# Patient Record
Sex: FEMALE | Race: WHITE | NOT HISPANIC OR LATINO | Employment: OTHER | URBAN - NONMETROPOLITAN AREA
[De-identification: names, ages, dates, MRNs, and addresses within clinical notes are randomized per-mention and may not be internally consistent; named-entity substitution may affect disease eponyms.]

---

## 2017-08-10 ENCOUNTER — HOSPITAL ENCOUNTER (EMERGENCY)
Facility: HOSPITAL | Age: 62
Discharge: HOME | End: 2017-08-10
Attending: EMERGENCY MEDICINE | Admitting: EMERGENCY MEDICINE

## 2017-08-10 VITALS
RESPIRATION RATE: 18 BRPM | WEIGHT: 186 LBS | OXYGEN SATURATION: 98 % | TEMPERATURE: 98.6 F | HEIGHT: 61 IN | SYSTOLIC BLOOD PRESSURE: 203 MMHG | HEART RATE: 74 BPM | DIASTOLIC BLOOD PRESSURE: 90 MMHG | BODY MASS INDEX: 35.12 KG/M2

## 2017-08-10 DIAGNOSIS — I10 POORLY-CONTROLLED HYPERTENSION: Primary | ICD-10-CM

## 2017-08-10 PROCEDURE — 99283 EMERGENCY DEPT VISIT LOW MDM: CPT

## 2017-08-10 RX ORDER — TRAMADOL HYDROCHLORIDE 50 MG/1
50 TABLET ORAL EVERY 8 HOURS PRN
COMMUNITY
End: 2017-08-25

## 2017-08-10 RX ORDER — LISINOPRIL 40 MG/1
40 TABLET ORAL DAILY
Qty: 30 TABLET | Refills: 0 | Status: SHIPPED | OUTPATIENT
Start: 2017-08-10 | End: 2017-08-24

## 2017-08-10 RX ORDER — FLUOXETINE HYDROCHLORIDE 20 MG/1
60 CAPSULE ORAL DAILY
COMMUNITY
End: 2017-11-30 | Stop reason: SDUPTHER

## 2017-08-10 RX ORDER — CLONIDINE HYDROCHLORIDE 0.1 MG/1
0.1 TABLET ORAL ONCE
Status: COMPLETED | OUTPATIENT
Start: 2017-08-10 | End: 2017-08-10

## 2017-08-10 RX ORDER — LISINOPRIL 40 MG/1
40 TABLET ORAL DAILY
COMMUNITY
End: 2017-08-25 | Stop reason: SDUPTHER

## 2017-08-10 RX ADMIN — CLONIDINE HYDROCHLORIDE 0.1 MG: 0.1 TABLET ORAL at 22:10

## 2017-08-11 NOTE — ED PROVIDER NOTES
Subjective   HPI Comments: 62-year-old female with poorly controlled hypertension.  She states that she just recently changed insurances and she has not established a doctor yet, she is on lisinopril and another hypertension medication that she cannot remember.  She is noncompliant with her controlled.  She states that even when she is on blood pressure medicine and her systolic runs 190s.  She states that she does have a headache but denies any chest pain blurred vision or dizziness.      History provided by:  Patient   used: No        Review of Systems   Cardiovascular:        Htn   Neurological: Positive for headaches.   All other systems reviewed and are negative.      Past Medical History:   Diagnosis Date   • Arthritis    • Depression    • Diabetes mellitus    • Hyperlipidemia    • Hypertension    • Myocardial infarction        Allergies   Allergen Reactions   • Sulfa Antibiotics Rash       Past Surgical History:   Procedure Laterality Date   • BONE MARROW ASPIRATION     • HYSTERECTOMY     • NOSE SURGERY         History reviewed. No pertinent family history.    Social History     Social History   • Marital status: Single     Spouse name: N/A   • Number of children: N/A   • Years of education: N/A     Social History Main Topics   • Smoking status: Former Smoker   • Smokeless tobacco: None   • Alcohol use No   • Drug use: No   • Sexual activity: Not Asked     Other Topics Concern   • None     Social History Narrative   • None           Objective   Physical Exam   Constitutional: She is oriented to person, place, and time. She appears well-developed and well-nourished.   HENT:   Head: Normocephalic.   Eyes: EOM are normal. Pupils are equal, round, and reactive to light.   Neck: Normal range of motion. Neck supple.   Cardiovascular: Normal rate and regular rhythm.    Pulmonary/Chest: Effort normal and breath sounds normal.   Abdominal: Soft. Bowel sounds are normal.   Musculoskeletal: Normal  range of motion.   Neurological: She is alert and oriented to person, place, and time. She has normal reflexes.   Skin: Skin is warm and dry.   Psychiatric: She has a normal mood and affect.   Nursing note and vitals reviewed.      Procedures         ED Course  ED Course                  MDM    Final diagnoses:   Poorly-controlled hypertension            Jamari Mata Jr., ABRAHAM  08/10/17 [id number]

## 2017-08-11 NOTE — ED NOTES
ANAYELI Kauffman states okay to medicate pt and then d/c. Pt reports comfortable to go home. No complaints/concerns @ d/c.      Meme Lewis RN  08/10/17 [id number]

## 2017-08-24 ENCOUNTER — OFFICE VISIT (OUTPATIENT)
Dept: INTERNAL MEDICINE | Facility: CLINIC | Age: 62
End: 2017-08-24

## 2017-08-24 VITALS
HEART RATE: 78 BPM | SYSTOLIC BLOOD PRESSURE: 174 MMHG | DIASTOLIC BLOOD PRESSURE: 90 MMHG | HEIGHT: 61 IN | RESPIRATION RATE: 20 BRPM | BODY MASS INDEX: 34.93 KG/M2 | OXYGEN SATURATION: 99 % | WEIGHT: 185 LBS

## 2017-08-24 DIAGNOSIS — E11.9 TYPE 2 DIABETES MELLITUS WITHOUT COMPLICATION, WITHOUT LONG-TERM CURRENT USE OF INSULIN (HCC): ICD-10-CM

## 2017-08-24 DIAGNOSIS — J45.30 MILD PERSISTENT ASTHMA WITHOUT COMPLICATION: ICD-10-CM

## 2017-08-24 DIAGNOSIS — I10 ESSENTIAL HYPERTENSION: Primary | ICD-10-CM

## 2017-08-24 LAB
ALBUMIN SERPL-MCNC: 4.6 G/DL (ref 3.2–4.8)
ALBUMIN/GLOB SERPL: 1.9 G/DL (ref 1.5–2.5)
ALP SERPL-CCNC: 103 U/L (ref 25–100)
ALT SERPL W P-5'-P-CCNC: 38 U/L (ref 7–40)
ANION GAP SERPL CALCULATED.3IONS-SCNC: 11 MMOL/L (ref 3–11)
ARTICHOKE IGE QN: 147 MG/DL (ref 0–130)
AST SERPL-CCNC: 32 U/L (ref 0–33)
BILIRUB SERPL-MCNC: 0.6 MG/DL (ref 0.3–1.2)
BUN BLD-MCNC: 17 MG/DL (ref 9–23)
BUN/CREAT SERPL: 18.9 (ref 7–25)
CALCIUM SPEC-SCNC: 9.6 MG/DL (ref 8.7–10.4)
CHLORIDE SERPL-SCNC: 103 MMOL/L (ref 99–109)
CHOLEST SERPL-MCNC: 224 MG/DL (ref 0–200)
CO2 SERPL-SCNC: 26 MMOL/L (ref 20–31)
CREAT BLD-MCNC: 0.9 MG/DL (ref 0.6–1.3)
GFR SERPL CREATININE-BSD FRML MDRD: 64 ML/MIN/1.73
GLOBULIN UR ELPH-MCNC: 2.4 GM/DL
GLUCOSE BLD-MCNC: 89 MG/DL (ref 70–100)
HBA1C MFR BLD: 6.1 %
HDLC SERPL-MCNC: 44 MG/DL (ref 40–60)
POTASSIUM BLD-SCNC: 4.5 MMOL/L (ref 3.5–5.5)
PROT SERPL-MCNC: 7 G/DL (ref 5.7–8.2)
SODIUM BLD-SCNC: 140 MMOL/L (ref 132–146)
TRIGL SERPL-MCNC: 223 MG/DL (ref 0–150)

## 2017-08-24 PROCEDURE — 99204 OFFICE O/P NEW MOD 45 MIN: CPT | Performed by: FAMILY MEDICINE

## 2017-08-24 PROCEDURE — 83036 HEMOGLOBIN GLYCOSYLATED A1C: CPT | Performed by: FAMILY MEDICINE

## 2017-08-24 PROCEDURE — 80053 COMPREHEN METABOLIC PANEL: CPT | Performed by: FAMILY MEDICINE

## 2017-08-24 PROCEDURE — 80061 LIPID PANEL: CPT | Performed by: FAMILY MEDICINE

## 2017-08-24 RX ORDER — HYDROCHLOROTHIAZIDE 25 MG/1
25 TABLET ORAL DAILY
COMMUNITY
End: 2017-08-25 | Stop reason: SDUPTHER

## 2017-08-24 RX ORDER — ALBUTEROL SULFATE 90 UG/1
2 AEROSOL, METERED RESPIRATORY (INHALATION) EVERY 4 HOURS PRN
COMMUNITY
End: 2017-10-04 | Stop reason: SDUPTHER

## 2017-08-24 NOTE — PATIENT INSTRUCTIONS
It was nice meeting you today!  I look forward to getting to know you and helping you with your primary care needs.  Please go to the lab before you leave to have your labs drawn.  You will be called or receive a letter with your results.  I will refill your medications and prescribe Breo.  Your medications have been electronically prescribed and will be at your pharmacy.  Please pick them up and take as directed.  Use your Breo once a day.  Please sign a consent to request medical records from your previous primary care provider.  Recommend that you sign up for My Chart (our patient portal).  You will be able to access lab results, request appointments and send our office messages.  If you are interested, please ask for My Chart access information at the check out desk.  Please schedule an appointment for your annual physical exam (if you have not already had one for this year) at your earliest convenience.  Please follow-up as indicated.  Return to the clinic sooner if your symptoms worsen or if you have any other concerns.

## 2017-08-24 NOTE — PROGRESS NOTES
Subjective   Martha Murray is a 62 y.o. female who presents to the clinic to Establish Care      History of Present Illness  She reports that her previous PCP was at .  Transferred care due to patient being dismissed from her last PCP office.  Specialists include: Dr. Ontiveros (sports medicine)  OTC medications include: Aleve PRN    Patient has a history of chronic hypertension and diabetes, diagnosed between 3-5 years ago.  Currently taking HCTZ and was previously taking Lisinopril for her blood pressure.  Was also previously taking Metformin, but is not currently taking it.  Has been trying to control her blood sugar with diet.  Admits that she does not check her blood sugar or pressure at home, but states that she will check her blood pressure at [facility] on occasion.  States that she is very active at work.    Also reports a history of chronic asthma.  Currently using Symbicort and Albuterol PRN.  Admits that she will sometimes use her Symbicort more often if she is wheezing more.  Symptoms tend to worsen with exertion.  Previous smoker, quit about 20 years ago.    Review of Systems   Constitutional: Negative for appetite change, chills, fever and unexpected weight change.   HENT: Negative for congestion, ear pain, rhinorrhea, sinus pressure and sore throat.    Eyes: Negative for pain and visual disturbance.   Respiratory: Positive for cough (intermittent), shortness of breath (intermittent) and wheezing (intermittent).    Cardiovascular: Negative for chest pain and palpitations.   Gastrointestinal: Negative for abdominal pain, constipation, diarrhea, nausea and vomiting.   Genitourinary: Negative for decreased urine volume, dysuria and hematuria.   Musculoskeletal: Positive for arthralgias (chronic) and joint swelling (chronic). Negative for back pain and gait problem.   Skin: Negative for pallor and rash.   Neurological: Negative for dizziness, syncope and headaches.   Psychiatric/Behavioral: Negative for  self-injury and suicidal ideas. The patient is not nervous/anxious.         Positive for depressed mood.     All other systems reviewed and are negative.    Past Medical History:   Diagnosis Date   • Arthritis    • Asthma    • Depression    • Diabetes mellitus    • History of heart attack    • Hyperlipidemia    • Hypertension        Family History   Problem Relation Age of Onset   • Arthritis Maternal Grandmother    • Hypertension Mother    • Hyperlipidemia Mother    • Thyroid disease Mother    • Diabetes Sister    • Crohn's disease Sister    • Hypertension Sister    • Heart attack Maternal Grandfather        Past Surgical History:   Procedure Laterality Date   • BILATERAL BREAST REDUCTION     • BONE MARROW ASPIRATION     • HYSTERECTOMY  1990    x 2   • SINUS SURGERY  2003   • TONSILLECTOMY         Social History     Social History   • Marital status: Single     Spouse name: N/A   • Number of children: N/A   • Years of education: N/A     Occupational History   • Not on file.     Social History Main Topics   • Smoking status: Former Smoker     Packs/day: 2.00     Years: 30.00     Types: Cigarettes     Quit date: 1997   • Smokeless tobacco: Never Used   • Alcohol use No   • Drug use: No   • Sexual activity: Not on file     Other Topics Concern   • Not on file     Social History Narrative   • No narrative on file         Current Outpatient Prescriptions:   •  albuterol (PROVENTIL HFA;VENTOLIN HFA) 108 (90 Base) MCG/ACT inhaler, Inhale 2 puffs Every 4 (Four) Hours As Needed for Wheezing., Disp: , Rfl:   •  FLUoxetine (PROzac) 20 MG capsule, Take 60 mg by mouth Daily., Disp: , Rfl:   •  hydrochlorothiazide (HYDRODIURIL) 25 MG tablet, Take 1 tablet by mouth Daily., Disp: 30 tablet, Rfl: 2  •  lisinopril (PRINIVIL,ZESTRIL) 40 MG tablet, Take 1 tablet by mouth Daily., Disp: 30 tablet, Rfl: 2  •  Fluticasone Furoate-Vilanterol (BREO ELLIPTA) 100-25 MCG/INH aerosol powder , Inhale 1 puff Daily., Disp: 14 each, Rfl:  "0    Objective   /90  Pulse 78  Resp 20  Ht 61\" (154.9 cm)  Wt 185 lb (83.9 kg)  SpO2 99%  BMI 34.96 kg/m2     Physical Exam   Constitutional: She is oriented to person, place, and time. She appears well-developed and well-nourished.   No acute distress.   HENT:   Head: Normocephalic and atraumatic.   Right Ear: External ear normal.   Left Ear: External ear normal.   Nose: Nose normal.   Mouth/Throat: Oropharynx is clear and moist.   Eyes: Conjunctivae and EOM are normal. Pupils are equal, round, and reactive to light.   Neck: Normal range of motion. Neck supple.   Cardiovascular: Normal rate, regular rhythm, normal heart sounds and intact distal pulses.    Pulmonary/Chest: Effort normal and breath sounds normal. No respiratory distress. She has no wheezes.   Abdominal: Soft. Bowel sounds are normal. There is no tenderness.   Musculoskeletal: Normal range of motion.   Normal gait.   Neurological: She is alert and oriented to person, place, and time.   Skin: Skin is warm and dry.   Psychiatric: She has a normal mood and affect. Her behavior is normal.   Vitals reviewed.      Assessment/Plan   Martha was seen today for establish care.    Diagnoses and all orders for this visit:    Essential hypertension  -     lisinopril (PRINIVIL,ZESTRIL) 40 MG tablet; Take 1 tablet by mouth Daily.  -     hydrochlorothiazide (HYDRODIURIL) 25 MG tablet; Take 1 tablet by mouth Daily.    Uncontrolled.  Blood pressure today in office was 174/90.  Patient states that she is only taking HCTZ at this time.  Will refill both of her medications today.  Advised patient to return to the clinic in 4 weeks for recheck or sooner if their blood pressure worsens.    Mild persistent asthma without complication  -     Fluticasone Furoate-Vilanterol (BREO ELLIPTA) 100-25 MCG/INH aerosol powder ; Inhale 1 puff Daily.    Uncontrolled symptoms per patient report.  A sample of Breo was provided for the patient today.  Will consider continuing " this if insurance will cover.  Advised patient to return to the clinic in 4 weeks for recheck or sooner if their symptoms worsen.    Type 2 diabetes mellitus without complication, without long-term current use of insulin  -     Comprehensive Metabolic Panel  -     Lipid Panel  -     POC Glycosylated Hemoglobin (Hb A1C)    Per patient report.  Patient is not currently taking any medications.  The above labs were ordered today.  Will restart Metformin if labs indicate.

## 2017-08-25 ENCOUNTER — TELEPHONE (OUTPATIENT)
Dept: INTERNAL MEDICINE | Facility: CLINIC | Age: 62
End: 2017-08-25

## 2017-08-25 PROBLEM — J45.30 MILD PERSISTENT ASTHMA WITHOUT COMPLICATION: Status: ACTIVE | Noted: 2017-08-25

## 2017-08-25 PROBLEM — E11.9 TYPE 2 DIABETES MELLITUS WITHOUT COMPLICATION, WITHOUT LONG-TERM CURRENT USE OF INSULIN (HCC): Status: ACTIVE | Noted: 2017-08-25

## 2017-08-25 PROBLEM — I10 ESSENTIAL HYPERTENSION: Status: ACTIVE | Noted: 2017-08-25

## 2017-08-25 RX ORDER — HYDROCHLOROTHIAZIDE 25 MG/1
25 TABLET ORAL DAILY
Qty: 30 TABLET | Refills: 2 | Status: SHIPPED | OUTPATIENT
Start: 2017-08-25 | End: 2017-11-30 | Stop reason: SDUPTHER

## 2017-08-25 RX ORDER — LISINOPRIL 40 MG/1
40 TABLET ORAL DAILY
Qty: 30 TABLET | Refills: 2 | Status: SHIPPED | OUTPATIENT
Start: 2017-08-25 | End: 2017-11-30 | Stop reason: SDUPTHER

## 2017-08-25 NOTE — TELEPHONE ENCOUNTER
----- Message from Billie Carvajal MD sent at 8/25/2017  8:24 AM EDT -----  Please call the patient regarding her recent lab results.  Her cholesterol was elevated.  Considering this, her hypertension, diabetes and age, she is at increased risk for developing heart disease.  I recommend that she start taking a baby aspirin and a cholesterol medication called Atorvastatin.  If she is OK with this, I will prescribe them for her.  Her Hgb A1C was 6.1, meaning that her diabetes is currently well controlled with her diet.  Recommend that she follow-up in 4 weeks for asthma recheck and we can also discuss her lab results further.  Thanks.

## 2017-08-28 DIAGNOSIS — E78.2 MIXED HYPERLIPIDEMIA: Primary | ICD-10-CM

## 2017-08-28 PROBLEM — E78.5 HYPERLIPIDEMIA: Status: ACTIVE | Noted: 2017-08-28

## 2017-08-28 RX ORDER — ASPIRIN 81 MG/1
81 TABLET ORAL DAILY
Qty: 30 TABLET | Refills: 5 | Status: SHIPPED | OUTPATIENT
Start: 2017-08-28 | End: 2019-02-01 | Stop reason: SDUPTHER

## 2017-08-28 RX ORDER — ATORVASTATIN CALCIUM 40 MG/1
40 TABLET, FILM COATED ORAL DAILY
Qty: 30 TABLET | Refills: 2 | Status: SHIPPED | OUTPATIENT
Start: 2017-08-28 | End: 2017-11-30 | Stop reason: SDUPTHER

## 2017-09-26 ENCOUNTER — TELEPHONE (OUTPATIENT)
Dept: INTERNAL MEDICINE | Facility: CLINIC | Age: 62
End: 2017-09-26

## 2017-09-26 DIAGNOSIS — J45.30 MILD PERSISTENT ASTHMA WITHOUT COMPLICATION: ICD-10-CM

## 2017-10-04 ENCOUNTER — OFFICE VISIT (OUTPATIENT)
Dept: INTERNAL MEDICINE | Facility: CLINIC | Age: 62
End: 2017-10-04

## 2017-10-04 ENCOUNTER — HOSPITAL ENCOUNTER (OUTPATIENT)
Dept: GENERAL RADIOLOGY | Facility: HOSPITAL | Age: 62
Discharge: HOME | End: 2017-10-04
Admitting: FAMILY MEDICINE

## 2017-10-04 VITALS
HEIGHT: 61 IN | HEART RATE: 78 BPM | SYSTOLIC BLOOD PRESSURE: 150 MMHG | BODY MASS INDEX: 34.93 KG/M2 | DIASTOLIC BLOOD PRESSURE: 100 MMHG | OXYGEN SATURATION: 100 % | RESPIRATION RATE: 20 BRPM | WEIGHT: 185 LBS

## 2017-10-04 DIAGNOSIS — J98.9 FEBRILE RESPIRATORY ILLNESS: Primary | ICD-10-CM

## 2017-10-04 DIAGNOSIS — J45.31 MILD PERSISTENT ASTHMA WITH ACUTE EXACERBATION: ICD-10-CM

## 2017-10-04 DIAGNOSIS — R50.9 FEBRILE RESPIRATORY ILLNESS: Primary | ICD-10-CM

## 2017-10-04 PROCEDURE — 71020 HC CHEST PA AND LATERAL: CPT

## 2017-10-04 PROCEDURE — 99214 OFFICE O/P EST MOD 30 MIN: CPT | Performed by: FAMILY MEDICINE

## 2017-10-04 RX ORDER — PREDNISONE 20 MG/1
40 TABLET ORAL DAILY
Qty: 10 TABLET | Refills: 0 | Status: SHIPPED | OUTPATIENT
Start: 2017-10-04 | End: 2017-11-30

## 2017-10-04 RX ORDER — ALBUTEROL SULFATE 90 UG/1
2 AEROSOL, METERED RESPIRATORY (INHALATION) EVERY 6 HOURS PRN
Qty: 1 INHALER | Refills: 2 | Status: SHIPPED | OUTPATIENT
Start: 2017-10-04 | End: 2019-02-01 | Stop reason: SDUPTHER

## 2017-10-04 RX ORDER — BUDESONIDE AND FORMOTEROL FUMARATE DIHYDRATE 80; 4.5 UG/1; UG/1
2 AEROSOL RESPIRATORY (INHALATION)
Qty: 1 INHALER | Refills: 2 | Status: SHIPPED | OUTPATIENT
Start: 2017-10-04 | End: 2019-02-01 | Stop reason: SDUPTHER

## 2017-10-04 RX ORDER — AZITHROMYCIN 250 MG/1
TABLET, FILM COATED ORAL
Qty: 6 TABLET | Refills: 0 | Status: SHIPPED | OUTPATIENT
Start: 2017-10-04 | End: 2017-11-30

## 2017-10-04 NOTE — PROGRESS NOTES
"Subjective   Martha Murray is a 62 y.o. female who presents with complaint of Cough and Wheezing      History of Present Illness   Patient presents with complaint of a productive cough (brown and clear sputum) associated with fever (up to 101 F), chest pain with coughing, wheezing, dyspnea, congestion, runny nose and sore throat.  Problem started one week ago and has been worsening.  Has a history of chronic asthma and seasonal allergies.  States that she ran out of her Albuterol inhaler, but was using the Breo inhaler that was given during her last office visit.  Reports multiple recent sick contacts at work, but denies any known influenza contacts.  Has tried taking DayQuil, which has not significantly helped.  Denies active smoking, but admits to intermittent passive smoke exposure.    Review of Systems   Constitutional: Positive for fever. Negative for chills.   HENT: Positive for congestion, rhinorrhea and sore throat. Negative for ear discharge and ear pain.    Respiratory: Positive for cough (productive), shortness of breath and wheezing.    Cardiovascular: Positive for chest pain (with coughing). Negative for palpitations.   Gastrointestinal: Negative for abdominal pain, constipation, diarrhea, nausea and vomiting.   Neurological: Negative for dizziness, syncope and headaches.     The following portions of the patient's history were reviewed and updated as appropriate: current medications, past medical history, past social history and problem list.    Objective   /100  Pulse 78  Resp 20  Ht 61\" (154.9 cm)  Wt 185 lb (83.9 kg)  SpO2 100%  BMI 34.96 kg/m2     Physical Exam   Constitutional: She is oriented to person, place, and time. She appears well-developed and well-nourished.   No acute distress.   HENT:   Head: Normocephalic and atraumatic.   Right Ear: External ear normal.   Left Ear: External ear normal.   Nose: Mucosal edema present.   Mouth/Throat: Oropharynx is clear and moist.   Eyes: " Conjunctivae and EOM are normal. Pupils are equal, round, and reactive to light.   Neck: Normal range of motion. Neck supple.   Cardiovascular: Normal rate, regular rhythm, normal heart sounds and intact distal pulses.    Pulmonary/Chest: Effort normal. No respiratory distress. She has wheezes (mild, bilaterally).   Abdominal: Soft. Bowel sounds are normal. There is no tenderness.   Musculoskeletal: Normal range of motion.   Normal gait.   Lymphadenopathy:        Head (right side): Submandibular adenopathy present.        Head (left side): Submandibular adenopathy present.   Neurological: She is alert and oriented to person, place, and time.   Skin: Skin is warm and dry.   Psychiatric: She has a normal mood and affect. Her behavior is normal.   Vitals reviewed.      Assessment/Plan   Martha was seen today for cough and wheezing.    Diagnoses and all orders for this visit:    Febrile respiratory illness  -     XR Chest PA & Lateral  -     azithromycin (ZITHROMAX Z-BIANCA) 250 MG tablet; Take 2 tablets the first day, then 1 tablet daily for 4 days.    Symptoms are concerning for possible pneumonia.  Will order a chest xray today to further evaluate her symptoms and empirically treat with the above antibiotic today.  Further management as per below.    Mild persistent asthma with acute exacerbation  -     albuterol (PROVENTIL HFA;VENTOLIN HFA) 108 (90 Base) MCG/ACT inhaler; Inhale 2 puffs Every 6 (Six) Hours As Needed for Wheezing.  -     predniSONE (DELTASONE) 20 MG tablet; Take 2 tablets by mouth Daily.  -     budesonide-formoterol (SYMBICORT) 80-4.5 MCG/ACT inhaler; Inhale 2 puffs 2 (Two) Times a Day.    Likely due to the above issue.  Will refill her Albuterol as well as prescribe steroids and Symbicort (due to insurance preference).  Will stop Breo today.  Recommended that patient use her Albuterol inhaler three times a day for the next 5 days then go back to as needed.  Advised patient to return to the clinic if  their symptoms worsen despite the above treatments.

## 2017-10-04 NOTE — PATIENT INSTRUCTIONS
You are likely having an acute exacerbation of your asthma.  I have prescribed steroids and an antibiotic (Azithromycin) for you.  I have ordered a chest xray for you to rule out possible pneumonia.  Please go to the [facility] at [address] to get this done.  You do not need an appointment.  I have also refilled your Albuterol.  Please use this three times a day for the next 5 days, then go back to as needed.  I have also given you a sample of Symbicort and refilled this for you.  Please use two puffs, twice a day, every day.  Your medications have been electronically prescribed and will be at your pharmacy.  Please pick them up and take as directed.  Please follow-up as indicated.  Return to the clinic sooner if your symptoms worsen or if you have any other concerns.

## 2017-11-06 ENCOUNTER — HOSPITAL ENCOUNTER (OUTPATIENT)
Dept: GENERAL RADIOLOGY | Facility: HOSPITAL | Age: 62
Discharge: HOME | End: 2017-11-06
Admitting: FAMILY MEDICINE

## 2017-11-06 ENCOUNTER — OFFICE VISIT (OUTPATIENT)
Dept: INTERNAL MEDICINE | Facility: CLINIC | Age: 62
End: 2017-11-06

## 2017-11-06 VITALS
DIASTOLIC BLOOD PRESSURE: 90 MMHG | BODY MASS INDEX: 34.93 KG/M2 | WEIGHT: 185 LBS | HEIGHT: 61 IN | SYSTOLIC BLOOD PRESSURE: 128 MMHG | TEMPERATURE: 97.1 F

## 2017-11-06 DIAGNOSIS — M25.561 CHRONIC PAIN OF BOTH KNEES: Primary | ICD-10-CM

## 2017-11-06 DIAGNOSIS — M25.562 CHRONIC PAIN OF BOTH KNEES: Primary | ICD-10-CM

## 2017-11-06 DIAGNOSIS — G89.29 CHRONIC PAIN OF BOTH KNEES: Primary | ICD-10-CM

## 2017-11-06 DIAGNOSIS — J45.20 MILD INTERMITTENT ASTHMA WITHOUT COMPLICATION: ICD-10-CM

## 2017-11-06 PROCEDURE — 99214 OFFICE O/P EST MOD 30 MIN: CPT | Performed by: FAMILY MEDICINE

## 2017-11-06 PROCEDURE — 73560 X-RAY EXAM OF KNEE 1 OR 2: CPT

## 2017-11-06 NOTE — PROGRESS NOTES
Subjective   Martha Murray is a 62 y.o. female who presents to follow-up for Asthma      History of Present Illness   She was last seen in the office on 10/04/17 for acute asthma exacerbation.  Previous intervention/treatment includes: prescribed 5 day course of steroids, Symbicort and refilled Albuterol.  Reports better symptoms.    States that she has been using her Symbicort as directed and denies any intolerable side effects.  Also reports taking her steroids as directed.  States that she has been using her Albuterol inhaler less than twice a week.  Reports that her symptoms have improved significantly.    Also reports complaint of chronic bilateral knee pain.  Pain is on the medial sides of both knees and is associated with intermittent knee swelling.  Denies associated erythema, leg numbness or weakness.  Symptoms have been worsening over the past 7-8 months.  States that her pain worsens with excessive walking and states that she has to sit down and rest multiple times a day.  Pain also bothers her a night.  Denies falling, but states that her knees will sometimes give out from under her, but she will catch herself before falling.  Takes Aleve twice a day as well as uses OTC topical medications, which mildly helps.  Has done a steroid injection in her left knee one time, which helped for about one month.  Has also tried using gel insoles and has done physical therapy in the past, which did not significantly help.  Last imaging studies were reportedly done earlier this year.  Was previously seeing an orthopedic surgeon, but states that she switched insurances and needs a new referral.    Review of Systems   Constitutional: Negative for chills and fever.   Respiratory: Positive for cough (improved), shortness of breath (improved) and wheezing (improved).    Cardiovascular: Negative for chest pain and palpitations.   Gastrointestinal: Negative for abdominal pain, constipation, diarrhea, nausea and vomiting.  "  Musculoskeletal: Positive for arthralgias (chronic bilateral knees) and joint swelling (chronic bilateral knees).   Skin: Negative for color change.   Neurological: Negative for dizziness, syncope and headaches.     The following portions of the patient's history were reviewed and updated as appropriate: current medications, past medical history and problem list.    Objective   /90  Temp 97.1 °F (36.2 °C)  Ht 61\" (154.9 cm)  Wt 185 lb (83.9 kg)  BMI 34.96 kg/m2     Physical Exam   Constitutional: She is oriented to person, place, and time. She appears well-developed and well-nourished.   No acute distress.   HENT:   Head: Normocephalic and atraumatic.   Eyes: Conjunctivae and EOM are normal.   Neck: Normal range of motion.   Cardiovascular: Normal rate, regular rhythm, normal heart sounds and intact distal pulses.    Pulmonary/Chest: Effort normal and breath sounds normal. She has no wheezes.   Abdominal: Soft. Bowel sounds are normal. There is no tenderness.   Musculoskeletal: Normal range of motion.        Right knee: She exhibits normal range of motion, no swelling, no deformity and normal patellar mobility. No tenderness found.        Left knee: She exhibits normal range of motion, no swelling, no deformity and normal patellar mobility. No tenderness found.   Moves all extremities. Popping sensation with right knee flexion and extension.   Neurological: She is alert and oriented to person, place, and time. She has normal strength. No sensory deficit.   Skin: Skin is warm and dry.   Psychiatric: She has a normal mood and affect. Her behavior is normal.   Vitals reviewed.      Assessment/Plan   Martha was seen today for asthma.    Diagnoses and all orders for this visit:    Chronic pain of both knees  -     Ambulatory Referral to Orthopedic Surgery  -     XR Knee 1 or 2 View Left  -     XR Knee 1 or 2 View Right  -     diclofenac (VOLTAREN) 1 % gel gel; Apply 4 g topically 4 (Four) Times a Day As Needed " (joint pain).    Will order bilateral knee xrays today to further evaluate her symptoms.  Will also prescribe the above medication and referral for orthopedic surgery to aid with further evaluation and management today.    Mild intermittent asthma without complication    Improved exacerbation symptoms per patient report.  Will continue Symbicort and Albuterol PRN.  Advised patient to return to the clinic if their symptoms recur despite the above treatments.

## 2017-11-06 NOTE — PATIENT INSTRUCTIONS
I have ordered a orthopedic surgery referral for you.  You will be called to set up an appointment with this specialist.  In the mean time, I have prescribed a medication for your knee pain called Voltaren gel.  Your new medication has been electronically prescribed and will be at your pharmacy.  Please pick this up and take as directed.  I have ordered knee xrays for you.  Please go to the [facility] at [facility] to get this done.  You do not need an appointment.  Please continue taking your current medications as directed.  Please follow-up as indicated.  Return to the clinic sooner if your symptoms worsen or if you have any other concerns.

## 2017-11-14 ENCOUNTER — OFFICE VISIT (OUTPATIENT)
Dept: ORTHOPEDIC SURGERY | Facility: CLINIC | Age: 62
End: 2017-11-14

## 2017-11-14 VITALS
DIASTOLIC BLOOD PRESSURE: 103 MMHG | HEIGHT: 61 IN | HEART RATE: 70 BPM | SYSTOLIC BLOOD PRESSURE: 180 MMHG | BODY MASS INDEX: 34.93 KG/M2 | WEIGHT: 185 LBS

## 2017-11-14 DIAGNOSIS — M17.0 PRIMARY OSTEOARTHRITIS OF BOTH KNEES: ICD-10-CM

## 2017-11-14 DIAGNOSIS — M17.10 PATELLOFEMORAL ARTHRITIS: Primary | ICD-10-CM

## 2017-11-14 PROCEDURE — 20610 DRAIN/INJ JOINT/BURSA W/O US: CPT | Performed by: ORTHOPAEDIC SURGERY

## 2017-11-14 PROCEDURE — 99244 OFF/OP CNSLTJ NEW/EST MOD 40: CPT | Performed by: ORTHOPAEDIC SURGERY

## 2017-11-14 RX ORDER — LIDOCAINE HYDROCHLORIDE 10 MG/ML
3 INJECTION, SOLUTION INFILTRATION; PERINEURAL
Status: COMPLETED | OUTPATIENT
Start: 2017-11-14 | End: 2017-11-14

## 2017-11-14 RX ORDER — TRIAMCINOLONE ACETONIDE 40 MG/ML
80 INJECTION, SUSPENSION INTRA-ARTICULAR; INTRAMUSCULAR
Status: COMPLETED | OUTPATIENT
Start: 2017-11-14 | End: 2017-11-14

## 2017-11-14 RX ORDER — MELOXICAM 7.5 MG/1
TABLET ORAL
Qty: 60 TABLET | Refills: 0 | Status: SHIPPED | OUTPATIENT
Start: 2017-11-14 | End: 2019-04-17

## 2017-11-14 RX ADMIN — LIDOCAINE HYDROCHLORIDE 3 ML: 10 INJECTION, SOLUTION INFILTRATION; PERINEURAL at 08:30

## 2017-11-14 RX ADMIN — LIDOCAINE HYDROCHLORIDE 3 ML: 10 INJECTION, SOLUTION INFILTRATION; PERINEURAL at 08:32

## 2017-11-14 RX ADMIN — TRIAMCINOLONE ACETONIDE 80 MG: 40 INJECTION, SUSPENSION INTRA-ARTICULAR; INTRAMUSCULAR at 08:32

## 2017-11-14 RX ADMIN — TRIAMCINOLONE ACETONIDE 80 MG: 40 INJECTION, SUSPENSION INTRA-ARTICULAR; INTRAMUSCULAR at 08:30

## 2017-11-14 NOTE — PROGRESS NOTES
Procedure   Large Joint Arthrocentesis  Date/Time: 11/14/2017 8:30 AM  Consent given by: patient  Site marked: site marked  Timeout: Immediately prior to procedure a time out was called to verify the correct patient, procedure, equipment, support staff and site/side marked as required   Supporting Documentation  Indications: pain   Procedure Details  Location: knee - R knee  Preparation: Patient was prepped and draped in the usual sterile fashion  Needle size: 22 G  Approach: anterolateral  Medications administered: 3 mL lidocaine 1 %; 80 mg triamcinolone acetonide 40 MG/ML (3cc bupivicaine .25%, NDC 63270-334-11, lot [id number], exp [id number])  Patient tolerance: patient tolerated the procedure well with no immediate complications    Large Joint Arthrocentesis  Date/Time: 11/14/2017 8:32 AM  Consent given by: patient  Site marked: site marked  Timeout: Immediately prior to procedure a time out was called to verify the correct patient, procedure, equipment, support staff and site/side marked as required   Supporting Documentation  Indications: pain   Procedure Details  Location: knee - L knee  Preparation: Patient was prepped and draped in the usual sterile fashion  Needle size: 22 G  Approach: anterolateral  Medications administered: 3 mL lidocaine 1 %; 80 mg triamcinolone acetonide 40 MG/ML (3cc bupivicaine .25%, NDC 85504-725-25, lot [id number], exp [id number])  Patient tolerance: patient tolerated the procedure well with no immediate complications

## 2017-11-14 NOTE — PROGRESS NOTES
Orthopaedic Clinic Note: Knee New Patient    Chief Complaint   Patient presents with   • Left Knee - Pain   • Right Knee - Pain        HPI    Consult from Billie Carvajal M.D.    Martha Murray is a 62 y.o. female who presents with bilateral knee pain for several years.  The right knee has been symptomatic for 10 years, left knee for 2 years.  She localizes the pain primarily to the anterior aspect of the knee and rates it a 5/10 on the pain scale in the morning which gradually increases to a 10/10 on the pain scale by evening.  Her pain is worse with climbing stairs, rising from a seated position, sitting with knees bent for prolonged period of time, working.  Rest, heat and elevation improve the pain.  Previous treatments have included Advil, therapy, and a left knee cortisone injection in the beginning of 2017 which she states provided approximately 2 months of pain relief.  Despite these treatments, her pain has persisted.  She was referred to me for discussion of treatment options for her ongoing knee pain.     Past Medical History:   Diagnosis Date   • Arthritis    • Asthma    • Depression    • Diabetes mellitus    • History of heart attack    • Hyperlipidemia    • Hypertension       Past Surgical History:   Procedure Laterality Date   • BILATERAL BREAST REDUCTION     • BONE MARROW ASPIRATION     • HYSTERECTOMY  1990    x 2   • SINUS SURGERY  2003   • TONSILLECTOMY        Family History   Problem Relation Age of Onset   • Arthritis Maternal Grandmother    • Hypertension Mother    • Hyperlipidemia Mother    • Thyroid disease Mother    • Diabetes Sister    • Crohn's disease Sister    • Hypertension Sister    • Heart attack Maternal Grandfather      Social History     Social History   • Marital status: Single     Spouse name: N/A   • Number of children: N/A   • Years of education: N/A     Occupational History   • Not on file.     Social History Main Topics   • Smoking status: Former Smoker     Packs/day: 2.00      Years: 30.00     Types: Cigarettes     Quit date: 1997   • Smokeless tobacco: Never Used   • Alcohol use No   • Drug use: No   • Sexual activity: Defer     Other Topics Concern   • Not on file     Social History Narrative      Current Outpatient Prescriptions on File Prior to Visit   Medication Sig Dispense Refill   • albuterol (PROVENTIL HFA;VENTOLIN HFA) 108 (90 Base) MCG/ACT inhaler Inhale 2 puffs Every 6 (Six) Hours As Needed for Wheezing. 1 inhaler 2   • aspirin 81 MG EC tablet Take 1 tablet by mouth Daily. 30 tablet 5   • atorvastatin (LIPITOR) 40 MG tablet Take 1 tablet by mouth Daily. 30 tablet 2   • azithromycin (ZITHROMAX Z-BIANCA) 250 MG tablet Take 2 tablets the first day, then 1 tablet daily for 4 days. 6 tablet 0   • budesonide-formoterol (SYMBICORT) 80-4.5 MCG/ACT inhaler Inhale 2 puffs 2 (Two) Times a Day. 1 inhaler 2   • diclofenac (VOLTAREN) 1 % gel gel Apply 4 g topically 4 (Four) Times a Day As Needed (joint pain). 100 g 0   • FLUoxetine (PROzac) 20 MG capsule Take 60 mg by mouth Daily.     • hydrochlorothiazide (HYDRODIURIL) 25 MG tablet Take 1 tablet by mouth Daily. 30 tablet 2   • lisinopril (PRINIVIL,ZESTRIL) 40 MG tablet Take 1 tablet by mouth Daily. 30 tablet 2   • predniSONE (DELTASONE) 20 MG tablet Take 2 tablets by mouth Daily. 10 tablet 0     No current facility-administered medications on file prior to visit.       Allergies   Allergen Reactions   • Sulfa Antibiotics Rash        Review of Systems   Constitutional: Negative.    HENT: Negative.    Eyes: Positive for pain and itching.   Respiratory: Positive for wheezing.    Cardiovascular: Negative.    Gastrointestinal: Negative.    Endocrine: Negative.    Genitourinary: Negative.    Musculoskeletal: Negative.         Joint Pain    Skin: Negative.    Allergic/Immunologic: Negative.    Neurological: Negative.    Hematological: Negative.    Psychiatric/Behavioral: Negative.         The following portions of the patient's history were reviewed  "and updated as appropriate: allergies, current medications, past family history, past medical history, past social history, past surgical history and problem list.    Physical Exam  Blood pressure (!) 180/103, pulse 70, height 61\" (154.9 cm), weight 185 lb (83.9 kg).    Body mass index is 34.96 kg/(m^2).    GENERAL APPEARANCE: awake, alert & oriented x 3, in no acute distress and well developed, well nourished  PSYCH: normal affect  LUNGS:  breathing nonlabored  EYES: sclera anicteric  CARDIOVASCULAR: palpable dorsalis pedis, palpable posterior tibial bilaterally. Capillary refill less than 2 seconds  EXTREMITIES: no clubbing, cyanosis  GAIT:  Antalgic            Right Lower Extremity Exam:   ----------  Hip Exam  ----------  FLEXION CONTRACTURE: None  FLEXION: 110 degrees  INTERNAL ROTATION: 20 degrees at 90 degrees of flexion   EXTERNAL ROTATION: 40 degrees at 90 degrees of flexion    PAIN WITH HIP MOTION: no  ----------  Knee Exam  ----------  ALIGNMENT: Slight varus, correctable to neutral     RANGE OF MOTION:  Normal (0-130 degrees) with no extensor lag or flexion contracture  LIGAMENTOUS STABILITY:   stable to varus and valgus stress at terminal extension and 30 degrees; slight retensioning of the MCL is appreciated with valgus stress at 30 degrees consistent with medial compartment degeneration     STRENGTH:  4/5 knee flexion, extension. 5/5 ankle dorsiflexion and plantarflexion.     PAIN WITH PALPATION: anterior knee  KNEE EFFUSION: yes, mild effusion  PAIN WITH KNEE ROM: yes, anteriorly with deep knee flexion   PATELLAR CREPITUS: yes, painful and symptomatic  SPECIAL EXAM FINDINGS:  none    REFLEXES:  PATELLAR 2+/4  ACHILLES 2+/4    CLONUS: no  STRAIGHT LEG TEST:   negative    SENSATION TO LIGHT TOUCH:  DEEP PERONEAL/SUPERFICIAL PERONEAL/SURAL/SAPHENOUS/TIBIAL:   intact    EDEMA:  no  ERYTHEMA:  no  WOUNDS/INCISIONS: no        Left Lower Extremity Exam:   ----------  Hip Exam  ----------  FLEXION " CONTRACTURE: None  FLEXION: 110 degrees  INTERNAL ROTATION: 20 degrees at 90 degrees of flexion   EXTERNAL ROTATION: 40 degrees at 90 degrees of flexion    PAIN WITH HIP MOTION: no  ----------  Knee Exam  ----------  ALIGNMENT: Slight varus, correctable to neutral     RANGE OF MOTION:  Normal (0-130 degrees) with no extensor lag or flexion contracture  LIGAMENTOUS STABILITY:   stable to varus and valgus stress at terminal extension and 30 degrees; slight retensioning of the MCL is appreciated with valgus stress at 30 degrees consistent with medial compartment degeneration     STRENGTH:  4/5 knee flexion, extension. 5/5 ankle dorsiflexion and plantarflexion.     PAIN WITH PALPATION: anterior knee  KNEE EFFUSION: yes, mild effusion  PAIN WITH KNEE ROM: yes, anteriorly with deep knee flexion   PATELLAR CREPITUS: yes, painful and symptomatic  SPECIAL EXAM FINDINGS:  none    REFLEXES:  PATELLAR 2+/4  ACHILLES 2+/4    CLONUS: no  STRAIGHT LEG TEST:   negative    SENSATION TO LIGHT TOUCH:  DEEP PERONEAL/SUPERFICIAL PERONEAL/SURAL/SAPHENOUS/TIBIAL:   intact    EDEMA:  no  ERYTHEMA:  no  WOUNDS/INCISIONS: no    _____________________________________________________________________  ______________________________________________________________________    RADIOGRAPHIC FINDINGS:   Radiographs from 11/6/17 of the bilateral knees were personally reviewed.  Radiographs demonstrate mild to moderate varus osteoarthritis of bilateral knees with slight medial compartment joint space narrowing and periarticular osteophytes.  There is moderate to severe patellofemoral arthritis bilaterally with severe narrowing of the joint space.    Assessment/Plan:   Diagnosis Plan   1. Patellofemoral arthritis  meloxicam (MOBIC) 7.5 MG tablet    Ambulatory Referral to Physical Therapy Evaluate and treat   2. Primary osteoarthritis of both knees  meloxicam (MOBIC) 7.5 MG tablet    Ambulatory Referral to Physical Therapy Evaluate and treat     I  discussed with the patient that her pain appears to be primarily to the isolated the patellofemoral joint.  Arthritis in this part of the knee joint has a unreliable outcome with total knee arthroplasty or patellofemoral arthroplasty.  Therefore I recommended conservative intervention.  I'll prescribe her a course of oral anti-inflammatories, physical therapy for treatment of patellofemoral arthritis, and corticosteroid injections the bilateral knees today.  She was agreeable to this plan.  I will see her back in 3 months for repeat assessment.    Procedure Note:  I discussed with the patient the potential benefits of performing a therapeutic injections of the bilateral knees as well as potential risks including but not limited to infection, swelling, pain, bleeding, bruising, nerve/vessel damage, skin color changes, transient elevation in blood glucose levels, and fat atrophy. After informed consent and after the areas were prepped with alcohol, ethyl chloride was used to numb the skin. Via the superolateral approach, 3cc of 1% lidocaine, 3cc of 0.25% marcaine and 2 cc of 40mg/ml of Kenalog were each injected into the bilateral knees. The patient tolerated the procedures well. There were no complications. A sterile dressing was placed over each injection site.    Keaton Aldrich MD  11/14/17  8:22 AM

## 2017-11-28 ENCOUNTER — TREATMENT (OUTPATIENT)
Dept: PHYSICAL THERAPY | Facility: CLINIC | Age: 62
End: 2017-11-28

## 2017-11-28 DIAGNOSIS — M25.561 CHRONIC PAIN OF BOTH KNEES: Primary | ICD-10-CM

## 2017-11-28 DIAGNOSIS — M25.562 CHRONIC PAIN OF BOTH KNEES: Primary | ICD-10-CM

## 2017-11-28 DIAGNOSIS — M17.10 PATELLOFEMORAL ARTHRITIS: ICD-10-CM

## 2017-11-28 DIAGNOSIS — G89.29 CHRONIC PAIN OF BOTH KNEES: Primary | ICD-10-CM

## 2017-11-28 DIAGNOSIS — M17.0 PRIMARY OSTEOARTHRITIS OF BOTH KNEES: ICD-10-CM

## 2017-11-28 PROCEDURE — 97110 THERAPEUTIC EXERCISES: CPT | Performed by: PHYSICAL THERAPIST

## 2017-11-28 PROCEDURE — 97162 PT EVAL MOD COMPLEX 30 MIN: CPT | Performed by: PHYSICAL THERAPIST

## 2017-11-28 PROCEDURE — 97035 APP MDLTY 1+ULTRASOUND EA 15: CPT | Performed by: PHYSICAL THERAPIST

## 2017-11-28 NOTE — PROGRESS NOTES
Physical Therapy Initial Evaluation and Plan of Care        Subjective       Objective       Assessment/Plan    Manual Therapy:    mins  44740;  Therapeutic Exercise:         mins  75023;     Neuromuscular Eric:        mins  70876;    Therapeutic Activity:          mins  85710;     Gait Training:           mins  30119;     Ultrasound:          mins  72996;    Electrical Stimulation:         mins  46867 ( );  Dry Needling          mins self-pay    Timed Treatment:      mins   Total Treatment:        mins    PT SIGNATURE: Tian Ca, PAPI   DATE TREATMENT INITIATED: 11/28/2017    Initial Certification  Certification Period: 2/26/2018  I certify that the therapy services are furnished while this patient is under my care.  The services outlined above are required by this patient, and will be reviewed every 90 days.     PHYSICIAN: Keaton Aldrich MD      DATE:     Please sign and return via fax to  .. Thank you, [facility].

## 2017-11-28 NOTE — PROGRESS NOTES
"Physical Therapy Daily Progress Note        Subjective Evaluation    History of Present Illness  Mechanism of injury: Right knee has been popping for a while but hadn't had pain. Hurt left knee approx 3 years ago. Tripped and landed on knee. Xrays done and was told she \"chipped something in her knee\". Had PT but wasn't helping and stopped.   Was going down steps and pain returned approx 1 1/2 years ago.   2 injections left knee and one right knee. The last were done 2 weeks ago. Helped the right knee more than the left.   Left knee will buckle.       Patient Occupation:  at [employer]   Precautions and Work Restrictions: nonePain  Pain scale at highest: right knee- 5/10, left 6/10.  Location: left knee-anterior knee, posterior, right- medial and lateral  Exacerbated by: left knee- standing, walking, twisting, steps. right knee- stairs, walking,     Patient Goals  Patient goals for therapy: decreased pain           Objective       Tenderness     Additional Tenderness Details  Left medial knee/MCL    Neurological Testing   Sensation     Knee   Left Knee   Intact: light touch    Right Knee   Intact: light touch     Active Range of Motion   Left Knee   Flexion: 112 degrees   Extension: Left knee active extension: -2.     Right Knee   Flexion: 127 degrees   Extension: Right knee active extension: -5.     Patellar Mobility     Additional Patellar Mobility Details  Hypermobile bilateral, crepitus    Strength/Myotome Testing     Left Knee   Flexion: 4  Extension: 4+    Right Knee   Flexion: 4+  Extension: 4+    Tests     Left Knee   Negative anterior drawer, lateral Jamil, medial Jamil, patellar compression, patella-femoral grind, valgus stress test at 0 degrees and varus stress test at 0 degrees.     Right Knee   Negative anterior drawer, lateral Jamil, medial Jamil, patellar compression, patella-femoral grind, valgus stress test at 0 degrees and varus stress test at 0 degrees.       See Exercise, " Manual, and Modality Logs for complete treatment.       Assessment & Plan     Assessment  Impairments: abnormal gait, abnormal or restricted ROM, activity intolerance, impaired physical strength, lacks appropriate home exercise program and pain with function  Assessment details: Pt presents to therapy with bilateral chronic knee pain. Deficits with ROM. Mild weakness at knee and hip. Decreased flexibility. Crepitus. Patella hypomobility. Chief complaint is pain with standing and walking. Ligamentous tests were negative. Jamil negative.   Prognosis: fair  Prognosis details: STG 2wks  1. Pt to be independent with HEP.   2. Pt to rate pain at worst less than or equal to 3/10 for improved function with sleep.    LTG 4 wks  1. Pt to have improved bilateral knee ROM to WNL for improved function.  2. Pt to have improved left knee strength to 5/5 for improved function with stairs.  3. Pt to have improved right hip strength to 5/5 for return to previous activity level.   Functional Limitations: sleeping, walking, uncomfortable because of pain and standing  Plan  Therapy options: will be seen for skilled physical therapy services  Planned modality interventions: cryotherapy, electrical stimulation/Russian stimulation, iontophoresis, microcurrent electrical stimulation, TENS, thermotherapy (hydrocollator packs) and ultrasound  Planned therapy interventions: flexibility, functional ROM exercises, home exercise program, gait training, joint mobilization, manual therapy, soft tissue mobilization, strengthening, stretching and therapeutic activities  Frequency: 2x week  Duration in weeks: 4  Treatment plan discussed with: patient        Progress per Plan of Care           Manual Therapy:         mins  14404;  Therapeutic Exercise:    9     mins  98292;     Neuromuscular Eric:         mins  98831;    Therapeutic Activity:          mins  11976;     Gait Training:            mins  16776;     Ultrasound:     8     mins  73194;     Electrical Stimulation:         mins  38314 ( );  Dry Needling          mins self-pay    Timed Treatment:   17   mins   Total Treatment:     55   mins    Tian Ca PT, DPT  Physical Therapist

## 2017-11-30 ENCOUNTER — OFFICE VISIT (OUTPATIENT)
Dept: INTERNAL MEDICINE | Facility: CLINIC | Age: 62
End: 2017-11-30

## 2017-11-30 VITALS
HEIGHT: 61 IN | OXYGEN SATURATION: 99 % | BODY MASS INDEX: 34.55 KG/M2 | DIASTOLIC BLOOD PRESSURE: 82 MMHG | HEART RATE: 81 BPM | SYSTOLIC BLOOD PRESSURE: 134 MMHG | RESPIRATION RATE: 20 BRPM | WEIGHT: 183 LBS

## 2017-11-30 DIAGNOSIS — E11.9 DIET-CONTROLLED DIABETES MELLITUS (HCC): ICD-10-CM

## 2017-11-30 DIAGNOSIS — F41.9 ANXIETY: ICD-10-CM

## 2017-11-30 DIAGNOSIS — E78.2 MIXED HYPERLIPIDEMIA: ICD-10-CM

## 2017-11-30 DIAGNOSIS — J45.20 MILD INTERMITTENT ASTHMA WITHOUT COMPLICATION: ICD-10-CM

## 2017-11-30 DIAGNOSIS — I10 ESSENTIAL HYPERTENSION: Primary | ICD-10-CM

## 2017-11-30 LAB
ALBUMIN SERPL-MCNC: 4.4 G/DL (ref 3.2–4.8)
ALBUMIN/GLOB SERPL: 2.1 G/DL (ref 1.5–2.5)
ALP SERPL-CCNC: 116 U/L (ref 25–100)
ALT SERPL W P-5'-P-CCNC: 56 U/L (ref 7–40)
ANION GAP SERPL CALCULATED.3IONS-SCNC: 8 MMOL/L (ref 3–11)
ARTICHOKE IGE QN: 127 MG/DL (ref 0–130)
AST SERPL-CCNC: 24 U/L (ref 0–33)
BILIRUB SERPL-MCNC: 1.6 MG/DL (ref 0.3–1.2)
BUN BLD-MCNC: 28 MG/DL (ref 9–23)
BUN/CREAT SERPL: 25.5 (ref 7–25)
CALCIUM SPEC-SCNC: 9.6 MG/DL (ref 8.7–10.4)
CHLORIDE SERPL-SCNC: 108 MMOL/L (ref 99–109)
CHOLEST SERPL-MCNC: 190 MG/DL (ref 0–200)
CO2 SERPL-SCNC: 25 MMOL/L (ref 20–31)
CREAT BLD-MCNC: 1.1 MG/DL (ref 0.6–1.3)
GFR SERPL CREATININE-BSD FRML MDRD: 50 ML/MIN/1.73
GLOBULIN UR ELPH-MCNC: 2.1 GM/DL
GLUCOSE BLD-MCNC: 134 MG/DL (ref 70–100)
HDLC SERPL-MCNC: 43 MG/DL (ref 40–60)
POTASSIUM BLD-SCNC: 5.1 MMOL/L (ref 3.5–5.5)
PROT SERPL-MCNC: 6.5 G/DL (ref 5.7–8.2)
SODIUM BLD-SCNC: 141 MMOL/L (ref 132–146)
TRIGL SERPL-MCNC: 156 MG/DL (ref 0–150)

## 2017-11-30 PROCEDURE — 80061 LIPID PANEL: CPT | Performed by: FAMILY MEDICINE

## 2017-11-30 PROCEDURE — 99214 OFFICE O/P EST MOD 30 MIN: CPT | Performed by: FAMILY MEDICINE

## 2017-11-30 PROCEDURE — 80053 COMPREHEN METABOLIC PANEL: CPT | Performed by: FAMILY MEDICINE

## 2017-11-30 RX ORDER — HYDROCHLOROTHIAZIDE 25 MG/1
25 TABLET ORAL DAILY
Qty: 90 TABLET | Refills: 1 | Status: SHIPPED | OUTPATIENT
Start: 2017-11-30 | End: 2018-02-07 | Stop reason: SDUPTHER

## 2017-11-30 RX ORDER — FLUOXETINE HYDROCHLORIDE 20 MG/1
60 CAPSULE ORAL DAILY
Qty: 270 CAPSULE | Refills: 1 | Status: SHIPPED | OUTPATIENT
Start: 2017-11-30 | End: 2018-02-07 | Stop reason: SDUPTHER

## 2017-11-30 RX ORDER — LISINOPRIL 40 MG/1
40 TABLET ORAL DAILY
Qty: 90 TABLET | Refills: 1 | Status: SHIPPED | OUTPATIENT
Start: 2017-11-30 | End: 2018-02-07 | Stop reason: SDUPTHER

## 2017-11-30 RX ORDER — ATORVASTATIN CALCIUM 40 MG/1
40 TABLET, FILM COATED ORAL DAILY
Qty: 90 TABLET | Refills: 1 | Status: SHIPPED | OUTPATIENT
Start: 2017-11-30 | End: 2018-02-07 | Stop reason: SDUPTHER

## 2017-12-05 ENCOUNTER — TREATMENT (OUTPATIENT)
Dept: PHYSICAL THERAPY | Facility: CLINIC | Age: 62
End: 2017-12-05

## 2017-12-05 DIAGNOSIS — M17.10 PATELLOFEMORAL ARTHRITIS: ICD-10-CM

## 2017-12-05 DIAGNOSIS — M17.0 PRIMARY OSTEOARTHRITIS OF BOTH KNEES: Primary | ICD-10-CM

## 2017-12-05 PROCEDURE — 97035 APP MDLTY 1+ULTRASOUND EA 15: CPT | Performed by: PHYSICAL THERAPIST

## 2017-12-05 PROCEDURE — 97530 THERAPEUTIC ACTIVITIES: CPT | Performed by: PHYSICAL THERAPIST

## 2017-12-05 PROCEDURE — 97110 THERAPEUTIC EXERCISES: CPT | Performed by: PHYSICAL THERAPIST

## 2017-12-05 NOTE — PROGRESS NOTES
Physical Therapy Daily Progress Note    Visit # : 2        Subjective Pt reports her knees feel about the same. Has some pain left posterior knee and thigh today. Has been doing a lot of walking today at work. Is short staffed and has been busy. Says she feels like she needs to keep left knee straight when walking down incline as it hurts to bend it.     Objective    See Exercise, Manual, and Modality Logs for complete treatment.     Pt performed the following exercises:  SLR flexion- 2 x 10  Add squeeze w/ball- hold 5 secs, 2 x 10   Hip abd w/TB- red, 2 x 10  Standing hip abd and ext with red TB- 2 x 10   Hamstring stretch- hold 20 secs, 3 times    Ultrasound left knee- 3 MHz, 100%, 1.0 w/cm^2      Assessment/Plan Pt did well with exercises today. Some left knee pain with standing hip extension on the right. May need to do in prone next visit instead.     Progress per Plan of Care           Manual Therapy:    5     mins  11931;  Therapeutic Exercise:    17     mins  48342;     Neuromuscular Eric:         mins  88985;    Therapeutic Activity:     13     mins  80140;     Gait Training:            mins  35418;     Ultrasound:     8     mins  51952;    Electrical Stimulation:         mins  01665 ( );  Dry Needling          mins self-pay    Timed Treatment:   43   mins   Total Treatment:     47   mins    Tian Ca, PT, DPT  Physical Therapist

## 2017-12-07 ENCOUNTER — TREATMENT (OUTPATIENT)
Dept: PHYSICAL THERAPY | Facility: CLINIC | Age: 62
End: 2017-12-07

## 2017-12-07 DIAGNOSIS — M17.10 PATELLOFEMORAL ARTHRITIS: ICD-10-CM

## 2017-12-07 DIAGNOSIS — M17.0 PRIMARY OSTEOARTHRITIS OF BOTH KNEES: Primary | ICD-10-CM

## 2017-12-07 PROCEDURE — 97110 THERAPEUTIC EXERCISES: CPT | Performed by: PHYSICAL THERAPIST

## 2017-12-07 NOTE — PROGRESS NOTES
Physical Therapy Daily Progress Note    Visit # : 3      Subjective Pt reports her knee felt okay Tuesday. Had pain yesterday at work. So bad that she took off work today. Has felt better with rest and heat.     Objective    See Exercise, Manual, and Modality Logs for complete treatment.       Assessment/Plan Pt's exercises and treatment did not appear in flowsheet from last PT session. Added to last PT note in objective section. Pt did well with exercises today. Did better with abduction and extension exercises lying down vs. Standing.     Progress per Plan of Care           Manual Therapy:         mins  48894;  Therapeutic Exercise:    43     mins  07387;     Neuromuscular Eric:         mins  10704;    Therapeutic Activity:          mins  10474;     Gait Training:            mins  78108;     Ultrasound:     8     mins  21666;    Electrical Stimulation:         mins  46566 ( );  Dry Needling          mins self-pay    Timed Treatment:   51   mins   Total Treatment:     54   mins    Tian Ca, PT, DPT  Physical Therapist

## 2017-12-19 ENCOUNTER — TREATMENT (OUTPATIENT)
Dept: PHYSICAL THERAPY | Facility: CLINIC | Age: 62
End: 2017-12-19

## 2017-12-19 DIAGNOSIS — M17.10 PATELLOFEMORAL ARTHRITIS: ICD-10-CM

## 2017-12-19 DIAGNOSIS — M17.0 PRIMARY OSTEOARTHRITIS OF BOTH KNEES: Primary | ICD-10-CM

## 2017-12-19 PROCEDURE — 97014 ELECTRIC STIMULATION THERAPY: CPT | Performed by: PHYSICAL THERAPIST

## 2017-12-19 PROCEDURE — 97140 MANUAL THERAPY 1/> REGIONS: CPT | Performed by: PHYSICAL THERAPIST

## 2017-12-19 PROCEDURE — 97110 THERAPEUTIC EXERCISES: CPT | Performed by: PHYSICAL THERAPIST

## 2017-12-19 NOTE — PROGRESS NOTES
Physical Therapy Daily Progress Note    Visit # : 4        Subjective Pt reports she was sitting on the floor, turned and twisted left knee last Thursday and had increased pain. Today right knee is hurting more. Pain superior patella.     Objective    See Exercise, Manual, and Modality Logs for complete treatment.       Assessment/Plan Last day of work before Christmas break is on 12/22. Will see how pt responds from being off work and less standing/walking. Pt reports no decreased pain from US. To try estim/MH today. To see how she responds.     Progress per Plan of Care           Manual Therapy:    8     mins  70019;  Therapeutic Exercise:    32     mins  08012;     Neuromuscular Eric:         mins  25923;    Therapeutic Activity:          mins  77687;     Gait Training:            mins  94858;     Ultrasound:          mins  88564;    Electrical Stimulation:    15     mins  03886 ( );  Dry Needling          mins self-pay    Timed Treatment:   40   mins   Total Treatment:     59   mins    Tian Ca, PT, DPT  Physical Therapist

## 2017-12-26 ENCOUNTER — TREATMENT (OUTPATIENT)
Dept: PHYSICAL THERAPY | Facility: CLINIC | Age: 62
End: 2017-12-26

## 2017-12-26 DIAGNOSIS — M17.0 PRIMARY OSTEOARTHRITIS OF BOTH KNEES: Primary | ICD-10-CM

## 2017-12-26 DIAGNOSIS — M17.10 PATELLOFEMORAL ARTHRITIS: ICD-10-CM

## 2017-12-26 PROCEDURE — 97110 THERAPEUTIC EXERCISES: CPT | Performed by: PHYSICAL THERAPIST

## 2017-12-26 PROCEDURE — 97140 MANUAL THERAPY 1/> REGIONS: CPT | Performed by: PHYSICAL THERAPIST

## 2017-12-26 NOTE — PROGRESS NOTES
Physical Therapy Daily Progress Note    Visit # : 5        Subjective Pt reports her knees have been feeling much better since being off work. Had a little pain yesterday from being on feet more with [place]. Right knee is more sore today. Liked estim done last visit. Pt says she plans on retiring from work next year.     Objective    See Exercise, Manual, and Modality Logs for complete treatment.       Assessment/Plan Pt reports she likes TG squats. Wanted to do another minute. Progress with strengthening as able to help support knees.    Progress per Plan of Care           Manual Therapy:    8     mins  02543;  Therapeutic Exercise:    32     mins  14309;     Neuromuscular Eric:         mins  08483;    Therapeutic Activity:          mins  21595;     Gait Training:            mins  84893;     Ultrasound:          mins  03763;    Electrical Stimulation:    12     mins  13128 ( );  Dry Needling          mins self-pay    Timed Treatment:   40   mins   Total Treatment:     57   mins    Tian Ca, PT, DPT  Physical Therapist

## 2018-01-02 ENCOUNTER — TREATMENT (OUTPATIENT)
Dept: PHYSICAL THERAPY | Facility: CLINIC | Age: 63
End: 2018-01-02

## 2018-01-02 DIAGNOSIS — M17.10 PATELLOFEMORAL ARTHRITIS: ICD-10-CM

## 2018-01-02 DIAGNOSIS — M17.0 PRIMARY OSTEOARTHRITIS OF BOTH KNEES: Primary | ICD-10-CM

## 2018-01-02 PROCEDURE — 97110 THERAPEUTIC EXERCISES: CPT | Performed by: PHYSICAL THERAPIST

## 2018-01-02 NOTE — PROGRESS NOTES
Physical Therapy Daily Progress Note    Visit # : 6        Subjective Pt reports her knees are feeling much better. Has been off work with [place] break. Scheduled to RTW on Monday. She says it has been so nice to not be in pain. Current pain will only reach a 2/10.     Objective    See Exercise, Manual, and Modality Logs for complete treatment.       Assessment/Plan Decreased knee pain with being off work. Is fearful pain will return to being on feet more at her job. Talked with about ways to decrease pressure on knees.     Progress per Plan of Care           Manual Therapy:         mins  24979;  Therapeutic Exercise:    41     mins  79460;     Neuromuscular Eric:         mins  90802;    Therapeutic Activity:          mins  58889;     Gait Training:            mins  79698;     Ultrasound:          mins  35250;    Electrical Stimulation:    12     mins  06229 ( );  Dry Needling          mins self-pay    Timed Treatment:   41   mins   Total Treatment:     57   mins    Tian Ca, PT, DPT  Physical Therapist

## 2018-01-11 ENCOUNTER — TELEPHONE (OUTPATIENT)
Dept: ORTHOPEDIC SURGERY | Facility: CLINIC | Age: 63
End: 2018-01-11

## 2018-01-11 ENCOUNTER — TREATMENT (OUTPATIENT)
Dept: PHYSICAL THERAPY | Facility: CLINIC | Age: 63
End: 2018-01-11

## 2018-01-11 DIAGNOSIS — M17.10 PATELLOFEMORAL ARTHRITIS: ICD-10-CM

## 2018-01-11 DIAGNOSIS — M17.0 PRIMARY OSTEOARTHRITIS OF BOTH KNEES: Primary | ICD-10-CM

## 2018-01-11 PROCEDURE — 97140 MANUAL THERAPY 1/> REGIONS: CPT | Performed by: PHYSICAL THERAPIST

## 2018-01-11 PROCEDURE — 97110 THERAPEUTIC EXERCISES: CPT | Performed by: PHYSICAL THERAPIST

## 2018-01-11 NOTE — TELEPHONE ENCOUNTER
Patient is having a great deal of pain in both knees and wants to know if it is  too soon for another injection. She has a three month follow up scheduled for 02/15/2018 but would like to come sooner if possible. She can be reached at [phone].

## 2018-01-11 NOTE — PROGRESS NOTES
Physical Therapy Daily Progress Note    Visit # : 7        Subjective Pt reports her knees have been hurting more since she has been back to work. Has been aching down into her lower leg and foot.      Objective    See Exercise, Manual, and Modality Logs for complete treatment.       Assessment/Plan Held on some exercises today secondary to increased pain. Will hope to resume next visit. Pt with increased symptoms secondary to prolonged standing and walking at work. Pt says she is going to ask MD if she can have another injection. Pt reports decreased pain post PT    Progress per Plan of Care           Manual Therapy:    12     mins  65159;  Therapeutic Exercise:     28    mins  16712;     Neuromuscular Eric:         mins  58942;    Therapeutic Activity:          mins  35376;     Gait Training:            mins  34766;     Ultrasound:          mins  48262;    Electrical Stimulation:    12     mins  09387 ( );  Dry Needling          mins self-pay    Timed Treatment: 40   mins   Total Treatment:     56   mins    Tian Ca, PT, DPT  Physical Therapist

## 2018-01-11 NOTE — TELEPHONE ENCOUNTER
The patient is in lots of pain and wants to get injection prior to the 3 month marty. Are you willing to do that?    Meme

## 2018-01-23 ENCOUNTER — TREATMENT (OUTPATIENT)
Dept: PHYSICAL THERAPY | Facility: CLINIC | Age: 63
End: 2018-01-23

## 2018-01-23 DIAGNOSIS — M17.0 PRIMARY OSTEOARTHRITIS OF BOTH KNEES: Primary | ICD-10-CM

## 2018-01-23 DIAGNOSIS — M17.10 PATELLOFEMORAL ARTHRITIS: ICD-10-CM

## 2018-01-23 PROCEDURE — 97140 MANUAL THERAPY 1/> REGIONS: CPT | Performed by: PHYSICAL THERAPIST

## 2018-01-23 PROCEDURE — 97110 THERAPEUTIC EXERCISES: CPT | Performed by: PHYSICAL THERAPIST

## 2018-01-23 NOTE — PROGRESS NOTES
Physical Therapy Daily Progress Note    Visit # : 8        Subjective Pt reports her left knee has really been hurting. Had increased pain yesterday at work. Says she didn't think she was going to be able to get out of her car and walk into the house yesterday. Had pain radiating into lower leg and posterior knee. Says her boss has allowed her to sit more at work so she is hoping this will help.     Objective    See Exercise, Manual, and Modality Logs for complete treatment.       Assessment/Plan STM done left knee and post knee today. Some increased pain with exercises today. Exercises stopped.     Progress per Plan of Care         Manual Therapy:    10     mins  83718;  Therapeutic Exercise:    29     mins  37594;     Neuromuscular Eric:         mins  26765;    Therapeutic Activity:          mins  72755;     Gait Training:            mins  30329;     Ultrasound:          mins  71770;    Electrical Stimulation:    12     mins  14078 ( );  Dry Needling          mins self-pay    Timed Treatment:   39   mins   Total Treatment:     57   mins    Tian Ca, PT, DPT  Physical Therapist

## 2018-01-30 ENCOUNTER — TREATMENT (OUTPATIENT)
Dept: PHYSICAL THERAPY | Facility: CLINIC | Age: 63
End: 2018-01-30

## 2018-01-30 DIAGNOSIS — M17.10 PATELLOFEMORAL ARTHRITIS: ICD-10-CM

## 2018-01-30 DIAGNOSIS — M17.0 PRIMARY OSTEOARTHRITIS OF BOTH KNEES: Primary | ICD-10-CM

## 2018-01-30 PROCEDURE — 97014 ELECTRIC STIMULATION THERAPY: CPT | Performed by: PHYSICAL THERAPIST

## 2018-01-30 PROCEDURE — 97110 THERAPEUTIC EXERCISES: CPT | Performed by: PHYSICAL THERAPIST

## 2018-01-30 PROCEDURE — 97140 MANUAL THERAPY 1/> REGIONS: CPT | Performed by: PHYSICAL THERAPIST

## 2018-01-30 NOTE — PROGRESS NOTES
Physical Therapy Daily Progress Note    Visit # : 9        Subjective Pt reports increased pain on Friday. Says her knee was hurting at the end of the work day so bad she wanted to cry. Says this weekend she had zero pain in her knees.   Is trying to sit more at work to help with the pain.     Objective    See Exercise, Manual, and Modality Logs for complete treatment.       Assessment/Plan Decreased pain with PT. Have held on some exercises secondary to pain. Will resume these as able.     Progress per Plan of Care           Manual Therapy:    13     mins  83744;  Therapeutic Exercise:    25     mins  99491;     Neuromuscular Eric:         mins  89134;    Therapeutic Activity:          mins  10207;     Gait Training:            mins  74228;     Ultrasound:          mins  96635;    Electrical Stimulation:    12     mins  07799 ( );  Dry Needling          mins self-pay    Timed Treatment:   38   mins   Total Treatment:     56  mins    Tian Ca PT, DPT  Physical Therapist

## 2018-02-07 ENCOUNTER — OFFICE VISIT (OUTPATIENT)
Dept: INTERNAL MEDICINE | Facility: CLINIC | Age: 63
End: 2018-02-07

## 2018-02-07 VITALS
OXYGEN SATURATION: 99 % | WEIGHT: 187 LBS | DIASTOLIC BLOOD PRESSURE: 102 MMHG | RESPIRATION RATE: 20 BRPM | SYSTOLIC BLOOD PRESSURE: 158 MMHG | HEIGHT: 61 IN | BODY MASS INDEX: 35.3 KG/M2 | HEART RATE: 82 BPM

## 2018-02-07 DIAGNOSIS — F41.9 ANXIETY: ICD-10-CM

## 2018-02-07 DIAGNOSIS — E11.9 DIET-CONTROLLED DIABETES MELLITUS (HCC): ICD-10-CM

## 2018-02-07 DIAGNOSIS — F32.A DEPRESSION, UNSPECIFIED DEPRESSION TYPE: ICD-10-CM

## 2018-02-07 DIAGNOSIS — I10 ESSENTIAL HYPERTENSION: Primary | ICD-10-CM

## 2018-02-07 DIAGNOSIS — E78.2 MIXED HYPERLIPIDEMIA: ICD-10-CM

## 2018-02-07 PROCEDURE — 36415 COLL VENOUS BLD VENIPUNCTURE: CPT | Performed by: FAMILY MEDICINE

## 2018-02-07 PROCEDURE — 99214 OFFICE O/P EST MOD 30 MIN: CPT | Performed by: FAMILY MEDICINE

## 2018-02-07 RX ORDER — FLUOXETINE HYDROCHLORIDE 20 MG/1
60 CAPSULE ORAL DAILY
Qty: 270 CAPSULE | Refills: 1 | Status: SHIPPED | OUTPATIENT
Start: 2018-02-07 | End: 2019-02-01 | Stop reason: SDUPTHER

## 2018-02-07 RX ORDER — ATORVASTATIN CALCIUM 40 MG/1
40 TABLET, FILM COATED ORAL DAILY
Qty: 90 TABLET | Refills: 1 | Status: SHIPPED | OUTPATIENT
Start: 2018-02-07 | End: 2019-02-01 | Stop reason: SDUPTHER

## 2018-02-07 RX ORDER — HYDROCHLOROTHIAZIDE 25 MG/1
25 TABLET ORAL DAILY
Qty: 90 TABLET | Refills: 1 | Status: SHIPPED | OUTPATIENT
Start: 2018-02-07 | End: 2019-02-01 | Stop reason: SDUPTHER

## 2018-02-07 RX ORDER — LISINOPRIL 40 MG/1
40 TABLET ORAL DAILY
Qty: 90 TABLET | Refills: 1 | Status: SHIPPED | OUTPATIENT
Start: 2018-02-07 | End: 2019-02-01 | Stop reason: SDUPTHER

## 2018-02-07 NOTE — PATIENT INSTRUCTIONS
Please go to the lab before you leave to have your labs drawn.  You will be called with your results.  I have refilled your medications.  Your medications have been electronically prescribed and will be at your pharmacy.  Please pick them up and take as directed.  Please follow-up as indicated.  Return to the clinic sooner if your symptoms worsen or if you have any other concerns.

## 2018-02-07 NOTE — PROGRESS NOTES
Subjective   Martha Murray is a 62 y.o. female who presents to follow-up for Hypertension; Diabetes; and Depression      History of Present Illness   She was last seen in the office on 11/30/17 for hypertension, diabetes, hyperlipidemia and asthma management.  Previous intervention/treatment includes: continued on current regimen.    Patient is also here for follow-up of type 2 diabetes mellitus.  Current symptoms/problems include none.  Patient is diet controlled.  Last Hgb A1C was 6.1 on 08/24/17.      Current monitoring regimen: none  Home blood sugar records: N/A  Any episodes of hypoglycemia? no     Known diabetic complications: none  Cardiovascular risk factors: diabetes mellitus, dyslipidemia, hypertension and obesity (BMI >= 30 kg/m2)  Current diabetic medications include none, diet-controlled     Eye exam current (within one year): yes  Weight trend: stable  Current diet: not limiting carbohydrates, drinking tea instead of soda, low salt  Current exercise: walks a lot at work     She is on an ACE inhibitor or angiotensin II receptor blocker: yes  She is on a statin: yes  She is on Aspirin: yes    Patient is also here to follow-up for chronic hypertension.  She is exercising (walks a lot at work) and is adherent to a low-salt diet.  She denies chest pressure/discomfort, dyspnea, orthopnea, palpitations and syncope.  Cardiovascular risk factors: diabetes mellitus, dyslipidemia, hypertension and obesity (BMI >= 30 kg/m2).  She is not compliant with her medications, admits that she takes her medications every other day to make her medications last.  Denies intolerable side effects to her medication.  She does not smoke.     Patient is also here to follow up for hyperlipidemia management with a lipid panel recheck.   She indicates her exercise level as walks a lot at work.  Diet: not limiting carbohydrates, drinking tea instead of soda, low salt  Patient isnot  compliant with medications, admits that she takes  "her medications every other day to make her medications last..  Side effects to medications: No      Patient is due for labs.    Also presents to follow-up for chronic depression management.  Admits that she cut down on her Prozac dose (down from 60 to 40 mg) to make her medication last longer.  Reports sleeping more and that her mood has been worsening slightly.  Appetite and weight have been stable.  Denies SI or HI.    Review of Systems   Constitutional: Negative for chills and fever.   Respiratory: Negative for cough, shortness of breath and wheezing.    Cardiovascular: Negative for chest pain and palpitations.   Gastrointestinal: Negative for abdominal pain, constipation, diarrhea, nausea and vomiting.   Neurological: Negative for dizziness, syncope and headaches.   Psychiatric/Behavioral: Positive for sleep disturbance (hypersomnia). Negative for self-injury and suicidal ideas. The patient is not nervous/anxious.         Positive for depressed mood.     The following portions of the patient's history were reviewed and updated as appropriate: current medications, past medical history, past social history and problem list.    Objective   BP (!) 158/102  Pulse 82  Resp 20  Ht 154.9 cm (61\")  Wt 84.8 kg (187 lb)  SpO2 99%  BMI 35.33 kg/m2     Physical Exam   Constitutional: She is oriented to person, place, and time. She appears well-developed and well-nourished.   No acute distress.   HENT:   Head: Normocephalic and atraumatic.   Eyes: Conjunctivae and EOM are normal.   Neck: Normal range of motion.   Cardiovascular: Normal rate, regular rhythm, normal heart sounds and intact distal pulses.    Pulmonary/Chest: Effort normal and breath sounds normal. She has no wheezes.   Abdominal: Soft. Bowel sounds are normal. There is no tenderness.   Musculoskeletal: Normal range of motion.   Moves all extremities.   Neurological: She is alert and oriented to person, place, and time.   Skin: Skin is warm and dry. "   Psychiatric: Her behavior is normal. Judgment and thought content normal. Her mood appears anxious.   Tearful during visit, but able to console.   Vitals reviewed.      Assessment/Plan   Martha was seen today for hypertension.    Diagnoses and all orders for this visit:    Essential hypertension  -     lisinopril (PRINIVIL,ZESTRIL) 40 MG tablet; Take 1 tablet by mouth Daily.  -     hydrochlorothiazide (HYDRODIURIL) 25 MG tablet; Take 1 tablet by mouth Daily.    Blood pressure today in office was 158/102.  Patient admits that she has not been taking her medication every day.  Encouraged medication compliance.  Will continue current regimen today.  Refills for her medications were requested today.  Advised patient to return to the clinic in 3 months for next routine follow-up or sooner if needed.    Diet-controlled diabetes mellitus  -     Hemoglobin A1c    The above labs were ordered today.  Will plan to restart Metformin if lab results indicate.  Lab results to determine follow-up.    Mixed hyperlipidemia  -     Lipid Panel  -     Comprehensive Metabolic Panel  -     atorvastatin (LIPITOR) 40 MG tablet; Take 1 tablet by mouth Daily.    The above labs were ordered today.  Will plan to adjust her current regimen if lab results indicate.    Depression, unspecified depression type    Patient reports worsening mood since she decreased her medication dose.  Denies SI or HI.  Will continue current regimen (60 mg) today.  A refill for her medication was requested today.  Advised patient to return to the clinic in 3 months for next routine follow-up or sooner if needed.    Anxiety  -     FLUoxetine (PROzac) 20 MG capsule; Take 3 capsules by mouth Daily.    Patient reports worsening mood since she decreased her medication dose.  Denies SI or HI.  Will continue current regimen (60 mg) today.  A refill for her medication was requested today.  Advised patient to return to the clinic in 3 months for next routine follow-up or  sooner if needed.

## 2018-02-15 ENCOUNTER — OFFICE VISIT (OUTPATIENT)
Dept: ORTHOPEDIC SURGERY | Facility: CLINIC | Age: 63
End: 2018-02-15

## 2018-02-15 VITALS
SYSTOLIC BLOOD PRESSURE: 176 MMHG | DIASTOLIC BLOOD PRESSURE: 96 MMHG | HEART RATE: 92 BPM | BODY MASS INDEX: 33.3 KG/M2 | WEIGHT: 176.37 LBS | HEIGHT: 61 IN

## 2018-02-15 DIAGNOSIS — M17.0 PRIMARY OSTEOARTHRITIS OF BOTH KNEES: Primary | ICD-10-CM

## 2018-02-15 DIAGNOSIS — M17.10 PATELLOFEMORAL ARTHRITIS: ICD-10-CM

## 2018-02-15 DIAGNOSIS — M17.12 PRIMARY OSTEOARTHRITIS OF LEFT KNEE: ICD-10-CM

## 2018-02-15 PROCEDURE — 20610 DRAIN/INJ JOINT/BURSA W/O US: CPT | Performed by: ORTHOPAEDIC SURGERY

## 2018-02-15 PROCEDURE — 99213 OFFICE O/P EST LOW 20 MIN: CPT | Performed by: ORTHOPAEDIC SURGERY

## 2018-02-15 RX ORDER — TRIAMCINOLONE ACETONIDE 40 MG/ML
80 INJECTION, SUSPENSION INTRA-ARTICULAR; INTRAMUSCULAR
Status: COMPLETED | OUTPATIENT
Start: 2018-02-15 | End: 2018-02-15

## 2018-02-15 RX ADMIN — TRIAMCINOLONE ACETONIDE 80 MG: 40 INJECTION, SUSPENSION INTRA-ARTICULAR; INTRAMUSCULAR at 13:44

## 2018-02-15 NOTE — PROGRESS NOTES
Procedure   Large Joint Arthrocentesis  Date/Time: 2/15/2018 1:44 PM  Consent given by: patient  Site marked: site marked  Timeout: Immediately prior to procedure a time out was called to verify the correct patient, procedure, equipment, support staff and site/side marked as required   Supporting Documentation  Indications: pain   Procedure Details  Location: knee - L knee  Preparation: Patient was prepped and draped in the usual sterile fashion  Needle size: 22 G  Approach: anterolateral  Medications administered: 80 mg triamcinolone acetonide 40 MG/ML (Bupivacaine 0.25%, NDC: 551501-167-10, LOT: [id number], EXP: 10/2019, 3cc - Lidocaine 1%, NDC: 06093-279-80, LOT: [id number], EXP: 08/2019, 3cc)  Patient tolerance: patient tolerated the procedure well with no immediate complications

## 2018-02-15 NOTE — PROGRESS NOTES
Orthopaedic Clinic Note: Knee Established Patient    Chief Complaint   Patient presents with   • Left Knee - Follow-up     3 month   • Right Knee - Follow-up        HPI    It has been 3  month(s) since Ms. Murray's last visit. She returns to clinic today for Follow-up of bilateral knee pain.  She received cortisone injections as well as referral physical therapy and anti-inflammatories at her last visit.  She states the anti-inflammatories did help somewhat.  Therapy exercises are also helping and the injection helped for approximately 2 months.  Her pain is gradually returned in the left knee.  The right knee is still doing fairly well.  She rates her left knee pain a 9/10 on the pain scale.  She is describing the pain as being localized to the front asthma knee as well as pain radiating down the posterior aspect of her leg to the foot.  She is also complaining of numbness and tingling in the bilateral feet.  She states that as a result of her increased pain with ambulation, her job is now allow her to sit most of the day.  She is contemplating residential as she is unable continue with the daily rigors of work as a result of her ongoing knee pain.  Overall she feels she is doing worse.    Past Medical History:   Diagnosis Date   • Allergic    • Anemia    • Depression    • Diabetes mellitus    • History of heart attack    • Hyperlipidemia    • Hypertension       Past Surgical History:   Procedure Laterality Date   • BILATERAL BREAST REDUCTION     • BONE MARROW ASPIRATION     • HYSTERECTOMY  1990    x 2   • SINUS SURGERY  2003   • TONSILLECTOMY        Family History   Problem Relation Age of Onset   • Arthritis Maternal Grandmother    • Hypertension Mother    • Hyperlipidemia Mother    • Thyroid disease Mother    • Diabetes Sister    • Crohn's disease Sister    • Hypertension Sister    • Heart attack Maternal Grandfather      Social History     Social History   • Marital status: Single     Spouse name: N/A   • Number  of children: N/A   • Years of education: N/A     Occupational History   • Not on file.     Social History Main Topics   • Smoking status: Former Smoker     Packs/day: 2.00     Years: 30.00     Types: Cigarettes     Quit date: 1997   • Smokeless tobacco: Never Used   • Alcohol use No   • Drug use: No   • Sexual activity: Defer     Other Topics Concern   • Not on file     Social History Narrative      Current Outpatient Prescriptions on File Prior to Visit   Medication Sig Dispense Refill   • albuterol (PROVENTIL HFA;VENTOLIN HFA) 108 (90 Base) MCG/ACT inhaler Inhale 2 puffs Every 6 (Six) Hours As Needed for Wheezing. 1 inhaler 2   • aspirin 81 MG EC tablet Take 1 tablet by mouth Daily. 30 tablet 5   • atorvastatin (LIPITOR) 40 MG tablet Take 1 tablet by mouth Daily. 90 tablet 1   • budesonide-formoterol (SYMBICORT) 80-4.5 MCG/ACT inhaler Inhale 2 puffs 2 (Two) Times a Day. 1 inhaler 2   • diclofenac (VOLTAREN) 1 % gel gel Apply 4 g topically 4 (Four) Times a Day As Needed (joint pain). 100 g 0   • FLUoxetine (PROzac) 20 MG capsule Take 3 capsules by mouth Daily. 270 capsule 1   • hydrochlorothiazide (HYDRODIURIL) 25 MG tablet Take 1 tablet by mouth Daily. 90 tablet 1   • lisinopril (PRINIVIL,ZESTRIL) 40 MG tablet Take 1 tablet by mouth Daily. 90 tablet 1   • meloxicam (MOBIC) 7.5 MG tablet 1 Oral Daily with food. 60 tablet 0     No current facility-administered medications on file prior to visit.       Allergies   Allergen Reactions   • Sulfa Antibiotics Rash        Review of Systems   Constitutional: Negative.    HENT: Negative.    Eyes: Negative.    Respiratory: Negative.    Cardiovascular: Negative.    Gastrointestinal: Negative.    Endocrine: Negative.    Genitourinary: Negative.    Musculoskeletal: Positive for arthralgias.   Skin: Negative.    Allergic/Immunologic: Negative.    Neurological: Negative.    Hematological: Negative.    Psychiatric/Behavioral: Negative.         Physical Exam  Blood pressure 176/96,  "pulse 92, height 154.9 cm (60.98\"), weight 80 kg (176 lb 5.9 oz).    Body mass index is 33.34 kg/(m^2).    GENERAL APPEARANCE: awake, alert, oriented, in no acute distress  LUNGS:  breathing nonlabored  EXTREMITIES: no clubbing, cyanosis  PERIPHERAL PULSES: palpable dorsalis pedis and posterior tibial pulses bilaterally.    GAIT:  Antalgic        ----------  Left Knee Exam:  ----------  ALIGNMENT: Slight varus, correctable to neutral   ----------  RANGE OF MOTION:  Normal (0-130 degrees) with no extensor lag or flexion contracture  LIGAMENTOUS STABILITY:   stable to varus and valgus stress at terminal extension and 30 degrees; slight retensioning of the MCL is appreciated with valgus stress at 30 degrees consistent with medial compartment degeneration  ----------  STRENGTH:  KNEE FLEXION 5/5  KNEE EXTENSION  5/5  ANKLE DORSIFLEXION  5/5  ANKLE PLANTARFLEXION  5/5  ----------  PAIN WITH PALPATION:medial joint line, anterior knee and popliteal region  KNEE EFFUSION: yes, trace effusion  PAIN WITH KNEE ROM: yes  PATELLAR CREPITUS:  yes, painful and symptomatic  ----------  SENSATION TO LIGHT TOUCH:  DEEP PERONEAL/SUPERFICIAL PERONEAL/SURAL/SAPHENOUS/TIBIAL:    intact  ----------  EDEMA:  no  ERYTHEMA:    no  WOUNDS/INCISIONS:  no  _____________________________________________________________________  _____________________________________________________________________    RADIOGRAPHIC FINDINGS:   No new imaging today    Assessment/Plan:   Diagnosis Plan   1. Primary osteoarthritis of both knees     2. Patellofemoral arthritis     3. Primary osteoarthritis of left knee  Large Joint Arthrocentesis     Patient does have patellofemoral arthritis as well as ongoing mild to moderate knee arthritis.  She received significant relief with the prior cortisone injection but her pain is gradually returned.  I do not believe her arthritis is advanced enough to warrant total knee arthroplasty at this time.  Furthermore, anterior " knee pain does not respond predictably to total knee arthroplasty.  I discussed with her alternative treatment options including bracing, weight loss, and modified exercises activities.  I also discussed surgical intervention per her request and regarding what it total knee arthroplasty would entail and the recovery.  She is not interested in surgery at this time.  She is agreeable to activity modification.  She is not interested in a brace.  She will work on weight loss.  We'll proceed with cortisone injection left knee only today.  She'll follow up on an as-needed basis.    Procedure Note:  I discussed with the patient the potential benefits of performing a therapeutic injection of the left knee as well as potential risks including but not limited to infection, swelling, pain, bleeding, bruising, nerve/vessel damage, skin color changes, transient elevation in blood glucose levels, and fat atrophy. After informed consent and after the area was prepped with alcohol, ethyl chloride was used to numb the skin. Via the superolateral approach, 3cc of 1% lidocaine, 3cc of 0.25% marcaine and 2 cc of 40mg/ml of Kenalog were injected into the left knee. The patient tolerated the procedure well. There were no complications. A sterile dressing was placed over the injection site.      Keaton Aldrich MD  02/15/18  2:10 PM

## 2018-02-22 ENCOUNTER — HOSPITAL ENCOUNTER (EMERGENCY)
Facility: HOSPITAL | Age: 63
Discharge: HOME | End: 2018-02-22
Attending: EMERGENCY MEDICINE | Admitting: EMERGENCY MEDICINE

## 2018-02-22 ENCOUNTER — APPOINTMENT (OUTPATIENT)
Dept: CT IMAGING | Facility: HOSPITAL | Age: 63
End: 2018-02-22

## 2018-02-22 ENCOUNTER — OFFICE VISIT (OUTPATIENT)
Dept: INTERNAL MEDICINE | Facility: CLINIC | Age: 63
End: 2018-02-22

## 2018-02-22 VITALS
BODY MASS INDEX: 34.93 KG/M2 | WEIGHT: 185 LBS | DIASTOLIC BLOOD PRESSURE: 96 MMHG | TEMPERATURE: 98.8 F | SYSTOLIC BLOOD PRESSURE: 167 MMHG | HEART RATE: 68 BPM | RESPIRATION RATE: 18 BRPM | HEIGHT: 61 IN | OXYGEN SATURATION: 100 %

## 2018-02-22 VITALS
HEIGHT: 61 IN | SYSTOLIC BLOOD PRESSURE: 144 MMHG | DIASTOLIC BLOOD PRESSURE: 100 MMHG | BODY MASS INDEX: 34.74 KG/M2 | RESPIRATION RATE: 20 BRPM | WEIGHT: 184 LBS

## 2018-02-22 DIAGNOSIS — L02.01 ACUTE ABSCESS OF FACE: Primary | ICD-10-CM

## 2018-02-22 DIAGNOSIS — L03.211 CELLULITIS OF CHIN: Primary | ICD-10-CM

## 2018-02-22 DIAGNOSIS — L03.221 CELLULITIS, NECK: ICD-10-CM

## 2018-02-22 LAB
ALBUMIN SERPL-MCNC: 4.3 G/DL (ref 3.5–5)
ALBUMIN/GLOB SERPL: 1.4 G/DL (ref 1–2)
ALP SERPL-CCNC: 170 U/L (ref 38–126)
ALT SERPL W P-5'-P-CCNC: 114 U/L (ref 13–69)
ANION GAP SERPL CALCULATED.3IONS-SCNC: 16.7 MMOL/L
AST SERPL-CCNC: 57 U/L (ref 15–46)
BASOPHILS # BLD AUTO: 0.16 10*3/MM3 (ref 0–0.2)
BASOPHILS NFR BLD AUTO: 0.8 % (ref 0–2.5)
BILIRUB SERPL-MCNC: 1.4 MG/DL (ref 0.2–1.3)
BUN BLD-MCNC: 26 MG/DL (ref 7–20)
BUN/CREAT SERPL: 28.9 (ref 7.1–23.5)
CALCIUM SPEC-SCNC: 9.1 MG/DL (ref 8.4–10.2)
CHLORIDE SERPL-SCNC: 104 MMOL/L (ref 98–107)
CO2 SERPL-SCNC: 24 MMOL/L (ref 26–30)
CREAT BLD-MCNC: 0.9 MG/DL (ref 0.6–1.3)
CRP SERPL-MCNC: 5.4 MG/DL (ref 0–1)
DEPRECATED RDW RBC AUTO: 46.5 FL (ref 37–54)
EOSINOPHIL # BLD AUTO: 0.71 10*3/MM3 (ref 0–0.7)
EOSINOPHIL NFR BLD AUTO: 3.7 % (ref 0–7)
ERYTHROCYTE [DISTWIDTH] IN BLOOD BY AUTOMATED COUNT: 13.4 % (ref 11.5–14.5)
ERYTHROCYTE [SEDIMENTATION RATE] IN BLOOD: 5 MM/HR (ref 0–20)
GFR SERPL CREATININE-BSD FRML MDRD: 64 ML/MIN/1.73
GLOBULIN UR ELPH-MCNC: 3.1 GM/DL
GLUCOSE BLD-MCNC: 136 MG/DL (ref 74–98)
HCT VFR BLD AUTO: 54 % (ref 37–47)
HGB BLD-MCNC: 17.8 G/DL (ref 12–16)
IMM GRANULOCYTES # BLD: 0.35 10*3/MM3 (ref 0–0.06)
IMM GRANULOCYTES NFR BLD: 1.8 % (ref 0–0.6)
LYMPHOCYTES # BLD AUTO: 1.68 10*3/MM3 (ref 0.6–3.4)
LYMPHOCYTES NFR BLD AUTO: 8.7 % (ref 10–50)
MCH RBC QN AUTO: 31 PG (ref 27–31)
MCHC RBC AUTO-ENTMCNC: 33 G/DL (ref 30–37)
MCV RBC AUTO: 94.1 FL (ref 81–99)
MONOCYTES # BLD AUTO: 1.2 10*3/MM3 (ref 0–0.9)
MONOCYTES NFR BLD AUTO: 6.2 % (ref 0–12)
NEUTROPHILS # BLD AUTO: 15.25 10*3/MM3 (ref 2–6.9)
NEUTROPHILS NFR BLD AUTO: 78.8 % (ref 37–80)
NRBC BLD MANUAL-RTO: 0 /100 WBC (ref 0–0)
PLATELET # BLD AUTO: 889 10*3/MM3 (ref 130–400)
PMV BLD AUTO: 9.8 FL (ref 6–12)
POTASSIUM BLD-SCNC: 4.7 MMOL/L (ref 3.5–5.1)
PROT SERPL-MCNC: 7.4 G/DL (ref 6.3–8.2)
RBC # BLD AUTO: 5.74 10*6/MM3 (ref 4.2–5.4)
SODIUM BLD-SCNC: 140 MMOL/L (ref 137–145)
WBC NRBC COR # BLD: 19.35 10*3/MM3 (ref 4.8–10.8)

## 2018-02-22 PROCEDURE — 96365 THER/PROPH/DIAG IV INF INIT: CPT

## 2018-02-22 PROCEDURE — 0 IOPAMIDOL 61 % SOLUTION: Performed by: EMERGENCY MEDICINE

## 2018-02-22 PROCEDURE — 70491 CT SOFT TISSUE NECK W/DYE: CPT

## 2018-02-22 PROCEDURE — 85651 RBC SED RATE NONAUTOMATED: CPT | Performed by: NURSE PRACTITIONER

## 2018-02-22 PROCEDURE — 80053 COMPREHEN METABOLIC PANEL: CPT | Performed by: NURSE PRACTITIONER

## 2018-02-22 PROCEDURE — 86140 C-REACTIVE PROTEIN: CPT | Performed by: NURSE PRACTITIONER

## 2018-02-22 PROCEDURE — 99283 EMERGENCY DEPT VISIT LOW MDM: CPT

## 2018-02-22 PROCEDURE — 85025 COMPLETE CBC W/AUTO DIFF WBC: CPT | Performed by: NURSE PRACTITIONER

## 2018-02-22 PROCEDURE — 99214 OFFICE O/P EST MOD 30 MIN: CPT | Performed by: FAMILY MEDICINE

## 2018-02-22 RX ORDER — HYDROCODONE BITARTRATE AND ACETAMINOPHEN 10; 325 MG/1; MG/1
1 TABLET ORAL ONCE
Status: COMPLETED | OUTPATIENT
Start: 2018-02-22 | End: 2018-02-22

## 2018-02-22 RX ORDER — CLINDAMYCIN PHOSPHATE 900 MG/50ML
900 INJECTION, SOLUTION INTRAVENOUS ONCE
Status: COMPLETED | OUTPATIENT
Start: 2018-02-22 | End: 2018-02-22

## 2018-02-22 RX ORDER — ONDANSETRON 4 MG/1
4 TABLET, ORALLY DISINTEGRATING ORAL EVERY 8 HOURS PRN
Qty: 21 TABLET | Refills: 0 | Status: SHIPPED | OUTPATIENT
Start: 2018-02-22 | End: 2018-03-01

## 2018-02-22 RX ORDER — HYDROCODONE BITARTRATE AND ACETAMINOPHEN 5; 325 MG/1; MG/1
1 TABLET ORAL EVERY 6 HOURS PRN
Qty: 12 TABLET | Refills: 0 | Status: SHIPPED | OUTPATIENT
Start: 2018-02-22 | End: 2018-03-01

## 2018-02-22 RX ORDER — CLINDAMYCIN HYDROCHLORIDE 300 MG/1
300 CAPSULE ORAL 3 TIMES DAILY
Qty: 21 CAPSULE | Refills: 0 | Status: SHIPPED | OUTPATIENT
Start: 2018-02-22 | End: 2018-03-01

## 2018-02-22 RX ADMIN — CLINDAMYCIN PHOSPHATE 900 MG: 900 INJECTION, SOLUTION INTRAVENOUS at 14:05

## 2018-02-22 RX ADMIN — SODIUM CHLORIDE 1000 ML: 9 INJECTION, SOLUTION INTRAVENOUS at 13:16

## 2018-02-22 RX ADMIN — IOPAMIDOL 100 ML: 612 INJECTION, SOLUTION INTRAVENOUS at 14:15

## 2018-02-22 RX ADMIN — HYDROCODONE BITARTRATE AND ACETAMINOPHEN 1 TABLET: 10; 325 TABLET ORAL at 14:06

## 2018-02-22 NOTE — PATIENT INSTRUCTIONS
Please go to the ED to have your abscess further evaluated.  You may need to be started on an IV antibiotic and have your abscess opened up again.  Please follow-up as indicated.  Return to the clinic sooner if you have any other concerns.

## 2018-02-22 NOTE — ED PROVIDER NOTES
Subjective   History of Present Illness  62-year-old female with a history of diabetes mellitus, hyperlipidemia, hypertension presents with a facial abscess.  She tells me that on right H she felt like she was having some discomfort around her chin and she felt like she was having flushing on her face.  She didn't see anything and then when she woke up Saturday morning she had an swelling and swelling.  She went to urgent care and they opened the abscess and there was purulent drainage.  She was started on a Keflex 3 times a day.  She states that the area continued to get more swollen and red and painful.  She went back and they ordered some Tylenol 3 for the pain and she was to continue the Keflex.  She went to her primary care provider today and they recommended that she come to the emergency Department because there is significant redness swelling that is extending down into the anterior neck and warmth.  He denies any difficulty swallowing.  She does not have any pain or tenderness around her lower inner T.  She believes she has had fever and chills.     Review of Systems   All other systems reviewed and are negative.      Past Medical History:   Diagnosis Date   • Allergic    • Anemia    • Depression    • Diabetes mellitus    • History of heart attack    • Hyperlipidemia    • Hypertension        Allergies   Allergen Reactions   • Sulfa Antibiotics Rash       Past Surgical History:   Procedure Laterality Date   • BILATERAL BREAST REDUCTION     • BONE MARROW ASPIRATION     • HYSTERECTOMY  1990    x 2   • SINUS SURGERY  2003   • TONSILLECTOMY         Family History   Problem Relation Age of Onset   • Arthritis Maternal Grandmother    • Hypertension Mother    • Hyperlipidemia Mother    • Thyroid disease Mother    • Diabetes Sister    • Crohn's disease Sister    • Hypertension Sister    • Heart attack Maternal Grandfather        Social History     Social History   • Marital status: Single     Spouse name: N/A   •  Number of children: N/A   • Years of education: N/A     Social History Main Topics   • Smoking status: Former Smoker     Packs/day: 2.00     Years: 30.00     Types: Cigarettes     Quit date: 1997   • Smokeless tobacco: Never Used   • Alcohol use No   • Drug use: No   • Sexual activity: Defer     Other Topics Concern   • None     Social History Narrative           Objective   Physical Exam   Constitutional: She is oriented to person, place, and time. She appears well-developed and well-nourished.   HENT:   Head: Normocephalic and atraumatic.   Mouth/Throat: Oropharynx is clear and moist.   Eyes: Conjunctivae and EOM are normal. Pupils are equal, round, and reactive to light.   Neck: Normal range of motion. Neck supple.   There is redness, swelling of the lower chin lower jaw bilaterally, and the anterior neck.  There is evidence of an abscessed area on the lower chin that is draining.   Cardiovascular: Normal rate, regular rhythm, normal heart sounds and intact distal pulses.  Exam reveals no gallop and no friction rub.    No murmur heard.  Pulmonary/Chest: Effort normal and breath sounds normal.   Musculoskeletal: Normal range of motion.   Neurological: She is alert and oriented to person, place, and time.   Nursing note and vitals reviewed.      Procedures         ED Course  ED Course   Comment By Time   Patient was started on Keflex on 19 February and was given Rocephin IM.  She then was seen again on the 20th and at that time was started on Augmentin.  Going to give her a dose of IV clindamycin for MRSA coverage.  White count is 19. Amberly Olga Lidia Chica, APRN 02/22 1338      CT demonstrates cellulitis but there is no discrete abscess.  The patient is having no difficulty swallowing.  I've given her IV clindamycin and going to discharge her with clindamycin 300 mg every 8 hours with food.  I'll give her some Lortab for pain and zofran in case she has nausea.  She will do warm moist packs.  If the area is not  improving greatly within 48 hours, she should return to the emergency department.  If it is improving she has an appointment in 1 week to see her primary care provider for recheck.  GERD has a note clearing her from work from her primary care provider through Monday.  He understands that if her symptoms worsen or she develops any trouble swallowing that she should return to the emergency department before 48 hours.  She will discontinue the Augmentin and the Keflex.  She states an understanding.  Discharged in stable condition.            [facility]    Final diagnoses:   Cellulitis of chin   Cellulitis, neck            Amberly Coto, APRN  02/22/18 0414

## 2018-02-22 NOTE — PROGRESS NOTES
"Subjective   Martha Murray is a 62 y.o. female who presents with complaint of Abscess      History of Present Illness   Patient presents with complaint of an acute facial abscess.  Symptoms started on Saturday and have been worsening.  Describes developing acute redness and swelling located at her chin and extending down her neck.  States that she went to an [facility] on Monday and had an incision and drainage done.  Reports that a small amount of purulent discharge was expressed and was prescribed Keflex and Tylenol with Codeine.  Patient has been taking her medications as directed and has also been using warm and cold compresses.  Reports that her abscess had mild drainage yesterday, but then stopped.  Has noticed worsening redness, warmth and swelling since her [facility] visit associated with nausea, chills and decreased appetite.    Review of Systems   Constitutional: Positive for appetite change (decreased) and chills. Negative for fever.   Respiratory: Negative for cough, shortness of breath and wheezing.    Cardiovascular: Negative for chest pain and palpitations.   Gastrointestinal: Positive for nausea. Negative for abdominal pain, constipation, diarrhea and vomiting.   Skin: Positive for color change (erythema).        Positive for facial abscess.   Neurological: Negative for dizziness, syncope and headaches.     The following portions of the patient's history were reviewed and updated as appropriate: current medications, past medical history and problem list.    Objective   /100  Resp 20  Ht 154.9 cm (61\")  Wt 83.5 kg (184 lb)  BMI 34.77 kg/m2     Physical Exam   Constitutional: She is oriented to person, place, and time. She appears well-developed and well-nourished.   No acute distress.   HENT:   Head: Normocephalic and atraumatic.       Eyes: Conjunctivae and EOM are normal.   Neck: Normal range of motion.   Cardiovascular: Normal rate, regular rhythm, normal heart sounds and intact distal pulses.  "   Pulmonary/Chest: Effort normal and breath sounds normal. She has no wheezes.   Abdominal: Soft. Bowel sounds are normal. There is no tenderness.   Musculoskeletal: Normal range of motion.   Moves all extremities.   Neurological: She is alert and oriented to person, place, and time.   Skin: Skin is warm.   Psychiatric: She has a normal mood and affect. Her behavior is normal.   Vitals reviewed.      Assessment/Plan   Martha was seen today for abscess.    Diagnoses and all orders for this visit:    Acute abscess of face    Patient has failed outpatient antibiotic therapy.  Considering her physical exam findings and worsening symptoms, recommended that she go to the ED to be promptly treated.

## 2018-03-01 ENCOUNTER — OFFICE VISIT (OUTPATIENT)
Dept: INTERNAL MEDICINE | Facility: CLINIC | Age: 63
End: 2018-03-01

## 2018-03-01 VITALS
HEIGHT: 61 IN | DIASTOLIC BLOOD PRESSURE: 90 MMHG | WEIGHT: 184 LBS | HEART RATE: 74 BPM | SYSTOLIC BLOOD PRESSURE: 142 MMHG | RESPIRATION RATE: 20 BRPM | OXYGEN SATURATION: 100 % | BODY MASS INDEX: 34.74 KG/M2

## 2018-03-01 DIAGNOSIS — L02.01 ACUTE ABSCESS OF FACE: Primary | ICD-10-CM

## 2018-03-01 PROCEDURE — 99213 OFFICE O/P EST LOW 20 MIN: CPT | Performed by: FAMILY MEDICINE

## 2018-03-01 NOTE — PATIENT INSTRUCTIONS
Your abscess looks much better.  Continue using warm compresses (to promote drainage) and keeping abscess area clean and dry.  Please continue taking your current medications as directed.  Please follow-up as indicated.  Return to the clinic sooner if your symptoms worsen or if you have any other concerns.

## 2018-03-01 NOTE — PROGRESS NOTES
"Subjective   Martha Murray is a 62 y.o. female who presents to follow-up for Abscess      History of Present Illness   She was last seen in the office on 02/22/18 for abscess management.  Previous intervention/treatment includes: recommended that patient go to the ED for further evaluation and treatment.  Reports improving symptoms.    Patient presents to follow-up after recent ED visit for abscess management on 02/22/18.  Had a facial CT scan done, which showed inflammatory stranding in the right submandibular and submental region consistent with cellulitis with no evidence of a discrete fluid  collection to suggest an abscess.  Patient was given a one time IV dose of Clindamycin and was discharged with a one week course of Clindamycin as well as Lortab and Zofran.    Since her ED visit, patient states that she took her medications as directed and denies any intolerable side effects.  Has also been using warm compresses multiple times a day.  Reports improving facial swelling, induration and redness.  States that she has had some purulent drainage at home, last time was a few days ago.  Denies any fevers, sweats, chills, trouble breathing.    Review of Systems   Constitutional: Negative for chills and fever.   Respiratory: Negative for cough, shortness of breath and wheezing.    Cardiovascular: Negative for chest pain and palpitations.   Gastrointestinal: Negative for abdominal pain, constipation, diarrhea, nausea and vomiting.   Skin:        Positive for facial swelling, induration and redness, improving.   Neurological: Negative for dizziness, syncope and headaches.     The following portions of the patient's history were reviewed and updated as appropriate: current medications, past medical history and problem list.    Objective   /90  Pulse 74  Resp 20  Ht 154.9 cm (61\")  Wt 83.5 kg (184 lb)  SpO2 100%  BMI 34.77 kg/m2     Physical Exam   Constitutional: She is oriented to person, place, and time. " She appears well-developed and well-nourished.   No acute distress.   HENT:   Head: Normocephalic and atraumatic.       Eyes: Conjunctivae and EOM are normal.   Neck: Normal range of motion.   Cardiovascular: Normal rate, regular rhythm, normal heart sounds and intact distal pulses.    Pulmonary/Chest: Effort normal and breath sounds normal. She has no wheezes.   Abdominal: Soft. Bowel sounds are normal. There is no tenderness.   Musculoskeletal: Normal range of motion.   Moves all extremities.   Neurological: She is alert and oriented to person, place, and time.   Skin: Skin is warm and dry.   Psychiatric: She has a normal mood and affect. Her behavior is normal.   Vitals reviewed.      Assessment/Plan   Martha was seen today for abscess.    Diagnoses and all orders for this visit:    Acute abscess of face    Significantly improved symptoms.  Patient reports that she finished taking her antibiotic.  Recommended that she continue using warm compresses and keeping affected skin clean and dry.  Advised patient to return to the clinic if their symptoms worsen or persist despite the above recommendations.

## 2018-05-15 ENCOUNTER — OFFICE VISIT (OUTPATIENT)
Dept: ORTHOPEDIC SURGERY | Facility: CLINIC | Age: 63
End: 2018-05-15

## 2018-05-15 VITALS
SYSTOLIC BLOOD PRESSURE: 189 MMHG | WEIGHT: 180.34 LBS | HEIGHT: 61 IN | BODY MASS INDEX: 34.05 KG/M2 | HEART RATE: 68 BPM | DIASTOLIC BLOOD PRESSURE: 89 MMHG

## 2018-05-15 DIAGNOSIS — M17.0 PRIMARY OSTEOARTHRITIS OF BOTH KNEES: Primary | ICD-10-CM

## 2018-05-15 DIAGNOSIS — M17.10 PATELLOFEMORAL ARTHRITIS: ICD-10-CM

## 2018-05-15 PROCEDURE — 99213 OFFICE O/P EST LOW 20 MIN: CPT | Performed by: ORTHOPAEDIC SURGERY

## 2018-05-15 PROCEDURE — 20610 DRAIN/INJ JOINT/BURSA W/O US: CPT | Performed by: ORTHOPAEDIC SURGERY

## 2018-05-15 RX ORDER — LIDOCAINE HYDROCHLORIDE 10 MG/ML
3 INJECTION, SOLUTION INFILTRATION; PERINEURAL
Status: COMPLETED | OUTPATIENT
Start: 2018-05-15 | End: 2018-05-15

## 2018-05-15 RX ORDER — TRIAMCINOLONE ACETONIDE 40 MG/ML
80 INJECTION, SUSPENSION INTRA-ARTICULAR; INTRAMUSCULAR
Status: COMPLETED | OUTPATIENT
Start: 2018-05-15 | End: 2018-05-15

## 2018-05-15 RX ORDER — BUPIVACAINE HYDROCHLORIDE 2.5 MG/ML
3 INJECTION, SOLUTION INFILTRATION; PERINEURAL
Status: COMPLETED | OUTPATIENT
Start: 2018-05-15 | End: 2018-05-15

## 2018-05-15 RX ADMIN — BUPIVACAINE HYDROCHLORIDE 3 ML: 2.5 INJECTION, SOLUTION INFILTRATION; PERINEURAL at 07:50

## 2018-05-15 RX ADMIN — TRIAMCINOLONE ACETONIDE 80 MG: 40 INJECTION, SUSPENSION INTRA-ARTICULAR; INTRAMUSCULAR at 07:46

## 2018-05-15 RX ADMIN — TRIAMCINOLONE ACETONIDE 80 MG: 40 INJECTION, SUSPENSION INTRA-ARTICULAR; INTRAMUSCULAR at 07:50

## 2018-05-15 RX ADMIN — LIDOCAINE HYDROCHLORIDE 3 ML: 10 INJECTION, SOLUTION INFILTRATION; PERINEURAL at 07:46

## 2018-05-15 RX ADMIN — BUPIVACAINE HYDROCHLORIDE 3 ML: 2.5 INJECTION, SOLUTION INFILTRATION; PERINEURAL at 07:46

## 2018-05-15 RX ADMIN — LIDOCAINE HYDROCHLORIDE 3 ML: 10 INJECTION, SOLUTION INFILTRATION; PERINEURAL at 07:50

## 2018-05-15 NOTE — PROGRESS NOTES
Procedure   Large Joint Injection  Date/Time: 5/15/2018 7:46 AM  Consent given by: patient  Site marked: site marked  Timeout: Immediately prior to procedure a time out was called to verify the correct patient, procedure, equipment, support staff and site/side marked as required   Supporting Documentation  Indications: pain   Procedure Details  Location: knee - R knee  Preparation: Patient was prepped and draped in the usual sterile fashion  Needle size: 22 G  Approach: anterolateral  Medications administered: 3 mL bupivacaine 0.25 %; 3 mL lidocaine 1 %; 80 mg triamcinolone acetonide 40 MG/ML  Patient tolerance: patient tolerated the procedure well with no immediate complications

## 2018-05-15 NOTE — PROGRESS NOTES
Orthopaedic Clinic Note: Knee Established Patient    Chief Complaint   Patient presents with   • Follow-up     3 month - Primary osteoarthritis of both knees; Patellofemoral arthritis         HPI    It has been 3  month(s) since Ms. Murray's last visit. She returns to clinic today for Follow-up of bilateral knee osteoarthritis.  She received cortisone injection in the left knee 3 months ago.  She states the injection provided approximately 2 months of relief.  Her pain is gradually returned.  She rates her pain a 10/10 on the pain scale at its worst.  On average it is a 6/10 on the pain scale with increasing severity throughout the day.  She is ambulating with no assistive device today.  She continues to do home exercise program to work on weight loss.  Overall she is doing slightly worse.    Past Medical History:   Diagnosis Date   • Allergic    • Anemia    • Depression    • Diabetes mellitus    • History of heart attack    • Hyperlipidemia    • Hypertension       Past Surgical History:   Procedure Laterality Date   • BILATERAL BREAST REDUCTION     • BONE MARROW ASPIRATION     • HYSTERECTOMY  1990    x 2   • SINUS SURGERY  2003   • TONSILLECTOMY        Family History   Problem Relation Age of Onset   • Arthritis Maternal Grandmother    • Hypertension Mother    • Hyperlipidemia Mother    • Thyroid disease Mother    • Diabetes Sister    • Crohn's disease Sister    • Hypertension Sister    • Heart attack Maternal Grandfather      Social History     Social History   • Marital status: Single     Spouse name: N/A   • Number of children: N/A   • Years of education: N/A     Occupational History   • Not on file.     Social History Main Topics   • Smoking status: Former Smoker     Packs/day: 2.00     Years: 30.00     Types: Cigarettes     Quit date: 1997   • Smokeless tobacco: Never Used   • Alcohol use No   • Drug use: No   • Sexual activity: Defer     Other Topics Concern   • Not on file     Social History Narrative   • No  "narrative on file      Current Outpatient Prescriptions on File Prior to Visit   Medication Sig Dispense Refill   • albuterol (PROVENTIL HFA;VENTOLIN HFA) 108 (90 Base) MCG/ACT inhaler Inhale 2 puffs Every 6 (Six) Hours As Needed for Wheezing. 1 inhaler 2   • aspirin 81 MG EC tablet Take 1 tablet by mouth Daily. 30 tablet 5   • atorvastatin (LIPITOR) 40 MG tablet Take 1 tablet by mouth Daily. 90 tablet 1   • budesonide-formoterol (SYMBICORT) 80-4.5 MCG/ACT inhaler Inhale 2 puffs 2 (Two) Times a Day. 1 inhaler 2   • diclofenac (VOLTAREN) 1 % gel gel Apply 4 g topically 4 (Four) Times a Day As Needed (joint pain). 100 g 0   • FLUoxetine (PROzac) 20 MG capsule Take 3 capsules by mouth Daily. 270 capsule 1   • hydrochlorothiazide (HYDRODIURIL) 25 MG tablet Take 1 tablet by mouth Daily. 90 tablet 1   • lisinopril (PRINIVIL,ZESTRIL) 40 MG tablet Take 1 tablet by mouth Daily. 90 tablet 1   • meloxicam (MOBIC) 7.5 MG tablet 1 Oral Daily with food. 60 tablet 0     No current facility-administered medications on file prior to visit.       Allergies   Allergen Reactions   • Sulfa Antibiotics Rash        Review of Systems   Constitutional: Negative.    HENT: Negative.    Eyes: Negative.    Respiratory: Negative.    Cardiovascular: Negative.    Gastrointestinal: Negative.    Endocrine: Negative.    Genitourinary: Negative.    Musculoskeletal: Positive for arthralgias.   Skin: Negative.    Allergic/Immunologic: Negative.    Neurological: Negative.    Hematological: Negative.    Psychiatric/Behavioral: Negative.         Physical Exam  Blood pressure (!) 189/89, pulse 68, height 154.9 cm (60.98\"), weight 81.8 kg (180 lb 5.4 oz).    Body mass index is 34.09 kg/m².    GENERAL APPEARANCE: awake, alert, oriented, in no acute distress  LUNGS:  breathing nonlabored  EXTREMITIES: no clubbing, cyanosis  PERIPHERAL PULSES: palpable dorsalis pedis and posterior tibial pulses bilaterally.    GAIT:  Antalgic        ----------  Bilateral Knee " Exam:  ----------  ALIGNMENT: Slight varus, correctable to neutral   ----------  RANGE OF MOTION:  Normal (0-130 degrees) with no extensor lag or flexion contracture  LIGAMENTOUS STABILITY:   stable to varus and valgus stress at terminal extension and 30 degrees; slight retensioning of the MCL is appreciated with valgus stress at 30 degrees consistent with medial compartment degeneration  ----------  STRENGTH:  KNEE FLEXION 5/5  KNEE EXTENSION  5/5  ANKLE DORSIFLEXION  5/5  ANKLE PLANTARFLEXION  5/5  ----------  PAIN WITH PALPATION:medial joint line, anterior knee and popliteal region  KNEE EFFUSION: yes, Mild effusion  PAIN WITH KNEE ROM: yes  PATELLAR CREPITUS:  yes, painful and symptomatic  ----------  SENSATION TO LIGHT TOUCH:  DEEP PERONEAL/SUPERFICIAL PERONEAL/SURAL/SAPHENOUS/TIBIAL:    intact  ----------  EDEMA:  no  ERYTHEMA:    no  WOUNDS/INCISIONS:  no  _____________________________________________________________________  _____________________________________________________________________    RADIOGRAPHIC FINDINGS:   No new imaging today     Assessment/Plan:   Diagnosis Plan   1. Primary osteoarthritis of both knees  Large Joint Arthrocentesis    Large Joint Arthrocentesis   2. Patellofemoral arthritis          The patient has failed conservative treatment measures and is a candidate for total joint arthroplasty.  I discussed the total joint surgical process as well as the recovery and rehabilitation time frame.  The patient asked several questions regarding the total joint arthroplasty surgery, which were answered accordingly.  Ultimately, the patient declines surgical intervention at this time and wishes to continue with conservative treatment measures.  Alternative conservative treatment measures were discussed including bracing, therapy, topical/oral anti-inflammatories, activity modification, and weight loss.  The patient considered these treatment options and wishes to proceed with corticosteroid  injections today.  Therefore we will proceed with corticosteroid injections today.  She will follow up in 3 months with x-rays 4 views bilateral knee upon return.    Procedure Note:  I discussed with the patient the potential benefits of performing a therapeutic injections of the bilateral knees as well as potential risks including but not limited to infection, swelling, pain, bleeding, bruising, nerve/vessel damage, skin color changes, transient elevation in blood glucose levels, and fat atrophy. After informed consent and after the areas were prepped with alcohol, ethyl chloride was used to numb the skin. Via the superolateral approach, 3cc of 1% lidocaine, 3cc of 0.25% marcaine and 2 cc of 40mg/ml of Kenalog were each injected into the bilateral knees. The patient tolerated the procedures well. There were no complications. A sterile dressing was placed over each injection site.      Keaton Aldrich MD  05/15/18  8:00 AM

## 2018-05-15 NOTE — PROGRESS NOTES
Procedure   Large Joint Injection   Date/Time: 5/15/2018 7:50 AM  Consent given by: patient  Site marked: site marked  Timeout: Immediately prior to procedure a time out was called to verify the correct patient, procedure, equipment, support staff and site/side marked as required   Supporting Documentation  Indications: pain   Procedure Details  Location: knee - L knee  Preparation: Patient was prepped and draped in the usual sterile fashion  Needle size: 22 G  Approach: anterolateral  Medications administered: 3 mL bupivacaine 0.25 %; 3 mL lidocaine 1 %; 80 mg triamcinolone acetonide 40 MG/ML  Patient tolerance: patient tolerated the procedure well with no immediate complications

## 2019-02-01 ENCOUNTER — TELEPHONE (OUTPATIENT)
Dept: INTERNAL MEDICINE | Facility: CLINIC | Age: 64
End: 2019-02-01

## 2019-02-01 ENCOUNTER — OFFICE VISIT (OUTPATIENT)
Dept: INTERNAL MEDICINE | Facility: CLINIC | Age: 64
End: 2019-02-01

## 2019-02-01 VITALS
HEART RATE: 82 BPM | DIASTOLIC BLOOD PRESSURE: 110 MMHG | SYSTOLIC BLOOD PRESSURE: 200 MMHG | RESPIRATION RATE: 18 BRPM | BODY MASS INDEX: 37.57 KG/M2 | WEIGHT: 199 LBS | OXYGEN SATURATION: 99 % | HEIGHT: 61 IN

## 2019-02-01 DIAGNOSIS — E11.9 DIET-CONTROLLED DIABETES MELLITUS (HCC): ICD-10-CM

## 2019-02-01 DIAGNOSIS — I25.2 HISTORY OF MI (MYOCARDIAL INFARCTION): ICD-10-CM

## 2019-02-01 DIAGNOSIS — J45.31 MILD PERSISTENT ASTHMA WITH ACUTE EXACERBATION: ICD-10-CM

## 2019-02-01 DIAGNOSIS — R07.89 OTHER CHEST PAIN: ICD-10-CM

## 2019-02-01 DIAGNOSIS — F41.9 ANXIETY: ICD-10-CM

## 2019-02-01 DIAGNOSIS — E78.2 MIXED HYPERLIPIDEMIA: ICD-10-CM

## 2019-02-01 DIAGNOSIS — I10 ESSENTIAL HYPERTENSION: Primary | ICD-10-CM

## 2019-02-01 LAB
BASOPHILS # BLD AUTO: 0.12 10*3/MM3 (ref 0–0.2)
BASOPHILS NFR BLD AUTO: 1 % (ref 0–1)
DEPRECATED RDW RBC AUTO: 48.6 FL (ref 37–54)
EOSINOPHIL # BLD AUTO: 0.63 10*3/MM3 (ref 0–0.3)
EOSINOPHIL NFR BLD AUTO: 5.4 % (ref 0–3)
ERYTHROCYTE [DISTWIDTH] IN BLOOD BY AUTOMATED COUNT: 13.7 % (ref 11.3–14.5)
HBA1C MFR BLD: 6.5 % (ref 4.8–5.6)
HCT VFR BLD AUTO: 49.9 % (ref 34.5–44)
HGB BLD-MCNC: 16.3 G/DL (ref 11.5–15.5)
IMM GRANULOCYTES # BLD AUTO: 0.06 10*3/MM3 (ref 0–0.03)
IMM GRANULOCYTES NFR BLD AUTO: 0.5 % (ref 0–0.6)
LYMPHOCYTES # BLD AUTO: 1.88 10*3/MM3 (ref 0.6–4.8)
LYMPHOCYTES NFR BLD AUTO: 16.2 % (ref 24–44)
MCH RBC QN AUTO: 32.3 PG (ref 27–31)
MCHC RBC AUTO-ENTMCNC: 32.7 G/DL (ref 32–36)
MCV RBC AUTO: 99 FL (ref 80–99)
MONOCYTES # BLD AUTO: 0.85 10*3/MM3 (ref 0–1)
MONOCYTES NFR BLD AUTO: 7.3 % (ref 0–12)
NEUTROPHILS # BLD AUTO: 8.15 10*3/MM3 (ref 1.5–8.3)
NEUTROPHILS NFR BLD AUTO: 70.1 % (ref 41–71)
PLATELET # BLD AUTO: 735 10*3/MM3 (ref 150–450)
PMV BLD AUTO: 11 FL (ref 6–12)
RBC # BLD AUTO: 5.04 10*6/MM3 (ref 3.89–5.14)
WBC NRBC COR # BLD: 11.63 10*3/MM3 (ref 3.5–10.8)

## 2019-02-01 PROCEDURE — 99215 OFFICE O/P EST HI 40 MIN: CPT | Performed by: NURSE PRACTITIONER

## 2019-02-01 PROCEDURE — 93000 ELECTROCARDIOGRAM COMPLETE: CPT | Performed by: NURSE PRACTITIONER

## 2019-02-01 PROCEDURE — 83036 HEMOGLOBIN GLYCOSYLATED A1C: CPT | Performed by: NURSE PRACTITIONER

## 2019-02-01 PROCEDURE — 85025 COMPLETE CBC W/AUTO DIFF WBC: CPT | Performed by: NURSE PRACTITIONER

## 2019-02-01 RX ORDER — ATORVASTATIN CALCIUM 40 MG/1
40 TABLET, FILM COATED ORAL DAILY
Qty: 90 TABLET | Refills: 1 | Status: SHIPPED | OUTPATIENT
Start: 2019-02-01 | End: 2019-02-15 | Stop reason: SDUPTHER

## 2019-02-01 RX ORDER — LISINOPRIL 40 MG/1
40 TABLET ORAL DAILY
Qty: 90 TABLET | Refills: 1 | Status: SHIPPED | OUTPATIENT
Start: 2019-02-01 | End: 2019-06-21 | Stop reason: SDUPTHER

## 2019-02-01 RX ORDER — ASPIRIN 81 MG/1
81 TABLET ORAL DAILY
Qty: 30 TABLET | Refills: 5 | Status: SHIPPED | OUTPATIENT
Start: 2019-02-01 | End: 2019-06-21 | Stop reason: SDUPTHER

## 2019-02-01 RX ORDER — CARVEDILOL 3.12 MG/1
3.12 TABLET ORAL 2 TIMES DAILY WITH MEALS
Qty: 60 TABLET | Refills: 2 | Status: SHIPPED | OUTPATIENT
Start: 2019-02-01 | End: 2019-02-15 | Stop reason: SDUPTHER

## 2019-02-01 RX ORDER — BUDESONIDE AND FORMOTEROL FUMARATE DIHYDRATE 80; 4.5 UG/1; UG/1
2 AEROSOL RESPIRATORY (INHALATION)
Qty: 1 INHALER | Refills: 2 | Status: SHIPPED | OUTPATIENT
Start: 2019-02-01 | End: 2019-03-27

## 2019-02-01 RX ORDER — ALBUTEROL SULFATE 90 UG/1
2 AEROSOL, METERED RESPIRATORY (INHALATION) EVERY 6 HOURS PRN
Qty: 1 INHALER | Refills: 2 | Status: SHIPPED | OUTPATIENT
Start: 2019-02-01 | End: 2020-01-21 | Stop reason: SDUPTHER

## 2019-02-01 RX ORDER — FLUOXETINE HYDROCHLORIDE 20 MG/1
60 CAPSULE ORAL DAILY
Qty: 270 CAPSULE | Refills: 1 | Status: SHIPPED | OUTPATIENT
Start: 2019-02-01 | End: 2019-06-21 | Stop reason: SDUPTHER

## 2019-02-01 RX ORDER — ASPIRIN 81 MG/1
81 TABLET ORAL DAILY
Qty: 30 TABLET | Refills: 5 | Status: SHIPPED | OUTPATIENT
Start: 2019-02-01 | End: 2019-02-01 | Stop reason: SDUPTHER

## 2019-02-01 RX ORDER — HYDROCHLOROTHIAZIDE 25 MG/1
25 TABLET ORAL DAILY
Qty: 90 TABLET | Refills: 1 | Status: SHIPPED | OUTPATIENT
Start: 2019-02-01 | End: 2019-06-21 | Stop reason: SDUPTHER

## 2019-02-01 NOTE — PROGRESS NOTES
Subjective   Martha Murray is a 63 y.o. female here today for HTN.    Chief Complaint   Patient presents with   • Med Refill     Needs blood work     Martha reports she has never been compliant with her medications but the past 2 months she has been compliant with only her lisinopril.  She reports she has been having CP/ SOA.  She usually has CP about once a day- dull- midsternal- not associated with activity- not worse with breathing, It is associated with hearburn- not associated with anxiety.  She has a history of MI- 2013- she did not have a stent placed.  She does not see a cardiologist- was seeing one but has insurance issue/ financial issues and stopped going to the doctor all together.  Her left arm has also been going numb intermittently    Diet controlled-  Checks occasionally-  Usually about 120-  She has been on metformin before but stopped because she was taking too much medications.      Asthma:  She reports she is out of symbicort- she stays wheezy and out of breath- can only walk a few feet without getting out of breath    HLD:  She has not been taking her cholesterol medication-  She reports she is on too much medication and was busy with work.   Review of Systems   Constitutional: Positive for activity change and fatigue. Negative for diaphoresis, unexpected weight gain and unexpected weight loss.   HENT: Negative for nosebleeds and trouble swallowing.    Eyes: Negative for blurred vision and visual disturbance.   Respiratory: Positive for shortness of breath and wheezing. Negative for chest tightness.    Cardiovascular: Positive for chest pain. Negative for palpitations and leg swelling.   Gastrointestinal: Positive for GERD and indigestion. Negative for blood in stool, nausea and vomiting.   Endocrine: Positive for heat intolerance. Negative for polydipsia.   Genitourinary: Negative.    Musculoskeletal: Positive for arthralgias.   Skin: Negative for rash and skin lesions.   Allergic/Immunologic:  Negative.    Neurological: Negative for dizziness, syncope, facial asymmetry, light-headedness, headache, memory problem and confusion.   Psychiatric/Behavioral: Negative for depressed mood. The patient is nervous/anxious.        Past Medical History:   Diagnosis Date   • Allergic    • Anemia    • Depression    • Diabetes mellitus (CMS/HCC)    • History of heart attack    • Hyperlipidemia    • Hypertension      Family History   Problem Relation Age of Onset   • Arthritis Maternal Grandmother    • Hypertension Mother    • Hyperlipidemia Mother    • Thyroid disease Mother    • Diabetes Sister    • Crohn's disease Sister    • Hypertension Sister    • Heart attack Maternal Grandfather      Past Surgical History:   Procedure Laterality Date   • BILATERAL BREAST REDUCTION     • BONE MARROW ASPIRATION     • HYSTERECTOMY  1990    x 2   • SINUS SURGERY     • TONSILLECTOMY       Social History     Socioeconomic History   • Marital status: Single     Spouse name: Not on file   • Number of children: Not on file   • Years of education: Not on file   • Highest education level: Not on file   Social Needs   • Financial resource strain: Not on file   • Food insecurity - worry: Not on file   • Food insecurity - inability: Not on file   • Transportation needs - medical: Not on file   • Transportation needs - non-medical: Not on file   Occupational History   • Not on file   Tobacco Use   • Smoking status: Former Smoker     Packs/day: 2.00     Years: 30.00     Pack years: 60.00     Types: Cigarettes     Last attempt to quit:      Years since quittin.0   • Smokeless tobacco: Never Used   Substance and Sexual Activity   • Alcohol use: No   • Drug use: No   • Sexual activity: Defer   Other Topics Concern   • Not on file   Social History Narrative   • Not on file         Current Outpatient Medications:   •  albuterol sulfate  (90 Base) MCG/ACT inhaler, Inhale 2 puffs Every 6 (Six) Hours As Needed for Wheezing., Disp: 1  "inhaler, Rfl: 2  •  aspirin 81 MG EC tablet, Take 1 tablet by mouth Daily., Disp: 30 tablet, Rfl: 5  •  atorvastatin (LIPITOR) 40 MG tablet, Take 1 tablet by mouth Daily., Disp: 90 tablet, Rfl: 1  •  budesonide-formoterol (SYMBICORT) 80-4.5 MCG/ACT inhaler, Inhale 2 puffs 2 (Two) Times a Day., Disp: 1 inhaler, Rfl: 2  •  diclofenac (VOLTAREN) 1 % gel gel, Apply 4 g topically 4 (Four) Times a Day As Needed (joint pain)., Disp: 100 g, Rfl: 0  •  FLUoxetine (PROzac) 20 MG capsule, Take 3 capsules by mouth Daily., Disp: 270 capsule, Rfl: 1  •  hydrochlorothiazide (HYDRODIURIL) 25 MG tablet, Take 1 tablet by mouth Daily., Disp: 90 tablet, Rfl: 1  •  lisinopril (PRINIVIL,ZESTRIL) 40 MG tablet, Take 1 tablet by mouth Daily., Disp: 90 tablet, Rfl: 1  •  meloxicam (MOBIC) 7.5 MG tablet, 1 Oral Daily with food., Disp: 60 tablet, Rfl: 0  •  carvedilol (COREG) 3.125 MG tablet, Take 1 tablet by mouth 2 (Two) Times a Day With Meals., Disp: 60 tablet, Rfl: 2    Objective   Vitals:    02/01/19 0848   BP: (!) 200/110   Pulse: 82   Resp: 18   SpO2: 99%   Weight: 90.3 kg (199 lb)   Height: 154.9 cm (60.98\")     Body mass index is 37.63 kg/m².  Physical Exam   Constitutional: She is oriented to person, place, and time. She appears well-developed and well-nourished. No distress.   HENT:   Head: Normocephalic and atraumatic.   Eyes: Pupils are equal, round, and reactive to light.   Neck: Neck supple. No thyromegaly present.   Cardiovascular: Normal rate and regular rhythm.   No murmur heard.  Pulmonary/Chest: Effort normal and breath sounds normal. No respiratory distress. She has no wheezes. She exhibits no tenderness.   Abdominal: Soft. She exhibits no distension. There is no tenderness.   Musculoskeletal: She exhibits no edema or deformity.   Neurological: She is alert and oriented to person, place, and time.   Skin: Skin is warm and dry. Capillary refill takes 2 to 3 seconds. She is not diaphoretic.   Psychiatric: She has a normal " mood and affect. Her behavior is normal. Judgment and thought content normal.   Nursing note and vitals reviewed.    ECG 12 Lead  Date/Time: 2/1/2019 9:40 AM  Performed by: Lucia Kraus APRN  Authorized by: Lucia Kraus APRN   Comparison: not compared with previous ECG   Rhythm: sinus rhythm  Rate: normal  QRS axis: normal  Clinical impression: normal ECG            Assessment/Plan   Problem List Items Addressed This Visit        Cardiovascular and Mediastinum    Essential hypertension - Primary    Relevant Medications    hydrochlorothiazide (HYDRODIURIL) 25 MG tablet    lisinopril (PRINIVIL,ZESTRIL) 40 MG tablet    carvedilol (COREG) 3.125 MG tablet    Other Relevant Orders    CBC & Differential    Comprehensive Metabolic Panel    CBC Auto Differential    Hyperlipidemia    Relevant Medications    atorvastatin (LIPITOR) 40 MG tablet    aspirin 81 MG EC tablet    Other Relevant Orders    CBC & Differential    Comprehensive Metabolic Panel    Lipid Panel    CBC Auto Differential       Other    Diet-controlled diabetes mellitus (CMS/HCC)    Relevant Orders    Hemoglobin A1c      Other Visit Diagnoses     Mild persistent asthma with acute exacerbation        Relevant Medications    budesonide-formoterol (SYMBICORT) 80-4.5 MCG/ACT inhaler    albuterol sulfate  (90 Base) MCG/ACT inhaler    Anxiety        Relevant Medications    FLUoxetine (PROzac) 20 MG capsule    Other Relevant Orders    CBC & Differential    Comprehensive Metabolic Panel    TSH    CBC Auto Differential    Other chest pain        Relevant Orders    CBC & Differential    Comprehensive Metabolic Panel    CBC Auto Differential    Treadmill Stress Test    Ambulatory Referral to Cardiology    ECG 12 Lead    History of MI (myocardial infarction)        Relevant Medications    carvedilol (COREG) 3.125 MG tablet        Martha was seen today for med refill.    Diagnoses and all orders for this visit:    Essential hypertension  -      hydrochlorothiazide (HYDRODIURIL) 25 MG tablet; Take 1 tablet by mouth Daily.  -     lisinopril (PRINIVIL,ZESTRIL) 40 MG tablet; Take 1 tablet by mouth Daily.  -     CBC & Differential  -     Comprehensive Metabolic Panel  -     CBC Auto Differential  -     carvedilol (COREG) 3.125 MG tablet; Take 1 tablet by mouth 2 (Two) Times a Day With Meals.          Mild persistent asthma with acute exacerbation  -     budesonide-formoterol (SYMBICORT) 80-4.5 MCG/ACT inhaler; Inhale 2 puffs 2 (Two) Times a Day.  -     albuterol sulfate  (90 Base) MCG/ACT inhaler; Inhale 2 puffs Every 6 (Six) Hours As Needed for Wheezing.    Anxiety  -     FLUoxetine (PROzac) 20 MG capsule; Take 3 capsules by mouth Daily.  -     CBC & Differential  -     Comprehensive Metabolic Panel  -     TSH  -     CBC Auto Differential    Mixed hyperlipidemia  -     atorvastatin (LIPITOR) 40 MG tablet; Take 1 tablet by mouth Daily.  -     Discontinue: aspirin 81 MG EC tablet; Take 1 tablet by mouth Daily.  -     CBC & Differential  -     Comprehensive Metabolic Panel  -     Lipid Panel  -     CBC Auto Differential  -     aspirin 81 MG EC tablet; Take 1 tablet by mouth Daily.    Other chest pain  -     CBC & Differential  -     Comprehensive Metabolic Panel  -     CBC Auto Differential  -     Treadmill Stress Test; Future  -     Ambulatory Referral to Cardiology         -   No active Chest pain- no ST elevation on ECG-  Advised that should she experience CP again - report to ED immediately.   Diet-controlled diabetes mellitus (CMS/HCC)  -     Hemoglobin A1c    History of MI (myocardial infarction)  -     carvedilol (COREG) 3.125 MG tablet; Take 1 tablet by mouth 2 (Two) Times a Day With Meals.    Other orders  -     ECG 12 Lead    Compliance discussed with patient.  Advised that if she continues to not take her medication she is a very high risk of morbidity and mortality.  Discussed the nature of hypertension and risk of heart failure/ kidney  disease, stroke, and death.  Discussed the strain of the heart after an MI and with uncontrolled hypertension.  She reports understanding of the risk and that she will begin taking her medications. I have added on coreg to her regimen.  I have advised her that should she have troubles affording her medication that she needs to let me know as we can work with her.  She reports her finances are in better order at this time and will be able to afford her medications.  RTC in 2 weeks for a recheck- Advised to go to ED with new chest pain- also suggested she try OTC zantac once a day to see if this helps prevent her chest pain.          The patient was counseled regarding diagnostic results, impressions, prognosis, instructions for management, risk factor reductions, education, and importance of treatment compliance.  The patient verbalized understanding of and agreement with the plan of care.    Advised patient to call with any further questions and any new or worsening symptoms.     Return in about 2 weeks (around 2/15/2019).      Lucia Kraus, APRN

## 2019-02-01 NOTE — TELEPHONE ENCOUNTER
Can you let her know her A1C was 6.5 which is worse than her last check but still does not require treatment-  Her COPD has needs to be treated based on her labs which we already discussed

## 2019-02-04 ENCOUNTER — TELEPHONE (OUTPATIENT)
Dept: INTERNAL MEDICINE | Facility: CLINIC | Age: 64
End: 2019-02-04

## 2019-02-04 NOTE — TELEPHONE ENCOUNTER
PATIENT CANCELLED HER STRESS TEST BECAUSE SHE DOESN'T HAVE $800. PATIENT ALSO WANTED ME TO LET YOU KNOW THAT SHE CAN NOW TAKE HER BLOOD PRESSURE.

## 2019-02-06 ENCOUNTER — APPOINTMENT (OUTPATIENT)
Dept: CARDIOLOGY | Facility: HOSPITAL | Age: 64
End: 2019-02-06
Attending: NURSE PRACTITIONER

## 2019-02-15 ENCOUNTER — OFFICE VISIT (OUTPATIENT)
Dept: INTERNAL MEDICINE | Facility: CLINIC | Age: 64
End: 2019-02-15

## 2019-02-15 VITALS
SYSTOLIC BLOOD PRESSURE: 162 MMHG | HEIGHT: 61 IN | RESPIRATION RATE: 18 BRPM | BODY MASS INDEX: 37.19 KG/M2 | WEIGHT: 197 LBS | HEART RATE: 90 BPM | DIASTOLIC BLOOD PRESSURE: 108 MMHG | OXYGEN SATURATION: 98 %

## 2019-02-15 DIAGNOSIS — I10 ESSENTIAL HYPERTENSION: Primary | ICD-10-CM

## 2019-02-15 DIAGNOSIS — R07.89 OTHER CHEST PAIN: ICD-10-CM

## 2019-02-15 DIAGNOSIS — J41.0 SIMPLE CHRONIC BRONCHITIS (HCC): ICD-10-CM

## 2019-02-15 DIAGNOSIS — I25.2 HISTORY OF MI (MYOCARDIAL INFARCTION): ICD-10-CM

## 2019-02-15 DIAGNOSIS — E78.2 MIXED HYPERLIPIDEMIA: ICD-10-CM

## 2019-02-15 PROBLEM — Z87.891 HISTORY OF TOBACCO ABUSE: Status: ACTIVE | Noted: 2019-02-15

## 2019-02-15 PROCEDURE — 99214 OFFICE O/P EST MOD 30 MIN: CPT | Performed by: NURSE PRACTITIONER

## 2019-02-15 RX ORDER — ATORVASTATIN CALCIUM 80 MG/1
80 TABLET, FILM COATED ORAL DAILY
Qty: 30 TABLET | Refills: 2 | Status: SHIPPED | OUTPATIENT
Start: 2019-02-15 | End: 2019-06-18 | Stop reason: SDUPTHER

## 2019-02-15 RX ORDER — CARVEDILOL 6.25 MG/1
6.25 TABLET ORAL 2 TIMES DAILY WITH MEALS
Qty: 60 TABLET | Refills: 2 | Status: SHIPPED | OUTPATIENT
Start: 2019-02-15 | End: 2019-03-27 | Stop reason: SDUPTHER

## 2019-02-15 NOTE — PROGRESS NOTES
Subjective   Martha Murray is a 63 y.o. female here today for HTN.    Chief Complaint   Patient presents with   • Hypertension    Martha is here today with HTN/ chest pain- BP meds restarted last visit. Chest pain at rest- ECG normal-Unable to afford stress test - did not set up cardiology referral related to money- nothing seems to bring it own- described as discomfort/ pressure- radiates to back.  It is not associated with activity, diaphoresis, SOA.  She does have SOA with exertion. Symbicort for asthma started back last visit.  Is taking her symbicort.  Chest pain does not seem to be related to heartburn (did have heartburn- taking OTC antacid- improved but chest pain unchanged), not related to anxiety.  She has been tested for sleep apnea which was negative.     Review of Systems   Constitutional: Negative for diaphoresis, fatigue, unexpected weight gain and unexpected weight loss.   HENT: Negative for nosebleeds and trouble swallowing.    Eyes: Negative for blurred vision and visual disturbance.   Respiratory: Positive for shortness of breath and wheezing. Negative for chest tightness.    Cardiovascular: Positive for chest pain. Negative for palpitations and leg swelling.   Gastrointestinal: Negative for blood in stool, nausea and vomiting.   Skin: Negative for rash and skin lesions.   Neurological: Negative for dizziness, syncope, facial asymmetry, light-headedness, headache, memory problem and confusion.   Psychiatric/Behavioral: The patient is not nervous/anxious.        Past Medical History:   Diagnosis Date   • Allergic    • Anemia    • Depression    • Diabetes mellitus (CMS/[facility])    • History of heart attack    • Hyperlipidemia    • Hypertension      Family History   Problem Relation Age of Onset   • Arthritis Maternal Grandmother    • Hypertension Mother    • Hyperlipidemia Mother    • Thyroid disease Mother    • Diabetes Sister    • Crohn's disease Sister    • Hypertension Sister    • Heart attack  Maternal Grandfather      Past Surgical History:   Procedure Laterality Date   • BILATERAL BREAST REDUCTION     • BONE MARROW ASPIRATION     • HYSTERECTOMY  1990    x 2   • SINUS SURGERY     • TONSILLECTOMY       Social History     Socioeconomic History   • Marital status: Single     Spouse name: Not on file   • Number of children: Not on file   • Years of education: Not on file   • Highest education level: Not on file   Social Needs   • Financial resource strain: Not on file   • Food insecurity - worry: Not on file   • Food insecurity - inability: Not on file   • Transportation needs - medical: Not on file   • Transportation needs - non-medical: Not on file   Occupational History   • Not on file   Tobacco Use   • Smoking status: Former Smoker     Packs/day: 2.00     Years: 30.00     Pack years: 60.00     Types: Cigarettes     Last attempt to quit:      Years since quittin.1   • Smokeless tobacco: Never Used   Substance and Sexual Activity   • Alcohol use: No   • Drug use: No   • Sexual activity: Defer   Other Topics Concern   • Not on file   Social History Narrative   • Not on file         Current Outpatient Medications:   •  albuterol sulfate  (90 Base) MCG/ACT inhaler, Inhale 2 puffs Every 6 (Six) Hours As Needed for Wheezing., Disp: 1 inhaler, Rfl: 2  •  aspirin 81 MG EC tablet, Take 1 tablet by mouth Daily., Disp: 30 tablet, Rfl: 5  •  atorvastatin (LIPITOR) 80 MG tablet, Take 1 tablet by mouth Daily., Disp: 30 tablet, Rfl: 2  •  budesonide-formoterol (SYMBICORT) 80-4.5 MCG/ACT inhaler, Inhale 2 puffs 2 (Two) Times a Day., Disp: 1 inhaler, Rfl: 2  •  carvedilol (COREG) 6.25 MG tablet, Take 1 tablet by mouth 2 (Two) Times a Day With Meals., Disp: 60 tablet, Rfl: 2  •  diclofenac (VOLTAREN) 1 % gel gel, Apply 4 g topically 4 (Four) Times a Day As Needed (joint pain)., Disp: 100 g, Rfl: 0  •  FLUoxetine (PROzac) 20 MG capsule, Take 3 capsules by mouth Daily., Disp: 270 capsule, Rfl: 1  •   "hydrochlorothiazide (HYDRODIURIL) 25 MG tablet, Take 1 tablet by mouth Daily., Disp: 90 tablet, Rfl: 1  •  lisinopril (PRINIVIL,ZESTRIL) 40 MG tablet, Take 1 tablet by mouth Daily., Disp: 90 tablet, Rfl: 1  •  meloxicam (MOBIC) 7.5 MG tablet, 1 Oral Daily with food., Disp: 60 tablet, Rfl: 0  •  tiotropium (SPIRIVA) 18 MCG per inhalation capsule, Place 1 capsule into inhaler and inhale Daily., Disp: 30 capsule, Rfl: 2    Objective   Vitals:    02/15/19 1339   BP: (!) 162/108   Pulse: 90   Resp: 18   SpO2: 98%   Weight: 89.4 kg (197 lb)   Height: 154.9 cm (60.98\")     Body mass index is 37.25 kg/m².  Physical Exam   Constitutional: She is oriented to person, place, and time. She appears well-developed and well-nourished. No distress.   Eyes: Pupils are equal, round, and reactive to light.   Neck: Neck supple. No thyromegaly present.   Cardiovascular: Normal rate and regular rhythm.   Pulmonary/Chest: Effort normal and breath sounds normal.   Neurological: She is alert and oriented to person, place, and time.   Skin: Skin is warm and dry. Capillary refill takes 2 to 3 seconds. She is not diaphoretic.   Psychiatric: She has a normal mood and affect. Her behavior is normal. Judgment and thought content normal.   Nursing note and vitals reviewed.      Assessment/Plan   Problem List Items Addressed This Visit        Cardiovascular and Mediastinum    Essential hypertension - Primary    Relevant Medications    hydrochlorothiazide (HYDRODIURIL) 25 MG tablet    lisinopril (PRINIVIL,ZESTRIL) 40 MG tablet    carvedilol (COREG) 6.25 MG tablet    Hyperlipidemia    Relevant Medications    aspirin 81 MG EC tablet    atorvastatin (LIPITOR) 80 MG tablet       Respiratory    Simple chronic bronchitis (CMS/HCC)    Relevant Medications    budesonide-formoterol (SYMBICORT) 80-4.5 MCG/ACT inhaler    albuterol sulfate  (90 Base) MCG/ACT inhaler    tiotropium (SPIRIVA) 18 MCG per inhalation capsule       Nervous and Auditory    Other " chest pain      Other Visit Diagnoses     History of MI (myocardial infarction)        Relevant Medications    carvedilol (COREG) 6.25 MG tablet        Martha was seen today for hypertension.    Diagnoses and all orders for this visit:    Essential hypertension/ chest pain  -     carvedilol (COREG) 6.25 MG tablet; Take 1 tablet by mouth 2 (Two) Times a Day With Meals.        -   Increase Coreg- encouraged to go to cardiology even if she cannot afford stress test- she is going to consider it after our next check up.  She has voiced understanding that she may have a life threatening blockage and could have another heart attack.  She reports she will call 911 should the pain worsen or if the pain changes- FU with me in 2 weeks for a BP recheck and physical  History of MI (myocardial infarction)  -     carvedilol (COREG) 6.25 MG tablet; Take 1 tablet by mouth 2 (Two) Times a Day With Meals.    Simple chronic bronchitis (CMS/HCC)  -     tiotropium (SPIRIVA) 18 MCG per inhalation capsule; Place 1 capsule into inhaler and inhale Daily.        -    Add spiriva to her inhalers to see if her dyspnea improves  Mixed hyperlipidemia  -     atorvastatin (LIPITOR) 80 MG tablet; Take 1 tablet by mouth Daily.       -    Maximize statin today- FU in 2 weeks for labs- fasting           The patient was counseled regarding diagnostic results, impressions, prognosis, instructions for management, risk factor reductions, education, and importance of treatment compliance.  The patient verbalized understanding of and agreement with the plan of care.    Advised patient to call with any further questions and any new or worsening symptoms.     Return in about 2 weeks (around 3/1/2019) for Physical w/Next Visit.      Lucia Kraus, APRN

## 2019-03-27 ENCOUNTER — OFFICE VISIT (OUTPATIENT)
Dept: INTERNAL MEDICINE | Facility: CLINIC | Age: 64
End: 2019-03-27

## 2019-03-27 VITALS
TEMPERATURE: 96.8 F | RESPIRATION RATE: 18 BRPM | SYSTOLIC BLOOD PRESSURE: 130 MMHG | HEIGHT: 61 IN | WEIGHT: 193 LBS | DIASTOLIC BLOOD PRESSURE: 92 MMHG | BODY MASS INDEX: 36.44 KG/M2 | HEART RATE: 62 BPM | OXYGEN SATURATION: 99 %

## 2019-03-27 DIAGNOSIS — I10 ESSENTIAL HYPERTENSION: ICD-10-CM

## 2019-03-27 DIAGNOSIS — E11.9 DIET-CONTROLLED DIABETES MELLITUS (HCC): ICD-10-CM

## 2019-03-27 DIAGNOSIS — I25.2 HISTORY OF MI (MYOCARDIAL INFARCTION): ICD-10-CM

## 2019-03-27 DIAGNOSIS — Z12.31 SCREENING MAMMOGRAM, ENCOUNTER FOR: ICD-10-CM

## 2019-03-27 DIAGNOSIS — Z23 NEED FOR TDAP VACCINATION: ICD-10-CM

## 2019-03-27 DIAGNOSIS — Z00.00 ANNUAL PHYSICAL EXAM: Primary | ICD-10-CM

## 2019-03-27 DIAGNOSIS — Z11.59 NEED FOR HEPATITIS C SCREENING TEST: ICD-10-CM

## 2019-03-27 DIAGNOSIS — S99.922A INJURY OF TOE ON LEFT FOOT, INITIAL ENCOUNTER: ICD-10-CM

## 2019-03-27 DIAGNOSIS — J41.0 SIMPLE CHRONIC BRONCHITIS (HCC): ICD-10-CM

## 2019-03-27 DIAGNOSIS — J45.20 MILD INTERMITTENT ASTHMA WITHOUT COMPLICATION: ICD-10-CM

## 2019-03-27 LAB
25(OH)D3 SERPL-MCNC: 17.8 NG/ML
ALBUMIN SERPL-MCNC: 4.69 G/DL (ref 3.2–4.8)
ALBUMIN/GLOB SERPL: 2.2 G/DL (ref 1.5–2.5)
ALP SERPL-CCNC: 129 U/L (ref 25–100)
ALT SERPL W P-5'-P-CCNC: 52 U/L (ref 7–40)
ANION GAP SERPL CALCULATED.3IONS-SCNC: 12 MMOL/L (ref 3–11)
ARTICHOKE IGE QN: 59 MG/DL (ref 0–130)
AST SERPL-CCNC: 36 U/L (ref 0–33)
BASOPHILS # BLD AUTO: 0.11 10*3/MM3 (ref 0–0.2)
BASOPHILS NFR BLD AUTO: 0.9 % (ref 0–1)
BILIRUB SERPL-MCNC: 1.2 MG/DL (ref 0.3–1.2)
BUN BLD-MCNC: 20 MG/DL (ref 9–23)
BUN/CREAT SERPL: 15.9 (ref 7–25)
CALCIUM SPEC-SCNC: 10.2 MG/DL (ref 8.7–10.4)
CHLORIDE SERPL-SCNC: 101 MMOL/L (ref 99–109)
CHOLEST SERPL-MCNC: 110 MG/DL (ref 0–200)
CO2 SERPL-SCNC: 28 MMOL/L (ref 20–31)
CREAT BLD-MCNC: 1.26 MG/DL (ref 0.6–1.3)
DEPRECATED RDW RBC AUTO: 52.2 FL (ref 37–54)
EOSINOPHIL # BLD AUTO: 0.5 10*3/MM3 (ref 0–0.3)
EOSINOPHIL NFR BLD AUTO: 4.3 % (ref 0–3)
ERYTHROCYTE [DISTWIDTH] IN BLOOD BY AUTOMATED COUNT: 14.4 % (ref 11.3–14.5)
GFR SERPL CREATININE-BSD FRML MDRD: 43 ML/MIN/1.73
GLOBULIN UR ELPH-MCNC: 2.1 GM/DL
GLUCOSE BLD-MCNC: 147 MG/DL (ref 70–100)
HCT VFR BLD AUTO: 48.4 % (ref 34.5–44)
HCV AB SER DONR QL: NORMAL
HDLC SERPL-MCNC: 29 MG/DL (ref 40–60)
HGB BLD-MCNC: 15.5 G/DL (ref 11.5–15.5)
IMM GRANULOCYTES # BLD AUTO: 0.05 10*3/MM3 (ref 0–0.05)
IMM GRANULOCYTES NFR BLD AUTO: 0.4 % (ref 0–0.6)
IRON 24H UR-MRATE: 74 MCG/DL (ref 50–175)
IRON SATN MFR SERPL: 26 % (ref 15–50)
LYMPHOCYTES # BLD AUTO: 1.51 10*3/MM3 (ref 0.6–4.8)
LYMPHOCYTES NFR BLD AUTO: 13 % (ref 24–44)
MCH RBC QN AUTO: 32.1 PG (ref 27–31)
MCHC RBC AUTO-ENTMCNC: 32 G/DL (ref 32–36)
MCV RBC AUTO: 100.2 FL (ref 80–99)
MONOCYTES # BLD AUTO: 0.73 10*3/MM3 (ref 0–1)
MONOCYTES NFR BLD AUTO: 6.3 % (ref 0–12)
NEUTROPHILS # BLD AUTO: 8.81 10*3/MM3 (ref 1.5–8.3)
NEUTROPHILS NFR BLD AUTO: 75.5 % (ref 41–71)
PLATELET # BLD AUTO: 904 10*3/MM3 (ref 150–450)
PMV BLD AUTO: 10.8 FL (ref 6–12)
POTASSIUM BLD-SCNC: 4.6 MMOL/L (ref 3.5–5.5)
PROT SERPL-MCNC: 6.8 G/DL (ref 5.7–8.2)
RBC # BLD AUTO: 4.83 10*6/MM3 (ref 3.89–5.14)
SODIUM BLD-SCNC: 141 MMOL/L (ref 132–146)
TIBC SERPL-MCNC: 287 MCG/DL (ref 250–450)
TRIGL SERPL-MCNC: 132 MG/DL (ref 0–150)
TSH SERPL DL<=0.05 MIU/L-ACNC: 1.32 MIU/ML (ref 0.35–5.35)
VIT B12 BLD-MCNC: 683 PG/ML (ref 211–911)
WBC NRBC COR # BLD: 11.66 10*3/MM3 (ref 3.5–10.8)

## 2019-03-27 PROCEDURE — 83550 IRON BINDING TEST: CPT | Performed by: NURSE PRACTITIONER

## 2019-03-27 PROCEDURE — 82607 VITAMIN B-12: CPT | Performed by: NURSE PRACTITIONER

## 2019-03-27 PROCEDURE — 99213 OFFICE O/P EST LOW 20 MIN: CPT | Performed by: NURSE PRACTITIONER

## 2019-03-27 PROCEDURE — 86803 HEPATITIS C AB TEST: CPT | Performed by: NURSE PRACTITIONER

## 2019-03-27 PROCEDURE — 80061 LIPID PANEL: CPT | Performed by: NURSE PRACTITIONER

## 2019-03-27 PROCEDURE — 82043 UR ALBUMIN QUANTITATIVE: CPT | Performed by: NURSE PRACTITIONER

## 2019-03-27 PROCEDURE — 85007 BL SMEAR W/DIFF WBC COUNT: CPT | Performed by: NURSE PRACTITIONER

## 2019-03-27 PROCEDURE — 85025 COMPLETE CBC W/AUTO DIFF WBC: CPT | Performed by: NURSE PRACTITIONER

## 2019-03-27 PROCEDURE — 84443 ASSAY THYROID STIM HORMONE: CPT | Performed by: NURSE PRACTITIONER

## 2019-03-27 PROCEDURE — 80053 COMPREHEN METABOLIC PANEL: CPT | Performed by: NURSE PRACTITIONER

## 2019-03-27 PROCEDURE — 90471 IMMUNIZATION ADMIN: CPT | Performed by: NURSE PRACTITIONER

## 2019-03-27 PROCEDURE — 83540 ASSAY OF IRON: CPT | Performed by: NURSE PRACTITIONER

## 2019-03-27 PROCEDURE — 99396 PREV VISIT EST AGE 40-64: CPT | Performed by: NURSE PRACTITIONER

## 2019-03-27 PROCEDURE — 82570 ASSAY OF URINE CREATININE: CPT | Performed by: NURSE PRACTITIONER

## 2019-03-27 PROCEDURE — 90715 TDAP VACCINE 7 YRS/> IM: CPT | Performed by: NURSE PRACTITIONER

## 2019-03-27 PROCEDURE — 82306 VITAMIN D 25 HYDROXY: CPT | Performed by: NURSE PRACTITIONER

## 2019-03-27 RX ORDER — CARVEDILOL 12.5 MG/1
12.5 TABLET ORAL 2 TIMES DAILY WITH MEALS
Qty: 60 TABLET | Refills: 2 | Status: SHIPPED | OUTPATIENT
Start: 2019-03-27 | End: 2019-06-19 | Stop reason: SDUPTHER

## 2019-03-27 NOTE — PROGRESS NOTES
"Subjective   Martha Murray is a 63 y.o. female here today for physical exam.    Chief Complaint   Patient presents with   • Annual Exam     Her overall health is: \"ok\" does have a lot of joint pain  She exercises by: walking and gardening  Last colon screening was: 2015  Immunizations are: Needs tdap- refuses PNA at this time- Had shingrix at [facility]  LMP: s/p hysterectomy  PAP: TABH- non cervical cancer  Mammogram: 2006- ordered today  She is not sexually active. No concern for STDs  Calcium/ Vit D: counseled  She does see not see a dentist:   Her diet is described as: \"ok\"   She describes her alcohol intake as: none  She does not use tobacco: none  Her cardiovascular risk is: high  She does wear a seatbelt regularly.  She does not wear sunscreen regularly when outdoors but does wear a big hat  PHQ2- does take prozac- does not want to change medications at this time    BP improving- she is checking it at home and is getting numbers consistent with today's reading- CP improved with BP/ COPD control.  Unable to afford stress test    COPD not at goal.  Still having productive cough and gets winded easily.  Wheezing.    She reports she stubbed her toe a few weeks ago and cut it, and has been sore and swollen since.  She can move it, does not feel like other times when she has broken her toe.  It is red around the sore.  She has been soaking in Epsom salts and trying to air it out, she has also been trying to keep it clean.  Review of Systems   Constitutional: Negative for activity change, appetite change, chills, fever, unexpected weight gain and unexpected weight loss.   HENT: Negative for congestion, ear pain, nosebleeds, postnasal drip, sore throat, trouble swallowing and voice change.    Eyes: Negative for blurred vision, pain, itching and visual disturbance.   Respiratory: Positive for cough, shortness of breath and wheezing.    Cardiovascular: Negative for chest pain and palpitations.        Chest pain improved " with COPD and HTN treatment   Gastrointestinal: Negative for blood in stool, diarrhea, nausea and vomiting.   Endocrine: Negative for polydipsia, polyphagia and polyuria.   Genitourinary: Negative for dysuria, flank pain, frequency, genital sores, hematuria and urgency.   Musculoskeletal: Positive for arthralgias. Negative for myalgias.   Skin: Positive for skin lesions. Negative for rash.   Allergic/Immunologic: Negative for environmental allergies, food allergies and immunocompromised state.   Neurological: Negative for dizziness, seizures, weakness, headache, memory problem and confusion.   Psychiatric/Behavioral: Positive for depressed mood. Negative for self-injury, sleep disturbance and suicidal ideas. The patient is not nervous/anxious.        Past Medical History:   Diagnosis Date   • Allergic    • Anemia    • Depression    • Diabetes mellitus (CMS/[facility])    • History of heart attack    • Hyperlipidemia    • Hypertension      Family History   Problem Relation Age of Onset   • Arthritis Maternal Grandmother    • Hypertension Mother    • Hyperlipidemia Mother    • Thyroid disease Mother    • Diabetes Sister    • Crohn's disease Sister    • Hypertension Sister    • Heart attack Maternal Grandfather      Past Surgical History:   Procedure Laterality Date   • BILATERAL BREAST REDUCTION     • BONE MARROW ASPIRATION     • HYSTERECTOMY  1990    x 2   • SINUS SURGERY     • TONSILLECTOMY       Social History     Socioeconomic History   • Marital status: Single     Spouse name: Not on file   • Number of children: Not on file   • Years of education: Not on file   • Highest education level: Not on file   Tobacco Use   • Smoking status: Former Smoker     Packs/day: 2.00     Years: 30.00     Pack years: 60.00     Types: Cigarettes     Last attempt to quit:      Years since quittin.2   • Smokeless tobacco: Never Used   Substance and Sexual Activity   • Alcohol use: No   • Drug use: No   • Sexual activity: Defer  "        Current Outpatient Medications:   •  albuterol sulfate  (90 Base) MCG/ACT inhaler, Inhale 2 puffs Every 6 (Six) Hours As Needed for Wheezing., Disp: 1 inhaler, Rfl: 2  •  aspirin 81 MG EC tablet, Take 1 tablet by mouth Daily., Disp: 30 tablet, Rfl: 5  •  atorvastatin (LIPITOR) 80 MG tablet, Take 1 tablet by mouth Daily., Disp: 30 tablet, Rfl: 2  •  carvedilol (COREG) 12.5 MG tablet, Take 1 tablet by mouth 2 (Two) Times a Day With Meals., Disp: 60 tablet, Rfl: 2  •  diclofenac (VOLTAREN) 1 % gel gel, Apply 4 g topically 4 (Four) Times a Day As Needed (joint pain)., Disp: 100 g, Rfl: 0  •  FLUoxetine (PROzac) 20 MG capsule, Take 3 capsules by mouth Daily., Disp: 270 capsule, Rfl: 1  •  hydrochlorothiazide (HYDRODIURIL) 25 MG tablet, Take 1 tablet by mouth Daily., Disp: 90 tablet, Rfl: 1  •  lisinopril (PRINIVIL,ZESTRIL) 40 MG tablet, Take 1 tablet by mouth Daily., Disp: 90 tablet, Rfl: 1  •  meloxicam (MOBIC) 7.5 MG tablet, 1 Oral Daily with food., Disp: 60 tablet, Rfl: 0  •  tiotropium (SPIRIVA) 18 MCG per inhalation capsule, Place 1 capsule into inhaler and inhale Daily., Disp: 30 capsule, Rfl: 2  •  Fluticasone-Umeclidin-Vilant (TRELEGY ELLIPTA) 100-62.5-25 MCG/INH aerosol powder , Inhale 1 each Daily., Disp: 60 each, Rfl: 5  •  mupirocin (BACTROBAN) 2 % ointment, Apply  topically to the appropriate area as directed 3 (Three) Times a Day., Disp: 15 g, Rfl: 0    Objective   Vitals:    03/27/19 0924   BP: 130/92   Pulse: 62   Resp: 18   Temp: 96.8 °F (36 °C)   TempSrc: Temporal   SpO2: 99%   Weight: 87.5 kg (193 lb)   Height: 154.9 cm (60.98\")   PainSc:   6   PainLoc: Toe     Body mass index is 36.49 kg/m².  Physical Exam   Constitutional: She is oriented to person, place, and time. She appears well-developed and well-nourished. She is cooperative. No distress.   HENT:   Head: Normocephalic.   Right Ear: Tympanic membrane and external ear normal.   Left Ear: Tympanic membrane and external ear normal. "   Nose: Nose normal. Right sinus exhibits no maxillary sinus tenderness and no frontal sinus tenderness. Left sinus exhibits no maxillary sinus tenderness and no frontal sinus tenderness.   Mouth/Throat: Oropharynx is clear and moist and mucous membranes are normal. No oropharyngeal exudate.   Eyes: Conjunctivae and EOM are normal. Pupils are equal, round, and reactive to light. No scleral icterus.   Neck: Normal range of motion. Neck supple. Carotid bruit is not present. No neck rigidity. No tracheal deviation present. No thyroid mass and no thyromegaly present.   Cardiovascular: Normal rate, regular rhythm, normal heart sounds and intact distal pulses. Exam reveals no gallop and no friction rub.   No murmur heard.  Pulmonary/Chest: Effort normal. No accessory muscle usage. No tachypnea. No respiratory distress. She has wheezes in the right upper field and the left upper field. She has no rales.   Abdominal: Soft. Normal appearance and bowel sounds are normal. She exhibits no distension and no mass. There is no hepatosplenomegaly. There is no tenderness. There is no rebound and no guarding. No hernia.   Musculoskeletal: Normal range of motion. She exhibits no edema, tenderness or deformity.   ROM normal with all major joints    Martha had a diabetic foot exam performed today.   During the foot exam she had a monofilament test performed.    Neurological Sensory Findings -  Unaltered sharp/dull left ankle/foot discrimination.  Vascular Status -  Her right foot exhibits normal foot vasculature  and no edema. Her left foot exhibits normal foot vasculature  and no edema.  Skin Integrity  -  Her right foot skin is intact.Her left foot skin is intact (sore on fifth digit)..  Lymphadenopathy:        Head (right side): No submental, no submandibular, no tonsillar, no preauricular, no posterior auricular and no occipital adenopathy present.        Head (left side): No submental, no submandibular, no tonsillar, no  preauricular, no posterior auricular and no occipital adenopathy present.     She has no cervical adenopathy.        Right cervical: No superficial cervical, no deep cervical and no posterior cervical adenopathy present.       Left cervical: No superficial cervical, no deep cervical and no posterior cervical adenopathy present.   Neurological: She is alert and oriented to person, place, and time. She displays no atrophy and no tremor. No cranial nerve deficit or sensory deficit. She exhibits normal muscle tone. Coordination and gait normal. GCS eye subscore is 4. GCS verbal subscore is 5. GCS motor subscore is 6.   Skin: Skin is warm and dry. Capillary refill takes 2 to 3 seconds. No rash noted. She is not diaphoretic. No cyanosis. Nails show no clubbing.   Psychiatric: She has a normal mood and affect. Her speech is normal and behavior is normal. Judgment and thought content normal. Cognition and memory are normal.   Nursing note and vitals reviewed.      Assessment/Plan   Problem List Items Addressed This Visit        Cardiovascular and Mediastinum    Essential hypertension    Relevant Medications    hydrochlorothiazide (HYDRODIURIL) 25 MG tablet    lisinopril (PRINIVIL,ZESTRIL) 40 MG tablet    carvedilol (COREG) 12.5 MG tablet       Respiratory    Mild intermittent asthma without complication    Relevant Medications    albuterol sulfate  (90 Base) MCG/ACT inhaler    tiotropium (SPIRIVA) 18 MCG per inhalation capsule    Fluticasone-Umeclidin-Vilant (TRELEGY ELLIPTA) 100-62.5-25 MCG/INH aerosol powder     Simple chronic bronchitis (CMS/HCC)    Relevant Medications    albuterol sulfate  (90 Base) MCG/ACT inhaler    tiotropium (SPIRIVA) 18 MCG per inhalation capsule    Fluticasone-Umeclidin-Vilant (TRELEGY ELLIPTA) 100-62.5-25 MCG/INH aerosol powder        Other    Diet-controlled diabetes mellitus (CMS/HCC)    Relevant Orders    Microalbumin / Creatinine Urine Ratio - Urine, Clean Catch    Ambulatory  Referral for Diabetic Eye Exam-Ophthalmology      Other Visit Diagnoses     Annual physical exam    -  Primary    Relevant Orders    CBC & Differential    Comprehensive Metabolic Panel    Iron Profile    Lipid Panel    TSH    Vitamin B12    Vitamin D 25 Hydroxy    CBC Auto Differential    Screening mammogram, encounter for        Relevant Orders    Mammo Screening Digital Tomosynthesis Bilateral With CAD    History of MI (myocardial infarction)        Relevant Medications    carvedilol (COREG) 12.5 MG tablet    Need for hepatitis C screening test        Relevant Orders    Hepatitis C Antibody    Injury of toe on left foot, initial encounter        Relevant Medications    mupirocin (BACTROBAN) 2 % ointment    Other Relevant Orders    Tdap Vaccine Greater Than or Equal To 6yo IM (Completed)    Need for Tdap vaccination        Relevant Orders    Tdap Vaccine Greater Than or Equal To 6yo IM (Completed)        Martha was seen today for annual exam.    Diagnoses and all orders for this visit:    Annual physical exam  -     CBC & Differential  -     Comprehensive Metabolic Panel  -     Iron Profile  -     Lipid Panel  -     TSH  -     Vitamin B12  -     Vitamin D 25 Hydroxy  -     CBC Auto Differential  Counseled regarding: age-appropriate screening labs and tests, wearing seatbelt and sunscreen regularly  Discussed: regular exercise and diet changes to promote weight loss, wearing protective sunwear, starting vit d/ calcium supplement for bone health    Diet-controlled diabetes mellitus (CMS/HCC)  -     Microalbumin / Creatinine Urine Ratio - Urine, Clean Catch  -     Ambulatory Referral for Diabetic Eye Exam-Ophthalmology        -DM foot exam performed  Screening mammogram, encounter for  -     Mammo Screening Digital Tomosynthesis Bilateral With CAD; Future    Essential hypertension  -     carvedilol (COREG) 12.5 MG tablet; Take 1 tablet by mouth 2 (Two) Times a Day With Meals.      -     BP still elevated- Increase  coreg- FU in 2 weeks  History of MI (myocardial infarction)  -     carvedilol (COREG) 12.5 MG tablet; Take 1 tablet by mouth 2 (Two) Times a Day With Meals.    Need for hepatitis C screening test  -     Hepatitis C Antibody    Injury of toe on left foot, initial encounter  -     mupirocin (BACTROBAN) 2 % ointment; Apply  topically to the appropriate area as directed 3 (Three) Times a Day.  -     Tdap Vaccine Greater Than or Equal To 6yo IM         Need for Tdap vaccination  -     Tdap Vaccine Greater Than or Equal To 6yo IM    Mild intermittent asthma without complication    Simple chronic bronchitis (CMS/HCC)  -     Fluticasone-Umeclidin-Vilant (TRELEGY ELLIPTA) 100-62.5-25 MCG/INH aerosol powder ; Inhale 1 each Daily.        -   Change symbicort to trelegy- FU in 2 weeks           The patient was counseled regarding diagnostic results, impressions, prognosis, instructions for management, risk factor reductions, education, and importance of treatment compliance.  The patient verbalized understanding of and agreement with the plan of care.    Advised patient to call with any further questions and any new or worsening symptoms.     Return in about 2 weeks (around 4/10/2019).      NAYELI Barcenas

## 2019-03-27 NOTE — PATIENT INSTRUCTIONS
Chronic Obstructive Pulmonary Disease  Chronic obstructive pulmonary disease (COPD) is a long-term (chronic) lung problem. When you have COPD, it is hard for air to get in and out of your lungs. Usually the condition gets worse over time, and your lungs will never return to normal. There are things you can do to keep yourself as healthy as possible.  · Your doctor may treat your condition with:  ? Medicines.  ? Oxygen.  ? Lung surgery.  · Your doctor may also recommend:  ? Rehabilitation. This includes steps to make your body work better. It may involve a team of specialists.  ? Quitting smoking, if you smoke.  ? Exercise and changes to your diet.  ? Comfort measures (palliative care).    Follow these instructions at home:  Medicines  · Take over-the-counter and prescription medicines only as told by your doctor.  · Talk to your doctor before taking any cough or allergy medicines. You may need to avoid medicines that cause your lungs to be dry.  Lifestyle  · If you smoke, stop. Smoking makes the problem worse. If you need help quitting, ask your doctor.  · Avoid being around things that make your breathing worse. This may include smoke, chemicals, and fumes.  · Stay active, but remember to rest as well.  · Learn and use tips on how to relax.  · Make sure you get enough sleep. Most adults need at least 7 hours of sleep every night.  · Eat healthy foods. Eat smaller meals more often. Rest before meals.  Controlled breathing  Learn and use tips on how to control your breathing as told by your doctor. Try:  · Breathing in (inhaling) through your nose for 1 second. Then, pucker your lips and breath out (exhale) through your lips for 2 seconds.  · Putting one hand on your belly (abdomen). Breathe in slowly through your nose for 1 second. Your hand on your belly should move out. Pucker your lips and breathe out slowly through your lips. Your hand on your belly should move in as you breathe out.    Controlled  coughing  Learn and use controlled coughing to clear mucus from your lungs. Follow these steps:  1. Lean your head a little forward.  2. Breathe in deeply.  3. Try to hold your breath for 3 seconds.  4. Keep your mouth slightly open while coughing 2 times.  5. Spit any mucus out into a tissue.  6. Rest and do the steps again 1 or 2 times as needed.    General instructions  · Make sure you get all the shots (vaccines) that your doctor recommends. Ask your doctor about a flu shot and a pneumonia shot.  · Use oxygen therapy and pulmonary rehabilitation if told by your doctor. If you need home oxygen therapy, ask your doctor if you should buy a tool to measure your oxygen level (oximeter).  · Make a COPD action plan with your doctor. This helps you to know what to do if you feel worse than usual.  · Manage any other conditions you have as told by your doctor.  · Avoid going outside when it is very hot, cold, or humid.  · Avoid people who have a sickness you can catch (contagious).  · Keep all follow-up visits as told by your doctor. This is important.  Contact a doctor if:  · You cough up more mucus than usual.  · There is a change in the color or thickness of the mucus.  · It is harder to breathe than usual.  · Your breathing is faster than usual.  · You have trouble sleeping.  · You need to use your medicines more often than usual.  · You have trouble doing your normal activities such as getting dressed or walking around the house.  Get help right away if:  · You have shortness of breath while resting.  · You have shortness of breath that stops you from:  ? Being able to talk.  ? Doing normal activities.  · Your chest hurts for longer than 5 minutes.  · Your skin color is more blue than usual.  · Your pulse oximeter shows that you have low oxygen for longer than 5 minutes.  · You have a fever.  · You feel too tired to breathe normally.  Summary  · Chronic obstructive pulmonary disease (COPD) is a long-term lung  problem.  · The way your lungs work will never return to normal. Usually the condition gets worse over time. There are things you can do to keep yourself as healthy as possible.  · Take over-the-counter and prescription medicines only as told by your doctor.  · If you smoke, stop. Smoking makes the problem worse.  This information is not intended to replace advice given to you by your health care provider. Make sure you discuss any questions you have with your health care provider.  Document Released: 06/05/2009 Document Revised: 01/22/2018 Document Reviewed: 01/22/2018  Elsevier Interactive Patient Education © 2019 [employer]

## 2019-03-28 DIAGNOSIS — J41.1 MUCOPURULENT CHRONIC BRONCHITIS (HCC): ICD-10-CM

## 2019-03-28 DIAGNOSIS — D75.839 THROMBOCYTOSIS: ICD-10-CM

## 2019-03-28 DIAGNOSIS — D72.829 LEUKOCYTOSIS, UNSPECIFIED TYPE: Primary | ICD-10-CM

## 2019-03-28 PROBLEM — E55.9 VITAMIN D DEFICIENCY: Status: ACTIVE | Noted: 2019-03-28

## 2019-03-28 PROBLEM — R74.8 ELEVATED LIVER ENZYMES: Status: ACTIVE | Noted: 2019-03-28

## 2019-03-29 LAB
CREAT 24H UR-MCNC: 289.8 MG/DL
MICROALBUMIN UR-MCNC: 75 UG/ML
MICROALBUMIN/CREAT UR: 25.9 MG/G CREAT (ref 0–30)

## 2019-04-17 ENCOUNTER — OFFICE VISIT (OUTPATIENT)
Dept: INTERNAL MEDICINE | Facility: CLINIC | Age: 64
End: 2019-04-17

## 2019-04-17 VITALS
WEIGHT: 189 LBS | SYSTOLIC BLOOD PRESSURE: 134 MMHG | TEMPERATURE: 96.8 F | RESPIRATION RATE: 16 BRPM | DIASTOLIC BLOOD PRESSURE: 88 MMHG | OXYGEN SATURATION: 99 % | HEART RATE: 68 BPM | HEIGHT: 61 IN | BODY MASS INDEX: 35.68 KG/M2

## 2019-04-17 DIAGNOSIS — J45.20 MILD INTERMITTENT ASTHMA WITHOUT COMPLICATION: ICD-10-CM

## 2019-04-17 DIAGNOSIS — B35.1 ONYCHOMYCOSIS WITH INGROWN TOENAIL: ICD-10-CM

## 2019-04-17 DIAGNOSIS — D75.839 THROMBOCYTOSIS: ICD-10-CM

## 2019-04-17 DIAGNOSIS — I10 ESSENTIAL HYPERTENSION: Primary | ICD-10-CM

## 2019-04-17 DIAGNOSIS — J41.0 SIMPLE CHRONIC BRONCHITIS (HCC): ICD-10-CM

## 2019-04-17 DIAGNOSIS — D72.828 OTHER ELEVATED WHITE BLOOD CELL (WBC) COUNT: ICD-10-CM

## 2019-04-17 DIAGNOSIS — L60.0 ONYCHOMYCOSIS WITH INGROWN TOENAIL: ICD-10-CM

## 2019-04-17 PROCEDURE — 99214 OFFICE O/P EST MOD 30 MIN: CPT | Performed by: NURSE PRACTITIONER

## 2019-04-17 RX ORDER — CEPHALEXIN 500 MG/1
500 CAPSULE ORAL 3 TIMES DAILY
Qty: 21 CAPSULE | Refills: 0 | Status: SHIPPED | OUTPATIENT
Start: 2019-04-17 | End: 2019-06-21

## 2019-04-17 NOTE — PROGRESS NOTES
Subjective   Martha Murray is a 63 y.o. female here today for COPD/ HTN/ thrombocytosis.    Chief Complaint   Patient presents with   • Cough     no problems    • Hypertension   Martha is here today for follow-up on hypertension and COPD.  Last visit her Coreg was increased and her inhaler was switched to trelegy.  She reports she did not get the x-ray that was ordered because she felt so significantly better.  She also reports that she is not having chest pain anymore.  Her blood pressure stabilized.  She is compliant with her medication and denies adverse side effects.  She was referred to hematology for thrombocytosis.  She reports she does not plan on going to this appointment today.  She reports that she has seen hematologist for this problem in the past.  They did a full workup including spinal tap and lots of scans and did not find anything.  She reports they did not give her an official diagnosis but they did not have any medication recommendations for her.  She would like to avoid the cost and pain associated with this referral at this time.      Her kidney function was also found to be decreased last visit.  She reports that she had been taking more ibuprofen than normal.  She is refusing labs today.    She does report her depression has improved as well.  She is experiencing some grief after her dog had to be put down however her mood is better in general.  She has lost some weight and has been active in the garden.    She continues to have an infection of her left pinky toe.  It seems to have worsened.  She has been intermittently soaking it and putting the bactroban ointment on it without relief.  She reports it is keeping her up at night as it is causing a lot of pain.      Review of Systems   Constitutional: Negative for activity change, appetite change, chills, fever, unexpected weight gain and unexpected weight loss.   HENT: Negative for congestion, ear pain, nosebleeds, postnasal drip, sore throat,  trouble swallowing and voice change.    Eyes: Negative for blurred vision, pain, itching and visual disturbance.   Respiratory: Negative for cough, shortness of breath and wheezing.    Cardiovascular: Negative for chest pain and palpitations.        Chest pain improved with COPD and HTN treatment   Gastrointestinal: Negative for blood in stool, diarrhea, nausea and vomiting.   Endocrine: Negative for polydipsia, polyphagia and polyuria.   Genitourinary: Negative for dysuria, flank pain, frequency, genital sores, hematuria and urgency.   Musculoskeletal: Positive for arthralgias. Negative for myalgias.   Skin: Positive for skin lesions. Negative for rash.   Allergic/Immunologic: Negative for environmental allergies, food allergies and immunocompromised state.   Neurological: Negative for dizziness, seizures, weakness, headache, memory problem and confusion.   Psychiatric/Behavioral: Positive for depressed mood. Negative for self-injury, sleep disturbance and suicidal ideas. The patient is not nervous/anxious.         Improving       Past Medical History:   Diagnosis Date   • Allergic    • Anemia    • Depression    • Diabetes mellitus (CMS/HCC)    • History of heart attack    • Hyperlipidemia    • Hypertension      Family History   Problem Relation Age of Onset   • Arthritis Maternal Grandmother    • Hypertension Mother    • Hyperlipidemia Mother    • Thyroid disease Mother    • Diabetes Sister    • Crohn's disease Sister    • Hypertension Sister    • Heart attack Maternal Grandfather      Past Surgical History:   Procedure Laterality Date   • BILATERAL BREAST REDUCTION     • BONE MARROW ASPIRATION     • HYSTERECTOMY  1990    x 2   • SINUS SURGERY  2003   • TONSILLECTOMY       Social History     Socioeconomic History   • Marital status: Single     Spouse name: Not on file   • Number of children: Not on file   • Years of education: Not on file   • Highest education level: Not on file   Tobacco Use   • Smoking status:  "Former Smoker     Packs/day: 2.00     Years: 30.00     Pack years: 60.00     Types: Cigarettes     Last attempt to quit:      Years since quittin.3   • Smokeless tobacco: Never Used   Substance and Sexual Activity   • Alcohol use: No   • Drug use: No   • Sexual activity: Defer         Current Outpatient Medications:   •  albuterol sulfate  (90 Base) MCG/ACT inhaler, Inhale 2 puffs Every 6 (Six) Hours As Needed for Wheezing., Disp: 1 inhaler, Rfl: 2  •  aspirin 81 MG EC tablet, Take 1 tablet by mouth Daily., Disp: 30 tablet, Rfl: 5  •  atorvastatin (LIPITOR) 80 MG tablet, Take 1 tablet by mouth Daily., Disp: 30 tablet, Rfl: 2  •  carvedilol (COREG) 12.5 MG tablet, Take 1 tablet by mouth 2 (Two) Times a Day With Meals., Disp: 60 tablet, Rfl: 2  •  cephalexin (KEFLEX) 500 MG capsule, Take 1 capsule by mouth 3 (Three) Times a Day., Disp: 21 capsule, Rfl: 0  •  diclofenac (VOLTAREN) 1 % gel gel, Apply 4 g topically 4 (Four) Times a Day As Needed (joint pain)., Disp: 100 g, Rfl: 0  •  FLUoxetine (PROzac) 20 MG capsule, Take 3 capsules by mouth Daily., Disp: 270 capsule, Rfl: 1  •  Fluticasone-Umeclidin-Vilant (TRELEGY ELLIPTA) 100-62.5-25 MCG/INH aerosol powder , Inhale 1 each Daily., Disp: 60 each, Rfl: 5  •  hydrochlorothiazide (HYDRODIURIL) 25 MG tablet, Take 1 tablet by mouth Daily., Disp: 90 tablet, Rfl: 1  •  lisinopril (PRINIVIL,ZESTRIL) 40 MG tablet, Take 1 tablet by mouth Daily., Disp: 90 tablet, Rfl: 1  •  mupirocin (BACTROBAN) 2 % ointment, Apply  topically to the appropriate area as directed 3 (Three) Times a Day., Disp: 15 g, Rfl: 0    Objective   Vitals:    19 0950   BP: 134/88   Pulse: 68   Resp: 16   Temp: 96.8 °F (36 °C)   TempSrc: Temporal   SpO2: 99%   Weight: 85.7 kg (189 lb)   Height: 154.9 cm (60.98\")     Body mass index is 35.73 kg/m².  Physical Exam   Constitutional: She is oriented to person, place, and time. She appears well-developed and well-nourished. No distress.   HENT: "   Head: Normocephalic.   Eyes: Pupils are equal, round, and reactive to light.   Neck: Neck supple. No thyromegaly present.   Cardiovascular: Normal rate and regular rhythm.   Pulmonary/Chest: Effort normal and breath sounds normal. No respiratory distress. She has no wheezes.   Neurological: She is alert and oriented to person, place, and time.   Skin: Skin is warm and dry. Capillary refill takes 2 to 3 seconds. No rash noted. She is not diaphoretic. No pallor.   Psychiatric: She has a normal mood and affect. Her behavior is normal. Judgment and thought content normal.   Nursing note and vitals reviewed.      Assessment/Plan   Problem List Items Addressed This Visit        Cardiovascular and Mediastinum    Essential hypertension - Primary    Relevant Medications    hydrochlorothiazide (HYDRODIURIL) 25 MG tablet    lisinopril (PRINIVIL,ZESTRIL) 40 MG tablet    carvedilol (COREG) 12.5 MG tablet       Respiratory    Mild intermittent asthma without complication    Relevant Medications    albuterol sulfate  (90 Base) MCG/ACT inhaler    Fluticasone-Umeclidin-Vilant (TRELEGY ELLIPTA) 100-62.5-25 MCG/INH aerosol powder     Simple chronic bronchitis (CMS/HCC)    Relevant Medications    albuterol sulfate  (90 Base) MCG/ACT inhaler    Fluticasone-Umeclidin-Vilant (TRELEGY ELLIPTA) 100-62.5-25 MCG/INH aerosol powder        Musculoskeletal and Integument    Onychomycosis with ingrown toenail    Relevant Medications    cephalexin (KEFLEX) 500 MG capsule    Other Relevant Orders    Ambulatory Referral to Podiatry       Immune and Lymphatic    Leukocytosis       Hematopoietic and Hemostatic    Thrombocytosis (CMS/HCC)        Martha was seen today for cough and hypertension.    Diagnoses and all orders for this visit:    Essential hypertension  -     Cancel: Comprehensive Metabolic Panel  -     Cancel: CBC & Differential  Continue with HCTZ, lisinopril, Coreg  BP stabilizing, follow-up in 3 months  Thrombocytosis  (CMS/[facility])  -     Cancel: Comprehensive Metabolic Panel  -     Cancel: CBC & Differential  Patient refusing referral at this time, will continue to monitor  Simple chronic bronchitis (CMS/[facility])  -     Cancel: Comprehensive Metabolic Panel  -     Cancel: CBC & Differential  Improved with trelegy  Mild intermittent asthma without complication  -     Cancel: Comprehensive Metabolic Panel  -     Cancel: CBC & Differential  Improved with trelegy  Onychomycosis with ingrown toenail  -     Ambulatory Referral to Podiatry  -     cephalexin (KEFLEX) 500 MG capsule; Take 1 capsule by mouth 3 (Three) Times a Day.  -     Cancel: CBC & Differential  We will try Keflex, advised to follow through with podiatry referral if no improvement.  She may need the nail removed.  Other elevated white blood cell (WBC) count  -     Cancel: Comprehensive Metabolic Panel  -     Cancel: CBC & Differential  Patient refusing referral at this time           The patient was counseled regarding diagnostic results, impressions, prognosis, instructions for management, risk factor reductions, education, and importance of treatment compliance.  The patient verbalized understanding of and agreement with the plan of care.    Advised patient to call with any further questions and any new or worsening symptoms.     Return in about 3 months (around 7/17/2019) for Recheck.      NAYELI Barcenas

## 2019-05-01 ENCOUNTER — APPOINTMENT (OUTPATIENT)
Dept: MAMMOGRAPHY | Facility: HOSPITAL | Age: 64
End: 2019-05-01

## 2019-06-18 DIAGNOSIS — I10 ESSENTIAL HYPERTENSION: ICD-10-CM

## 2019-06-18 DIAGNOSIS — E78.2 MIXED HYPERLIPIDEMIA: ICD-10-CM

## 2019-06-18 DIAGNOSIS — I25.2 HISTORY OF MI (MYOCARDIAL INFARCTION): ICD-10-CM

## 2019-06-19 DIAGNOSIS — I25.2 HISTORY OF MI (MYOCARDIAL INFARCTION): ICD-10-CM

## 2019-06-19 DIAGNOSIS — I10 ESSENTIAL HYPERTENSION: ICD-10-CM

## 2019-06-19 RX ORDER — CARVEDILOL 6.25 MG/1
TABLET ORAL
Qty: 60 TABLET | Refills: 1 | OUTPATIENT
Start: 2019-06-19

## 2019-06-19 RX ORDER — CARVEDILOL 12.5 MG/1
12.5 TABLET ORAL 2 TIMES DAILY WITH MEALS
Qty: 60 TABLET | Refills: 2 | Status: SHIPPED | OUTPATIENT
Start: 2019-06-19 | End: 2019-06-21 | Stop reason: SDUPTHER

## 2019-06-19 RX ORDER — ATORVASTATIN CALCIUM 80 MG/1
TABLET, FILM COATED ORAL
Qty: 30 TABLET | Refills: 2 | Status: SHIPPED | OUTPATIENT
Start: 2019-06-19 | End: 2019-06-21 | Stop reason: SDUPTHER

## 2019-06-21 ENCOUNTER — OFFICE VISIT (OUTPATIENT)
Dept: INTERNAL MEDICINE | Facility: CLINIC | Age: 64
End: 2019-06-21

## 2019-06-21 VITALS
DIASTOLIC BLOOD PRESSURE: 86 MMHG | RESPIRATION RATE: 20 BRPM | HEIGHT: 60 IN | SYSTOLIC BLOOD PRESSURE: 124 MMHG | TEMPERATURE: 97.4 F | BODY MASS INDEX: 37.3 KG/M2 | HEART RATE: 56 BPM | WEIGHT: 190 LBS | OXYGEN SATURATION: 98 %

## 2019-06-21 DIAGNOSIS — I25.2 HISTORY OF MI (MYOCARDIAL INFARCTION): ICD-10-CM

## 2019-06-21 DIAGNOSIS — E55.9 VITAMIN D DEFICIENCY: ICD-10-CM

## 2019-06-21 DIAGNOSIS — I77.1 ARTERIAL INSUFFICIENCY WITH ISCHEMIC ULCER (HCC): Primary | ICD-10-CM

## 2019-06-21 DIAGNOSIS — E11.9 DIET-CONTROLLED DIABETES MELLITUS (HCC): ICD-10-CM

## 2019-06-21 DIAGNOSIS — J41.0 SIMPLE CHRONIC BRONCHITIS (HCC): ICD-10-CM

## 2019-06-21 DIAGNOSIS — F41.9 ANXIETY: ICD-10-CM

## 2019-06-21 DIAGNOSIS — L98.499 ARTERIAL INSUFFICIENCY WITH ISCHEMIC ULCER (HCC): Primary | ICD-10-CM

## 2019-06-21 DIAGNOSIS — I73.9 PAD (PERIPHERAL ARTERY DISEASE) (HCC): ICD-10-CM

## 2019-06-21 DIAGNOSIS — I10 ESSENTIAL HYPERTENSION: ICD-10-CM

## 2019-06-21 DIAGNOSIS — R74.8 ELEVATED LIVER ENZYMES: ICD-10-CM

## 2019-06-21 DIAGNOSIS — E78.2 MIXED HYPERLIPIDEMIA: ICD-10-CM

## 2019-06-21 LAB
25(OH)D3 SERPL-MCNC: 21.5 NG/ML (ref 30–100)
ALBUMIN SERPL-MCNC: 4.4 G/DL (ref 3.5–5.2)
ALBUMIN/GLOB SERPL: 1.8 G/DL
ALP SERPL-CCNC: 106 U/L (ref 39–117)
ALT SERPL W P-5'-P-CCNC: 34 U/L (ref 1–33)
ANION GAP SERPL CALCULATED.3IONS-SCNC: 14.7 MMOL/L
AST SERPL-CCNC: 24 U/L (ref 1–32)
BASOPHILS # BLD AUTO: 0.13 10*3/MM3 (ref 0–0.2)
BASOPHILS NFR BLD AUTO: 1.2 % (ref 0–1.5)
BILIRUB SERPL-MCNC: 1.1 MG/DL (ref 0.2–1.2)
BUN BLD-MCNC: 16 MG/DL (ref 8–23)
BUN/CREAT SERPL: 13.6 (ref 7–25)
CALCIUM SPEC-SCNC: 9.7 MG/DL (ref 8.6–10.5)
CHLORIDE SERPL-SCNC: 100 MMOL/L (ref 98–107)
CHOLEST SERPL-MCNC: 122 MG/DL (ref 0–200)
CO2 SERPL-SCNC: 26.3 MMOL/L (ref 22–29)
CREAT BLD-MCNC: 1.18 MG/DL (ref 0.57–1)
DEPRECATED RDW RBC AUTO: 51.8 FL (ref 37–54)
EOSINOPHIL # BLD AUTO: 0.48 10*3/MM3 (ref 0–0.4)
EOSINOPHIL NFR BLD AUTO: 4.4 % (ref 0.3–6.2)
ERYTHROCYTE [DISTWIDTH] IN BLOOD BY AUTOMATED COUNT: 14.1 % (ref 12.3–15.4)
GFR SERPL CREATININE-BSD FRML MDRD: 46 ML/MIN/1.73
GLOBULIN UR ELPH-MCNC: 2.4 GM/DL
GLUCOSE BLD-MCNC: 112 MG/DL (ref 65–99)
HBA1C MFR BLD: 6.9 % (ref 4.8–5.6)
HCT VFR BLD AUTO: 48.7 % (ref 34–46.6)
HDLC SERPL-MCNC: 36 MG/DL (ref 40–60)
HGB BLD-MCNC: 15.2 G/DL (ref 12–15.9)
IMM GRANULOCYTES # BLD AUTO: 0.08 10*3/MM3 (ref 0–0.05)
IMM GRANULOCYTES NFR BLD AUTO: 0.7 % (ref 0–0.5)
LDLC SERPL CALC-MCNC: 59 MG/DL (ref 0–100)
LDLC/HDLC SERPL: 1.63 {RATIO}
LYMPHOCYTES # BLD AUTO: 1.53 10*3/MM3 (ref 0.7–3.1)
LYMPHOCYTES NFR BLD AUTO: 14.2 % (ref 19.6–45.3)
MCH RBC QN AUTO: 31.3 PG (ref 26.6–33)
MCHC RBC AUTO-ENTMCNC: 31.2 G/DL (ref 31.5–35.7)
MCV RBC AUTO: 100.4 FL (ref 79–97)
MONOCYTES # BLD AUTO: 0.72 10*3/MM3 (ref 0.1–0.9)
MONOCYTES NFR BLD AUTO: 6.7 % (ref 5–12)
NEUTROPHILS # BLD AUTO: 7.85 10*3/MM3 (ref 1.7–7)
NEUTROPHILS NFR BLD AUTO: 72.8 % (ref 42.7–76)
NRBC BLD AUTO-RTO: 0.1 /100 WBC (ref 0–0.2)
PLATELET # BLD AUTO: 862 10*3/MM3 (ref 140–450)
PMV BLD AUTO: 10.6 FL (ref 6–12)
POTASSIUM BLD-SCNC: 4.9 MMOL/L (ref 3.5–5.2)
PROT SERPL-MCNC: 6.8 G/DL (ref 6–8.5)
RBC # BLD AUTO: 4.85 10*6/MM3 (ref 3.77–5.28)
SODIUM BLD-SCNC: 141 MMOL/L (ref 136–145)
TRIGL SERPL-MCNC: 137 MG/DL (ref 0–150)
VLDLC SERPL-MCNC: 27.4 MG/DL (ref 5–40)
WBC NRBC COR # BLD: 10.79 10*3/MM3 (ref 3.4–10.8)

## 2019-06-21 PROCEDURE — 82306 VITAMIN D 25 HYDROXY: CPT | Performed by: NURSE PRACTITIONER

## 2019-06-21 PROCEDURE — 80053 COMPREHEN METABOLIC PANEL: CPT | Performed by: NURSE PRACTITIONER

## 2019-06-21 PROCEDURE — 85025 COMPLETE CBC W/AUTO DIFF WBC: CPT | Performed by: NURSE PRACTITIONER

## 2019-06-21 PROCEDURE — 80061 LIPID PANEL: CPT | Performed by: NURSE PRACTITIONER

## 2019-06-21 PROCEDURE — 99214 OFFICE O/P EST MOD 30 MIN: CPT | Performed by: NURSE PRACTITIONER

## 2019-06-21 PROCEDURE — 83036 HEMOGLOBIN GLYCOSYLATED A1C: CPT | Performed by: NURSE PRACTITIONER

## 2019-06-21 RX ORDER — CARVEDILOL 12.5 MG/1
12.5 TABLET ORAL 2 TIMES DAILY WITH MEALS
Qty: 60 TABLET | Refills: 5 | Status: SHIPPED | OUTPATIENT
Start: 2019-06-21 | End: 2020-01-21 | Stop reason: SDUPTHER

## 2019-06-21 RX ORDER — ATORVASTATIN CALCIUM 80 MG/1
80 TABLET, FILM COATED ORAL DAILY
Qty: 30 TABLET | Refills: 5 | Status: SHIPPED | OUTPATIENT
Start: 2019-06-21 | End: 2020-01-21 | Stop reason: SDUPTHER

## 2019-06-21 RX ORDER — FLUOXETINE HYDROCHLORIDE 20 MG/1
60 CAPSULE ORAL DAILY
Qty: 270 CAPSULE | Refills: 1 | Status: SHIPPED | OUTPATIENT
Start: 2019-06-21 | End: 2020-01-21 | Stop reason: SDUPTHER

## 2019-06-21 RX ORDER — ASPIRIN 81 MG/1
81 TABLET ORAL DAILY
Qty: 30 TABLET | Refills: 5 | Status: SHIPPED | OUTPATIENT
Start: 2019-06-21 | End: 2020-01-21 | Stop reason: SDUPTHER

## 2019-06-21 RX ORDER — HYDROCHLOROTHIAZIDE 25 MG/1
25 TABLET ORAL DAILY
Qty: 90 TABLET | Refills: 1 | Status: SHIPPED | OUTPATIENT
Start: 2019-06-21 | End: 2020-01-21 | Stop reason: SDUPTHER

## 2019-06-21 RX ORDER — LISINOPRIL 40 MG/1
40 TABLET ORAL DAILY
Qty: 90 TABLET | Refills: 1 | Status: SHIPPED | OUTPATIENT
Start: 2019-06-21 | End: 2020-01-21 | Stop reason: SDUPTHER

## 2019-06-21 NOTE — PROGRESS NOTES
Subjective   Martha Murray is a 64 y.o. female here today for HTN/ COPD.    Chief Complaint   Patient presents with   • Hypertension   Martha is here today for follow-up on hypertension, hyperlipidemia, and COPD.  She has been compliant with her medication and her blood pressure is finally at goal.  She is very pleased with this today as it is been some time since she has been stable.  She has mostly been feeling great but occasionally will have some left-sided body numbness.  It happens for a few seconds and then goes away.  She is not having any chest pain.  Her breathing is significantly improved with trelegy.  She continues to abstain from tobacco.  Her anxiety is controlled.  Her LDL is less than 70.  She has previously had significant financial difficulties and it has been a barrier to her health.  She was having a lot of chest pain and needed at stress test but was unable to get this secondary to finances.  She also reports that she went to the podiatrist for her toe and was told it was an arterial insufficiency problem.  She does have chronic leg pain with ambulation but is always assume this was secondary to her chronic knee pain.    Review of Systems   Constitutional: Negative for activity change, appetite change, diaphoresis, unexpected weight gain and unexpected weight loss.   HENT: Negative for nosebleeds and trouble swallowing.    Eyes: Negative for blurred vision and visual disturbance.   Respiratory: Negative for chest tightness, shortness of breath and wheezing.    Cardiovascular: Negative for chest pain, palpitations and leg swelling.   Gastrointestinal: Negative for blood in stool, nausea and vomiting.   Skin: Positive for color change. Negative for rash and skin lesions.   Neurological: Positive for numbness. Negative for dizziness, syncope, facial asymmetry, light-headedness, headache, memory problem and confusion.   Psychiatric/Behavioral: Negative for dysphoric mood, self-injury, sleep  disturbance, suicidal ideas, negative for hyperactivity, depressed mood and stress. The patient is not nervous/anxious.        Past Medical History:   Diagnosis Date   • Allergic    • Anemia    • Depression    • Diabetes mellitus (CMS/HCC)    • History of heart attack    • Hyperlipidemia    • Hypertension      Family History   Problem Relation Age of Onset   • Arthritis Maternal Grandmother    • Hypertension Mother    • Hyperlipidemia Mother    • Thyroid disease Mother    • Diabetes Sister    • Crohn's disease Sister    • Hypertension Sister    • Heart attack Maternal Grandfather      Past Surgical History:   Procedure Laterality Date   • BILATERAL BREAST REDUCTION     • BONE MARROW ASPIRATION     • HYSTERECTOMY  1990    x 2   • SINUS SURGERY     • TONSILLECTOMY       Social History     Socioeconomic History   • Marital status: Single     Spouse name: Not on file   • Number of children: Not on file   • Years of education: Not on file   • Highest education level: Not on file   Tobacco Use   • Smoking status: Former Smoker     Packs/day: 2.00     Years: 30.00     Pack years: 60.00     Types: Cigarettes     Last attempt to quit:      Years since quittin.4   • Smokeless tobacco: Never Used   Substance and Sexual Activity   • Alcohol use: No   • Drug use: No   • Sexual activity: Defer         Current Outpatient Medications:   •  albuterol sulfate  (90 Base) MCG/ACT inhaler, Inhale 2 puffs Every 6 (Six) Hours As Needed for Wheezing., Disp: 1 inhaler, Rfl: 2  •  aspirin 81 MG EC tablet, Take 1 tablet by mouth Daily., Disp: 30 tablet, Rfl: 5  •  atorvastatin (LIPITOR) 80 MG tablet, Take 1 tablet by mouth Daily., Disp: 30 tablet, Rfl: 5  •  carvedilol (COREG) 12.5 MG tablet, Take 1 tablet by mouth 2 (Two) Times a Day With Meals., Disp: 60 tablet, Rfl: 5  •  hydrochlorothiazide (HYDRODIURIL) 25 MG tablet, Take 1 tablet by mouth Daily., Disp: 90 tablet, Rfl: 1  •  lisinopril (PRINIVIL,ZESTRIL) 40 MG tablet,  "Take 1 tablet by mouth Daily., Disp: 90 tablet, Rfl: 1  •  FLUoxetine (PROzac) 20 MG capsule, Take 3 capsules by mouth Daily., Disp: 270 capsule, Rfl: 1  •  Fluticasone-Umeclidin-Vilant (TRELEGY ELLIPTA) 100-62.5-25 MCG/INH aerosol powder , Inhale 1 each Daily., Disp: 60 each, Rfl: 5  •  Rivaroxaban (XARELTO) 2.5 MG tablet, Take 1 tablet by mouth 2 (Two) Times a Day., Disp: 60 tablet, Rfl: 5    Objective   Vitals:    06/21/19 1042   BP: 124/86   Pulse: 56   Resp: 20   Temp: 97.4 °F (36.3 °C)   TempSrc: Temporal   SpO2: 98%   Weight: 86.2 kg (190 lb)   Height: 152.4 cm (60\")     Body mass index is 37.11 kg/m².  Physical Exam   Constitutional: She is oriented to person, place, and time. She appears well-developed and well-nourished. No distress.   Eyes: Pupils are equal, round, and reactive to light.   Neck: Neck supple. No thyromegaly present.   Cardiovascular: Normal rate and regular rhythm.   Pulses:       Posterior tibial pulses are 1+ on the right side, and 1+ on the left side.   Pulmonary/Chest: Effort normal and breath sounds normal.   Neurological: She is alert and oriented to person, place, and time.   Skin: Skin is warm and dry. Capillary refill takes 2 to 3 seconds. She is not diaphoretic.        Psychiatric: She has a normal mood and affect. Her behavior is normal. Judgment and thought content normal.   Nursing note and vitals reviewed.      Assessment/Plan   Problem List Items Addressed This Visit        Cardiovascular and Mediastinum    Essential hypertension    Relevant Medications    carvedilol (COREG) 12.5 MG tablet    hydrochlorothiazide (HYDRODIURIL) 25 MG tablet    lisinopril (PRINIVIL,ZESTRIL) 40 MG tablet    Other Relevant Orders    CBC & Differential    Comprehensive Metabolic Panel    CBC Auto Differential    Hyperlipidemia    Relevant Medications    atorvastatin (LIPITOR) 80 MG tablet    aspirin 81 MG EC tablet    Other Relevant Orders    CBC & Differential    Comprehensive Metabolic Panel    " Lipid Panel    CBC Auto Differential    PAD (peripheral artery disease) (CMS/HCC)    Relevant Medications    Rivaroxaban (XARELTO) 2.5 MG tablet       Respiratory    Simple chronic bronchitis (CMS/HCC)    Relevant Medications    albuterol sulfate  (90 Base) MCG/ACT inhaler    Fluticasone-Umeclidin-Vilant (TRELEGY ELLIPTA) 100-62.5-25 MCG/INH aerosol powder     Other Relevant Orders    CBC & Differential    Comprehensive Metabolic Panel    CBC Auto Differential       Digestive    Vitamin D deficiency    Relevant Orders    Vitamin D 25 Hydroxy       Other    Diet-controlled diabetes mellitus (CMS/HCC)    Relevant Orders    Hemoglobin A1c    Elevated liver enzymes    Relevant Orders    CBC & Differential    Comprehensive Metabolic Panel    CBC Auto Differential      Other Visit Diagnoses     Arterial insufficiency with ischemic ulcer (CMS/HCC)    -  Primary    Relevant Medications    Rivaroxaban (XARELTO) 2.5 MG tablet    Other Relevant Orders    Doppler Arterial Multi Level Lower Extremity - Bilateral CAR    Ambulatory Referral to Vascular Surgery    CBC & Differential    Comprehensive Metabolic Panel    CBC Auto Differential    History of MI (myocardial infarction)        Relevant Medications    carvedilol (COREG) 12.5 MG tablet    Anxiety        Relevant Medications    FLUoxetine (PROzac) 20 MG capsule        Martha was seen today for hypertension.    Diagnoses and all orders for this visit:    Arterial insufficiency with ischemic ulcer (CMS/HCC)  -     Doppler Arterial Multi Level Lower Extremity - Bilateral CAR; Future  -     Ambulatory Referral to Vascular Surgery  -     CBC & Differential  -     Comprehensive Metabolic Panel  -     Rivaroxaban (XARELTO) 2.5 MG tablet; Take 1 tablet by mouth 2 (Two) Times a Day.  -     CBC Auto Differential  Ideally patient would have stress test and potential heart cath as well as carotid ultrasound.  However due to finances, patient is very limited.  We will try to manage  medically and reduce her risk this way.  She continues to abstain from tobacco.  Discussed weight loss as well.  Discussed this plan with the patient and that she is to call 911 should she develop any more numbness.  She is also going to look into the and she is on TV where she can get certain scans $150.  She believes this includes a carotid ultrasound.  PAD (peripheral artery disease) (CMS/[facility])  -     Rivaroxaban (XARELTO) 2.5 MG tablet; Take 1 tablet by mouth 2 (Two) Times a Day.       Discussed increased risk of bleed and bruising.  History of MI (myocardial infarction)  -     carvedilol (COREG) 12.5 MG tablet; Take 1 tablet by mouth 2 (Two) Times a Day With Meals.    Mixed hyperlipidemia  -     CBC & Differential  -     Comprehensive Metabolic Panel  -     Lipid Panel  -     CBC Auto Differential  -     atorvastatin (LIPITOR) 80 MG tablet; Take 1 tablet by mouth Daily.  -     aspirin 81 MG EC tablet; Take 1 tablet by mouth Daily.    Simple chronic bronchitis (CMS/[facility])  -     CBC & Differential  -     Comprehensive Metabolic Panel  -     CBC Auto Differential  -     Fluticasone-Umeclidin-Vilant (TRELEGY ELLIPTA) 100-62.5-25 MCG/INH aerosol powder ; Inhale 1 each Daily.    Diet-controlled diabetes mellitus (CMS/[facility])  -     Hemoglobin A1c    Elevated liver enzymes  -     CBC & Differential  -     Comprehensive Metabolic Panel  -     CBC Auto Differential    Essential hypertension  -     CBC & Differential  -     Comprehensive Metabolic Panel  -     CBC Auto Differential  -     carvedilol (COREG) 12.5 MG tablet; Take 1 tablet by mouth 2 (Two) Times a Day With Meals.  -     hydrochlorothiazide (HYDRODIURIL) 25 MG tablet; Take 1 tablet by mouth Daily.  -     lisinopril (PRINIVIL,ZESTRIL) 40 MG tablet; Take 1 tablet by mouth Daily.  -     Stable, continue current treatment plan    Vitamin D deficiency  -     Vitamin D 25 Hydroxy    Anxiety  -     FLUoxetine (PROzac) 20 MG capsule; Take 3 capsules by mouth  Daily.             The patient was counseled regarding diagnostic results, impressions, prognosis, instructions for management, risk factor reductions, education, and importance of treatment compliance.  The patient verbalized understanding of and agreement with the plan of care.    Advised patient to call with any further questions and any new or worsening symptoms.     Return in about 3 months (around 9/21/2019).      NAYELI Barcenas    Please note that portions of this note were completed with a voice recognition program. Efforts were made to edit the dictations, but occasionally words are mistranscribed.

## 2019-06-24 DIAGNOSIS — E55.9 VITAMIN D DEFICIENCY: Primary | ICD-10-CM

## 2019-06-24 PROBLEM — N18.30 CKD (CHRONIC KIDNEY DISEASE) STAGE 3, GFR 30-59 ML/MIN (HCC): Status: ACTIVE | Noted: 2019-06-24

## 2019-06-24 RX ORDER — ERGOCALCIFEROL 1.25 MG/1
50000 CAPSULE ORAL WEEKLY
Qty: 4 CAPSULE | Refills: 6 | Status: SHIPPED | OUTPATIENT
Start: 2019-06-24 | End: 2019-10-17

## 2019-07-05 ENCOUNTER — HOSPITAL ENCOUNTER (OUTPATIENT)
Dept: CARDIOLOGY | Facility: HOSPITAL | Age: 64
Discharge: HOME | End: 2019-07-05
Admitting: NURSE PRACTITIONER

## 2019-07-05 VITALS — HEIGHT: 60 IN | BODY MASS INDEX: 37.3 KG/M2 | WEIGHT: 190 LBS

## 2019-07-05 DIAGNOSIS — I77.1 ARTERIAL INSUFFICIENCY WITH ISCHEMIC ULCER (HCC): ICD-10-CM

## 2019-07-05 DIAGNOSIS — L98.499 ARTERIAL INSUFFICIENCY WITH ISCHEMIC ULCER (HCC): ICD-10-CM

## 2019-07-05 LAB
BH CV LOWER ARTERIAL LEFT ABI RATIO: 1.17
BH CV LOWER ARTERIAL LEFT DORSALIS PEDIS SYS MAX: 136 MMHG
BH CV LOWER ARTERIAL LEFT GREAT TOE SYS MAX: 95 MMHG
BH CV LOWER ARTERIAL LEFT HIGH THIGH SYS MAX: 204 MMHG
BH CV LOWER ARTERIAL LEFT LOW THIGH SYS MAX: 154 MMHG
BH CV LOWER ARTERIAL LEFT POPLITEAL SYS MAX: 137 MMHG
BH CV LOWER ARTERIAL LEFT POST TIBIAL SYS MAX: 145 MMHG
BH CV LOWER ARTERIAL LEFT TBI RATIO: 0.77
BH CV LOWER ARTERIAL RIGHT ABI RATIO: 1.18
BH CV LOWER ARTERIAL RIGHT DORSALIS PEDIS SYS MAX: 136 MMHG
BH CV LOWER ARTERIAL RIGHT GREAT TOE SYS MAX: 106 MMHG
BH CV LOWER ARTERIAL RIGHT LOW THIGH SYS MAX: 169 MMHG
BH CV LOWER ARTERIAL RIGHT POPLITEAL SYS MAX: 151 MMHG
BH CV LOWER ARTERIAL RIGHT POST TIBIAL SYS MAX: 146 MMHG
BH CV LOWER ARTERIAL RIGHT TBI RATIO: 0.85
UPPER ARTERIAL LEFT ARM BRACHIAL SYS MAX: 122 MMHG
UPPER ARTERIAL RIGHT ARM BRACHIAL SYS MAX: 124 MMHG

## 2019-07-05 PROCEDURE — 93923 UPR/LXTR ART STDY 3+ LVLS: CPT

## 2019-07-05 PROCEDURE — 93923 UPR/LXTR ART STDY 3+ LVLS: CPT | Performed by: INTERNAL MEDICINE

## 2019-07-17 ENCOUNTER — OFFICE VISIT (OUTPATIENT)
Dept: INTERNAL MEDICINE | Facility: CLINIC | Age: 64
End: 2019-07-17

## 2019-07-17 VITALS
SYSTOLIC BLOOD PRESSURE: 120 MMHG | HEART RATE: 55 BPM | TEMPERATURE: 97 F | WEIGHT: 190 LBS | DIASTOLIC BLOOD PRESSURE: 74 MMHG | RESPIRATION RATE: 22 BRPM | OXYGEN SATURATION: 98 % | HEIGHT: 60 IN | BODY MASS INDEX: 37.3 KG/M2

## 2019-07-17 DIAGNOSIS — Z12.31 SCREENING MAMMOGRAM, ENCOUNTER FOR: ICD-10-CM

## 2019-07-17 DIAGNOSIS — L97.909 PERIPHERAL VASCULAR DISEASE OF LOWER EXTREMITY WITH ULCERATION (HCC): Primary | ICD-10-CM

## 2019-07-17 DIAGNOSIS — I10 ESSENTIAL HYPERTENSION: ICD-10-CM

## 2019-07-17 DIAGNOSIS — J41.0 SIMPLE CHRONIC BRONCHITIS (HCC): ICD-10-CM

## 2019-07-17 DIAGNOSIS — I77.1 ARTERIAL INSUFFICIENCY WITH ISCHEMIC ULCER (HCC): ICD-10-CM

## 2019-07-17 DIAGNOSIS — L98.499 ARTERIAL INSUFFICIENCY WITH ISCHEMIC ULCER (HCC): ICD-10-CM

## 2019-07-17 DIAGNOSIS — I73.9 PERIPHERAL VASCULAR DISEASE OF LOWER EXTREMITY WITH ULCERATION (HCC): Primary | ICD-10-CM

## 2019-07-17 DIAGNOSIS — N18.30 CKD (CHRONIC KIDNEY DISEASE) STAGE 3, GFR 30-59 ML/MIN (HCC): ICD-10-CM

## 2019-07-17 DIAGNOSIS — E11.9 DIET-CONTROLLED DIABETES MELLITUS (HCC): ICD-10-CM

## 2019-07-17 DIAGNOSIS — J45.20 MILD INTERMITTENT ASTHMA WITHOUT COMPLICATION: ICD-10-CM

## 2019-07-17 PROCEDURE — 99214 OFFICE O/P EST MOD 30 MIN: CPT | Performed by: NURSE PRACTITIONER

## 2019-07-17 NOTE — PROGRESS NOTES
Subjective   Martha Murray is a 64 y.o. female here today for HTN/ DM/ COPD/ MDD/ foot ulcer.    Chief Complaint   Patient presents with   • Hypertension   Martha is here today for follow-up on her chronic conditions.  She has been compliant with her medication and denies adverse side effects.  She does report that for the first 3 days she was on Xarelto she had some minor nosebleeds but these have resolved.  She did have her duplex of her left lower extremity which shows PAD in her posterior tibial artery.  She is previously seen podiatry for the ulcer on her foot but they have signed off.  She has an appointment with vascular next month.  The wound continues to not heal though does not show any evidence of infection.  It does cause pain on occasion.  Otherwise she is doing well, her breathing and her mood are stable.  She denies any chest pain, shortness of breath, lower extremity edema.  She is due for mammogram.  She continues to try make healthy eating choices and portion control.  Her weakness is soda.    Review of Systems   Constitutional: Negative for activity change, appetite change, diaphoresis, fatigue, unexpected weight gain and unexpected weight loss.   HENT: Negative for nosebleeds and trouble swallowing.    Eyes: Negative for blurred vision, double vision and visual disturbance.   Respiratory: Negative for chest tightness and shortness of breath.    Cardiovascular: Negative for chest pain, palpitations and leg swelling.   Gastrointestinal: Negative for blood in stool, diarrhea, nausea and vomiting.   Endocrine: Negative for cold intolerance, heat intolerance, polydipsia, polyphagia and polyuria.   Skin: Positive for skin lesions. Negative for rash.   Neurological: Negative for dizziness, seizures, syncope, facial asymmetry, weakness, light-headedness, headache, memory problem and confusion.       Past Medical History:   Diagnosis Date   • Allergic    • Anemia    • Depression    • Diabetes mellitus  (CMS/[facility])    • History of heart attack    • Hyperlipidemia    • Hypertension      Family History   Problem Relation Age of Onset   • Arthritis Maternal Grandmother    • Hypertension Mother    • Hyperlipidemia Mother    • Thyroid disease Mother    • Diabetes Sister    • Crohn's disease Sister    • Hypertension Sister    • Heart attack Maternal Grandfather      Past Surgical History:   Procedure Laterality Date   • BILATERAL BREAST REDUCTION     • BONE MARROW ASPIRATION     • HYSTERECTOMY  1990    x 2   • SINUS SURGERY     • TONSILLECTOMY       Social History     Socioeconomic History   • Marital status: Single     Spouse name: Not on file   • Number of children: Not on file   • Years of education: Not on file   • Highest education level: Not on file   Tobacco Use   • Smoking status: Former Smoker     Packs/day: 2.00     Years: 30.00     Pack years: 60.00     Types: Cigarettes     Last attempt to quit:      Years since quittin.5   • Smokeless tobacco: Never Used   Substance and Sexual Activity   • Alcohol use: No   • Drug use: No   • Sexual activity: Defer         Current Outpatient Medications:   •  albuterol sulfate  (90 Base) MCG/ACT inhaler, Inhale 2 puffs Every 6 (Six) Hours As Needed for Wheezing., Disp: 1 inhaler, Rfl: 2  •  aspirin 81 MG EC tablet, Take 1 tablet by mouth Daily., Disp: 30 tablet, Rfl: 5  •  atorvastatin (LIPITOR) 80 MG tablet, Take 1 tablet by mouth Daily., Disp: 30 tablet, Rfl: 5  •  carvedilol (COREG) 12.5 MG tablet, Take 1 tablet by mouth 2 (Two) Times a Day With Meals., Disp: 60 tablet, Rfl: 5  •  FLUoxetine (PROzac) 20 MG capsule, Take 3 capsules by mouth Daily., Disp: 270 capsule, Rfl: 1  •  Fluticasone-Umeclidin-Vilant (TRELEGY ELLIPTA) 100-62.5-25 MCG/INH aerosol powder , Inhale 1 each Daily., Disp: 60 each, Rfl: 5  •  hydrochlorothiazide (HYDRODIURIL) 25 MG tablet, Take 1 tablet by mouth Daily., Disp: 90 tablet, Rfl: 1  •  lisinopril (PRINIVIL,ZESTRIL) 40 MG tablet,  "Take 1 tablet by mouth Daily., Disp: 90 tablet, Rfl: 1  •  Rivaroxaban (XARELTO) 2.5 MG tablet, Take 1 tablet by mouth 2 (Two) Times a Day., Disp: 60 tablet, Rfl: 5  •  vitamin D (ERGOCALCIFEROL) 24461 units capsule capsule, Take 1 capsule by mouth 1 (One) Time Per Week., Disp: 4 capsule, Rfl: 6    Objective   Vitals:    07/17/19 1000   BP: 120/74   Pulse: 55   Resp: 22   Temp: 97 °F (36.1 °C)   TempSrc: Temporal   SpO2: 98%   Weight: 86.2 kg (190 lb)   Height: 152.4 cm (60\")     Body mass index is 37.11 kg/m².  Physical Exam   Constitutional: She is oriented to person, place, and time. She appears well-developed and well-nourished. No distress.   Eyes: Pupils are equal, round, and reactive to light.   Neck: Neck supple. No thyromegaly present.   Cardiovascular: Normal rate and regular rhythm.   Pulses:       Posterior tibial pulses are 1+ on the right side, and 1+ on the left side.   Pulmonary/Chest: Effort normal and breath sounds normal.   Neurological: She is alert and oriented to person, place, and time.   Skin: Skin is warm and dry. Capillary refill takes 2 to 3 seconds. She is not diaphoretic.        Psychiatric: She has a normal mood and affect. Her behavior is normal. Judgment and thought content normal.   Nursing note and vitals reviewed.      Assessment/Plan   Problem List Items Addressed This Visit        Cardiovascular and Mediastinum    Essential hypertension    Relevant Medications    carvedilol (COREG) 12.5 MG tablet    hydrochlorothiazide (HYDRODIURIL) 25 MG tablet    lisinopril (PRINIVIL,ZESTRIL) 40 MG tablet    Peripheral vascular disease of lower extremity with ulceration (CMS/HCC) - Primary    Relevant Orders    Ambulatory Referral to Physical Therapy Wound Care    Arterial insufficiency with ischemic ulcer (CMS/HCC)    Relevant Medications    Rivaroxaban (XARELTO) 2.5 MG tablet    Other Relevant Orders    Ambulatory Referral to Physical Therapy Wound Care       Respiratory    Mild intermittent " asthma without complication    Relevant Medications    albuterol sulfate  (90 Base) MCG/ACT inhaler    Fluticasone-Umeclidin-Vilant (TRELEGY ELLIPTA) 100-62.5-25 MCG/INH aerosol powder     Simple chronic bronchitis (CMS/HCC)    Relevant Medications    albuterol sulfate  (90 Base) MCG/ACT inhaler    Fluticasone-Umeclidin-Vilant (TRELEGY ELLIPTA) 100-62.5-25 MCG/INH aerosol powder        Genitourinary    CKD (chronic kidney disease) stage 3, GFR 30-59 ml/min (CMS/HCC)    Relevant Medications    hydrochlorothiazide (HYDRODIURIL) 25 MG tablet       Other    Diet-controlled diabetes mellitus (CMS/HCC)      Other Visit Diagnoses     Screening mammogram, encounter for        Relevant Orders    Mammo Screening Digital Tomosynthesis Bilateral With CAD        Martha was seen today for hypertension.    Diagnoses and all orders for this visit:    Peripheral vascular disease of lower extremity with ulceration (CMS/HCC)  -     Ambulatory Referral to Physical Therapy Wound Care        Imaging discussed with patient.  Continue with vascular referral, will also start PT wound care  Arterial insufficiency with ischemic ulcer (CMS/HCC)  -     Ambulatory Referral to Physical Therapy Wound Care  Continue with Xarelto 2.5  Screening mammogram, encounter for  -     Mammo Screening Digital Tomosynthesis Bilateral With CAD; Future    Diet-controlled diabetes mellitus (CMS/HCC)  Discussed choices they may work with her lifestyle including portion control and buying the smaller pop cans which may help with her portion control and satisfy her cravings.  CKD (chronic kidney disease) stage 3, GFR 30-59 ml/min (CMS/HCC)  We will recheck kidneys in 3 months at follow-up  Mild intermittent asthma without complication  Breathing symptoms controlled with trelegy  Simple chronic bronchitis (CMS/HCC)  Breathing symptoms controlled with Trelegy  Essential hypertension  Stable and at goal, follow-up in 3 months for recheck           The  patient was counseled regarding diagnostic results, impressions, prognosis, instructions for management, risk factor reductions, education, and importance of treatment compliance.  The patient verbalized understanding of and agreement with the plan of care.    Advised patient to call with any further questions and any new or worsening symptoms.     Return in about 3 months (around 10/17/2019) for Recheck.      NAYELI Barcenas    Please note that portions of this note were completed with a voice recognition program. Efforts were made to edit the dictations, but occasionally words are mistranscribed.

## 2019-08-16 ENCOUNTER — HOSPITAL ENCOUNTER (OUTPATIENT)
Dept: MAMMOGRAPHY | Facility: HOSPITAL | Age: 64
Discharge: HOME | End: 2019-08-16
Admitting: NURSE PRACTITIONER

## 2019-08-16 DIAGNOSIS — Z12.31 SCREENING MAMMOGRAM, ENCOUNTER FOR: ICD-10-CM

## 2019-08-16 PROCEDURE — 77063 BREAST TOMOSYNTHESIS BI: CPT | Performed by: RADIOLOGY

## 2019-08-16 PROCEDURE — 77063 BREAST TOMOSYNTHESIS BI: CPT

## 2019-08-16 PROCEDURE — 77067 SCR MAMMO BI INCL CAD: CPT | Performed by: RADIOLOGY

## 2019-08-16 PROCEDURE — 77067 SCR MAMMO BI INCL CAD: CPT

## 2019-10-17 ENCOUNTER — OFFICE VISIT (OUTPATIENT)
Dept: INTERNAL MEDICINE | Facility: CLINIC | Age: 64
End: 2019-10-17

## 2019-10-17 VITALS
HEART RATE: 75 BPM | SYSTOLIC BLOOD PRESSURE: 126 MMHG | WEIGHT: 196 LBS | DIASTOLIC BLOOD PRESSURE: 86 MMHG | RESPIRATION RATE: 18 BRPM | TEMPERATURE: 96.8 F | HEIGHT: 60 IN | OXYGEN SATURATION: 98 % | BODY MASS INDEX: 38.48 KG/M2

## 2019-10-17 DIAGNOSIS — I10 ESSENTIAL HYPERTENSION: ICD-10-CM

## 2019-10-17 DIAGNOSIS — I77.1 ARTERIAL INSUFFICIENCY WITH ISCHEMIC ULCER (HCC): ICD-10-CM

## 2019-10-17 DIAGNOSIS — I73.9 PERIPHERAL VASCULAR DISEASE OF LOWER EXTREMITY WITH ULCERATION (HCC): ICD-10-CM

## 2019-10-17 DIAGNOSIS — E11.9 DIET-CONTROLLED DIABETES MELLITUS (HCC): ICD-10-CM

## 2019-10-17 DIAGNOSIS — L97.909 PERIPHERAL VASCULAR DISEASE OF LOWER EXTREMITY WITH ULCERATION (HCC): ICD-10-CM

## 2019-10-17 DIAGNOSIS — I73.9 PAD (PERIPHERAL ARTERY DISEASE) (HCC): ICD-10-CM

## 2019-10-17 DIAGNOSIS — N18.30 CKD (CHRONIC KIDNEY DISEASE) STAGE 3, GFR 30-59 ML/MIN (HCC): ICD-10-CM

## 2019-10-17 DIAGNOSIS — J41.0 SIMPLE CHRONIC BRONCHITIS (HCC): ICD-10-CM

## 2019-10-17 DIAGNOSIS — I25.118 CORONARY ARTERY DISEASE OF NATIVE HEART WITH STABLE ANGINA PECTORIS, UNSPECIFIED VESSEL OR LESION TYPE (HCC): ICD-10-CM

## 2019-10-17 DIAGNOSIS — E78.2 MIXED HYPERLIPIDEMIA: Primary | ICD-10-CM

## 2019-10-17 DIAGNOSIS — L98.499 ARTERIAL INSUFFICIENCY WITH ISCHEMIC ULCER (HCC): ICD-10-CM

## 2019-10-17 DIAGNOSIS — Z59.9 FINANCIAL DIFFICULTY: ICD-10-CM

## 2019-10-17 DIAGNOSIS — F33.41 RECURRENT MAJOR DEPRESSIVE DISORDER, IN PARTIAL REMISSION (HCC): ICD-10-CM

## 2019-10-17 DIAGNOSIS — E55.9 VITAMIN D DEFICIENCY: ICD-10-CM

## 2019-10-17 LAB
25(OH)D3 SERPL-MCNC: 23 NG/ML (ref 30–100)
ALBUMIN SERPL-MCNC: 4.4 G/DL (ref 3.5–5.2)
ALBUMIN/GLOB SERPL: 1.6 G/DL
ALP SERPL-CCNC: 98 U/L (ref 39–117)
ALT SERPL W P-5'-P-CCNC: 34 U/L (ref 1–33)
ANION GAP SERPL CALCULATED.3IONS-SCNC: 14.9 MMOL/L (ref 5–15)
AST SERPL-CCNC: 26 U/L (ref 1–32)
BASOPHILS # BLD AUTO: 0.15 10*3/MM3 (ref 0–0.2)
BASOPHILS NFR BLD AUTO: 1.3 % (ref 0–1.5)
BILIRUB SERPL-MCNC: 0.8 MG/DL (ref 0.2–1.2)
BUN BLD-MCNC: 15 MG/DL (ref 8–23)
BUN/CREAT SERPL: 12.9 (ref 7–25)
CALCIUM SPEC-SCNC: 9.4 MG/DL (ref 8.6–10.5)
CHLORIDE SERPL-SCNC: 97 MMOL/L (ref 98–107)
CO2 SERPL-SCNC: 25.1 MMOL/L (ref 22–29)
CREAT BLD-MCNC: 1.16 MG/DL (ref 0.57–1)
DEPRECATED RDW RBC AUTO: 42.7 FL (ref 37–54)
EOSINOPHIL # BLD AUTO: 0.55 10*3/MM3 (ref 0–0.4)
EOSINOPHIL NFR BLD AUTO: 4.8 % (ref 0.3–6.2)
ERYTHROCYTE [DISTWIDTH] IN BLOOD BY AUTOMATED COUNT: 12.8 % (ref 12.3–15.4)
GFR SERPL CREATININE-BSD FRML MDRD: 47 ML/MIN/1.73
GLOBULIN UR ELPH-MCNC: 2.7 GM/DL
GLUCOSE BLD-MCNC: 155 MG/DL (ref 65–99)
HBA1C MFR BLD: 7.4 % (ref 4.8–5.6)
HCT VFR BLD AUTO: 46 % (ref 34–46.6)
HGB BLD-MCNC: 15.5 G/DL (ref 12–15.9)
IMM GRANULOCYTES # BLD AUTO: 0.08 10*3/MM3 (ref 0–0.05)
IMM GRANULOCYTES NFR BLD AUTO: 0.7 % (ref 0–0.5)
LYMPHOCYTES # BLD AUTO: 1.56 10*3/MM3 (ref 0.7–3.1)
LYMPHOCYTES NFR BLD AUTO: 13.7 % (ref 19.6–45.3)
MCH RBC QN AUTO: 31.3 PG (ref 26.6–33)
MCHC RBC AUTO-ENTMCNC: 33.7 G/DL (ref 31.5–35.7)
MCV RBC AUTO: 92.7 FL (ref 79–97)
MONOCYTES # BLD AUTO: 0.86 10*3/MM3 (ref 0.1–0.9)
MONOCYTES NFR BLD AUTO: 7.6 % (ref 5–12)
NEUTROPHILS # BLD AUTO: 8.19 10*3/MM3 (ref 1.7–7)
NEUTROPHILS NFR BLD AUTO: 71.9 % (ref 42.7–76)
NRBC BLD AUTO-RTO: 0 /100 WBC (ref 0–0.2)
PLATELET # BLD AUTO: 773 10*3/MM3 (ref 140–450)
PMV BLD AUTO: 10 FL (ref 6–12)
POTASSIUM BLD-SCNC: 4.4 MMOL/L (ref 3.5–5.2)
PROT SERPL-MCNC: 7.1 G/DL (ref 6–8.5)
RBC # BLD AUTO: 4.96 10*6/MM3 (ref 3.77–5.28)
SODIUM BLD-SCNC: 137 MMOL/L (ref 136–145)
WBC NRBC COR # BLD: 11.39 10*3/MM3 (ref 3.4–10.8)

## 2019-10-17 PROCEDURE — 85025 COMPLETE CBC W/AUTO DIFF WBC: CPT | Performed by: NURSE PRACTITIONER

## 2019-10-17 PROCEDURE — 83036 HEMOGLOBIN GLYCOSYLATED A1C: CPT | Performed by: NURSE PRACTITIONER

## 2019-10-17 PROCEDURE — 82306 VITAMIN D 25 HYDROXY: CPT | Performed by: NURSE PRACTITIONER

## 2019-10-17 PROCEDURE — 99214 OFFICE O/P EST MOD 30 MIN: CPT | Performed by: NURSE PRACTITIONER

## 2019-10-17 PROCEDURE — 80053 COMPREHEN METABOLIC PANEL: CPT | Performed by: NURSE PRACTITIONER

## 2019-10-17 RX ORDER — NITROGLYCERIN 0.4 MG/1
0.4 TABLET SUBLINGUAL
Qty: 15 TABLET | Refills: 12 | Status: SHIPPED | OUTPATIENT
Start: 2019-10-17 | End: 2021-05-14 | Stop reason: SDUPTHER

## 2019-10-17 SDOH — ECONOMIC STABILITY - INCOME SECURITY: PROBLEM RELATED TO HOUSING AND ECONOMIC CIRCUMSTANCES, UNSPECIFIED: Z59.9

## 2019-10-17 NOTE — PROGRESS NOTES
Subjective   Martha Murray is a 64 y.o. female here today for DM/ CKD/ HLD/ PAD/PVD/ COPD/ Depression.    Chief Complaint   Patient presents with   • Hypertension   Martha is here today for follow-up on diabetes, CKD, hyperlipidemia, PAD, PVD, COPD, depression, and arterial ulcer.  Last visit she was referred to wound care for her ulcer and had an appointment with vascular on the 23rd of this month.  She reports she had to cancel his appointment secondary to finances.  She currently owes $300 to [facility] for getting the ABIs performed and does not want to move forward until she is able to pay this off.  She is on a fixed income.  She is having difficulty affording her medications at this time.  Did receive an application from [facility] for financial assistance.  She is not having any lower extremity edema and her ulcer is healing.  It sometimes is cold but is closing.  No fever or chills.  Her breathing is doing well except she has to space out when she takes her trelegy due to finances.  Her mood is doing great on Prozac.  She does occasionally have some chest pressure but relieves after a few minutes.  Again patient unable to afford stress test cardiologist at this time.    She is up-to-date on her diabetic foot exam and urine microalbumin.  She is up-to-date on her mammogram and colonoscopy.  Review of Systems   Constitutional: Negative for activity change, appetite change, diaphoresis, unexpected weight gain and unexpected weight loss.   HENT: Negative for nosebleeds and trouble swallowing.    Eyes: Negative for blurred vision and visual disturbance.   Respiratory: Positive for chest tightness. Negative for shortness of breath and wheezing.    Cardiovascular: Negative for chest pain, palpitations and leg swelling.   Gastrointestinal: Negative for blood in stool, nausea and vomiting.   Skin: Positive for color change. Negative for rash and skin lesions.   Neurological: Positive for numbness. Negative for  dizziness, syncope, facial asymmetry, light-headedness, headache, memory problem and confusion.   Psychiatric/Behavioral: Negative for dysphoric mood, self-injury, sleep disturbance, suicidal ideas, negative for hyperactivity, depressed mood and stress. The patient is not nervous/anxious.        Past Medical History:   Diagnosis Date   • Allergic    • Anemia    • Depression    • Diabetes mellitus (CMS/HCC)    • History of heart attack    • Hyperlipidemia    • Hypertension      Family History   Problem Relation Age of Onset   • Arthritis Maternal Grandmother    • Hypertension Mother    • Hyperlipidemia Mother    • Thyroid disease Mother    • Diabetes Sister    • Crohn's disease Sister    • Hypertension Sister    • Heart attack Maternal Grandfather    • Breast cancer Neg Hx    • Ovarian cancer Neg Hx      Past Surgical History:   Procedure Laterality Date   • BILATERAL BREAST REDUCTION     • BONE MARROW ASPIRATION     • HYSTERECTOMY  1990    x 2   • REDUCTION MAMMAPLASTY Bilateral     MORE THAN 20 YEARS AGO   • SINUS SURGERY     • TONSILLECTOMY       Social History     Socioeconomic History   • Marital status: Single     Spouse name: Not on file   • Number of children: Not on file   • Years of education: Not on file   • Highest education level: Not on file   Tobacco Use   • Smoking status: Former Smoker     Packs/day: 2.00     Years: 30.00     Pack years: 60.00     Types: Cigarettes     Last attempt to quit:      Years since quittin.8   • Smokeless tobacco: Never Used   Substance and Sexual Activity   • Alcohol use: No   • Drug use: No   • Sexual activity: Defer         Current Outpatient Medications:   •  albuterol sulfate  (90 Base) MCG/ACT inhaler, Inhale 2 puffs Every 6 (Six) Hours As Needed for Wheezing., Disp: 1 inhaler, Rfl: 2  •  aspirin 81 MG EC tablet, Take 1 tablet by mouth Daily., Disp: 30 tablet, Rfl: 5  •  atorvastatin (LIPITOR) 80 MG tablet, Take 1 tablet by mouth Daily., Disp: 30  "tablet, Rfl: 5  •  carvedilol (COREG) 12.5 MG tablet, Take 1 tablet by mouth 2 (Two) Times a Day With Meals., Disp: 60 tablet, Rfl: 5  •  FLUoxetine (PROzac) 20 MG capsule, Take 3 capsules by mouth Daily., Disp: 270 capsule, Rfl: 1  •  Fluticasone-Umeclidin-Vilant (TRELEGY ELLIPTA) 100-62.5-25 MCG/INH aerosol powder , Inhale 1 each Daily., Disp: 60 each, Rfl: 5  •  hydrochlorothiazide (HYDRODIURIL) 25 MG tablet, Take 1 tablet by mouth Daily., Disp: 90 tablet, Rfl: 1  •  lisinopril (PRINIVIL,ZESTRIL) 40 MG tablet, Take 1 tablet by mouth Daily., Disp: 90 tablet, Rfl: 1  •  Rivaroxaban (XARELTO) 2.5 MG tablet, Take 1 tablet by mouth 2 (Two) Times a Day., Disp: 60 tablet, Rfl: 5  •  nitroglycerin (NITROSTAT) 0.4 MG SL tablet, Place 1 tablet under the tongue Every 5 (Five) Minutes As Needed for Chest Pain. Take no more than 3 doses in 15 minutes., Disp: 15 tablet, Rfl: 12    Objective   Vitals:    10/17/19 1014   BP: 126/86   Pulse: 75   Resp: 18   Temp: 96.8 °F (36 °C)   TempSrc: Temporal   SpO2: 98%   Weight: 88.9 kg (196 lb)   Height: 152.4 cm (60\")     Body mass index is 38.28 kg/m².  Physical Exam   Constitutional: She is oriented to person, place, and time. She appears well-developed and well-nourished. No distress.   Eyes: Pupils are equal, round, and reactive to light.   Neck: Neck supple. No thyromegaly present.   Cardiovascular: Normal rate and regular rhythm.   Pulses:       Posterior tibial pulses are 1+ on the right side, and 1+ on the left side.   Pulmonary/Chest: Effort normal and breath sounds normal.   Neurological: She is alert and oriented to person, place, and time.   Skin: Skin is warm and dry. Capillary refill takes 2 to 3 seconds. She is not diaphoretic.        Psychiatric: She has a normal mood and affect. Her behavior is normal. Judgment and thought content normal.   Nursing note and vitals reviewed.      Assessment/Plan   Problem List Items Addressed This Visit        Cardiovascular and " Mediastinum    Essential hypertension    Relevant Medications    carvedilol (COREG) 12.5 MG tablet    hydrochlorothiazide (HYDRODIURIL) 25 MG tablet    lisinopril (PRINIVIL,ZESTRIL) 40 MG tablet    Other Relevant Orders    CBC & Differential    Comprehensive Metabolic Panel    CBC Auto Differential    Hyperlipidemia - Primary    Relevant Medications    atorvastatin (LIPITOR) 80 MG tablet    aspirin 81 MG EC tablet    PAD (peripheral artery disease) (CMS/[facility])    Relevant Medications    Rivaroxaban (XARELTO) 2.5 MG tablet    Other Relevant Orders    CBC & Differential    Comprehensive Metabolic Panel    CBC Auto Differential    Peripheral vascular disease of lower extremity with ulceration (CMS/[facility])    Relevant Orders    CBC & Differential    Comprehensive Metabolic Panel    CBC Auto Differential    Arterial insufficiency with ischemic ulcer (CMS/[facility])    Relevant Medications    Rivaroxaban (XARELTO) 2.5 MG tablet    Other Relevant Orders    CBC & Differential    Comprehensive Metabolic Panel    CBC Auto Differential    Coronary artery disease of native heart with stable angina pectoris (CMS/[facility])    Relevant Medications    carvedilol (COREG) 12.5 MG tablet    nitroglycerin (NITROSTAT) 0.4 MG SL tablet       Respiratory    Simple chronic bronchitis (CMS/[facility])    Relevant Medications    albuterol sulfate  (90 Base) MCG/ACT inhaler    Fluticasone-Umeclidin-Vilant (TRELEGY ELLIPTA) 100-62.5-25 MCG/INH aerosol powder     Other Relevant Orders    CBC & Differential    Comprehensive Metabolic Panel    CBC Auto Differential       Digestive    Vitamin D deficiency    Relevant Orders    Vitamin D 25 Hydroxy       Endocrine    Diet-controlled diabetes mellitus (CMS/[facility])    Relevant Orders    Hemoglobin A1c       Genitourinary    CKD (chronic kidney disease) stage 3, GFR 30-59 ml/min (CMS/[facility])    Relevant Medications    hydrochlorothiazide (HYDRODIURIL) 25 MG tablet    Other Relevant Orders    CBC & Differential     Comprehensive Metabolic Panel    CBC Auto Differential       Other    Depression    Relevant Medications    FLUoxetine (PROzac) 20 MG capsule    Other Relevant Orders    CBC & Differential    Comprehensive Metabolic Panel    CBC Auto Differential      Other Visit Diagnoses     Financial difficulty        Relevant Orders    Ambulatory Referral to Social Work        Martha was seen today for hypertension.    Diagnoses and all orders for this visit:    Mixed hyperlipidemia    Essential hypertension  -     CBC & Differential  -     Comprehensive Metabolic Panel  -     CBC Auto Differential  Ideally BP would be less than 80 diastolic, however patient unable to pay for another medication.  Will continue with diet and exercise plan  PAD (peripheral artery disease) (CMS/[facility])  -     CBC & Differential  -     Comprehensive Metabolic Panel  -     CBC Auto Differential  Continue with baby Xarelto and blood pressure management.  Continues to abstain from tobacco  Peripheral vascular disease of lower extremity with ulceration (CMS/[facility])  -     CBC & Differential  -     Comprehensive Metabolic Panel  -     CBC Auto Differential    Simple chronic bronchitis (CMS/[facility])  -     CBC & Differential  -     Comprehensive Metabolic Panel  -     CBC Auto Differential  Samples given today for patient.  Continue to abstain from tobacco  Arterial insufficiency with ischemic ulcer (CMS/[facility])  -     CBC & Differential  -     Comprehensive Metabolic Panel  -     CBC Auto Differential  Discussed risk of gangrene which can be life-threatening.  Advised this soon as she is able to pay off her most recent medical bills she does need to see vascular surgery.  She verbalized understanding and denies further questions at this time.  We will also put her in contact with  to see if they can be of assistance  CKD (chronic kidney disease) stage 3, GFR 30-59 ml/min (CMS/[facility])  -     CBC & Differential  -     Comprehensive Metabolic Panel  -     CBC  Auto Differential    Recurrent major depressive disorder, in partial remission (CMS/HCC)  -     CBC & Differential  -     Comprehensive Metabolic Panel  -     CBC Auto Differential  Stable and doing well on Prozac.  Patient also benefits extremely from her dog  Diet-controlled diabetes mellitus (CMS/[facility])  -     Hemoglobin A1c  If not at goal may benefit from Jardiance  Vitamin D deficiency  -     Vitamin D 25 Hydroxy    Coronary artery disease of native heart with stable angina pectoris, unspecified vessel or lesion type (CMS/[facility])  -     nitroglycerin (NITROSTAT) 0.4 MG SL tablet; Place 1 tablet under the tongue Every 5 (Five) Minutes As Needed for Chest Pain. Take no more than 3 doses in 15 minutes.  Discussed proper use and storage of nitroglycerin.  Discussed symptoms of MI.  We will continue to manage medically.  Financial difficulty  -     Ambulatory Referral to Social Work             The patient was counseled regarding diagnostic results, impressions, prognosis, instructions for management, risk factor reductions, education, and importance of treatment compliance.  The patient verbalized understanding of and agreement with the plan of care.    Advised patient to call with any further questions and any new or worsening symptoms.     Return in about 3 months (around 1/17/2020), or if symptoms worsen or fail to improve, for Recheck.      Lucia Kraus, APRN    Please note that portions of this note were completed with a voice recognition program. Efforts were made to edit the dictations, but occasionally words are mistranscribed.

## 2019-10-18 DIAGNOSIS — E11.9 DIET-CONTROLLED DIABETES MELLITUS (HCC): Primary | ICD-10-CM

## 2019-11-01 ENCOUNTER — TELEPHONE (OUTPATIENT)
Dept: ENDOCRINOLOGY | Facility: CLINIC | Age: 64
End: 2019-11-01

## 2019-11-07 ENCOUNTER — APPOINTMENT (OUTPATIENT)
Dept: DIABETES SERVICES | Facility: HOSPITAL | Age: 64
End: 2019-11-07

## 2020-01-21 ENCOUNTER — OFFICE VISIT (OUTPATIENT)
Dept: INTERNAL MEDICINE | Facility: CLINIC | Age: 65
End: 2020-01-21

## 2020-01-21 VITALS
HEIGHT: 60 IN | BODY MASS INDEX: 38.28 KG/M2 | WEIGHT: 195 LBS | OXYGEN SATURATION: 99 % | DIASTOLIC BLOOD PRESSURE: 78 MMHG | SYSTOLIC BLOOD PRESSURE: 138 MMHG | RESPIRATION RATE: 16 BRPM | HEART RATE: 87 BPM

## 2020-01-21 DIAGNOSIS — D72.828 OTHER ELEVATED WHITE BLOOD CELL (WBC) COUNT: ICD-10-CM

## 2020-01-21 DIAGNOSIS — D75.839 THROMBOCYTOSIS: ICD-10-CM

## 2020-01-21 DIAGNOSIS — I73.9 PAD (PERIPHERAL ARTERY DISEASE) (HCC): ICD-10-CM

## 2020-01-21 DIAGNOSIS — Z87.891 HISTORY OF TOBACCO ABUSE: ICD-10-CM

## 2020-01-21 DIAGNOSIS — F33.41 RECURRENT MAJOR DEPRESSIVE DISORDER, IN PARTIAL REMISSION (HCC): ICD-10-CM

## 2020-01-21 DIAGNOSIS — E11.59 TYPE 2 DIABETES MELLITUS WITH OTHER CIRCULATORY COMPLICATION, WITHOUT LONG-TERM CURRENT USE OF INSULIN (HCC): Primary | ICD-10-CM

## 2020-01-21 DIAGNOSIS — Z23 NEED FOR INFLUENZA VACCINATION: ICD-10-CM

## 2020-01-21 DIAGNOSIS — F41.9 ANXIETY: ICD-10-CM

## 2020-01-21 DIAGNOSIS — N18.30 CKD (CHRONIC KIDNEY DISEASE) STAGE 3, GFR 30-59 ML/MIN (HCC): ICD-10-CM

## 2020-01-21 DIAGNOSIS — I10 ESSENTIAL HYPERTENSION: ICD-10-CM

## 2020-01-21 DIAGNOSIS — E78.2 MIXED HYPERLIPIDEMIA: ICD-10-CM

## 2020-01-21 DIAGNOSIS — J41.0 SIMPLE CHRONIC BRONCHITIS (HCC): ICD-10-CM

## 2020-01-21 DIAGNOSIS — I25.118 CORONARY ARTERY DISEASE OF NATIVE HEART WITH STABLE ANGINA PECTORIS, UNSPECIFIED VESSEL OR LESION TYPE (HCC): ICD-10-CM

## 2020-01-21 DIAGNOSIS — L98.499 ARTERIAL INSUFFICIENCY WITH ISCHEMIC ULCER (HCC): ICD-10-CM

## 2020-01-21 DIAGNOSIS — I77.1 ARTERIAL INSUFFICIENCY WITH ISCHEMIC ULCER (HCC): ICD-10-CM

## 2020-01-21 DIAGNOSIS — I73.9 PERIPHERAL VASCULAR DISEASE OF LOWER EXTREMITY WITH ULCERATION (HCC): ICD-10-CM

## 2020-01-21 DIAGNOSIS — I25.2 HISTORY OF MI (MYOCARDIAL INFARCTION): ICD-10-CM

## 2020-01-21 DIAGNOSIS — J45.31 MILD PERSISTENT ASTHMA WITH ACUTE EXACERBATION: ICD-10-CM

## 2020-01-21 DIAGNOSIS — J01.00 ACUTE NON-RECURRENT MAXILLARY SINUSITIS: ICD-10-CM

## 2020-01-21 DIAGNOSIS — L97.909 PERIPHERAL VASCULAR DISEASE OF LOWER EXTREMITY WITH ULCERATION (HCC): ICD-10-CM

## 2020-01-21 PROCEDURE — 80061 LIPID PANEL: CPT | Performed by: NURSE PRACTITIONER

## 2020-01-21 PROCEDURE — 85025 COMPLETE CBC W/AUTO DIFF WBC: CPT | Performed by: NURSE PRACTITIONER

## 2020-01-21 PROCEDURE — 83036 HEMOGLOBIN GLYCOSYLATED A1C: CPT | Performed by: NURSE PRACTITIONER

## 2020-01-21 PROCEDURE — 90471 IMMUNIZATION ADMIN: CPT | Performed by: NURSE PRACTITIONER

## 2020-01-21 PROCEDURE — 90674 CCIIV4 VAC NO PRSV 0.5 ML IM: CPT | Performed by: NURSE PRACTITIONER

## 2020-01-21 PROCEDURE — 99214 OFFICE O/P EST MOD 30 MIN: CPT | Performed by: NURSE PRACTITIONER

## 2020-01-21 PROCEDURE — 80053 COMPREHEN METABOLIC PANEL: CPT | Performed by: NURSE PRACTITIONER

## 2020-01-21 RX ORDER — HYDROCHLOROTHIAZIDE 25 MG/1
25 TABLET ORAL DAILY
Qty: 90 TABLET | Refills: 1 | Status: SHIPPED | OUTPATIENT
Start: 2020-01-21 | End: 2020-08-04

## 2020-01-21 RX ORDER — ATORVASTATIN CALCIUM 80 MG/1
80 TABLET, FILM COATED ORAL DAILY
Qty: 30 TABLET | Refills: 5 | Status: SHIPPED | OUTPATIENT
Start: 2020-01-21 | End: 2020-10-06 | Stop reason: SDUPTHER

## 2020-01-21 RX ORDER — DOXYCYCLINE 100 MG/1
100 CAPSULE ORAL 2 TIMES DAILY
Qty: 14 CAPSULE | Refills: 0 | Status: SHIPPED | OUTPATIENT
Start: 2020-01-21 | End: 2020-04-21

## 2020-01-21 RX ORDER — METHYLPREDNISOLONE 4 MG/1
TABLET ORAL
Qty: 21 EACH | Refills: 0 | Status: SHIPPED | OUTPATIENT
Start: 2020-01-21 | End: 2020-04-21

## 2020-01-21 RX ORDER — CARVEDILOL 12.5 MG/1
12.5 TABLET ORAL 2 TIMES DAILY WITH MEALS
Qty: 60 TABLET | Refills: 5 | Status: SHIPPED | OUTPATIENT
Start: 2020-01-21 | End: 2020-08-04 | Stop reason: SDUPTHER

## 2020-01-21 RX ORDER — ASPIRIN 81 MG/1
81 TABLET ORAL DAILY
Qty: 30 TABLET | Refills: 5 | Status: SHIPPED | OUTPATIENT
Start: 2020-01-21 | End: 2021-05-14 | Stop reason: SDUPTHER

## 2020-01-21 RX ORDER — LISINOPRIL 40 MG/1
40 TABLET ORAL DAILY
Qty: 90 TABLET | Refills: 1 | Status: SHIPPED | OUTPATIENT
Start: 2020-01-21 | End: 2020-11-23 | Stop reason: SDUPTHER

## 2020-01-21 RX ORDER — FLUOXETINE HYDROCHLORIDE 20 MG/1
60 CAPSULE ORAL DAILY
Qty: 270 CAPSULE | Refills: 1 | Status: SHIPPED | OUTPATIENT
Start: 2020-01-21 | End: 2020-07-01 | Stop reason: SDUPTHER

## 2020-01-21 RX ORDER — ALBUTEROL SULFATE 90 UG/1
2 AEROSOL, METERED RESPIRATORY (INHALATION) EVERY 6 HOURS PRN
Qty: 1 INHALER | Refills: 2 | Status: SHIPPED | OUTPATIENT
Start: 2020-01-21 | End: 2021-07-28

## 2020-01-21 NOTE — PROGRESS NOTES
Subjective   Martha Murray is a 64 y.o. female here today for HLD.    Chief Complaint   Patient presents with   • Hyperlipidemia   • Sinusitis   Martha is here today for a FU on her chronic conditions.   She has been trying to watch what she eats with her diabetes.  Having some diarrhea with metformin but otherwise doing well.  Denies any chest pain and has not had to use nitro.  Has been out of Trelegy but reports improvement of breathing while on it.  Continues to abstain from tobacco.  Toe ulcer has healed.  She is up-to-date on her pneumonia vaccine.  She is on an ACE inhibitor, a statin, and aspirin.  She is on baby Xarelto for her PAD.  Unable to afford stress test at this time.  Depression and anxiety stable on Prozac.      She is also having congestion.  Has been taking brandon-seltzer plus- started to feel better but the symptoms came back.  She is also taking dayquil.  This has been going on 3-4 weeks. The symptoms began with a cough (has been out of trelegy), stomach pain and body aches.  The cough has improved but is now having sinus pressure and yellow drainage.  She is having nasal pain.  She denies any fevers but has felt warm. Continues to abstain from tobacco.      Review of Systems   Constitutional: Negative for activity change, appetite change, diaphoresis, unexpected weight gain and unexpected weight loss.   HENT: Positive for sinus pressure. Negative for nosebleeds and trouble swallowing.    Eyes: Negative for blurred vision and visual disturbance.   Respiratory: Positive for cough and wheezing. Negative for chest tightness and shortness of breath.    Cardiovascular: Negative for chest pain, palpitations and leg swelling.   Gastrointestinal: Negative for blood in stool, nausea and vomiting.   Skin: Negative for color change, rash and skin lesions.   Neurological: Positive for numbness and headache. Negative for dizziness, syncope, facial asymmetry, light-headedness, memory problem and confusion.    Psychiatric/Behavioral: Positive for agitation. Negative for dysphoric mood, self-injury, sleep disturbance, suicidal ideas, negative for hyperactivity, depressed mood and stress. The patient is not nervous/anxious.        Past Medical History:   Diagnosis Date   • Allergic    • Anemia    • Depression    • Diabetes mellitus (CMS/HCC)    • History of heart attack    • Hyperlipidemia    • Hypertension      Family History   Problem Relation Age of Onset   • Arthritis Maternal Grandmother    • Hypertension Mother    • Hyperlipidemia Mother    • Thyroid disease Mother    • Diabetes Sister    • Crohn's disease Sister    • Hypertension Sister    • Heart attack Maternal Grandfather    • Breast cancer Neg Hx    • Ovarian cancer Neg Hx      Past Surgical History:   Procedure Laterality Date   • BILATERAL BREAST REDUCTION     • BONE MARROW ASPIRATION     • HYSTERECTOMY  1990    x 2   • REDUCTION MAMMAPLASTY Bilateral     MORE THAN 20 YEARS AGO   • SINUS SURGERY     • TONSILLECTOMY       Social History     Socioeconomic History   • Marital status: Single     Spouse name: Not on file   • Number of children: Not on file   • Years of education: Not on file   • Highest education level: Not on file   Tobacco Use   • Smoking status: Former Smoker     Packs/day: 2.00     Years: 30.00     Pack years: 60.00     Types: Cigarettes     Last attempt to quit:      Years since quittin.0   • Smokeless tobacco: Never Used   Substance and Sexual Activity   • Alcohol use: No   • Drug use: No   • Sexual activity: Defer         Current Outpatient Medications:   •  albuterol sulfate  (90 Base) MCG/ACT inhaler, Inhale 2 puffs Every 6 (Six) Hours As Needed for Wheezing., Disp: 1 inhaler, Rfl: 2  •  aspirin 81 MG EC tablet, Take 1 tablet by mouth Daily., Disp: 30 tablet, Rfl: 5  •  atorvastatin (LIPITOR) 80 MG tablet, Take 1 tablet by mouth Daily., Disp: 30 tablet, Rfl: 5  •  carvedilol (COREG) 12.5 MG tablet, Take 1 tablet by  "mouth 2 (Two) Times a Day With Meals., Disp: 60 tablet, Rfl: 5  •  FLUoxetine (PROzac) 20 MG capsule, Take 3 capsules by mouth Daily., Disp: 270 capsule, Rfl: 1  •  Fluticasone-Umeclidin-Vilant (TRELEGY ELLIPTA) 100-62.5-25 MCG/INH aerosol powder , Inhale 1 puff Daily., Disp: 30 each, Rfl: 5  •  hydroCHLOROthiazide (HYDRODIURIL) 25 MG tablet, Take 1 tablet by mouth Daily., Disp: 90 tablet, Rfl: 1  •  lisinopril (PRINIVIL,ZESTRIL) 40 MG tablet, Take 1 tablet by mouth Daily., Disp: 90 tablet, Rfl: 1  •  metFORMIN (GLUCOPHAGE) 500 MG tablet, Take 1 tablet by mouth 2 (Two) Times a Day With Meals., Disp: 60 tablet, Rfl: 2  •  nitroglycerin (NITROSTAT) 0.4 MG SL tablet, Place 1 tablet under the tongue Every 5 (Five) Minutes As Needed for Chest Pain. Take no more than 3 doses in 15 minutes., Disp: 15 tablet, Rfl: 12  •  Rivaroxaban (XARELTO) 2.5 MG tablet, Take 1 tablet by mouth 2 (Two) Times a Day., Disp: 60 tablet, Rfl: 5  •  doxycycline (MONODOX) 100 MG capsule, Take 1 capsule by mouth 2 (Two) Times a Day., Disp: 14 capsule, Rfl: 0  •  methylPREDNISolone (MEDROL, BIANCA,) 4 MG tablet, Take as directed on package instructions., Disp: 21 each, Rfl: 0    Objective   Vitals:    01/21/20 1103   BP: 138/78   Pulse: 87   Resp: 16   SpO2: 99%   Weight: 88.5 kg (195 lb)   Height: 152.4 cm (60\")     Body mass index is 38.08 kg/m².  Physical Exam   Constitutional: She has a sickly appearance. No distress.   HENT:   Right Ear: External ear normal. Tympanic membrane is not perforated, not erythematous and not retracted. A middle ear effusion is present.   Left Ear: Tympanic membrane and external ear normal.   Nose: Mucosal edema present. No rhinorrhea. Right sinus exhibits maxillary sinus tenderness. Right sinus exhibits no frontal sinus tenderness. Left sinus exhibits maxillary sinus tenderness. Left sinus exhibits no frontal sinus tenderness.   Mouth/Throat: Oropharynx is clear and moist and mucous membranes are normal. No " oropharyngeal exudate, posterior oropharyngeal edema, posterior oropharyngeal erythema or tonsillar abscesses. No tonsillar exudate.   Eyes: Right eye exhibits no discharge. Left eye exhibits no discharge. Right conjunctiva is not injected. Left conjunctiva is not injected. No scleral icterus.   Neck: No neck rigidity. No thyromegaly present.   Cardiovascular: Normal rate and regular rhythm.   Pulmonary/Chest: Effort normal. She has no decreased breath sounds. She has wheezes in the right upper field, the left upper field and the left lower field. She has no rhonchi. She has no rales.   Abdominal: Soft. She exhibits no distension.   Musculoskeletal: She exhibits no edema.   Lymphadenopathy:        Head (right side): No submental, no submandibular, no tonsillar, no preauricular, no posterior auricular and no occipital adenopathy present.        Head (left side): No submental, no submandibular, no tonsillar, no preauricular, no posterior auricular and no occipital adenopathy present.     She has no cervical adenopathy.        Right cervical: No superficial cervical, no deep cervical and no posterior cervical adenopathy present.       Left cervical: No superficial cervical, no deep cervical and no posterior cervical adenopathy present.   Neurological: She is alert.   Skin: Skin is warm, dry and intact. Capillary refill takes 2 to 3 seconds. She is not diaphoretic. No pallor.   Psychiatric: She has a normal mood and affect. Her speech is normal and behavior is normal. Cognition and memory are normal.   Nursing note and vitals reviewed.      Assessment/Plan   Problem List Items Addressed This Visit        Cardiovascular and Mediastinum    Essential hypertension    Relevant Medications    lisinopril (PRINIVIL,ZESTRIL) 40 MG tablet    hydroCHLOROthiazide (HYDRODIURIL) 25 MG tablet    carvedilol (COREG) 12.5 MG tablet    Hyperlipidemia    Relevant Medications    atorvastatin (LIPITOR) 80 MG tablet    aspirin 81 MG EC tablet     PAD (peripheral artery disease) (CMS/HCC)    Relevant Medications    lisinopril (PRINIVIL,ZESTRIL) 40 MG tablet    Rivaroxaban (XARELTO) 2.5 MG tablet    carvedilol (COREG) 12.5 MG tablet    Other Relevant Orders    Comprehensive Metabolic Panel    CBC & Differential    Lipid Panel    CBC Auto Differential    Peripheral vascular disease of lower extremity with ulceration (CMS/HCC)    Relevant Orders    Lipid Panel    Arterial insufficiency with ischemic ulcer (CMS/HCC)    Relevant Medications    Rivaroxaban (XARELTO) 2.5 MG tablet    Other Relevant Orders    Lipid Panel    Coronary artery disease of native heart with stable angina pectoris (CMS/HCC)    Relevant Medications    nitroglycerin (NITROSTAT) 0.4 MG SL tablet    lisinopril (PRINIVIL,ZESTRIL) 40 MG tablet    carvedilol (COREG) 12.5 MG tablet    atorvastatin (LIPITOR) 80 MG tablet    Other Relevant Orders    Comprehensive Metabolic Panel    CBC & Differential    CBC Auto Differential       Respiratory    Simple chronic bronchitis (CMS/HCC)    Relevant Medications    Fluticasone-Umeclidin-Vilant (TRELEGY ELLIPTA) 100-62.5-25 MCG/INH aerosol powder     albuterol sulfate  (90 Base) MCG/ACT inhaler       Endocrine    Diet-controlled diabetes mellitus (CMS/[facility]) - Primary    Relevant Medications    metFORMIN (GLUCOPHAGE) 500 MG tablet       Immune and Lymphatic    Leukocytosis    Relevant Medications    methylPREDNISolone (MEDROL, BIANCA,) 4 MG tablet       Genitourinary    CKD (chronic kidney disease) stage 3, GFR 30-59 ml/min (CMS/HCC)    Relevant Medications    hydroCHLOROthiazide (HYDRODIURIL) 25 MG tablet    Other Relevant Orders    Comprehensive Metabolic Panel    CBC & Differential    CBC Auto Differential       Hematopoietic and Hemostatic    Thrombocytosis (CMS/HCC)    Relevant Medications    Rivaroxaban (XARELTO) 2.5 MG tablet       Other    Depression    Relevant Medications    FLUoxetine (PROzac) 20 MG capsule    History of tobacco abuse       Other Visit Diagnoses     Need for influenza vaccination        Relevant Orders    Flucelvax Quad=>4Years (8037-9769) (Completed)    Anxiety        Relevant Medications    FLUoxetine (PROzac) 20 MG capsule    History of MI (myocardial infarction)        Relevant Medications    carvedilol (COREG) 12.5 MG tablet    Mild persistent asthma with acute exacerbation        Relevant Medications    Fluticasone-Umeclidin-Vilant (TRELEGY ELLIPTA) 100-62.5-25 MCG/INH aerosol powder     albuterol sulfate  (90 Base) MCG/ACT inhaler    methylPREDNISolone (MEDROL, BIANCA,) 4 MG tablet    Acute non-recurrent maxillary sinusitis        Relevant Medications    methylPREDNISolone (MEDROL, BIANCA,) 4 MG tablet    doxycycline (MONODOX) 100 MG capsule        Martha was seen today for hyperlipidemia and sinusitis.    Diagnoses and all orders for this visit:    Type 2 diabetes mellitus with other circulatory complication, without long-term current use of insulin (CMS/[facility])  -     metFORMIN (GLUCOPHAGE) 500 MG tablet; Take 1 tablet by mouth 2 (Two) Times a Day With Meals.  -     Hemoglobin A1c  Recheck today  Need for influenza vaccination  -     Flucelvax Quad=>4Years ([id number])    CKD (chronic kidney disease) stage 3, GFR 30-59 ml/min (CMS/[facility])  -     Comprehensive Metabolic Panel  -     CBC & Differential  -     CBC Auto Differential  Recheck today  Other elevated white blood cell (WBC) count    Coronary artery disease of native heart with stable angina pectoris, unspecified vessel or lesion type (CMS/[facility])  -     lisinopril (PRINIVIL,ZESTRIL) 40 MG tablet; Take 1 tablet by mouth Daily.  -     atorvastatin (LIPITOR) 80 MG tablet; Take 1 tablet by mouth Daily.  -     Comprehensive Metabolic Panel  -     CBC & Differential  -     CBC Auto Differential  BP slightly elevated today, advised patient against using over-the-counter decongestants and recommend Coricidin HBP while ill  PAD (peripheral artery disease) (CMS/[facility])  -     lisinopril  (PRINIVIL,ZESTRIL) 40 MG tablet; Take 1 tablet by mouth Daily.  -     Rivaroxaban (XARELTO) 2.5 MG tablet; Take 1 tablet by mouth 2 (Two) Times a Day.  -     carvedilol (COREG) 12.5 MG tablet; Take 1 tablet by mouth 2 (Two) Times a Day With Meals.  -     Comprehensive Metabolic Panel  -     CBC & Differential  -     Lipid Panel  -     CBC Auto Differential    Peripheral vascular disease of lower extremity with ulceration (CMS/HCC)  -     Lipid Panel    Essential hypertension  -     lisinopril (PRINIVIL,ZESTRIL) 40 MG tablet; Take 1 tablet by mouth Daily.  -     hydroCHLOROthiazide (HYDRODIURIL) 25 MG tablet; Take 1 tablet by mouth Daily.  -     carvedilol (COREG) 12.5 MG tablet; Take 1 tablet by mouth 2 (Two) Times a Day With Meals.    Mixed hyperlipidemia  -     atorvastatin (LIPITOR) 80 MG tablet; Take 1 tablet by mouth Daily.  -     aspirin 81 MG EC tablet; Take 1 tablet by mouth Daily.    Arterial insufficiency with ischemic ulcer (CMS/HCC)  -     Rivaroxaban (XARELTO) 2.5 MG tablet; Take 1 tablet by mouth 2 (Two) Times a Day.  -     Lipid Panel  Stable  Simple chronic bronchitis (CMS/HCC)  -     Fluticasone-Umeclidin-Vilant (TRELEGY ELLIPTA) 100-62.5-25 MCG/INH aerosol powder ; Inhale 1 puff Daily.  Refilled today  Anxiety  -     FLUoxetine (PROzac) 20 MG capsule; Take 3 capsules by mouth Daily.  Stable, continue current treatment plan  History of MI (myocardial infarction)  -     carvedilol (COREG) 12.5 MG tablet; Take 1 tablet by mouth 2 (Two) Times a Day With Meals.    History of tobacco abuse  Continues to abstain  Thrombocytosis (CMS/HCC)    Recurrent major depressive disorder, in partial remission (CMS/[facility])  -     FLUoxetine (PROzac) 20 MG capsule; Take 3 capsules by mouth Daily.    Mild persistent asthma with acute exacerbation  -     albuterol sulfate  (90 Base) MCG/ACT inhaler; Inhale 2 puffs Every 6 (Six) Hours As Needed for Wheezing.  -     methylPREDNISolone (MEDROL, BIANCA,) 4 MG tablet; Take  as directed on package instructions.  Advised steroid may increase blood sugars temporarily.  Advised to take with food and early in the day to avoid insomnia.  Recommend Coricidin HBP for URI symptoms  Acute non-recurrent maxillary sinusitis  -     methylPREDNISolone (MEDROL, BIANCA,) 4 MG tablet; Take as directed on package instructions.  -     doxycycline (MONODOX) 100 MG capsule; Take 1 capsule by mouth 2 (Two) Times a Day.             The patient was counseled regarding diagnostic results, impressions, prognosis, instructions for management, risk factor reductions, education, and importance of treatment compliance.  The patient verbalized understanding of and agreement with the plan of care.    Advised patient to call with any further questions and any new or worsening symptoms.     Return in about 3 months (around 4/21/2020), or if symptoms worsen or fail to improve.      Lucia Kraus, APRN    Please note that portions of this note were completed with a voice recognition program. Efforts were made to edit the dictations, but occasionally words are mistranscribed.

## 2020-01-22 DIAGNOSIS — E11.59 TYPE 2 DIABETES MELLITUS WITH OTHER CIRCULATORY COMPLICATION, WITHOUT LONG-TERM CURRENT USE OF INSULIN (HCC): ICD-10-CM

## 2020-01-22 LAB
ALBUMIN SERPL-MCNC: 4.1 G/DL (ref 3.5–5.2)
ALBUMIN/GLOB SERPL: 1.5 G/DL
ALP SERPL-CCNC: 123 U/L (ref 39–117)
ALT SERPL W P-5'-P-CCNC: 38 U/L (ref 1–33)
ANION GAP SERPL CALCULATED.3IONS-SCNC: 18.7 MMOL/L (ref 5–15)
AST SERPL-CCNC: 31 U/L (ref 1–32)
BASOPHILS # BLD AUTO: 0.16 10*3/MM3 (ref 0–0.2)
BASOPHILS NFR BLD AUTO: 1.5 % (ref 0–1.5)
BILIRUB SERPL-MCNC: 1 MG/DL (ref 0.2–1.2)
BUN BLD-MCNC: 15 MG/DL (ref 8–23)
BUN/CREAT SERPL: 14.2 (ref 7–25)
CALCIUM SPEC-SCNC: 9.7 MG/DL (ref 8.6–10.5)
CHLORIDE SERPL-SCNC: 96 MMOL/L (ref 98–107)
CHOLEST SERPL-MCNC: 121 MG/DL (ref 0–200)
CO2 SERPL-SCNC: 22.3 MMOL/L (ref 22–29)
CREAT BLD-MCNC: 1.06 MG/DL (ref 0.57–1)
DEPRECATED RDW RBC AUTO: 45.4 FL (ref 37–54)
EOSINOPHIL # BLD AUTO: 0.34 10*3/MM3 (ref 0–0.4)
EOSINOPHIL NFR BLD AUTO: 3.2 % (ref 0.3–6.2)
ERYTHROCYTE [DISTWIDTH] IN BLOOD BY AUTOMATED COUNT: 13.3 % (ref 12.3–15.4)
GFR SERPL CREATININE-BSD FRML MDRD: 52 ML/MIN/1.73
GLOBULIN UR ELPH-MCNC: 2.8 GM/DL
GLUCOSE BLD-MCNC: 122 MG/DL (ref 65–99)
HBA1C MFR BLD: 7.7 % (ref 4.8–5.6)
HCT VFR BLD AUTO: 46.5 % (ref 34–46.6)
HDLC SERPL-MCNC: 29 MG/DL (ref 40–60)
HGB BLD-MCNC: 15.4 G/DL (ref 12–15.9)
IMM GRANULOCYTES # BLD AUTO: 0.12 10*3/MM3 (ref 0–0.05)
IMM GRANULOCYTES NFR BLD AUTO: 1.1 % (ref 0–0.5)
LDLC SERPL CALC-MCNC: 54 MG/DL (ref 0–100)
LDLC/HDLC SERPL: 1.86 {RATIO}
LYMPHOCYTES # BLD AUTO: 1.55 10*3/MM3 (ref 0.7–3.1)
LYMPHOCYTES NFR BLD AUTO: 14.6 % (ref 19.6–45.3)
MCH RBC QN AUTO: 30.6 PG (ref 26.6–33)
MCHC RBC AUTO-ENTMCNC: 33.1 G/DL (ref 31.5–35.7)
MCV RBC AUTO: 92.4 FL (ref 79–97)
MONOCYTES # BLD AUTO: 0.84 10*3/MM3 (ref 0.1–0.9)
MONOCYTES NFR BLD AUTO: 7.9 % (ref 5–12)
NEUTROPHILS # BLD AUTO: 7.58 10*3/MM3 (ref 1.7–7)
NEUTROPHILS NFR BLD AUTO: 71.7 % (ref 42.7–76)
NRBC BLD AUTO-RTO: 0.1 /100 WBC (ref 0–0.2)
PLATELET # BLD AUTO: 952 10*3/MM3 (ref 140–450)
PMV BLD AUTO: 10.3 FL (ref 6–12)
POTASSIUM BLD-SCNC: 4.7 MMOL/L (ref 3.5–5.2)
PROT SERPL-MCNC: 6.9 G/DL (ref 6–8.5)
RBC # BLD AUTO: 5.03 10*6/MM3 (ref 3.77–5.28)
SODIUM BLD-SCNC: 137 MMOL/L (ref 136–145)
TRIGL SERPL-MCNC: 191 MG/DL (ref 0–150)
VLDLC SERPL-MCNC: 38.2 MG/DL (ref 5–40)
WBC NRBC COR # BLD: 10.59 10*3/MM3 (ref 3.4–10.8)

## 2020-01-22 RX ORDER — METFORMIN HYDROCHLORIDE 500 MG/1
1000 TABLET, EXTENDED RELEASE ORAL 2 TIMES DAILY WITH MEALS
Qty: 60 TABLET | Refills: 5 | Status: SHIPPED | OUTPATIENT
Start: 2020-01-22 | End: 2020-08-05

## 2020-03-31 ENCOUNTER — TELEPHONE (OUTPATIENT)
Dept: INTERNAL MEDICINE | Facility: CLINIC | Age: 65
End: 2020-03-31

## 2020-03-31 RX ORDER — AMOXICILLIN AND CLAVULANATE POTASSIUM 875; 125 MG/1; MG/1
1 TABLET, FILM COATED ORAL 2 TIMES DAILY
Qty: 20 TABLET | Refills: 0 | Status: SHIPPED | OUTPATIENT
Start: 2020-03-31 | End: 2020-04-10

## 2020-04-21 ENCOUNTER — OFFICE VISIT (OUTPATIENT)
Dept: INTERNAL MEDICINE | Facility: CLINIC | Age: 65
End: 2020-04-21

## 2020-04-21 DIAGNOSIS — J41.0 SIMPLE CHRONIC BRONCHITIS (HCC): ICD-10-CM

## 2020-04-21 DIAGNOSIS — J44.1 COPD EXACERBATION (HCC): ICD-10-CM

## 2020-04-21 DIAGNOSIS — J45.20 MILD INTERMITTENT ASTHMA WITHOUT COMPLICATION: ICD-10-CM

## 2020-04-21 DIAGNOSIS — E11.51 TYPE 2 DIABETES MELLITUS WITH DIABETIC PERIPHERAL ANGIOPATHY WITHOUT GANGRENE, WITHOUT LONG-TERM CURRENT USE OF INSULIN (HCC): Primary | ICD-10-CM

## 2020-04-21 DIAGNOSIS — I25.118 CORONARY ARTERY DISEASE OF NATIVE HEART WITH STABLE ANGINA PECTORIS, UNSPECIFIED VESSEL OR LESION TYPE (HCC): ICD-10-CM

## 2020-04-21 DIAGNOSIS — I10 ESSENTIAL HYPERTENSION: ICD-10-CM

## 2020-04-21 PROCEDURE — 99442 PR PHYS/QHP TELEPHONE EVALUATION 11-20 MIN: CPT | Performed by: NURSE PRACTITIONER

## 2020-04-21 RX ORDER — LEVOFLOXACIN 750 MG/1
750 TABLET ORAL DAILY
Qty: 7 TABLET | Refills: 0 | Status: SHIPPED | OUTPATIENT
Start: 2020-04-21 | End: 2020-08-04

## 2020-04-21 NOTE — PROGRESS NOTES
Subjective   Martha Murray is a 64 y.o. female here today for DM.    Chief Complaint   Patient presents with   • Diabetes   You have chosen to receive care through a telephone visit. Do you consent to use a telephone visit for your medical care today? Yes  Martha is here today via telephone call to discuss DM/HTN.  She was out of her medication for about three days but is now back on her meds.  Mood is good. Has been unable to check BP as her son borrowed her machine.  Checks blood sugars on occasion- highs is 220.      She has had productive cough with green sputum since December.  Has been on augmentin, doxy, and medrol without much improvement  No longer having sinus pressure or shortness of breath but sputum and cough remains.  Cough is worse at night.    Review of Systems   Constitutional: Negative for activity change, appetite change, chills, diaphoresis, fatigue, fever, unexpected weight gain and unexpected weight loss.   HENT: Positive for congestion. Negative for ear discharge, ear pain, nosebleeds, postnasal drip, rhinorrhea, sinus pressure, sore throat, swollen glands, trouble swallowing and voice change.    Eyes: Negative for blurred vision, photophobia, redness, itching and visual disturbance.   Respiratory: Positive for cough. Negative for chest tightness, shortness of breath and wheezing.    Cardiovascular: Negative for chest pain, palpitations and leg swelling.   Gastrointestinal: Negative for blood in stool, nausea and vomiting.   Musculoskeletal: Negative for arthralgias and myalgias.   Skin: Negative for color change, rash and skin lesions.   Allergic/Immunologic: Negative for environmental allergies and immunocompromised state.   Neurological: Negative for dizziness, syncope, facial asymmetry, weakness, light-headedness, headache, memory problem and confusion.   Hematological: Negative for adenopathy. Does not bruise/bleed easily.   Psychiatric/Behavioral: Positive for stress. Negative for  suicidal ideas and depressed mood. The patient is not nervous/anxious.        Past Medical History:   Diagnosis Date   • Allergic    • Anemia    • Depression    • Diabetes mellitus (CMS/HCC)    • History of heart attack    • Hyperlipidemia    • Hypertension      Family History   Problem Relation Age of Onset   • Arthritis Maternal Grandmother    • Hypertension Mother    • Hyperlipidemia Mother    • Thyroid disease Mother    • Diabetes Sister    • Crohn's disease Sister    • Hypertension Sister    • Heart attack Maternal Grandfather    • Breast cancer Neg Hx    • Ovarian cancer Neg Hx      Past Surgical History:   Procedure Laterality Date   • BILATERAL BREAST REDUCTION     • BONE MARROW ASPIRATION     • HYSTERECTOMY  1990    x 2   • REDUCTION MAMMAPLASTY Bilateral     MORE THAN 20 YEARS AGO   • SINUS SURGERY     • TONSILLECTOMY       Social History     Socioeconomic History   • Marital status: Single     Spouse name: Not on file   • Number of children: Not on file   • Years of education: Not on file   • Highest education level: Not on file   Tobacco Use   • Smoking status: Former Smoker     Packs/day: 2.00     Years: 30.00     Pack years: 60.00     Types: Cigarettes     Last attempt to quit:      Years since quittin.3   • Smokeless tobacco: Never Used   Substance and Sexual Activity   • Alcohol use: No   • Drug use: No   • Sexual activity: Defer         Current Outpatient Medications:   •  albuterol sulfate  (90 Base) MCG/ACT inhaler, Inhale 2 puffs Every 6 (Six) Hours As Needed for Wheezing., Disp: 1 inhaler, Rfl: 2  •  aspirin 81 MG EC tablet, Take 1 tablet by mouth Daily., Disp: 30 tablet, Rfl: 5  •  atorvastatin (LIPITOR) 80 MG tablet, Take 1 tablet by mouth Daily., Disp: 30 tablet, Rfl: 5  •  carvedilol (COREG) 12.5 MG tablet, Take 1 tablet by mouth 2 (Two) Times a Day With Meals., Disp: 60 tablet, Rfl: 5  •  FLUoxetine (PROzac) 20 MG capsule, Take 3 capsules by mouth Daily., Disp: 270  capsule, Rfl: 1  •  Fluticasone-Umeclidin-Vilant (TRELEGY ELLIPTA) 100-62.5-25 MCG/INH aerosol powder , Inhale 1 puff Daily., Disp: 30 each, Rfl: 5  •  hydroCHLOROthiazide (HYDRODIURIL) 25 MG tablet, Take 1 tablet by mouth Daily., Disp: 90 tablet, Rfl: 1  •  levoFLOXacin (Levaquin) 750 MG tablet, Take 1 tablet by mouth Daily., Disp: 7 tablet, Rfl: 0  •  lisinopril (PRINIVIL,ZESTRIL) 40 MG tablet, Take 1 tablet by mouth Daily., Disp: 90 tablet, Rfl: 1  •  metFORMIN (GLUCOPHAGE-XR) 500 MG 24 hr tablet, Take 2 tablets by mouth 2 (Two) Times a Day With Meals., Disp: 60 tablet, Rfl: 5  •  nitroglycerin (NITROSTAT) 0.4 MG SL tablet, Place 1 tablet under the tongue Every 5 (Five) Minutes As Needed for Chest Pain. Take no more than 3 doses in 15 minutes., Disp: 15 tablet, Rfl: 12  •  Rivaroxaban (XARELTO) 2.5 MG tablet, Take 1 tablet by mouth 2 (Two) Times a Day., Disp: 60 tablet, Rfl: 5    Objective   There were no vitals filed for this visit.  There is no height or weight on file to calculate BMI.  Physical Exam   Constitutional: She is oriented to person, place, and time.   Neurological: She is alert and oriented to person, place, and time.   Psychiatric: Judgment and thought content normal.       Assessment/Plan   Problem List Items Addressed This Visit        Cardiovascular and Mediastinum    Essential hypertension    Relevant Medications    lisinopril (PRINIVIL,ZESTRIL) 40 MG tablet    hydroCHLOROthiazide (HYDRODIURIL) 25 MG tablet    carvedilol (COREG) 12.5 MG tablet    Coronary artery disease of native heart with stable angina pectoris (CMS/HCC)    Relevant Medications    nitroglycerin (NITROSTAT) 0.4 MG SL tablet    lisinopril (PRINIVIL,ZESTRIL) 40 MG tablet    carvedilol (COREG) 12.5 MG tablet    atorvastatin (LIPITOR) 80 MG tablet       Respiratory    Mild intermittent asthma without complication    Relevant Medications    Fluticasone-Umeclidin-Vilant (TRELEGY ELLIPTA) 100-62.5-25 MCG/INH aerosol powder      albuterol sulfate  (90 Base) MCG/ACT inhaler    Simple chronic bronchitis (CMS/HCC)    Relevant Medications    Fluticasone-Umeclidin-Vilant (TRELEGY ELLIPTA) 100-62.5-25 MCG/INH aerosol powder     albuterol sulfate  (90 Base) MCG/ACT inhaler       Endocrine    Diet-controlled diabetes mellitus (CMS/HCC) - Primary    Relevant Medications    metFORMIN (GLUCOPHAGE-XR) 500 MG 24 hr tablet      Other Visit Diagnoses     COPD exacerbation (CMS/[facility])        Relevant Medications    levoFLOXacin (Levaquin) 750 MG tablet        Martha was seen today for diabetes.    Diagnoses and all orders for this visit:    Type 2 diabetes mellitus with diabetic peripheral angiopathy without gangrene, without long-term current use of insulin (CMS/[facility])  - continue to monitor blood sugars- pt will keep a log of fasting blood sugars. Continue to also check as needed  Simple chronic bronchitis (CMS/[facility])  - continue with treligy  Mild intermittent asthma without complication  See above  Essential hypertension  - discussed BP goal of approx 130/80. Pt will keep log of BP  Coronary artery disease of native heart with stable angina pectoris, unspecified vessel or lesion type (CMS/[facility])  - Adivised BP control/ diet/ and exercise  COPD exacerbation (CMS/[facility])  -     levoFLOXacin (Levaquin) 750 MG tablet; Take 1 tablet by mouth Daily.  - has tried augmentin/doxy/ and steroids.  Sinus infection resolved but still having worsening cough that is productive.  Continue inhaler and wash mouth with each use.  If no improvement- will need imaging.    approx 20 minutes of phone call       The patient was counseled regarding diagnostic results, impressions, prognosis, instructions for management, risk factor reductions, education, and importance of treatment compliance.  The patient verbalized understanding of and agreement with the plan of care.    Advised patient to call with any further questions and any new or worsening symptoms.     Return in  about 3 months (around 7/21/2020), or if symptoms worsen or fail to improve.      Lucia Kraus, APRN    Please note that portions of this note were completed with a voice recognition program. Efforts were made to edit the dictations, but occasionally words are mistranscribed.

## 2020-06-09 ENCOUNTER — OFFICE VISIT (OUTPATIENT)
Dept: ORTHOPEDIC SURGERY | Facility: CLINIC | Age: 65
End: 2020-06-09

## 2020-06-09 VITALS — WEIGHT: 184.2 LBS | HEIGHT: 60 IN | HEART RATE: 94 BPM | BODY MASS INDEX: 36.16 KG/M2 | OXYGEN SATURATION: 98 %

## 2020-06-09 DIAGNOSIS — M17.0 PRIMARY OSTEOARTHRITIS OF BOTH KNEES: Primary | ICD-10-CM

## 2020-06-09 PROCEDURE — 99213 OFFICE O/P EST LOW 20 MIN: CPT | Performed by: ORTHOPAEDIC SURGERY

## 2020-06-09 PROCEDURE — 20610 DRAIN/INJ JOINT/BURSA W/O US: CPT | Performed by: ORTHOPAEDIC SURGERY

## 2020-06-09 RX ORDER — BUPIVACAINE HYDROCHLORIDE 2.5 MG/ML
3 INJECTION, SOLUTION EPIDURAL; INFILTRATION; INTRACAUDAL
Status: COMPLETED | OUTPATIENT
Start: 2020-06-09 | End: 2020-06-09

## 2020-06-09 RX ORDER — TRIAMCINOLONE ACETONIDE 40 MG/ML
80 INJECTION, SUSPENSION INTRA-ARTICULAR; INTRAMUSCULAR
Status: COMPLETED | OUTPATIENT
Start: 2020-06-09 | End: 2020-06-09

## 2020-06-09 RX ORDER — LIDOCAINE HYDROCHLORIDE 10 MG/ML
3 INJECTION, SOLUTION EPIDURAL; INFILTRATION; INTRACAUDAL; PERINEURAL
Status: COMPLETED | OUTPATIENT
Start: 2020-06-09 | End: 2020-06-09

## 2020-06-09 RX ADMIN — LIDOCAINE HYDROCHLORIDE 3 ML: 10 INJECTION, SOLUTION EPIDURAL; INFILTRATION; INTRACAUDAL; PERINEURAL at 07:41

## 2020-06-09 RX ADMIN — BUPIVACAINE HYDROCHLORIDE 3 ML: 2.5 INJECTION, SOLUTION EPIDURAL; INFILTRATION; INTRACAUDAL at 07:41

## 2020-06-09 RX ADMIN — TRIAMCINOLONE ACETONIDE 80 MG: 40 INJECTION, SUSPENSION INTRA-ARTICULAR; INTRAMUSCULAR at 07:41

## 2020-06-09 RX ADMIN — LIDOCAINE HYDROCHLORIDE 3 ML: 10 INJECTION, SOLUTION EPIDURAL; INFILTRATION; INTRACAUDAL; PERINEURAL at 07:40

## 2020-06-09 RX ADMIN — BUPIVACAINE HYDROCHLORIDE 3 ML: 2.5 INJECTION, SOLUTION EPIDURAL; INFILTRATION; INTRACAUDAL at 07:40

## 2020-06-09 RX ADMIN — TRIAMCINOLONE ACETONIDE 80 MG: 40 INJECTION, SUSPENSION INTRA-ARTICULAR; INTRAMUSCULAR at 07:40

## 2020-06-09 NOTE — PROGRESS NOTES
Orthopaedic Clinic Note: Knee Established Patient    Chief Complaint   Patient presents with   • Follow-up     2 year recheck - Primary osteoarthritis of both knees         HPI    It has been 2  year(s) since Ms. Murray's last visit. She returns to clinic today for follow-up bilateral knee osteoarthritis.  At her last visit 2 years ago, she underwent bilateral knee cortisone injections.  The injections provided excellent relief for several months.  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  Since her activity has decreased, her knee pain has not been as severe.  However, once the weather got warmer, over the last 2 to 3 months, she has been more active and her knee pain has returned.  She rates her pain a 7/10 on the pain scale.  Right knee is more painful than the left.  She is doing home exercise regimen as well as Tylenol.  She denies fevers chills or constitutional symptoms.  She is ambulatory with no assistive device today.  Overall she is doing about the same.    Past Medical History:   Diagnosis Date   • Allergic    • Anemia    • Depression    • Diabetes mellitus (CMS/HCC)    • History of heart attack    • Hyperlipidemia    • Hypertension       Past Surgical History:   Procedure Laterality Date   • BILATERAL BREAST REDUCTION     • BONE MARROW ASPIRATION     • HYSTERECTOMY  1990    x 2   • REDUCTION MAMMAPLASTY Bilateral     MORE THAN 20 YEARS AGO   • SINUS SURGERY  2003   • TONSILLECTOMY        Family History   Problem Relation Age of Onset   • Arthritis Maternal Grandmother    • Hypertension Mother    • Hyperlipidemia Mother    • Thyroid disease Mother    • Diabetes Sister    • Crohn's disease Sister    • Hypertension Sister    • Heart attack Maternal Grandfather    • Breast cancer Neg Hx    • Ovarian cancer Neg Hx      Social History     Socioeconomic History   • Marital status: Single     Spouse name: Not on file   • Number of children: Not on file   • Years of education: Not on file    • Highest education level: Not on file   Tobacco Use   • Smoking status: Former Smoker     Packs/day: 2.00     Years: 30.00     Pack years: 60.00     Types: Cigarettes     Last attempt to quit:      Years since quittin.4   • Smokeless tobacco: Never Used   Substance and Sexual Activity   • Alcohol use: No   • Drug use: No   • Sexual activity: Defer      Current Outpatient Medications on File Prior to Visit   Medication Sig Dispense Refill   • albuterol sulfate  (90 Base) MCG/ACT inhaler Inhale 2 puffs Every 6 (Six) Hours As Needed for Wheezing. 1 inhaler 2   • aspirin 81 MG EC tablet Take 1 tablet by mouth Daily. 30 tablet 5   • atorvastatin (LIPITOR) 80 MG tablet Take 1 tablet by mouth Daily. 30 tablet 5   • carvedilol (COREG) 12.5 MG tablet Take 1 tablet by mouth 2 (Two) Times a Day With Meals. 60 tablet 5   • FLUoxetine (PROzac) 20 MG capsule Take 3 capsules by mouth Daily. 270 capsule 1   • Fluticasone-Umeclidin-Vilant (TRELEGY ELLIPTA) 100-62.5-25 MCG/INH aerosol powder  Inhale 1 puff Daily. 30 each 5   • hydroCHLOROthiazide (HYDRODIURIL) 25 MG tablet Take 1 tablet by mouth Daily. 90 tablet 1   • levoFLOXacin (Levaquin) 750 MG tablet Take 1 tablet by mouth Daily. 7 tablet 0   • lisinopril (PRINIVIL,ZESTRIL) 40 MG tablet Take 1 tablet by mouth Daily. 90 tablet 1   • metFORMIN (GLUCOPHAGE-XR) 500 MG 24 hr tablet Take 2 tablets by mouth 2 (Two) Times a Day With Meals. 60 tablet 5   • nitroglycerin (NITROSTAT) 0.4 MG SL tablet Place 1 tablet under the tongue Every 5 (Five) Minutes As Needed for Chest Pain. Take no more than 3 doses in 15 minutes. 15 tablet 12   • Rivaroxaban (XARELTO) 2.5 MG tablet Take 1 tablet by mouth 2 (Two) Times a Day. 60 tablet 5     No current facility-administered medications on file prior to visit.       Allergies   Allergen Reactions   • Sulfa Antibiotics Rash        Review of Systems   Constitutional: Negative.    HENT: Negative.    Eyes: Negative.    Respiratory:  "Negative.    Cardiovascular: Negative.    Gastrointestinal: Negative.    Endocrine: Negative.    Genitourinary: Negative.    Musculoskeletal: Positive for arthralgias.   Skin: Negative.    Allergic/Immunologic: Negative.    Neurological: Negative.    Hematological: Negative.    Psychiatric/Behavioral: Negative.         The patient's Review of Systems was personally reviewed and confirmed as accurate.    Physical Exam  Pulse 94, height 152.4 cm (60\"), weight 83.6 kg (184 lb 3.2 oz), SpO2 98 %, not currently breastfeeding.    Body mass index is 35.97 kg/m².    GENERAL APPEARANCE: awake, alert, oriented, in no acute distress and well developed, well nourished  LUNGS:  breathing nonlabored  EXTREMITIES: no clubbing, cyanosis  PERIPHERAL PULSES: palpable dorsalis pedis and posterior tibial pulses bilaterally.    GAIT:  Antalgic        ----------  Bilateral Knee Exam:  ----------  ALIGNMENT: severe varus, correctable to neutral  ----------  RANGE OF MOTION:  Decreased (3 - 120 degrees) with no extensor lag  LIGAMENTOUS STABILITY:   stable to varus and valgus stress at terminal extension and 30 degrees; retensioning of the MCL is appreciated with valgus stress at 30 degrees consistent with medial compartment degeneration  ----------  STRENGTH:  KNEE FLEXION 5/5  KNEE EXTENSION  5/5  ANKLE DORSIFLEXION  5/5  ANKLE PLANTARFLEXION  5/5  ----------  PAIN WITH PALPATION:global  KNEE EFFUSION: yes, mild effusion on right, trace effusion on left  PAIN WITH KNEE ROM: yes  PATELLAR CREPITUS:  yes, painful and symptomatic  ----------  SENSATION TO LIGHT TOUCH:  DEEP PERONEAL/SUPERFICIAL PERONEAL/SURAL/SAPHENOUS/TIBIAL:    intact  ----------  EDEMA:  no  ERYTHEMA:    no  WOUNDS/INCISIONS:   no  _____________________________________________________________________  _____________________________________________________________________    RADIOGRAPHIC FINDINGS:   Indication: Bilateral knee pain    Comparison: Todays xrays were compared " to previous xrays from 11/6/2017    Knee films: moderate to severe tricompartmental arthritis with genu varum alignment, periarticular osteophytes visualized in all compartments and Radiographs demonstrate worsening deformity with advancing arthritic changes and wear compared to prior radiographs.    Assessment/Plan:   Diagnosis Plan   1. Primary osteoarthritis of both knees  XR Knee 4+ View Bilateral    Large Joint Arthrocentesis: R knee    Large Joint Arthrocentesis: L knee     The patient has failed conservative treatment measures and is a candidate for total joint arthroplasty.  I discussed the total joint surgical process as well as the recovery and rehabilitation time frame.  The patient asked several questions regarding the total joint arthroplasty surgery, which were answered accordingly.  Ultimately, the patient declines surgical intervention at this time and wishes to continue with conservative treatment measures.  Alternative conservative treatment measures were discussed including bracing, therapy, topical/oral anti-inflammatories, activity modification, and weight loss.  The patient considered these treatment options and wishes to proceed with corticosteroid injection(s) today.  Therefore we will proceed with corticosteroid injection(s) today.  Follow-up 3 months for repeat evaluation.    Procedure Note:  I discussed with the patient the potential benefits of performing a therapeutic injections of the bilateral knees as well as potential risks including but not limited to infection, swelling, pain, bleeding, bruising, nerve/vessel damage, skin color changes, transient elevation in blood glucose levels, and fat atrophy. After informed consent and after the areas were prepped with alcohol, ethyl chloride was used to numb the skin. Via the superior lateral approach, 3cc of 1% lidocaine, 3cc of 0.25% bupivicaine and 2 cc of 40mg/ml of Kenalog were each injected into the bilateral knees. The patient  tolerated the procedures well. There were no complications. A sterile dressing was placed over each injection site.      Keaton Aldrich MD  06/09/20  07:42

## 2020-06-09 NOTE — PROGRESS NOTES
Procedure   Large Joint Arthrocentesis: R knee  Date/Time: 6/9/2020 7:40 AM  Consent given by: patient  Site marked: site marked  Timeout: Immediately prior to procedure a time out was called to verify the correct patient, procedure, equipment, support staff and site/side marked as required   Supporting Documentation  Indications: pain   Procedure Details  Location: knee - R knee  Preparation: Patient was prepped and draped in the usual sterile fashion  Needle size: 22 G  Approach: anterolateral  Medications administered: 80 mg triamcinolone acetonide 40 MG/ML; 3 mL lidocaine PF 1% 1 %; 3 mL bupivacaine (PF) 0.25 %  Patient tolerance: patient tolerated the procedure well with no immediate complications    Large Joint Arthrocentesis: L knee  Date/Time: 6/9/2020 7:41 AM  Consent given by: patient  Site marked: site marked  Timeout: Immediately prior to procedure a time out was called to verify the correct patient, procedure, equipment, support staff and site/side marked as required   Supporting Documentation  Indications: pain   Procedure Details  Location: knee - L knee  Preparation: Patient was prepped and draped in the usual sterile fashion  Needle size: 22 G  Approach: anterolateral  Medications administered: 80 mg triamcinolone acetonide 40 MG/ML; 3 mL lidocaine PF 1% 1 %; 3 mL bupivacaine (PF) 0.25 %  Patient tolerance: patient tolerated the procedure well with no immediate complications

## 2020-07-01 ENCOUNTER — TELEPHONE (OUTPATIENT)
Dept: INTERNAL MEDICINE | Facility: CLINIC | Age: 65
End: 2020-07-01

## 2020-07-01 DIAGNOSIS — F41.9 ANXIETY: ICD-10-CM

## 2020-07-01 DIAGNOSIS — F33.41 RECURRENT MAJOR DEPRESSIVE DISORDER, IN PARTIAL REMISSION (HCC): ICD-10-CM

## 2020-07-01 RX ORDER — FLUOXETINE HYDROCHLORIDE 20 MG/1
60 CAPSULE ORAL DAILY
Qty: 270 CAPSULE | Refills: 1 | Status: SHIPPED | OUTPATIENT
Start: 2020-07-01 | End: 2021-07-06

## 2020-07-01 NOTE — TELEPHONE ENCOUNTER
PT CALLED TO REQUEST REFILL FOR RX  FLUoxetine (PROzac) 20 MG capsule.    PLEASE ADVISE.  CALL BACK:[phone]       HUYEN RODRÍGUEZResearch [facility] [address] [facility], [place] - [address] AT [facility]

## 2020-08-04 ENCOUNTER — OFFICE VISIT (OUTPATIENT)
Dept: INTERNAL MEDICINE | Facility: CLINIC | Age: 65
End: 2020-08-04

## 2020-08-04 VITALS
RESPIRATION RATE: 16 BRPM | TEMPERATURE: 97.5 F | OXYGEN SATURATION: 98 % | BODY MASS INDEX: 36.91 KG/M2 | HEART RATE: 64 BPM | SYSTOLIC BLOOD PRESSURE: 132 MMHG | DIASTOLIC BLOOD PRESSURE: 86 MMHG | HEIGHT: 60 IN | WEIGHT: 188 LBS

## 2020-08-04 DIAGNOSIS — I10 ESSENTIAL HYPERTENSION: ICD-10-CM

## 2020-08-04 DIAGNOSIS — I73.9 PAD (PERIPHERAL ARTERY DISEASE) (HCC): ICD-10-CM

## 2020-08-04 DIAGNOSIS — I25.2 HISTORY OF MI (MYOCARDIAL INFARCTION): ICD-10-CM

## 2020-08-04 DIAGNOSIS — R61 DIAPHORESIS: ICD-10-CM

## 2020-08-04 DIAGNOSIS — E11.51 TYPE 2 DIABETES MELLITUS WITH DIABETIC PERIPHERAL ANGIOPATHY WITHOUT GANGRENE, WITHOUT LONG-TERM CURRENT USE OF INSULIN (HCC): Primary | ICD-10-CM

## 2020-08-04 DIAGNOSIS — N18.30 CKD (CHRONIC KIDNEY DISEASE) STAGE 3, GFR 30-59 ML/MIN (HCC): ICD-10-CM

## 2020-08-04 DIAGNOSIS — I25.118 CORONARY ARTERY DISEASE OF NATIVE HEART WITH STABLE ANGINA PECTORIS, UNSPECIFIED VESSEL OR LESION TYPE (HCC): ICD-10-CM

## 2020-08-04 DIAGNOSIS — L97.909 PERIPHERAL VASCULAR DISEASE OF LOWER EXTREMITY WITH ULCERATION (HCC): ICD-10-CM

## 2020-08-04 DIAGNOSIS — I73.9 PERIPHERAL VASCULAR DISEASE OF LOWER EXTREMITY WITH ULCERATION (HCC): ICD-10-CM

## 2020-08-04 DIAGNOSIS — J41.0 SIMPLE CHRONIC BRONCHITIS (HCC): ICD-10-CM

## 2020-08-04 LAB
ALBUMIN SERPL-MCNC: 4.2 G/DL (ref 3.5–5.2)
ALBUMIN UR-MCNC: 2.8 MG/DL
ALBUMIN/GLOB SERPL: 1.8 G/DL
ALP SERPL-CCNC: 102 U/L (ref 39–117)
ALT SERPL W P-5'-P-CCNC: 32 U/L (ref 1–33)
ANION GAP SERPL CALCULATED.3IONS-SCNC: 13.1 MMOL/L (ref 5–15)
AST SERPL-CCNC: 26 U/L (ref 1–32)
BASOPHILS # BLD AUTO: 0.13 10*3/MM3 (ref 0–0.2)
BASOPHILS NFR BLD AUTO: 1.1 % (ref 0–1.5)
BILIRUB SERPL-MCNC: 1.8 MG/DL (ref 0–1.2)
BUN SERPL-MCNC: 18 MG/DL (ref 8–23)
BUN/CREAT SERPL: 11.2 (ref 7–25)
CALCIUM SPEC-SCNC: 9.6 MG/DL (ref 8.6–10.5)
CHLORIDE SERPL-SCNC: 101 MMOL/L (ref 98–107)
CHOLEST SERPL-MCNC: 140 MG/DL (ref 0–200)
CO2 SERPL-SCNC: 23.9 MMOL/L (ref 22–29)
CREAT SERPL-MCNC: 1.61 MG/DL (ref 0.57–1)
CREAT UR-MCNC: 234.4 MG/DL
DEPRECATED RDW RBC AUTO: 49.2 FL (ref 37–54)
EOSINOPHIL # BLD AUTO: 0.41 10*3/MM3 (ref 0–0.4)
EOSINOPHIL NFR BLD AUTO: 3.6 % (ref 0.3–6.2)
ERYTHROCYTE [DISTWIDTH] IN BLOOD BY AUTOMATED COUNT: 13.9 % (ref 12.3–15.4)
GFR SERPL CREATININE-BSD FRML MDRD: 32 ML/MIN/1.73
GLOBULIN UR ELPH-MCNC: 2.4 GM/DL
GLUCOSE SERPL-MCNC: 136 MG/DL (ref 65–99)
HBA1C MFR BLD: 7.5 % (ref 4.8–5.6)
HCT VFR BLD AUTO: 49.2 % (ref 34–46.6)
HDLC SERPL-MCNC: 38 MG/DL (ref 40–60)
HGB BLD-MCNC: 16 G/DL (ref 12–15.9)
IMM GRANULOCYTES # BLD AUTO: 0.11 10*3/MM3 (ref 0–0.05)
IMM GRANULOCYTES NFR BLD AUTO: 1 % (ref 0–0.5)
LDLC SERPL CALC-MCNC: 74 MG/DL (ref 0–100)
LDLC/HDLC SERPL: 1.96 {RATIO}
LYMPHOCYTES # BLD AUTO: 1.35 10*3/MM3 (ref 0.7–3.1)
LYMPHOCYTES NFR BLD AUTO: 11.8 % (ref 19.6–45.3)
MCH RBC QN AUTO: 30.9 PG (ref 26.6–33)
MCHC RBC AUTO-ENTMCNC: 32.5 G/DL (ref 31.5–35.7)
MCV RBC AUTO: 95 FL (ref 79–97)
MICROALBUMIN/CREAT UR: 11.9 MG/G
MONOCYTES # BLD AUTO: 0.76 10*3/MM3 (ref 0.1–0.9)
MONOCYTES NFR BLD AUTO: 6.6 % (ref 5–12)
NEUTROPHILS NFR BLD AUTO: 75.9 % (ref 42.7–76)
NEUTROPHILS NFR BLD AUTO: 8.67 10*3/MM3 (ref 1.7–7)
NRBC BLD AUTO-RTO: 0.1 /100 WBC (ref 0–0.2)
PLATELET # BLD AUTO: 823 10*3/MM3 (ref 140–450)
PMV BLD AUTO: 9.8 FL (ref 6–12)
POTASSIUM SERPL-SCNC: 4.4 MMOL/L (ref 3.5–5.2)
PROT SERPL-MCNC: 6.6 G/DL (ref 6–8.5)
RBC # BLD AUTO: 5.18 10*6/MM3 (ref 3.77–5.28)
SODIUM SERPL-SCNC: 138 MMOL/L (ref 136–145)
TRIGL SERPL-MCNC: 138 MG/DL (ref 0–150)
VLDLC SERPL-MCNC: 27.6 MG/DL (ref 5–40)
WBC # BLD AUTO: 11.43 10*3/MM3 (ref 3.4–10.8)

## 2020-08-04 PROCEDURE — 83036 HEMOGLOBIN GLYCOSYLATED A1C: CPT | Performed by: NURSE PRACTITIONER

## 2020-08-04 PROCEDURE — 80053 COMPREHEN METABOLIC PANEL: CPT | Performed by: NURSE PRACTITIONER

## 2020-08-04 PROCEDURE — 82043 UR ALBUMIN QUANTITATIVE: CPT | Performed by: NURSE PRACTITIONER

## 2020-08-04 PROCEDURE — 80061 LIPID PANEL: CPT | Performed by: NURSE PRACTITIONER

## 2020-08-04 PROCEDURE — 85025 COMPLETE CBC W/AUTO DIFF WBC: CPT | Performed by: NURSE PRACTITIONER

## 2020-08-04 PROCEDURE — 82570 ASSAY OF URINE CREATININE: CPT | Performed by: NURSE PRACTITIONER

## 2020-08-04 PROCEDURE — 99214 OFFICE O/P EST MOD 30 MIN: CPT | Performed by: NURSE PRACTITIONER

## 2020-08-04 RX ORDER — CARVEDILOL 25 MG/1
25 TABLET ORAL 2 TIMES DAILY WITH MEALS
Qty: 60 TABLET | Refills: 2 | Status: SHIPPED | OUTPATIENT
Start: 2020-08-04 | End: 2021-05-12 | Stop reason: SDUPTHER

## 2020-08-04 NOTE — PROGRESS NOTES
Subjective   Martha Murray is a 65 y.o. female here today for CAD/HTN    Chief Complaint   Patient presents with   • Dizziness     pt states she's stopped blood thinner.    • Diabetes     Martha is here today for follow-up on her chronic conditions.  Last A1c at 7.7.  Her metformin dose was increased.  History of MI- LDL less than 70 and on atorvastatin 80.  Did stop Xarelto due to price.  She is not taking a baby aspirin.  Also reports stopping trelegy due to cost. Blood sugar in 120's- 160's.  Has been working outside and in intense heat.  Reports excessive sweating.  Not urinating much.  Complains of dizziness intermittently.  No chest pain.  Previously unable to afford stress test.  Review of Systems   Constitutional: Positive for diaphoresis. Negative for activity change, appetite change, chills, fatigue, fever, unexpected weight gain and unexpected weight loss.   HENT: Negative for congestion, ear discharge, ear pain, nosebleeds, postnasal drip, rhinorrhea, sinus pressure, sore throat, swollen glands, trouble swallowing and voice change.    Eyes: Negative for blurred vision, photophobia, redness, itching and visual disturbance.   Respiratory: Positive for cough and shortness of breath. Negative for chest tightness and wheezing.    Cardiovascular: Negative for chest pain, palpitations and leg swelling.   Gastrointestinal: Negative for blood in stool, nausea and vomiting.   Endocrine: Positive for heat intolerance.   Musculoskeletal: Negative for arthralgias and myalgias.   Skin: Negative for color change, rash and skin lesions.   Allergic/Immunologic: Negative for environmental allergies and immunocompromised state.   Neurological: Positive for dizziness. Negative for syncope, facial asymmetry, weakness, light-headedness, headache, memory problem and confusion.   Hematological: Negative for adenopathy. Does not bruise/bleed easily.   Psychiatric/Behavioral: Positive for stress. Negative for suicidal ideas  and depressed mood. The patient is not nervous/anxious.        Past Medical History:   Diagnosis Date   • Allergic    • Anemia    • Depression    • Diabetes mellitus (CMS/HCC)    • History of heart attack    • Hyperlipidemia    • Hypertension      Family History   Problem Relation Age of Onset   • Arthritis Maternal Grandmother    • Hypertension Mother    • Hyperlipidemia Mother    • Thyroid disease Mother    • Diabetes Sister    • Crohn's disease Sister    • Hypertension Sister    • Heart attack Maternal Grandfather    • Breast cancer Neg Hx    • Ovarian cancer Neg Hx      Past Surgical History:   Procedure Laterality Date   • BILATERAL BREAST REDUCTION     • BONE MARROW ASPIRATION     • HYSTERECTOMY  1990    x 2   • REDUCTION MAMMAPLASTY Bilateral     MORE THAN 20 YEARS AGO   • SINUS SURGERY     • TONSILLECTOMY       Social History     Socioeconomic History   • Marital status: Single     Spouse name: Not on file   • Number of children: Not on file   • Years of education: Not on file   • Highest education level: Not on file   Tobacco Use   • Smoking status: Former Smoker     Packs/day: 2.00     Years: 30.00     Pack years: 60.00     Types: Cigarettes     Last attempt to quit:      Years since quittin.6   • Smokeless tobacco: Never Used   Substance and Sexual Activity   • Alcohol use: No   • Drug use: No   • Sexual activity: Defer         Current Outpatient Medications:   •  albuterol sulfate  (90 Base) MCG/ACT inhaler, Inhale 2 puffs Every 6 (Six) Hours As Needed for Wheezing., Disp: 1 inhaler, Rfl: 2  •  aspirin 81 MG EC tablet, Take 1 tablet by mouth Daily., Disp: 30 tablet, Rfl: 5  •  atorvastatin (LIPITOR) 80 MG tablet, Take 1 tablet by mouth Daily., Disp: 30 tablet, Rfl: 5  •  carvedilol (COREG) 25 MG tablet, Take 1 tablet by mouth 2 (Two) Times a Day With Meals., Disp: 60 tablet, Rfl: 2  •  FLUoxetine (PROzac) 20 MG capsule, Take 3 capsules by mouth Daily., Disp: 270 capsule, Rfl: 1  •   "Fluticasone-Umeclidin-Vilant (TRELEGY ELLIPTA) 100-62.5-25 MCG/INH aerosol powder , Inhale 1 puff Daily., Disp: 30 each, Rfl: 5  •  lisinopril (PRINIVIL,ZESTRIL) 40 MG tablet, Take 1 tablet by mouth Daily., Disp: 90 tablet, Rfl: 1  •  metFORMIN (GLUCOPHAGE-XR) 500 MG 24 hr tablet, Take 2 tablets by mouth 2 (Two) Times a Day With Meals., Disp: 60 tablet, Rfl: 5  •  nitroglycerin (NITROSTAT) 0.4 MG SL tablet, Place 1 tablet under the tongue Every 5 (Five) Minutes As Needed for Chest Pain. Take no more than 3 doses in 15 minutes., Disp: 15 tablet, Rfl: 12    Objective   Vitals:    08/04/20 0819   BP: 132/86   Pulse: 64   Resp: 16   Temp: 97.5 °F (36.4 °C)   TempSrc: Temporal   SpO2: 98%   Weight: 85.3 kg (188 lb)   Height: 152.4 cm (60\")     Body mass index is 36.72 kg/m².  Physical Exam   Constitutional: She is oriented to person, place, and time. She appears well-developed and well-nourished. No distress.   Eyes: Pupils are equal, round, and reactive to light.   Neck: Neck supple. No thyromegaly present.   Cardiovascular: Normal rate and regular rhythm.   Distant heart tones   Pulmonary/Chest: Effort normal. She has no decreased breath sounds. She has wheezes. She has no rhonchi. She has no rales.   Neurological: She is alert and oriented to person, place, and time.   Skin: Skin is warm and dry. Capillary refill takes 2 to 3 seconds. She is not diaphoretic.   Psychiatric: She has a normal mood and affect. Her behavior is normal. Judgment and thought content normal.   Nursing note and vitals reviewed.      Assessment/Plan   Problem List Items Addressed This Visit        Cardiovascular and Mediastinum    Essential hypertension    Relevant Medications    lisinopril (PRINIVIL,ZESTRIL) 40 MG tablet    carvedilol (COREG) 25 MG tablet    Other Relevant Orders    CBC & Differential    Comprehensive Metabolic Panel    Ambulatory Referral to Case Management [facility]CM - High Risk Care Management, Value Based Care    CBC Auto " Differential    PAD (peripheral artery disease) (CMS/[facility])    Relevant Medications    lisinopril (PRINIVIL,ZESTRIL) 40 MG tablet    carvedilol (COREG) 25 MG tablet    Other Relevant Orders    CBC & Differential    Comprehensive Metabolic Panel    Lipid Panel    Ambulatory Referral to Case Management [facility] - High Risk Care Management, Value Based Care    CBC Auto Differential    Peripheral vascular disease of lower extremity with ulceration (CMS/[facility])    Relevant Orders    CBC & Differential    Comprehensive Metabolic Panel    Ambulatory Referral to Case Management [facility] - High Risk Care Management, Value Based Care    CBC Auto Differential    Coronary artery disease of native heart with stable angina pectoris (CMS/[facility])    Relevant Medications    nitroglycerin (NITROSTAT) 0.4 MG SL tablet    lisinopril (PRINIVIL,ZESTRIL) 40 MG tablet    atorvastatin (LIPITOR) 80 MG tablet    carvedilol (COREG) 25 MG tablet    Other Relevant Orders    CBC & Differential    Comprehensive Metabolic Panel    Lipid Panel    Ambulatory Referral to Case Management [facility] - High Risk Care Management, Value Based Care    CBC Auto Differential       Respiratory    Simple chronic bronchitis (CMS/[facility])    Relevant Medications    Fluticasone-Umeclidin-Vilant (TRELEGY ELLIPTA) 100-62.5-25 MCG/INH aerosol powder     albuterol sulfate  (90 Base) MCG/ACT inhaler    Other Relevant Orders    CBC & Differential    Comprehensive Metabolic Panel    Ambulatory Referral to Case Management [facility] - High Risk Care Management, Value Based Care    CBC Auto Differential       Endocrine    Diet-controlled diabetes mellitus (CMS/[facility]) - Primary    Relevant Medications    metFORMIN (GLUCOPHAGE-XR) 500 MG 24 hr tablet       Genitourinary    CKD (chronic kidney disease) stage 3, GFR 30-59 ml/min (CMS/[facility])    Relevant Orders    CBC & Differential    Comprehensive Metabolic Panel    Ambulatory Referral to Case Management [facility] - High Risk Care Management, Value Based Care     CBC Auto Differential      Other Visit Diagnoses     History of MI (myocardial infarction)        Relevant Medications    carvedilol (COREG) 25 MG tablet    Other Relevant Orders    Ambulatory Referral to Case Management [facility] - High Risk Care Management, Value Based Care    Diaphoresis        Relevant Orders    TSH Rfx On Abnormal To Free T4        Martha was seen today for dizziness and diabetes.    Diagnoses and all orders for this visit:    Type 2 diabetes mellitus with diabetic peripheral angiopathy without gangrene, without long-term current use of insulin (CMS/[facility])  -     CBC & Differential  -     Comprehensive Metabolic Panel  -     Hemoglobin A1c  -     Ambulatory Referral to Case Management [facility] - High Risk Care Management, Value Based Care  -     Microalbumin / Creatinine Urine Ratio - Urine, Clean Catch  -     CBC Auto Differential    Essential hypertension  -     CBC & Differential  -     Comprehensive Metabolic Panel  -     carvedilol (COREG) 25 MG tablet; Take 1 tablet by mouth 2 (Two) Times a Day With Meals.  -     Ambulatory Referral to Case Management [facility] - High Risk Care Management, Value Based Care  -     CBC Auto Differential    PAD (peripheral artery disease) (CMS/[facility])  -     CBC & Differential  -     Comprehensive Metabolic Panel  -     Lipid Panel  -     carvedilol (COREG) 25 MG tablet; Take 1 tablet by mouth 2 (Two) Times a Day With Meals.  -     Ambulatory Referral to Case Management [facility] - High Risk Care Management, Value Based Care  -     CBC Auto Differential    Peripheral vascular disease of lower extremity with ulceration (CMS/[facility])  -     CBC & Differential  -     Comprehensive Metabolic Panel  -     Ambulatory Referral to Case Management [facility] - High Risk Care Management, Value Based Care  -     CBC Auto Differential    Coronary artery disease of native heart with stable angina pectoris, unspecified vessel or lesion type (CMS/[facility])  -     CBC & Differential  -     Comprehensive  Metabolic Panel  -     Lipid Panel  -     Ambulatory Referral to Case Management [facility] - High Risk Care Management, Value Based Care  -     CBC Auto Differential    CKD (chronic kidney disease) stage 3, GFR 30-59 ml/min (CMS/[facility])  -     CBC & Differential  -     Comprehensive Metabolic Panel  -     Ambulatory Referral to Case Management [facility] - High Risk Care Management, Value Based Care  -     CBC Auto Differential    History of MI (myocardial infarction)  -     carvedilol (COREG) 25 MG tablet; Take 1 tablet by mouth 2 (Two) Times a Day With Meals.  -     Ambulatory Referral to Case Management [facility] - High Risk Care Management, Value Based Care    Simple chronic bronchitis (CMS/[facility])  -     CBC & Differential  -     Comprehensive Metabolic Panel  -     Ambulatory Referral to Case Management [facility] - High Risk Care Management, Value Based Care  -     CBC Auto Differential    Diaphoresis  -     TSH Rfx On Abnormal To Free T4    Sample today given of baby aspirin and Trelegy.  Will consult case management as they may be able to help her with the price of her meds or help her apply for Medicaid as I believe based on her income, she is eligible.  Will increase carvedilol and discontinue HCTZ as I believe her dizziness is from dehydration.  She will increase her fluid intake and notify me should she experience any edema.         The patient was counseled regarding diagnostic results, impressions, prognosis, instructions for management, risk factor reductions, education, and importance of treatment compliance.  The patient verbalized understanding of and agreement with the plan of care.    Advised patient to call with any further questions and any new or worsening symptoms.     Return in about 3 months (around 11/4/2020) for Recheck.      NAYELI Barcenas    Please note that portions of this note were completed with a voice recognition program. Efforts were made to edit the dictations, but occasionally words are  mistranscribed.

## 2020-08-04 NOTE — PATIENT INSTRUCTIONS
Diabetes Mellitus and Nutrition, Adult  When you have diabetes (diabetes mellitus), it is very important to have healthy eating habits because your blood sugar (glucose) levels are greatly affected by what you eat and drink. Eating healthy foods in the appropriate amounts, at about the same times every day, can help you:  · Control your blood glucose.  · Lower your risk of heart disease.  · Improve your blood pressure.  · Reach or maintain a healthy weight.  Every person with diabetes is different, and each person has different needs for a meal plan. Your health care provider may recommend that you work with a diet and nutrition specialist (dietitian) to make a meal plan that is best for you. Your meal plan may vary depending on factors such as:  · The calories you need.  · The medicines you take.  · Your weight.  · Your blood glucose, blood pressure, and cholesterol levels.  · Your activity level.  · Other health conditions you have, such as heart or kidney disease.  How do carbohydrates affect me?  Carbohydrates, also called carbs, affect your blood glucose level more than any other type of food. Eating carbs naturally raises the amount of glucose in your blood. Carb counting is a method for keeping track of how many carbs you eat. Counting carbs is important to keep your blood glucose at a healthy level, especially if you use insulin or take certain oral diabetes medicines.  It is important to know how many carbs you can safely have in each meal. This is different for every person. Your dietitian can help you calculate how many carbs you should have at each meal and for each snack.  Foods that contain carbs include:  · Bread, cereal, rice, pasta, and crackers.  · Potatoes and corn.  · Peas, beans, and lentils.  · Milk and yogurt.  · Fruit and juice.  · Desserts, such as cakes, cookies, ice cream, and candy.  How does alcohol affect me?  Alcohol can cause a sudden decrease in blood glucose (hypoglycemia),  "especially if you use insulin or take certain oral diabetes medicines. Hypoglycemia can be a life-threatening condition. Symptoms of hypoglycemia (sleepiness, dizziness, and confusion) are similar to symptoms of having too much alcohol.  If your health care provider says that alcohol is safe for you, follow these guidelines:  · Limit alcohol intake to no more than 1 drink per day for nonpregnant women and 2 drinks per day for men. One drink equals 12 oz of beer, 5 oz of wine, or 1½ oz of hard liquor.  · Do not drink on an empty stomach.  · Keep yourself hydrated with water, diet soda, or unsweetened iced tea.  · Keep in mind that regular soda, juice, and other mixers may contain a lot of sugar and must be counted as carbs.  What are tips for following this plan?    Reading food labels  · Start by checking the serving size on the \"Nutrition Facts\" label of packaged foods and drinks. The amount of calories, carbs, fats, and other nutrients listed on the label is based on one serving of the item. Many items contain more than one serving per package.  · Check the total grams (g) of carbs in one serving. You can calculate the number of servings of carbs in one serving by dividing the total carbs by 15. For example, if a food has 30 g of total carbs, it would be equal to 2 servings of carbs.  · Check the number of grams (g) of saturated and trans fats in one serving. Choose foods that have low or no amount of these fats.  · Check the number of milligrams (mg) of salt (sodium) in one serving. Most people should limit total sodium intake to less than 2,300 mg per day.  · Always check the nutrition information of foods labeled as \"low-fat\" or \"nonfat\". These foods may be higher in added sugar or refined carbs and should be avoided.  · Talk to your dietitian to identify your daily goals for nutrients listed on the label.  Shopping  · Avoid buying canned, premade, or processed foods. These foods tend to be high in fat, sodium, " and added sugar.  · Shop around the outside edge of the grocery store. This includes fresh fruits and vegetables, bulk grains, fresh meats, and fresh dairy.  Cooking  · Use low-heat cooking methods, such as baking, instead of high-heat cooking methods like deep frying.  · Cook using healthy oils, such as olive, canola, or sunflower oil.  · Avoid cooking with butter, cream, or high-fat meats.  Meal planning  · Eat meals and snacks regularly, preferably at the same times every day. Avoid going long periods of time without eating.  · Eat foods high in fiber, such as fresh fruits, vegetables, beans, and whole grains. Talk to your dietitian about how many servings of carbs you can eat at each meal.  · Eat 4-6 ounces (oz) of lean protein each day, such as lean meat, chicken, fish, eggs, or tofu. One oz of lean protein is equal to:  ? 1 oz of meat, chicken, or fish.  ? 1 egg.  ? ¼ cup of tofu.  · Eat some foods each day that contain healthy fats, such as avocado, nuts, seeds, and fish.  Lifestyle  · Check your blood glucose regularly.  · Exercise regularly as told by your health care provider. This may include:  ? 150 minutes of moderate-intensity or vigorous-intensity exercise each week. This could be brisk walking, biking, or water aerobics.  ? Stretching and doing strength exercises, such as yoga or weightlifting, at least 2 times a week.  · Take medicines as told by your health care provider.  · Do not use any products that contain nicotine or tobacco, such as cigarettes and e-cigarettes. If you need help quitting, ask your health care provider.  · Work with a counselor or diabetes educator to identify strategies to manage stress and any emotional and social challenges.  Questions to ask a health care provider  · Do I need to meet with a diabetes educator?  · Do I need to meet with a dietitian?  · What number can I call if I have questions?  · When are the best times to check my blood glucose?  Where to find more  information:  · American Diabetes Association: diabetes.org  · Academy of Nutrition and Dietetics: www.eatright.org  · National [place] of Diabetes and Digestive and Kidney Diseases (NIH): www.niddk.nih.gov  Summary  · A healthy meal plan will help you control your blood glucose and maintain a healthy lifestyle.  · Working with a diet and nutrition specialist (dietitian) can help you make a meal plan that is best for you.  · Keep in mind that carbohydrates (carbs) and alcohol have immediate effects on your blood glucose levels. It is important to count carbs and to use alcohol carefully.  This information is not intended to replace advice given to you by your health care provider. Make sure you discuss any questions you have with your health care provider.  Document Released: 09/14/2006 Document Revised: 11/30/2018 Document Reviewed: 01/22/2018  Elsevier Patient Education © 2020 [employer]      Mediterranean Diet  A Mediterranean diet refers to food and lifestyle choices that are based on the traditions of countries located on the [place]. This way of eating has been shown to help prevent certain conditions and improve outcomes for people who have chronic diseases, like kidney disease and heart disease.  What are tips for following this plan?  Lifestyle  · Cook and eat meals together with your family, when possible.  · Drink enough fluid to keep your urine clear or pale yellow.  · Be physically active every day. This includes:  ? Aerobic exercise like running or swimming.  ? Leisure activities like gardening, walking, or housework.  · Get 7-8 hours of sleep each night.  · If recommended by your health care provider, drink red wine in moderation. This means 1 glass a day for nonpregnant women and 2 glasses a day for men. A glass of wine equals 5 oz (150 mL).  Reading food labels    · Check the serving size of packaged foods. For foods such as rice and pasta, the serving size refers to the amount of  cooked product, not dry.  · Check the total fat in packaged foods. Avoid foods that have saturated fat or trans fats.  · Check the ingredients list for added sugars, such as corn syrup.  Shopping  · At the grocery store, buy most of your food from the areas near the walls of the store. This includes:  ? Fresh fruits and vegetables (produce).  ? Grains, beans, nuts, and seeds. Some of these may be available in unpackaged forms or large amounts (in bulk).  ? Fresh seafood.  ? Poultry and eggs.  ? Low-fat dairy products.  · Buy whole ingredients instead of prepackaged foods.  · Buy fresh fruits and vegetables in-season from local [employer] markets.  · Buy frozen fruits and vegetables in resealable bags.  · If you do not have access to quality fresh seafood, buy precooked frozen shrimp or canned fish, such as tuna, salmon, or sardines.  · Buy small amounts of raw or cooked vegetables, salads, or olives from the deli or salad bar at your store.  · Stock your pantry so you always have certain foods on hand, such as olive oil, canned tuna, canned tomatoes, rice, pasta, and beans.  Cooking  · Cook foods with extra-virgin olive oil instead of using butter or other vegetable oils.  · Have meat as a side dish, and have vegetables or grains as your main dish. This means having meat in small portions or adding small amounts of meat to foods like pasta or stew.  · Use beans or vegetables instead of meat in common dishes like chili or lasagna.  · [place] with different cooking methods. Try roasting or broiling vegetables instead of steaming or sautéeing them.  · Add frozen vegetables to soups, stews, pasta, or rice.  · Add nuts or seeds for added healthy fat at each meal. You can add these to yogurt, salads, or vegetable dishes.  · Marinate fish or vegetables using olive oil, lemon juice, garlic, and fresh herbs.  Meal planning    · Plan to eat 1 vegetarian meal one day each week. Try to work up to 2 vegetarian meals, if  possible.  · Eat seafood 2 or more times a week.  · Have healthy snacks readily available, such as:  ? Vegetable sticks with hummus.  ? Greek yogurt.  ? Fruit and nut trail mix.  · Eat balanced meals throughout the week. This includes:  ? Fruit: 2-3 servings a day  ? Vegetables: 4-5 servings a day  ? Low-fat dairy: 2 servings a day  ? Fish, poultry, or lean meat: 1 serving a day  ? Beans and legumes: 2 or more servings a week  ? Nuts and seeds: 1-2 servings a day  ? Whole grains: 6-8 servings a day  ? Extra-virgin olive oil: 3-4 servings a day  · Limit red meat and sweets to only a few servings a month  What are my food choices?  · Mediterranean diet  ? Recommended  § Grains: Whole-grain pasta. Brown rice. Bulgar wheat. Polenta. Couscous. Whole-wheat bread. Oatmeal. Quinoa.  § Vegetables: Artichokes. Beets. Broccoli. Cabbage. Carrots. Eggplant. Green beans. Chard. Kale. Spinach. Onions. Leeks. Peas. Squash. Tomatoes. Peppers. Radishes.  § Fruits: Apples. Apricots. Avocado. Berries. Bananas. Cherries. Dates. Figs. Grapes. Tri. Melon. Oranges. Peaches. Plums. Pomegranate.  § Meats and other protein foods: Beans. Almonds. Sunflower seeds. Pine nuts. Peanuts. Cod. [place]. Scallops. Shrimp. Tuna. Tilapia. Clams. Oysters. Eggs.  § Dairy: Low-fat milk. Cheese. Greek yogurt.  § Beverages: Water. Red wine. Herbal tea.  § Fats and oils: Extra virgin olive oil. Avocado oil. Grape seed oil.  § Sweets and desserts: Greek yogurt with honey. Baked apples. Poached pears. Trail mix.  § Seasoning and other foods: Basil. Cilantro. Coriander. Cumin. Mint. Parsley. Matt. Rosemary. Tarragon. Garlic. Oregano. Thyme. Pepper. Balsalmic vinegar. Tahini. Hummus. Tomato sauce. Olives. Mushrooms.  ? Limit these  § Grains: Prepackaged pasta or rice dishes. Prepackaged cereal with added sugar.  § Vegetables: Deep fried potatoes (french fries).  § Fruits: Fruit canned in syrup.  § Meats and other protein foods: Beef. Pork. Lamb. Poultry with  skin. Hot dogs. [place].  § Dairy: Ice cream. Sour cream. Whole milk.  § Beverages: Juice. Sugar-sweetened soft drinks. Beer. Liquor and spirits.  § Fats and oils: Butter. Canola oil. Vegetable oil. Beef fat (tallow). Lard.  § Sweets and desserts: Cookies. Cakes. Pies. Candy.  § Seasoning and other foods: Mayonnaise. Premade sauces and marinades.  The items listed may not be a complete list. Talk with your dietitian about what dietary choices are right for you.  Summary  · The Mediterranean diet includes both food and lifestyle choices.  · Eat a variety of fresh fruits and vegetables, beans, nuts, seeds, and whole grains.  · Limit the amount of red meat and sweets that you eat.  · Talk with your health care provider about whether it is safe for you to drink red wine in moderation. This means 1 glass a day for nonpregnant women and 2 glasses a day for men. A glass of wine equals 5 oz (150 mL).  This information is not intended to replace advice given to you by your health care provider. Make sure you discuss any questions you have with your health care provider.  Document Released: 08/10/2017 Document Revised: 08/17/2017 Document Reviewed: 08/10/2017  Elsevier Patient Education © 2020 [employer] Inc.

## 2020-08-05 ENCOUNTER — PATIENT OUTREACH (OUTPATIENT)
Dept: CASE MANAGEMENT | Facility: OTHER | Age: 65
End: 2020-08-05

## 2020-08-05 RX ORDER — GLIPIZIDE 5 MG/1
5 TABLET ORAL
Qty: 60 TABLET | Refills: 2 | Status: SHIPPED | OUTPATIENT
Start: 2020-08-05 | End: 2020-09-01

## 2020-08-05 NOTE — OUTREACH NOTE
Patient Outreach Note    Received referral from PCP, NAYELI Squires.  Contacted pt regarding any CM needs.  Discussed applying for Medicaid, which pt does not want to do at present.  States she does still have regular insurance and she has just gotten job at [employer] for approx 20 hours per week.  States that will help her with bills, etc.  She also gets SS monthly.  She does have some concerns regarding food and utilities. Has credit card that she has been putting grocery purchases on.  Knows that she cannot continue to do this and does not want it to get out of control.   [employer] in [place] numbers given that would be able to help with food and utilities.  She states she does live alone, but has family that check on her everyday.  She did not think she needed Medical Alert Necklace, but did set her smartphone that would call 911 for her from voice command.  She states she is compliant with her medication and did not think she needed any assistance with this at present.  Trelegy was sampled for her in her PCP office.  Goodrx offers still very costly, cheapest being $573.80 for 60.  Will offer her link to trelegy if she is going to stay on this.  [email or web address].  She would be required to fill out form, but may be able to get free as long as on commercial insurance.  She does check her blood sugars at home, but is unable to check bp at present.  States he bp monitor no longer works.  Is going to try to obtain another once starts getting paycheck and has caught up on bills.  She was babysitting her 3 year old great grandchild at time of call, so call was cut short.  Role of  explained and contact number given.  [facility] Nurse line explained and contact number provided.  She did voice her appreciation for the call and informed her, I would be back in contact with her in 2-3 weeks, but to call for any needs.     Trudy Villanueva RN   Ambulatory     8/5/2020,  14:28

## 2020-08-26 ENCOUNTER — PATIENT OUTREACH (OUTPATIENT)
Dept: CASE MANAGEMENT | Facility: OTHER | Age: 65
End: 2020-08-26

## 2020-08-26 NOTE — OUTREACH NOTE
"Patient Outreach Note    Pt notified of scheduled appt with Lucia Kraus for 9/1/2020 at 8 am.  Voiced her appreciation for the call and for getting appt scheduled.  States \"I kept meaning to call and schedule.\"    Trudy Villanueva RN  Ambulatory     8/26/2020, 14:15      "

## 2020-08-26 NOTE — OUTREACH NOTE
Patient Outreach Note    See previous CM outreach note  Contacted pt to check in on her medicines and blood sugars.  States she has started back to work, so this has helped her with finances.  States her blood sugar has been up and down and does not think she can tolerate new blood sugar medicine.  States BS was 180 and after taking the new med, it dropped to 58.  She did obtain new blood sugar meter, but is still using old one at present.  Offered to schedule appt with PCP, which she asked that I please do.  Encouraged to monitor blood sugar regularly, log and bring log to office visit.      Trudy Villanueva RN  Ambulatory     8/26/2020, 14:11

## 2020-08-26 NOTE — OUTREACH NOTE
Care Coordination Note    Notified [facility] scheduling and appt set for Tues 9/1/at 8 am     Trudy Villanueva RN  Ambulatory     8/26/2020, 14:14

## 2020-09-01 ENCOUNTER — OFFICE VISIT (OUTPATIENT)
Dept: INTERNAL MEDICINE | Facility: CLINIC | Age: 65
End: 2020-09-01

## 2020-09-01 VITALS
OXYGEN SATURATION: 98 % | RESPIRATION RATE: 18 BRPM | BODY MASS INDEX: 36.71 KG/M2 | HEIGHT: 60 IN | SYSTOLIC BLOOD PRESSURE: 132 MMHG | TEMPERATURE: 96.9 F | WEIGHT: 187 LBS | HEART RATE: 74 BPM | DIASTOLIC BLOOD PRESSURE: 84 MMHG

## 2020-09-01 DIAGNOSIS — N18.30 STAGE 3 CHRONIC KIDNEY DISEASE (HCC): ICD-10-CM

## 2020-09-01 DIAGNOSIS — E11.51 TYPE 2 DIABETES MELLITUS WITH DIABETIC PERIPHERAL ANGIOPATHY WITHOUT GANGRENE, WITHOUT LONG-TERM CURRENT USE OF INSULIN (HCC): Primary | ICD-10-CM

## 2020-09-01 DIAGNOSIS — Z23 NEED FOR INFLUENZA VACCINATION: ICD-10-CM

## 2020-09-01 PROCEDURE — 90686 IIV4 VACC NO PRSV 0.5 ML IM: CPT | Performed by: NURSE PRACTITIONER

## 2020-09-01 PROCEDURE — 99214 OFFICE O/P EST MOD 30 MIN: CPT | Performed by: NURSE PRACTITIONER

## 2020-09-01 PROCEDURE — 90471 IMMUNIZATION ADMIN: CPT | Performed by: NURSE PRACTITIONER

## 2020-09-01 RX ORDER — HYDROCHLOROTHIAZIDE 25 MG/1
TABLET ORAL
COMMUNITY
Start: 2020-08-16 | End: 2020-09-01

## 2020-09-01 NOTE — PROGRESS NOTES
Subjective   Martha Murray is a 65 y.o. female here today for DM.    Chief Complaint   Patient presents with   • Diabetes     complains Glipizied dropped glucose 58.   Martha is here today to discuss diabetes.  Last A1c was 7.5.  Needs to be less than 7 for her to have her knee replacement surgery.  After all having labs performed, I encourage patient to discontinue metformin and hydrochlorothiazide.  She does admit she drinks not even 1 cup of water a day.  She reports that she tried the glipizide for glucose control and immediately dropped to 58.  She then resumed the metformin and has continued to take HCTZ.  Fasting blood sugars are around 130-160.    Review of Systems   Constitutional: Positive for diaphoresis. Negative for activity change, appetite change, chills, fatigue, fever, unexpected weight gain and unexpected weight loss.   HENT: Negative for congestion, ear discharge, ear pain, nosebleeds, postnasal drip, rhinorrhea, sinus pressure, sore throat, swollen glands, trouble swallowing and voice change.    Eyes: Negative for blurred vision, photophobia, redness, itching and visual disturbance.   Respiratory: Positive for cough and shortness of breath. Negative for chest tightness and wheezing.    Cardiovascular: Negative for chest pain, palpitations and leg swelling.   Gastrointestinal: Negative for blood in stool, nausea and vomiting.   Endocrine: Positive for heat intolerance.   Musculoskeletal: Negative for arthralgias and myalgias.   Skin: Negative for color change, rash and skin lesions.   Allergic/Immunologic: Negative for environmental allergies and immunocompromised state.   Neurological: Positive for dizziness. Negative for syncope, facial asymmetry, weakness, light-headedness, headache, memory problem and confusion.   Hematological: Negative for adenopathy. Does not bruise/bleed easily.   Psychiatric/Behavioral: Positive for stress. Negative for suicidal ideas and depressed mood. The patient is  not nervous/anxious.        Past Medical History:   Diagnosis Date   • Allergic    • Anemia    • Depression    • Diabetes mellitus (CMS/HCC)    • History of heart attack    • Hyperlipidemia    • Hypertension      Family History   Problem Relation Age of Onset   • Arthritis Maternal Grandmother    • Hypertension Mother    • Hyperlipidemia Mother    • Thyroid disease Mother    • Diabetes Sister    • Crohn's disease Sister    • Hypertension Sister    • Heart attack Maternal Grandfather    • Breast cancer Neg Hx    • Ovarian cancer Neg Hx      Past Surgical History:   Procedure Laterality Date   • BILATERAL BREAST REDUCTION     • BONE MARROW ASPIRATION     • HYSTERECTOMY  1990    x 2   • REDUCTION MAMMAPLASTY Bilateral     MORE THAN 20 YEARS AGO   • SINUS SURGERY     • TONSILLECTOMY       Social History     Socioeconomic History   • Marital status: Single     Spouse name: Not on file   • Number of children: Not on file   • Years of education: Not on file   • Highest education level: Not on file   Social Needs   • Financial resource strain: Not on file   • Food insecurity:     Worry: Sometimes true     Inability: Sometimes true   • Transportation needs:     Medical: No     Non-medical: No   Tobacco Use   • Smoking status: Former Smoker     Packs/day: 2.00     Years: 30.00     Pack years: 60.00     Types: Cigarettes     Last attempt to quit:      Years since quittin.6   • Smokeless tobacco: Never Used   Substance and Sexual Activity   • Alcohol use: No   • Drug use: No   • Sexual activity: Defer         Current Outpatient Medications:   •  albuterol sulfate  (90 Base) MCG/ACT inhaler, Inhale 2 puffs Every 6 (Six) Hours As Needed for Wheezing., Disp: 1 inhaler, Rfl: 2  •  aspirin 81 MG EC tablet, Take 1 tablet by mouth Daily., Disp: 30 tablet, Rfl: 5  •  atorvastatin (LIPITOR) 80 MG tablet, Take 1 tablet by mouth Daily., Disp: 30 tablet, Rfl: 5  •  carvedilol (COREG) 25 MG tablet, Take 1 tablet by mouth  "2 (Two) Times a Day With Meals., Disp: 60 tablet, Rfl: 2  •  FLUoxetine (PROzac) 20 MG capsule, Take 3 capsules by mouth Daily., Disp: 270 capsule, Rfl: 1  •  Fluticasone-Umeclidin-Vilant (TRELEGY ELLIPTA) 100-62.5-25 MCG/INH aerosol powder , Inhale 1 puff Daily., Disp: 30 each, Rfl: 5  •  lisinopril (PRINIVIL,ZESTRIL) 40 MG tablet, Take 1 tablet by mouth Daily., Disp: 90 tablet, Rfl: 1  •  nitroglycerin (NITROSTAT) 0.4 MG SL tablet, Place 1 tablet under the tongue Every 5 (Five) Minutes As Needed for Chest Pain. Take no more than 3 doses in 15 minutes., Disp: 15 tablet, Rfl: 12  •  linagliptin (TRADJENTA) 5 MG tablet tablet, Take 1 tablet by mouth Daily., Disp: 30 tablet, Rfl: 5    Objective   Vitals:    09/01/20 0806   BP: 132/84   Pulse: 74   Resp: 18   Temp: 96.9 °F (36.1 °C)   TempSrc: Temporal   SpO2: 98%   Weight: 84.8 kg (187 lb)   Height: 152.4 cm (60\")     Body mass index is 36.52 kg/m².  Physical Exam   Constitutional: She is oriented to person, place, and time. She appears well-developed and well-nourished. No distress.   Eyes: Pupils are equal, round, and reactive to light.   Neck: Neck supple. No thyromegaly present.   Cardiovascular: Normal rate and regular rhythm.   Pulmonary/Chest: Effort normal and breath sounds normal.   Neurological: She is alert and oriented to person, place, and time.   Skin: Skin is warm and dry. Capillary refill takes 2 to 3 seconds. She is not diaphoretic.   Psychiatric: She has a normal mood and affect. Her behavior is normal. Judgment and thought content normal.   Nursing note and vitals reviewed.      Assessment/Plan   Problem List Items Addressed This Visit     None      Visit Diagnoses     Type 2 diabetes mellitus with diabetic peripheral angiopathy without gangrene, without long-term current use of insulin (CMS/HCC)    -  Primary    Relevant Medications    linagliptin (TRADJENTA) 5 MG tablet tablet    Stage 3 chronic kidney disease (CMS/HCC)        Need for influenza " vaccination        Relevant Orders    Fluarix Quad >6 Months ([id number]) (Completed)        Martha was seen today for diabetes.    Diagnoses and all orders for this visit:    Type 2 diabetes mellitus with diabetic peripheral angiopathy without gangrene, without long-term current use of insulin (CMS/[facility])  -     linagliptin (TRADJENTA) 5 MG tablet tablet; Take 1 tablet by mouth Daily.  Patient extremely financially limited.  I advised the patient why metformin is not safe with her current kidney function and why I would like to stop the HCTZ.  She will also increase her water intake.  We will try Tradjenta for blood sugar control.  She will MyChart message me if her blood sugars are not staying less than 150 fasting.  We also discussed the ABCs of diabetes and what her average blood sugar should be postprandial.  Discussed websites that she may visit for diabetic [id number] in 2 months for A1c recheck.  Stage 3 chronic kidney disease (CMS/[facility])    Need for influenza vaccination  -     Fluarix Quad >6 Months ([id number])             The patient was counseled regarding diagnostic results, impressions, prognosis, instructions for management, risk factor reductions, education, and importance of treatment compliance.  The patient verbalized understanding of and agreement with the plan of care.    Advised patient to call with any further questions and any new or worsening symptoms.     Return in about 2 months (around 11/1/2020).      NAYELI Barcenas    Please note that portions of this note were completed with a voice recognition program. Efforts were made to edit the dictations, but occasionally words are mistranscribed.

## 2020-09-08 ENCOUNTER — OFFICE VISIT (OUTPATIENT)
Dept: ORTHOPEDIC SURGERY | Facility: CLINIC | Age: 65
End: 2020-09-08

## 2020-09-08 VITALS — WEIGHT: 186.95 LBS | BODY MASS INDEX: 36.7 KG/M2 | HEART RATE: 62 BPM | HEIGHT: 60 IN | OXYGEN SATURATION: 98 %

## 2020-09-08 DIAGNOSIS — M17.0 PRIMARY OSTEOARTHRITIS OF BOTH KNEES: Primary | ICD-10-CM

## 2020-09-08 PROCEDURE — 99213 OFFICE O/P EST LOW 20 MIN: CPT | Performed by: ORTHOPAEDIC SURGERY

## 2020-09-08 PROCEDURE — 20610 DRAIN/INJ JOINT/BURSA W/O US: CPT | Performed by: ORTHOPAEDIC SURGERY

## 2020-09-08 RX ORDER — LIDOCAINE HYDROCHLORIDE 10 MG/ML
3 INJECTION, SOLUTION EPIDURAL; INFILTRATION; INTRACAUDAL; PERINEURAL
Status: COMPLETED | OUTPATIENT
Start: 2020-09-08 | End: 2020-09-08

## 2020-09-08 RX ORDER — TRIAMCINOLONE ACETONIDE 40 MG/ML
80 INJECTION, SUSPENSION INTRA-ARTICULAR; INTRAMUSCULAR
Status: COMPLETED | OUTPATIENT
Start: 2020-09-08 | End: 2020-09-08

## 2020-09-08 RX ORDER — BUPIVACAINE HYDROCHLORIDE 2.5 MG/ML
3 INJECTION, SOLUTION EPIDURAL; INFILTRATION; INTRACAUDAL
Status: COMPLETED | OUTPATIENT
Start: 2020-09-08 | End: 2020-09-08

## 2020-09-08 RX ADMIN — TRIAMCINOLONE ACETONIDE 80 MG: 40 INJECTION, SUSPENSION INTRA-ARTICULAR; INTRAMUSCULAR at 08:09

## 2020-09-08 RX ADMIN — BUPIVACAINE HYDROCHLORIDE 3 ML: 2.5 INJECTION, SOLUTION EPIDURAL; INFILTRATION; INTRACAUDAL at 08:09

## 2020-09-08 RX ADMIN — LIDOCAINE HYDROCHLORIDE 3 ML: 10 INJECTION, SOLUTION EPIDURAL; INFILTRATION; INTRACAUDAL; PERINEURAL at 08:09

## 2020-09-08 NOTE — PROGRESS NOTES
Procedure   Large Joint Arthrocentesis: R knee  Date/Time: 9/8/2020 8:09 AM  Consent given by: patient  Site marked: site marked  Timeout: Immediately prior to procedure a time out was called to verify the correct patient, procedure, equipment, support staff and site/side marked as required   Supporting Documentation  Indications: pain   Procedure Details  Location: knee - R knee  Preparation: Patient was prepped and draped in the usual sterile fashion  Needle size: 22 G  Approach: anterolateral  Medications administered: 3 mL bupivacaine (PF) 0.25 %; 3 mL lidocaine PF 1% 1 %; 80 mg triamcinolone acetonide 40 MG/ML  Patient tolerance: patient tolerated the procedure well with no immediate complications    Large Joint Arthrocentesis: L knee  Date/Time: 9/8/2020 8:09 AM  Consent given by: patient  Site marked: site marked  Timeout: Immediately prior to procedure a time out was called to verify the correct patient, procedure, equipment, support staff and site/side marked as required   Supporting Documentation  Indications: pain   Procedure Details  Location: knee - L knee  Preparation: Patient was prepped and draped in the usual sterile fashion  Needle size: 22 G  Approach: anterolateral  Medications administered: 3 mL bupivacaine (PF) 0.25 %; 3 mL lidocaine PF 1% 1 %; 80 mg triamcinolone acetonide 40 MG/ML  Patient tolerance: patient tolerated the procedure well with no immediate complications

## 2020-09-08 NOTE — PROGRESS NOTES
Orthopaedic Clinic Note: Knee Established Patient    Chief Complaint   Patient presents with   • Follow-up     3 month follow up - Primary osteoarthritis of both knees - injection given 06/09/2020        HPI    It has been 3  month(s) since Ms. Murray's last visit. She returns to clinic today for follow-up bilateral knee osteoarthritis.  She returns to clinic today for follow-up evaluation bilateral knees.  Underwent cortisone injection both knees 3 months ago.  The injections provided about 2 months of relief before pain gradually returned.  She rates her pain 10/10 on the pain scale.  She is having swelling, popping, stiffness of both knees and pain is worse with walking weightbearing activities.  She is ambulatory with no assistive device today.  Denies fevers chills or constitutional symptoms.  Overall she is doing about the same.    Past Medical History:   Diagnosis Date   • Allergic    • Anemia    • Depression    • Diabetes mellitus (CMS/[facility])    • History of heart attack    • Hyperlipidemia    • Hypertension       Past Surgical History:   Procedure Laterality Date   • BILATERAL BREAST REDUCTION     • BONE MARROW ASPIRATION     • HYSTERECTOMY  1990    x 2   • REDUCTION MAMMAPLASTY Bilateral     MORE THAN 20 YEARS AGO   • SINUS SURGERY  2003   • TONSILLECTOMY        Family History   Problem Relation Age of Onset   • Arthritis Maternal Grandmother    • Hypertension Mother    • Hyperlipidemia Mother    • Thyroid disease Mother    • Diabetes Sister    • Crohn's disease Sister    • Hypertension Sister    • Heart attack Maternal Grandfather    • Breast cancer Neg Hx    • Ovarian cancer Neg Hx      Social History     Socioeconomic History   • Marital status: Single     Spouse name: Not on file   • Number of children: Not on file   • Years of education: Not on file   • Highest education level: Not on file   Social Needs   • Financial resource strain: Not on file   • Food insecurity:     Worry: Sometimes true      Inability: Sometimes true   • Transportation needs:     Medical: No     Non-medical: No   Tobacco Use   • Smoking status: Former Smoker     Packs/day: 2.00     Years: 30.00     Pack years: 60.00     Types: Cigarettes     Last attempt to quit:      Years since quittin.7   • Smokeless tobacco: Never Used   Substance and Sexual Activity   • Alcohol use: No   • Drug use: No   • Sexual activity: Defer      Current Outpatient Medications on File Prior to Visit   Medication Sig Dispense Refill   • albuterol sulfate  (90 Base) MCG/ACT inhaler Inhale 2 puffs Every 6 (Six) Hours As Needed for Wheezing. 1 inhaler 2   • aspirin 81 MG EC tablet Take 1 tablet by mouth Daily. 30 tablet 5   • atorvastatin (LIPITOR) 80 MG tablet Take 1 tablet by mouth Daily. 30 tablet 5   • carvedilol (COREG) 25 MG tablet Take 1 tablet by mouth 2 (Two) Times a Day With Meals. 60 tablet 2   • FLUoxetine (PROzac) 20 MG capsule Take 3 capsules by mouth Daily. 270 capsule 1   • Fluticasone-Umeclidin-Vilant (TRELEGY ELLIPTA) 100-62.5-25 MCG/INH aerosol powder  Inhale 1 puff Daily. 30 each 5   • linagliptin (TRADJENTA) 5 MG tablet tablet Take 1 tablet by mouth Daily. 30 tablet 5   • lisinopril (PRINIVIL,ZESTRIL) 40 MG tablet Take 1 tablet by mouth Daily. 90 tablet 1   • nitroglycerin (NITROSTAT) 0.4 MG SL tablet Place 1 tablet under the tongue Every 5 (Five) Minutes As Needed for Chest Pain. Take no more than 3 doses in 15 minutes. 15 tablet 12     No current facility-administered medications on file prior to visit.       Allergies   Allergen Reactions   • Sulfa Antibiotics Rash        Review of Systems   Constitutional: Positive for activity change.   HENT: Positive for tinnitus.    Eyes: Positive for itching.   Respiratory: Negative.    Cardiovascular: Negative.    Gastrointestinal: Negative.    Endocrine: Positive for heat intolerance and polydipsia.   Genitourinary: Negative.    Musculoskeletal: Positive for joint swelling.   Skin:  "Positive for rash.   Allergic/Immunologic: Positive for environmental allergies.   Neurological: Negative.    Hematological: Negative.    Psychiatric/Behavioral: Positive for agitation.        The patient's Review of Systems was personally reviewed and confirmed as accurate.    Physical Exam  Pulse 62, height 152.4 cm (60\"), weight 84.8 kg (186 lb 15.2 oz), SpO2 98 %, not currently breastfeeding.    Body mass index is 36.51 kg/m².    GENERAL APPEARANCE: awake, alert, oriented, in no acute distress and well developed, well nourished  LUNGS:  breathing nonlabored  EXTREMITIES: no clubbing, cyanosis  PERIPHERAL PULSES: palpable dorsalis pedis and posterior tibial pulses bilaterally.    GAIT:  Antalgic        ----------  Bilateral Knee Exam:  ----------  ALIGNMENT: severe varus, correctable to neutral  ----------  RANGE OF MOTION:  Decreased (3 - 120 degrees) with no extensor lag  LIGAMENTOUS STABILITY:   stable to varus and valgus stress at terminal extension and 30 degrees; retensioning of the MCL is appreciated with valgus stress at 30 degrees consistent with medial compartment degeneration  ----------  STRENGTH:  KNEE FLEXION 5/5  KNEE EXTENSION  5/5  ANKLE DORSIFLEXION  5/5  ANKLE PLANTARFLEXION  5/5  ----------  PAIN WITH PALPATION:global  KNEE EFFUSION: yes, mild effusion  PAIN WITH KNEE ROM: yes  PATELLAR CREPITUS:  yes, painful and symptomatic  ----------  SENSATION TO LIGHT TOUCH:  DEEP PERONEAL/SUPERFICIAL PERONEAL/SURAL/SAPHENOUS/TIBIAL:    intact  ----------  EDEMA:  no  ERYTHEMA:    no  WOUNDS/INCISIONS:   no  _____________________________________________________________________  _____________________________________________________________________    RADIOGRAPHIC FINDINGS:   No new imaging today    Assessment/Plan:   Diagnosis Plan   1. Primary osteoarthritis of both knees       The patient has failed conservative treatment measures and is a candidate for joint arthroplasty.  I discussed the joint " arthroplasty surgical process as well as the recovery and rehabilitation time frame.  The patient asked several questions regarding the joint arthroplasty surgery, which were answered accordingly.  Ultimately, the patient declines surgical intervention at this time and wishes to continue with conservative treatment measures.  Alternative conservative treatment measures were discussed including bracing, therapy, topical/oral anti-inflammatories, activity modification, and weight loss.  The patient considered these treatment options and wishes to proceed with corticosteroid injection(s) today.  Therefore we will proceed with corticosteroid injection(s) today.  Follow-up 3 months with repeat x-ray 4 views bilateral knees.    Procedure Note:  I discussed with the patient the potential benefits of performing a therapeutic injections of the bilateral knees as well as potential risks including but not limited to infection, swelling, pain, bleeding, bruising, nerve/vessel damage, skin color changes, transient elevation in blood glucose levels, and fat atrophy. After informed consent and verifying correct patient, procedure site, and type of procedure, the areas were prepped with alcohol, ethyl chloride was used to numb the skin. Via the superior lateral approach, 3cc of 1% lidocaine, 3cc of 0.25% bupivicaine and 2 cc of 40mg/ml of Kenalog were each injected into the bilateral knee. The patient tolerated the procedures well. There were no complications. A sterile dressing was placed over each injection site.      Keaton Aldrich MD  09/08/20  08:25

## 2020-10-06 DIAGNOSIS — I25.118 CORONARY ARTERY DISEASE OF NATIVE HEART WITH STABLE ANGINA PECTORIS, UNSPECIFIED VESSEL OR LESION TYPE (HCC): ICD-10-CM

## 2020-10-06 DIAGNOSIS — E78.2 MIXED HYPERLIPIDEMIA: ICD-10-CM

## 2020-10-06 RX ORDER — ATORVASTATIN CALCIUM 80 MG/1
80 TABLET, FILM COATED ORAL DAILY
Qty: 30 TABLET | Refills: 5 | Status: SHIPPED | OUTPATIENT
Start: 2020-10-06 | End: 2020-11-23

## 2020-10-06 NOTE — TELEPHONE ENCOUNTER
Caller: Martha Murrya    Relationship: Self    Best call back number:[phone]     Medication needed:   Requested Prescriptions     Pending Prescriptions Disp Refills   • atorvastatin (LIPITOR) 80 MG tablet 30 tablet 5     Sig: Take 1 tablet by mouth Daily.       When do you need the refill by: ASAP    What details did the patient provide when requesting the medication: OUT OF MEDICATION - PATIENT STATES THE [facility] IN [place] IS OUT OF THIS MEDICATION AND SHE IS REQUESTING IT TO BE SENT TO [facility] INSTEAD    Does the patient have less than a 3 day supply:  [x] Yes  [] No    What is the patient's preferred pharmacy: [facility] [address] [place] [address] [phone] [facility] [phone] FX         PLEASE CALL PATIENT AND CONFIRM [phone]

## 2020-11-10 ENCOUNTER — LAB (OUTPATIENT)
Dept: LAB | Facility: HOSPITAL | Age: 65
End: 2020-11-10

## 2020-11-10 ENCOUNTER — OFFICE VISIT (OUTPATIENT)
Dept: INTERNAL MEDICINE | Facility: CLINIC | Age: 65
End: 2020-11-10

## 2020-11-10 VITALS
OXYGEN SATURATION: 98 % | DIASTOLIC BLOOD PRESSURE: 86 MMHG | HEART RATE: 68 BPM | BODY MASS INDEX: 34.91 KG/M2 | WEIGHT: 177.8 LBS | HEIGHT: 60 IN | TEMPERATURE: 97.1 F | SYSTOLIC BLOOD PRESSURE: 134 MMHG

## 2020-11-10 DIAGNOSIS — E11.51 TYPE 2 DIABETES MELLITUS WITH DIABETIC PERIPHERAL ANGIOPATHY WITHOUT GANGRENE, WITHOUT LONG-TERM CURRENT USE OF INSULIN (HCC): ICD-10-CM

## 2020-11-10 DIAGNOSIS — H93.13 TINNITUS OF BOTH EARS: ICD-10-CM

## 2020-11-10 DIAGNOSIS — I25.118 CORONARY ARTERY DISEASE OF NATIVE HEART WITH STABLE ANGINA PECTORIS, UNSPECIFIED VESSEL OR LESION TYPE (HCC): ICD-10-CM

## 2020-11-10 DIAGNOSIS — I73.9 PAD (PERIPHERAL ARTERY DISEASE) (HCC): ICD-10-CM

## 2020-11-10 DIAGNOSIS — J45.20 MILD INTERMITTENT ASTHMA WITHOUT COMPLICATION: ICD-10-CM

## 2020-11-10 DIAGNOSIS — N18.32 STAGE 3B CHRONIC KIDNEY DISEASE (HCC): Primary | ICD-10-CM

## 2020-11-10 LAB
ALBUMIN SERPL-MCNC: 4 G/DL (ref 3.5–5.2)
ALBUMIN/GLOB SERPL: 1.9 G/DL
ALP SERPL-CCNC: 101 U/L (ref 39–117)
ALT SERPL W P-5'-P-CCNC: 27 U/L (ref 1–33)
ANION GAP SERPL CALCULATED.3IONS-SCNC: 11.8 MMOL/L (ref 5–15)
AST SERPL-CCNC: 27 U/L (ref 1–32)
BASOPHILS # BLD AUTO: 0.1 10*3/MM3 (ref 0–0.2)
BASOPHILS NFR BLD AUTO: 1.1 % (ref 0–1.5)
BILIRUB SERPL-MCNC: 1.2 MG/DL (ref 0–1.2)
BUN SERPL-MCNC: 13 MG/DL (ref 8–23)
BUN/CREAT SERPL: 11 (ref 7–25)
CALCIUM SPEC-SCNC: 8.9 MG/DL (ref 8.6–10.5)
CHLORIDE SERPL-SCNC: 105 MMOL/L (ref 98–107)
CHOLEST SERPL-MCNC: 107 MG/DL (ref 0–200)
CO2 SERPL-SCNC: 24.2 MMOL/L (ref 22–29)
CREAT SERPL-MCNC: 1.18 MG/DL (ref 0.57–1)
DEPRECATED RDW RBC AUTO: 40.5 FL (ref 37–54)
EOSINOPHIL # BLD AUTO: 0.3 10*3/MM3 (ref 0–0.4)
EOSINOPHIL NFR BLD AUTO: 3.2 % (ref 0.3–6.2)
ERYTHROCYTE [DISTWIDTH] IN BLOOD BY AUTOMATED COUNT: 13.1 % (ref 12.3–15.4)
GFR SERPL CREATININE-BSD FRML MDRD: 46 ML/MIN/1.73
GLOBULIN UR ELPH-MCNC: 2.1 GM/DL
GLUCOSE SERPL-MCNC: 116 MG/DL (ref 65–99)
HBA1C MFR BLD: 6.44 % (ref 4.8–5.6)
HCT VFR BLD AUTO: 45.4 % (ref 34–46.6)
HDLC SERPL-MCNC: 37 MG/DL (ref 40–60)
HGB BLD-MCNC: 15.1 G/DL (ref 12–15.9)
IMM GRANULOCYTES # BLD AUTO: 0.05 10*3/MM3 (ref 0–0.05)
IMM GRANULOCYTES NFR BLD AUTO: 0.5 % (ref 0–0.5)
LDLC SERPL CALC-MCNC: 51 MG/DL (ref 0–100)
LDLC/HDLC SERPL: 1.35 {RATIO}
LYMPHOCYTES # BLD AUTO: 0.9 10*3/MM3 (ref 0.7–3.1)
LYMPHOCYTES NFR BLD AUTO: 9.6 % (ref 19.6–45.3)
MCH RBC QN AUTO: 29 PG (ref 26.6–33)
MCHC RBC AUTO-ENTMCNC: 33.3 G/DL (ref 31.5–35.7)
MCV RBC AUTO: 87.1 FL (ref 79–97)
MONOCYTES # BLD AUTO: 0.67 10*3/MM3 (ref 0.1–0.9)
MONOCYTES NFR BLD AUTO: 7.1 % (ref 5–12)
NEUTROPHILS NFR BLD AUTO: 7.38 10*3/MM3 (ref 1.7–7)
NEUTROPHILS NFR BLD AUTO: 78.5 % (ref 42.7–76)
NRBC BLD AUTO-RTO: 0 /100 WBC (ref 0–0.2)
PLATELET # BLD AUTO: 886 10*3/MM3 (ref 140–450)
PMV BLD AUTO: 9.8 FL (ref 6–12)
POTASSIUM SERPL-SCNC: 4.6 MMOL/L (ref 3.5–5.2)
PROT SERPL-MCNC: 6.1 G/DL (ref 6–8.5)
RBC # BLD AUTO: 5.21 10*6/MM3 (ref 3.77–5.28)
SODIUM SERPL-SCNC: 141 MMOL/L (ref 136–145)
TRIGL SERPL-MCNC: 101 MG/DL (ref 0–150)
TSH SERPL DL<=0.05 MIU/L-ACNC: 1.23 UIU/ML (ref 0.27–4.2)
VLDLC SERPL-MCNC: 19 MG/DL (ref 5–40)
WBC # BLD AUTO: 9.4 10*3/MM3 (ref 3.4–10.8)

## 2020-11-10 PROCEDURE — 85025 COMPLETE CBC W/AUTO DIFF WBC: CPT | Performed by: NURSE PRACTITIONER

## 2020-11-10 PROCEDURE — 99214 OFFICE O/P EST MOD 30 MIN: CPT | Performed by: NURSE PRACTITIONER

## 2020-11-10 PROCEDURE — 83036 HEMOGLOBIN GLYCOSYLATED A1C: CPT | Performed by: NURSE PRACTITIONER

## 2020-11-10 PROCEDURE — 84443 ASSAY THYROID STIM HORMONE: CPT | Performed by: NURSE PRACTITIONER

## 2020-11-10 PROCEDURE — 80053 COMPREHEN METABOLIC PANEL: CPT | Performed by: NURSE PRACTITIONER

## 2020-11-10 PROCEDURE — 80061 LIPID PANEL: CPT | Performed by: NURSE PRACTITIONER

## 2020-11-10 PROCEDURE — 36415 COLL VENOUS BLD VENIPUNCTURE: CPT | Performed by: NURSE PRACTITIONER

## 2020-11-10 RX ORDER — LORATADINE 10 MG/1
10 TABLET ORAL DAILY
Qty: 30 TABLET | Refills: 11 | Status: SHIPPED | OUTPATIENT
Start: 2020-11-10 | End: 2021-06-21

## 2020-11-10 NOTE — PROGRESS NOTES
Subjective   Martha Murray is a 65 y.o. female here today for DM.    Chief Complaint   Patient presents with   • Follow-up     Diabetes   Martha is here today to FU on DM and COPD.  Having a lot of shortness of breath with the mask (due to COVID). Still having a lot of congestion. Having tinnitus for the past few months.    Developed hypoglycemia on gipizide- tolerating trajenta well.  Blood sugars in the 100-160's fasting.  Admits she is not drinking much water.    Review of Systems   Constitutional: Positive for diaphoresis. Negative for activity change, appetite change, chills, fatigue, fever, unexpected weight gain and unexpected weight loss.   HENT: Positive for congestion and tinnitus. Negative for ear discharge, ear pain, nosebleeds, postnasal drip, rhinorrhea, sinus pressure, sore throat, swollen glands, trouble swallowing and voice change.    Eyes: Negative for blurred vision, photophobia, redness, itching and visual disturbance.   Respiratory: Positive for cough and shortness of breath. Negative for chest tightness and wheezing.    Cardiovascular: Negative for chest pain, palpitations and leg swelling.   Gastrointestinal: Negative for blood in stool, nausea and vomiting.   Endocrine: Positive for heat intolerance.   Musculoskeletal: Negative for arthralgias and myalgias.   Skin: Negative for color change, rash and skin lesions.   Allergic/Immunologic: Positive for environmental allergies. Negative for immunocompromised state.   Neurological: Positive for dizziness. Negative for syncope, facial asymmetry, weakness, light-headedness, headache, memory problem and confusion.   Hematological: Negative for adenopathy. Does not bruise/bleed easily.   Psychiatric/Behavioral: Positive for stress. Negative for suicidal ideas and depressed mood. The patient is not nervous/anxious.        Past Medical History:   Diagnosis Date   • Allergic    • Anemia    • Depression    • Diabetes mellitus (CMS/[facility])    • History of  heart attack    • Hyperlipidemia    • Hypertension      Family History   Problem Relation Age of Onset   • Arthritis Maternal Grandmother    • Hypertension Mother    • Hyperlipidemia Mother    • Thyroid disease Mother    • Diabetes Sister    • Crohn's disease Sister    • Hypertension Sister    • Heart attack Maternal Grandfather    • Breast cancer Neg Hx    • Ovarian cancer Neg Hx      Past Surgical History:   Procedure Laterality Date   • BILATERAL BREAST REDUCTION     • BONE MARROW ASPIRATION     • HYSTERECTOMY  1990    x 2   • REDUCTION MAMMAPLASTY Bilateral     MORE THAN 20 YEARS AGO   • SINUS SURGERY     • TONSILLECTOMY       Social History     Socioeconomic History   • Marital status: Single     Spouse name: Not on file   • Number of children: Not on file   • Years of education: Not on file   • Highest education level: Not on file   Social Needs   • Financial resource strain: Not on file   • Food insecurity     Worry: Sometimes true     Inability: Sometimes true   • Transportation needs     Medical: No     Non-medical: No   Tobacco Use   • Smoking status: Former Smoker     Packs/day: 2.00     Years: 30.00     Pack years: 60.00     Types: Cigarettes     Quit date:      Years since quittin.8   • Smokeless tobacco: Never Used   Substance and Sexual Activity   • Alcohol use: No   • Drug use: No   • Sexual activity: Defer         Current Outpatient Medications:   •  albuterol sulfate  (90 Base) MCG/ACT inhaler, Inhale 2 puffs Every 6 (Six) Hours As Needed for Wheezing., Disp: 1 inhaler, Rfl: 2  •  aspirin 81 MG EC tablet, Take 1 tablet by mouth Daily., Disp: 30 tablet, Rfl: 5  •  atorvastatin (LIPITOR) 80 MG tablet, Take 1 tablet by mouth Daily., Disp: 30 tablet, Rfl: 5  •  carvedilol (COREG) 25 MG tablet, Take 1 tablet by mouth 2 (Two) Times a Day With Meals., Disp: 60 tablet, Rfl: 2  •  FLUoxetine (PROzac) 20 MG capsule, Take 3 capsules by mouth Daily., Disp: 270 capsule, Rfl: 1  •   "Fluticasone-Umeclidin-Vilant (TRELEGY ELLIPTA) 100-62.5-25 MCG/INH aerosol powder , Inhale 1 puff Daily., Disp: 30 each, Rfl: 5  •  linagliptin (TRADJENTA) 5 MG tablet tablet, Take 1 tablet by mouth Daily., Disp: 30 tablet, Rfl: 5  •  lisinopril (PRINIVIL,ZESTRIL) 40 MG tablet, Take 1 tablet by mouth Daily., Disp: 90 tablet, Rfl: 1  •  nitroglycerin (NITROSTAT) 0.4 MG SL tablet, Place 1 tablet under the tongue Every 5 (Five) Minutes As Needed for Chest Pain. Take no more than 3 doses in 15 minutes., Disp: 15 tablet, Rfl: 12  •  loratadine (Claritin) 10 MG tablet, Take 1 tablet by mouth Daily., Disp: 30 tablet, Rfl: 11    Objective   Vitals:    11/10/20 0757   BP: 134/86   BP Location: Left arm   Patient Position: Sitting   Cuff Size: Large Adult   Pulse: 68   Temp: 97.1 °F (36.2 °C)   TempSrc: Temporal   SpO2: 98%   Weight: 80.6 kg (177 lb 12.8 oz)   Height: 152.4 cm (60\")     Body mass index is 34.72 kg/m².  Physical Exam  Vitals signs and nursing note reviewed.   Constitutional:       General: She is not in acute distress.     Appearance: She is not diaphoretic.   HENT:      Right Ear: External ear normal. A middle ear effusion is present. Tympanic membrane is bulging.      Left Ear: External ear normal. A middle ear effusion is present.      Nose: No mucosal edema or rhinorrhea.      Right Sinus: No maxillary sinus tenderness or frontal sinus tenderness.      Left Sinus: No maxillary sinus tenderness or frontal sinus tenderness.      Mouth/Throat:      Pharynx: No oropharyngeal exudate or posterior oropharyngeal erythema.      Tonsils: No tonsillar exudate or tonsillar abscesses.   Eyes:      General: No scleral icterus.        Right eye: No discharge.         Left eye: No discharge.      Conjunctiva/sclera:      Right eye: Right conjunctiva is not injected.      Left eye: Left conjunctiva is not injected.   Neck:      Musculoskeletal: No neck rigidity.      Thyroid: No thyromegaly.   Cardiovascular:      Rate " and Rhythm: Normal rate and regular rhythm.      Pulses:           Dorsalis pedis pulses are 2+ on the right side and 2+ on the left side.   Pulmonary:      Effort: Pulmonary effort is normal.      Breath sounds: Normal breath sounds.   Musculoskeletal:      Right lower leg: No edema.      Left lower leg: No edema.   Lymphadenopathy:      Head:      Right side of head: No submental, submandibular, tonsillar, preauricular, posterior auricular or occipital adenopathy.      Left side of head: No submental, submandibular, tonsillar, preauricular, posterior auricular or occipital adenopathy.      Cervical: No cervical adenopathy.      Right cervical: No superficial, deep or posterior cervical adenopathy.     Left cervical: No superficial, deep or posterior cervical adenopathy.   Skin:     General: Skin is warm and dry.      Capillary Refill: Capillary refill takes 2 to 3 seconds.      Coloration: Skin is not pale.   Neurological:      Mental Status: She is alert.   Psychiatric:         Speech: Speech normal.         Behavior: Behavior normal.         Assessment/Plan   Problem List Items Addressed This Visit        Cardiovascular and Mediastinum    Coronary artery disease of native heart with stable angina pectoris (CMS/[facility])    Relevant Medications    nitroglycerin (NITROSTAT) 0.4 MG SL tablet    lisinopril (PRINIVIL,ZESTRIL) 40 MG tablet    carvedilol (COREG) 25 MG tablet    atorvastatin (LIPITOR) 80 MG tablet    Other Relevant Orders    CBC & Differential    Comprehensive Metabolic Panel    Lipid Panel    CBC Auto Differential       Respiratory    Mild intermittent asthma without complication    Relevant Medications    Fluticasone-Umeclidin-Vilant (TRELEGY ELLIPTA) 100-62.5-25 MCG/INH aerosol powder     albuterol sulfate  (90 Base) MCG/ACT inhaler    loratadine (Claritin) 10 MG tablet    Other Relevant Orders    CBC & Differential    Comprehensive Metabolic Panel    CBC Auto Differential       Genitourinary     CKD (chronic kidney disease) stage 3, GFR 30-59 ml/min - Primary    Relevant Orders    CBC & Differential    Comprehensive Metabolic Panel    CBC Auto Differential       Other    PAD (peripheral artery disease) (CMS/[facility])    Relevant Medications    lisinopril (PRINIVIL,ZESTRIL) 40 MG tablet    carvedilol (COREG) 25 MG tablet    Other Relevant Orders    CBC & Differential    Comprehensive Metabolic Panel    CBC Auto Differential      Other Visit Diagnoses     Type 2 diabetes mellitus with diabetic peripheral angiopathy without gangrene, without long-term current use of insulin (CMS/[facility])        Relevant Orders    Hemoglobin A1c    Tinnitus of both ears            Diagnoses and all orders for this visit:    1. Stage 3b chronic kidney disease (Primary)  -     CBC & Differential  -     Comprehensive Metabolic Panel  -     CBC Auto Differential  Increase water intake, may have improved if her HCTZ discontinuation.  Blood pressure mildly elevated, patient reports having eaten a bag of sunflower seeds last night.  Continue to monitor sodium intake  2. Coronary artery disease of native heart with stable angina pectoris, unspecified vessel or lesion type (CMS/[facility])  -     CBC & Differential  -     Comprehensive Metabolic Panel  -     Lipid Panel  -     CBC Auto Differential  Fasting today  3. Type 2 diabetes mellitus with diabetic peripheral angiopathy without gangrene, without long-term current use of insulin (CMS/[facility])  -     Hemoglobin A1c  Metformin discontinued due to decreased GFR, patient tolerating Tradjenta  4. PAD (peripheral artery disease) (CMS/[facility])  -     CBC & Differential  -     Comprehensive Metabolic Panel  -     CBC Auto Differential  Strong pulses today  5. Mild intermittent asthma without complication  -     loratadine (Claritin) 10 MG tablet; Take 1 tablet by mouth Daily.  Dispense: 30 tablet; Refill: 11  -     CBC & Differential  -     Comprehensive Metabolic Panel  -     CBC Auto Differential  Add on  Claritin for worsening shortness of breath and congestion  6. Tinnitus of both ears  Add on Claritin for middle ear effusion           The patient was counseled regarding diagnostic results, impressions, prognosis, instructions for management, risk factor reductions, education, and importance of treatment compliance.  The patient verbalized understanding of and agreement with the plan of care.    Advised patient to call with any further questions and any new or worsening symptoms.     Return in about 3 months (around 2/10/2021), or if symptoms worsen or fail to improve.      Lucia Kraus, APRN    Please note that portions of this note were completed with a voice recognition program. Efforts were made to edit the dictations, but occasionally words are mistranscribed.

## 2020-11-18 ENCOUNTER — PATIENT OUTREACH (OUTPATIENT)
Dept: CASE MANAGEMENT | Facility: OTHER | Age: 65
End: 2020-11-18

## 2020-11-18 NOTE — OUTREACH NOTE
Patient Outreach Note    F/u call to check on medication adherence and food insecurities.  States she is doing so much better.  Is getting ready to get on Medical and requested number to call to check on all the options.  [facility] Insurance Assistance Program number provided [phone].  States she has been working at [employer] 3 days week 5 hours a day to help offset copays and other expenses.  Denies any problems with affording food at present.  States she has been working on losing weight and getting her A1C under 7 due to upcoming knee surgery.  Her last A1C 11/10/2020 was 6.44  Will be getting knee injection 12/8/2020, but states they have told her eventually she would need surgery.  She denies any needs at present.  Reminded her of [facility] 24/7 number and that she is welcome to call RN-ACM for any needs.  She voiced her appreciation for the call.       Trudy Villanueva RN  Ambulatory     11/18/2020, 13:47 EST

## 2020-11-23 ENCOUNTER — TELEPHONE (OUTPATIENT)
Dept: INTERNAL MEDICINE | Facility: CLINIC | Age: 65
End: 2020-11-23

## 2020-11-23 DIAGNOSIS — I73.9 PAD (PERIPHERAL ARTERY DISEASE) (HCC): ICD-10-CM

## 2020-11-23 DIAGNOSIS — I10 ESSENTIAL HYPERTENSION: ICD-10-CM

## 2020-11-23 DIAGNOSIS — E78.2 MIXED HYPERLIPIDEMIA: ICD-10-CM

## 2020-11-23 DIAGNOSIS — I25.118 CORONARY ARTERY DISEASE OF NATIVE HEART WITH STABLE ANGINA PECTORIS, UNSPECIFIED VESSEL OR LESION TYPE (HCC): ICD-10-CM

## 2020-11-23 RX ORDER — LISINOPRIL 40 MG/1
40 TABLET ORAL DAILY
Qty: 90 TABLET | Refills: 1 | Status: SHIPPED | OUTPATIENT
Start: 2020-11-23 | End: 2021-03-19

## 2020-11-23 RX ORDER — ATORVASTATIN CALCIUM 80 MG/1
80 TABLET, FILM COATED ORAL DAILY
Qty: 30 TABLET | Refills: 5 | Status: SHIPPED | OUTPATIENT
Start: 2020-11-23 | End: 2021-05-14 | Stop reason: SDUPTHER

## 2020-12-28 PROCEDURE — U0004 COV-19 TEST NON-CDC HGH THRU: HCPCS | Performed by: PHYSICIAN ASSISTANT

## 2021-01-26 ENCOUNTER — OFFICE VISIT (OUTPATIENT)
Dept: ORTHOPEDIC SURGERY | Facility: CLINIC | Age: 66
End: 2021-01-26

## 2021-01-26 VITALS — WEIGHT: 179.9 LBS | OXYGEN SATURATION: 98 % | HEART RATE: 73 BPM | HEIGHT: 61 IN | BODY MASS INDEX: 33.96 KG/M2

## 2021-01-26 DIAGNOSIS — M17.0 PRIMARY OSTEOARTHRITIS OF BOTH KNEES: Primary | ICD-10-CM

## 2021-01-26 PROCEDURE — 20610 DRAIN/INJ JOINT/BURSA W/O US: CPT | Performed by: ORTHOPAEDIC SURGERY

## 2021-01-26 PROCEDURE — 99214 OFFICE O/P EST MOD 30 MIN: CPT | Performed by: ORTHOPAEDIC SURGERY

## 2021-01-26 RX ORDER — BUPIVACAINE HYDROCHLORIDE 2.5 MG/ML
3 INJECTION, SOLUTION EPIDURAL; INFILTRATION; INTRACAUDAL
Status: COMPLETED | OUTPATIENT
Start: 2021-01-26 | End: 2021-01-26

## 2021-01-26 RX ORDER — TRIAMCINOLONE ACETONIDE 40 MG/ML
80 INJECTION, SUSPENSION INTRA-ARTICULAR; INTRAMUSCULAR
Status: COMPLETED | OUTPATIENT
Start: 2021-01-26 | End: 2021-01-26

## 2021-01-26 RX ORDER — LIDOCAINE HYDROCHLORIDE 10 MG/ML
3 INJECTION, SOLUTION EPIDURAL; INFILTRATION; INTRACAUDAL; PERINEURAL
Status: COMPLETED | OUTPATIENT
Start: 2021-01-26 | End: 2021-01-26

## 2021-01-26 RX ADMIN — BUPIVACAINE HYDROCHLORIDE 3 ML: 2.5 INJECTION, SOLUTION EPIDURAL; INFILTRATION; INTRACAUDAL at 08:46

## 2021-01-26 RX ADMIN — TRIAMCINOLONE ACETONIDE 80 MG: 40 INJECTION, SUSPENSION INTRA-ARTICULAR; INTRAMUSCULAR at 08:46

## 2021-01-26 RX ADMIN — BUPIVACAINE HYDROCHLORIDE 3 ML: 2.5 INJECTION, SOLUTION EPIDURAL; INFILTRATION; INTRACAUDAL at 08:45

## 2021-01-26 RX ADMIN — LIDOCAINE HYDROCHLORIDE 3 ML: 10 INJECTION, SOLUTION EPIDURAL; INFILTRATION; INTRACAUDAL; PERINEURAL at 08:45

## 2021-01-26 RX ADMIN — TRIAMCINOLONE ACETONIDE 80 MG: 40 INJECTION, SUSPENSION INTRA-ARTICULAR; INTRAMUSCULAR at 08:45

## 2021-01-26 RX ADMIN — LIDOCAINE HYDROCHLORIDE 3 ML: 10 INJECTION, SOLUTION EPIDURAL; INFILTRATION; INTRACAUDAL; PERINEURAL at 08:46

## 2021-01-26 NOTE — PROGRESS NOTES
Procedure   Large Joint Arthrocentesis: R knee  Date/Time: 1/26/2021 8:45 AM  Consent given by: patient  Site marked: site marked  Timeout: Immediately prior to procedure a time out was called to verify the correct patient, procedure, equipment, support staff and site/side marked as required   Supporting Documentation  Indications: pain   Procedure Details  Location: knee - R knee  Preparation: Patient was prepped and draped in the usual sterile fashion  Needle size: 22 G  Approach: anterolateral  Medications administered: 3 mL bupivacaine (PF) 0.25 %; 3 mL lidocaine PF 1% 1 %; 80 mg triamcinolone acetonide 40 MG/ML  Patient tolerance: patient tolerated the procedure well with no immediate complications    Large Joint Arthrocentesis: L knee  Date/Time: 1/26/2021 8:46 AM  Consent given by: patient  Site marked: site marked  Timeout: Immediately prior to procedure a time out was called to verify the correct patient, procedure, equipment, support staff and site/side marked as required   Supporting Documentation  Indications: pain   Procedure Details  Location: knee - L knee  Preparation: Patient was prepped and draped in the usual sterile fashion  Needle size: 22 G  Approach: anterolateral  Medications administered: 3 mL bupivacaine (PF) 0.25 %; 3 mL lidocaine PF 1% 1 %; 80 mg triamcinolone acetonide 40 MG/ML  Patient tolerance: patient tolerated the procedure well with no immediate complications

## 2021-01-26 NOTE — PROGRESS NOTES
Orthopaedic Clinic Note: Knee Established Patient    Chief Complaint   Patient presents with   • Follow-up     4.5 months- Primary osteoarthritis of both knees        HPI    It has been 4  month(s) since Ms. Murray's last visit. She returns to clinic today for follow-up bilateral knee osteoarthritis.  She underwent cortisone injections both knees 4 and half months ago.  She states the injections provided about 2 months of relief.  Her pain is returned.  In the interval since her last visit, she is started working again and she states that her knee pain is now much worse.  She is rating her pain 6/10 on the pain scale.  With ambulation and physical activity, her pain becomes a 10 out of 10 on pain scale with intermittent swelling and stiffness.  She denies fevers chills or constitutional symptoms.  She is working on obtaining the finances in order to proceed to surgical intervention.  Overall she is doing much worse and having severe limitations of daily activities.    Past Medical History:   Diagnosis Date   • Allergic    • Anemia    • Depression    • Diabetes mellitus (CMS/HCC)    • History of heart attack    • Hyperlipidemia    • Hypertension       Past Surgical History:   Procedure Laterality Date   • BILATERAL BREAST REDUCTION     • BONE MARROW ASPIRATION     • HYSTERECTOMY  1990    x 2   • REDUCTION MAMMAPLASTY Bilateral     MORE THAN 20 YEARS AGO   • SINUS SURGERY  2003   • TONSILLECTOMY        Family History   Problem Relation Age of Onset   • Arthritis Maternal Grandmother    • Hypertension Mother    • Hyperlipidemia Mother    • Thyroid disease Mother    • Diabetes Sister    • Crohn's disease Sister    • Hypertension Sister    • Heart attack Maternal Grandfather    • Breast cancer Neg Hx    • Ovarian cancer Neg Hx      Social History     Socioeconomic History   • Marital status: Single     Spouse name: Not on file   • Number of children: Not on file   • Years of education: Not on file   • Highest education  level: Not on file   Social Needs   • Financial resource strain: Not on file   • Food insecurity     Worry: Sometimes true     Inability: Sometimes true   • Transportation needs     Medical: No     Non-medical: No   Tobacco Use   • Smoking status: Former Smoker     Packs/day: 2.00     Years: 30.00     Pack years: 60.00     Types: Cigarettes     Quit date:      Years since quittin.0   • Smokeless tobacco: Never Used   Substance and Sexual Activity   • Alcohol use: No   • Drug use: No   • Sexual activity: Defer      Current Outpatient Medications on File Prior to Visit   Medication Sig Dispense Refill   • albuterol sulfate  (90 Base) MCG/ACT inhaler Inhale 2 puffs Every 6 (Six) Hours As Needed for Wheezing. 1 inhaler 2   • aspirin 81 MG EC tablet Take 1 tablet by mouth Daily. 30 tablet 5   • atorvastatin (LIPITOR) 80 MG tablet Take 1 tablet by mouth Daily. 30 tablet 5   • carvedilol (COREG) 25 MG tablet Take 1 tablet by mouth 2 (Two) Times a Day With Meals. 60 tablet 2   • ciprofloxacin (CILOXAN) 0.3 % ophthalmic solution Apply 2 drops in affected eye Q2H while awake x 2 days, then decrease use to Q4H x 5 days. Apply 1 drop in unaffected eye Q4H x 5days. 10 mL 0   • FLUoxetine (PROzac) 20 MG capsule Take 3 capsules by mouth Daily. 270 capsule 1   • Fluticasone-Umeclidin-Vilant (TRELEGY ELLIPTA) 100-62.5-25 MCG/INH aerosol powder  Inhale 1 puff Daily. 30 each 5   • linagliptin (TRADJENTA) 5 MG tablet tablet Take 1 tablet by mouth Daily. 30 tablet 5   • lisinopril (PRINIVIL,ZESTRIL) 40 MG tablet Take 1 tablet by mouth Daily. 90 tablet 1   • loratadine (Claritin) 10 MG tablet Take 1 tablet by mouth Daily. 30 tablet 11   • nitroglycerin (NITROSTAT) 0.4 MG SL tablet Place 1 tablet under the tongue Every 5 (Five) Minutes As Needed for Chest Pain. Take no more than 3 doses in 15 minutes. 15 tablet 12     No current facility-administered medications on file prior to visit.       Allergies   Allergen Reactions  "  • Sulfa Antibiotics Rash        Review of Systems   Constitutional: Negative.    HENT: Negative.    Eyes: Negative.    Respiratory: Negative.    Cardiovascular: Negative.    Gastrointestinal: Negative.    Endocrine: Negative.    Genitourinary: Negative.    Musculoskeletal: Positive for arthralgias.   Skin: Negative.    Allergic/Immunologic: Negative.    Neurological: Negative.    Hematological: Negative.    Psychiatric/Behavioral: Negative.         The patient's Review of Systems was personally reviewed and confirmed as accurate.    Physical Exam  Pulse 73, height 154.9 cm (60.98\"), weight 81.6 kg (179 lb 14.3 oz), SpO2 98 %, not currently breastfeeding.    Body mass index is 34.01 kg/m².    GENERAL APPEARANCE: awake, alert, oriented, in no acute distress and well developed, well nourished  LUNGS:  breathing nonlabored  EXTREMITIES: no clubbing, cyanosis  PERIPHERAL PULSES: palpable dorsalis pedis and posterior tibial pulses bilaterally.    GAIT:  Antalgic        ----------  Bilateral Knee Exam:  ----------  ALIGNMENT: severe varus, correctable to neutral  ----------  RANGE OF MOTION:  Decreased (5 - 120 degrees) with no extensor lag  LIGAMENTOUS STABILITY:   stable to varus and valgus stress at terminal extension and 30 degrees; retensioning of the MCL is appreciated with valgus stress at 30 degrees consistent with medial compartment degeneration  ----------  STRENGTH:  KNEE FLEXION 5/5  KNEE EXTENSION  5/5  ANKLE DORSIFLEXION  5/5  ANKLE PLANTARFLEXION  5/5  ----------  PAIN WITH PALPATION:global  KNEE EFFUSION: yes, mild effusion bilateral  PAIN WITH KNEE ROM: yes  PATELLAR CREPITUS:  yes, painful and symptomatic  ----------  SENSATION TO LIGHT TOUCH:  DEEP PERONEAL/SUPERFICIAL PERONEAL/SURAL/SAPHENOUS/TIBIAL:    intact  ----------  EDEMA:  no  ERYTHEMA:    no  WOUNDS/INCISIONS:   " no    _____________________________________________________________________  _____________________________________________________________________    RADIOGRAPHIC FINDINGS:   Indication: Bilateral knee pain    Comparison: Todays xrays were compared to previous xrays from 6/9/2020    Right knee 4 views: moderate to severe tricompartmental arthritis with genu varum alignment, periarticular osteophytes visualized in all compartments and No significant changes compared to prior radiographs.    Left knee 4 views: moderate to severe tricompartmental arthritis with genu varum alignment, periarticular osteophytes visualized in all compartments and No significant changes compared to prior radiographs.      Assessment/Plan:   Diagnosis Plan   1. Primary osteoarthritis of both knees  XR Knee 4+ View Bilateral    Large Joint Arthrocentesis: R knee    Large Joint Arthrocentesis: L knee     The patient has failed conservative treatment measures and is a candidate for joint arthroplasty.  I discussed the joint arthroplasty surgical process as well as the recovery and rehabilitation time frame.  The patient asked several questions regarding the joint arthroplasty surgery, which were answered accordingly.  Ultimately, the patient declines surgical intervention at this time and wishes to continue with conservative treatment measures.  Alternative conservative treatment measures were discussed including bracing, therapy, topical/oral anti-inflammatories, activity modification, and weight loss.  The patient considered these treatment options and wishes to proceed with corticosteroid injection(s) today.  Therefore we will proceed with corticosteroid injection(s) today.  Follow-up 3 months for repeat assessment.    Procedure Note:  I discussed with the patient the potential benefits of performing a therapeutic injections of the bilateral knees as well as potential risks including but not limited to infection, swelling, pain, bleeding,  bruising, nerve/vessel damage, skin color changes, transient elevation in blood glucose levels, and fat atrophy. After informed consent and verifying correct patient, procedure site, and type of procedure, the areas were prepped with alcohol, ethyl chloride was used to numb the skin. Via the superolateral approach, 3cc of 1% lidocaine, 3cc of 0.25% bupivicaine and 2 cc of 40mg/ml of Kenalog were each injected into the bilateral knees. The patient tolerated the procedures well. There were no complications. A sterile dressing was placed over each injection site.      Keaton Aldrich MD  01/26/21  08:48 EST

## 2021-02-09 ENCOUNTER — LAB (OUTPATIENT)
Dept: LAB | Facility: HOSPITAL | Age: 66
End: 2021-02-09

## 2021-02-09 ENCOUNTER — OFFICE VISIT (OUTPATIENT)
Dept: INTERNAL MEDICINE | Facility: CLINIC | Age: 66
End: 2021-02-09

## 2021-02-09 VITALS
DIASTOLIC BLOOD PRESSURE: 84 MMHG | HEART RATE: 76 BPM | WEIGHT: 170 LBS | OXYGEN SATURATION: 99 % | SYSTOLIC BLOOD PRESSURE: 122 MMHG | BODY MASS INDEX: 33.38 KG/M2 | RESPIRATION RATE: 16 BRPM | HEIGHT: 60 IN | TEMPERATURE: 96.9 F

## 2021-02-09 DIAGNOSIS — N18.32 STAGE 3B CHRONIC KIDNEY DISEASE (HCC): ICD-10-CM

## 2021-02-09 DIAGNOSIS — M25.40 JOINT SWELLING: ICD-10-CM

## 2021-02-09 DIAGNOSIS — E11.65 TYPE 2 DIABETES MELLITUS WITH HYPERGLYCEMIA, WITHOUT LONG-TERM CURRENT USE OF INSULIN (HCC): Primary | ICD-10-CM

## 2021-02-09 DIAGNOSIS — I10 ESSENTIAL HYPERTENSION: ICD-10-CM

## 2021-02-09 LAB
ALBUMIN SERPL-MCNC: 4.1 G/DL (ref 3.5–5.2)
ALBUMIN/GLOB SERPL: 1.6 G/DL
ALP SERPL-CCNC: 108 U/L (ref 39–117)
ALT SERPL W P-5'-P-CCNC: 17 U/L (ref 1–33)
ANION GAP SERPL CALCULATED.3IONS-SCNC: 11.4 MMOL/L (ref 5–15)
AST SERPL-CCNC: 16 U/L (ref 1–32)
BILIRUB SERPL-MCNC: 1.3 MG/DL (ref 0–1.2)
BUN SERPL-MCNC: 32 MG/DL (ref 8–23)
BUN/CREAT SERPL: 20 (ref 7–25)
CALCIUM SPEC-SCNC: 9.3 MG/DL (ref 8.6–10.5)
CHLORIDE SERPL-SCNC: 104 MMOL/L (ref 98–107)
CHROMATIN AB SERPL-ACNC: <10 IU/ML (ref 0–14)
CO2 SERPL-SCNC: 23.6 MMOL/L (ref 22–29)
CREAT SERPL-MCNC: 1.6 MG/DL (ref 0.57–1)
ERYTHROCYTE [SEDIMENTATION RATE] IN BLOOD: 1 MM/HR (ref 0–30)
GFR SERPL CREATININE-BSD FRML MDRD: 32 ML/MIN/1.73
GLOBULIN UR ELPH-MCNC: 2.5 GM/DL
GLUCOSE SERPL-MCNC: 104 MG/DL (ref 65–99)
HBA1C MFR BLD: 6.2 % (ref 4.8–5.6)
POTASSIUM SERPL-SCNC: 4.6 MMOL/L (ref 3.5–5.2)
PROT SERPL-MCNC: 6.6 G/DL (ref 6–8.5)
SODIUM SERPL-SCNC: 139 MMOL/L (ref 136–145)

## 2021-02-09 PROCEDURE — 99214 OFFICE O/P EST MOD 30 MIN: CPT | Performed by: INTERNAL MEDICINE

## 2021-02-09 PROCEDURE — 80053 COMPREHEN METABOLIC PANEL: CPT | Performed by: INTERNAL MEDICINE

## 2021-02-09 PROCEDURE — 86431 RHEUMATOID FACTOR QUANT: CPT | Performed by: INTERNAL MEDICINE

## 2021-02-09 PROCEDURE — 86200 CCP ANTIBODY: CPT

## 2021-02-09 PROCEDURE — 83036 HEMOGLOBIN GLYCOSYLATED A1C: CPT | Performed by: INTERNAL MEDICINE

## 2021-02-09 PROCEDURE — 85652 RBC SED RATE AUTOMATED: CPT | Performed by: INTERNAL MEDICINE

## 2021-02-09 NOTE — PROGRESS NOTES
Office Note      Date: 2021  Patient Name: Martha Murray  MRN: [id number]  : 1955    Chief Complaint   Patient presents with   • Chronic Kidney Disease       History of Present Illness: Martha Murray is a 65 y.o. female who presents for Chronic Kidney Disease.     Cr elevated at last PCP visit. Her metformin was stopped and her HCTZ was stopped.   She has lost weight.   Has hand swelling with difficulty bending rt ring finger.       Subjective      Review of Systems:   Pertinent review of systems per HPI.    Review of Systems   Constitutional: Negative for activity change, appetite change, chills, diaphoresis, fatigue, fever and unexpected weight change.   HENT: Negative for congestion, dental problem, drooling, ear discharge, ear pain, facial swelling, hearing loss and mouth sores.    Eyes: Negative for pain, discharge and itching.   Respiratory: Negative for apnea, cough, choking, chest tightness and shortness of breath.    Cardiovascular: Negative for chest pain, palpitations and leg swelling.   Gastrointestinal: Negative for abdominal distention, abdominal pain, blood in stool, constipation and diarrhea.   Endocrine: Negative for cold intolerance, heat intolerance, polydipsia and polyuria.   Genitourinary: Negative for difficulty urinating, dysuria, frequency and hematuria.   Musculoskeletal: Positive for joint swelling.   Skin: Negative for color change, pallor, rash and wound.   Allergic/Immunologic: Negative for environmental allergies, food allergies and immunocompromised state.   Neurological: Negative for dizziness, weakness and light-headedness.   Psychiatric/Behavioral: Negative for agitation, behavioral problems, confusion, decreased concentration and self-injury. The patient is not nervous/anxious.    All other systems reviewed and are negative.    Allergies   Allergen Reactions   • Sulfa Antibiotics Rash       Objective     Physical Exam:  Vital Signs:   Vitals:    21  "0823   BP: 122/84   Pulse: 76   Resp: 16   Temp: 96.9 °F (36.1 °C)   TempSrc: Temporal   SpO2: 99%   Weight: 77.1 kg (170 lb)   Height: 152.4 cm (60\")      Body mass index is 33.2 kg/m².    Physical Exam  Vitals signs and nursing note reviewed.   Constitutional:       General: She is not in acute distress.     Appearance: She is well-developed.   HENT:      Head: Normocephalic and atraumatic.      Right Ear: External ear normal.      Left Ear: External ear normal.   Cardiovascular:      Rate and Rhythm: Normal rate and regular rhythm.      Heart sounds: Normal heart sounds. No murmur. No friction rub. No gallop.    Pulmonary:      Effort: Pulmonary effort is normal. No respiratory distress.      Breath sounds: Normal breath sounds. No wheezing or rales.   Musculoskeletal:      Comments: Rt hand MCP joints swollen.    Skin:     General: Skin is warm and dry.      Coloration: Skin is not pale.         Assessment / Plan      Assessment & Plan:    1. Type 2 diabetes mellitus with hyperglycemia, without long-term current use of insulin (CMS/[facility])  A1c at goal today. Continue with weight loss.   - Hemoglobin A1c    2. Essential hypertension  BP at goal today.  - Comprehensive Metabolic Panel    3. Stage 3b chronic kidney disease (CMS/[facility])  Cr elevated. Avoid NSAIDs  - Comprehensive Metabolic Panel  - Ambulatory Referral to Nephrology    4. Joint swelling  - try tylenol for pain  - Rheumatoid Factor  - Cyclic Citrul Peptide Antibody, IgG / IgA; Future  - Sedimentation Rate      Julia Stacy MD  02/09/2021     Please note that portions of this note may have been completed with a voice recognition program. Efforts were made to edit the dictations, but occasionally words are mistranscribed.  "

## 2021-02-10 ENCOUNTER — TELEPHONE (OUTPATIENT)
Dept: INTERNAL MEDICINE | Facility: CLINIC | Age: 66
End: 2021-02-10

## 2021-02-10 LAB — CCP IGA+IGG SERPL IA-ACNC: 6 UNITS (ref 0–19)

## 2021-02-10 NOTE — TELEPHONE ENCOUNTER
----- Message from Julia Stacy MD sent at 2/9/2021  3:04 PM EST -----  Renal function increasing, referral to nephro placed. Avoid NSAIDs.

## 2021-03-19 DIAGNOSIS — J41.0 SIMPLE CHRONIC BRONCHITIS (HCC): ICD-10-CM

## 2021-03-19 DIAGNOSIS — I73.9 PAD (PERIPHERAL ARTERY DISEASE) (HCC): ICD-10-CM

## 2021-03-19 DIAGNOSIS — I10 ESSENTIAL HYPERTENSION: ICD-10-CM

## 2021-03-19 DIAGNOSIS — I25.118 CORONARY ARTERY DISEASE OF NATIVE HEART WITH STABLE ANGINA PECTORIS, UNSPECIFIED VESSEL OR LESION TYPE (HCC): ICD-10-CM

## 2021-03-19 RX ORDER — LISINOPRIL 40 MG/1
TABLET ORAL
Qty: 30 TABLET | Refills: 2 | Status: SHIPPED | OUTPATIENT
Start: 2021-03-19 | End: 2021-05-18 | Stop reason: DRUGHIGH

## 2021-03-19 NOTE — TELEPHONE ENCOUNTER
Last seen 2/9/2021 Next appointment 5/11/21     Fluticasone-Umeclidin (Arturo Stark) last sent 1/21/20

## 2021-04-06 ENCOUNTER — IMMUNIZATION (OUTPATIENT)
Dept: VACCINE CLINIC | Facility: HOSPITAL | Age: 66
End: 2021-04-06

## 2021-04-06 PROCEDURE — 91300 HC SARSCOV02 VAC 30MCG/0.3ML IM: CPT | Performed by: INTERNAL MEDICINE

## 2021-04-06 PROCEDURE — 0001A: CPT | Performed by: INTERNAL MEDICINE

## 2021-04-27 ENCOUNTER — IMMUNIZATION (OUTPATIENT)
Dept: VACCINE CLINIC | Facility: HOSPITAL | Age: 66
End: 2021-04-27

## 2021-04-27 PROCEDURE — 91300 HC SARSCOV02 VAC 30MCG/0.3ML IM: CPT | Performed by: INTERNAL MEDICINE

## 2021-04-27 PROCEDURE — 0002A: CPT | Performed by: INTERNAL MEDICINE

## 2021-05-04 ENCOUNTER — OFFICE VISIT (OUTPATIENT)
Dept: ORTHOPEDIC SURGERY | Facility: CLINIC | Age: 66
End: 2021-05-04

## 2021-05-04 VITALS
BODY MASS INDEX: 32.1 KG/M2 | SYSTOLIC BLOOD PRESSURE: 220 MMHG | WEIGHT: 170 LBS | HEIGHT: 61 IN | DIASTOLIC BLOOD PRESSURE: 100 MMHG

## 2021-05-04 DIAGNOSIS — M17.0 PRIMARY OSTEOARTHRITIS OF BOTH KNEES: Primary | ICD-10-CM

## 2021-05-04 DIAGNOSIS — E66.09 CLASS 1 OBESITY DUE TO EXCESS CALORIES WITHOUT SERIOUS COMORBIDITY WITH BODY MASS INDEX (BMI) OF 32.0 TO 32.9 IN ADULT: ICD-10-CM

## 2021-05-04 PROCEDURE — 99214 OFFICE O/P EST MOD 30 MIN: CPT | Performed by: ORTHOPAEDIC SURGERY

## 2021-05-04 PROCEDURE — 20610 DRAIN/INJ JOINT/BURSA W/O US: CPT | Performed by: ORTHOPAEDIC SURGERY

## 2021-05-04 RX ORDER — TRIAMCINOLONE ACETONIDE 40 MG/ML
80 INJECTION, SUSPENSION INTRA-ARTICULAR; INTRAMUSCULAR
Status: COMPLETED | OUTPATIENT
Start: 2021-05-04 | End: 2021-05-04

## 2021-05-04 RX ORDER — BUPIVACAINE HYDROCHLORIDE 2.5 MG/ML
3 INJECTION, SOLUTION EPIDURAL; INFILTRATION; INTRACAUDAL
Status: COMPLETED | OUTPATIENT
Start: 2021-05-04 | End: 2021-05-04

## 2021-05-04 RX ORDER — LIDOCAINE HYDROCHLORIDE 10 MG/ML
3 INJECTION, SOLUTION EPIDURAL; INFILTRATION; INTRACAUDAL; PERINEURAL
Status: COMPLETED | OUTPATIENT
Start: 2021-05-04 | End: 2021-05-04

## 2021-05-04 RX ADMIN — LIDOCAINE HYDROCHLORIDE 3 ML: 10 INJECTION, SOLUTION EPIDURAL; INFILTRATION; INTRACAUDAL; PERINEURAL at 08:57

## 2021-05-04 RX ADMIN — BUPIVACAINE HYDROCHLORIDE 3 ML: 2.5 INJECTION, SOLUTION EPIDURAL; INFILTRATION; INTRACAUDAL at 08:56

## 2021-05-04 RX ADMIN — TRIAMCINOLONE ACETONIDE 80 MG: 40 INJECTION, SUSPENSION INTRA-ARTICULAR; INTRAMUSCULAR at 08:56

## 2021-05-04 RX ADMIN — TRIAMCINOLONE ACETONIDE 80 MG: 40 INJECTION, SUSPENSION INTRA-ARTICULAR; INTRAMUSCULAR at 08:57

## 2021-05-04 RX ADMIN — LIDOCAINE HYDROCHLORIDE 3 ML: 10 INJECTION, SOLUTION EPIDURAL; INFILTRATION; INTRACAUDAL; PERINEURAL at 08:56

## 2021-05-04 RX ADMIN — BUPIVACAINE HYDROCHLORIDE 3 ML: 2.5 INJECTION, SOLUTION EPIDURAL; INFILTRATION; INTRACAUDAL at 08:57

## 2021-05-04 NOTE — PROGRESS NOTES
Procedure   Large Joint Arthrocentesis: R knee  Date/Time: 5/4/2021 8:56 AM  Consent given by: patient  Site marked: site marked  Timeout: Immediately prior to procedure a time out was called to verify the correct patient, procedure, equipment, support staff and site/side marked as required   Supporting Documentation  Indications: pain   Procedure Details  Location: knee - R knee  Preparation: Patient was prepped and draped in the usual sterile fashion  Needle size: 23 G  Approach: anterolateral  Medications administered: 3 mL bupivacaine (PF) 0.25 %; 3 mL lidocaine PF 1% 1 %; 80 mg triamcinolone acetonide 40 MG/ML  Patient tolerance: patient tolerated the procedure well with no immediate complications    Large Joint Arthrocentesis: L knee  Date/Time: 5/4/2021 8:57 AM  Consent given by: patient  Site marked: site marked  Timeout: Immediately prior to procedure a time out was called to verify the correct patient, procedure, equipment, support staff and site/side marked as required   Supporting Documentation  Indications: pain   Procedure Details  Location: knee - L knee  Preparation: Patient was prepped and draped in the usual sterile fashion  Needle size: 23 G  Approach: anterolateral  Medications administered: 3 mL bupivacaine (PF) 0.25 %; 3 mL lidocaine PF 1% 1 %; 80 mg triamcinolone acetonide 40 MG/ML  Patient tolerance: patient tolerated the procedure well with no immediate complications

## 2021-05-04 NOTE — PROGRESS NOTES
Orthopaedic Clinic Note: Knee Established Patient    Chief Complaint   Patient presents with   • Follow-up     3 months follow up Primary osteoarthritis of both knees        HPI    It has been 3  month(s) since Ms. Murray's last visit. She returns to clinic today for follow-up bilateral knee osteoarthritis.  She underwent cortisone injection in both knees little over 3 months ago.  The injections worked well for about 6 weeks.  Her pain is returned.  She is rating her pain 8/10 on the pain scale.  She is having recurrent swelling and stiffness in the knees and pain is worse with walking weightbearing activities.  She denies fevers chills or constitutional symptoms.  Overall she is doing significantly worse.  She is overweight with a BMI 32.1.    Past Medical History:   Diagnosis Date   • Allergic    • Anemia    • Depression    • Diabetes mellitus (CMS/HCC)    • History of heart attack    • Hyperlipidemia    • Hypertension       Past Surgical History:   Procedure Laterality Date   • BILATERAL BREAST REDUCTION     • BONE MARROW ASPIRATION     • HYSTERECTOMY  1990    x 2   • REDUCTION MAMMAPLASTY Bilateral     MORE THAN 20 YEARS AGO   • SINUS SURGERY     • TONSILLECTOMY        Family History   Problem Relation Age of Onset   • Arthritis Maternal Grandmother    • Hypertension Mother    • Hyperlipidemia Mother    • Thyroid disease Mother    • Diabetes Sister    • Crohn's disease Sister    • Hypertension Sister    • Heart attack Maternal Grandfather    • Breast cancer Neg Hx    • Ovarian cancer Neg Hx      Social History     Socioeconomic History   • Marital status: Single     Spouse name: Not on file   • Number of children: Not on file   • Years of education: Not on file   • Highest education level: Not on file   Tobacco Use   • Smoking status: Former Smoker     Packs/day: 2.00     Years: 30.00     Pack years: 60.00     Types: Cigarettes     Quit date:      Years since quittin.3   • Smokeless tobacco: Never  Used   Substance and Sexual Activity   • Alcohol use: No   • Drug use: No   • Sexual activity: Defer      Current Outpatient Medications on File Prior to Visit   Medication Sig Dispense Refill   • albuterol sulfate  (90 Base) MCG/ACT inhaler Inhale 2 puffs Every 6 (Six) Hours As Needed for Wheezing. 1 inhaler 2   • aspirin 81 MG EC tablet Take 1 tablet by mouth Daily. 30 tablet 5   • atorvastatin (LIPITOR) 80 MG tablet Take 1 tablet by mouth Daily. 30 tablet 5   • carvedilol (COREG) 25 MG tablet Take 1 tablet by mouth 2 (Two) Times a Day With Meals. 60 tablet 2   • ciprofloxacin (CILOXAN) 0.3 % ophthalmic solution Apply 2 drops in affected eye Q2H while awake x 2 days, then decrease use to Q4H x 5 days. Apply 1 drop in unaffected eye Q4H x 5days. 10 mL 0   • FLUoxetine (PROzac) 20 MG capsule Take 3 capsules by mouth Daily. 270 capsule 1   • linagliptin (TRADJENTA) 5 MG tablet tablet Take 1 tablet by mouth Daily. 30 tablet 5   • lisinopril (PRINIVIL,ZESTRIL) 40 MG tablet Take 1 tablet by mouth once daily 30 tablet 2   • loratadine (Claritin) 10 MG tablet Take 1 tablet by mouth Daily. 30 tablet 11   • nitroglycerin (NITROSTAT) 0.4 MG SL tablet Place 1 tablet under the tongue Every 5 (Five) Minutes As Needed for Chest Pain. Take no more than 3 doses in 15 minutes. 15 tablet 12   • Trelegy Ellipta 100-62.5-25 MCG/INH aerosol powder  INHALE ONE PUFF BY MOUTH DAILY 60 each 4     No current facility-administered medications on file prior to visit.      Allergies   Allergen Reactions   • Sulfa Antibiotics Rash        Review of Systems   Constitutional: Negative.    HENT: Negative.    Eyes: Negative.    Respiratory: Negative.    Cardiovascular: Negative.    Gastrointestinal: Negative.    Endocrine: Negative.    Genitourinary: Negative.    Musculoskeletal: Positive for arthralgias.   Skin: Negative.    Allergic/Immunologic: Negative.    Neurological: Negative.    Hematological: Negative.    Psychiatric/Behavioral:  "Negative.         The patient's Review of Systems was personally reviewed and confirmed as accurate.    Physical Exam  Blood pressure (!) 220/100, height 154.9 cm (60.98\"), weight 77.1 kg (170 lb), not currently breastfeeding.    Body mass index is 32.14 kg/m².    GENERAL APPEARANCE: awake, alert, oriented, in no acute distress and well developed, well nourished  LUNGS:  breathing nonlabored  EXTREMITIES: no clubbing, cyanosis  PERIPHERAL PULSES: palpable dorsalis pedis and posterior tibial pulses bilaterally.    GAIT:  Antalgic        ----------  Bilateral Knee Exam:  ----------  ALIGNMENT: severe varus, correctable to neutral  ----------  RANGE OF MOTION:  Decreased (5 - 120 degrees) with no extensor lag  LIGAMENTOUS STABILITY:   stable to varus and valgus stress at terminal extension and 30 degrees; retensioning of the MCL is appreciated with valgus stress at 30 degrees consistent with medial compartment degeneration  ----------  STRENGTH:  KNEE FLEXION 5/5  KNEE EXTENSION  5/5  ANKLE DORSIFLEXION  5/5  ANKLE PLANTARFLEXION  5/5  ----------  PAIN WITH PALPATION:global  KNEE EFFUSION: yes, mild effusion bilateral  PAIN WITH KNEE ROM: yes  PATELLAR CREPITUS:  yes, painful and symptomatic  ----------  SENSATION TO LIGHT TOUCH:  DEEP PERONEAL/SUPERFICIAL PERONEAL/SURAL/SAPHENOUS/TIBIAL:    intact  ----------  EDEMA:  no  ERYTHEMA:    no  WOUNDS/INCISIONS:   no  _____________________________________________________________________  _____________________________________________________________________    RADIOGRAPHIC FINDINGS:   No new imaging today    Assessment/Plan:   Diagnosis Plan   1. Primary osteoarthritis of both knees     2. Class 1 obesity due to excess calories without serious comorbidity with body mass index (BMI) of 32.0 to 32.9 in adult       The patient has failed conservative treatment measures and is a candidate for joint arthroplasty.  I discussed the joint arthroplasty surgical process as well as the " recovery and rehabilitation time frame.  The patient asked several questions regarding the joint arthroplasty surgery, which were answered accordingly.  Ultimately, the patient declines surgical intervention at this time and wishes to continue with conservative treatment measures.  Alternative conservative treatment measures were discussed including bracing, therapy, topical/oral anti-inflammatories, activity modification, and weight loss.  The patient considered these treatment options and wishes to proceed with corticosteroid injection(s) today.  Therefore we will proceed with corticosteroid injection(s) today.  Follow-up 3 months with repeat x-ray 4 views bilateral knees on return.    Patient has an elevated BMI. The patient has been instructed on various weight loss avenues including diet, portion control, calorie restriction, low/no impact exercise, referral to weight loss management and/or bariatric surgery.  It was explained that weight loss can improve joint pain alone by decreasing the joint reaction forces.  For every pound of weight change, the knee and hip joints see a 4 to 5 fold change in pressure.  Given these options, the patient will proceed with low calorie diet and low impact exercise.    Procedure Note:  I discussed with the patient the potential benefits of performing a therapeutic injections of the bilateral knees as well as potential risks including but not limited to infection, swelling, pain, bleeding, bruising, nerve/vessel damage, skin color changes, transient elevation in blood glucose levels, and fat atrophy. After informed consent and verifying correct patient, procedure site, and type of procedure, the areas were prepped with alcohol, ethyl chloride was used to numb the skin. Via the superior lateral approach, 3cc of 1% lidocaine, 3cc of 0.25% bupivicaine and 2 cc of 40mg/ml of Kenalog were each injected into the bilateral knees. The patient tolerated the procedures well. There were no  complications. A sterile dressing was placed over each injection site.      Keaton Aldrich MD  05/04/21  09:00 EDT

## 2021-05-11 ENCOUNTER — OFFICE VISIT (OUTPATIENT)
Dept: INTERNAL MEDICINE | Facility: CLINIC | Age: 66
End: 2021-05-11

## 2021-05-11 VITALS
OXYGEN SATURATION: 97 % | HEART RATE: 75 BPM | DIASTOLIC BLOOD PRESSURE: 100 MMHG | BODY MASS INDEX: 32 KG/M2 | SYSTOLIC BLOOD PRESSURE: 186 MMHG | HEIGHT: 60 IN | RESPIRATION RATE: 18 BRPM | WEIGHT: 163 LBS | TEMPERATURE: 97.2 F

## 2021-05-11 DIAGNOSIS — R07.89 OTHER CHEST PAIN: Primary | ICD-10-CM

## 2021-05-11 DIAGNOSIS — R42 DIZZINESS: ICD-10-CM

## 2021-05-11 DIAGNOSIS — I51.7 LEFT VENTRICULAR HYPERTROPHY BY ELECTROCARDIOGRAM: ICD-10-CM

## 2021-05-11 DIAGNOSIS — R39.15 URINARY URGENCY: ICD-10-CM

## 2021-05-11 DIAGNOSIS — R82.998 LEUKOCYTES IN URINE: ICD-10-CM

## 2021-05-11 DIAGNOSIS — Z86.79 HEART MURMUR AFTER RHEUMATIC HEART DISEASE: ICD-10-CM

## 2021-05-11 DIAGNOSIS — I16.0 HYPERTENSIVE URGENCY: ICD-10-CM

## 2021-05-11 DIAGNOSIS — I25.2 HISTORY OF MI (MYOCARDIAL INFARCTION): ICD-10-CM

## 2021-05-11 DIAGNOSIS — I10 ESSENTIAL HYPERTENSION: ICD-10-CM

## 2021-05-11 DIAGNOSIS — I73.9 PAD (PERIPHERAL ARTERY DISEASE) (HCC): ICD-10-CM

## 2021-05-11 DIAGNOSIS — R63.4 WEIGHT LOSS: ICD-10-CM

## 2021-05-11 DIAGNOSIS — R06.02 SHORTNESS OF BREATH: ICD-10-CM

## 2021-05-11 DIAGNOSIS — R01.1 HEART MURMUR AFTER RHEUMATIC HEART DISEASE: ICD-10-CM

## 2021-05-11 LAB
BILIRUB BLD-MCNC: ABNORMAL MG/DL
CLARITY, POC: CLEAR
COLOR UR: YELLOW
GLUCOSE UR STRIP-MCNC: NEGATIVE MG/DL
KETONES UR QL: NEGATIVE
LEUKOCYTE EST, POC: ABNORMAL
NITRITE UR-MCNC: NEGATIVE MG/ML
PH UR: 5 [PH] (ref 5–8)
PROT UR STRIP-MCNC: NEGATIVE MG/DL
RBC # UR STRIP: NEGATIVE /UL
SP GR UR: 1.02 (ref 1–1.03)
UROBILINOGEN UR QL: NORMAL

## 2021-05-11 PROCEDURE — 93000 ELECTROCARDIOGRAM COMPLETE: CPT | Performed by: NURSE PRACTITIONER

## 2021-05-11 PROCEDURE — 87186 SC STD MICRODIL/AGAR DIL: CPT | Performed by: NURSE PRACTITIONER

## 2021-05-11 PROCEDURE — 87086 URINE CULTURE/COLONY COUNT: CPT | Performed by: NURSE PRACTITIONER

## 2021-05-11 PROCEDURE — 99214 OFFICE O/P EST MOD 30 MIN: CPT | Performed by: NURSE PRACTITIONER

## 2021-05-11 PROCEDURE — 81003 URINALYSIS AUTO W/O SCOPE: CPT | Performed by: NURSE PRACTITIONER

## 2021-05-11 PROCEDURE — 87077 CULTURE AEROBIC IDENTIFY: CPT | Performed by: NURSE PRACTITIONER

## 2021-05-11 RX ORDER — AMLODIPINE BESYLATE 10 MG/1
10 TABLET ORAL DAILY
Qty: 30 TABLET | Refills: 5 | Status: SHIPPED | OUTPATIENT
Start: 2021-05-11 | End: 2021-05-14 | Stop reason: SDUPTHER

## 2021-05-11 NOTE — PROGRESS NOTES
"Chief Complaint   Patient presents with   • Chest Pain     x6 months on and off   • Dizziness   • Weight Loss     refill        HPI  Martha Murray is a 65 y.o. female presents with chest pain.  Associated symptoms include diaphoresis.  Pt denies any nausea.  She has been getting SOB with walking around.  She states that she cannot walk very far without getting SOB.  She does have a history of COPD.  The chest pain started about 6 months ago.  The pain is intermittent.  She describes the pain as a \"pressure\".  Pt denies any CP at this time. When she does have the pain it is located in the middle of her chest.  She states it does not radiate.   Pt states that several years ago she was seen at  for chest pain and told that her cardiac enzymes were high and she was on a NTG gtt.  She has a Rx for NTG but she is scared to take it.  Has not been followed by cardiology.  Last episode of CP was this am  Pt also complains of dizziness.  The dizziness start about the same time that the CP started.  Her blood pressure is elevated today.  Her blood pressure was even higher at her recent orthopedic appt.  Pt is controlled DM with last A1c <7.   She has chronic kidney disease.  She was referred to nephrology in Feb.  Pt states she did not go because she cannot afford it.     Pt also concerned about weight loss.  She has lost >20 lbs in about 6 months.  She has not been trying.    The following portions of the patient's history were reviewed and updated as appropriate: allergies, current medications, past family history, past medical history, past social history, past surgical history and problem list.    Subjective  Review of Systems   Constitutional: Positive for diaphoresis and unexpected weight loss. Negative for activity change, appetite change and fatigue.   HENT: Negative for congestion.    Respiratory: Positive for shortness of breath. Negative for cough.    Cardiovascular: Positive for chest pain. Negative for " "palpitations and leg swelling.   Gastrointestinal: Negative for abdominal pain, nausea, vomiting and GERD.   Neurological: Negative for dizziness, weakness and confusion.   Psychiatric/Behavioral: Negative for behavioral problems and decreased concentration.       Objective  Visit Vitals  BP (!) 186/100   Pulse 75   Temp 97.2 °F (36.2 °C) (Temporal)   Resp 18   Ht 152.4 cm (60\")   Wt 73.9 kg (163 lb)   SpO2 97%   BMI 31.83 kg/m²        Physical Exam  Vitals and nursing note reviewed.   HENT:      Head: Normocephalic.   Eyes:      Pupils: Pupils are equal, round, and reactive to light.   Cardiovascular:      Rate and Rhythm: Normal rate and regular rhythm.      Pulses: Normal pulses.      Heart sounds: Murmur heard.   Systolic murmur is present with a grade of 2/6.     Pulmonary:      Effort: Pulmonary effort is normal. No tachypnea or respiratory distress.      Breath sounds: Decreased breath sounds present.      Comments: Scattered faint wheezes  Musculoskeletal:      Cervical back: Normal range of motion.      Right lower leg: No edema.      Left lower leg: No edema.   Skin:     General: Skin is warm and dry.      Capillary Refill: Capillary refill takes less than 2 seconds.   Neurological:      General: No focal deficit present.      Mental Status: She is alert and oriented to person, place, and time.   Psychiatric:         Mood and Affect: Mood normal.         Behavior: Behavior normal.         Thought Content: Thought content normal.            ECG 12 Lead    Date/Time: 5/11/2021 9:41 AM  Performed by: Nishant Kapoor APRN  Authorized by: Nishant Kapoor APRN   Comparison: compared with previous ECG from 2/7/2019  Comparison to previous ECG: Left ventricular hypertrophy noted today  Rhythm: sinus rhythm  Rate: normal  Conduction: conduction normal  QRS axis: normal  Other findings: non-specific ST-T wave changes and left ventricular hypertrophy          Results for orders placed or performed in visit on " 05/11/21   POC Urinalysis Dipstick, Automated    Specimen: Urine   Result Value Ref Range    Color Yellow Yellow, Straw, Dark Yellow, Tata    Clarity, UA Clear Clear    Specific Gravity  1.020 1.005 - 1.030    pH, Urine 5.0 5.0 - 8.0    Leukocytes 500 Rylee/ul (A) Negative    Nitrite, UA Negative Negative    Protein, POC Negative Negative mg/dL    Glucose, UA Negative Negative, 1000 mg/dL (3+) mg/dL    Ketones, UA Negative Negative    Urobilinogen, UA Normal Normal    Bilirubin 1 mg/dL (A) Negative    Blood, UA Negative Negative         Assessment and Plan  Diagnoses and all orders for this visit:    1. Other chest pain (Primary)  -     Ambulatory Referral to Cardiology  -     ECG 12 Lead    2. Shortness of breath    3. Hypertensive urgency  -     amLODIPine (NORVASC) 10 MG tablet; Take 1 tablet by mouth Daily.  Dispense: 30 tablet; Refill: 5    4. Weight loss    5. Heart murmur after rheumatic heart disease    6. Dizziness    7. Leukocytes in urine  -     Urine Culture - Urine, Urine, Clean Catch    8. Urinary urgency  -     POC Urinalysis Dipstick, Automated  -     Urine Culture - Urine, Urine, Clean Catch; Future    9. Left ventricular hypertrophy by electrocardiogram    CP on and off x6 months  Dizziness likely r/t BP being high   f/u with Dr Stacy for ongoing weight loss  EKG done - L ventricular hypertrophy with ST-T changes  Add Amlodipine for better BP control  Didn't see nephrology - cannot afford  Pt agrees to see cardiology - will refer urgently  Murmur noted - chronic    Return in about 2 weeks (around 5/25/2021) for Recheck blood pressure.    Nishant Kapoor, NAYELI

## 2021-05-12 DIAGNOSIS — I25.2 HISTORY OF MI (MYOCARDIAL INFARCTION): ICD-10-CM

## 2021-05-12 DIAGNOSIS — I73.9 PAD (PERIPHERAL ARTERY DISEASE) (HCC): ICD-10-CM

## 2021-05-12 DIAGNOSIS — I10 ESSENTIAL HYPERTENSION: Primary | ICD-10-CM

## 2021-05-12 RX ORDER — CARVEDILOL 12.5 MG/1
TABLET ORAL
Qty: 60 TABLET | Refills: 2 | OUTPATIENT
Start: 2021-05-12

## 2021-05-12 RX ORDER — CARVEDILOL 25 MG/1
25 TABLET ORAL 2 TIMES DAILY WITH MEALS
Qty: 60 TABLET | Refills: 2 | Status: SHIPPED | OUTPATIENT
Start: 2021-05-12 | End: 2021-05-14 | Stop reason: SDUPTHER

## 2021-05-13 PROBLEM — D72.829 LEUKOCYTOSIS: Status: RESOLVED | Noted: 2019-03-28 | Resolved: 2021-05-13

## 2021-05-13 PROBLEM — I77.1 ARTERIAL INSUFFICIENCY WITH ISCHEMIC ULCER: Status: RESOLVED | Noted: 2019-07-17 | Resolved: 2021-05-13

## 2021-05-13 PROBLEM — I73.9 PAD (PERIPHERAL ARTERY DISEASE): Status: RESOLVED | Noted: 2019-06-21 | Resolved: 2021-05-13

## 2021-05-13 PROBLEM — I73.9 PERIPHERAL VASCULAR DISEASE OF LOWER EXTREMITY WITH ULCERATION: Status: RESOLVED | Noted: 2019-07-17 | Resolved: 2021-05-13

## 2021-05-13 PROBLEM — E78.5 HYPERLIPIDEMIA LDL GOAL <70: Chronic | Status: ACTIVE | Noted: 2017-08-28

## 2021-05-13 PROBLEM — I10 ESSENTIAL HYPERTENSION: Chronic | Status: ACTIVE | Noted: 2017-08-25

## 2021-05-13 PROBLEM — L97.909 PERIPHERAL VASCULAR DISEASE OF LOWER EXTREMITY WITH ULCERATION: Status: RESOLVED | Noted: 2019-07-17 | Resolved: 2021-05-13

## 2021-05-13 PROBLEM — D75.839 THROMBOCYTOSIS: Status: RESOLVED | Noted: 2019-03-28 | Resolved: 2021-05-13

## 2021-05-13 PROBLEM — J41.0 SIMPLE CHRONIC BRONCHITIS: Status: RESOLVED | Noted: 2019-02-15 | Resolved: 2021-05-13

## 2021-05-13 PROBLEM — L98.499 ARTERIAL INSUFFICIENCY WITH ISCHEMIC ULCER (HCC): Status: RESOLVED | Noted: 2019-07-17 | Resolved: 2021-05-13

## 2021-05-13 PROBLEM — I20.9 ANGINA PECTORIS: Status: ACTIVE | Noted: 2019-10-17

## 2021-05-13 PROBLEM — R07.89 OTHER CHEST PAIN: Status: RESOLVED | Noted: 2019-02-15 | Resolved: 2021-05-13

## 2021-05-13 NOTE — PROGRESS NOTES
"[facility]  Cardiology Consultation Note     Martha Murray  1955  Requesting Provider: NAYELI Hines  PCP: Julia Stacy MD     ID:  Martha Murray is a 65 y.o. female who resides in [place], [place]    REASON FOR CONSULTATION:    • Chest pain  • Uncontrolled hypertension         Dear Nishant:    Thank you for referring Martha Murray to to me for evaluation of chest pain.  The patient is a 65-year-old female with diet-controlled diabetes, hyperlipidemia, and uncontrolled hypertension. She complains of fatigue and shortness of breath as well as chest pain. The patient states she had an episode of chest pain this morning while driving that lasted around 5 seconds. The pain is located in the middle of her chest. She states the chest pain can happen with and without activity. The episodes tend to be around seconds long. The patient also reports that after the last couple episodes of chest pain, she then had muscle spasms in her neck as well as occasional numbness in her arm and dizziness. She will also feel as if she is going to \"pass out\". The patient states that she will have to sit down 3 to 4 times while doing yard work because she will feel tired. She also endorses shortness of breath when walking a lot at her job. The patient reports that 5 to 7 years ago she did have chest pains that lasted longer periods of time. At that time, she went to the emergency room at [facility] for chest pain and was given nitroglycerin. She also had a coronary angiogram done and did not have any blockages on testing.     The patient reports that her blood pressure has remained elevated for the past 3 to 4 years. She is compliant with her medication regimen. She states her systolic blood pressure on 05/11/2021 at a different doctor's office was above 200 mmHg. She states it is constantly elevated. The patient was previously referred to a nephrologist but did not " "receive an appointment and she states previous tests have shown her kidneys are \"not doing well\". She does not have sleep apnea.     The patient states she has bad knees and has difficulty walking or climbing stairs.       Past Medical History, Past Surgical History, Family history, Social History, and Medications were all reviewed with the patient today and updated as necessary.       Current Outpatient Medications:   •  albuterol sulfate  (90 Base) MCG/ACT inhaler, Inhale 2 puffs Every 6 (Six) Hours As Needed for Wheezing., Disp: 1 inhaler, Rfl: 2  •  amLODIPine (NORVASC) 10 MG tablet, Take 1 tablet by mouth Daily., Disp: 30 tablet, Rfl: 5  •  aspirin (aspirin) 81 MG EC tablet, Take 1 tablet by mouth Daily., Disp: 30 tablet, Rfl: 5  •  atorvastatin (LIPITOR) 80 MG tablet, Take 1 tablet by mouth Daily., Disp: 30 tablet, Rfl: 5  •  carvedilol (COREG) 25 MG tablet, Take 1 tablet by mouth 2 (Two) Times a Day With Meals., Disp: 180 tablet, Rfl: 1  •  ciprofloxacin (CILOXAN) 0.3 % ophthalmic solution, Apply 2 drops in affected eye Q2H while awake x 2 days, then decrease use to Q4H x 5 days. Apply 1 drop in unaffected eye Q4H x 5days., Disp: 10 mL, Rfl: 0  •  FLUoxetine (PROzac) 20 MG capsule, Take 3 capsules by mouth Daily., Disp: 270 capsule, Rfl: 1  •  linagliptin (TRADJENTA) 5 MG tablet tablet, Take 1 tablet by mouth Daily., Disp: 30 tablet, Rfl: 5  •  lisinopril (PRINIVIL,ZESTRIL) 40 MG tablet, Take 1 tablet by mouth once daily, Disp: 30 tablet, Rfl: 2  •  loratadine (Claritin) 10 MG tablet, Take 1 tablet by mouth Daily., Disp: 30 tablet, Rfl: 11  •  nitroglycerin (NITROSTAT) 0.4 MG SL tablet, Place 1 tablet under the tongue Every 5 (Five) Minutes As Needed for Chest Pain. Take no more than 3 doses in 15 minutes., Disp: 25 tablet, Rfl: 5  •  Trelegy Ellipta 100-62.5-25 MCG/INH aerosol powder , INHALE ONE PUFF BY MOUTH DAILY, Disp: 60 each, Rfl: 4    Allergies   Allergen Reactions   • Sulfa Antibiotics Rash " "        Past Medical History:   Diagnosis Date   • Allergic    • Anemia    • Depression    • Diabetes mellitus (CMS/HCC)    • History of heart attack    • Hyperlipidemia    • Hypertension        Past Surgical History:   Procedure Laterality Date   • BILATERAL BREAST REDUCTION     • BONE MARROW ASPIRATION     • HYSTERECTOMY  1990    x 2   • REDUCTION MAMMAPLASTY Bilateral     MORE THAN 20 YEARS AGO   • SINUS SURGERY     • TONSILLECTOMY         Family History   Problem Relation Age of Onset   • Arthritis Maternal Grandmother    • Hypertension Mother    • Hyperlipidemia Mother    • Thyroid disease Mother    • Diabetes Sister    • Crohn's disease Sister    • Hypertension Sister    • Heart attack Maternal Grandfather    • Breast cancer Neg Hx    • Ovarian cancer Neg Hx        Social History     Tobacco Use   • Smoking status: Former Smoker     Packs/day: 2.00     Years: 30.00     Pack years: 60.00     Types: Cigarettes     Quit date:      Years since quittin.3   • Smokeless tobacco: Never Used   Substance Use Topics   • Alcohol use: No       Review of Systems   Cardiovascular: Positive for chest pain.   Respiratory: Positive for cough and shortness of breath.                /82 (BP Location: Right arm, Patient Position: Sitting)   Pulse 62   Ht 154.9 cm (61\")   Wt 76.5 kg (168 lb 9.6 oz)   SpO2 98%   BMI 31.86 kg/m²        Constitutional:       Appearance: Healthy appearance. Well-developed.   Eyes:      General: No scleral icterus.  HENT:      Head: Normocephalic and atraumatic.   Neck:      Vascular: No carotid bruit or JVD. JVD normal.   Pulmonary:      Effort: Pulmonary effort is normal.      Breath sounds: Normal breath sounds.   Cardiovascular:      Normal rate. Regular rhythm.      Murmurs: There is a systolic murmur.      No gallop.   Musculoskeletal:      Extremities: No clubbing present.Skin:     General: Skin is warm and dry. There is no cyanosis.   Neurological:      Mental Status: " Alert.   Psychiatric:         Attention and Perception: Attention normal.         Behavior: Behavior normal.             ECG 12 Lead    Date/Time: 5/14/2021 8:38 AM  Performed by: Tommie Saha IV, MD  Authorized by: Tommie Saha IV, MD   Comparison: not compared with previous ECG   Previous ECG: no previous ECG available  Rhythm: sinus rhythm  BPM: 62  Other findings: left ventricular hypertrophy    Clinical impression: abnormal EKG            Lab Results   Component Value Date    CHOL 107 11/10/2020    HDL 37 (L) 11/10/2020    LDL 51 11/10/2020    VLDL 19 11/10/2020            Problem List Items Addressed This Visit        Cardiology Problems    Atypical angina (CMS/HCC)    Overview     Intermittent short-lived chest pain symptoms  EKG (5/14/2021): Sinus rhythm with LVH         Current Assessment & Plan     · Atypical chest pain symptoms  · Suspect uncontrolled hypertension may be playing a role  · Risk stratify with pharmacologic nuclear stress test  · Obtain cardiac catheterization report from [facility] circa 2016  · Continue aspirin, beta-blocker, statin         Relevant Medications    aspirin (aspirin) 81 MG EC tablet    amLODIPine (NORVASC) 10 MG tablet    carvedilol (COREG) 25 MG tablet    nitroglycerin (NITROSTAT) 0.4 MG SL tablet    Other Relevant Orders    ECG 12 Lead    Stress Test With Myocardial Perfusion (1 Day)    CBC (No Diff)    Essential hypertension - Primary (Chronic)    Overview     • Target blood pressure <130/80 mmHg         Current Assessment & Plan     · Uncontrolled  · Associated with CKD stage 3-4  · On multiple antihypertensive medications  · Obtain TSH, repeat CMP  · Renal artery duplex  · Echocardiogram to assess for LVH         Relevant Medications    amLODIPine (NORVASC) 10 MG tablet    carvedilol (COREG) 25 MG tablet    Other Relevant Orders    Adult Transthoracic Echo Complete W/ Cont if Necessary Per Protocol    TSH+Free T4    Duplex Renal Artery - Bilateral  Complete CAR    Hyperlipidemia LDL goal <70 (Chronic)    Overview     • High intensity statin therapy indicated given the presence of PAD         Current Assessment & Plan     · Obtain CMP and lipid         Relevant Medications    atorvastatin (LIPITOR) 80 MG tablet    Other Relevant Orders    Comprehensive Metabolic Panel    Lipid Panel       Other    Diet-controlled diabetes mellitus (CMS/[facility])    Current Assessment & Plan     · Obtain hemoglobin A1c  · Consider SGLT2 inhibitor therapy if renal function allows         Relevant Orders    Hemoglobin A1c      Other Visit Diagnoses     Chronic congestive heart failure, unspecified heart failure type (CMS/[facility])        Relevant Medications    amLODIPine (NORVASC) 10 MG tablet    carvedilol (COREG) 25 MG tablet    nitroglycerin (NITROSTAT) 0.4 MG SL tablet    Other Relevant Orders    ECG 12 Lead    proBNP                   · Echo  · Pharmacologic nuclear stress test  · Renal artery duplex   · Lab work today to include CMP, lipids, CBC, TSH, proBNP, hemoglobin A1c  · If echo and proBNP suggest diastolic heart failure, will start furosemide  Return in about 4 weeks (around 6/11/2021).          CHRISTINA Saha MD, [facility], [facility]  Interventional Cardiology  05/14/21  09:01 EDT     Scribed for Tommie Saha IV, MD by Rosa Liu.  05/14/21   10:26 EDT    I have personally performed the services described in this document as scribed by the above individual, and it is both accurate and complete.  Tommie aSha IV, MD  5/14/2021  10:35 EDT

## 2021-05-14 ENCOUNTER — OFFICE VISIT (OUTPATIENT)
Dept: CARDIOLOGY | Facility: CLINIC | Age: 66
End: 2021-05-14

## 2021-05-14 VITALS
HEIGHT: 61 IN | SYSTOLIC BLOOD PRESSURE: 164 MMHG | OXYGEN SATURATION: 98 % | HEART RATE: 62 BPM | BODY MASS INDEX: 31.83 KG/M2 | WEIGHT: 168.6 LBS | DIASTOLIC BLOOD PRESSURE: 82 MMHG

## 2021-05-14 DIAGNOSIS — I20.8 ATYPICAL ANGINA (HCC): ICD-10-CM

## 2021-05-14 DIAGNOSIS — I50.9 CHRONIC CONGESTIVE HEART FAILURE, UNSPECIFIED HEART FAILURE TYPE (HCC): ICD-10-CM

## 2021-05-14 DIAGNOSIS — E11.9 DIET-CONTROLLED DIABETES MELLITUS (HCC): ICD-10-CM

## 2021-05-14 DIAGNOSIS — I10 ESSENTIAL HYPERTENSION: Primary | ICD-10-CM

## 2021-05-14 DIAGNOSIS — E78.5 HYPERLIPIDEMIA LDL GOAL <70: ICD-10-CM

## 2021-05-14 PROBLEM — I20.89 ATYPICAL ANGINA: Status: ACTIVE | Noted: 2019-10-17

## 2021-05-14 LAB
BACTERIA SPEC AEROBE CULT: ABNORMAL
BACTERIA SPEC AEROBE CULT: ABNORMAL

## 2021-05-14 PROCEDURE — 93000 ELECTROCARDIOGRAM COMPLETE: CPT | Performed by: INTERNAL MEDICINE

## 2021-05-14 PROCEDURE — 99204 OFFICE O/P NEW MOD 45 MIN: CPT | Performed by: INTERNAL MEDICINE

## 2021-05-14 RX ORDER — CARVEDILOL 25 MG/1
25 TABLET ORAL 2 TIMES DAILY WITH MEALS
Qty: 180 TABLET | Refills: 1
Start: 2021-05-14 | End: 2022-03-30

## 2021-05-14 RX ORDER — ATORVASTATIN CALCIUM 80 MG/1
80 TABLET, FILM COATED ORAL DAILY
Qty: 30 TABLET | Refills: 5
Start: 2021-05-14 | End: 2021-12-20

## 2021-05-14 RX ORDER — AMLODIPINE BESYLATE 10 MG/1
10 TABLET ORAL DAILY
Qty: 30 TABLET | Refills: 5
Start: 2021-05-14 | End: 2022-05-31

## 2021-05-14 RX ORDER — NITROFURANTOIN 25; 75 MG/1; MG/1
100 CAPSULE ORAL EVERY 12 HOURS SCHEDULED
Qty: 14 CAPSULE | Refills: 0 | Status: SHIPPED | OUTPATIENT
Start: 2021-05-14 | End: 2021-05-21

## 2021-05-14 RX ORDER — ASPIRIN 81 MG/1
81 TABLET ORAL DAILY
Qty: 30 TABLET | Refills: 5 | Status: SHIPPED | OUTPATIENT
Start: 2021-05-14

## 2021-05-14 RX ORDER — NITROGLYCERIN 0.4 MG/1
0.4 TABLET SUBLINGUAL
Qty: 25 TABLET | Refills: 5 | Status: SHIPPED | OUTPATIENT
Start: 2021-05-14

## 2021-05-14 NOTE — ASSESSMENT & PLAN NOTE
· Atypical chest pain symptoms  · Suspect uncontrolled hypertension may be playing a role  · Risk stratify with pharmacologic nuclear stress test  · Obtain cardiac catheterization report from [facility] circa 2016  · Continue aspirin, beta-blocker, statin

## 2021-05-14 NOTE — ASSESSMENT & PLAN NOTE
· Uncontrolled  · Associated with CKD stage 3-4  · On multiple antihypertensive medications  · Obtain TSH, repeat CMP  · Renal artery duplex  · Echocardiogram to assess for LVH

## 2021-05-15 LAB
ALBUMIN SERPL-MCNC: 4.2 G/DL (ref 3.5–5.2)
ALBUMIN/GLOB SERPL: 2.3 G/DL
ALP SERPL-CCNC: 105 U/L (ref 39–117)
ALT SERPL-CCNC: 16 U/L (ref 1–33)
AST SERPL-CCNC: 11 U/L (ref 1–32)
BILIRUB SERPL-MCNC: 1 MG/DL (ref 0–1.2)
BUN SERPL-MCNC: 21 MG/DL (ref 8–23)
BUN/CREAT SERPL: 16.7 (ref 7–25)
CALCIUM SERPL-MCNC: 9.7 MG/DL (ref 8.6–10.5)
CHLORIDE SERPL-SCNC: 104 MMOL/L (ref 98–107)
CHOLEST SERPL-MCNC: 114 MG/DL (ref 0–200)
CO2 SERPL-SCNC: 24.5 MMOL/L (ref 22–29)
CREAT SERPL-MCNC: 1.26 MG/DL (ref 0.57–1)
ERYTHROCYTE [DISTWIDTH] IN BLOOD BY AUTOMATED COUNT: 16.3 % (ref 12.3–15.4)
GLOBULIN SER CALC-MCNC: 1.8 GM/DL
GLUCOSE SERPL-MCNC: 116 MG/DL (ref 65–99)
HBA1C MFR BLD: 6.5 % (ref 4.8–5.6)
HCT VFR BLD AUTO: 45.9 % (ref 34–46.6)
HDLC SERPL-MCNC: 45 MG/DL (ref 40–60)
HGB BLD-MCNC: 14.7 G/DL (ref 12–15.9)
LDLC SERPL CALC-MCNC: 53 MG/DL (ref 0–100)
MCH RBC QN AUTO: 25.2 PG (ref 26.6–33)
MCHC RBC AUTO-ENTMCNC: 32 G/DL (ref 31.5–35.7)
MCV RBC AUTO: 78.6 FL (ref 79–97)
NT-PROBNP SERPL-MCNC: 1429 PG/ML (ref 0–287)
PLATELET # BLD AUTO: 932 10*3/MM3 (ref 140–450)
POTASSIUM SERPL-SCNC: 5 MMOL/L (ref 3.5–5.2)
PROT SERPL-MCNC: 6 G/DL (ref 6–8.5)
RBC # BLD AUTO: 5.84 10*6/MM3 (ref 3.77–5.28)
SODIUM SERPL-SCNC: 139 MMOL/L (ref 136–145)
T4 FREE SERPL-MCNC: 1.5 NG/DL (ref 0.93–1.7)
TRIGL SERPL-MCNC: 79 MG/DL (ref 0–150)
TSH SERPL DL<=0.005 MIU/L-ACNC: 1.45 UIU/ML (ref 0.27–4.2)
VLDLC SERPL CALC-MCNC: 16 MG/DL (ref 5–40)
WBC # BLD AUTO: 14.79 10*3/MM3 (ref 3.4–10.8)

## 2021-05-17 NOTE — PROGRESS NOTES
Give lasix 40 and kdur 10 meq daily.  Reduce lisinprol to 20 mg daily.  BMP in 2 weeks (prior to next visit).

## 2021-05-18 DIAGNOSIS — I10 ESSENTIAL HYPERTENSION: Primary | ICD-10-CM

## 2021-05-18 RX ORDER — FUROSEMIDE 40 MG/1
40 TABLET ORAL DAILY
Qty: 30 TABLET | Refills: 3 | Status: SHIPPED | OUTPATIENT
Start: 2021-05-18 | End: 2021-07-28 | Stop reason: SDUPTHER

## 2021-05-18 RX ORDER — POTASSIUM CHLORIDE 750 MG/1
10 TABLET, FILM COATED, EXTENDED RELEASE ORAL DAILY
Qty: 30 TABLET | Refills: 3 | Status: SHIPPED | OUTPATIENT
Start: 2021-05-18 | End: 2021-07-28 | Stop reason: SDUPTHER

## 2021-05-18 RX ORDER — LISINOPRIL 20 MG/1
20 TABLET ORAL DAILY
Qty: 30 TABLET | Refills: 3 | Status: SHIPPED | OUTPATIENT
Start: 2021-05-18 | End: 2021-07-28 | Stop reason: SDUPTHER

## 2021-05-18 NOTE — TELEPHONE ENCOUNTER
----- Message from Tommie Saha IV, MD sent at 5/17/2021  1:39 PM EDT -----  Give lasix 40 and kdur 10 meq daily.  Reduce lisinprol to 20 mg daily.  BMP in 2 weeks (prior to next visit).

## 2021-05-25 ENCOUNTER — OFFICE VISIT (OUTPATIENT)
Dept: INTERNAL MEDICINE | Facility: CLINIC | Age: 66
End: 2021-05-25

## 2021-05-25 VITALS
SYSTOLIC BLOOD PRESSURE: 116 MMHG | HEART RATE: 75 BPM | DIASTOLIC BLOOD PRESSURE: 70 MMHG | RESPIRATION RATE: 20 BRPM | OXYGEN SATURATION: 99 % | BODY MASS INDEX: 30.4 KG/M2 | TEMPERATURE: 97.6 F | WEIGHT: 161 LBS | HEIGHT: 61 IN

## 2021-05-25 DIAGNOSIS — Z12.11 COLON CANCER SCREENING: ICD-10-CM

## 2021-05-25 DIAGNOSIS — I10 ESSENTIAL HYPERTENSION: ICD-10-CM

## 2021-05-25 DIAGNOSIS — Z12.39 BREAST SCREENING: Primary | ICD-10-CM

## 2021-05-25 DIAGNOSIS — R63.4 UNINTENTIONAL WEIGHT LOSS: ICD-10-CM

## 2021-05-25 PROCEDURE — 99214 OFFICE O/P EST MOD 30 MIN: CPT | Performed by: INTERNAL MEDICINE

## 2021-05-25 NOTE — PROGRESS NOTES
Office Note      Date: 2021  Patient Name: Martha Murray  MRN: [id number]  : 1955    Chief Complaint   Patient presents with   • Weight Loss   • Hypertension       History of Present Illness: Martha Murray is a 65 y.o. female who presents for Weight Loss and Hypertension. Has unintentional weight loss of about 10 lbs, has gone down 2 pant sizes. Is less active than usual. Has fatigue that is chronic. TSH noramal this month. Lasix was recently initiated but was having weight loss prior to it.   Colonoscopy overdue, mentions biopsy of polyp done  mammo normal 2019  Had hysterectomy 2/2 uterine mass, had follow up normal paps after.   She is a non smoker, quit smoking more than 20 years ago.   HTN- BP improved today. Notes lasix helped bring down BP.   Works at [employer]   No NS, fever, chills  Subjective      Review of Systems:   Pertinent review of systems per HPI.    Review of Systems   Constitutional: Positive for fatigue and unexpected weight change. Negative for activity change, appetite change, chills, diaphoresis and fever.   HENT: Negative for congestion, dental problem, drooling, ear discharge, ear pain, facial swelling, hearing loss and mouth sores.    Eyes: Negative for pain, discharge and itching.   Respiratory: Negative for apnea, cough, choking, chest tightness and shortness of breath.    Cardiovascular: Negative for chest pain, palpitations and leg swelling.   Gastrointestinal: Negative for abdominal distention, abdominal pain, blood in stool, constipation and diarrhea.   Endocrine: Negative for cold intolerance, heat intolerance, polydipsia and polyuria.   Genitourinary: Negative for difficulty urinating, dysuria, frequency and hematuria.   Skin: Negative for color change, pallor, rash and wound.   Allergic/Immunologic: Negative for environmental allergies, food allergies and immunocompromised state.   Neurological: Negative for dizziness, weakness and light-headedness.  "  Psychiatric/Behavioral: Negative for agitation, behavioral problems, confusion, decreased concentration and self-injury. The patient is not nervous/anxious.    All other systems reviewed and are negative.    Allergies   Allergen Reactions   • Sulfa Antibiotics Rash       Objective     Physical Exam:  Vital Signs:   Vitals:    05/25/21 0846   BP: 116/70   Pulse: 75   Resp: 20   Temp: 97.6 °F (36.4 °C)   TempSrc: Temporal   SpO2: 99%   Weight: 73 kg (161 lb)   Height: 154.9 cm (61\")      Body mass index is 30.42 kg/m².    Physical Exam  Vitals and nursing note reviewed.   Constitutional:       General: She is not in acute distress.     Appearance: She is well-developed.   HENT:      Head: Normocephalic and atraumatic.      Right Ear: External ear normal.      Left Ear: External ear normal.   Eyes:      General: No scleral icterus.        Right eye: No discharge.         Left eye: No discharge.      Conjunctiva/sclera: Conjunctivae normal.   Cardiovascular:      Rate and Rhythm: Normal rate and regular rhythm.      Heart sounds: Normal heart sounds. No murmur heard.   No friction rub. No gallop.    Pulmonary:      Effort: Pulmonary effort is normal. No respiratory distress.      Breath sounds: Normal breath sounds. No wheezing or rales.   Skin:     General: Skin is warm and dry.      Coloration: Skin is not pale.         Assessment / Plan      Assessment & Plan:    1. Breast screening    - Mammo Screening Digital Tomosynthesis Bilateral With CAD; Future    2. Colon cancer screening    - Ambulatory Referral to Gastroenterology    3. Essential hypertension  BP well controlled today, continue current regimen.     4. Unintentional weight loss  Will do routine cancer screenings. Continue to monitor.       Julia Stacy MD  05/25/2021     Please note that portions of this note may have been completed with a voice recognition program. Efforts were made to edit the dictations, but occasionally words are mistranscribed.  "

## 2021-06-15 ENCOUNTER — HOSPITAL ENCOUNTER (OUTPATIENT)
Dept: MAMMOGRAPHY | Facility: HOSPITAL | Age: 66
Discharge: HOME | End: 2021-06-15
Admitting: INTERNAL MEDICINE

## 2021-06-15 DIAGNOSIS — Z12.39 BREAST SCREENING: ICD-10-CM

## 2021-06-15 PROCEDURE — 77067 SCR MAMMO BI INCL CAD: CPT

## 2021-06-15 PROCEDURE — 77063 BREAST TOMOSYNTHESIS BI: CPT

## 2021-06-21 ENCOUNTER — OFFICE VISIT (OUTPATIENT)
Dept: INTERNAL MEDICINE | Facility: CLINIC | Age: 66
End: 2021-06-21

## 2021-06-21 ENCOUNTER — LAB (OUTPATIENT)
Dept: LAB | Facility: HOSPITAL | Age: 66
End: 2021-06-21

## 2021-06-21 VITALS
SYSTOLIC BLOOD PRESSURE: 112 MMHG | BODY MASS INDEX: 30.21 KG/M2 | HEART RATE: 68 BPM | DIASTOLIC BLOOD PRESSURE: 74 MMHG | WEIGHT: 160 LBS | TEMPERATURE: 97.3 F | RESPIRATION RATE: 16 BRPM | OXYGEN SATURATION: 98 % | HEIGHT: 61 IN

## 2021-06-21 DIAGNOSIS — B37.2 CANDIDA INFECTION OF FLEXURAL SKIN: ICD-10-CM

## 2021-06-21 DIAGNOSIS — N30.00 ACUTE CYSTITIS WITHOUT HEMATURIA: Primary | ICD-10-CM

## 2021-06-21 DIAGNOSIS — R82.2 BILIURIA: ICD-10-CM

## 2021-06-21 DIAGNOSIS — E11.51 TYPE 2 DIABETES MELLITUS WITH DIABETIC PERIPHERAL ANGIOPATHY WITHOUT GANGRENE, WITHOUT LONG-TERM CURRENT USE OF INSULIN (HCC): ICD-10-CM

## 2021-06-21 DIAGNOSIS — M54.50 LOW BACK PAIN WITHOUT SCIATICA, UNSPECIFIED BACK PAIN LATERALITY, UNSPECIFIED CHRONICITY: ICD-10-CM

## 2021-06-21 LAB
BILIRUB BLD-MCNC: ABNORMAL MG/DL
CLARITY, POC: ABNORMAL
COLOR UR: ABNORMAL
GLUCOSE UR STRIP-MCNC: NEGATIVE MG/DL
KETONES UR QL: NEGATIVE
LEUKOCYTE EST, POC: ABNORMAL
NITRITE UR-MCNC: NEGATIVE MG/ML
PH UR: 5 [PH] (ref 5–8)
PROT UR STRIP-MCNC: ABNORMAL MG/DL
RBC # UR STRIP: NEGATIVE /UL
SP GR UR: 1.02 (ref 1–1.03)
UROBILINOGEN UR QL: NORMAL

## 2021-06-21 PROCEDURE — 87086 URINE CULTURE/COLONY COUNT: CPT

## 2021-06-21 PROCEDURE — 81003 URINALYSIS AUTO W/O SCOPE: CPT | Performed by: INTERNAL MEDICINE

## 2021-06-21 PROCEDURE — 99214 OFFICE O/P EST MOD 30 MIN: CPT | Performed by: INTERNAL MEDICINE

## 2021-06-21 RX ORDER — NITROFURANTOIN 25; 75 MG/1; MG/1
100 CAPSULE ORAL 2 TIMES DAILY
Qty: 10 CAPSULE | Refills: 0 | Status: SHIPPED | OUTPATIENT
Start: 2021-06-21 | End: 2021-07-28

## 2021-06-21 RX ORDER — LINAGLIPTIN 5 MG/1
TABLET, FILM COATED ORAL
Qty: 30 TABLET | Refills: 4 | Status: SHIPPED | OUTPATIENT
Start: 2021-06-21 | End: 2022-01-20

## 2021-06-21 RX ORDER — NYSTATIN 100000 [USP'U]/G
POWDER TOPICAL 4 TIMES DAILY
Qty: 45 G | Refills: 10 | Status: SHIPPED | OUTPATIENT
Start: 2021-06-21 | End: 2021-06-25

## 2021-06-21 NOTE — PROGRESS NOTES
Office Note      Date: 2021  Patient Name: Martha Murray  MRN: [id number]  : 1955    Chief Complaint   Patient presents with   • Urinary Tract Infection       History of Present Illness: Martha Murray is a 66 y.o. female who presents for Urinary Tract Infection.  Recently treated for UTI last month.  Urine culture reviewed and sensitive to Macrobid.  Also mentions she was recently treated with another antibiotic from online doctor but cannot recall name of it.  Having dysuria.  Also having bilateral inguinal red rash that is itchy with raw sensation.    Subjective      Review of Systems:   Pertinent review of systems per HPI.    Review of Systems   Constitutional: Negative for activity change, appetite change, chills, diaphoresis, fatigue, fever and unexpected weight change.   HENT: Negative for congestion, dental problem, drooling, ear discharge, ear pain, facial swelling, hearing loss and mouth sores.    Eyes: Negative for pain, discharge and itching.   Respiratory: Negative for apnea, cough, choking, chest tightness and shortness of breath.    Cardiovascular: Negative for chest pain, palpitations and leg swelling.   Gastrointestinal: Negative for abdominal distention, abdominal pain, blood in stool, constipation and diarrhea.   Endocrine: Negative for cold intolerance, heat intolerance, polydipsia and polyuria.   Genitourinary: Positive for dysuria. Negative for difficulty urinating, frequency and hematuria.   Skin: Positive for rash. Negative for color change, pallor and wound.   Allergic/Immunologic: Negative for environmental allergies, food allergies and immunocompromised state.   Neurological: Negative for dizziness, weakness and light-headedness.   Psychiatric/Behavioral: Negative for agitation, behavioral problems, confusion, decreased concentration and self-injury. The patient is not nervous/anxious.    All other systems reviewed and are negative.    Allergies   Allergen  "Reactions   • Sulfa Antibiotics Rash       Objective     Physical Exam:  Vital Signs:   Vitals:    06/21/21 1129   BP: 112/74   Pulse: 68   Resp: 16   Temp: 97.3 °F (36.3 °C)   SpO2: 98%   Weight: 72.6 kg (160 lb)   Height: 154.9 cm (61\")      Body mass index is 30.23 kg/m².    Physical Exam  Vitals and nursing note reviewed.   Constitutional:       General: She is not in acute distress.     Appearance: She is well-developed.   HENT:      Head: Normocephalic and atraumatic.      Right Ear: External ear normal.      Left Ear: External ear normal.   Eyes:      General: No scleral icterus.        Right eye: No discharge.         Left eye: No discharge.      Conjunctiva/sclera: Conjunctivae normal.   Cardiovascular:      Rate and Rhythm: Normal rate and regular rhythm.      Heart sounds: Normal heart sounds. No murmur heard.   No friction rub. No gallop.    Pulmonary:      Effort: Pulmonary effort is normal. No respiratory distress.      Breath sounds: Normal breath sounds. No wheezing or rales.   Abdominal:      Tenderness: There is no right CVA tenderness or left CVA tenderness.   Skin:     General: Skin is warm and dry.      Coloration: Skin is not pale.         Assessment / Plan      Assessment & Plan:    1.  UTI  UA positive for UTI.  Rx for Macrobid.  We will send for urine culture.  - POCT urinalysis dipstick, automated  - Urine Culture - Urine, Urine, Clean Catch; Future    2. Candida infection of flexural skin  Rx for nystatin powder, advised keep area dry    3. Biliuria     - Urine Culture - Urine, Urine, Clean Catch; Future      Julia Stacy MD  06/21/2021     Please note that portions of this note may have been completed with a voice recognition program. Efforts were made to edit the dictations, but occasionally words are mistranscribed.  "

## 2021-06-22 LAB — BACTERIA SPEC AEROBE CULT: NORMAL

## 2021-07-01 ENCOUNTER — HOSPITAL ENCOUNTER (OUTPATIENT)
Facility: HOSPITAL | Age: 66
Setting detail: OBSERVATION
Discharge: HOME | End: 2021-07-03
Attending: EMERGENCY MEDICINE | Admitting: INTERNAL MEDICINE

## 2021-07-01 ENCOUNTER — APPOINTMENT (OUTPATIENT)
Dept: CT IMAGING | Facility: HOSPITAL | Age: 66
End: 2021-07-01

## 2021-07-01 DIAGNOSIS — N17.9 AKI (ACUTE KIDNEY INJURY) (HCC): Primary | ICD-10-CM

## 2021-07-01 LAB
ADV 40+41 DNA STL QL NAA+NON-PROBE: NOT DETECTED
ALBUMIN SERPL-MCNC: 4.7 G/DL (ref 3.5–5.2)
ALBUMIN/GLOB SERPL: 1.8 G/DL
ALP SERPL-CCNC: 134 U/L (ref 39–117)
ALT SERPL W P-5'-P-CCNC: 20 U/L (ref 1–33)
ANION GAP SERPL CALCULATED.3IONS-SCNC: 14 MMOL/L (ref 5–15)
AST SERPL-CCNC: 25 U/L (ref 1–32)
ASTRO TYP 1-8 RNA STL QL NAA+NON-PROBE: NOT DETECTED
BACTERIA UR QL AUTO: ABNORMAL /HPF
BILIRUB SERPL-MCNC: 1.8 MG/DL (ref 0–1.2)
BILIRUB UR QL STRIP: ABNORMAL
BUN SERPL-MCNC: 23 MG/DL (ref 8–23)
BUN/CREAT SERPL: 7.9 (ref 7–25)
C CAYETANENSIS DNA STL QL NAA+NON-PROBE: NOT DETECTED
C COLI+JEJ+UPSA DNA STL QL NAA+NON-PROBE: NOT DETECTED
C DIFF GDH STL QL: NEGATIVE
CALCIUM SPEC-SCNC: 9.7 MG/DL (ref 8.6–10.5)
CHLORIDE SERPL-SCNC: 100 MMOL/L (ref 98–107)
CLARITY UR: ABNORMAL
CO2 SERPL-SCNC: 24 MMOL/L (ref 22–29)
COD CRY URNS QL: ABNORMAL /HPF
COLOR UR: ABNORMAL
CREAT SERPL-MCNC: 2.9 MG/DL (ref 0.57–1)
CRYPTOSP DNA STL QL NAA+NON-PROBE: NOT DETECTED
E HISTOLYT DNA STL QL NAA+NON-PROBE: NOT DETECTED
EAEC PAA PLAS AGGR+AATA ST NAA+NON-PRB: NOT DETECTED
EC STX1+STX2 GENES STL QL NAA+NON-PROBE: NOT DETECTED
EPEC EAE GENE STL QL NAA+NON-PROBE: DETECTED
ETEC LTA+ST1A+ST1B TOX ST NAA+NON-PROBE: NOT DETECTED
G LAMBLIA DNA STL QL NAA+NON-PROBE: NOT DETECTED
GFR SERPL CREATININE-BSD FRML MDRD: 16 ML/MIN/1.73
GLOBULIN UR ELPH-MCNC: 2.6 GM/DL
GLUCOSE BLDC GLUCOMTR-MCNC: 86 MG/DL (ref 70–130)
GLUCOSE BLDC GLUCOMTR-MCNC: 86 MG/DL (ref 70–130)
GLUCOSE SERPL-MCNC: 118 MG/DL (ref 65–99)
GLUCOSE UR STRIP-MCNC: NEGATIVE MG/DL
HGB UR QL STRIP.AUTO: NEGATIVE
HYALINE CASTS UR QL AUTO: ABNORMAL /LPF
KETONES UR QL STRIP: ABNORMAL
LEUKOCYTE ESTERASE UR QL STRIP.AUTO: ABNORMAL
LIPASE SERPL-CCNC: 86 U/L (ref 13–60)
NITRITE UR QL STRIP: NEGATIVE
NOROVIRUS GI+II RNA STL QL NAA+NON-PROBE: NOT DETECTED
P SHIGELLOIDES DNA STL QL NAA+NON-PROBE: NOT DETECTED
PH UR STRIP.AUTO: <=5 [PH] (ref 5–8)
POTASSIUM SERPL-SCNC: 4.8 MMOL/L (ref 3.5–5.2)
PROT SERPL-MCNC: 7.3 G/DL (ref 6–8.5)
PROT UR QL STRIP: ABNORMAL
RBC # UR: ABNORMAL /HPF
REF LAB TEST METHOD: ABNORMAL
RVA RNA STL QL NAA+NON-PROBE: NOT DETECTED
S ENT+BONG DNA STL QL NAA+NON-PROBE: NOT DETECTED
SAPO I+II+IV+V RNA STL QL NAA+NON-PROBE: NOT DETECTED
SHIGELLA SP+EIEC IPAH ST NAA+NON-PROBE: NOT DETECTED
SODIUM SERPL-SCNC: 138 MMOL/L (ref 136–145)
SP GR UR STRIP: 1.02 (ref 1–1.03)
SQUAMOUS #/AREA URNS HPF: ABNORMAL /HPF
UROBILINOGEN UR QL STRIP: ABNORMAL
V CHOL+PARA+VUL DNA STL QL NAA+NON-PROBE: NOT DETECTED
V CHOLERAE DNA STL QL NAA+NON-PROBE: NOT DETECTED
WBC UR QL AUTO: ABNORMAL /HPF
Y ENTEROCOL DNA STL QL NAA+NON-PROBE: NOT DETECTED

## 2021-07-01 PROCEDURE — 85007 BL SMEAR W/DIFF WBC COUNT: CPT | Performed by: PHYSICIAN ASSISTANT

## 2021-07-01 PROCEDURE — G0378 HOSPITAL OBSERVATION PER HR: HCPCS

## 2021-07-01 PROCEDURE — 83690 ASSAY OF LIPASE: CPT | Performed by: PHYSICIAN ASSISTANT

## 2021-07-01 PROCEDURE — 96361 HYDRATE IV INFUSION ADD-ON: CPT

## 2021-07-01 PROCEDURE — 96375 TX/PRO/DX INJ NEW DRUG ADDON: CPT

## 2021-07-01 PROCEDURE — 80053 COMPREHEN METABOLIC PANEL: CPT | Performed by: PHYSICIAN ASSISTANT

## 2021-07-01 PROCEDURE — 81001 URINALYSIS AUTO W/SCOPE: CPT | Performed by: PHYSICIAN ASSISTANT

## 2021-07-01 PROCEDURE — 99220 PR INITIAL OBSERVATION CARE/DAY 70 MINUTES: CPT | Performed by: INTERNAL MEDICINE

## 2021-07-01 PROCEDURE — 99284 EMERGENCY DEPT VISIT MOD MDM: CPT

## 2021-07-01 PROCEDURE — 74176 CT ABD & PELVIS W/O CONTRAST: CPT

## 2021-07-01 PROCEDURE — 93010 ELECTROCARDIOGRAM REPORT: CPT | Performed by: INTERNAL MEDICINE

## 2021-07-01 PROCEDURE — 87449 NOS EACH ORGANISM AG IA: CPT | Performed by: PHYSICIAN ASSISTANT

## 2021-07-01 PROCEDURE — 96372 THER/PROPH/DIAG INJ SC/IM: CPT

## 2021-07-01 PROCEDURE — 25010000002 HEPARIN (PORCINE) PER 1000 UNITS: Performed by: INTERNAL MEDICINE

## 2021-07-01 PROCEDURE — 87635 SARS-COV-2 COVID-19 AMP PRB: CPT | Performed by: INTERNAL MEDICINE

## 2021-07-01 PROCEDURE — 96374 THER/PROPH/DIAG INJ IV PUSH: CPT

## 2021-07-01 PROCEDURE — 25010000002 PIPERACILLIN SOD-TAZOBACTAM PER 1 G: Performed by: INTERNAL MEDICINE

## 2021-07-01 PROCEDURE — 96365 THER/PROPH/DIAG IV INF INIT: CPT

## 2021-07-01 PROCEDURE — 25010000002 ONDANSETRON PER 1 MG: Performed by: PHYSICIAN ASSISTANT

## 2021-07-01 PROCEDURE — 87324 CLOSTRIDIUM AG IA: CPT | Performed by: PHYSICIAN ASSISTANT

## 2021-07-01 PROCEDURE — 85025 COMPLETE CBC W/AUTO DIFF WBC: CPT | Performed by: PHYSICIAN ASSISTANT

## 2021-07-01 PROCEDURE — 93005 ELECTROCARDIOGRAM TRACING: CPT | Performed by: INTERNAL MEDICINE

## 2021-07-01 PROCEDURE — 82962 GLUCOSE BLOOD TEST: CPT

## 2021-07-01 PROCEDURE — 85060 BLOOD SMEAR INTERPRETATION: CPT | Performed by: PHYSICIAN ASSISTANT

## 2021-07-01 PROCEDURE — C9803 HOPD COVID-19 SPEC COLLECT: HCPCS

## 2021-07-01 PROCEDURE — 0097U HC BIOFIRE FILMARRAY GI PANEL: CPT | Performed by: INTERNAL MEDICINE

## 2021-07-01 RX ORDER — FLUOXETINE HYDROCHLORIDE 20 MG/1
60 CAPSULE ORAL DAILY
Status: DISCONTINUED | OUTPATIENT
Start: 2021-07-01 | End: 2021-07-03 | Stop reason: HOSPADM

## 2021-07-01 RX ORDER — HEPARIN SODIUM 5000 [USP'U]/ML
5000 INJECTION, SOLUTION INTRAVENOUS; SUBCUTANEOUS EVERY 8 HOURS SCHEDULED
Status: DISCONTINUED | OUTPATIENT
Start: 2021-07-01 | End: 2021-07-03 | Stop reason: HOSPADM

## 2021-07-01 RX ORDER — ONDANSETRON 2 MG/ML
4 INJECTION INTRAMUSCULAR; INTRAVENOUS EVERY 6 HOURS PRN
Status: DISCONTINUED | OUTPATIENT
Start: 2021-07-01 | End: 2021-07-03 | Stop reason: HOSPADM

## 2021-07-01 RX ORDER — AMLODIPINE BESYLATE 5 MG/1
10 TABLET ORAL DAILY
Status: DISCONTINUED | OUTPATIENT
Start: 2021-07-01 | End: 2021-07-02

## 2021-07-01 RX ORDER — CARVEDILOL 25 MG/1
25 TABLET ORAL 2 TIMES DAILY WITH MEALS
Status: DISCONTINUED | OUTPATIENT
Start: 2021-07-01 | End: 2021-07-02

## 2021-07-01 RX ORDER — ONDANSETRON 2 MG/ML
4 INJECTION INTRAMUSCULAR; INTRAVENOUS ONCE
Status: COMPLETED | OUTPATIENT
Start: 2021-07-01 | End: 2021-07-01

## 2021-07-01 RX ORDER — DEXTROSE MONOHYDRATE 25 G/50ML
25 INJECTION, SOLUTION INTRAVENOUS
Status: DISCONTINUED | OUTPATIENT
Start: 2021-07-01 | End: 2021-07-03 | Stop reason: HOSPADM

## 2021-07-01 RX ORDER — NICOTINE POLACRILEX 4 MG
1 LOZENGE BUCCAL
Status: DISCONTINUED | OUTPATIENT
Start: 2021-07-01 | End: 2021-07-03 | Stop reason: HOSPADM

## 2021-07-01 RX ORDER — ASPIRIN 81 MG/1
81 TABLET ORAL DAILY
Status: DISCONTINUED | OUTPATIENT
Start: 2021-07-01 | End: 2021-07-03 | Stop reason: HOSPADM

## 2021-07-01 RX ORDER — FLUCONAZOLE 100 MG/1
150 TABLET ORAL
Status: DISCONTINUED | OUTPATIENT
Start: 2021-07-03 | End: 2021-07-03 | Stop reason: HOSPADM

## 2021-07-01 RX ORDER — LOPERAMIDE HYDROCHLORIDE 2 MG/1
2 CAPSULE ORAL 4 TIMES DAILY PRN
Status: DISCONTINUED | OUTPATIENT
Start: 2021-07-01 | End: 2021-07-03 | Stop reason: HOSPADM

## 2021-07-01 RX ORDER — ATORVASTATIN CALCIUM 40 MG/1
40 TABLET, FILM COATED ORAL NIGHTLY
Status: DISCONTINUED | OUTPATIENT
Start: 2021-07-01 | End: 2021-07-03 | Stop reason: HOSPADM

## 2021-07-01 RX ORDER — SODIUM CHLORIDE 0.9 % (FLUSH) 0.9 %
10 SYRINGE (ML) INJECTION EVERY 12 HOURS SCHEDULED
Status: DISCONTINUED | OUTPATIENT
Start: 2021-07-01 | End: 2021-07-03 | Stop reason: HOSPADM

## 2021-07-01 RX ORDER — SODIUM CHLORIDE, SODIUM LACTATE, POTASSIUM CHLORIDE, CALCIUM CHLORIDE 600; 310; 30; 20 MG/100ML; MG/100ML; MG/100ML; MG/100ML
150 INJECTION, SOLUTION INTRAVENOUS CONTINUOUS
Status: DISCONTINUED | OUTPATIENT
Start: 2021-07-01 | End: 2021-07-03

## 2021-07-01 RX ORDER — SODIUM CHLORIDE 0.9 % (FLUSH) 0.9 %
10 SYRINGE (ML) INJECTION AS NEEDED
Status: DISCONTINUED | OUTPATIENT
Start: 2021-07-01 | End: 2021-07-03 | Stop reason: HOSPADM

## 2021-07-01 RX ADMIN — ASPIRIN 81 MG: 81 TABLET, COATED ORAL at 17:35

## 2021-07-01 RX ADMIN — AMLODIPINE BESYLATE 10 MG: 5 TABLET ORAL at 17:35

## 2021-07-01 RX ADMIN — SODIUM CHLORIDE, POTASSIUM CHLORIDE, SODIUM LACTATE AND CALCIUM CHLORIDE 150 ML/HR: 600; 310; 30; 20 INJECTION, SOLUTION INTRAVENOUS at 17:35

## 2021-07-01 RX ADMIN — ONDANSETRON 4 MG: 2 INJECTION INTRAMUSCULAR; INTRAVENOUS at 13:15

## 2021-07-01 RX ADMIN — HEPARIN SODIUM 5000 UNITS: 5000 INJECTION INTRAVENOUS; SUBCUTANEOUS at 23:03

## 2021-07-01 RX ADMIN — TAZOBACTAM SODIUM AND PIPERACILLIN SODIUM 3.38 G: 375; 3 INJECTION, SOLUTION INTRAVENOUS at 23:02

## 2021-07-01 RX ADMIN — FLUOXETINE 60 MG: 20 CAPSULE ORAL at 17:35

## 2021-07-01 RX ADMIN — ATORVASTATIN CALCIUM 40 MG: 40 TABLET, FILM COATED ORAL at 20:09

## 2021-07-01 RX ADMIN — CARVEDILOL 25 MG: 25 TABLET, FILM COATED ORAL at 17:35

## 2021-07-01 RX ADMIN — SODIUM CHLORIDE, PRESERVATIVE FREE 10 ML: 5 INJECTION INTRAVENOUS at 20:10

## 2021-07-01 RX ADMIN — SODIUM CHLORIDE, POTASSIUM CHLORIDE, SODIUM LACTATE AND CALCIUM CHLORIDE 1500 ML: 600; 310; 30; 20 INJECTION, SOLUTION INTRAVENOUS at 17:34

## 2021-07-01 RX ADMIN — LOPERAMIDE HYDROCHLORIDE 2 MG: 2 CAPSULE ORAL at 20:12

## 2021-07-01 RX ADMIN — SODIUM CHLORIDE 1000 ML: 9 INJECTION, SOLUTION INTRAVENOUS at 13:15

## 2021-07-01 NOTE — H&P
[facility]   HISTORY AND PHYSICAL      Name:  Martha Murray   Age:  66 y.o.  Sex:  female  :  1955  MRN:  [id number]   Visit Number:  [id number]  Admission Date:  2021  Date Of Service:  21  Primary Care Physician:  Julia Stacy MD    Chief Complaint:   Nausea and vomiting for 2 days    History Of Presenting Illness:    66 years old female patient with past medical history significant for type 2 diabetes, dyslipidemia and hypertension presented to the hospital with primary complaint of persistent nausea and vomiting and inability to tolerate oral intake tomach for 2 days now.  She reported that she has been struggling with UTI since May 2021 and received multiple courses/4 different types of antibiotics.  Finally she managed to quit having urinary symptoms.  But over the last couple days she started having worsening nausea vomiting and diarrhea.  She also reported associated abdominal pain that she rates as 7/10 in intensity colicky in nature with no repair or radiation.  Of note, the patient had extensive work-up done at  few years ago for elevated platelet count and underwent bone marrow biopsy that did not show any evidence of bone marrow malignancy    In ER, she was found to have elevated creatinine of 2.1 from her baseline of 1.2 and leukocytosis of 16,000 and platelet count of 1.3 milligrams     Review Of Systems:  All systems were reviewed and negative except for what's mentioned in HPI    Past Medical History: Patient  has a past medical history of Allergic, Anemia, Depression, Diabetes mellitus (CMS/HCC), History of heart attack, Hyperlipidemia, and Hypertension.    Past Surgical History: Patient  has a past surgical history that includes Tonsillectomy; Breast Reduction; Sinus surgery (); Bone marrow aspiration; Hysterectomy (); and Reduction mammaplasty (Bilateral).    Social History: Patient  reports that she quit smoking about 24 years  ago. Her smoking use included cigarettes. She has a 60.00 pack-year smoking history. She has never used smokeless tobacco. She reports that she does not drink alcohol and does not use drugs.    Family History: Patient's family history includes Arthritis in her maternal grandmother; Crohn's disease in her sister; Diabetes in her sister; Heart attack in her maternal grandfather; Hyperlipidemia in her mother; Hypertension in her mother and sister; Thyroid disease in her mother.    Allergies:    Sulfa antibiotics    Home Medications:  Prior to Admission Medications     Prescriptions Last Dose Informant Patient Reported? Taking?    albuterol sulfate  (90 Base) MCG/ACT inhaler   No No    Inhale 2 puffs Every 6 (Six) Hours As Needed for Wheezing.    amLODIPine (NORVASC) 10 MG tablet 6/30/2021  No Yes    Take 1 tablet by mouth Daily.    aspirin (aspirin) 81 MG EC tablet 6/30/2021  No Yes    Take 1 tablet by mouth Daily.    atorvastatin (LIPITOR) 80 MG tablet 6/30/2021  No Yes    Take 1 tablet by mouth Daily.    Patient taking differently:  Take 40 mg by mouth Daily.    carvedilol (COREG) 25 MG tablet 6/30/2021  No Yes    Take 1 tablet by mouth 2 (Two) Times a Day With Meals.    fluconazole (Diflucan) 150 MG tablet   No No    Take 1 tablet by mouth Every 72 (Seventy-Two) Hours.    FLUoxetine (PROzac) 20 MG capsule 6/30/2021  No Yes    Take 3 capsules by mouth Daily.    furosemide (LASIX) 40 MG tablet 6/30/2021  No Yes    Take 1 tablet by mouth Daily.    lisinopril (PRINIVIL,ZESTRIL) 20 MG tablet 6/30/2021  No Yes    Take 1 tablet by mouth Daily.    Patient taking differently:  Take 10 mg by mouth Daily.    nitrofurantoin, macrocrystal-monohydrate, (Macrobid) 100 MG capsule   No No    Take 1 capsule by mouth 2 (Two) Times a Day.    nitroglycerin (NITROSTAT) 0.4 MG SL tablet More than a month  No No    Place 1 tablet under the tongue Every 5 (Five) Minutes As Needed for Chest Pain. Take no more than 3 doses in 15  minutes.    nystatin (MYCOSTATIN) 537381 UNIT/GM cream 7/1/2021  No Yes    Apply  topically to the appropriate area as directed 2 (two) times a day.    potassium chloride 10 MEQ CR tablet 6/30/2021  No Yes    Take 1 tablet by mouth Daily.    Tradjenta 5 MG tablet tablet 6/30/2021  No Yes    TAKE ONE TABLET BY MOUTH DAILY        ED Medications:  Medications   sodium chloride 0.9 % bolus 1,000 mL (0 mL Intravenous Stopped 7/1/21 1520)   ondansetron (ZOFRAN) injection 4 mg (4 mg Intravenous Given 7/1/21 1315)     Vital Signs:  Temp:  [98.3 °F (36.8 °C)] 98.3 °F (36.8 °C)  Heart Rate:  [64] 64  Resp:  [16] 16  BP: (100-148)/(56-87) 131/78        07/01/21  1230   Weight: 73 kg (161 lb)     Body mass index is 30.42 kg/m².    Physical Exam:   General Appearance:  Alert and cooperative.    Head:  Atraumatic and normocephalic.   Eyes: Conjunctivae and sclerae normal, no icterus. No pallor.   Ears:  Ears with no abnormalities noted.   Throat: No oral lesions, no thrush, oral mucosa dry.   Neck: Supple, trachea midline, no thyromegaly.   Back:   No kyphoscoliosis present. No tenderness to palpation.   Lungs:   Breath sounds heard bilaterally equally.  No crackles or wheezing. No Pleural rub or bronchial breathing.   Heart:  Normal S1 and S2, no murmur, no gallop, no rub. No JVD.   Abdomen:   Normal bowel sounds, no masses, no organomegaly. Soft, nontender, nondistended, no rebound tenderness.   Extremities: Supple, no edema, no cyanosis, no clubbing.   Pulses: Pulses palpable bilaterally.   Skin: No bleeding or rash.   Neurologic: Alert and oriented x 3. No facial asymmetry. Moves all four limbs. No tremors.      Laboratory data:  I have reviewed the labs done in the emergency room.    Results from last 7 days   Lab Units 07/01/21  1315   SODIUM mmol/L 138   POTASSIUM mmol/L 4.8   CHLORIDE mmol/L 100   CO2 mmol/L 24.0   BUN mg/dL 23   CREATININE mg/dL 2.90*   CALCIUM mg/dL 9.7   BILIRUBIN mg/dL 1.8*   ALK PHOS U/L 134*   ALT  (SGPT) U/L 20   AST (SGOT) U/L 25   GLUCOSE mg/dL 118*     Results from last 7 days   Lab Units 07/01/21  1315   WBC 10*3/mm3 16.36*   HEMOGLOBIN g/dL 16.5*   HEMATOCRIT % 53.8*   PLATELETS 10*3/mm3 1,293*                     Results from last 7 days   Lab Units 07/01/21  1315   LIPASE U/L 86*         Results from last 7 days   Lab Units 07/01/21  1315   COLOR UA  Dark Yellow*   GLUCOSE UA  Negative   KETONES UA  Trace*   LEUKOCYTES UA  Small (1+)*   PH, URINE  <=5.0   BILIRUBIN UA  Small (1+)*   UROBILINOGEN UA  1.0 E.U./dL   RBC UA /HPF 0-2*   WBC UA /HPF 6-12*       UA    Urinalysis 5/11/21 6/21/21 7/1/21 7/1/21      1315 1315   Squamous Epithelial Cells, UA    0-2   Specific Gravity, UA   1.022    Ketones, UA Negative Negative Trace (A)    Blood, UA   Negative    Leukocytes,  Rylee/ul (A) 500 Rylee/ul (A) Small (1+) (A)    Nitrite, UA   Negative    RBC, UA    0-2 (A)   WBC, UA    6-12 (A)   Bacteria, UA    Trace (A)   (A) Abnormal value              Pain Management Panel     Pain Management Panel Latest Ref Rng & Units 8/4/2020 3/27/2019    CREATININE UR mg/dL 234.4 289.8        EKG:    Pending     Radiology:  CT Abdomen Pelvis Without Contrast    Result Date: 7/1/2021  PROCEDURE: CT ABDOMEN PELVIS WO CONTRAST-  HISTORY: n/v/d abd pain, ruma, leukocytosis, thrombocytosis  COMPARISON:None  Note: Noncontrast exam is less sensitive for assessment of the bowel and solid abdominal organs  TECHNIQUE: Noncontrast exam  CT ABDOMEN:  Moderate splenomegaly is noted. Spleen measures up to 16.3 cm. Remaining solid organs are unremarkable. The bowel shows no evidence of obstruction. There is no free air or free fluid within the abdomen. Patient is status post cholecystectomy.  CT PELVIS: No bowel dilatation is seen. There is no free fluid. Appendix is not visualized. Appendix may have been removed at time of hysterectomy. No pelvic adenopathy is seen.      1. Moderate splenomegaly without adenopathy. 2. No mass or  inflammatory changes  This study was performed with techniques to keep radiation doses as low as reasonably achievable (ALARA). Individualized dose reduction techniques using automated exposure control or adjustment of vA and/or kV according to the patient size were employed.  This report was finalized on 7/1/2021 3:08 PM by Sam Thakkar MD.      Assessment:  · Acute renal failure, likely prerenal  · Severe dehydration and volume depletion  · Acute gastroenteritis  · Recurrent UTI  · Splenomegaly  · Thrombocytosis  · Type 2 diabetes  · Essential hypertension  · Dyslipidemia    Plan:  · The main plan for the patient at this point is aggressive IV fluid hydration with LR bolus and maintenance.  Follow kidney functions in the a.m.  Meanwhile we will continue to hold lisinopril and her home dose Lasix.  · Follow stool studies including stool culture and C. Difficile  · Will hold off antibiotic therapy for now  · Insulin sliding scale and monitor fingersticks  · Echocardiogram  · I informed the patient that since her platelet counts are more than 1 million, she will have to have hematology follow-up on outpatient basis and she voiced understanding.  · Risk Assessment: High  · DVT Prophylaxis: Heparin  · Code Status: Full  · Diet: Clear liquid    Advance Care Planning   ACP discussion was held with the patient during this visit. Patient does not have an advance directive, information provided.      Liliana Davis MD  07/01/21  16:26 EDT    Dictated utilizing Dragon dictation.

## 2021-07-01 NOTE — PLAN OF CARE
Goal Outcome Evaluation:  Plan of Care Reviewed With: patient        Progress: improving   Patient admitted to room 310 under the services of Dr. Davis with a dx of acute kidney injury. A/Ox4, independent at home. She has c/o flank pain, intermittent nausea, loss of appetite and diarrhea at home. Specimen cup provided to obtain stool sample. On RA with saturation greater than 95% Scab noted to the left buttock as well as yeast to the abdominal folds, coccyx, under left breast and left underarm. Patient is aware and has been treating outpatient with ointment from her PCP. Denies wants or needs. Will continue to monitor.

## 2021-07-01 NOTE — ED PROVIDER NOTES
Subjective   Chief Complaint: Nausea vomiting diarrhea abdominal and back discomfort  History of Present Illness: 66-year-old female comes in for duration of above complaints.  She states for the past 2 to 3 weeks she has had 3 UTIs treated with 3 different antibiotics.  She states she finished her third antibiotic however yesterday developed nausea vomiting and diarrhea.  She states she had 5 episodes of vomiting yesterday with continued nausea today, 5 episodes of diarrhea today and to numerous count episodes of diarrhea yesterday.  She denies blood in her stool.  She denies any urinary symptoms.  Onset: 2 days ago  Timing: Ongoing  Exacerbating / Alleviating factors: Oral intake makes her nausea and vomiting worse  Associated symptoms: None      Nurses Notes reviewed and agree, including vitals, allergies, social history and prior medical history.          Review of Systems   Constitutional: Negative.    HENT: Negative.    Eyes: Negative.    Respiratory: Negative.    Cardiovascular: Negative.    Gastrointestinal: Positive for diarrhea, nausea and vomiting.        Abdominal discomfort   Genitourinary: Negative.    Musculoskeletal: Negative.    Skin: Negative.    Neurological: Negative.    Psychiatric/Behavioral: Negative.        Past Medical History:   Diagnosis Date   • Allergic    • Anemia    • Depression    • Diabetes mellitus (CMS/[facility])    • History of heart attack    • Hyperlipidemia    • Hypertension        Allergies   Allergen Reactions   • Sulfa Antibiotics Rash       Past Surgical History:   Procedure Laterality Date   • BILATERAL BREAST REDUCTION     • BONE MARROW ASPIRATION     • HYSTERECTOMY  1990    x 2   • REDUCTION MAMMAPLASTY Bilateral     MORE THAN 20 YEARS AGO   • SINUS SURGERY  2003   • TONSILLECTOMY         Family History   Problem Relation Age of Onset   • Arthritis Maternal Grandmother    • Hypertension Mother    • Hyperlipidemia Mother    • Thyroid disease Mother    • Diabetes Sister    •  Crohn's disease Sister    • Hypertension Sister    • Heart attack Maternal Grandfather    • Breast cancer Neg Hx    • Ovarian cancer Neg Hx        Social History     Socioeconomic History   • Marital status: Single     Spouse name: Not on file   • Number of children: Not on file   • Years of education: Not on file   • Highest education level: Not on file   Tobacco Use   • Smoking status: Former Smoker     Packs/day: 2.00     Years: 30.00     Pack years: 60.00     Types: Cigarettes     Quit date:      Years since quittin.5   • Smokeless tobacco: Never Used   Substance and Sexual Activity   • Alcohol use: No   • Drug use: No   • Sexual activity: Defer           Objective   Physical Exam  Vitals and nursing note reviewed.   Constitutional:       Appearance: She is well-developed.   HENT:      Head: Normocephalic and atraumatic.   Eyes:      Extraocular Movements: Extraocular movements intact.   Cardiovascular:      Rate and Rhythm: Normal rate and regular rhythm.   Pulmonary:      Effort: Pulmonary effort is normal.   Abdominal:      General: Abdomen is flat. Bowel sounds are normal.      Palpations: Abdomen is soft.      Tenderness: There is no abdominal tenderness. There is no right CVA tenderness, left CVA tenderness, guarding or rebound.   Skin:     General: Skin is warm and dry.   Neurological:      General: No focal deficit present.      Mental Status: She is alert.   Psychiatric:         Mood and Affect: Mood normal.         Behavior: Behavior normal.         Procedures           ED Course  ED Course as of  153   Thu 2021   1336 WBC, UA(!): 6-12 [TM]      ED Course User Index  [TM] Efra Hull PA-C      1532  Noted HAMMAD, elevated WBC count, hemoglobin hematocrit, platelets.  Patient also states she is had over 40 pounds of unintentional weight loss since the beginning of this year.  Spoke with Dr. Davis, he graciously accepts pt for admission                                        [facility]    Final diagnoses:   HAMMAD (acute kidney injury) (CMS/[facility])       ED Disposition  ED Disposition     ED Disposition Condition Comment    Decision to Admit  Level of Care: Med/Surg [1]   Diagnosis: HAMMAD (acute kidney injury) (CMS/[facility]) [880159]   Admitting Physician: MAXIMUS LARA [[id number]]   Attending Physician: MAXIMUS LARA [[id number]]            No follow-up provider specified.       Medication List      No changes were made to your prescriptions during this visit.          Efra Hull PA-C  07/01/21 1533       Efra Hull PA-C  07/01/21 1533       Efra Hull PA-C  07/01/21 1535

## 2021-07-02 ENCOUNTER — APPOINTMENT (OUTPATIENT)
Dept: CARDIOLOGY | Facility: HOSPITAL | Age: 66
End: 2021-07-02

## 2021-07-02 LAB
BH CV ECHO MEAS - % IVS THICK: 22.9 %
BH CV ECHO MEAS - % LVPW THICK: 33.3 %
BH CV ECHO MEAS - AO MAX PG (FULL): 5.2 MMHG
BH CV ECHO MEAS - AO MAX PG: 10 MMHG
BH CV ECHO MEAS - AO MEAN PG (FULL): 2 MMHG
BH CV ECHO MEAS - AO MEAN PG: 5 MMHG
BH CV ECHO MEAS - AO ROOT AREA (BSA CORRECTED): 1.6
BH CV ECHO MEAS - AO ROOT AREA: 6.3 CM^2
BH CV ECHO MEAS - AO ROOT DIAM: 2.8 CM
BH CV ECHO MEAS - AO V2 MAX: 158 CM/SEC
BH CV ECHO MEAS - AO V2 MEAN: 101 CM/SEC
BH CV ECHO MEAS - AO V2 VTI: 34.1 CM
BH CV ECHO MEAS - AVA(I,A): 1.9 CM^2
BH CV ECHO MEAS - AVA(I,D): 1.9 CM^2
BH CV ECHO MEAS - AVA(V,A): 1.7 CM^2
BH CV ECHO MEAS - AVA(V,D): 1.7 CM^2
BH CV ECHO MEAS - BSA(HAYCOCK): 1.8 M^2
BH CV ECHO MEAS - BSA: 1.7 M^2
BH CV ECHO MEAS - BZI_BMI: 30.4 KILOGRAMS/M^2
BH CV ECHO MEAS - BZI_METRIC_HEIGHT: 154.9 CM
BH CV ECHO MEAS - BZI_METRIC_WEIGHT: 73 KG
BH CV ECHO MEAS - EDV(CUBED): 99.3 ML
BH CV ECHO MEAS - EDV(MOD-SP2): 120 ML
BH CV ECHO MEAS - EDV(MOD-SP4): 112 ML
BH CV ECHO MEAS - EDV(TEICH): 98.8 ML
BH CV ECHO MEAS - EF(CUBED): 79.9 %
BH CV ECHO MEAS - EF(MOD-BP): 66.7 %
BH CV ECHO MEAS - EF(MOD-SP2): 74.7 %
BH CV ECHO MEAS - EF(MOD-SP4): 61.1 %
BH CV ECHO MEAS - EF(TEICH): 72.4 %
BH CV ECHO MEAS - ESV(CUBED): 19.9 ML
BH CV ECHO MEAS - ESV(MOD-SP2): 30.4 ML
BH CV ECHO MEAS - ESV(MOD-SP4): 43.6 ML
BH CV ECHO MEAS - ESV(TEICH): 27.3 ML
BH CV ECHO MEAS - FS: 41.5 %
BH CV ECHO MEAS - IVS/LVPW: 1.1
BH CV ECHO MEAS - IVSD: 1.4 CM
BH CV ECHO MEAS - IVSS: 1.8 CM
BH CV ECHO MEAS - LA DIMENSION: 4.7 CM
BH CV ECHO MEAS - LA/AO: 1.6
BH CV ECHO MEAS - LAD MAJOR: 5.8 CM
BH CV ECHO MEAS - LAT PEAK E' VEL: 8.1 CM/SEC
BH CV ECHO MEAS - LATERAL E/E' RATIO: 11
BH CV ECHO MEAS - LV DIASTOLIC VOL/BSA (35-75): 65 ML/M^2
BH CV ECHO MEAS - LV MASS(C)D: 257.9 GRAMS
BH CV ECHO MEAS - LV MASS(C)DI: 149.7 GRAMS/M^2
BH CV ECHO MEAS - LV MASS(C)S: 190.1 GRAMS
BH CV ECHO MEAS - LV MASS(C)SI: 110.4 GRAMS/M^2
BH CV ECHO MEAS - LV MAX PG: 4.8 MMHG
BH CV ECHO MEAS - LV MEAN PG: 3 MMHG
BH CV ECHO MEAS - LV SYSTOLIC VOL/BSA (12-30): 25.3 ML/M^2
BH CV ECHO MEAS - LV V1 MAX: 109 CM/SEC
BH CV ECHO MEAS - LV V1 MEAN: 73.6 CM/SEC
BH CV ECHO MEAS - LV V1 VTI: 27.3 CM
BH CV ECHO MEAS - LVIDD: 4.6 CM
BH CV ECHO MEAS - LVIDS: 2.7 CM
BH CV ECHO MEAS - LVLD AP2: 8 CM
BH CV ECHO MEAS - LVLD AP4: 7.8 CM
BH CV ECHO MEAS - LVLS AP2: 5.4 CM
BH CV ECHO MEAS - LVLS AP4: 6.2 CM
BH CV ECHO MEAS - LVOT AREA (M): 2.4 CM^2
BH CV ECHO MEAS - LVOT AREA: 2.4 CM^2
BH CV ECHO MEAS - LVOT DIAM: 1.8 CM
BH CV ECHO MEAS - LVPWD: 1.4 CM
BH CV ECHO MEAS - LVPWS: 1.8 CM
BH CV ECHO MEAS - MED PEAK E' VEL: 7.4 CM/SEC
BH CV ECHO MEAS - MEDIAL E/E' RATIO: 12
BH CV ECHO MEAS - MV A MAX VEL: 110 CM/SEC
BH CV ECHO MEAS - MV DEC TIME: 0.21 SEC
BH CV ECHO MEAS - MV E MAX VEL: 88.5 CM/SEC
BH CV ECHO MEAS - MV E/A: 0.8
BH CV ECHO MEAS - MV MAX PG: 4.7 MMHG
BH CV ECHO MEAS - MV MEAN PG: 2 MMHG
BH CV ECHO MEAS - MV V2 MAX: 108 CM/SEC
BH CV ECHO MEAS - MV V2 MEAN: 56.6 CM/SEC
BH CV ECHO MEAS - MV V2 VTI: 33 CM
BH CV ECHO MEAS - MVA(VTI): 2 CM^2
BH CV ECHO MEAS - PA ACC TIME: 0.13 SEC
BH CV ECHO MEAS - PA MAX PG (FULL): 2.3 MMHG
BH CV ECHO MEAS - PA MAX PG: 4.2 MMHG
BH CV ECHO MEAS - PA MEAN PG (FULL): 1 MMHG
BH CV ECHO MEAS - PA MEAN PG: 2 MMHG
BH CV ECHO MEAS - PA PR(ACCEL): 18.7 MMHG
BH CV ECHO MEAS - PA V2 MAX: 103 CM/SEC
BH CV ECHO MEAS - PA V2 MEAN: 57 CM/SEC
BH CV ECHO MEAS - PA V2 VTI: 21.8 CM
BH CV ECHO MEAS - PULM A REVS DUR: 0.11 SEC
BH CV ECHO MEAS - PULM A REVS VEL: 33.1 CM/SEC
BH CV ECHO MEAS - PULM DIAS VEL: 55.1 CM/SEC
BH CV ECHO MEAS - PULM S/D: 1.1
BH CV ECHO MEAS - PULM SYS VEL: 58.2 CM/SEC
BH CV ECHO MEAS - RAP SYSTOLE: 3 MMHG
BH CV ECHO MEAS - RV MAX PG: 1.9 MMHG
BH CV ECHO MEAS - RV MEAN PG: 1 MMHG
BH CV ECHO MEAS - RV V1 MAX: 69.5 CM/SEC
BH CV ECHO MEAS - RV V1 MEAN: 44.1 CM/SEC
BH CV ECHO MEAS - RV V1 VTI: 15 CM
BH CV ECHO MEAS - RVSP: 17 MMHG
BH CV ECHO MEAS - SI(AO): 124.5 ML/M^2
BH CV ECHO MEAS - SI(CUBED): 46.1 ML/M^2
BH CV ECHO MEAS - SI(LVOT): 38.6 ML/M^2
BH CV ECHO MEAS - SI(MOD-SP2): 52 ML/M^2
BH CV ECHO MEAS - SI(MOD-SP4): 39.7 ML/M^2
BH CV ECHO MEAS - SI(TEICH): 41.5 ML/M^2
BH CV ECHO MEAS - SV(AO): 214.5 ML
BH CV ECHO MEAS - SV(CUBED): 79.4 ML
BH CV ECHO MEAS - SV(LVOT): 66.4 ML
BH CV ECHO MEAS - SV(MOD-SP2): 89.6 ML
BH CV ECHO MEAS - SV(MOD-SP4): 68.4 ML
BH CV ECHO MEAS - SV(TEICH): 71.6 ML
BH CV ECHO MEAS - TAPSE (>1.6): 2.8 CM
BH CV ECHO MEAS - TR MAX PG: 14 MMHG
BH CV ECHO MEAS - TR MAX VEL: 187 CM/SEC
BH CV ECHO MEASUREMENTS AVERAGE E/E' RATIO: 11.42
BH CV XLRA - RV BASE: 3.9 CM
BH CV XLRA - RV LENGTH: 7.4 CM
BH CV XLRA - RV MID: 3.1 CM
BH CV XLRA - TDI S': 12.5 CM/SEC
GLUCOSE BLDC GLUCOMTR-MCNC: 113 MG/DL (ref 70–130)
GLUCOSE BLDC GLUCOMTR-MCNC: 82 MG/DL (ref 70–130)
GLUCOSE BLDC GLUCOMTR-MCNC: 85 MG/DL (ref 70–130)
GLUCOSE BLDC GLUCOMTR-MCNC: 86 MG/DL (ref 70–130)
LEFT ATRIUM VOLUME INDEX: 35.8 ML/M^2
LEFT ATRIUM VOLUME: 61.7 ML
PROCALCITONIN SERPL-MCNC: 0.09 NG/ML (ref 0–0.25)
SARS-COV-2 RNA PNL SPEC NAA+PROBE: NOT DETECTED
SODIUM UR-SCNC: 69 MMOL/L

## 2021-07-02 PROCEDURE — 99226 PR SBSQ OBSERVATION CARE/DAY 35 MINUTES: CPT | Performed by: INTERNAL MEDICINE

## 2021-07-02 PROCEDURE — 25010000002 HEPARIN (PORCINE) PER 1000 UNITS: Performed by: INTERNAL MEDICINE

## 2021-07-02 PROCEDURE — 93306 TTE W/DOPPLER COMPLETE: CPT | Performed by: INTERNAL MEDICINE

## 2021-07-02 PROCEDURE — G0378 HOSPITAL OBSERVATION PER HR: HCPCS

## 2021-07-02 PROCEDURE — 96372 THER/PROPH/DIAG INJ SC/IM: CPT

## 2021-07-02 PROCEDURE — 25010000002 CEFTRIAXONE SODIUM-DEXTROSE 1-3.74 GM-%(50ML) RECONSTITUTED SOLUTION: Performed by: INTERNAL MEDICINE

## 2021-07-02 PROCEDURE — 25010000002 PIPERACILLIN SOD-TAZOBACTAM PER 1 G: Performed by: INTERNAL MEDICINE

## 2021-07-02 PROCEDURE — 93306 TTE W/DOPPLER COMPLETE: CPT

## 2021-07-02 PROCEDURE — 84145 PROCALCITONIN (PCT): CPT | Performed by: INTERNAL MEDICINE

## 2021-07-02 PROCEDURE — 96367 TX/PROPH/DG ADDL SEQ IV INF: CPT

## 2021-07-02 PROCEDURE — 84300 ASSAY OF URINE SODIUM: CPT | Performed by: INTERNAL MEDICINE

## 2021-07-02 PROCEDURE — 82962 GLUCOSE BLOOD TEST: CPT

## 2021-07-02 RX ORDER — AMLODIPINE BESYLATE 5 MG/1
2.5 TABLET ORAL DAILY
Status: DISCONTINUED | OUTPATIENT
Start: 2021-07-03 | End: 2021-07-03 | Stop reason: HOSPADM

## 2021-07-02 RX ORDER — SODIUM CHLORIDE, SODIUM LACTATE, POTASSIUM CHLORIDE, CALCIUM CHLORIDE 600; 310; 30; 20 MG/100ML; MG/100ML; MG/100ML; MG/100ML
150 INJECTION, SOLUTION INTRAVENOUS CONTINUOUS
Status: DISCONTINUED | OUTPATIENT
Start: 2021-07-02 | End: 2021-07-03 | Stop reason: HOSPADM

## 2021-07-02 RX ORDER — CEFTRIAXONE 1 G/50ML
1 INJECTION, SOLUTION INTRAVENOUS EVERY 24 HOURS
Status: DISCONTINUED | OUTPATIENT
Start: 2021-07-02 | End: 2021-07-03 | Stop reason: HOSPADM

## 2021-07-02 RX ORDER — CARVEDILOL 6.25 MG/1
6.25 TABLET ORAL 2 TIMES DAILY WITH MEALS
Status: DISCONTINUED | OUTPATIENT
Start: 2021-07-02 | End: 2021-07-03 | Stop reason: HOSPADM

## 2021-07-02 RX ADMIN — HEPARIN SODIUM 5000 UNITS: 5000 INJECTION INTRAVENOUS; SUBCUTANEOUS at 21:42

## 2021-07-02 RX ADMIN — AMLODIPINE BESYLATE 10 MG: 5 TABLET ORAL at 09:01

## 2021-07-02 RX ADMIN — HEPARIN SODIUM 5000 UNITS: 5000 INJECTION INTRAVENOUS; SUBCUTANEOUS at 15:55

## 2021-07-02 RX ADMIN — SODIUM CHLORIDE, PRESERVATIVE FREE 10 ML: 5 INJECTION INTRAVENOUS at 09:01

## 2021-07-02 RX ADMIN — CARVEDILOL 6.25 MG: 6.25 TABLET, FILM COATED ORAL at 17:24

## 2021-07-02 RX ADMIN — ATORVASTATIN CALCIUM 40 MG: 40 TABLET, FILM COATED ORAL at 21:42

## 2021-07-02 RX ADMIN — TAZOBACTAM SODIUM AND PIPERACILLIN SODIUM 3.38 G: 375; 3 INJECTION, SOLUTION INTRAVENOUS at 05:13

## 2021-07-02 RX ADMIN — FLUOXETINE 60 MG: 20 CAPSULE ORAL at 09:01

## 2021-07-02 RX ADMIN — CARVEDILOL 25 MG: 25 TABLET, FILM COATED ORAL at 09:01

## 2021-07-02 RX ADMIN — SODIUM CHLORIDE, POTASSIUM CHLORIDE, SODIUM LACTATE AND CALCIUM CHLORIDE 150 ML/HR: 600; 310; 30; 20 INJECTION, SOLUTION INTRAVENOUS at 05:13

## 2021-07-02 RX ADMIN — CEFTRIAXONE 1 G: 1 INJECTION, SOLUTION INTRAVENOUS at 15:55

## 2021-07-02 RX ADMIN — SODIUM CHLORIDE, PRESERVATIVE FREE 10 ML: 5 INJECTION INTRAVENOUS at 21:42

## 2021-07-02 RX ADMIN — HEPARIN SODIUM 5000 UNITS: 5000 INJECTION INTRAVENOUS; SUBCUTANEOUS at 06:40

## 2021-07-02 RX ADMIN — ASPIRIN 81 MG: 81 TABLET, COATED ORAL at 09:01

## 2021-07-02 NOTE — PROGRESS NOTES
[facility]    Name:  Martha Murray   Age:  66 y.o.  Sex:  female  :  1955  MRN:  [id number]   Visit Number:  [id number]  Admission Date:  2021  Date Of Service:  21  Primary Care Physician:  Julia Stacy MD     LOS: 0 days :    Chief Complaint:    Diarrhea and fatigue    Subjective:  I have seen and evaluated the patient this morning. feeling much better today  Chart reviewed and is noted..  Had multiple diarrheal episodes overnight but nothing since this morning.  Able to tolerate clear liquid diet.  Hemodynamics are stable    Hospital Course:  66 years old female patient with past medical history significant for type 2 diabetes, dyslipidemia and hypertension presented to the hospital with primary complaint of persistent nausea and vomiting and inability to tolerate oral intake.  CT scan abdomen was done in the ER and was unremarkable for any acute intra-abdominal pathology.  Stool culture revealed enteropathogenic E. coli.  She was admitted for acute renal failure likely secondary to severe dehydration and volume depletion.  Started on IV Zosyn and IV fluids for hydration    Review of Systems:   All systems were reviewed and negative except as mentioned in subjective, assessment and plan.    Vital Signs:  Temp:  [98 °F (36.7 °C)-98.6 °F (37 °C)] 98.5 °F (36.9 °C)  Heart Rate:  [68-74] 74  Resp:  [17-20] 19  BP: ()/(48-87) 115/64    Intake and output:    I/O last 3 completed shifts:  In: 3833.9 [P.O.:240; I.V.:2593.9; IV Piggyback:1000]  Out: 450 [Urine:450]  I/O this shift:  In: 150 [P.O.:150]  Out: -     Physical Examination:    General Appearance:  Alert and cooperative.   Head:  Atraumatic and normocephalic.   Eyes: Conjunctivae and sclerae normal, no icterus. No pallor.   Throat: No oral lesions, no thrush, oral mucosa dry.   Neck: Supple, trachea midline, no thyromegaly.   Lungs:   Breath sounds heard bilaterally equally.  No  crackles or wheezing. No Pleural rub or bronchial breathing.   Heart:  Normal S1 and S2, no murmur, no gallop, no rub. No JVD.   Abdomen:   Normal bowel sounds, no masses, no organomegaly. Soft, nontender, nondistended, no rebound tenderness.   Extremities: Supple, no edema, no cyanosis, no clubbing.   Skin: No bleeding or rash.   Neurologic: Alert and oriented x 3. No facial asymmetry. Moves all four limbs. No tremors.      Laboratory results:    Results from last 7 days   Lab Units 07/01/21  1315   SODIUM mmol/L 138   POTASSIUM mmol/L 4.8   CHLORIDE mmol/L 100   CO2 mmol/L 24.0   BUN mg/dL 23   CREATININE mg/dL 2.90*   CALCIUM mg/dL 9.7   BILIRUBIN mg/dL 1.8*   ALK PHOS U/L 134*   ALT (SGPT) U/L 20   AST (SGOT) U/L 25   GLUCOSE mg/dL 118*     Results from last 7 days   Lab Units 07/01/21  1315   WBC 10*3/mm3 16.36*   HEMOGLOBIN g/dL 16.5*   HEMATOCRIT % 53.8*   PLATELETS 10*3/mm3 1,293*           I have reviewed the patient's laboratory results.    Radiology results:  CT Abdomen Pelvis Without Contrast    Result Date: 7/1/2021  PROCEDURE: CT ABDOMEN PELVIS WO CONTRAST-  HISTORY: n/v/d abd pain, ruma, leukocytosis, thrombocytosis  COMPARISON:None  Note: Noncontrast exam is less sensitive for assessment of the bowel and solid abdominal organs  TECHNIQUE: Noncontrast exam  CT ABDOMEN:  Moderate splenomegaly is noted. Spleen measures up to 16.3 cm. Remaining solid organs are unremarkable. The bowel shows no evidence of obstruction. There is no free air or free fluid within the abdomen. Patient is status post cholecystectomy.  CT PELVIS: No bowel dilatation is seen. There is no free fluid. Appendix is not visualized. Appendix may have been removed at time of hysterectomy. No pelvic adenopathy is seen.      Impression: 1. Moderate splenomegaly without adenopathy. 2. No mass or inflammatory changes  This study was performed with techniques to keep radiation doses as low as reasonably achievable (ALARA). Individualized  dose reduction techniques using automated exposure control or adjustment of vA and/or kV according to the patient size were employed.  This report was finalized on 7/1/2021 3:08 PM by Sam Thakkar MD.    I have reviewed the patient's radiology reports.    Medication Review:   I have reviewed the patient's active and prn medications.     Problem List:    HAMMAD (acute kidney injury) (CMS/[facility])    Assessment:  · Acute renal failure with ATN, worsening, likely prerenal  · Severe dehydration and volume depletion, improving  · Acute gastroenteritis with enteropathogenic E. coli  · Recurrent UTIs  · Splenomegaly  · Thrombocytosis  · Type 2 diabetes, A1c 6.5% from May 2021  · Essential hypertension  · Dyslipidemia    Plan:  · The patient was admitted for severe volume depletion secondary to gastroenteritis that resulted in acute renal failure.  Kidney functions are rising today which is expected with her severe dehydration that resulted in prerenal azotemia with ATN.  She is feeling much better already after IV fluid hydration able tolerate p.o. intake.  Stool culture grew enteropathogenic E. coli currently on antibiotic therapy with Zosyn.  After the results of the culture showing enteropathic E. coli, will switch her antibiotic therapy to Rocephin.  Given the rise in her kidney function, I consulted Dr. Durán/nephrology and we appreciate his help and recommendations  · Continue to hold lisinopril and home dose Lasix  · Continue insulin sliding scale.  · Follow echocardiogram report  · I informed the patient that since her platelet counts are more than 1 million, she will have to have hematology follow-up on outpatient basis and she voiced understanding.  · Because of her recurrent epigastric pain, the patient is scheduled for stress nuclear test on outpatient basis with her family doctor.    Checklist:  · DVT Prophylaxis: Heparin  · Code Status: Full code  · Diet: Clear liquid diet  · Discharge Plan: Home in 24 to 48  hours    Liliana Davis MD  07/02/21  12:52 EDT    Dictated utilizing Dragon dictation.

## 2021-07-02 NOTE — CONSULTS
"Adult Nutrition  Assessment/PES    Patient Name:  Martha Murray  YOB: 1955  MRN: [id number]  Admit Date:  7/1/2021    Assessment Date:  7/2/2021    Comments:    Recommend:  1. Continue current diet order as medically appropriate and tolerated.  2. Encourage PO intake. PO intake average ~50% x 2 meals.  3. RD ordered Boost Glucose Control daily.  4. Consider a multivitamin with minerals daily.    RD to follow pt and available PRN.      Reason for Assessment     Row Name 07/02/21 1346          Reason for Assessment    Reason For Assessment  nurse/nurse practitioner consult;identified at risk by screening criteria;diagnosis/disease state     Diagnosis  diabetes diagnosis/complications;cardiac disease;gastrointestinal disease;infection/sepsis;renal disease N/V/D, Acute gastroenteritis, ARF, CKD Stage 3, Thrombocytopenia, HTN, DLD, DM 2     Identified At Risk by Screening Criteria  unintentional loss of 10 lbs or more in the past 2 mos;MST SCORE 2+;BMI           Anthropometrics     Row Name 07/02/21 1351          Anthropometrics    Height  154.9 cm (61\")        Ideal Body Weight (IBW)    Ideal Body Weight (IBW) (kg)  48.15         Labs/Tests/Procedures/Meds     Row Name 07/02/21 1350          Labs/Procedures/Meds    Lab Results Reviewed  reviewed, pertinent     Lab Results Comments  High: Lipase, Total Bilirubin, Lipase, Platelets        Medications    Pertinent Medications Reviewed  reviewed, pertinent     Pertinent Medications Comments  Lipitor         Physical Findings     Row Name 07/02/21 1350          Physical Findings    Overall Physical Appearance  obese         Estimated/Assessed Needs     Row Name 07/02/21 1351          Calculation Measurements    Weight Used For Calculations  73 kg (161 lb) Actual BW     Height  154.9 cm (61\")        Estimated/Assessed Needs    Additional Documentation  Calorie Requirements (Group);Protein Requirements (Group);KCAL/KG (Group);Fluid Requirements (Group)     "    Calorie Requirements    Estimated Calorie Requirement Comment  1850 - 2190        KCAL/KG    KCAL/KG  25 Kcal/Kg (kcal);30 Kcal/Kg (kcal)     25 Kcal/Kg (kcal)  1825.725     30 Kcal/Kg (kcal)  2190.87        Protein Requirements    Weight Used For Protein Calculations  73 kg (161 lb) Actual BW     Est Protein Requirement Amount (gms/kg)  1.2 gm protein 59 - 87 gm     Estimated Protein Requirements (gms/day)  87.63        Fluid Requirements    Fluid Requirements (mL/day)  1850     Estimated Fluid Requirement Method  other (see comments) 1 mL/kcal     RDA Method (mL)  1850         Nutrition Prescription Ordered     Row Name 07/02/21 1352          Nutrition Prescription PO    Current PO Diet  Regular     Common Modifiers  GI Soft/G[place];Cardiac;Consistent Carbohydrate         Evaluation of Received Nutrient/Fluid Intake     Row Name 07/02/21 1353          PO Evaluation    Number of Days PO Intake Evaluated  1 day     Number of Meals  2     % PO Intake  50               Problem/Interventions:  Problem 1     Row Name 07/02/21 1353          Nutrition Diagnoses Problem 1    Problem 1  Impaired Nutrient Utilization     Etiology (related to)  Medical Diagnosis     Endocrine  DM2     Renal  CKD     Signs/Symptoms (evidenced by)  Biochemical     Specific Labs Noted  HgbA1C;Creatinine         Problem 2     Row Name 07/02/21 1353          Nutrition Diagnoses Problem 2    Problem 2  Overweight/Obesity     Etiology (related to)  Factors Affecting Nutrition     Food Habit/Preferences  Large Meals     Signs/Symptoms (evidenced by)  BMI     BMI  30 - 34.9         Problem 3     Row Name 07/02/21 1354          Nutrition Diagnoses Problem 3    Problem 3  Unintended Weight Loss     Etiology (related to)  Factors Affecting Nutrition     Appetite  Poor     Reported GI Symptoms  N & V;Diarrhea     Signs/Symptoms (evidenced by)  Unintended Weight Change     Unintended Weight Change  Loss     Number of Pounds Lost  18     Weight gain time  period  6 months           Intervention Goal     Row Name 07/02/21 1352          Intervention Goal    General  Meet nutritional needs for age/condition;Improved nutrition related lab(s)     PO  Meet estimated needs;Increase intake;PO intake (%)     PO Intake %  75 %     Weight  No significant weight loss         Nutrition Intervention     Row Name 07/02/21 1351          Nutrition Intervention    RD/Tech Action  Follow Tx progress;Encourage intake;Recommend/ordered     Recommended/Ordered  Supplement         Nutrition Prescription     Row Name 07/02/21 135          Nutrition Prescription PO    PO Prescription  Other (comment);Begin/change supplement Continue current diet order as medically appropriate and tolerated     Supplement  Boost Glucose Control     Supplement Frequency  Daily     New PO Prescription Ordered?  Yes Supplement ordered        Other Orders    Obtain Weight  Daily     Obtain Weight Ordered?  No, recommended     Supplement  Vitamin mineral supplement     Supplement Ordered?  No, recommended         Education/Evaluation     Row Name 07/02/21 3177          Education    Education  Will Instruct as appropriate        Monitor/Evaluation    Monitor  Per protocol;I&O;PO intake;Supplement intake;Pertinent labs;Weight;Skin status           Electronically signed by:  Rubi Blanca RD  07/02/21 13:56 EDT

## 2021-07-02 NOTE — CONSULTS
[facility]      Nephrology Consultation      Referring Provider:   Liliana Davis, *    Reason for Consultation:  Acute Kidney Injury and associated problems.  CKD stage 3.    Subjective:  Chief complaint   Chief Complaint   Patient presents with   • Abdominal Pain     History of present illness:    Patient is 66-year-old  female with multiple medical problems as listed below in the past medical history including type 2 diabetes, hypertension, dyslipidemia and by reviewing her labs she does appear to have chronic kidney disease stage III most likely diabetic who has been having problem with persistent nausea and vomiting unable to tolerate food for last 2 days and has been taking her ACE inhibitor's as well as diuretics presented to the ER secondary to unable to keep anything down.  She was noted to have significant worsening of her renal function and was admitted for further evaluation and treatment.  She has been getting IV fluids and still has no improvement in renal function I was consulted.  I have reviewed labs/imaging/records from this hospitalization, including ER staff and admitting/attending physicians H/P's and progress notes to establish a comprehensive understanding of this patient's clinical hospital course, as well as to establish plan of care appropriately.   Past Medical History:   Diagnosis Date   • Allergic    • Anemia    • Depression    • Diabetes mellitus (CMS/HCC)    • History of heart attack    • Hyperlipidemia    • Hypertension        Past Surgical History:   Procedure Laterality Date   • BILATERAL BREAST REDUCTION     • BONE MARROW ASPIRATION     • HYSTERECTOMY  1990    x 2   • REDUCTION MAMMAPLASTY Bilateral     MORE THAN 20 YEARS AGO   • SINUS SURGERY  2003   • TONSILLECTOMY       Family History   Problem Relation Age of Onset   • Arthritis Maternal Grandmother    • Hypertension Mother    • Hyperlipidemia Mother    • Thyroid disease Mother    • Diabetes  Sister    • Crohn's disease Sister    • Hypertension Sister    • Heart attack Maternal Grandfather    • Breast cancer Neg Hx    • Ovarian cancer Neg Hx      negative h/o ESRD     Social History     Tobacco Use   • Smoking status: Former Smoker     Packs/day: 2.00     Years: 30.00     Pack years: 60.00     Types: Cigarettes     Quit date:      Years since quittin.5   • Smokeless tobacco: Never Used   Substance Use Topics   • Alcohol use: No   • Drug use: No     Home medications:   Prior to Admission Medications     Prescriptions Last Dose Informant Patient Reported? Taking?    amLODIPine (NORVASC) 10 MG tablet 2021  No Yes    Take 1 tablet by mouth Daily.    aspirin (aspirin) 81 MG EC tablet 2021  No Yes    Take 1 tablet by mouth Daily.    atorvastatin (LIPITOR) 80 MG tablet 2021  No Yes    Take 1 tablet by mouth Daily.    Patient taking differently:  Take 40 mg by mouth Daily.    carvedilol (COREG) 25 MG tablet 2021  No Yes    Take 1 tablet by mouth 2 (Two) Times a Day With Meals.    FLUoxetine (PROzac) 20 MG capsule 2021  No Yes    Take 3 capsules by mouth Daily.    furosemide (LASIX) 40 MG tablet 2021  No Yes    Take 1 tablet by mouth Daily.    lisinopril (PRINIVIL,ZESTRIL) 20 MG tablet 2021  No Yes    Take 1 tablet by mouth Daily.    Patient taking differently:  Take 10 mg by mouth Daily.    nystatin (MYCOSTATIN) 517961 UNIT/GM cream 2021  No Yes    Apply  topically to the appropriate area as directed 2 (two) times a day.    potassium chloride 10 MEQ CR tablet 2021  No Yes    Take 1 tablet by mouth Daily.    Tradjenta 5 MG tablet tablet 2021  No Yes    TAKE ONE TABLET BY MOUTH DAILY    albuterol sulfate  (90 Base) MCG/ACT inhaler   No No    Inhale 2 puffs Every 6 (Six) Hours As Needed for Wheezing.    fluconazole (Diflucan) 150 MG tablet   No No    Take 1 tablet by mouth Every 72 (Seventy-Two) Hours.    nitrofurantoin, macrocrystal-monohydrate,  (Macrobid) 100 MG capsule   No No    Take 1 capsule by mouth 2 (Two) Times a Day.    nitroglycerin (NITROSTAT) 0.4 MG SL tablet More than a month  No No    Place 1 tablet under the tongue Every 5 (Five) Minutes As Needed for Chest Pain. Take no more than 3 doses in 15 minutes.        Emergency department medications:   Medications   amLODIPine (NORVASC) tablet 10 mg (10 mg Oral Given 7/2/21 0901)   aspirin EC tablet 81 mg (81 mg Oral Given 7/2/21 0901)   atorvastatin (LIPITOR) tablet 40 mg (40 mg Oral Given 7/1/21 2009)   carvedilol (COREG) tablet 25 mg (25 mg Oral Given 7/2/21 0901)   FLUoxetine (PROzac) capsule 60 mg (60 mg Oral Given 7/2/21 0901)   fluconazole (DIFLUCAN) tablet 150 mg (has no administration in time range)   lactated ringers infusion (150 mL/hr Intravenous Currently Infusing 7/2/21 0802)   heparin (porcine) 5000 UNIT/ML injection 5,000 Units (5,000 Units Subcutaneous Given 7/2/21 0640)   sodium chloride 0.9 % flush 10 mL (10 mL Intravenous Given 7/2/21 0901)   sodium chloride 0.9 % flush 10 mL (has no administration in time range)   ondansetron (ZOFRAN) injection 4 mg (4 mg Intravenous Not Given 7/2/21 0900)   loperamide (IMODIUM) capsule 2 mg (2 mg Oral Given 7/1/21 2012)   dextrose (GLUTOSE) oral gel 1 tube (has no administration in time range)   dextrose (D50W) 25 g/ 50mL Intravenous Solution 25 g (has no administration in time range)   glucagon (human recombinant) (GLUCAGEN DIAGNOSTIC) injection 1 mg (has no administration in time range)   insulin aspart (novoLOG) injection 0-7 Units (0 Units Subcutaneous Not Given 7/2/21 0635)   Pharmacy to Dose Zosyn (has no administration in time range)   piperacillin-tazobactam (ZOSYN) 3.375 g in iso-osmotic dextrose 50 ml (premix) ( Intravenous Currently Infusing 7/2/21 0802)   sodium chloride 0.9 % bolus 1,000 mL (0 mL Intravenous Stopped 7/1/21 1520)   ondansetron (ZOFRAN) injection 4 mg (4 mg Intravenous Given 7/1/21 1315)   lactated ringers bolus  "1,500 mL (0 mL Intravenous Stopped 7/1/21 7852)   piperacillin-tazobactam (ZOSYN) 3.375 g in iso-osmotic dextrose 50 ml (premix) (0 g Intravenous Stopped 7/2/21 0123)       Allergies:  Sulfa antibiotics    Review of Systems    1. Constitutional: Negative for fever and chills.  Denies any diaphoresis. Complains of fatigue and malaise.  Complains of unexpected weight change, she thinks she has lost about 40 pounds in the last 6 months and is being worked up for routine screening including mammogram and colonoscopy.  2. HENT: Negative for congestion and hearing loss.   3. Eyes: Negative for redness and visual disturbance.   4. Respiratory: Negative for shortness of breath or cough. Negative for chest pain  5. Cardiovascular: Negative for chest pain and chest tightness or palpitations.   6. Gastrointestinal: Negative for abdominal pain or distention, and blood in stool.  Positive nausea, vomiting, and diarrhea, denies any constipation.  7. Endocrine: Negative for heat or cold intolerance.   8. Genitourinary: Negative for difficulty urinating, dysuria and frequency.  She did mention that she had recurrent problem which is better now.  9. Musculoskeletal: Positive for arthralgias, back pain.  Denies any myalgias.   10. Skin: Negative for color change, rash and wound.   11. Neurological: Negative for syncope, weakness and headaches.   12. Hematological: Negative for adenopathy. Does not bruise/bleed easily.   13. Psychiatric/Behavioral: Negative for confusion. The patient is not nervous/anxious.     Objective:  Vital Signs  /48 (BP Location: Left arm, Patient Position: Lying)   Pulse 72   Temp 98.2 °F (36.8 °C) (Oral)   Resp 17   Ht 154.9 cm (61\")   Wt 73 kg (161 lb)   SpO2 98%   BMI 30.42 kg/m²          No intake/output data recorded.    Intake/Output Summary (Last 24 hours) at 7/2/2021 0919  Last data filed at 7/2/2021 0604  Gross per 24 hour   Intake 3833.86 ml   Output 450 ml   Net 3383.86 ml "       Physical Exam:  General Appearance:   Alert, cooperative, in no acute distress.     Head:   Normocephalic, without obvious abnormality, atraumatic.     Eyes:      Normal, conjunctivae and sclerae, no icterus, no pallor, corneas clear, PERRLA        Throat:   Oral mucosa dry      Neck:  No adenopathy, supple, trachea midline, no thyromegaly, no carotid bruit, no JVD        Lungs:    Clear to auscultation and fair air movement noted.      Heart::   Regular rhythm and normal rate, normal S1 and S2.       Abdomen:   Obese. Normal bowel sounds, no masses, no organomegaly, soft non-tender, non-distended, no guarding, no rebound tenderness      Genital urinary:   No urinary bladder palpable      Extremities:  Moves all extremities, no edema, no cyanosis, no redness.     Pulses:  Pulses palpable and equal bilaterally but weak.     Skin:  No bleeding, bruising or rash        Neurologic:  Cranial nerves grossly intact, move all extremities         Results Review:   Results from last 7 days   Lab Units 07/01/21  1315   SODIUM mmol/L 138   POTASSIUM mmol/L 4.8   CHLORIDE mmol/L 100   CO2 mmol/L 24.0   BUN mg/dL 23   CREATININE mg/dL 2.90*   CALCIUM mg/dL 9.7   ALBUMIN g/dL 4.70   BILIRUBIN mg/dL 1.8*   ALK PHOS U/L 134*   ALT (SGPT) U/L 20   AST (SGOT) U/L 25   GLUCOSE mg/dL 118*     Estimated Creatinine Clearance: 17.7 mL/min (A) (by C-G formula based on SCr of 2.9 mg/dL (H)).          Results from last 7 days   Lab Units 07/01/21  1315   WBC 10*3/mm3 16.36*   HEMOGLOBIN g/dL 16.5*   PLATELETS 10*3/mm3 1,293*         Brief Urine Lab Results  (Last result in the past 365 days)      Color   Clarity   Blood   Leuk Est   Nitrite   Protein   CREAT   Urine HCG        07/01/21 1315 Dark Yellow Cloudy Negative Small (1+) Negative Trace             No results found for: UTPCR  Imaging Results (Last 24 Hours)     Procedure Component Value Units Date/Time    CT Abdomen Pelvis Without Contrast [[id number]] Collected: 07/01/21 0818      Updated: 07/01/21 1510    Narrative:      PROCEDURE: CT ABDOMEN PELVIS WO CONTRAST-     HISTORY: n/v/d abd pain, hammad, leukocytosis, thrombocytosis     COMPARISON:None     Note: Noncontrast exam is less sensitive for assessment of the bowel and  solid abdominal organs     TECHNIQUE: Noncontrast exam      CT ABDOMEN:  Moderate splenomegaly is noted. Spleen measures up to 16.3  cm. Remaining solid organs are unremarkable. The bowel shows no evidence  of obstruction. There is no free air or free fluid within the abdomen.  Patient is status post cholecystectomy.     CT PELVIS: No bowel dilatation is seen. There is no free fluid. Appendix  is not visualized. Appendix may have been removed at time of  hysterectomy. No pelvic adenopathy is seen.       Impression:      1. Moderate splenomegaly without adenopathy.  2. No mass or inflammatory changes     This study was performed with techniques to keep radiation doses as low  as reasonably achievable (ALARA). Individualized dose reduction  techniques using automated exposure control or adjustment of vA and/or  kV according to the patient size were employed.      This report was finalized on 7/1/2021 3:08 PM by Sam Thakkar MD.        amLODIPine, 10 mg, Oral, Daily  aspirin, 81 mg, Oral, Daily  atorvastatin, 40 mg, Oral, Nightly  carvedilol, 25 mg, Oral, BID With Meals  [START ON 7/3/2021] fluconazole, 150 mg, Oral, Q72H  FLUoxetine, 60 mg, Oral, Daily  heparin (porcine), 5,000 Units, Subcutaneous, Q8H  insulin aspart, 0-7 Units, Subcutaneous, TID AC  piperacillin-tazobactam, 3.375 g, Intravenous, Q12H  sodium chloride, 10 mL, Intravenous, Q12H      lactated ringers, 150 mL/hr, Last Rate: 150 mL/hr (07/02/21 0802)  Pharmacy to Dose Zosyn,         Assessment/Plan:    1. HAMMAD (acute kidney injury) (CMS/HCC): Likely has prerenal azotemia, check urine sodium and continue with hydration.  2. 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  3. Severe dehydration and volume depletion:  4. Acute gastroenteritis: Improving  5. Type 2 diabetes:  6. Essential hypertension: Initially hypotensive recently blood pressure appears to be slightly better.  I will go ahead and cut down her home dose of amlodipine to 2.5 mg a day and decrease carvedilol to 6.25 twice a day until her blood pressure starts coming up and if needed can be increased later.  7. Dyslipidemia:  8. Splenomegaly:  9. Thrombocytosis:  10. History of recurrent UTI:    Risk and complexity: High      Plan:  · Most likely her acute kidney injury secondary to volume depletion and being on multiple medications that can worsen renal function.  She has been off all those medications and has been getting IV fluids with no improvement in renal function.  I will check a urine sodium and continue with hydration.  · Her stool came back positive for EPEC and was started on Zosyn with significant improvement after 2 doses.  · Continue with rest of the current treatment plan and surveillance labs.  · Details were discussed with the patient no family in the room.    · Details were also discussed with the hospitalist service.   · Further recommendations will depend on clinical course of the patient during the current hospitalization.    · I also discussed the details with the nursing staff.  · Rest as ordered.    In closing, I sincerely appreciate opportunity to participate in care of this patient. If I can be of any further assistance with the management of this patient, please don’t hesitate to contact me.    Domingo Durán MD, FASN    07/02/21  09:19 EDT    Dictated using Dragon.

## 2021-07-02 NOTE — PROGRESS NOTES
Notified by RN that patient has positive EPEC in stool.   Currently admitted with dehydration, nausea, vomiting and diarrhea. Given severity of diarrhea, will initiate Zosyn.

## 2021-07-02 NOTE — CASE MANAGEMENT/SOCIAL WORK
Discharge Planning Assessment  [facility]     Patient Name: Martha Murray  MRN: [id number]  Today's Date: 7/2/2021    Admit Date: 7/1/2021    Discharge Needs Assessment     Row Name 07/02/21 1242       Living Environment    Lives With  alone    Current Living Arrangements  home/apartment/condo    Primary Care Provided by  self    Provides Primary Care For  no one, unable/limited ability to care for self    Family Caregiver if Needed  child(alexa), adult    Quality of Family Relationships  supportive    Able to Return to Prior Arrangements  yes       Resource/Environmental Concerns    Resource/Environmental Concerns  none    Transportation Concerns  car, none       Transition Planning    Patient/Family Anticipates Transition to  home    Patient/Family Anticipated Services at Transition  none    Transportation Anticipated  car, drives self       Discharge Needs Assessment    Readmission Within the Last 30 Days  no previous admission in last 30 days    Equipment Currently Used at Home  glucometer bp machine    Anticipated Changes Related to Illness  none    Equipment Needed After Discharge  none        Discharge Plan     Row Name 07/02/21 1242       Plan    Plan Met with patient in room. NO POA/LW. Demographics verified. Patient fully independent.  No needs.  OWUSU completed, CM card given to patient. Drove herself here, will drive herself home, or son will, if needed.  Denies having any unmet needs.         Continued Care and Services - Admitted Since 7/1/2021    Coordination has not been started for this encounter.         Demographic Summary     Row Name 07/02/21 1240       General Information    Admission Type  observation    Required Notices Provided  Observation Status Notice    Referral Source  admission list    Reason for Consult  discharge planning    Preferred Language  English     Used During This Interaction  no        Functional Status     Row Name 07/02/21 1240       Functional Status     Usual Activity Tolerance  excellent    Current Activity Tolerance  excellent       Functional Status, IADL    Medications  independent    Meal Preparation  independent    Housekeeping  independent    Laundry  independent    Shopping  independent       Mental Status    General Appearance WDL  WDL       Mental Status Summary    Recent Changes in Mental Status/Cognitive Functioning  no changes       Employment/    Employment Status  retired;employed part-time    Shift Worked  first shift    Current or Previous Occupation  service industry works at [employer]     Row Name 07/02/21 1241       Values/Beliefs    Spiritual, Cultural Beliefs, Confucianism Practices, Values that Affect Care  no       Behavior WDL    Behavior WDL  WDL       Emotion Mood WDL    Emotion/Mood/Affect WDL  WDL       Speech WDL    Speech WDL  WDL       Perceptual State WDL    Perceptual State WDL  WDL       Thought Process WDL    Thought Process WDL  WDL       Intellectual Performance WDL    Intellectual Performance WDL  WDL       Coping/Stress    Major Change/Loss/Stressor  none    Sources of Support  adult child(alexa)    Reaction to Health Status  accepting;adjusting    Understanding of Condition and Treatment  adequate understanding of medical condition;adequate understanding of treatment       C-SSRS (Recent)    Q1 Wished to be Dead (Past Month)  no    Q2 Suicidal Thoughts (Past Month)  no    Q6 Suicide Behavior (Lifetime)  no       Violence Risk    Feels Like Hurting Others  no    Previous Attempt to Harm Others  no        Abuse/Neglect     Row Name 07/02/21 1241       Personal Safety    Feels Unsafe at Home or Work/School  no    Feels Threatened by Someone  no    Does Anyone Try to Keep You From Having Contact with Others or Doing Things Outside Your Home?  no    Physical Signs of Abuse Present  no        Legal    No documentation.       Substance Abuse    No documentation.       Patient Forms    No  documentation.           Tere Rivas, RN

## 2021-07-03 ENCOUNTER — READMISSION MANAGEMENT (OUTPATIENT)
Dept: CALL CENTER | Facility: HOSPITAL | Age: 66
End: 2021-07-03

## 2021-07-03 VITALS
OXYGEN SATURATION: 96 % | WEIGHT: 167.33 LBS | RESPIRATION RATE: 16 BRPM | SYSTOLIC BLOOD PRESSURE: 131 MMHG | BODY MASS INDEX: 31.59 KG/M2 | HEIGHT: 61 IN | HEART RATE: 79 BPM | DIASTOLIC BLOOD PRESSURE: 59 MMHG | TEMPERATURE: 98.3 F

## 2021-07-03 DIAGNOSIS — F41.9 ANXIETY: ICD-10-CM

## 2021-07-03 DIAGNOSIS — F33.41 RECURRENT MAJOR DEPRESSIVE DISORDER, IN PARTIAL REMISSION (HCC): ICD-10-CM

## 2021-07-03 LAB
ANION GAP SERPL CALCULATED.3IONS-SCNC: 9.4 MMOL/L (ref 5–15)
BASOPHILS # BLD AUTO: 0.12 10*3/MM3 (ref 0–0.2)
BASOPHILS NFR BLD AUTO: 1.6 % (ref 0–1.5)
BUN SERPL-MCNC: 12 MG/DL (ref 8–23)
BUN/CREAT SERPL: 9.8 (ref 7–25)
CALCIUM SPEC-SCNC: 8.6 MG/DL (ref 8.6–10.5)
CHLORIDE SERPL-SCNC: 108 MMOL/L (ref 98–107)
CO2 SERPL-SCNC: 22.6 MMOL/L (ref 22–29)
CREAT SERPL-MCNC: 1.23 MG/DL (ref 0.57–1)
DEPRECATED RDW RBC AUTO: 47.3 FL (ref 37–54)
EOSINOPHIL # BLD AUTO: 0.25 10*3/MM3 (ref 0–0.4)
EOSINOPHIL NFR BLD AUTO: 3.3 % (ref 0.3–6.2)
ERYTHROCYTE [DISTWIDTH] IN BLOOD BY AUTOMATED COUNT: 15.6 % (ref 12.3–15.4)
GFR SERPL CREATININE-BSD FRML MDRD: 44 ML/MIN/1.73
GLUCOSE BLDC GLUCOMTR-MCNC: 100 MG/DL (ref 70–130)
GLUCOSE BLDC GLUCOMTR-MCNC: 93 MG/DL (ref 70–130)
GLUCOSE SERPL-MCNC: 88 MG/DL (ref 65–99)
HCT VFR BLD AUTO: 45.2 % (ref 34–46.6)
HGB BLD-MCNC: 13.8 G/DL (ref 12–15.9)
IMM GRANULOCYTES # BLD AUTO: 0.04 10*3/MM3 (ref 0–0.05)
IMM GRANULOCYTES NFR BLD AUTO: 0.5 % (ref 0–0.5)
LYMPHOCYTES # BLD AUTO: 1.1 10*3/MM3 (ref 0.7–3.1)
LYMPHOCYTES NFR BLD AUTO: 14.3 % (ref 19.6–45.3)
MAGNESIUM SERPL-MCNC: 2 MG/DL (ref 1.6–2.4)
MCH RBC QN AUTO: 25.9 PG (ref 26.6–33)
MCHC RBC AUTO-ENTMCNC: 30.5 G/DL (ref 31.5–35.7)
MCV RBC AUTO: 85 FL (ref 79–97)
MONOCYTES # BLD AUTO: 0.63 10*3/MM3 (ref 0.1–0.9)
MONOCYTES NFR BLD AUTO: 8.2 % (ref 5–12)
NEUTROPHILS NFR BLD AUTO: 5.54 10*3/MM3 (ref 1.7–7)
NEUTROPHILS NFR BLD AUTO: 72.1 % (ref 42.7–76)
NRBC BLD AUTO-RTO: 0 /100 WBC (ref 0–0.2)
PHOSPHATE SERPL-MCNC: 2.8 MG/DL (ref 2.5–4.5)
PLATELET # BLD AUTO: 784 10*3/MM3 (ref 140–450)
PMV BLD AUTO: 10.1 FL (ref 6–12)
POTASSIUM SERPL-SCNC: 4.1 MMOL/L (ref 3.5–5.2)
RBC # BLD AUTO: 5.32 10*6/MM3 (ref 3.77–5.28)
SODIUM SERPL-SCNC: 140 MMOL/L (ref 136–145)
WBC # BLD AUTO: 7.68 10*3/MM3 (ref 3.4–10.8)

## 2021-07-03 PROCEDURE — G0378 HOSPITAL OBSERVATION PER HR: HCPCS

## 2021-07-03 PROCEDURE — 96372 THER/PROPH/DIAG INJ SC/IM: CPT

## 2021-07-03 PROCEDURE — 83735 ASSAY OF MAGNESIUM: CPT | Performed by: INTERNAL MEDICINE

## 2021-07-03 PROCEDURE — 99217 PR OBSERVATION CARE DISCHARGE MANAGEMENT: CPT | Performed by: INTERNAL MEDICINE

## 2021-07-03 PROCEDURE — 25010000002 HEPARIN (PORCINE) PER 1000 UNITS: Performed by: INTERNAL MEDICINE

## 2021-07-03 PROCEDURE — 84100 ASSAY OF PHOSPHORUS: CPT | Performed by: INTERNAL MEDICINE

## 2021-07-03 PROCEDURE — 82962 GLUCOSE BLOOD TEST: CPT

## 2021-07-03 PROCEDURE — 85025 COMPLETE CBC W/AUTO DIFF WBC: CPT | Performed by: INTERNAL MEDICINE

## 2021-07-03 PROCEDURE — 80048 BASIC METABOLIC PNL TOTAL CA: CPT | Performed by: INTERNAL MEDICINE

## 2021-07-03 RX ORDER — CEFDINIR 300 MG/1
300 CAPSULE ORAL 2 TIMES DAILY
Qty: 14 CAPSULE | Refills: 0 | Status: SHIPPED | OUTPATIENT
Start: 2021-07-03 | End: 2021-07-10

## 2021-07-03 RX ORDER — ONDANSETRON 4 MG/1
4 TABLET, ORALLY DISINTEGRATING ORAL EVERY 8 HOURS PRN
Qty: 20 TABLET | Refills: 0 | Status: SHIPPED | OUTPATIENT
Start: 2021-07-03 | End: 2021-07-28

## 2021-07-03 RX ORDER — LOPERAMIDE HYDROCHLORIDE 2 MG/1
2 CAPSULE ORAL 4 TIMES DAILY PRN
Qty: 20 CAPSULE | Refills: 0 | Status: SHIPPED | OUTPATIENT
Start: 2021-07-03 | End: 2021-07-28

## 2021-07-03 RX ADMIN — ASPIRIN 81 MG: 81 TABLET, COATED ORAL at 08:45

## 2021-07-03 RX ADMIN — AMLODIPINE BESYLATE 2.5 MG: 5 TABLET ORAL at 08:45

## 2021-07-03 RX ADMIN — SODIUM CHLORIDE, PRESERVATIVE FREE 10 ML: 5 INJECTION INTRAVENOUS at 08:45

## 2021-07-03 RX ADMIN — HEPARIN SODIUM 5000 UNITS: 5000 INJECTION INTRAVENOUS; SUBCUTANEOUS at 06:40

## 2021-07-03 RX ADMIN — FLUCONAZOLE 150 MG: 100 TABLET ORAL at 08:45

## 2021-07-03 RX ADMIN — FLUOXETINE 60 MG: 20 CAPSULE ORAL at 08:45

## 2021-07-03 RX ADMIN — CARVEDILOL 6.25 MG: 6.25 TABLET, FILM COATED ORAL at 08:45

## 2021-07-03 NOTE — DISCHARGE SUMMARY
[facility]   DISCHARGE SUMMARY      Name:  Martha Murray   Age:  66 y.o.  Sex:  female  :  1955  MRN:  [id number]   Visit Number:  [id number]    Admission Date:  2021  Date of Discharge:  7/3/2021  Primary Care Physician:  Julia Stacy MD    Important issues to note:  · The patient will be discharged on oral cefdinir, Zofran and Imodium for severe enteropathogenic E. coli gastroenteritis  · She will need referral through her family doctor to hematology service for further evaluation of her severe thrombocytosis    Discharge Diagnoses:   · Acute renal failure with ATN, improved  · Severe dehydration and volume depletion, improved  · Acute gastroenteritis with enteropathogenic E. coli, improving  · Recurrent UTIs  · Splenomegaly  · Thrombocytosis  · Type 2 diabetes, A1c 6.5% from May 2021  · Essential hypertension  · Dyslipidemia     Problem List:   Active Hospital Problems    Diagnosis  POA   • HAMMAD (acute kidney injury) (CMS/[facility]) [N17.9]  Yes      Resolved Hospital Problems   No resolved problems to display.     Presenting Problem:  Chief Complaint   Patient presents with   • Abdominal Pain      Consults:   Consulting Physician(s)  Chat With All Active Members    Provider Relationship Specialty    Domingo Durán MD, ABRAMN  Consulting Physician Nephrology        Procedures Performed:  CT abdomen    History of presenting illness/Hospital Course:  66 years old female patient with past medical history significant for type 2 diabetes, dyslipidemia and hypertension presented to the hospital with primary complaint of persistent nausea and vomiting and inability to tolerate oral intake.  CT scan abdomen was done in the ER and was unremarkable for any acute intra-abdominal pathology.  Stool culture revealed enteropathogenic E. coli.  She was admitted for acute renal failure likely secondary to severe dehydration and volume depletion.  C. difficile was negative. Started on  IV Zosyn and was transitioned to IV Rocephin after stool culture resulted.  She was also noted to have HAMMDA.  Creatinine on admission was 2.9 from her baseline around 1.26.  She was hydrated aggressively with IV fluids.  Dr. Durán was consulted and he recommended to continue IVF fluids, hold lisinopril and diuretic therapy.  With IV fluid hydration, her kidney functions returned back to normal at 1.23.  The patient quit having diarrhea while hospitalized.  She started to tolerate oral intake with no nausea or vomiting.  Felt much better and on the day of discharge she reported that she is at her baseline in terms of oral intake and physical wellbeing.  She remained hemodynamically stable throughout the entire hospitalization.  I gave a prescription for cefdinir, Zofran and Imodium upon discharge.  Of note, she was noted to have thrombocytosis with platelet count of 1.2 millions.  She was instructed to follow-up with her family doctor and get referral for hematology for evaluation for this thrombocytosis and she was understanding.    Vital Signs:  Temp:  [98.1 °F (36.7 °C)-98.5 °F (36.9 °C)] 98.3 °F (36.8 °C)  Heart Rate:  [66-79] 79  Resp:  [16-19] 16  BP: (115-137)/(56-73) 131/59    Physical Exam:    General Appearance:  Alert and cooperative.    Head:  Atraumatic and normocephalic.   Eyes: Conjunctivae and sclerae normal, no icterus. No pallor.   Ears:  Ears with no abnormalities noted.   Throat: No oral lesions, no thrush, oral mucosa moist.   Neck: Supple, trachea midline, no thyromegaly.   Back:   No kyphoscoliosis present. No tenderness to palpation.   Lungs:   Breath sounds heard bilaterally equally.  No crackles or wheezing. No Pleural rub or bronchial breathing.   Heart:  Normal S1 and S2, no murmur, no gallop, no rub. No JVD.   Abdomen:   Normal bowel sounds, no masses, no organomegaly. Soft, nontender, nondistended, no rebound tenderness.   Extremities: Supple, no edema, no cyanosis, no clubbing.    Pulses: Pulses palpable bilaterally.   Skin: No bleeding or rash.   Neurologic: Alert and oriented x 3. No facial asymmetry. Moves all four limbs. No tremors.     Pertinent Lab Results:     Results from last 7 days   Lab Units 07/03/21  0555 07/01/21  1315   SODIUM mmol/L 140 138   POTASSIUM mmol/L 4.1 4.8   CHLORIDE mmol/L 108* 100   CO2 mmol/L 22.6 24.0   BUN mg/dL 12 23   CREATININE mg/dL 1.23* 2.90*   CALCIUM mg/dL 8.6 9.7   BILIRUBIN mg/dL  --  1.8*   ALK PHOS U/L  --  134*   ALT (SGPT) U/L  --  20   AST (SGOT) U/L  --  25   GLUCOSE mg/dL 88 118*     Results from last 7 days   Lab Units 07/03/21  0555 07/01/21  1315   WBC 10*3/mm3 7.68 16.36*   HEMOGLOBIN g/dL 13.8 16.5*   HEMATOCRIT % 45.2 53.8*   PLATELETS 10*3/mm3 784* 1,293*               Results from last 7 days   Lab Units 07/01/21  1315   LIPASE U/L 86*       Pertinent Radiology Results:  Imaging Results (All)     Procedure Component Value Units Date/Time    CT Abdomen Pelvis Without Contrast [[id number]] Collected: 07/01/21 1458     Updated: 07/01/21 1510    Narrative:      PROCEDURE: CT ABDOMEN PELVIS WO CONTRAST-     HISTORY: n/v/d abd pain, ruma, leukocytosis, thrombocytosis     COMPARISON:None     Note: Noncontrast exam is less sensitive for assessment of the bowel and  solid abdominal organs     TECHNIQUE: Noncontrast exam      CT ABDOMEN:  Moderate splenomegaly is noted. Spleen measures up to 16.3  cm. Remaining solid organs are unremarkable. The bowel shows no evidence  of obstruction. There is no free air or free fluid within the abdomen.  Patient is status post cholecystectomy.     CT PELVIS: No bowel dilatation is seen. There is no free fluid. Appendix  is not visualized. Appendix may have been removed at time of  hysterectomy. No pelvic adenopathy is seen.       Impression:      1. Moderate splenomegaly without adenopathy.  2. No mass or inflammatory changes     This study was performed with techniques to keep radiation doses as low  as  reasonably achievable (ALARA). Individualized dose reduction  techniques using automated exposure control or adjustment of vA and/or  kV according to the patient size were employed.      This report was finalized on 7/1/2021 3:08 PM by Sam Thakkar MD.        Echo:  Results for orders placed during the hospital encounter of 07/01/21    Adult Transthoracic Echo Complete W/ Cont if Necessary Per Protocol    Interpretation Summary  1.  Normal left ventricular size and systolic function, LVEF 60-65%.  2.  Grade 1 diastolic dysfunction.  3.  Mild concentric LVH.  4.  Moderately increased left atrial volume index.  5.  Normal right ventricular size and systolic function.  6.  Mild aortic regurgitation.    Condition on Discharge:      Stable.    Code status during the hospital stay:  Code Status and Medical Interventions:   Ordered at: 07/01/21 1707     Level Of Support Discussed With:    Patient     Code Status:    CPR     Medical Interventions (Level of Support Prior to Arrest):    Full     Discharge Disposition:  Home or Self Care    Discharge Medications:     Discharge Medications      New Medications      Instructions Start Date   cefdinir 300 MG capsule  Commonly known as: OMNICEF   300 mg, Oral, 2 Times Daily      loperamide 2 MG capsule  Commonly known as: IMODIUM   2 mg, Oral, 4 Times Daily PRN      ondansetron ODT 4 MG disintegrating tablet  Commonly known as: Zofran ODT   4 mg, Translingual, Every 8 Hours PRN         Changes to Medications      Instructions Start Date   albuterol sulfate  (90 Base) MCG/ACT inhaler  Commonly known as: PROVENTIL HFA;VENTOLIN HFA;PROAIR HFA  What changed: additional instructions   2 puffs, Inhalation, Every 6 Hours PRN      fluconazole 150 MG tablet  Commonly known as: Diflucan  What changed: additional instructions   150 mg, Oral, Every 72 Hours      nitrofurantoin (macrocrystal-monohydrate) 100 MG capsule  Commonly known as: Macrobid  What changed: additional  instructions   100 mg, Oral, 2 Times Daily         Continue These Medications      Instructions Start Date   amLODIPine 10 MG tablet  Commonly known as: NORVASC   10 mg, Oral, Daily      aspirin 81 MG EC tablet   81 mg, Oral, Daily      atorvastatin 80 MG tablet  Commonly known as: LIPITOR   80 mg, Oral, Daily      carvedilol 25 MG tablet  Commonly known as: COREG   25 mg, Oral, 2 Times Daily With Meals      FLUoxetine 20 MG capsule  Commonly known as: PROzac   60 mg, Oral, Daily      furosemide 40 MG tablet  Commonly known as: LASIX   40 mg, Oral, Daily      lisinopril 20 MG tablet  Commonly known as: PRINIVIL,ZESTRIL   20 mg, Oral, Daily      nitroglycerin 0.4 MG SL tablet  Commonly known as: NITROSTAT   0.4 mg, Sublingual, Every 5 Minutes PRN, Take no more than 3 doses in 15 minutes.      nystatin [id number] UNIT/GM cream  Commonly known as: MYCOSTATIN   Topical, 2 times daily      potassium chloride 10 MEQ CR tablet   10 mEq, Oral, Daily      Tradjenta 5 MG tablet tablet  Generic drug: linagliptin   TAKE ONE TABLET BY MOUTH DAILY      Trelegy Ellipta 200-62.5-25 MCG/INH inhaler  Generic drug: Fluticasone-Umeclidin-Vilant   2 puffs, Inhalation, Daily - RT           Discharge Diet:   Diet Instructions     Diet: Cardiac, Consistent Carbohydrate      Discharge Diet:  Cardiac  Consistent Carbohydrate           Activity at Discharge:   Activity Instructions     Activity as Tolerated          Follow-up Appointments:  Follow-up Information     Julia Stacy MD Follow up in 1 week(s).    Specialty: Internal Medicine  Contact information:  [address] [place] [address] [phone]                 Future Appointments   Date Time Provider Department Center   7/6/2021  9:00 AM [facility] ECHO/VASC CART 2  [facility] NON [facility]   7/6/2021 10:45 AM [facility] CARD [facility]   7/6/2021 11:30 AM [facility] ECHO/VASC CART 1  [facility] NON ST[facility]   7/16/2021  9:00 AM Tommie Saha IV, MD [facility]    8/10/2021  9:30 AM Keaton Aldrich MD [facility]   11/23/2021  9:30 AM Julia Stacy MD [facility]      Liliana Davis MD  07/03/21  11:27 EDT    Time: I spent 33 minutes on this discharge activity which included: face-to-face encounter with the patient, reviewing the data in the system, coordination of the care with the nursing staff as well as consultants, documentation, and entering orders.     Dictated utilizing Dragon dictation.

## 2021-07-03 NOTE — OUTREACH NOTE
Prep Survey      Responses   [facility] facility patient discharged from?  [place]   Is LACE score < 7 ?  No   Emergency Room discharge w/ pulse ox?  No   Eligibility  [facility]   Date of Admission  07/01/21   Date of Discharge  07/03/21   Discharge Disposition  Home or Self Care   Discharge diagnosis  Acute renal failure   Does the patient have one of the following disease processes/diagnoses(primary or secondary)?  Other   Does the patient have Home health ordered?  No   Is there a DME ordered?  No   Prep survey completed?  Yes          Chloe Lowe RN

## 2021-07-04 ENCOUNTER — APPOINTMENT (OUTPATIENT)
Dept: ULTRASOUND IMAGING | Facility: HOSPITAL | Age: 66
End: 2021-07-04

## 2021-07-04 ENCOUNTER — HOSPITAL ENCOUNTER (EMERGENCY)
Facility: HOSPITAL | Age: 66
Discharge: HOME | End: 2021-07-04
Attending: EMERGENCY MEDICINE | Admitting: EMERGENCY MEDICINE

## 2021-07-04 VITALS
DIASTOLIC BLOOD PRESSURE: 69 MMHG | HEART RATE: 67 BPM | RESPIRATION RATE: 16 BRPM | BODY MASS INDEX: 30.4 KG/M2 | SYSTOLIC BLOOD PRESSURE: 132 MMHG | WEIGHT: 161 LBS | HEIGHT: 61 IN | TEMPERATURE: 98.4 F | OXYGEN SATURATION: 97 %

## 2021-07-04 DIAGNOSIS — M70.52 SUPRAPATELLAR BURSITIS OF LEFT KNEE: Primary | ICD-10-CM

## 2021-07-04 LAB
APPEARANCE FLD: ABNORMAL
COLOR FLD: ABNORMAL
CRYSTALS FLD MICRO: NORMAL
MONOS+MACROS NFR FLD: 54 %
NEUTROPHILS NFR FLD MANUAL: 46 %
QT INTERVAL: 456 MS
QTC INTERVAL: 447 MS
RBC # FLD AUTO: 4000 /MM3 (ref 0–200000)
WBC # FLD AUTO: 140 /MM3 (ref 0–1000)

## 2021-07-04 PROCEDURE — 99283 EMERGENCY DEPT VISIT LOW MDM: CPT

## 2021-07-04 PROCEDURE — 89051 BODY FLUID CELL COUNT: CPT | Performed by: EMERGENCY MEDICINE

## 2021-07-04 PROCEDURE — 93971 EXTREMITY STUDY: CPT

## 2021-07-04 PROCEDURE — 25010000003 LIDOCAINE 1 % SOLUTION: Performed by: EMERGENCY MEDICINE

## 2021-07-04 PROCEDURE — 89060 EXAM SYNOVIAL FLUID CRYSTALS: CPT | Performed by: EMERGENCY MEDICINE

## 2021-07-04 RX ORDER — HYDROCODONE BITARTRATE AND ACETAMINOPHEN 5; 325 MG/1; MG/1
1 TABLET ORAL EVERY 6 HOURS PRN
Qty: 12 TABLET | Refills: 0 | Status: SHIPPED | OUTPATIENT
Start: 2021-07-04 | End: 2022-01-20

## 2021-07-04 RX ORDER — LIDOCAINE HYDROCHLORIDE 10 MG/ML
10 INJECTION, SOLUTION INFILTRATION; PERINEURAL ONCE
Status: COMPLETED | OUTPATIENT
Start: 2021-07-04 | End: 2021-07-04

## 2021-07-04 RX ORDER — HYDROCODONE BITARTRATE AND ACETAMINOPHEN 5; 325 MG/1; MG/1
1 TABLET ORAL ONCE
Status: COMPLETED | OUTPATIENT
Start: 2021-07-04 | End: 2021-07-04

## 2021-07-04 RX ADMIN — HYDROCODONE BITARTRATE AND ACETAMINOPHEN 1 TABLET: 5; 325 TABLET ORAL at 22:09

## 2021-07-04 RX ADMIN — LIDOCAINE HYDROCHLORIDE 10 ML: 10 INJECTION, SOLUTION INFILTRATION; PERINEURAL at 22:16

## 2021-07-05 ENCOUNTER — TRANSITIONAL CARE MANAGEMENT TELEPHONE ENCOUNTER (OUTPATIENT)
Dept: CALL CENTER | Facility: HOSPITAL | Age: 66
End: 2021-07-05

## 2021-07-05 NOTE — OUTREACH NOTE
Call Center TCM Note      Responses   [facility] patient discharged from?  [facility]   Does the patient have one of the following disease processes/diagnoses(primary or secondary)?  Other   TCM attempt successful?  No   Unsuccessful attempts  Attempt 1          Yolande Knox RN    7/5/2021, 10:43 EDT

## 2021-07-06 ENCOUNTER — TRANSITIONAL CARE MANAGEMENT TELEPHONE ENCOUNTER (OUTPATIENT)
Dept: CALL CENTER | Facility: HOSPITAL | Age: 66
End: 2021-07-06

## 2021-07-06 ENCOUNTER — APPOINTMENT (OUTPATIENT)
Dept: CARDIOLOGY | Facility: HOSPITAL | Age: 66
End: 2021-07-06

## 2021-07-06 ENCOUNTER — TELEPHONE (OUTPATIENT)
Dept: ORTHOPEDIC SURGERY | Facility: CLINIC | Age: 66
End: 2021-07-06

## 2021-07-06 ENCOUNTER — HOSPITAL ENCOUNTER (OUTPATIENT)
Dept: CARDIOLOGY | Facility: HOSPITAL | Age: 66
End: 2021-07-06

## 2021-07-06 LAB
BASOPHILS # BLD AUTO: 0.17 10*3/MM3 (ref 0–0.2)
BASOPHILS NFR BLD AUTO: 1 % (ref 0–1.5)
CYTOLOGIST CVX/VAG CYTO: NORMAL
DEPRECATED RDW RBC AUTO: 47.2 FL (ref 37–54)
EOSINOPHIL # BLD AUTO: 0.32 10*3/MM3 (ref 0–0.4)
EOSINOPHIL NFR BLD AUTO: 2 % (ref 0.3–6.2)
ERYTHROCYTE [DISTWIDTH] IN BLOOD BY AUTOMATED COUNT: 16.8 % (ref 12.3–15.4)
HCT VFR BLD AUTO: 53.8 % (ref 34–46.6)
HGB BLD-MCNC: 16.5 G/DL (ref 12–15.9)
IMM GRANULOCYTES # BLD AUTO: 0.09 10*3/MM3 (ref 0–0.05)
IMM GRANULOCYTES NFR BLD AUTO: 0.6 % (ref 0–0.5)
LYMPHOCYTES # BLD AUTO: 0.88 10*3/MM3 (ref 0.7–3.1)
LYMPHOCYTES NFR BLD AUTO: 5.4 % (ref 19.6–45.3)
MCH RBC QN AUTO: 25.6 PG (ref 26.6–33)
MCHC RBC AUTO-ENTMCNC: 30.7 G/DL (ref 31.5–35.7)
MCV RBC AUTO: 83.4 FL (ref 79–97)
MONOCYTES # BLD AUTO: 1.03 10*3/MM3 (ref 0.1–0.9)
MONOCYTES NFR BLD AUTO: 6.3 % (ref 5–12)
NEUTROPHILS NFR BLD AUTO: 13.87 10*3/MM3 (ref 1.7–7)
NEUTROPHILS NFR BLD AUTO: 84.7 % (ref 42.7–76)
NRBC BLD AUTO-RTO: 0 /100 WBC (ref 0–0.2)
PATH INTERP BLD-IMP: NORMAL
PLATELET # BLD AUTO: 1293 10*3/MM3 (ref 140–450)
PMV BLD AUTO: 9.7 FL (ref 6–12)
RBC # BLD AUTO: 6.45 10*6/MM3 (ref 3.77–5.28)
RBC MORPH BLD: NORMAL
SMALL PLATELETS BLD QL SMEAR: NORMAL
WBC # BLD AUTO: 16.36 10*3/MM3 (ref 3.4–10.8)
WBC MORPH BLD: NORMAL

## 2021-07-06 RX ORDER — FLUOXETINE HYDROCHLORIDE 20 MG/1
CAPSULE ORAL
Qty: 90 CAPSULE | Refills: 0 | Status: SHIPPED | OUTPATIENT
Start: 2021-07-06 | End: 2021-09-22

## 2021-07-06 NOTE — TELEPHONE ENCOUNTER
Provider: DR WILKERSON    Caller: ELVA BENITEZ    Relationship to Patient: SELF    Phone Number: [phone]    Reason for Call: PATIENT WAS SEEN IN THE ER ON 7/4/21 FOR HER KNEE, LEG AND FOOT SWELLING. THEY DRAINED SOME FLUID AND ITS STILL SWOLLEN AND WARM (RED FLAG WORDS). UNSURE IF THIS IS NORMAL AFTER DRAINING. UNABLE TO WARM TRANSFER. PLEASE ADVISE

## 2021-07-06 NOTE — TELEPHONE ENCOUNTER
Aspiration negative for infectious etiology.  She is likely experiencing arthritic exacerbation of the knee.  Unfortunately, it is too early to proceed with cortisone injection.  She does have end-stage arthritis of the knee and would benefit from total joint arthroplasty.  This is as much as I can offer her.  The surgery cannot be done until 3 months after her last injection.

## 2021-07-06 NOTE — OUTREACH NOTE
Call Center TCM Note      Responses   [facility] patient discharged from?  [facility]   Does the patient have one of the following disease processes/diagnoses(primary or secondary)?  Other   TCM attempt successful?  No [Jose, son]   Unsuccessful attempts  Attempt 3          Ashli Dunne RN    7/6/2021, 15:00 EDT

## 2021-07-06 NOTE — TELEPHONE ENCOUNTER
I spoke with the patient and she advised that she had gone to the ER on Sunday and they carolina out fluid from the knee and sent the fluid to the lab. She also had a US done for a DVT which came back negative but she is still having pain in her knee and the calf muscle. I asked as far to help with the swelling if she has been elevating the knee higher than her heart which means flat on her back with her leg straight up in the air. She stated that she has not been doing that at all to help with the swelling. Her knee is hurting her so bad that she has to keep it straight at all times. Please advise on what else needs to be done for the patient.    Diamond

## 2021-07-06 NOTE — TELEPHONE ENCOUNTER
"I spoke with the patient and advised per Dr. Aldirch, \"Aspiration negative for infectious etiology.  She is likely experiencing arthritic exacerbation of the knee.  Unfortunately, it is too early to proceed with cortisone injection.  She does have end-stage arthritis of the knee and would benefit from total joint arthroplasty.  This is as much as I can offer her.  The surgery cannot be done until 3 months after her last injection.\"     I told her to keep icing, elevating and alternating Tylenol and Ibuprofen as needed as well. I also mentioned if she is having any swelling in the feet and ankles then she would also benefit from compression stockings. She understood and will call back if needed.     Diamond   "

## 2021-07-08 NOTE — ED PROVIDER NOTES
Subjective   History of Present Illness    Chief Complaint: Leg pain  History of Present Illness: 66-year-old female presents with left lower pain and swelling.  Recent hospitalization.  Also chronic knee issues which she gets knee injections every 3 months.  Onset: Yesterday  Duration: Persist  Exacerbating / Alleviating factors: Worse with ambulation  Associated symptoms: None      Nurses Notes reviewed and agree, including vitals, allergies, social history and prior medical history.     REVIEW OF SYSTEMS: All systems reviewed and not pertinent unless noted.    Positive for: Left lower extremity pain and swelling    Negative for: Weakness numbness incontinence  Review of Systems    Past Medical History:   Diagnosis Date   • Allergic    • Anemia    • Depression    • Diabetes mellitus (CMS/[facility])    • History of heart attack    • Hyperlipidemia    • Hypertension        Allergies   Allergen Reactions   • Sulfa Antibiotics Rash       Past Surgical History:   Procedure Laterality Date   • BILATERAL BREAST REDUCTION     • BONE MARROW ASPIRATION     • HYSTERECTOMY  1990    x 2   • REDUCTION MAMMAPLASTY Bilateral     MORE THAN 20 YEARS AGO   • SINUS SURGERY     • TONSILLECTOMY         Family History   Problem Relation Age of Onset   • Arthritis Maternal Grandmother    • Hypertension Mother    • Hyperlipidemia Mother    • Thyroid disease Mother    • Diabetes Sister    • Crohn's disease Sister    • Hypertension Sister    • Heart attack Maternal Grandfather    • Breast cancer Neg Hx    • Ovarian cancer Neg Hx        Social History     Socioeconomic History   • Marital status: Single     Spouse name: Not on file   • Number of children: Not on file   • Years of education: Not on file   • Highest education level: Not on file   Tobacco Use   • Smoking status: Former Smoker     Packs/day: 2.00     Years: 30.00     Pack years: 60.00     Types: Cigarettes     Quit date:      Years since quittin.5   • Smokeless tobacco:  Never Used   Substance and Sexual Activity   • Alcohol use: No   • Drug use: No   • Sexual activity: Defer           Objective   Physical Exam    CONSTITUTIONAL: Well developed, nontoxic 66-year-old white female,  in no acute distress.  VITAL SIGNS: per nursing, reviewed and noted  SKIN: exposed skin with no rashes, ulcerations or petechiae.  EYES: perrla. EOMI.  ENT: Normal voice.  Patient maintained wearing a mask throughout patient encounter due to coronavirus pandemic  RESPIRATORY:  No increased work of breathing. No retractions.   CARDIOVASCULAR:  regular rate and rhythm, no murmurs.  Good Peripheral pulses. Good cap refill to extremities.   GI: Abdomen soft, nontender, normal bowel sounds. No hernia. No ascites.  MUSCULOSKELETAL: Tenderness palpation to the left lower extremity left knee diffusely with suprapatellar effusion of the left knee.  Get distal pulses good cap refill.  Mild asymmetric soft tissue swelling left lower extremity compared to the right.  NEUROLOGIC: Alert, oriented x 3. No gross deficits. GCS 15.   PSYCH: appropriate affect.  : no bladder tenderness or distention, no CVA tenderness      Procedures     Knee aspiration  Indications suggests suprapatellar bursitis/effusion.  Sterile prep consent obtained.  Lidocaine 1% 2 mL infiltrated laterally.  30 mL of blood-tinged serous fluid withdrawn from the space.  Covered with Band-Aid and Ace wrap.  Tolerated well without complication.        ED Course  ED Course as of Jul 07 2130   Sun Jul 04, 2021 1948 EKG interpreted by me reveals A. fib at a rate of 63 with corrected  ms nonspecific diffuse T wave changes.  No other ectopy no ischemic changes    [PF]   2017 CLINICAL HISTORY:  PAIN, SWELLING X 1 DAY     FINDINGS:  Left lower extremity duplex ultrasound demonstrates normal flow  in the deep venous system.  There is no abnormal echogenicity to  suggest thrombus.  There is normal compression and augmentation.     IMPRESSION:  No  evidence of DVT.     Authenticated by Joey Valencia M.D. on 07/04/2021 08:13:50 PM    [PF]      ED Course User Index  [PF] Hernesto Castanon,                                            MDM  Patient presented with left lower extremity pain and swelling no evidence of DVT on ultrasound which was obtained due to recent hospitalization.  Successful bursal aspiration.  Labs pending low suspicion for septic bursitis.  Discharged home stable condition with outpatient follow-up return precautions discussed.  George reviewed.  Prescription Norco.  Final diagnoses:   Suprapatellar bursitis of left knee       ED Disposition  ED Disposition     ED Disposition Condition Comment    Discharge Stable           keep your orthopedist appointment               Medication List      New Prescriptions    HYDROcodone-acetaminophen 5-325 MG per tablet  Commonly known as: NORCO  Take 1 tablet by mouth Every 6 (Six) Hours As Needed for Moderate Pain .        Changed    albuterol sulfate  (90 Base) MCG/ACT inhaler  Commonly known as: PROVENTIL HFA;VENTOLIN HFA;PROAIR HFA  Inhale 2 puffs Every 6 (Six) Hours As Needed for Wheezing.  What changed: additional instructions     fluconazole 150 MG tablet  Commonly known as: Diflucan  Take 1 tablet by mouth Every 72 (Seventy-Two) Hours.  What changed: additional instructions     nitrofurantoin (macrocrystal-monohydrate) 100 MG capsule  Commonly known as: Macrobid  Take 1 capsule by mouth 2 (Two) Times a Day.  What changed: additional instructions           Where to Get Your Medications      These medications were sent to [facility] [address] [place] [address] - [address] [place] [address] AT [facility] [phone] [facility] [phone] [address] [address]    Phone: [phone]   · HYDROcodone-acetaminophen 5-325 MG per tablet          Hernesto Castanon DO  07/07/21 5851

## 2021-07-13 ENCOUNTER — OFFICE VISIT (OUTPATIENT)
Dept: ORTHOPEDIC SURGERY | Facility: CLINIC | Age: 66
End: 2021-07-13

## 2021-07-13 ENCOUNTER — HOSPITAL ENCOUNTER (OUTPATIENT)
Dept: CARDIOLOGY | Facility: HOSPITAL | Age: 66
Discharge: HOME | End: 2021-07-13
Admitting: INTERNAL MEDICINE

## 2021-07-13 ENCOUNTER — READMISSION MANAGEMENT (OUTPATIENT)
Dept: CALL CENTER | Facility: HOSPITAL | Age: 66
End: 2021-07-13

## 2021-07-13 VITALS
HEIGHT: 61 IN | BODY MASS INDEX: 30.39 KG/M2 | SYSTOLIC BLOOD PRESSURE: 133 MMHG | DIASTOLIC BLOOD PRESSURE: 69 MMHG | WEIGHT: 160.94 LBS | HEART RATE: 66 BPM

## 2021-07-13 DIAGNOSIS — I20.8 ATYPICAL ANGINA (HCC): ICD-10-CM

## 2021-07-13 DIAGNOSIS — M17.0 PRIMARY OSTEOARTHRITIS OF BOTH KNEES: Primary | ICD-10-CM

## 2021-07-13 PROCEDURE — 78452 HT MUSCLE IMAGE SPECT MULT: CPT

## 2021-07-13 PROCEDURE — 99214 OFFICE O/P EST MOD 30 MIN: CPT | Performed by: ORTHOPAEDIC SURGERY

## 2021-07-13 PROCEDURE — 93018 CV STRESS TEST I&R ONLY: CPT | Performed by: INTERNAL MEDICINE

## 2021-07-13 PROCEDURE — 25010000002 REGADENOSON 0.4 MG/5ML SOLUTION: Performed by: INTERNAL MEDICINE

## 2021-07-13 PROCEDURE — 78452 HT MUSCLE IMAGE SPECT MULT: CPT | Performed by: INTERNAL MEDICINE

## 2021-07-13 PROCEDURE — A9500 TC99M SESTAMIBI: HCPCS | Performed by: INTERNAL MEDICINE

## 2021-07-13 PROCEDURE — 93017 CV STRESS TEST TRACING ONLY: CPT

## 2021-07-13 PROCEDURE — 0 TECHNETIUM SESTAMIBI: Performed by: INTERNAL MEDICINE

## 2021-07-13 RX ORDER — TRAMADOL HYDROCHLORIDE 50 MG/1
50 TABLET ORAL EVERY 6 HOURS PRN
Qty: 30 TABLET | Refills: 0 | Status: SHIPPED | OUTPATIENT
Start: 2021-07-13 | End: 2022-01-20

## 2021-07-13 RX ADMIN — TECHNETIUM TC 99M SESTAMIBI 1 DOSE: 1 INJECTION INTRAVENOUS at 11:25

## 2021-07-13 RX ADMIN — TECHNETIUM TC 99M SESTAMIBI 1 DOSE: 1 INJECTION INTRAVENOUS at 09:45

## 2021-07-13 RX ADMIN — REGADENOSON 0.4 MG: 0.08 INJECTION, SOLUTION INTRAVENOUS at 11:25

## 2021-07-13 NOTE — OUTREACH NOTE
Medical Week 2 Survey      Responses   [facility] patient discharged from?  [facility]   Does the patient have one of the following disease processes/diagnoses(primary or secondary)?  Other   Week 2 attempt successful?  No   Unsuccessful attempts  Attempt 1          Fernanda Cotter RN

## 2021-07-13 NOTE — PROGRESS NOTES
Orthopaedic Clinic Note: Knee Established Patient    Chief Complaint   Patient presents with   • Follow-up     2 month follow up - Primary osteoarthritis of both knees         HPI    It has been 2  month(s) since Ms. Murray's last visit. She returns to clinic today for follow-up bilateral knee osteoarthritis.  She underwent cortisone injection in both knees  2 months ago.  The injection in the right knee provided excellent relief and is continuing to provide relief.  Unfortunately, the pain in the left knee rapidly recurred about 2 weeks ago.  She developed recurrent swelling and pain prompting her to go to the ER.  She is ambulating with no assistive device today.  Rates her pain a 9/10 on the pain scale.  She is complaining of swelling and stiffness in the knee and pain is worse with walking weightbearing activities.  Aspiration of the knee was performed to evaluate for potential infection.  Aspiration revealed 140 white blood cells with low percentage neutrophils indicative of low suspicion for infection.  She is here today to discuss treatment for her ongoing knee pain.  Overall she is doing worse.    Past Medical History:   Diagnosis Date   • Allergic    • Anemia    • Depression    • Diabetes mellitus (CMS/HCC)    • History of heart attack    • Hyperlipidemia    • Hypertension       Past Surgical History:   Procedure Laterality Date   • BILATERAL BREAST REDUCTION     • BONE MARROW ASPIRATION     • HYSTERECTOMY  1990    x 2   • REDUCTION MAMMAPLASTY Bilateral     MORE THAN 20 YEARS AGO   • SINUS SURGERY  2003   • TONSILLECTOMY        Family History   Problem Relation Age of Onset   • Arthritis Maternal Grandmother    • Hypertension Mother    • Hyperlipidemia Mother    • Thyroid disease Mother    • Diabetes Sister    • Crohn's disease Sister    • Hypertension Sister    • Heart attack Maternal Grandfather    • Breast cancer Neg Hx    • Ovarian cancer Neg Hx      Social History     Socioeconomic History   • Marital  status: Single     Spouse name: Not on file   • Number of children: Not on file   • Years of education: Not on file   • Highest education level: Not on file   Tobacco Use   • Smoking status: Former Smoker     Packs/day: 2.00     Years: 30.00     Pack years: 60.00     Types: Cigarettes     Quit date:      Years since quittin.5   • Smokeless tobacco: Never Used   Substance and Sexual Activity   • Alcohol use: No   • Drug use: No   • Sexual activity: Defer      Current Outpatient Medications on File Prior to Visit   Medication Sig Dispense Refill   • albuterol sulfate  (90 Base) MCG/ACT inhaler Inhale 2 puffs Every 6 (Six) Hours As Needed for Wheezing. (Patient taking differently: Inhale 2 puffs Every 6 (Six) Hours As Needed for Wheezing. Patient states she is not using anymore due to change in therapy) 1 inhaler 2   • amLODIPine (NORVASC) 10 MG tablet Take 1 tablet by mouth Daily. 30 tablet 5   • aspirin (aspirin) 81 MG EC tablet Take 1 tablet by mouth Daily. 30 tablet 5   • atorvastatin (LIPITOR) 80 MG tablet Take 1 tablet by mouth Daily. 30 tablet 5   • carvedilol (COREG) 25 MG tablet Take 1 tablet by mouth 2 (Two) Times a Day With Meals. 180 tablet 1   • fluconazole (Diflucan) 150 MG tablet Take 1 tablet by mouth Every 72 (Seventy-Two) Hours. (Patient taking differently: Take 150 mg by mouth Every 72 (Seventy-Two) Hours. Patient has finished this) 3 tablet 0   • FLUoxetine (PROzac) 20 MG capsule TAKE THREE CAPSULES BY MOUTH DAILY 90 capsule 0   • Fluticasone-Umeclidin-Vilant (Trelegy Ellipta) 200-62.5-25 MCG/INH inhaler Inhale 2 puffs Daily.     • furosemide (LASIX) 40 MG tablet Take 1 tablet by mouth Daily. 30 tablet 3   • HYDROcodone-acetaminophen (NORCO) 5-325 MG per tablet Take 1 tablet by mouth Every 6 (Six) Hours As Needed for Moderate Pain . 12 tablet 0   • lisinopril (PRINIVIL,ZESTRIL) 20 MG tablet Take 1 tablet by mouth Daily. 30 tablet 3   • loperamide (IMODIUM) 2 MG capsule Take 1 capsule  "by mouth 4 (Four) Times a Day As Needed for Diarrhea. 20 capsule 0   • nitrofurantoin, macrocrystal-monohydrate, (Macrobid) 100 MG capsule Take 1 capsule by mouth 2 (Two) Times a Day. (Patient taking differently: Take 100 mg by mouth 2 (Two) Times a Day. Patient says she has finished this) 10 capsule 0   • nitroglycerin (NITROSTAT) 0.4 MG SL tablet Place 1 tablet under the tongue Every 5 (Five) Minutes As Needed for Chest Pain. Take no more than 3 doses in 15 minutes. 25 tablet 5   • nystatin (MYCOSTATIN) 456605 UNIT/GM cream Apply  topically to the appropriate area as directed 2 (two) times a day. 30 g 0   • ondansetron ODT (Zofran ODT) 4 MG disintegrating tablet Place 1 tablet on the tongue Every 8 (Eight) Hours As Needed for Nausea or Vomiting. 20 tablet 0   • potassium chloride 10 MEQ CR tablet Take 1 tablet by mouth Daily. 30 tablet 3   • Tradjenta 5 MG tablet tablet TAKE ONE TABLET BY MOUTH DAILY 30 tablet 4     No current facility-administered medications on file prior to visit.      Allergies   Allergen Reactions   • Sulfa Antibiotics Rash        Review of Systems   Constitutional: Negative.    HENT: Negative.    Eyes: Negative.    Respiratory: Negative.    Cardiovascular: Negative.    Gastrointestinal: Negative.    Endocrine: Negative.    Genitourinary: Negative.    Musculoskeletal: Positive for arthralgias.   Skin: Negative.    Allergic/Immunologic: Negative.    Neurological: Negative.    Hematological: Negative.    Psychiatric/Behavioral: Negative.         The patient's Review of Systems was personally reviewed and confirmed as accurate.    Physical Exam  Blood pressure 133/69, pulse 66, height 154.9 cm (60.98\"), weight 73 kg (160 lb 15 oz), not currently breastfeeding.    Body mass index is 30.42 kg/m².    GENERAL APPEARANCE: awake, alert, oriented, in no acute distress and well developed, well nourished  LUNGS:  breathing nonlabored  EXTREMITIES: no clubbing, cyanosis  PERIPHERAL PULSES: palpable " dorsalis pedis and posterior tibial pulses bilaterally.    GAIT:  Antalgic        ----------  Bilateral Knee Exam:  ----------  ALIGNMENT: severe varus, correctable to neutral  ----------  RANGE OF MOTION:  Decreased (5 - 120 degrees) with no extensor lag  LIGAMENTOUS STABILITY:   stable to varus and valgus stress at terminal extension and 30 degrees; retensioning of the MCL is appreciated with valgus stress at 30 degrees consistent with medial compartment degeneration  ----------  STRENGTH:  KNEE FLEXION 5/5  KNEE EXTENSION  5/5  ANKLE DORSIFLEXION  5/5  ANKLE PLANTARFLEXION  5/5  ----------  PAIN WITH PALPATION:global  KNEE EFFUSION: yes, trace effusion on right, moderate effusion on left  PAIN WITH KNEE ROM: yes  PATELLAR CREPITUS:  yes, painful and symptomatic  ----------  SENSATION TO LIGHT TOUCH:  DEEP PERONEAL/SUPERFICIAL PERONEAL/SURAL/SAPHENOUS/TIBIAL:    intact  ----------  EDEMA:  no  ERYTHEMA:    no  WOUNDS/INCISIONS:   no  _____________________________________________________________________  _____________________________________________________________________    RADIOGRAPHIC FINDINGS:   No new imaging today    Assessment/Plan:   Diagnosis Plan   1. Primary osteoarthritis of both knees  traMADol (ULTRAM) 50 MG tablet     Patient is experiencing an arthritic flareup of the left knee.  She has a moderate knee effusion.  Aspiration results from the ER were personally reviewed and negative for infectious etiology.  She is not a candidate for surgical intervention until 3 months after her last injection.  She still has another month before she can schedule surgery.  In the interval, I discussed alternative treatment options.  She is unable to take anti-inflammatory secondary to kidney disease.  I will prescribe her tramadol.  She will follow-up as previously scheduled      Keaton Aldrich MD  07/13/21  08:15 EDT

## 2021-07-14 ENCOUNTER — HOSPITAL ENCOUNTER (OUTPATIENT)
Dept: CARDIOLOGY | Facility: HOSPITAL | Age: 66
Discharge: HOME | End: 2021-07-14
Admitting: INTERNAL MEDICINE

## 2021-07-14 ENCOUNTER — READMISSION MANAGEMENT (OUTPATIENT)
Dept: CALL CENTER | Facility: HOSPITAL | Age: 66
End: 2021-07-14

## 2021-07-14 VITALS — HEIGHT: 61 IN | BODY MASS INDEX: 30.21 KG/M2 | WEIGHT: 160 LBS

## 2021-07-14 DIAGNOSIS — I10 ESSENTIAL HYPERTENSION: ICD-10-CM

## 2021-07-14 PROCEDURE — 93975 VASCULAR STUDY: CPT

## 2021-07-14 NOTE — OUTREACH NOTE
Medical Week 2 Survey      Responses   [facility] patient discharged from?  [facility]   Does the patient have one of the following disease processes/diagnoses(primary or secondary)?  Other   Week 2 attempt successful?  No [Both numbers called UTR]   Unsuccessful attempts  Attempt 2   Wrap up additional comments  UTR x 5 attempts          Katelynn Martínez, RN

## 2021-07-15 LAB
BH CV ECHO MEAS - BSA(HAYCOCK): 1.8 M^2
BH CV ECHO MEAS - BSA: 1.7 M^2
BH CV ECHO MEAS - BZI_BMI: 30.2 KILOGRAMS/M^2
BH CV ECHO MEAS - BZI_METRIC_HEIGHT: 154.9 CM
BH CV ECHO MEAS - BZI_METRIC_WEIGHT: 72.6 KG
BH CV ECHO MEAS - DIST REN A EDV LEFT: 22.3 CM/SEC
BH CV ECHO MEAS - DIST REN A PSV LEFT: 96.9 CM/SEC
BH CV ECHO MEAS - DIST REN A RI LEFT: 0.77
BH CV ECHO MEAS - HILAR A EDV LEFT: 9.9 CM/SEC
BH CV ECHO MEAS - HILAR A PSV LEFT: 63.5 CM/SEC
BH CV ECHO MEAS - HILAR A RI LEFT: 0.84
BH CV ECHO MEAS - MID REN A EDV LEFT: 22.3 CM/SEC
BH CV ECHO MEAS - MID REN A PSV LEFT: 141.5 CM/SEC
BH CV ECHO MEAS - MID REN A RI LEFT: 0.84
BH CV ECHO MEAS - PROX REN A EDV LEFT: 9.4 CM/SEC
BH CV ECHO MEAS - PROX REN A PSV LEFT: 83.2 CM/SEC
BH CV ECHO MEAS - PROX REN A RI LEFT: 0.89
BH CV REST NUCLEAR ISOTOPE DOSE: 9.4 MCI
BH CV STRESS BP STAGE 1: NORMAL
BH CV STRESS BP STAGE 3: NORMAL
BH CV STRESS COMMENTS STAGE 1: NORMAL
BH CV STRESS DOSE REGADENOSON STAGE 1: 0.4
BH CV STRESS DURATION MIN STAGE 1: 0
BH CV STRESS DURATION MIN STAGE 2: 1
BH CV STRESS DURATION MIN STAGE 3: 1
BH CV STRESS DURATION MIN STAGE 4: 1
BH CV STRESS DURATION SEC STAGE 1: 10
BH CV STRESS DURATION SEC STAGE 2: 0
BH CV STRESS HR STAGE 1: 73
BH CV STRESS HR STAGE 2: 83
BH CV STRESS HR STAGE 3: 81
BH CV STRESS HR STAGE 4: 74
BH CV STRESS NUCLEAR ISOTOPE DOSE: 31.5 MCI
BH CV STRESS O2 STAGE 1: 100
BH CV STRESS O2 STAGE 2: 100
BH CV STRESS O2 STAGE 3: 100
BH CV STRESS O2 STAGE 4: 100
BH CV STRESS PROTOCOL 1: NORMAL
BH CV STRESS RECOVERY BP: NORMAL MMHG
BH CV STRESS RECOVERY HR: 75 BPM
BH CV STRESS RECOVERY O2: 100 %
BH CV STRESS STAGE 1: 1
BH CV STRESS STAGE 2: 2
BH CV STRESS STAGE 3: 3
BH CV STRESS STAGE 4: 4
BH CV VAS BP LEFT ARM: NORMAL MMHG
BH CV VAS BP RIGHT ARM: NORMAL MMHG
BH CV VAS KIDNEY HEIGHT LEFT: 4 CM
BH CV VAS RENAL AORTIC MID PSV: 93 CM/S
BH CV XCLRA SUP ARC RI LEFT: 0.66
BH CV XLRA MEAS - KID L LEFT: 9.6 CM
BH CV XLRA MEAS - RENAL A ORG RI LEFT: 0.85
BH CV XLRA MEAS - SUP ARC PSV LEFT: 20.1 CM/SEC
BH CV XLRA MEAS - SUP REN AO PSV: 94.4 CM/SEC
BH CV XLRA MEAS - SUP SEG EDV LEFT: 16.3 CM/SEC
BH CV XLRA MEAS - SUP SEG PSV LEFT: 45.6 CM/SEC
BH CV XLRA MEAS - SUP SEG RI LEFT: 0.64
BH CV XLRA MEAS DIST REN A EDV RIGHT: 22.9 CM/SEC
BH CV XLRA MEAS DIST REN A PSV RIGHT: 149.7 CM/SEC
BH CV XLRA MEAS DIST REN A RI RIGHT: 0.85
BH CV XLRA MEAS HILAR A EDV RIGHT: 27.5 CM/SEC
BH CV XLRA MEAS HILAR A PSV RIGHT: 143.6 CM/SEC
BH CV XLRA MEAS HILAR A RI RIGHT: 0.81
BH CV XLRA MEAS INF ARC EDV LEFT: 4.4 CM/SEC
BH CV XLRA MEAS INF ARC EDV RIGHT: 6.1 CM/SEC
BH CV XLRA MEAS INF ARC PSV LEFT: 14.1 CM/SEC
BH CV XLRA MEAS INF ARC PSV RIGHT: 18 CM/SEC
BH CV XLRA MEAS INF ARC RI LEFT: 0.68
BH CV XLRA MEAS INF ARC RI RIGHT: 0.66
BH CV XLRA MEAS INF SEG EDV LEFT: 17.4 CM/SEC
BH CV XLRA MEAS INF SEG EDV RIGHT: 11.3 CM/SEC
BH CV XLRA MEAS INF SEG PSV LEFT: 77.7 CM/SEC
BH CV XLRA MEAS INF SEG PSV RIGHT: 39.9 CM/SEC
BH CV XLRA MEAS INF SEG RI LEFT: 0.78
BH CV XLRA MEAS INF SEG RI RIGHT: 0.72
BH CV XLRA MEAS KID H RIGHT: 4.8 CM
BH CV XLRA MEAS KID L RIGHT: 10.2 CM
BH CV XLRA MEAS KID W RIGHT: 5.4 CM
BH CV XLRA MEAS MID REN A EDV RIGHT: 22.2 CM/SEC
BH CV XLRA MEAS MID REN A PSV RIGHT: 139 CM/SEC
BH CV XLRA MEAS MID REN A RI RIGHT: 0.84
BH CV XLRA MEAS PROX REN A EDV RIGHT: 27.8 CM/SEC
BH CV XLRA MEAS PROX REN A PSV RIGHT: 149.4 CM/SEC
BH CV XLRA MEAS PROX REN A RI RIGHT: 0.81
BH CV XLRA MEAS RAR LEFT: 1.52
BH CV XLRA MEAS RAR RIGHT: 1.6
BH CV XLRA MEAS RENAL A ORG EDV LEFT: 11.2 CM/SEC
BH CV XLRA MEAS RENAL A ORG EDV RIGHT: 15.6 CM/SEC
BH CV XLRA MEAS RENAL A ORG PSV LEFT: 75.5 CM/SEC
BH CV XLRA MEAS RENAL A ORG PSV RIGHT: 93.7 CM/SEC
BH CV XLRA MEAS RENAL A ORG RI RIGHT: 0.83
BH CV XLRA MEAS SUP ARC EDV RIGHT: 5.9 CM/SEC
BH CV XLRA MEAS SUP ARC PSV RIGHT: 19 CM/SEC
BH CV XLRA MEAS SUP ARC RI RIGHT: 0.69
BH CV XLRA MEAS SUP SEG EDV RIGHT: 14.5 CM/SEC
BH CV XLRA MEAS SUP SEG PSV RIGHT: 52.7 CM/SEC
BH CV XLRA MEAS SUP SEG RI RIGHT: 0.72
BH CV XLRA SUP ARC EDV LEFT: 6.8 CM/SEC
LEFT KIDNEY WIDTH: 3.8 CM
LV EF NUC BP: 75 %
MAXIMAL PREDICTED HEART RATE: 155 BPM
PERCENT MAX PREDICTED HR: 58.06 %
STRESS BASELINE BP: NORMAL MMHG
STRESS BASELINE HR: 63 BPM
STRESS O2 SAT REST: 96 %
STRESS PERCENT HR: 68 %
STRESS POST ESTIMATED WORKLOAD: 1 METS
STRESS POST EXERCISE DUR MIN: 4 MIN
STRESS POST EXERCISE DUR SEC: 0 SEC
STRESS POST O2 SAT PEAK: 100 %
STRESS POST PEAK BP: NORMAL MMHG
STRESS POST PEAK HR: 90 BPM
STRESS TARGET HR: 132 BPM

## 2021-07-20 ENCOUNTER — READMISSION MANAGEMENT (OUTPATIENT)
Dept: CALL CENTER | Facility: HOSPITAL | Age: 66
End: 2021-07-20

## 2021-07-20 NOTE — OUTREACH NOTE
Medical Week 3 Survey      Responses   [facility] patient discharged from?  [facility]   Does the patient have one of the following disease processes/diagnoses(primary or secondary)?  Other   Week 3 attempt successful?  No [Utr both numbers called]   Unsuccessful attempts  Attempt 1   Wrap up additional comments  UTR x 6 attempts          Katelynn Martínez, RN

## 2021-07-28 ENCOUNTER — OFFICE VISIT (OUTPATIENT)
Dept: CARDIOLOGY | Facility: CLINIC | Age: 66
End: 2021-07-28

## 2021-07-28 VITALS
HEART RATE: 64 BPM | BODY MASS INDEX: 30.25 KG/M2 | SYSTOLIC BLOOD PRESSURE: 106 MMHG | DIASTOLIC BLOOD PRESSURE: 62 MMHG | HEIGHT: 61 IN | WEIGHT: 160.2 LBS | OXYGEN SATURATION: 96 %

## 2021-07-28 DIAGNOSIS — E78.5 HYPERLIPIDEMIA LDL GOAL <70: Chronic | ICD-10-CM

## 2021-07-28 DIAGNOSIS — I20.8 ATYPICAL ANGINA (HCC): Primary | ICD-10-CM

## 2021-07-28 DIAGNOSIS — I10 ESSENTIAL HYPERTENSION: Chronic | ICD-10-CM

## 2021-07-28 PROBLEM — N17.9 AKI (ACUTE KIDNEY INJURY): Status: RESOLVED | Noted: 2021-07-01 | Resolved: 2021-07-28

## 2021-07-28 PROBLEM — I20.89 ATYPICAL ANGINA: Chronic | Status: ACTIVE | Noted: 2019-10-17

## 2021-07-28 PROCEDURE — 99214 OFFICE O/P EST MOD 30 MIN: CPT | Performed by: INTERNAL MEDICINE

## 2021-07-28 RX ORDER — LISINOPRIL 20 MG/1
20 TABLET ORAL DAILY
Qty: 90 TABLET | Refills: 3
Start: 2021-07-28 | End: 2021-12-20

## 2021-07-28 RX ORDER — FUROSEMIDE 20 MG/1
20 TABLET ORAL DAILY
Qty: 90 TABLET | Refills: 1 | Status: SHIPPED | OUTPATIENT
Start: 2021-07-28 | End: 2021-12-14 | Stop reason: DRUGHIGH

## 2021-07-28 RX ORDER — POTASSIUM CHLORIDE 750 MG/1
10 TABLET, FILM COATED, EXTENDED RELEASE ORAL DAILY
Qty: 90 TABLET | Refills: 1 | Status: SHIPPED | OUTPATIENT
Start: 2021-07-28 | End: 2022-02-01

## 2021-07-28 NOTE — ASSESSMENT & PLAN NOTE
· Improved with addition of loop diuretic therapy  · Reduce furosemide from 40 mg daily to 20 mg daily

## 2021-07-28 NOTE — PROGRESS NOTES
"[facility]      Identification: Martha Murray is a 66 y.o. female who resides in [place], [place]    Reason for visit:  Atypical angina      Subjective      Martha Murray presents to [facility] for followup.    Ms. Murray returns the office today in follow-up of her atypical chest pain and to discuss stress test results.  The patient had a nuclear stress test performed on 7/13/2021 which showed no evidence of ischemia or infarct and normal LV systolic function.  She states she is having no further chest discomfort symptoms.  She states her blood pressure has been well controlled since starting loop diuretic therapy.    At the beginning of the month, the patient was admitted to [facility] with recurrent urinary tract infections resulting in acute kidney injury.  She was seen by nephrology and her kidney function improved with IV hydration and treatment of UTI.  She has not had her kidney function rechecked since leaving hospital      ROS    Objective     /62 (BP Location: Left arm, Patient Position: Sitting)   Pulse 64   Ht 154.9 cm (61\")   Wt 72.7 kg (160 lb 3.2 oz)   SpO2 96%   BMI 30.27 kg/m²       Constitutional:       Appearance: Healthy appearance. Well-developed.   Eyes:      General: No scleral icterus.  HENT:      Head: Normocephalic and atraumatic.   Neck:      Vascular: No carotid bruit or JVD. JVD normal.   Pulmonary:      Effort: Pulmonary effort is normal.      Breath sounds: Normal breath sounds.   Cardiovascular:      Normal rate. Regular rhythm.      Murmurs: There is no murmur.      No gallop.   Musculoskeletal:      Extremities: No clubbing present.Skin:     General: Skin is warm and dry. There is no cyanosis.   Neurological:      Mental Status: Alert.   Psychiatric:         Attention and Perception: Attention normal.         Behavior: Behavior normal.         Result Review :    Lab Results   Component Value Date    GLUCOSE 88 07/03/2021    BUN 12 " 07/03/2021    CREATININE 1.23 (H) 07/03/2021    EGFRIFNONA 44 (L) 07/03/2021    EGFRIFAFRI 52 (L) 05/14/2021    BCR 9.8 07/03/2021    K 4.1 07/03/2021    CO2 22.6 07/03/2021    CALCIUM 8.6 07/03/2021    PROTENTOTREF 6.0 05/14/2021    ALBUMIN 4.70 07/01/2021    LABIL2 2.3 05/14/2021    AST 25 07/01/2021    ALT 20 07/01/2021     Lab Results   Component Value Date    WBC 7.68 07/03/2021    HGB 13.8 07/03/2021    HCT 45.2 07/03/2021    MCV 85.0 07/03/2021     (H) 07/03/2021     Lab Results   Component Value Date    CHLPL 114 05/14/2021    TRIG 79 05/14/2021    HDL 45 05/14/2021    LDL 53 05/14/2021     Lab Results   Component Value Date    HGBA1C 6.50 (H) 05/14/2021             Assessment     Problem List Items Addressed This Visit        Cardiology Problems    Atypical angina (CMS/HCC) - Primary    Overview     · Intermittent short-lived chest pain symptoms  · EKG (5/14/2021): Sinus rhythm with LVH  · Echocardiogram (07/02/2021): LVEF 60-65%. Mild LVH. Mild AR.  · Pharmacologic nuclear stress (7/13/2021): Normal perfusion.  LVEF >70%         Current Assessment & Plan     · No further chest pain  · Stress test shows no evidence of ischemia         Essential hypertension (Chronic)    Overview     • Target blood pressure <130/80 mmHg  • Renal duplex (7/15/2021): No evidence of renal artery stenosis.         Current Assessment & Plan     · Improved with addition of loop diuretic therapy  · Reduce furosemide from 40 mg daily to 20 mg daily         Relevant Medications    lisinopril (PRINIVIL,ZESTRIL) 20 MG tablet    furosemide (LASIX) 20 MG tablet    potassium chloride 10 MEQ CR tablet    Other Relevant Orders    Basic Metabolic Panel    Hyperlipidemia LDL goal <70 (Chronic)    Overview     • High intensity statin therapy indicated given the presence of PAD         Current Assessment & Plan     · Well-controlled  · Continue atorvastatin               Plan   • Reduce furosemide to 20 mg daily  • Otherwise continue  medical therapy  • Obtain BMP today        Follow-up   Return in about 9 months (around 4/28/2022).        José Miguel Saha MD, [facility], [facility]  7/28/2021     Scribed for Tommie Saha IV, MD by Karmen Ponce.  07/28/21   16:41 EDT    I have personally performed the services described in this document as scribed by the above individual, and it is both accurate and complete.  Tommie Saha IV, MD  7/28/2021  16:48 EDT

## 2021-07-29 LAB
BUN SERPL-MCNC: 21 MG/DL (ref 8–23)
BUN/CREAT SERPL: 14.1 (ref 7–25)
CALCIUM SERPL-MCNC: 10.1 MG/DL (ref 8.6–10.5)
CHLORIDE SERPL-SCNC: 102 MMOL/L (ref 98–107)
CO2 SERPL-SCNC: 24.7 MMOL/L (ref 22–29)
CREAT SERPL-MCNC: 1.49 MG/DL (ref 0.57–1)
GLUCOSE SERPL-MCNC: 81 MG/DL (ref 65–99)
POTASSIUM SERPL-SCNC: 5.2 MMOL/L (ref 3.5–5.2)
SODIUM SERPL-SCNC: 140 MMOL/L (ref 136–145)

## 2021-08-10 ENCOUNTER — OFFICE VISIT (OUTPATIENT)
Dept: ORTHOPEDIC SURGERY | Facility: CLINIC | Age: 66
End: 2021-08-10

## 2021-08-10 VITALS
DIASTOLIC BLOOD PRESSURE: 66 MMHG | HEART RATE: 63 BPM | HEIGHT: 61 IN | WEIGHT: 160.27 LBS | BODY MASS INDEX: 30.26 KG/M2 | SYSTOLIC BLOOD PRESSURE: 175 MMHG

## 2021-08-10 DIAGNOSIS — M17.0 PRIMARY OSTEOARTHRITIS OF BOTH KNEES: Primary | ICD-10-CM

## 2021-08-10 PROCEDURE — 99214 OFFICE O/P EST MOD 30 MIN: CPT | Performed by: ORTHOPAEDIC SURGERY

## 2021-08-10 RX ORDER — PREGABALIN 75 MG/1
75 CAPSULE ORAL ONCE
Status: CANCELLED | OUTPATIENT
Start: 2021-08-10 | End: 2021-08-10

## 2021-08-10 RX ORDER — ACETAMINOPHEN 325 MG/1
1000 TABLET ORAL ONCE
Status: CANCELLED | OUTPATIENT
Start: 2021-08-10 | End: 2021-08-10

## 2021-08-10 RX ORDER — MELOXICAM 7.5 MG/1
15 TABLET ORAL ONCE
Status: CANCELLED | OUTPATIENT
Start: 2021-08-10 | End: 2021-08-10

## 2021-08-10 RX ORDER — NAPROXEN SODIUM 220 MG
220 TABLET ORAL AS NEEDED
COMMUNITY
End: 2022-02-15

## 2021-08-10 NOTE — PROGRESS NOTES
Orthopaedic Clinic Note: Knee Established Patient    Chief Complaint   Patient presents with   • Follow-up     4 week recheck - Primary osteoarthritis of both knees        HPI    It has been 4  week(s) since Ms. Murray's last visit. She returns to clinic today for follow-up bilateral knee osteoarthritis.  She returns to clinic today complaining of severe pain in the bilateral knees.  She has failed cortisone injections, tramadol, and Norco treatment for pain as well as Aleve.  Despite these interventions, she continues to have debilitating pain that she rates a 8/10 on the pain scale.  She states that her left knee is more painful than the right.  She is having recurrent swelling and stiffness that is affecting her ability to perform daily activities including walking and working.  She is here to discuss surgical intervention for the ongoing knee pain.    Past Medical History:   Diagnosis Date   • HAMMAD (acute kidney injury) (CMS/Formerly [facility]) 7/1/2021   • Allergic    • Anemia    • Depression    • Diabetes mellitus (CMS/[facility]ly [facility])    • History of heart attack    • Hyperlipidemia    • Hypertension       Past Surgical History:   Procedure Laterality Date   • BILATERAL BREAST REDUCTION     • BONE MARROW ASPIRATION     • HYSTERECTOMY  1990    x 2   • REDUCTION MAMMAPLASTY Bilateral     MORE THAN 20 YEARS AGO   • SINUS SURGERY  2003   • TONSILLECTOMY        Family History   Problem Relation Age of Onset   • Arthritis Maternal Grandmother    • Hypertension Mother    • Hyperlipidemia Mother    • Thyroid disease Mother    • Diabetes Sister    • Crohn's disease Sister    • Hypertension Sister    • Heart attack Maternal Grandfather    • Breast cancer Neg Hx    • Ovarian cancer Neg Hx      Social History     Socioeconomic History   • Marital status: Single     Spouse name: Not on file   • Number of children: Not on file   • Years of education: Not on file   • Highest education level: Not on file   Tobacco Use   • Smoking status: Former Smoker      Packs/day: 2.00     Years: 30.00     Pack years: 60.00     Types: Cigarettes     Quit date:      Years since quittin.6   • Smokeless tobacco: Never Used   Substance and Sexual Activity   • Alcohol use: No   • Drug use: No   • Sexual activity: Defer      Current Outpatient Medications on File Prior to Visit   Medication Sig Dispense Refill   • amLODIPine (NORVASC) 10 MG tablet Take 1 tablet by mouth Daily. 30 tablet 5   • aspirin (aspirin) 81 MG EC tablet Take 1 tablet by mouth Daily. 30 tablet 5   • atorvastatin (LIPITOR) 80 MG tablet Take 1 tablet by mouth Daily. 30 tablet 5   • carvedilol (COREG) 25 MG tablet Take 1 tablet by mouth 2 (Two) Times a Day With Meals. 180 tablet 1   • FLUoxetine (PROzac) 20 MG capsule TAKE THREE CAPSULES BY MOUTH DAILY 90 capsule 0   • Fluticasone-Umeclidin-Vilant (Trelegy Ellipta) 200-62.5-25 MCG/INH inhaler Inhale 2 puffs Daily.     • furosemide (LASIX) 20 MG tablet Take 1 tablet by mouth Daily. 90 tablet 1   • lisinopril (PRINIVIL,ZESTRIL) 20 MG tablet Take 1 tablet by mouth Daily. 90 tablet 3   • naproxen sodium (ALEVE) 220 MG tablet Take 220 mg by mouth As Needed.     • nitroglycerin (NITROSTAT) 0.4 MG SL tablet Place 1 tablet under the tongue Every 5 (Five) Minutes As Needed for Chest Pain. Take no more than 3 doses in 15 minutes. 25 tablet 5   • potassium chloride 10 MEQ CR tablet Take 1 tablet by mouth Daily. 90 tablet 1   • Tradjenta 5 MG tablet tablet TAKE ONE TABLET BY MOUTH DAILY 30 tablet 4   • traMADol (ULTRAM) 50 MG tablet Take 1 tablet by mouth Every 6 (Six) Hours As Needed for Moderate Pain . 30 tablet 0   • HYDROcodone-acetaminophen (NORCO) 5-325 MG per tablet Take 1 tablet by mouth Every 6 (Six) Hours As Needed for Moderate Pain . 12 tablet 0     No current facility-administered medications on file prior to visit.      Allergies   Allergen Reactions   • Sulfa Antibiotics Rash        Review of Systems   Constitutional: Negative.    HENT: Negative.    Eyes:  "Negative.    Respiratory: Negative.    Cardiovascular: Negative.    Gastrointestinal: Negative.    Endocrine: Negative.    Genitourinary: Negative.    Musculoskeletal: Positive for arthralgias.   Skin: Negative.    Allergic/Immunologic: Negative.    Neurological: Negative.    Hematological: Negative.    Psychiatric/Behavioral: Negative.         The patient's Review of Systems was personally reviewed and confirmed as accurate.    Physical Exam  Blood pressure 175/66, pulse 63, height 154.9 cm (60.98\"), weight 72.7 kg (160 lb 4.4 oz), not currently breastfeeding.    Body mass index is 30.3 kg/m².    GENERAL APPEARANCE: awake, alert, oriented, in no acute distress and well developed, well nourished  LUNGS:  breathing nonlabored  EXTREMITIES: no clubbing, cyanosis  PERIPHERAL PULSES: palpable dorsalis pedis and posterior tibial pulses bilaterally.    GAIT:  Antalgic        ----------  Bilateral Knee Exam:  ----------  ALIGNMENT: severe varus, correctable to neutral  ----------  RANGE OF MOTION:  Decreased (5 - 120 degrees) with no extensor lag  LIGAMENTOUS STABILITY:   stable to varus and valgus stress at terminal extension and 30 degrees; retensioning of the MCL is appreciated with valgus stress at 30 degrees consistent with medial compartment degeneration  ----------  STRENGTH:  KNEE FLEXION 5/5  KNEE EXTENSION  5/5  ANKLE DORSIFLEXION  5/5  ANKLE PLANTARFLEXION  5/5  ----------  PAIN WITH PALPATION:global  KNEE EFFUSION: yes, trace effusion on right, moderate effusion on left  PAIN WITH KNEE ROM: yes  PATELLAR CREPITUS:  yes, painful and symptomatic  ----------  SENSATION TO LIGHT TOUCH:  DEEP PERONEAL/SUPERFICIAL PERONEAL/SURAL/SAPHENOUS/TIBIAL:    intact  ----------  EDEMA:  no  ERYTHEMA:    no  WOUNDS/INCISIONS:   no  _____________________________________________________________________  _____________________________________________________________________    RADIOGRAPHIC FINDINGS:   Indication: Left knee " pain    Comparison: Todays xrays were compared to previous xrays from 1/26/2021    Knee films: moderate to severe tricompartmental arthritis with genu varum alignment, periarticular osteophytes visualized in all compartments and No significant changes compared to prior radiographs.    Assessment/Plan:   Diagnosis Plan   1. Primary osteoarthritis of both knees  Case Request    Pregnancy, Urine - Urine, Clean Catch    CBC and Differential    Basic metabolic panel    Protime-INR    APTT    Hemoglobin A1c    ECG 12 Lead    Nicotine & Metabolite, Quant    Tranexamic Acid 1,000 mg in sodium chloride 0.9 % 100 mL    Tranexamic Acid 1,000 mg in sodium chloride 0.9 % 100 mL    ceFAZolin (ANCEF) 2 g in sodium chloride 0.9 % 100 mL IVPB    acetaminophen (TYLENOL) tablet 975 mg    meloxicam (MOBIC) tablet 15 mg    pregabalin (LYRICA) capsule 75 mg    mupirocin (BACTROBAN) 2 % nasal ointment 1 application    Case Request     The patient has clinical and radiographic evidence of end-stage left knee joint degeneration. Conservative measures have been tried for 3 months or longer, but have failed to adequately treat or improve the patient's symptoms. Pain is restricting the patient's daily activities as well as quality of life. The recommendation at this time is to proceed with a left total knee arthroplasty with the goal to improve patient function and pain. The risks, benefits, potential complications, and alternatives were discussed with the patient in detail. Risks included but were not limited to bleeding, infection, anesthesia risks, damage to neurovascular structures, osteolysis, aseptic loosening, instability, dislocation, pain, continued pain, iatrogenic fracture, possible need for future surgery including the potential for amputation, blood clots, myocardial infarction, stroke, and death. Clarissa-operative blood management and the potential for blood transfusion were discussed with risks and options clearly outlined.  Specific details of the surgical procedure, hospitalization, recovery, rehabilitation, and long-term precautions were also presented. Pre-operative teaching was provided. Implant/prosthesis selection was outlined, and the many options available were explained; the final choice will be made at the time of the procedure to match the anatomy and condition of the bone, ligaments, tendons, and muscles. Given this instruction, the patient elected to proceed with the left total knee arthroplasty. The patient will be seen by pre-admission testing for pre-operative optimization and risk assessment and will be scheduled for surgery once this is completed.    The patient is considered standard risk for DVT based on patient risk factors and will be placed on aspirin postoperatively for DVT prophylaxis.        Keaton Aldrich MD  08/10/21  09:26 EDT

## 2021-08-18 ENCOUNTER — TELEPHONE (OUTPATIENT)
Dept: ORTHOPEDIC SURGERY | Facility: CLINIC | Age: 66
End: 2021-08-18

## 2021-08-18 NOTE — TELEPHONE ENCOUNTER
Dr. Aldrich-pt not yet scheduled for sx but is wanting injections. It was explained that she would have to wait 3 months after injections to pursue surgery. Please advise. Thanks!    Anusha

## 2021-08-18 NOTE — TELEPHONE ENCOUNTER
PATIENT CALLED AND WANTS TO KNOW IF SHE CAN GET STEROID INJECTIONS UNTIL SHE CAN HAVE HER SURGERY? PATIENT IS SCHEDULED FOR HER DENTAL CLEARANCE EXAM ON 8/25/21. PATIENT CAN BE REACHED @ [phone]

## 2021-08-18 NOTE — TELEPHONE ENCOUNTER
Spoke with patient who reports she is wanting to come in for bilateral knee injections due to the amount of pain she is having and she understands she cannot have surgery for 3 months after getting these injections. She said she is okay with that and is still waiting to get into the dentist for her dental clearance anyways.    Patient was agreeable to coming in to see Keren for injections tomorrow.    Anusha

## 2021-08-19 ENCOUNTER — OFFICE VISIT (OUTPATIENT)
Dept: ORTHOPEDIC SURGERY | Facility: CLINIC | Age: 66
End: 2021-08-19

## 2021-08-19 VITALS
WEIGHT: 160.27 LBS | DIASTOLIC BLOOD PRESSURE: 104 MMHG | SYSTOLIC BLOOD PRESSURE: 187 MMHG | BODY MASS INDEX: 30.26 KG/M2 | HEART RATE: 82 BPM | HEIGHT: 61 IN

## 2021-08-19 DIAGNOSIS — M17.0 PRIMARY OSTEOARTHRITIS OF BOTH KNEES: Primary | ICD-10-CM

## 2021-08-19 PROCEDURE — 20610 DRAIN/INJ JOINT/BURSA W/O US: CPT | Performed by: PHYSICIAN ASSISTANT

## 2021-08-19 RX ADMIN — LIDOCAINE HYDROCHLORIDE 4 ML: 10 INJECTION, SOLUTION EPIDURAL; INFILTRATION; INTRACAUDAL; PERINEURAL at 13:04

## 2021-08-19 RX ADMIN — LIDOCAINE HYDROCHLORIDE 4 ML: 10 INJECTION, SOLUTION EPIDURAL; INFILTRATION; INTRACAUDAL; PERINEURAL at 13:05

## 2021-08-19 RX ADMIN — TRIAMCINOLONE ACETONIDE 40 MG: 40 INJECTION, SUSPENSION INTRA-ARTICULAR; INTRAMUSCULAR at 13:05

## 2021-08-19 RX ADMIN — TRIAMCINOLONE ACETONIDE 40 MG: 40 INJECTION, SUSPENSION INTRA-ARTICULAR; INTRAMUSCULAR at 13:04

## 2021-08-19 NOTE — PROGRESS NOTES
Procedure   Large Joint Arthrocentesis: R knee  Date/Time: 8/19/2021 1:04 PM  Consent given by: patient  Site marked: site marked  Timeout: Immediately prior to procedure a time out was called to verify the correct patient, procedure, equipment, support staff and site/side marked as required   Supporting Documentation  Indications: pain   Procedure Details  Location: knee - R knee  Preparation: Patient was prepped and draped in the usual sterile fashion  Needle size: 23 G  Approach: anterolateral  Medications administered: 4 mL lidocaine PF 1% 1 %; 40 mg triamcinolone acetonide 40 MG/ML  Patient tolerance: patient tolerated the procedure well with no immediate complications    Large Joint Arthrocentesis: L knee  Date/Time: 8/19/2021 1:05 PM  Consent given by: patient  Site marked: site marked  Timeout: Immediately prior to procedure a time out was called to verify the correct patient, procedure, equipment, support staff and site/side marked as required   Supporting Documentation  Indications: pain   Procedure Details  Location: knee - L knee  Preparation: Patient was prepped and draped in the usual sterile fashion  Needle size: 23 G  Approach: anterolateral  Medications administered: 4 mL lidocaine PF 1% 1 %; 40 mg triamcinolone acetonide 40 MG/ML  Patient tolerance: patient tolerated the procedure well with no immediate complications

## 2021-08-19 NOTE — PROGRESS NOTES
"        [facility] Note        Subjective     CC: Follow-up (1 week- Primary osteoarthritis of both knees)      HPI    Martha Murray is a 66 y.o. female.  Patient returns today for her bilateral knee arthritis.  She has decided to wait on knee replacement and would like cortisone injection today.    ROS:    Constiutional:Pt denies fever, chills, nausea, or vomiting.  MSK:as above        Objective      Past Medical History  Past Medical History:   Diagnosis Date   • HAMMAD (acute kidney injury) (CMS/[facility]) 7/1/2021   • Allergic    • Anemia    • Depression    • Diabetes mellitus (CMS/[facility])    • History of heart attack    • Hyperlipidemia    • Hypertension          Physical Exam  BP (!) 187/104   Pulse 82   Ht 154.9 cm (60.98\")   Wt 72.7 kg (160 lb 4.4 oz)   BMI 30.30 kg/m²     Body mass index is 30.3 kg/m².    Patient is well nourished and well developed.        Ortho Exam  Bilateral knee exam: Generalized tenderness about the knees.  No pain with range of motion.  Ambulating with 1 crutch.  Imaging/Labs/EMG Reviewed:  Imaging Results (Last 24 Hours)     ** No results found for the last 24 hours. **            Assessment    Assessment:  1. Primary osteoarthritis of both knees        Plan:  1. Recommend over the counter anti-inflammatories for pain and/or swelling  2. Bilateral knee arthritis.  Patient understands she will have to wait 3 months  to have surgery should he have cortisone injections today.  She voiced understanding.  Plan today bilateral cortisone injection to both knees.  Return as needed.      Using sterile technique, the left knee was sterilely prepped with Hibiclens.  Following time out, using a 22 gauge needle the left knee was aspirated and then injected with 2 ml Orthovisc. Approximately 0.5 mm of straw-colored fluid was obtained.  Patient tolerated the procedure well.  No complications.      I discussed with the patient the potential benefits of performing a therapeutic " injection of the right knee as well as potential risks including but not limited to infection, swelling, pain, bleeding, bruising, nerve/vessel damage, skin color changes, transient elevation in blood glucose levels, and fat atrophy. After informed consent and verifying correct patient, procedure site, and type of procedure, the area was prepped with Hibiclens, ethyl chloride was used to numb the skin. Via the inferior lateral approach, 4cc of 1% lidocaine and  40mg/ml of Kenalog were injected into the right knee. The patient tolerated the procedure well. There were no complications.           Keren Perez PA-C  08/22/21  09:20 MICKEYT      Dragon disclaimer:  Much of this encounter note is an electronic transcription/translation of spoken language to printed text. The electronic translation of spoken language may permit erroneous, or at times, nonsensical words or phrases to be inadvertently transcribed; Although I have reviewed the note for such errors, some may still exist.

## 2021-08-22 RX ORDER — TRIAMCINOLONE ACETONIDE 40 MG/ML
40 INJECTION, SUSPENSION INTRA-ARTICULAR; INTRAMUSCULAR
Status: COMPLETED | OUTPATIENT
Start: 2021-08-19 | End: 2021-08-19

## 2021-08-22 RX ORDER — LIDOCAINE HYDROCHLORIDE 10 MG/ML
4 INJECTION, SOLUTION EPIDURAL; INFILTRATION; INTRACAUDAL; PERINEURAL
Status: COMPLETED | OUTPATIENT
Start: 2021-08-19 | End: 2021-08-19

## 2021-09-22 DIAGNOSIS — F41.9 ANXIETY: ICD-10-CM

## 2021-09-22 DIAGNOSIS — F33.41 RECURRENT MAJOR DEPRESSIVE DISORDER, IN PARTIAL REMISSION (HCC): ICD-10-CM

## 2021-09-22 RX ORDER — FLUOXETINE HYDROCHLORIDE 20 MG/1
CAPSULE ORAL
Qty: 90 CAPSULE | Refills: 0 | Status: SHIPPED | OUTPATIENT
Start: 2021-09-22 | End: 2021-12-01

## 2021-11-30 DIAGNOSIS — F41.9 ANXIETY: ICD-10-CM

## 2021-11-30 DIAGNOSIS — F33.41 RECURRENT MAJOR DEPRESSIVE DISORDER, IN PARTIAL REMISSION (HCC): ICD-10-CM

## 2021-12-01 RX ORDER — FLUOXETINE HYDROCHLORIDE 20 MG/1
CAPSULE ORAL
Qty: 90 CAPSULE | Refills: 0 | Status: SHIPPED | OUTPATIENT
Start: 2021-12-01 | End: 2022-02-21

## 2021-12-14 ENCOUNTER — LAB (OUTPATIENT)
Dept: LAB | Facility: HOSPITAL | Age: 66
End: 2021-12-14

## 2021-12-14 ENCOUNTER — OFFICE VISIT (OUTPATIENT)
Dept: INTERNAL MEDICINE | Facility: CLINIC | Age: 66
End: 2021-12-14

## 2021-12-14 VITALS
HEART RATE: 57 BPM | WEIGHT: 164 LBS | SYSTOLIC BLOOD PRESSURE: 154 MMHG | DIASTOLIC BLOOD PRESSURE: 92 MMHG | HEIGHT: 61 IN | BODY MASS INDEX: 30.96 KG/M2 | OXYGEN SATURATION: 98 % | RESPIRATION RATE: 16 BRPM | TEMPERATURE: 96.5 F

## 2021-12-14 DIAGNOSIS — Z00.00 MEDICARE ANNUAL WELLNESS VISIT, SUBSEQUENT: Primary | ICD-10-CM

## 2021-12-14 DIAGNOSIS — E11.9 TYPE 2 DIABETES MELLITUS WITHOUT COMPLICATION, WITHOUT LONG-TERM CURRENT USE OF INSULIN: ICD-10-CM

## 2021-12-14 DIAGNOSIS — Z12.11 SCREENING FOR COLON CANCER: ICD-10-CM

## 2021-12-14 DIAGNOSIS — I10 ESSENTIAL HYPERTENSION: Chronic | ICD-10-CM

## 2021-12-14 DIAGNOSIS — R16.1 SPLEEN ENLARGED: ICD-10-CM

## 2021-12-14 DIAGNOSIS — Z23 NEED FOR COVID-19 VACCINE: ICD-10-CM

## 2021-12-14 DIAGNOSIS — Z23 FLU VACCINE NEED: ICD-10-CM

## 2021-12-14 DIAGNOSIS — Z13.29 THYROID DISORDER SCREENING: ICD-10-CM

## 2021-12-14 DIAGNOSIS — Z13.220 LIPID SCREENING: ICD-10-CM

## 2021-12-14 DIAGNOSIS — G47.09 OTHER INSOMNIA: ICD-10-CM

## 2021-12-14 DIAGNOSIS — K58.0 IRRITABLE BOWEL SYNDROME WITH DIARRHEA: ICD-10-CM

## 2021-12-14 DIAGNOSIS — D75.839 THROMBOCYTOSIS: ICD-10-CM

## 2021-12-14 DIAGNOSIS — Z13.0 SCREENING FOR BLOOD DISEASE: ICD-10-CM

## 2021-12-14 DIAGNOSIS — M79.641 RIGHT HAND PAIN: ICD-10-CM

## 2021-12-14 DIAGNOSIS — Z13.21 ENCOUNTER FOR VITAMIN DEFICIENCY SCREENING: ICD-10-CM

## 2021-12-14 DIAGNOSIS — E78.5 HYPERLIPIDEMIA LDL GOAL <70: ICD-10-CM

## 2021-12-14 LAB
DEPRECATED RDW RBC AUTO: 41.8 FL (ref 37–54)
ERYTHROCYTE [DISTWIDTH] IN BLOOD BY AUTOMATED COUNT: 16.8 % (ref 12.3–15.4)
HCT VFR BLD AUTO: 51.1 % (ref 34–46.6)
HGB BLD-MCNC: 15.6 G/DL (ref 12–15.9)
MCH RBC QN AUTO: 23.9 PG (ref 26.6–33)
MCHC RBC AUTO-ENTMCNC: 30.5 G/DL (ref 31.5–35.7)
MCV RBC AUTO: 78.1 FL (ref 79–97)
PLATELET # BLD AUTO: 1279 10*3/MM3 (ref 140–450)
PMV BLD AUTO: 9.8 FL (ref 6–12)
RBC # BLD AUTO: 6.54 10*6/MM3 (ref 3.77–5.28)
WBC NRBC COR # BLD: 13.3 10*3/MM3 (ref 3.4–10.8)

## 2021-12-14 PROCEDURE — 0002A COVID-19 (PFIZER): CPT | Performed by: NURSE PRACTITIONER

## 2021-12-14 PROCEDURE — 80061 LIPID PANEL: CPT | Performed by: NURSE PRACTITIONER

## 2021-12-14 PROCEDURE — 1125F AMNT PAIN NOTED PAIN PRSNT: CPT | Performed by: NURSE PRACTITIONER

## 2021-12-14 PROCEDURE — 99214 OFFICE O/P EST MOD 30 MIN: CPT | Performed by: NURSE PRACTITIONER

## 2021-12-14 PROCEDURE — 82746 ASSAY OF FOLIC ACID SERUM: CPT | Performed by: NURSE PRACTITIONER

## 2021-12-14 PROCEDURE — 91300 COVID-19 (PFIZER): CPT | Performed by: NURSE PRACTITIONER

## 2021-12-14 PROCEDURE — 90662 IIV NO PRSV INCREASED AG IM: CPT | Performed by: NURSE PRACTITIONER

## 2021-12-14 PROCEDURE — 82607 VITAMIN B-12: CPT | Performed by: NURSE PRACTITIONER

## 2021-12-14 PROCEDURE — 1160F RVW MEDS BY RX/DR IN RCRD: CPT | Performed by: NURSE PRACTITIONER

## 2021-12-14 PROCEDURE — 99397 PER PM REEVAL EST PAT 65+ YR: CPT | Performed by: NURSE PRACTITIONER

## 2021-12-14 PROCEDURE — G0008 ADMIN INFLUENZA VIRUS VAC: HCPCS | Performed by: NURSE PRACTITIONER

## 2021-12-14 PROCEDURE — 80053 COMPREHEN METABOLIC PANEL: CPT | Performed by: NURSE PRACTITIONER

## 2021-12-14 PROCEDURE — G0439 PPPS, SUBSEQ VISIT: HCPCS | Performed by: NURSE PRACTITIONER

## 2021-12-14 PROCEDURE — 85027 COMPLETE CBC AUTOMATED: CPT | Performed by: NURSE PRACTITIONER

## 2021-12-14 PROCEDURE — 1170F FXNL STATUS ASSESSED: CPT | Performed by: NURSE PRACTITIONER

## 2021-12-14 PROCEDURE — 84443 ASSAY THYROID STIM HORMONE: CPT | Performed by: NURSE PRACTITIONER

## 2021-12-14 RX ORDER — DICYCLOMINE HCL 20 MG
TABLET ORAL
Qty: 60 TABLET | Refills: 2 | Status: SHIPPED | OUTPATIENT
Start: 2021-12-14 | End: 2022-01-20

## 2021-12-14 RX ORDER — TRAZODONE HYDROCHLORIDE 50 MG/1
TABLET ORAL
Qty: 45 TABLET | Refills: 2 | Status: SHIPPED | OUTPATIENT
Start: 2021-12-14 | End: 2022-11-19

## 2021-12-14 RX ORDER — FUROSEMIDE 40 MG/1
TABLET ORAL
COMMUNITY
Start: 2021-10-12 | End: 2021-12-20

## 2021-12-15 LAB
ALBUMIN SERPL-MCNC: 4.6 G/DL (ref 3.5–5.2)
ALBUMIN/GLOB SERPL: 1.8 G/DL
ALP SERPL-CCNC: 113 U/L (ref 39–117)
ALT SERPL W P-5'-P-CCNC: 16 U/L (ref 1–33)
ANION GAP SERPL CALCULATED.3IONS-SCNC: 11.8 MMOL/L (ref 5–15)
AST SERPL-CCNC: 32 U/L (ref 1–32)
BILIRUB SERPL-MCNC: 0.8 MG/DL (ref 0–1.2)
BUN SERPL-MCNC: 15 MG/DL (ref 8–23)
BUN/CREAT SERPL: 11.8 (ref 7–25)
CALCIUM SPEC-SCNC: 9.7 MG/DL (ref 8.6–10.5)
CHLORIDE SERPL-SCNC: 102 MMOL/L (ref 98–107)
CHOLEST SERPL-MCNC: 180 MG/DL (ref 0–200)
CO2 SERPL-SCNC: 25.2 MMOL/L (ref 22–29)
CREAT SERPL-MCNC: 1.27 MG/DL (ref 0.57–1)
FOLATE SERPL-MCNC: 5.94 NG/ML (ref 4.78–24.2)
GFR SERPL CREATININE-BSD FRML MDRD: 42 ML/MIN/1.73
GLOBULIN UR ELPH-MCNC: 2.5 GM/DL
GLUCOSE SERPL-MCNC: 65 MG/DL (ref 65–99)
HDLC SERPL-MCNC: 36 MG/DL (ref 40–60)
LDLC SERPL CALC-MCNC: 107 MG/DL (ref 0–100)
LDLC/HDLC SERPL: 2.83 {RATIO}
POTASSIUM SERPL-SCNC: 4.7 MMOL/L (ref 3.5–5.2)
PROT SERPL-MCNC: 7.1 G/DL (ref 6–8.5)
SODIUM SERPL-SCNC: 139 MMOL/L (ref 136–145)
TRIGL SERPL-MCNC: 210 MG/DL (ref 0–150)
TSH SERPL DL<=0.05 MIU/L-ACNC: 1.33 UIU/ML (ref 0.27–4.2)
VIT B12 BLD-MCNC: 330 PG/ML (ref 211–946)
VLDLC SERPL-MCNC: 37 MG/DL (ref 5–40)

## 2021-12-16 ENCOUNTER — HOSPITAL ENCOUNTER (OUTPATIENT)
Dept: GENERAL RADIOLOGY | Facility: HOSPITAL | Age: 66
Discharge: HOME | End: 2021-12-16
Admitting: NURSE PRACTITIONER

## 2021-12-16 DIAGNOSIS — M79.641 RIGHT HAND PAIN: ICD-10-CM

## 2021-12-16 PROCEDURE — 73130 X-RAY EXAM OF HAND: CPT

## 2021-12-19 DIAGNOSIS — E78.5 HYPERLIPIDEMIA LDL GOAL <70: ICD-10-CM

## 2021-12-19 DIAGNOSIS — I10 ESSENTIAL HYPERTENSION: Chronic | ICD-10-CM

## 2021-12-20 RX ORDER — ATORVASTATIN CALCIUM 80 MG/1
TABLET, FILM COATED ORAL
Qty: 30 TABLET | Refills: 5 | Status: SHIPPED | OUTPATIENT
Start: 2021-12-20

## 2021-12-20 RX ORDER — FUROSEMIDE 40 MG/1
TABLET ORAL
Qty: 30 TABLET | Refills: 4 | Status: SHIPPED | OUTPATIENT
Start: 2021-12-20 | End: 2022-12-05

## 2021-12-20 RX ORDER — LISINOPRIL 20 MG/1
TABLET ORAL
Qty: 30 TABLET | Refills: 4 | Status: SHIPPED | OUTPATIENT
Start: 2021-12-20 | End: 2022-08-02

## 2021-12-20 NOTE — TELEPHONE ENCOUNTER
Next appt 4/2022  Lab Results   Component Value Date    GLUCOSE 65 12/14/2021    BUN 15 12/14/2021    CREATININE 1.27 (H) 12/14/2021    EGFRIFNONA 42 (L) 12/14/2021    EGFRIFAFRI 43 (L) 07/28/2021    BCR 11.8 12/14/2021    K 4.7 12/14/2021    CO2 25.2 12/14/2021    CALCIUM 9.7 12/14/2021    PROTENTOTREF 6.0 05/14/2021    ALBUMIN 4.60 12/14/2021    LABIL2 2.3 05/14/2021    AST 32 12/14/2021    ALT 16 12/14/2021       Patient discharged at 3:02 PM to Discharged to home  IV was discontinued by RN. Pain at time of discharge was 5/10 controlled with PRN oxycodone and scheduled oxycotin. Belongings returned to patient.  Discharge instructions and medications reviewed with patient.  Patient verbalized understanding and all questions were answered.  Prescriptions given to patient.  At time of discharge, patient condition was stable and left the unit via WC escorted by volunteer and transported home by family. Follow ups encouraged. [place] home infusion completed teaching, supplies given to patient.

## 2022-01-02 PROBLEM — G47.09 OTHER INSOMNIA: Status: ACTIVE | Noted: 2022-01-02

## 2022-01-02 PROBLEM — K58.0 IRRITABLE BOWEL SYNDROME WITH DIARRHEA: Status: ACTIVE | Noted: 2022-01-02

## 2022-01-02 PROBLEM — M79.641 RIGHT HAND PAIN: Status: ACTIVE | Noted: 2022-01-02

## 2022-01-11 ENCOUNTER — TELEPHONE (OUTPATIENT)
Dept: ORTHOPEDIC SURGERY | Facility: CLINIC | Age: 67
End: 2022-01-11

## 2022-01-11 DIAGNOSIS — M17.0 PRIMARY OSTEOARTHRITIS OF BOTH KNEES: Primary | ICD-10-CM

## 2022-01-11 NOTE — TELEPHONE ENCOUNTER
Keren,    Can you put in a referral for gel injections?  Patient is not currently interested in surgery and is aware this would delay surgery by 3 more months.    Thanks,    Judith

## 2022-01-11 NOTE — TELEPHONE ENCOUNTER
Patient called, inquiring about gel injections in both of her knee's. Patient wants to know if she os able to get them. Please give patient a call.

## 2022-01-11 NOTE — TELEPHONE ENCOUNTER
Called patient to explain referral put in and we will call to schedule once we receive approval.  She understood.

## 2022-01-20 ENCOUNTER — CONSULT (OUTPATIENT)
Dept: ONCOLOGY | Facility: CLINIC | Age: 67
End: 2022-01-20

## 2022-01-20 ENCOUNTER — LAB (OUTPATIENT)
Dept: LAB | Facility: HOSPITAL | Age: 67
End: 2022-01-20

## 2022-01-20 VITALS
TEMPERATURE: 97.5 F | OXYGEN SATURATION: 98 % | HEIGHT: 61 IN | DIASTOLIC BLOOD PRESSURE: 74 MMHG | HEART RATE: 67 BPM | WEIGHT: 161 LBS | SYSTOLIC BLOOD PRESSURE: 173 MMHG | RESPIRATION RATE: 16 BRPM | BODY MASS INDEX: 30.4 KG/M2

## 2022-01-20 DIAGNOSIS — D75.839 THROMBOCYTOSIS: ICD-10-CM

## 2022-01-20 DIAGNOSIS — I10 ESSENTIAL HYPERTENSION: ICD-10-CM

## 2022-01-20 DIAGNOSIS — R79.89 OTHER SPECIFIED ABNORMAL FINDINGS OF BLOOD CHEMISTRY: ICD-10-CM

## 2022-01-20 DIAGNOSIS — D75.839 THROMBOCYTOSIS: Primary | ICD-10-CM

## 2022-01-20 LAB
ALBUMIN SERPL-MCNC: 4.1 G/DL (ref 3.5–5.2)
ALBUMIN/GLOB SERPL: 1.7 G/DL
ALP SERPL-CCNC: 108 U/L (ref 39–117)
ALT SERPL W P-5'-P-CCNC: 16 U/L (ref 1–33)
ANION GAP SERPL CALCULATED.3IONS-SCNC: 13.6 MMOL/L (ref 5–15)
AST SERPL-CCNC: 28 U/L (ref 1–32)
BASOPHILS # BLD AUTO: 0.15 10*3/MM3 (ref 0–0.2)
BASOPHILS NFR BLD AUTO: 1.2 % (ref 0–1.5)
BILIRUB SERPL-MCNC: 0.9 MG/DL (ref 0–1.2)
BUN SERPL-MCNC: 19 MG/DL (ref 8–23)
BUN/CREAT SERPL: 12.6 (ref 7–25)
CALCIUM SPEC-SCNC: 9.1 MG/DL (ref 8.6–10.5)
CHLORIDE SERPL-SCNC: 104 MMOL/L (ref 98–107)
CO2 SERPL-SCNC: 24.4 MMOL/L (ref 22–29)
CREAT SERPL-MCNC: 1.51 MG/DL (ref 0.57–1)
DEPRECATED RDW RBC AUTO: 41.3 FL (ref 37–54)
EOSINOPHIL # BLD AUTO: 0.64 10*3/MM3 (ref 0–0.4)
EOSINOPHIL NFR BLD AUTO: 5.2 % (ref 0.3–6.2)
ERYTHROCYTE [DISTWIDTH] IN BLOOD BY AUTOMATED COUNT: 15.9 % (ref 12.3–15.4)
FERRITIN SERPL-MCNC: 28.7 NG/ML (ref 13–150)
GFR SERPL CREATININE-BSD FRML MDRD: 35 ML/MIN/1.73
GLOBULIN UR ELPH-MCNC: 2.4 GM/DL
GLUCOSE SERPL-MCNC: 108 MG/DL (ref 65–99)
HCT VFR BLD AUTO: 48.7 % (ref 34–46.6)
HGB BLD-MCNC: 14.8 G/DL (ref 12–15.9)
IMM GRANULOCYTES # BLD AUTO: 0.03 10*3/MM3 (ref 0–0.05)
IMM GRANULOCYTES NFR BLD AUTO: 0.2 % (ref 0–0.5)
IRON 24H UR-MRATE: 38 MCG/DL (ref 37–145)
IRON SATN MFR SERPL: 8 % (ref 20–50)
LYMPHOCYTES # BLD AUTO: 1.09 10*3/MM3 (ref 0.7–3.1)
LYMPHOCYTES NFR BLD AUTO: 8.8 % (ref 19.6–45.3)
MCH RBC QN AUTO: 23.5 PG (ref 26.6–33)
MCHC RBC AUTO-ENTMCNC: 30.4 G/DL (ref 31.5–35.7)
MCV RBC AUTO: 77.3 FL (ref 79–97)
MONOCYTES # BLD AUTO: 0.68 10*3/MM3 (ref 0.1–0.9)
MONOCYTES NFR BLD AUTO: 5.5 % (ref 5–12)
NEUTROPHILS NFR BLD AUTO: 79.1 % (ref 42.7–76)
NEUTROPHILS NFR BLD AUTO: 9.73 10*3/MM3 (ref 1.7–7)
NRBC BLD AUTO-RTO: 0.1 /100 WBC (ref 0–0.2)
PLATELET # BLD AUTO: 985 10*3/MM3 (ref 140–450)
PMV BLD AUTO: 10.5 FL (ref 6–12)
POTASSIUM SERPL-SCNC: 4 MMOL/L (ref 3.5–5.2)
PROT SERPL-MCNC: 6.5 G/DL (ref 6–8.5)
RBC # BLD AUTO: 6.3 10*6/MM3 (ref 3.77–5.28)
SODIUM SERPL-SCNC: 142 MMOL/L (ref 136–145)
TIBC SERPL-MCNC: 459 MCG/DL (ref 298–536)
TRANSFERRIN SERPL-MCNC: 308 MG/DL (ref 200–360)
WBC NRBC COR # BLD: 12.32 10*3/MM3 (ref 3.4–10.8)

## 2022-01-20 PROCEDURE — 83540 ASSAY OF IRON: CPT

## 2022-01-20 PROCEDURE — 85245 CLOT FACTOR VIII VW RISTOCTN: CPT

## 2022-01-20 PROCEDURE — 84466 ASSAY OF TRANSFERRIN: CPT

## 2022-01-20 PROCEDURE — 82728 ASSAY OF FERRITIN: CPT

## 2022-01-20 PROCEDURE — 85240 CLOT FACTOR VIII AHG 1 STAGE: CPT

## 2022-01-20 PROCEDURE — 36415 COLL VENOUS BLD VENIPUNCTURE: CPT

## 2022-01-20 PROCEDURE — 85025 COMPLETE CBC W/AUTO DIFF WBC: CPT

## 2022-01-20 PROCEDURE — 80053 COMPREHEN METABOLIC PANEL: CPT

## 2022-01-20 PROCEDURE — 85246 CLOT FACTOR VIII VW ANTIGEN: CPT

## 2022-01-20 PROCEDURE — 85247 CLOT FACTOR VIII MULTIMETRIC: CPT

## 2022-01-20 PROCEDURE — 99204 OFFICE O/P NEW MOD 45 MIN: CPT | Performed by: INTERNAL MEDICINE

## 2022-01-20 RX ORDER — BUDESONIDE AND FORMOTEROL FUMARATE DIHYDRATE 160; 4.5 UG/1; UG/1
2 AEROSOL RESPIRATORY (INHALATION)
COMMUNITY
End: 2022-02-01

## 2022-01-20 NOTE — PROGRESS NOTES
New Patient Office Visit      Date: 2022     Patient Name: Martha Murray  MRN: [id number]  : 1955  Referring Physician: Julia Stacy    Chief Complaint: Establish care for thrombocytosis    History of Present Illness: Martha Murray is a pleasant 66 y.o. female past medical history of anxiety, hypertension, hyperlipidemia, COPD, tobacco abuse, type 2 diabetes, CKD who presents today for evaluation of thrombocytosis. The patient has been followed by other PCP is monitoring CBCs which were notable for thrombocytosis. Most recently her platelet count was 1280K in 2021. Per chart review over the last several years her platelet count has ranged between 700K-900K. She does note excessive fatigue during this timeframe as well as increased cravings for ice. She denies any recurrent infections or inflammatory processes. Of note, she recent had a CT scan which showed some slight splenomegaly up to around 16 cm. She denies any chest pain or discomfort. Denies any shortness of breath, cough, vision changes, headaches    Oncology History:    Oncology/Hematology History    No history exists.       Subjective      Review of Systems:     Constitutional: Negative for fevers, chills, or weight loss  Eyes: Negative for blurred vision or discharge         Ear/Nose/Throat: Negative for difficulty swallowing, sore throat, LAD                                                       Respiratory: Negative for cough, SOA, wheezing                                                                                        Cardiovascular: Negative for chest pain or palpitations                                                                  Gastrointestinal: Negative for nausea, vomiting or diarrhea                                                                     Genitourinary: Negative for dysuria or hematuria                                                                                            Musculoskeletal: Negative for any joint pains or muscle aches                                                                        Neurologic: Negative for any weakness, headaches, dizziness                                                                         Hematologic: Negative for any easy bleeding or bruising                                                                                   Psychiatric: Negative for anxiety or depression                             Past Medical History:   Past Medical History:   Diagnosis Date   • HAMMAD (acute kidney injury) (HCC) 2021   • Allergic    • Anemia    • Depression    • Diabetes mellitus (HCC)    • History of heart attack    • Hyperlipidemia    • Hypertension        Past Surgical History:   Past Surgical History:   Procedure Laterality Date   • BILATERAL BREAST REDUCTION     • BONE MARROW ASPIRATION     • HYSTERECTOMY  1990    x 2   • REDUCTION MAMMAPLASTY Bilateral     MORE THAN 20 YEARS AGO   • SINUS SURGERY     • TONSILLECTOMY         Family History:   Family History   Problem Relation Age of Onset   • Arthritis Maternal Grandmother    • Hypertension Mother    • Hyperlipidemia Mother    • Thyroid disease Mother    • Diabetes Sister    • Crohn's disease Sister    • Hypertension Sister    • Heart attack Maternal Grandfather    • Breast cancer Neg Hx    • Ovarian cancer Neg Hx        Social History:   Social History     Socioeconomic History   • Marital status: Single   Tobacco Use   • Smoking status: Former Smoker     Packs/day: 2.00     Years: 30.00     Pack years: 60.00     Types: Cigarettes     Quit date:      Years since quittin.0   • Smokeless tobacco: Never Used   Substance and Sexual Activity   • Alcohol use: No   • Drug use: No   • Sexual activity: Defer       Medications:     Current Outpatient Medications:   •  amLODIPine (NORVASC) 10 MG tablet, Take 1 tablet by mouth Daily., Disp: 30 tablet, Rfl: 5  •  aspirin (aspirin) 81 MG EC  "tablet, Take 1 tablet by mouth Daily., Disp: 30 tablet, Rfl: 5  •  atorvastatin (LIPITOR) 80 MG tablet, TAKE ONE TABLET BY MOUTH DAILY, Disp: 30 tablet, Rfl: 5  •  budesonide-formoterol (SYMBICORT) 160-4.5 MCG/ACT inhaler, Inhale 2 puffs 2 (Two) Times a Day., Disp: , Rfl:   •  carvedilol (COREG) 25 MG tablet, Take 1 tablet by mouth 2 (Two) Times a Day With Meals., Disp: 180 tablet, Rfl: 1  •  FLUoxetine (PROzac) 20 MG capsule, TAKE THREE CAPSULES BY MOUTH DAILY (Patient taking differently: Take 60 mg by mouth Daily.), Disp: 90 capsule, Rfl: 0  •  furosemide (LASIX) 40 MG tablet, TAKE ONE TABLET BY MOUTH DAILY, Disp: 30 tablet, Rfl: 4  •  lisinopril (PRINIVIL,ZESTRIL) 20 MG tablet, TAKE ONE TABLET BY MOUTH DAILY, Disp: 30 tablet, Rfl: 4  •  potassium chloride 10 MEQ CR tablet, Take 1 tablet by mouth Daily., Disp: 90 tablet, Rfl: 1  •  traZODone (DESYREL) 50 MG tablet, Take 1-2 tablets one hour before bedtime as needed for sleep., Disp: 45 tablet, Rfl: 2  •  naproxen sodium (ALEVE) 220 MG tablet, Take 220 mg by mouth As Needed., Disp: , Rfl:   •  nitroglycerin (NITROSTAT) 0.4 MG SL tablet, Place 1 tablet under the tongue Every 5 (Five) Minutes As Needed for Chest Pain. Take no more than 3 doses in 15 minutes., Disp: 25 tablet, Rfl: 5    Allergies:   Allergies   Allergen Reactions   • Sulfa Antibiotics Rash       Objective     Physical Exam:  Vital Signs:   Vitals:    01/20/22 0932   BP: 173/74   Pulse: 67   Resp: 16   Temp: 97.5 °F (36.4 °C)   TempSrc: Temporal   SpO2: 98%   Weight: 73 kg (161 lb)   Height: 154.9 cm (61\")   PainSc: 0-No pain     Pain Score    01/20/22 0932   PainSc: 0-No pain     ECOG Performance Status: 0 - Asymptomatic    Constitutional: NAD, ECOG 0  Eyes: PERRLA, scleral anicteric  ENT: No LAD, no thyromegaly  Respiratory: CTAB, no wheezing, rales, rhonchi  Cardiovascular: RRR, no murmurs, pulses 2+ bilaterally  Abdomen: soft, NT/ND, no HSM  Musculoskeletal: strength 5/5 bilaterally, no " c/c/e  Neurologic: A&O x 3, CN II-XII intact grossly  Psych: mood and affect congruent, no SI or HI    Results Review:   No visits with results within 2 Week(s) from this visit.   Latest known visit with results is:   Office Visit on 12/14/2021   Component Date Value Ref Range Status   • WBC 12/14/2021 13.30* 3.40 - 10.80 10*3/mm3 Final   • RBC 12/14/2021 6.54* 3.77 - 5.28 10*6/mm3 Final   • Hemoglobin 12/14/2021 15.6  12.0 - 15.9 g/dL Final   • Hematocrit 12/14/2021 51.1* 34.0 - 46.6 % Final   • MCV 12/14/2021 78.1* 79.0 - 97.0 fL Final   • MCH 12/14/2021 23.9* 26.6 - 33.0 pg Final   • MCHC 12/14/2021 30.5* 31.5 - 35.7 g/dL Final   • RDW 12/14/2021 16.8* 12.3 - 15.4 % Final   • RDW-SD 12/14/2021 41.8  37.0 - 54.0 fl Final   • MPV 12/14/2021 9.8  6.0 - 12.0 fL Final   • Platelets 12/14/2021 1,279* 140 - 450 10*3/mm3 Final   • Glucose 12/14/2021 65  65 - 99 mg/dL Final   • BUN 12/14/2021 15  8 - 23 mg/dL Final   • Creatinine 12/14/2021 1.27* 0.57 - 1.00 mg/dL Final   • Sodium 12/14/2021 139  136 - 145 mmol/L Final   • Potassium 12/14/2021 4.7  3.5 - 5.2 mmol/L Final    Slight hemolysis detected by analyzer. Results may be affected.   • Chloride 12/14/2021 102  98 - 107 mmol/L Final   • CO2 12/14/2021 25.2  22.0 - 29.0 mmol/L Final   • Calcium 12/14/2021 9.7  8.6 - 10.5 mg/dL Final   • Total Protein 12/14/2021 7.1  6.0 - 8.5 g/dL Final   • Albumin 12/14/2021 4.60  3.50 - 5.20 g/dL Final   • ALT (SGPT) 12/14/2021 16  1 - 33 U/L Final   • AST (SGOT) 12/14/2021 32  1 - 32 U/L Final   • Alkaline Phosphatase 12/14/2021 113  39 - 117 U/L Final   • Total Bilirubin 12/14/2021 0.8  0.0 - 1.2 mg/dL Final   • eGFR Non African Amer 12/14/2021 42* >60 mL/min/1.73 Final   • Globulin 12/14/2021 2.5  gm/dL Final   • A/G Ratio 12/14/2021 1.8  g/dL Final   • BUN/Creatinine Ratio 12/14/2021 11.8  7.0 - 25.0 Final   • Anion Gap 12/14/2021 11.8  5.0 - 15.0 mmol/L Final   • Total Cholesterol 12/14/2021 180  0 - 200 mg/dL Final   •  Triglycerides 12/14/2021 210* 0 - 150 mg/dL Final   • HDL Cholesterol 12/14/2021 36* 40 - 60 mg/dL Final   • LDL Cholesterol  12/14/2021 107* 0 - 100 mg/dL Final   • VLDL Cholesterol 12/14/2021 37  5 - 40 mg/dL Final   • LDL/HDL Ratio 12/14/2021 2.83   Final   • Folate 12/14/2021 5.94  4.78 - 24.20 ng/mL Final   • Vitamin B-12 12/14/2021 330  211 - 946 pg/mL Final   • TSH 12/14/2021 1.330  0.270 - 4.200 uIU/mL Final       No results found.    Assessment / Plan      Assessment/Plan:   1. Thrombocytosis (Primary)  -Platelet count 1280K in December 2021  -CT scan July 2021 showed slight splenomegaly up to 16 cm  -Concern for myelofibrosis given the clinical picture as well as iron deficiency given her symptoms  -We will check iron studies as below  -We will check JAK2 mutational testing  -Given her platelet count greater than 1 million, will check von Willebrand studies  -Okay to continue baby aspirin at this time  -     CBC & Differential; Future  -     Comprehensive Metabolic Panel; Future  -     Ferritin; Future  -     Iron Profile; Future  -     JAK2 V617F, Rfx CALR/E12-15/MPL; Future  -     Von Willebrand Antigen; Future  -     Von Willebrand Factor Activity; Future  -     Factor 8 Activity; Future  -     Von Willebrand Multimeric; Future    2. Other specified abnormal findings of blood chemistry   -     Ferritin; Future  -     Iron Profile; Future       Follow Up:   Follow-up in 3 weeks     Paulo Graves MD  Hematology and Oncology     Please note that portions of this note may have been completed with a voice recognition program. Efforts were made to edit the dictations, but occasionally words are mistranscribed.

## 2022-01-22 LAB
FACT VIII ACT/NOR PPP: 87 % (ref 56–140)
VWF AG ACT/NOR PPP IA: 121 % (ref 50–200)
VWF:RCO ACT/NOR PPP PL AGG: 34 % (ref 50–200)

## 2022-01-28 LAB
JAK2 P.V617F BLD/T QL: ABNORMAL
LAB DIRECTOR NAME PROVIDER: ABNORMAL
LABORATORY COMMENT REPORT: ABNORMAL
REFLEX: ABNORMAL
SPECIMEN PREPARATION: ABNORMAL

## 2022-02-01 ENCOUNTER — OFFICE VISIT (OUTPATIENT)
Dept: INTERNAL MEDICINE | Facility: CLINIC | Age: 67
End: 2022-02-01

## 2022-02-01 VITALS
HEIGHT: 61 IN | RESPIRATION RATE: 16 BRPM | WEIGHT: 160.8 LBS | SYSTOLIC BLOOD PRESSURE: 182 MMHG | HEART RATE: 60 BPM | TEMPERATURE: 97.3 F | DIASTOLIC BLOOD PRESSURE: 86 MMHG | OXYGEN SATURATION: 98 % | BODY MASS INDEX: 30.36 KG/M2

## 2022-02-01 DIAGNOSIS — I10 ESSENTIAL HYPERTENSION: ICD-10-CM

## 2022-02-01 DIAGNOSIS — M19.041 PRIMARY OSTEOARTHRITIS OF RIGHT HAND: Primary | ICD-10-CM

## 2022-02-01 PROCEDURE — 99214 OFFICE O/P EST MOD 30 MIN: CPT | Performed by: INTERNAL MEDICINE

## 2022-02-01 RX ORDER — METHYLPREDNISOLONE 4 MG/1
TABLET ORAL
Qty: 21 TABLET | Refills: 0 | Status: SHIPPED | OUTPATIENT
Start: 2022-02-01 | End: 2022-02-06

## 2022-02-01 NOTE — PROGRESS NOTES
Office Note      Date: 2022  Patient Name: Martha Murray  MRN: [id number]  : 1955    Chief Complaint   Patient presents with   • Hand Pain     Swollen Right hand        History of Present Illness: Martha Murray is a 66 y.o. female who presents for Hand Pain (Swollen Right hand ). Rt hand has been painful and swollen for the past few months. Has progressively worsened to the point it hurts to make a hand . Follows w/ ortho for knee injections. Has been taking aleve w/o much improvement.   Did not take BP meds for 2 days. Has difficulty opening pill bottles.   Subjective      Review of Systems:   Pertinent review of systems per HPI.    Review of Systems   Constitutional: Negative for activity change, appetite change, chills, diaphoresis, fatigue, fever and unexpected weight change.   HENT: Negative for congestion, dental problem, drooling, ear discharge, ear pain, facial swelling, hearing loss and mouth sores.    Eyes: Negative for pain, discharge and itching.   Respiratory: Negative for apnea, cough, choking, chest tightness and shortness of breath.    Cardiovascular: Negative for chest pain, palpitations and leg swelling.   Gastrointestinal: Negative for abdominal distention, abdominal pain, blood in stool, constipation and diarrhea.   Endocrine: Negative for cold intolerance, heat intolerance, polydipsia and polyuria.   Genitourinary: Negative for difficulty urinating, dysuria, frequency and hematuria.   Musculoskeletal: Positive for arthralgias and joint swelling.   Skin: Negative for color change, pallor, rash and wound.   Allergic/Immunologic: Negative for environmental allergies, food allergies and immunocompromised state.   Neurological: Negative for dizziness, weakness and light-headedness.   Psychiatric/Behavioral: Negative for agitation, behavioral problems, confusion, decreased concentration and self-injury. The patient is not nervous/anxious.    All other systems reviewed  "and are negative.    Allergies   Allergen Reactions   • Sulfa Antibiotics Rash       Objective     Physical Exam:  Vital Signs:   Vitals:    02/01/22 0930   BP: (!) 182/86   BP Location: Right arm   Patient Position: Sitting   Cuff Size: Adult   Pulse: 60   Resp: 16   Temp: 97.3 °F (36.3 °C)   TempSrc: Temporal   SpO2: 98%   Weight: 72.9 kg (160 lb 12.8 oz)   Height: 154.9 cm (61\")   PainSc:   6   PainLoc: Hand      Body mass index is 30.38 kg/m².    Physical Exam  Vitals and nursing note reviewed.   Constitutional:       General: She is not in acute distress.     Appearance: Normal appearance.   Cardiovascular:      Comments: 2+ rt radial pulse  Musculoskeletal:      Comments: Rt 3rd MCP joint swollen, no erythema.    Neurological:      Mental Status: She is alert.         Assessment / Plan      Assessment & Plan:    1. Primary osteoarthritis of right hand  Medrol dose pack. Advised tyelnol arthritis and to f/u with Ortho for steroid injection into MCP joint.  - methylPREDNISolone (MEDROL) 4 MG dose pack; Take as directed on package instructions - 6 day taper.  Dispense: 21 tablet; Refill: 0    2. Essential hypertension  Elevated due to not taking her medications for the past 2 days and pain.  Follow-up in 2 weeks to repeat blood pressure check.      Julia Stacy MD  02/01/2022   "

## 2022-02-10 ENCOUNTER — CLINICAL SUPPORT (OUTPATIENT)
Dept: ORTHOPEDIC SURGERY | Facility: CLINIC | Age: 67
End: 2022-02-10

## 2022-02-10 DIAGNOSIS — M17.0 PRIMARY OSTEOARTHRITIS OF BOTH KNEES: Primary | ICD-10-CM

## 2022-02-10 PROCEDURE — 20610 DRAIN/INJ JOINT/BURSA W/O US: CPT | Performed by: PHYSICIAN ASSISTANT

## 2022-02-10 NOTE — PROGRESS NOTES
Procedure   Large Joint Arthrocentesis: R knee  Date/Time: 2/10/2022 1:21 PM  Consent given by: patient  Site marked: site marked  Timeout: Immediately prior to procedure a time out was called to verify the correct patient, procedure, equipment, support staff and site/side marked as required   Supporting Documentation  Indications: pain   Procedure Details  Location: knee - R knee  Preparation: Patient was prepped and draped in the usual sterile fashion  Needle size: 22 G  Approach: anterolateral  Medications administered: 8 mg/mL Hylan 16 MG/2ML  Patient tolerance: patient tolerated the procedure well with no immediate complications    Large Joint Arthrocentesis: L knee  Date/Time: 2/10/2022 1:21 PM  Consent given by: patient  Site marked: site marked  Timeout: Immediately prior to procedure a time out was called to verify the correct patient, procedure, equipment, support staff and site/side marked as required   Supporting Documentation  Indications: pain   Procedure Details  Location: knee - L knee  Preparation: Patient was prepped and draped in the usual sterile fashion  Needle size: 22 G  Approach: anterolateral  Medications administered: 8 mg/mL Hylan 16 MG/2ML  Patient tolerance: patient tolerated the procedure well with no immediate complications

## 2022-02-10 NOTE — PROGRESS NOTES
CC: Follow-up bilateral knee osteoarthritis, 1/3 Synvisc injection today    History of present illness: Patient presents for her first Synvisc injection to the bilateral knee today.  At this time she denies any numbness or tingling into the distal extremity.  No fever, chills, night sweats or other constitutional symptoms.    See chart for PMH, PSH, Meds, All - reviewed.    Ortho exam:  Both knee  Skin is intact without redness, warmth or swelling/effusion.  No lesions or evidence of infection noted.  Motor/sensory: Grossly intact L2-S1.    Assessment/plan:  Both knee osteoarthritis    Proceed today with 1/3 of Synvisc injection.  Patient will follow-up in week for second injection with Keren Perez PA-C.      After discussing risks of injection the patient gave consent to proceed.  Both knees were confirmed as the correct joints to be injected with a timeout.  Each knee was then prepped with Hibiclens and injected with a prefilled syringe of Synvisc without any resistance using anterior lateral approach, patient in seated position.  The patient tolerated procedure well.  Hemostasis was achieved and a Band-Aid was applied over the injection site.  I instructed the patient on signs and symptoms of infection.  They should report to the ED if any of these develop.  Recommended modifying activity to include rest, ice, elevation and/or heat along with oral pain medication as needed.     Labs reviewed thoroughly with patient. Provided 24hr collection jug. Educated patient on collection of urine. Discussed upcoming ultrasound appointments. Pt verbalized understanding. Will get labs drawn today- Will drop off urine next week.

## 2022-02-15 ENCOUNTER — OFFICE VISIT (OUTPATIENT)
Dept: INTERNAL MEDICINE | Facility: CLINIC | Age: 67
End: 2022-02-15

## 2022-02-15 VITALS
DIASTOLIC BLOOD PRESSURE: 78 MMHG | TEMPERATURE: 98.7 F | HEART RATE: 87 BPM | HEIGHT: 61 IN | BODY MASS INDEX: 30.4 KG/M2 | SYSTOLIC BLOOD PRESSURE: 124 MMHG | RESPIRATION RATE: 20 BRPM | WEIGHT: 161 LBS | OXYGEN SATURATION: 97 %

## 2022-02-15 DIAGNOSIS — I10 ESSENTIAL HYPERTENSION: Primary | ICD-10-CM

## 2022-02-15 DIAGNOSIS — M19.90 ARTHRITIS: ICD-10-CM

## 2022-02-15 PROCEDURE — 99213 OFFICE O/P EST LOW 20 MIN: CPT | Performed by: INTERNAL MEDICINE

## 2022-02-15 RX ORDER — PREDNISOLONE ACETATE 10 MG/ML
SUSPENSION/ DROPS OPHTHALMIC
COMMUNITY
Start: 2022-02-11 | End: 2022-11-19

## 2022-02-15 RX ORDER — METHYLPREDNISOLONE 4 MG/1
TABLET ORAL
Qty: 21 TABLET | Refills: 0 | Status: SHIPPED | OUTPATIENT
Start: 2022-02-15 | End: 2022-02-20

## 2022-02-15 NOTE — PROGRESS NOTES
Office Note      Date: 02/15/2022  Patient Name: Martha Murray  MRN: [id number]  : 1955    Chief Complaint   Patient presents with   • Hypertension   • Osteoarthritis       History of Present Illness: Martha Murray is a 66 y.o. female who presents for Hypertension and Osteoarthritis. Took her BP meds this morning. Last visit BP was very elevated.     Hand swelling imprved with medrol dose pack but returned after she finsihed course. Has appt w/ ortho this week.     Subjective      Review of Systems:   Pertinent review of systems per HPI.    Review of Systems   Constitutional: Negative for activity change, appetite change, chills, diaphoresis, fatigue, fever and unexpected weight change.   HENT: Negative for congestion, dental problem, drooling, ear discharge, ear pain, facial swelling, hearing loss and mouth sores.    Eyes: Negative for pain, discharge and itching.   Respiratory: Negative for apnea, cough, choking, chest tightness and shortness of breath.    Cardiovascular: Negative for chest pain, palpitations and leg swelling.   Gastrointestinal: Negative for abdominal distention, abdominal pain, blood in stool, constipation and diarrhea.   Endocrine: Negative for cold intolerance, heat intolerance, polydipsia and polyuria.   Genitourinary: Negative for difficulty urinating, dysuria, frequency and hematuria.   Musculoskeletal: Positive for joint swelling.   Skin: Negative for color change, pallor, rash and wound.   Allergic/Immunologic: Negative for environmental allergies, food allergies and immunocompromised state.   Neurological: Negative for dizziness, weakness and light-headedness.   Psychiatric/Behavioral: Negative for agitation, behavioral problems, confusion, decreased concentration and self-injury. The patient is not nervous/anxious.    All other systems reviewed and are negative.    Allergies   Allergen Reactions   • Sulfa Antibiotics Rash       Objective     Physical Exam:  Vital  "Signs:   Vitals:    02/15/22 0839   BP: 124/78   Pulse: 87   Resp: 20   Temp: 98.7 °F (37.1 °C)   TempSrc: Temporal   SpO2: 97%   Weight: 73 kg (161 lb)   Height: 154.9 cm (61\")      Body mass index is 30.42 kg/m².    Physical Exam  Vitals and nursing note reviewed.   Constitutional:       General: She is not in acute distress.     Appearance: She is well-developed.   HENT:      Head: Normocephalic and atraumatic.      Right Ear: External ear normal.      Left Ear: External ear normal.   Cardiovascular:      Rate and Rhythm: Normal rate and regular rhythm.      Heart sounds: Normal heart sounds. No murmur heard.  No friction rub. No gallop.    Pulmonary:      Effort: Pulmonary effort is normal. No respiratory distress.      Breath sounds: Normal breath sounds. No wheezing or rales.   Musculoskeletal:      Comments: Right 3rd MCP joint swollen, no erythema or warmth   Skin:     General: Skin is warm and dry.      Coloration: Skin is not pale.         Assessment / Plan      Assessment & Plan:    1. Essential hypertensioni  Improved today. Continue norvasc, lisinopril and lasix.     2. Arthritis  rx for another medrol dose pack and advised getting steroid injection into 3rd right mcp joint.   - methylPREDNISolone (MEDROL) 4 MG dose pack; Take as directed on package instructions - 6 day taper.  Dispense: 21 tablet; Refill: 0      Julia Stacy MD  02/15/2022   "

## 2022-02-17 ENCOUNTER — CLINICAL SUPPORT (OUTPATIENT)
Dept: ORTHOPEDIC SURGERY | Facility: CLINIC | Age: 67
End: 2022-02-17

## 2022-02-17 DIAGNOSIS — M17.0 PRIMARY OSTEOARTHRITIS OF BOTH KNEES: Primary | ICD-10-CM

## 2022-02-17 PROCEDURE — 20610 DRAIN/INJ JOINT/BURSA W/O US: CPT | Performed by: PHYSICIAN ASSISTANT

## 2022-02-17 NOTE — PROGRESS NOTES
Procedure   Large Joint Arthrocentesis: R knee  Date/Time: 2/17/2022 3:01 PM  Consent given by: patient  Site marked: site marked  Timeout: Immediately prior to procedure a time out was called to verify the correct patient, procedure, equipment, support staff and site/side marked as required   Supporting Documentation  Indications: pain   Procedure Details  Location: knee - R knee  Preparation: Patient was prepped and draped in the usual sterile fashion  Needle size: 22 G  Approach: anterolateral  Medications administered: 8 mg/mL Hylan 16 MG/2ML  Patient tolerance: patient tolerated the procedure well with no immediate complications    Large Joint Arthrocentesis: L knee  Date/Time: 2/17/2022 3:02 PM  Consent given by: patient  Site marked: site marked  Timeout: Immediately prior to procedure a time out was called to verify the correct patient, procedure, equipment, support staff and site/side marked as required   Supporting Documentation  Indications: pain   Procedure Details  Location: knee - L knee  Preparation: Patient was prepped and draped in the usual sterile fashion  Needle size: 22 G  Approach: anterolateral  Medications administered: 8 mg/mL Hylan 16 MG/2ML  Patient tolerance: patient tolerated the procedure well with no immediate complications

## 2022-02-20 NOTE — PROGRESS NOTES
Patient presents for the 2nd injection of Synvisc in bilateral knees.    Using sterile technique, the left knee was sterilely prepped with Hibiclens.  Following time out, using a 22 gauge needle the left knee was aspirated and then injected with 2 ml Synvisc. Approximately 0.5 mm of straw-colored fluid was obtained.  Patient tolerated the procedure well.  No complications.      Using sterile technique, the right knee was sterilely prepped with Hibiclens.  Following time out, using a 22 gauge needle the right knee was aspirated and then injected with 2 ml Synvisc. Approximately 0.5 mm of straw-colored fluid was obtained.  Patient tolerated the procedure well.  No complications.

## 2022-02-21 DIAGNOSIS — F41.9 ANXIETY: ICD-10-CM

## 2022-02-21 DIAGNOSIS — F33.41 RECURRENT MAJOR DEPRESSIVE DISORDER, IN PARTIAL REMISSION: ICD-10-CM

## 2022-02-21 RX ORDER — FLUOXETINE HYDROCHLORIDE 20 MG/1
CAPSULE ORAL
Qty: 90 CAPSULE | Refills: 0 | Status: SHIPPED | OUTPATIENT
Start: 2022-02-21 | End: 2022-03-30

## 2022-02-22 RX ORDER — SODIUM, POTASSIUM,MAG SULFATES 17.5-3.13G
SOLUTION, RECONSTITUTED, ORAL ORAL
Qty: 354 ML | Refills: 0 | Status: SHIPPED | OUTPATIENT
Start: 2022-02-22 | End: 2022-11-19

## 2022-02-24 ENCOUNTER — OFFICE VISIT (OUTPATIENT)
Dept: ONCOLOGY | Facility: CLINIC | Age: 67
End: 2022-02-24

## 2022-02-24 ENCOUNTER — CLINICAL SUPPORT (OUTPATIENT)
Dept: ORTHOPEDIC SURGERY | Facility: CLINIC | Age: 67
End: 2022-02-24

## 2022-02-24 VITALS
OXYGEN SATURATION: 98 % | RESPIRATION RATE: 16 BRPM | SYSTOLIC BLOOD PRESSURE: 186 MMHG | DIASTOLIC BLOOD PRESSURE: 72 MMHG | BODY MASS INDEX: 30.21 KG/M2 | HEIGHT: 61 IN | HEART RATE: 65 BPM | WEIGHT: 160 LBS | TEMPERATURE: 97.3 F

## 2022-02-24 DIAGNOSIS — D47.3 ESSENTIAL THROMBOCYTOSIS: Primary | ICD-10-CM

## 2022-02-24 DIAGNOSIS — M17.0 PRIMARY OSTEOARTHRITIS OF BOTH KNEES: Primary | ICD-10-CM

## 2022-02-24 PROCEDURE — 20610 DRAIN/INJ JOINT/BURSA W/O US: CPT | Performed by: PHYSICIAN ASSISTANT

## 2022-02-24 PROCEDURE — 99214 OFFICE O/P EST MOD 30 MIN: CPT | Performed by: INTERNAL MEDICINE

## 2022-02-24 RX ORDER — HYDROXYUREA 500 MG/1
500 CAPSULE ORAL DAILY
Qty: 30 CAPSULE | Refills: 2 | Status: SHIPPED | OUTPATIENT
Start: 2022-02-24

## 2022-02-24 NOTE — PROGRESS NOTES
Follow Up Office Visit      Date: 2022     Patient Name: aMrtha Murray  MRN: [id number]  : 1955  Referring Physician: Julia Stacy     Chief Complaint:  Follow-up for JAK2 positive essential thrombocytosis     History of Present Illness: Martha Murray is a pleasant 66 y.o. female past medical history of anxiety, hypertension, hyperlipidemia, COPD, tobacco abuse, type 2 diabetes, CKD who presents today for evaluation of thrombocytosis. The patient has been followed by other PCP is monitoring CBCs which were notable for thrombocytosis. Most recently her platelet count was 1280K in 2021. Per chart review over the last several years her platelet count has ranged between 700K-900K. She does note excessive fatigue during this timeframe as well as increased cravings for ice. She denies any recurrent infections or inflammatory processes. Of note, she recent had a CT scan which showed some slight splenomegaly up to around 16 cm. She denies any chest pain or discomfort. Denies any shortness of breath, cough, vision changes, headaches    Interval History:  Presents clinic for follow-up.  Continues to have fatigue as well as easy bruising.  Denies any easy bleeding episodes.  Denies any chest pain, vision changes, headaches    Oncology History:    Oncology/Hematology History    No history exists.       Subjective      Review of Systems:   Constitutional: Negative for fevers, chills, or weight loss  Eyes: Negative for blurred vision or discharge         Ear/Nose/Throat: Negative for difficulty swallowing, sore throat, LAD                                                       Respiratory: Negative for cough, SOA, wheezing                                                                                        Cardiovascular: Negative for chest pain or palpitations                                                                  Gastrointestinal: Negative for nausea, vomiting or diarrhea                                                                      Genitourinary: Negative for dysuria or hematuria                                                                                           Musculoskeletal: Negative for any joint pains or muscle aches                                                                        Neurologic: Negative for any weakness, headaches, dizziness                                                                         Hematologic: Negative for any easy bleeding or bruising                                                                                   Psychiatric: Negative for anxiety or depression                          Past Medical History/Past Surgical History/ Family History/ Social History: Reviewed by me and unchanged from my previous documentation done on January 2022.     Medications:     Current Outpatient Medications:   •  amLODIPine (NORVASC) 10 MG tablet, Take 1 tablet by mouth Daily., Disp: 30 tablet, Rfl: 5  •  aspirin (aspirin) 81 MG EC tablet, Take 1 tablet by mouth Daily., Disp: 30 tablet, Rfl: 5  •  atorvastatin (LIPITOR) 80 MG tablet, TAKE ONE TABLET BY MOUTH DAILY, Disp: 30 tablet, Rfl: 5  •  carvedilol (COREG) 25 MG tablet, Take 1 tablet by mouth 2 (Two) Times a Day With Meals., Disp: 180 tablet, Rfl: 1  •  FLUoxetine (PROzac) 20 MG capsule, TAKE THREE CAPSULES BY MOUTH DAILY, Disp: 90 capsule, Rfl: 0  •  furosemide (LASIX) 40 MG tablet, TAKE ONE TABLET BY MOUTH DAILY, Disp: 30 tablet, Rfl: 4  •  hydroxyurea (HYDREA) 500 MG capsule, Take 1 capsule by mouth Daily., Disp: 30 capsule, Rfl: 2  •  lisinopril (PRINIVIL,ZESTRIL) 20 MG tablet, TAKE ONE TABLET BY MOUTH DAILY, Disp: 30 tablet, Rfl: 4  •  nitroglycerin (NITROSTAT) 0.4 MG SL tablet, Place 1 tablet under the tongue Every 5 (Five) Minutes As Needed for Chest Pain. Take no more than 3 doses in 15 minutes., Disp: 25 tablet, Rfl: 5  •  prednisoLONE acetate (PRED FORTE) 1 % ophthalmic suspension, , Disp:  ", Rfl:   •  sodium-potassium-magnesium sulfates (Suprep Bowel Prep Kit) 17.5-3.13-1.6 GM/177ML solution oral solution, Use as directed by provider for colonoscopy prep, Disp: 354 mL, Rfl: 0  •  traZODone (DESYREL) 50 MG tablet, Take 1-2 tablets one hour before bedtime as needed for sleep., Disp: 45 tablet, Rfl: 2    Allergies:   Allergies   Allergen Reactions   • Sulfa Antibiotics Rash       Objective     Physical Exam:  Vital Signs:   Vitals:    02/24/22 1324   BP: (!) 186/72   Pulse: 65   Resp: 16   Temp: 97.3 °F (36.3 °C)   TempSrc: Temporal   SpO2: 98%   Weight: 72.6 kg (160 lb)   Height: 154.9 cm (61\")   PainSc: 0-No pain     Pain Score    02/24/22 1324   PainSc: 0-No pain     ECOG Performance Status: 0 - Asymptomatic    Constitutional: NAD, ECOG 0  Eyes: PERRLA, scleral anicteric  ENT: No LAD, no thyromegaly  Respiratory: CTAB, no wheezing, rales, rhonchi  Cardiovascular: RRR, no murmurs, pulses 2+ bilaterally  Abdomen: soft, NT/ND, no HSM  Musculoskeletal: strength 5/5 bilaterally, no c/c/e  Neurologic: A&O x 3, CN II-XII intact grossly    Results Review:   No visits with results within 2 Week(s) from this visit.   Latest known visit with results is:   Lab on 01/20/2022   Component Date Value Ref Range Status   • Glucose 01/20/2022 108* 65 - 99 mg/dL Final   • BUN 01/20/2022 19  8 - 23 mg/dL Final   • Creatinine 01/20/2022 1.51* 0.57 - 1.00 mg/dL Final   • Sodium 01/20/2022 142  136 - 145 mmol/L Final   • Potassium 01/20/2022 4.0  3.5 - 5.2 mmol/L Final   • Chloride 01/20/2022 104  98 - 107 mmol/L Final   • CO2 01/20/2022 24.4  22.0 - 29.0 mmol/L Final   • Calcium 01/20/2022 9.1  8.6 - 10.5 mg/dL Final   • Total Protein 01/20/2022 6.5  6.0 - 8.5 g/dL Final   • Albumin 01/20/2022 4.10  3.50 - 5.20 g/dL Final   • ALT (SGPT) 01/20/2022 16  1 - 33 U/L Final   • AST (SGOT) 01/20/2022 28  1 - 32 U/L Final   • Alkaline Phosphatase 01/20/2022 108  39 - 117 U/L Final   • Total Bilirubin 01/20/2022 0.9  0.0 - 1.2 mg/dL " Final   • eGFR Non  Amer 01/20/2022 35* >60 mL/min/1.73 Final   • Globulin 01/20/2022 2.4  gm/dL Final   • A/G Ratio 01/20/2022 1.7  g/dL Final   • BUN/Creatinine Ratio 01/20/2022 12.6  7.0 - 25.0 Final   • Anion Gap 01/20/2022 13.6  5.0 - 15.0 mmol/L Final   • Ferritin 01/20/2022 28.70  13.00 - 150.00 ng/mL Final   • Iron 01/20/2022 38  37 - 145 mcg/dL Final   • Iron Saturation 01/20/2022 8* 20 - 50 % Final   • Transferrin 01/20/2022 308  200 - 360 mg/dL Final   • TIBC 01/20/2022 459  298 - 536 mcg/dL Final   • JAK2 V617F Mutation 01/20/2022 Comment*  Final    Result:  POSITIVE for the detection of the V617F mutation.  Interpretation:  The assay detected the presence of a G to T  nucleotide change encoding the V617F mutation within JAK2.  Interpretation of this result should be made in the context of other  clinical, morphologic, and cytogenetic findings.   • Background: 01/20/2022 Comment   Final    Comment: JAK2 is a cytoplasmic tyrosine kinase with a key role in signal  transduction from multiple hematopoietic growth factor receptors. A  point mutation within exon 14 of the JAK2 gene ([id number]) encoding a  valine to phenylalanine substitution at position 617 of the JAK2  protein (V617F) has been identified in most patients with polycythemia  vera, and in about half of those with either essential thrombocythemia  or idiopathic myelofibrosis. The V617F has also been detected,  although infrequently, in other myeloid disorders such as chronic  myelomonocytic leukemia and chronic neutrophilic luekemia. V617F is  an acquired mutation that alters a highly conserved valine present in  the negative regulatory JH2 domain of the JAK2 protein and is  predicted to dysregulate kinase activity.  Methodology:  Total genomic DNA was extracted and subjected to TaqMan real-time PCR  amplification/detection. Two amplification products per sample were  monitored by real-time PCR using primers/probes specific                             to JAK2 wild  type (WT) and JAK2 mutant V617F. The [id number] Absolute Quantitation  software will compare the patient specimen valuse to the standard  curves and generate percent values for wild type and mutant type.  In vitro studies have indicated that this assay has an analytical  sensitivity of 1%.  References:  Gelacio EJ, Jhonny SOOD, Rahul PJ, et al. Acquired mutation of the  tyrosine kinase JAK2 in human myeloproliferative disorders. Lancet.  2005 Mar 19-25; 365(1676):3365-4821.  Jose GIBBS, Ivan V, Chaparrita Payne GRECIA. A unique clonal JAK2 mutation leading  to constitutive signaling causes polycythaemia vera. Nature. 2005 Apr 28; 434(1839):1552-6065.  Rory R, Divina F, Sai AS, et al. A gain-of-function  mutation of JAK2 in myeloproliferative disorders. N Engl J Med. 2005 Apr 28; 352(52):2171-9265.   • Director Review 01/20/2022 Comment   Final    Satya Galo, PhD, [facility]      Director, Molecular Oncology      [facility] [place] [address]      [phone]  This test was developed and its performance characteristics  determined by [facility]. It has not been cleared or approved  by the Food and Drug Administration.   • Reflex 01/20/2022 Comment   Final    Reflex to CALR Mutation Analysis, JAK2 Exon 12-15 Mutation Analysis,  and MPL Mutation Analysis is not indicated.   • Extraction 01/20/2022 Completed   Final   • Von Willebrand Ag 01/20/2022 121  50 - 200 % Final   • VWF Activity 01/20/2022 34* 50 - 200 % Final    The VWF:RCo assay demonstrates significant variability and slightly  low values are commonly seen due to preanalytical and analytical  variables.  VWF:RCo may be spuriously low in individuals with certain  polymorphisms in the VWF gene (e.g. [id number]) that alter the  binding of ristocetin to VWF.  These polymorphisms have been reported  in 17% of whites and 63% of  Americans without a history of a  bleeding disorder (Blood.   2010; 116(2):280-286).   • Factor VIII Activity 01/20/2022 87  56 - 140 % Final   • WBC 01/20/2022 12.32* 3.40 - 10.80 10*3/mm3 Final   • RBC 01/20/2022 6.30* 3.77 - 5.28 10*6/mm3 Final   • Hemoglobin 01/20/2022 14.8  12.0 - 15.9 g/dL Final   • Hematocrit 01/20/2022 48.7* 34.0 - 46.6 % Final   • MCV 01/20/2022 77.3* 79.0 - 97.0 fL Final   • MCH 01/20/2022 23.5* 26.6 - 33.0 pg Final   • MCHC 01/20/2022 30.4* 31.5 - 35.7 g/dL Final   • RDW 01/20/2022 15.9* 12.3 - 15.4 % Final   • RDW-SD 01/20/2022 41.3  37.0 - 54.0 fl Final   • MPV 01/20/2022 10.5  6.0 - 12.0 fL Final   • Platelets 01/20/2022 985* 140 - 450 10*3/mm3 Final   • Neutrophil % 01/20/2022 79.1* 42.7 - 76.0 % Final   • Lymphocyte % 01/20/2022 8.8* 19.6 - 45.3 % Final   • Monocyte % 01/20/2022 5.5  5.0 - 12.0 % Final   • Eosinophil % 01/20/2022 5.2  0.3 - 6.2 % Final   • Basophil % 01/20/2022 1.2  0.0 - 1.5 % Final   • Immature Grans % 01/20/2022 0.2  0.0 - 0.5 % Final   • Neutrophils, Absolute 01/20/2022 9.73* 1.70 - 7.00 10*3/mm3 Final   • Lymphocytes, Absolute 01/20/2022 1.09  0.70 - 3.10 10*3/mm3 Final   • Monocytes, Absolute 01/20/2022 0.68  0.10 - 0.90 10*3/mm3 Final   • Eosinophils, Absolute 01/20/2022 0.64* 0.00 - 0.40 10*3/mm3 Final   • Basophils, Absolute 01/20/2022 0.15  0.00 - 0.20 10*3/mm3 Final   • Immature Grans, Absolute 01/20/2022 0.03  0.00 - 0.05 10*3/mm3 Final   • nRBC 01/20/2022 0.1  0.0 - 0.2 /100 WBC Final       No results found.    Assessment / Plan      Assessment/Plan:   1. Essential thrombocytosis (HCC) (Primary)  -Platelet count 1280K in December 2021  -CT scan July 2021 showed slight splenomegaly up to 16 cm  -Concern for myelofibrosis given the clinical picture as well as iron deficiency given her symptoms  -JAK2 mutational testing positive for the V617F mutation  -Von Willebrand studies consistent with an acquired von Willebrand's disease secondary to a myeloproliferative disorder  -We will plan to initiate Hydrea 500 mg daily.   Patient okay to continue baby aspirin at this time    2.  Iron deficiency  -Noted on recent iron studies  -Given her JAK2 mutation positivity, will hold off on iron supplementation at this time         Follow Up:   Follow-up in 1 month    Paulo Graves MD  Hematology and Oncology     Please note that portions of this note may have been completed with a voice recognition program. Efforts were made to edit the dictations, but occasionally words are mistranscribed.    Siliq Counseling:  I discussed with the patient the risks of Siliq including but not limited to new or worsening depression, suicidal thoughts and behavior, immunosuppression, malignancy, posterior leukoencephalopathy syndrome, and serious infections.  The patient understands that monitoring is required including a PPD at baseline and must alert us or the primary physician if symptoms of infection or other concerning signs are noted. There is also a special program designed to monitor depression which is required with Siliq.

## 2022-03-01 NOTE — PROGRESS NOTES
Patient presents for the final injection of Synvisc in bilateral knees    Using sterile technique, the left knee was sterilely prepped with Hibiclens.  Following time out, using a 22 gauge needle the left knee was aspirated and then injected with 2 ml Synvisc. Approximately 0.5 mm of straw-colored fluid was obtained.  Patient tolerated the procedure well.  No complications.      Using sterile technique, the right knee was sterilely prepped with Hibiclens.  Following time out, using a 22 gauge needle the right knee was aspirated and then injected with 2 ml Synvisc. Approximately 0.5 mm of straw-colored fluid was obtained.  Patient tolerated the procedure well.  No complications.      Return to clinic 3 months or sooner if needed

## 2022-03-07 ENCOUNTER — TELEPHONE (OUTPATIENT)
Dept: ORTHOPEDIC SURGERY | Facility: CLINIC | Age: 67
End: 2022-03-07

## 2022-03-07 NOTE — TELEPHONE ENCOUNTER
Pt called in to state that on Saturday she thought she heard a pop while walking and been having trouble walking or standing for a long period of time. States that she has to work 4 to 5 hours a day standing up and doesn't think she will be able to stand that long. Also states that knee is not swollen at this time but it hurts.....    Would like a call back with clinical advice please...      Has appt on Wednesday 3/9/22

## 2022-03-07 NOTE — TELEPHONE ENCOUNTER
I spoke with patient.  Her left knee gave out on her at work on Saturday. She says it felt like it popped out of place then went back into place.  She has had increased pain since.  She denies swelling, redness, hot to the touch.  Weightbearing causes the most pain.  She has taken off work today because she is unable to bear weight and her job requires standing for long periods of time.  I offered her an appointment tomorrow with Dr. Aldrich, she accepted. I advised ice, tylenol or ibuprofen, and to take it easy.  She says she has an ice pack and will stay off the knee as much as possible.  She also mentioned minimal relief from the recent visco series that was completed 02.24.2022

## 2022-03-08 ENCOUNTER — OFFICE VISIT (OUTPATIENT)
Dept: ORTHOPEDIC SURGERY | Facility: CLINIC | Age: 67
End: 2022-03-08

## 2022-03-08 VITALS
HEIGHT: 61 IN | SYSTOLIC BLOOD PRESSURE: 140 MMHG | WEIGHT: 160.05 LBS | BODY MASS INDEX: 30.22 KG/M2 | DIASTOLIC BLOOD PRESSURE: 88 MMHG

## 2022-03-08 DIAGNOSIS — M17.0 PRIMARY OSTEOARTHRITIS OF BOTH KNEES: Primary | ICD-10-CM

## 2022-03-08 LAB — VWF MULTIMERS PPP IB: NORMAL

## 2022-03-08 PROCEDURE — 99214 OFFICE O/P EST MOD 30 MIN: CPT | Performed by: ORTHOPAEDIC SURGERY

## 2022-03-08 PROCEDURE — 20610 DRAIN/INJ JOINT/BURSA W/O US: CPT | Performed by: ORTHOPAEDIC SURGERY

## 2022-03-08 RX ORDER — LIDOCAINE HYDROCHLORIDE 10 MG/ML
3 INJECTION, SOLUTION EPIDURAL; INFILTRATION; INTRACAUDAL; PERINEURAL
Status: COMPLETED | OUTPATIENT
Start: 2022-03-08 | End: 2022-03-08

## 2022-03-08 RX ORDER — TRIAMCINOLONE ACETONIDE 40 MG/ML
80 INJECTION, SUSPENSION INTRA-ARTICULAR; INTRAMUSCULAR
Status: COMPLETED | OUTPATIENT
Start: 2022-03-08 | End: 2022-03-08

## 2022-03-08 RX ADMIN — LIDOCAINE HYDROCHLORIDE 3 ML: 10 INJECTION, SOLUTION EPIDURAL; INFILTRATION; INTRACAUDAL; PERINEURAL at 08:56

## 2022-03-08 RX ADMIN — TRIAMCINOLONE ACETONIDE 80 MG: 40 INJECTION, SUSPENSION INTRA-ARTICULAR; INTRAMUSCULAR at 08:56

## 2022-03-08 RX ADMIN — TRIAMCINOLONE ACETONIDE 80 MG: 40 INJECTION, SUSPENSION INTRA-ARTICULAR; INTRAMUSCULAR at 08:55

## 2022-03-08 RX ADMIN — LIDOCAINE HYDROCHLORIDE 3 ML: 10 INJECTION, SOLUTION EPIDURAL; INFILTRATION; INTRACAUDAL; PERINEURAL at 08:55

## 2022-03-08 NOTE — PROGRESS NOTES
Orthopaedic Clinic Note: Knee Established Patient    Chief Complaint   Patient presents with   • Follow-up     1.5 weeks- Primary osteoarthritis of both knees        HPI    It has been 1  week(s) since Ms. Murray's last visit. She returns to clinic today for follow-up bilateral knee arthritis.  She was last seen a week ago and scheduled to undergo left knee arthroplasty.  She has since looked at her finances and is uncertain that she is able to afford knee replacement surgery at this time.  She is wishing to pursue alternative interventions.  She rates her pain 7/10 on the pain scale.  She is ambulating with the assistance of a crutch.  She is having significant swelling stiffness in both knees and pain is worse with walking weightbearing activities.  She is wishing to discuss alternative treatment options as her knee pain is significantly worse.      Past Medical History:   Diagnosis Date   • HAMMAD (acute kidney injury) (HCC) 2021   • Allergic    • Anemia    • Depression    • Diabetes mellitus (HCC)    • History of heart attack    • Hyperlipidemia    • Hypertension       Past Surgical History:   Procedure Laterality Date   • BILATERAL BREAST REDUCTION     • BONE MARROW ASPIRATION     • HYSTERECTOMY  1990    x 2   • REDUCTION MAMMAPLASTY Bilateral     MORE THAN 20 YEARS AGO   • SINUS SURGERY     • TONSILLECTOMY        Family History   Problem Relation Age of Onset   • Arthritis Maternal Grandmother    • Hypertension Mother    • Hyperlipidemia Mother    • Thyroid disease Mother    • Diabetes Sister    • Crohn's disease Sister    • Hypertension Sister    • Heart attack Maternal Grandfather    • Breast cancer Neg Hx    • Ovarian cancer Neg Hx      Social History     Socioeconomic History   • Marital status: Single   Tobacco Use   • Smoking status: Former Smoker     Packs/day: 2.00     Years: 30.00     Pack years: 60.00     Types: Cigarettes     Quit date:      Years since quittin.1   • Smokeless tobacco:  Never Used   Vaping Use   • Vaping Use: Never used   Substance and Sexual Activity   • Alcohol use: No   • Drug use: No   • Sexual activity: Defer      Current Outpatient Medications on File Prior to Visit   Medication Sig Dispense Refill   • amLODIPine (NORVASC) 10 MG tablet Take 1 tablet by mouth Daily. 30 tablet 5   • aspirin (aspirin) 81 MG EC tablet Take 1 tablet by mouth Daily. 30 tablet 5   • atorvastatin (LIPITOR) 80 MG tablet TAKE ONE TABLET BY MOUTH DAILY 30 tablet 5   • carvedilol (COREG) 25 MG tablet Take 1 tablet by mouth 2 (Two) Times a Day With Meals. 180 tablet 1   • FLUoxetine (PROzac) 20 MG capsule TAKE THREE CAPSULES BY MOUTH DAILY 90 capsule 0   • furosemide (LASIX) 40 MG tablet TAKE ONE TABLET BY MOUTH DAILY 30 tablet 4   • hydroxyurea (HYDREA) 500 MG capsule Take 1 capsule by mouth Daily. 30 capsule 2   • lisinopril (PRINIVIL,ZESTRIL) 20 MG tablet TAKE ONE TABLET BY MOUTH DAILY 30 tablet 4   • nitroglycerin (NITROSTAT) 0.4 MG SL tablet Place 1 tablet under the tongue Every 5 (Five) Minutes As Needed for Chest Pain. Take no more than 3 doses in 15 minutes. 25 tablet 5   • prednisoLONE acetate (PRED FORTE) 1 % ophthalmic suspension      • sodium-potassium-magnesium sulfates (Suprep Bowel Prep Kit) 17.5-3.13-1.6 GM/177ML solution oral solution Use as directed by provider for colonoscopy prep 354 mL 0   • traZODone (DESYREL) 50 MG tablet Take 1-2 tablets one hour before bedtime as needed for sleep. 45 tablet 2     No current facility-administered medications on file prior to visit.      Allergies   Allergen Reactions   • Sulfa Antibiotics Rash        Review of Systems   Constitutional: Negative.    HENT: Negative.    Eyes: Negative.    Respiratory: Negative.    Cardiovascular: Negative.    Gastrointestinal: Negative.    Endocrine: Negative.    Genitourinary: Negative.    Musculoskeletal: Positive for arthralgias.   Skin: Negative.    Allergic/Immunologic: Negative.    Neurological: Negative.   "  Hematological: Negative.    Psychiatric/Behavioral: Negative.         The patient's Review of Systems was personally reviewed and confirmed as accurate.    Physical Exam  Blood pressure 140/88, height 154.9 cm (60.98\"), weight 72.6 kg (160 lb 0.9 oz), not currently breastfeeding.    Body mass index is 30.26 kg/m².    GENERAL APPEARANCE: awake, alert, oriented, in no acute distress and well developed, well nourished  LUNGS:  breathing nonlabored  EXTREMITIES: no clubbing, cyanosis  PERIPHERAL PULSES: palpable dorsalis pedis and posterior tibial pulses bilaterally.    GAIT:  Antalgic        ----------  Bilateral Knee Exam:  ----------  ALIGNMENT: severe varus, correctable to neutral  ----------  RANGE OF MOTION:  Decreased (5 - 120 degrees) with no extensor lag  LIGAMENTOUS STABILITY:   stable to varus and valgus stress at terminal extension and 30 degrees; retensioning of the MCL is appreciated with valgus stress at 30 degrees consistent with medial compartment degeneration  ----------  STRENGTH:  KNEE FLEXION 5/5  KNEE EXTENSION  5/5  ANKLE DORSIFLEXION  5/5  ANKLE PLANTARFLEXION  5/5  ----------  PAIN WITH PALPATION:global  KNEE EFFUSION: yes, mild effusion on right, moderate effusion on left  PAIN WITH KNEE ROM: yes  PATELLAR CREPITUS:  yes, painful and symptomatic  ----------  SENSATION TO LIGHT TOUCH:  DEEP PERONEAL/SUPERFICIAL PERONEAL/SURAL/SAPHENOUS/TIBIAL:    intact  ----------  EDEMA:  no  ERYTHEMA:    no  WOUNDS/INCISIONS:   no  _____________________________________________________________________  _____________________________________________________________________    RADIOGRAPHIC FINDINGS:   Indication: Bilateral knee pain    Comparison: Todays xrays were compared to previous xrays from 8/10/2021    Bilateral knee films: moderate to severe tricompartmental arthritis with genu varum alignment and bone-on-bone articulation medial compartment, periarticular osteophytes visualized in all compartments and " No significant changes compared to prior radiographs.    Assessment/Plan:   Diagnosis Plan   1. Primary osteoarthritis of both knees  XR Knee 4+ View Right     Patient is end-stage osteoarthritis the bilateral knees.  She is wishing to avoid surgical intervention at this time.  I had extensive discussion regarding alternative treatment options including cortisone injections, viscosupplementation series, topical anti-inflammatories.  She is unable to take oral anti-inflammatory secondary to stage III kidney disease.  I did discuss that her symptoms are unlikely to improve unless surgical intervention is performed at some point.  However given her financial constraints, I can certainly understand that surgery is not an option at this time.  She wishes to pursue bilateral knee cortisone injections today.  She will follow-up in 3 months.    Procedure Note:  I discussed with the patient the potential benefits of performing a therapeutic injections of the bilateral knees as well as potential risks including but not limited to infection, swelling, pain, bleeding, bruising, nerve/vessel damage, skin color changes, transient elevation in blood glucose levels, and fat atrophy. After informed consent and verifying correct patient, procedure site, and type of procedure, the areas were prepped with alcohol, ethyl chloride was used to numb the skin. Via the superior lateral approach, 3cc of 1% lidocaine,1 cc of 0.75% Marcaine and 2 cc of 40mg/ml of Kenalog were each injected into the bilateral knees. The patient tolerated the procedures well. There were no complications. A sterile dressing was placed over each injection site.      Keaton Aldrich MD  03/08/22  09:01 EST

## 2022-03-08 NOTE — PROGRESS NOTES
Procedure   Large Joint Arthrocentesis: R knee  Date/Time: 3/8/2022 8:55 AM  Consent given by: patient  Site marked: site marked  Timeout: Immediately prior to procedure a time out was called to verify the correct patient, procedure, equipment, support staff and site/side marked as required   Supporting Documentation  Indications: pain   Procedure Details  Location: knee - R knee  Preparation: Patient was prepped and draped in the usual sterile fashion  Needle size: 22 G  Approach: anterolateral  Medications administered: 3 mL lidocaine PF 1% 1 %; 80 mg triamcinolone acetonide 40 MG/ML (1cc marcaine .75%, NDC 0746-8768-87, Lot [id number], exp [id number])  Patient tolerance: patient tolerated the procedure well with no immediate complications    Large Joint Arthrocentesis: L knee  Date/Time: 3/8/2022 8:56 AM  Consent given by: patient  Site marked: site marked  Timeout: Immediately prior to procedure a time out was called to verify the correct patient, procedure, equipment, support staff and site/side marked as required   Supporting Documentation  Indications: pain   Procedure Details  Location: knee - L knee  Preparation: Patient was prepped and draped in the usual sterile fashion  Needle size: 22 G  Approach: anterolateral  Medications administered: 3 mL lidocaine PF 1% 1 %; 80 mg triamcinolone acetonide 40 MG/ML (1cc marcaine .75%, NDC 4395-9372-84, Lot [id number], exp [id number])  Patient tolerance: patient tolerated the procedure well with no immediate complications

## 2022-03-22 ENCOUNTER — OFFICE VISIT (OUTPATIENT)
Dept: ORTHOPEDIC SURGERY | Facility: CLINIC | Age: 67
End: 2022-03-22

## 2022-03-22 VITALS
SYSTOLIC BLOOD PRESSURE: 170 MMHG | HEIGHT: 61 IN | WEIGHT: 160.05 LBS | DIASTOLIC BLOOD PRESSURE: 90 MMHG | BODY MASS INDEX: 30.22 KG/M2

## 2022-03-22 DIAGNOSIS — M19.041 ARTHRITIS OF FINGER OF RIGHT HAND: Primary | ICD-10-CM

## 2022-03-22 PROCEDURE — 99214 OFFICE O/P EST MOD 30 MIN: CPT | Performed by: PHYSICIAN ASSISTANT

## 2022-03-22 PROCEDURE — 20600 DRAIN/INJ JOINT/BURSA W/O US: CPT | Performed by: PHYSICIAN ASSISTANT

## 2022-03-22 RX ORDER — POTASSIUM CHLORIDE 750 MG/1
TABLET, FILM COATED, EXTENDED RELEASE ORAL
COMMUNITY
Start: 2022-03-17

## 2022-03-22 RX ADMIN — LIDOCAINE HYDROCHLORIDE 1 ML: 10 INJECTION, SOLUTION INFILTRATION; PERINEURAL at 08:37

## 2022-03-22 RX ADMIN — TRIAMCINOLONE ACETONIDE 20 MG: 40 INJECTION, SUSPENSION INTRA-ARTICULAR; INTRAMUSCULAR at 08:37

## 2022-03-22 NOTE — PROGRESS NOTES
Procedure   Small Joint Arthrocentesis: R long MCP  Consent given by: patient  Site marked: site marked  Timeout: Immediately prior to procedure a time out was called to verify the correct patient, procedure, equipment, support staff and site/side marked as required   Procedure Details  Location: long finger - R long MCP  Preparation: Patient was prepped and draped in the usual sterile fashion  Needle size: 25 G  Approach: volar  Medications administered: 1 mL lidocaine 1 %; 20 mg triamcinolone acetonide 40 MG/ML  Patient tolerance: patient tolerated the procedure well with no immediate complications

## 2022-03-22 NOTE — PROGRESS NOTES
"        [facility] [place] [facility] Note        Subjective     CC: Pain of the Right Hand      HPI    Martha Murray is a 66 y.o. female. Patient presents today with a new problem.  She complain right third MCP joint pain for 3 to 4 months.  No trauma no injury.  It swells and is painful to 8-9/10.  It is aching.  She has difficulty at work secondary to the pain.  She works at [employer]  Here for further evaluation treatment recommendations.        ROS:    Constiutional:Pt denies fever, chills, nausea, or vomiting.  MSK:as above        Objective      Past Medical History  Past Medical History:   Diagnosis Date   • HAMMAD (acute kidney injury) ([facility]) 7/1/2021   • Allergic    • Anemia    • Depression    • Diabetes mellitus ([facility])    • History of heart attack    • Hyperlipidemia    • Hypertension          Physical Exam  /90   Ht 154.9 cm (60.98\")   Wt 72.6 kg (160 lb 0.9 oz)   BMI 30.26 kg/m²     Body mass index is 30.26 kg/m².    Patient is well nourished and well developed.        Ortho Exam  Right hand exam: Tender palpation at the third MCP joint with swelling.  Patient able make a composite fist.  Neurovascular intact distally.  Pulses 2+.    Imaging/Labs/EMG Reviewed:  Imaging Results (Last 24 Hours)     ** No results found for the last 24 hours. **            Assessment    Assessment:  1. Arthritis of finger of right hand        Plan:  1. Recommend over the counter anti-inflammatories for pain and/or swelling  2. Severe right MCP joint arthritis.  I personally reviewed x-rays from 12/16/2021, clinical findings past and current treatment the patient.  I explained that her pain is secondary to arthritis the swelling is as well.  We discussed further treatment options including oral and topical anti-inflammatories, cortisone injection and referral to hand surgery.  She would like to try an injection today.  She asked about bracing.  There is no real good brace for this problem.  I will see her back as " needed.    Using sterile technique, the right hand was sterilely prepped.  After following a timeout, using a 25-gauge needle, 1 cc of 1% lidocaine and 20 mg of Kenalog were injected into the third MCP joint.  Patient tolerated the procedure well.      Keren Perez PA-C  03/23/22  16:53 EDT

## 2022-03-23 RX ORDER — LIDOCAINE HYDROCHLORIDE 10 MG/ML
1 INJECTION, SOLUTION INFILTRATION; PERINEURAL
Status: COMPLETED | OUTPATIENT
Start: 2022-03-22 | End: 2022-03-22

## 2022-03-23 RX ORDER — TRIAMCINOLONE ACETONIDE 40 MG/ML
20 INJECTION, SUSPENSION INTRA-ARTICULAR; INTRAMUSCULAR
Status: COMPLETED | OUTPATIENT
Start: 2022-03-22 | End: 2022-03-22

## 2022-03-29 DIAGNOSIS — F33.41 RECURRENT MAJOR DEPRESSIVE DISORDER, IN PARTIAL REMISSION: ICD-10-CM

## 2022-03-29 DIAGNOSIS — I10 ESSENTIAL HYPERTENSION: ICD-10-CM

## 2022-03-29 DIAGNOSIS — F41.9 ANXIETY: ICD-10-CM

## 2022-03-30 RX ORDER — CARVEDILOL 25 MG/1
TABLET ORAL
Qty: 60 TABLET | Refills: 0 | Status: SHIPPED | OUTPATIENT
Start: 2022-03-30

## 2022-03-30 RX ORDER — FLUOXETINE HYDROCHLORIDE 20 MG/1
CAPSULE ORAL
Qty: 90 CAPSULE | Refills: 0 | Status: SHIPPED | OUTPATIENT
Start: 2022-03-30 | End: 2022-05-31

## 2022-05-29 DIAGNOSIS — F33.41 RECURRENT MAJOR DEPRESSIVE DISORDER, IN PARTIAL REMISSION: ICD-10-CM

## 2022-05-29 DIAGNOSIS — F41.9 ANXIETY: ICD-10-CM

## 2022-05-29 DIAGNOSIS — I10 ESSENTIAL HYPERTENSION: ICD-10-CM

## 2022-05-31 RX ORDER — FLUOXETINE HYDROCHLORIDE 20 MG/1
CAPSULE ORAL
Qty: 90 CAPSULE | Refills: 0 | Status: SHIPPED | OUTPATIENT
Start: 2022-05-31 | End: 2022-08-02

## 2022-05-31 RX ORDER — AMLODIPINE BESYLATE 10 MG/1
TABLET ORAL
Qty: 30 TABLET | Refills: 5 | Status: SHIPPED | OUTPATIENT
Start: 2022-05-31

## 2022-08-01 DIAGNOSIS — F41.9 ANXIETY: ICD-10-CM

## 2022-08-01 DIAGNOSIS — F33.41 RECURRENT MAJOR DEPRESSIVE DISORDER, IN PARTIAL REMISSION: ICD-10-CM

## 2022-08-01 DIAGNOSIS — I10 ESSENTIAL HYPERTENSION: Chronic | ICD-10-CM

## 2022-08-02 RX ORDER — FLUOXETINE HYDROCHLORIDE 20 MG/1
CAPSULE ORAL
Qty: 90 CAPSULE | Refills: 0 | Status: SHIPPED | OUTPATIENT
Start: 2022-08-02 | End: 2022-10-31

## 2022-08-02 RX ORDER — LISINOPRIL 20 MG/1
TABLET ORAL
Qty: 30 TABLET | Refills: 4 | Status: SHIPPED | OUTPATIENT
Start: 2022-08-02 | End: 2022-11-10

## 2022-08-11 ENCOUNTER — TELEPHONE (OUTPATIENT)
Dept: ORTHOPEDIC SURGERY | Facility: CLINIC | Age: 67
End: 2022-08-11

## 2022-08-15 ENCOUNTER — TELEPHONE (OUTPATIENT)
Dept: ORTHOPEDIC SURGERY | Facility: CLINIC | Age: 67
End: 2022-08-15

## 2022-08-15 NOTE — TELEPHONE ENCOUNTER
Hub staff attempted to follow warm transfer process and was unsuccessful     Caller: Martha Murray    Relationship to patient: Self    Best call back number: [phone]    Patient is needing: PT RETURNING CALL TO [facility]          Billing Type: Third-Party Bill

## 2022-08-15 NOTE — TELEPHONE ENCOUNTER
Called patient and scheduled her   Is This A New Presentation, Or A Follow-Up?: Phototherapy Treatment When Was Your Last Phototherapy Treatment?: 06/15/2020

## 2022-08-18 ENCOUNTER — OFFICE VISIT (OUTPATIENT)
Dept: ORTHOPEDIC SURGERY | Facility: CLINIC | Age: 67
End: 2022-08-18

## 2022-08-18 VITALS
DIASTOLIC BLOOD PRESSURE: 78 MMHG | HEIGHT: 61 IN | BODY MASS INDEX: 27.72 KG/M2 | SYSTOLIC BLOOD PRESSURE: 124 MMHG | WEIGHT: 146.8 LBS

## 2022-08-18 DIAGNOSIS — M17.0 PRIMARY OSTEOARTHRITIS OF BOTH KNEES: Primary | ICD-10-CM

## 2022-08-18 PROCEDURE — 20610 DRAIN/INJ JOINT/BURSA W/O US: CPT | Performed by: ORTHOPAEDIC SURGERY

## 2022-08-18 PROCEDURE — 99214 OFFICE O/P EST MOD 30 MIN: CPT | Performed by: ORTHOPAEDIC SURGERY

## 2022-08-18 RX ORDER — TRIAMCINOLONE ACETONIDE 40 MG/ML
80 INJECTION, SUSPENSION INTRA-ARTICULAR; INTRAMUSCULAR
Status: COMPLETED | OUTPATIENT
Start: 2022-08-18 | End: 2022-08-18

## 2022-08-18 RX ORDER — BUPIVACAINE HYDROCHLORIDE 2.5 MG/ML
3 INJECTION, SOLUTION EPIDURAL; INFILTRATION; INTRACAUDAL
Status: COMPLETED | OUTPATIENT
Start: 2022-08-18 | End: 2022-08-18

## 2022-08-18 RX ORDER — LIDOCAINE HYDROCHLORIDE 10 MG/ML
3 INJECTION, SOLUTION EPIDURAL; INFILTRATION; INTRACAUDAL; PERINEURAL
Status: COMPLETED | OUTPATIENT
Start: 2022-08-18 | End: 2022-08-18

## 2022-08-18 RX ORDER — SENNOSIDES 8.6 MG
650 CAPSULE ORAL EVERY 8 HOURS PRN
COMMUNITY
End: 2022-11-19

## 2022-08-18 RX ADMIN — LIDOCAINE HYDROCHLORIDE 3 ML: 10 INJECTION, SOLUTION EPIDURAL; INFILTRATION; INTRACAUDAL; PERINEURAL at 08:52

## 2022-08-18 RX ADMIN — TRIAMCINOLONE ACETONIDE 80 MG: 40 INJECTION, SUSPENSION INTRA-ARTICULAR; INTRAMUSCULAR at 08:52

## 2022-08-18 RX ADMIN — BUPIVACAINE HYDROCHLORIDE 3 ML: 2.5 INJECTION, SOLUTION EPIDURAL; INFILTRATION; INTRACAUDAL at 08:52

## 2022-08-18 NOTE — PROGRESS NOTES
Procedure   - Large Joint Arthrocentesis: bilateral knee on 8/18/2022 8:52 AM  Indications: pain  Details: 22 G needle, anterolateral approach  Medications (Right): 3 mL bupivacaine (PF) 0.25 %; 3 mL lidocaine PF 1% 1 %; 80 mg triamcinolone acetonide 40 MG/ML  Medications (Left): 3 mL bupivacaine (PF) 0.25 %; 3 mL lidocaine PF 1% 1 %; 80 mg triamcinolone acetonide 40 MG/ML  Outcome: tolerated well, no immediate complications  Procedure, treatment alternatives, risks and benefits explained, specific risks discussed. Consent was given by the patient. Patient was prepped and draped in the usual sterile fashion.

## 2022-08-18 NOTE — PROGRESS NOTES
Orthopaedic Clinic Note: Knee Established Patient    Chief Complaint   Patient presents with   • Follow-up     5 month f/u Primary osteoarthritis of both knees, last cortisone bilat 3/8/22, Last bilat Synvisc 2/24/22        HPI    It has been 5  month(s) since Ms. Murray's last visit. She returns to clinic today for follow-up bilateral knee osteoarthritis.  She underwent cortisone injection both knees 5 months ago.  The injections lasted for about 3 months.  Her pain has returned.  She is here today complaining of worsening bilateral knee pain that she rates an 8/10 on the pain scale.  She is ambulating with no assistive device today.  She is taking anti-inflammatories intermittently as well as over-the-counter Tylenol.  Despite these interventions, she is continue to have severe pain that is limiting her ability to ambulate.  She denies fevers chills or constitutional symptoms.  Overall she is doing worse.    Past Medical History:   Diagnosis Date   • HAMMAD (acute kidney injury) ([facility]) 7/1/2021   • Allergic    • Anemia    • Depression    • Diabetes mellitus (HCC)    • History of heart attack    • Hyperlipidemia    • Hypertension       Past Surgical History:   Procedure Laterality Date   • BILATERAL BREAST REDUCTION     • BONE MARROW ASPIRATION     • CATARACT EXTRACTION Bilateral    • HYSTERECTOMY  1990    x 2   • REDUCTION MAMMAPLASTY Bilateral     MORE THAN 20 YEARS AGO   • SINUS SURGERY  2003   • TONSILLECTOMY        Family History   Problem Relation Age of Onset   • Arthritis Maternal Grandmother    • Hypertension Mother    • Hyperlipidemia Mother    • Thyroid disease Mother    • Diabetes Sister    • Crohn's disease Sister    • Hypertension Sister    • Heart attack Maternal Grandfather    • Breast cancer Neg Hx    • Ovarian cancer Neg Hx      Social History     Socioeconomic History   • Marital status: Single   Tobacco Use   • Smoking status: Former Smoker     Packs/day: 2.00     Years: 30.00     Pack years: 60.00      Types: Cigarettes     Quit date:      Years since quittin.6   • Smokeless tobacco: Never Used   Vaping Use   • Vaping Use: Never used   Substance and Sexual Activity   • Alcohol use: No   • Drug use: No   • Sexual activity: Defer      Current Outpatient Medications on File Prior to Visit   Medication Sig Dispense Refill   • acetaminophen (TYLENOL) 650 MG 8 hr tablet Take 650 mg by mouth Every 8 (Eight) Hours As Needed for Mild Pain .     • amLODIPine (NORVASC) 10 MG tablet TAKE ONE TABLET BY MOUTH DAILY 30 tablet 5   • aspirin (aspirin) 81 MG EC tablet Take 1 tablet by mouth Daily. 30 tablet 5   • atorvastatin (LIPITOR) 80 MG tablet TAKE ONE TABLET BY MOUTH DAILY 30 tablet 5   • carvedilol (COREG) 25 MG tablet TAKE ONE TABLET BY MOUTH TWICE A DAY WITH MEALS 60 tablet 0   • FLUoxetine (PROzac) 20 MG capsule TAKE THREE CAPSULES BY MOUTH DAILY 90 capsule 0   • furosemide (LASIX) 40 MG tablet TAKE ONE TABLET BY MOUTH DAILY 30 tablet 4   • hydroxyurea (HYDREA) 500 MG capsule Take 1 capsule by mouth Daily. 30 capsule 2   • lisinopril (PRINIVIL,ZESTRIL) 20 MG tablet TAKE ONE TABLET BY MOUTH DAILY 30 tablet 4   • nitroglycerin (NITROSTAT) 0.4 MG SL tablet Place 1 tablet under the tongue Every 5 (Five) Minutes As Needed for Chest Pain. Take no more than 3 doses in 15 minutes. 25 tablet 5   • potassium chloride 10 MEQ CR tablet      • prednisoLONE acetate (PRED FORTE) 1 % ophthalmic suspension      • sodium-potassium-magnesium sulfates (Suprep Bowel Prep Kit) 17.5-3.13-1.6 GM/177ML solution oral solution Use as directed by provider for colonoscopy prep 354 mL 0   • traZODone (DESYREL) 50 MG tablet Take 1-2 tablets one hour before bedtime as needed for sleep. 45 tablet 2     No current facility-administered medications on file prior to visit.      Allergies   Allergen Reactions   • Sulfa Antibiotics Rash        Review of Systems   Constitutional: Negative.    HENT: Negative.    Eyes: Negative.    Respiratory:  "Negative.    Cardiovascular: Negative.    Gastrointestinal: Negative.    Endocrine: Negative.    Genitourinary: Negative.    Musculoskeletal: Positive for arthralgias.   Skin: Negative.    Allergic/Immunologic: Negative.    Neurological: Negative.    Hematological: Negative.    Psychiatric/Behavioral: Negative.         The patient's Review of Systems was personally reviewed and confirmed as accurate.    Physical Exam  Blood pressure 124/78, height 154.9 cm (60.98\"), weight 66.6 kg (146 lb 12.8 oz), not currently breastfeeding.    Body mass index is 27.75 kg/m².    GENERAL APPEARANCE: awake, alert, oriented, in no acute distress and well developed, well nourished  LUNGS:  breathing nonlabored  EXTREMITIES: no clubbing, cyanosis  PERIPHERAL PULSES: palpable dorsalis pedis and posterior tibial pulses bilaterally.    GAIT:  Antalgic        ----------  Bilateral Knee Exam:  ----------  ALIGNMENT: severe varus, correctable to neutral  ----------  RANGE OF MOTION:  Decreased (5 - 115 degrees) with no extensor lag  LIGAMENTOUS STABILITY:   stable to varus and valgus stress at terminal extension and 30 degrees; retensioning of the MCL is appreciated with valgus stress at 30 degrees consistent with medial compartment degeneration  ----------  STRENGTH:  KNEE FLEXION 5/5  KNEE EXTENSION  5/5  ANKLE DORSIFLEXION  5/5  ANKLE PLANTARFLEXION  5/5  ----------  PAIN WITH PALPATION:global  KNEE EFFUSION: yes, mild effusion bilateral  PAIN WITH KNEE ROM: yes  PATELLAR CREPITUS:  yes, painful and symptomatic  ----------  SENSATION TO LIGHT TOUCH:  DEEP PERONEAL/SUPERFICIAL PERONEAL/SURAL/SAPHENOUS/TIBIAL:    intact  ----------  EDEMA:  no  ERYTHEMA:    no  WOUNDS/INCISIONS:   no  _____________________________________________________________________  _____________________________________________________________________    RADIOGRAPHIC FINDINGS:   Indication: Bilateral knee pain    Comparison: Todays xrays were compared to previous xrays " from 3/8/2022 and 8/10/2021    Knee films: moderate to severe tricompartmental arthritis with genu varum alignment and bone-on-bone articulation medial compartment, periarticular osteophytes visualized in all compartments and No significant changes compared to prior radiographs.    Assessment/Plan:   Diagnosis Plan   1. Primary osteoarthritis of both knees  XR Knee 4+ View Bilateral     The patient has failed conservative treatment measures and is a candidate for joint arthroplasty.  I discussed the joint arthroplasty surgical process as well as the recovery and rehabilitation time frame.  The patient asked several questions regarding the joint arthroplasty surgery, which were answered accordingly.  Ultimately, the patient declines surgical intervention at this time and wishes to continue with conservative treatment measures.  She is try to get her finances in order in order to proceed to surgery as well as get her dental issues taken care of before scheduling surgery.  Alternative conservative treatment measures were discussed including bracing, therapy, topical/oral anti-inflammatories, activity modification, and weight loss.  The patient considered these treatment options and wishes to proceed with corticosteroid injection(s) today.  Therefore we will proceed with corticosteroid injection(s) today.  Follow-up as needed.    Procedure Note:  I discussed with the patient the potential benefits of performing a therapeutic injections of the bilateral knee as well as potential risks including but not limited to infection, swelling, pain, bleeding, bruising, nerve/vessel damage, skin color changes, transient elevation in blood glucose levels, and fat atrophy. After informed consent and verifying correct patient, procedure site, and type of procedure, the areas were prepped with alcohol, ethyl chloride was used to numb the skin. Via the superolateral approach, 3cc of 1% lidocaine, 3 cc of 0.25% Marcaine and 2 cc of  40mg/ml of Kenalog were each injected into the bilateral knees. The patient tolerated the procedures well. There were no complications. A sterile dressing was placed over each injection site.      Keaton Aldrich MD  08/18/22  09:04 EDT

## 2022-10-31 DIAGNOSIS — I10 ESSENTIAL HYPERTENSION: Chronic | ICD-10-CM

## 2022-10-31 DIAGNOSIS — F41.9 ANXIETY: ICD-10-CM

## 2022-10-31 DIAGNOSIS — F33.41 RECURRENT MAJOR DEPRESSIVE DISORDER, IN PARTIAL REMISSION: ICD-10-CM

## 2022-10-31 RX ORDER — LISINOPRIL 20 MG/1
TABLET ORAL
Qty: 90 TABLET | Refills: 0 | OUTPATIENT
Start: 2022-10-31

## 2022-10-31 RX ORDER — FLUOXETINE HYDROCHLORIDE 20 MG/1
CAPSULE ORAL
Qty: 90 CAPSULE | Refills: 0 | Status: SHIPPED | OUTPATIENT
Start: 2022-10-31 | End: 2023-02-28

## 2022-10-31 RX ORDER — POTASSIUM CHLORIDE 750 MG/1
TABLET, FILM COATED, EXTENDED RELEASE ORAL
Qty: 90 TABLET | Refills: 0 | OUTPATIENT
Start: 2022-10-31

## 2022-10-31 RX ORDER — FUROSEMIDE 40 MG/1
TABLET ORAL
Qty: 90 TABLET | Refills: 0 | OUTPATIENT
Start: 2022-10-31

## 2022-11-10 DIAGNOSIS — I10 ESSENTIAL HYPERTENSION: Chronic | ICD-10-CM

## 2022-11-10 RX ORDER — LISINOPRIL 20 MG/1
TABLET ORAL
Qty: 30 TABLET | Refills: 0 | Status: SHIPPED | OUTPATIENT
Start: 2022-11-10 | End: 2023-02-28

## 2022-12-05 ENCOUNTER — OFFICE VISIT (OUTPATIENT)
Dept: INTERNAL MEDICINE | Facility: CLINIC | Age: 67
End: 2022-12-05

## 2022-12-05 ENCOUNTER — LAB (OUTPATIENT)
Dept: LAB | Facility: HOSPITAL | Age: 67
End: 2022-12-05

## 2022-12-05 VITALS
SYSTOLIC BLOOD PRESSURE: 134 MMHG | HEIGHT: 61 IN | WEIGHT: 157.6 LBS | TEMPERATURE: 97.8 F | OXYGEN SATURATION: 95 % | DIASTOLIC BLOOD PRESSURE: 78 MMHG | HEART RATE: 78 BPM | BODY MASS INDEX: 29.76 KG/M2

## 2022-12-05 DIAGNOSIS — J44.9 CHRONIC OBSTRUCTIVE PULMONARY DISEASE, UNSPECIFIED COPD TYPE: ICD-10-CM

## 2022-12-05 DIAGNOSIS — I50.32 CHRONIC DIASTOLIC CONGESTIVE HEART FAILURE: ICD-10-CM

## 2022-12-05 DIAGNOSIS — F51.01 PRIMARY INSOMNIA: ICD-10-CM

## 2022-12-05 DIAGNOSIS — I10 ESSENTIAL HYPERTENSION: Primary | ICD-10-CM

## 2022-12-05 DIAGNOSIS — R42 DIZZINESS: ICD-10-CM

## 2022-12-05 DIAGNOSIS — R73.03 PRE-DIABETES: ICD-10-CM

## 2022-12-05 LAB
ALBUMIN SERPL-MCNC: 4.4 G/DL (ref 3.5–5.2)
ALBUMIN/GLOB SERPL: 2.1 G/DL
ALP SERPL-CCNC: 99 U/L (ref 39–117)
ALT SERPL W P-5'-P-CCNC: 11 U/L (ref 1–33)
ANION GAP SERPL CALCULATED.3IONS-SCNC: 10 MMOL/L (ref 5–15)
AST SERPL-CCNC: 23 U/L (ref 1–32)
BILIRUB SERPL-MCNC: 0.9 MG/DL (ref 0–1.2)
BUN SERPL-MCNC: 16 MG/DL (ref 8–23)
BUN/CREAT SERPL: 10.8 (ref 7–25)
CALCIUM SPEC-SCNC: 9.5 MG/DL (ref 8.6–10.5)
CHLORIDE SERPL-SCNC: 105 MMOL/L (ref 98–107)
CO2 SERPL-SCNC: 25 MMOL/L (ref 22–29)
CREAT SERPL-MCNC: 1.48 MG/DL (ref 0.57–1)
EGFRCR SERPLBLD CKD-EPI 2021: 38.9 ML/MIN/1.73
GLOBULIN UR ELPH-MCNC: 2.1 GM/DL
GLUCOSE SERPL-MCNC: 87 MG/DL (ref 65–99)
POTASSIUM SERPL-SCNC: 4.4 MMOL/L (ref 3.5–5.2)
PROT SERPL-MCNC: 6.5 G/DL (ref 6–8.5)
SODIUM SERPL-SCNC: 140 MMOL/L (ref 136–145)

## 2022-12-05 PROCEDURE — 99214 OFFICE O/P EST MOD 30 MIN: CPT | Performed by: INTERNAL MEDICINE

## 2022-12-05 PROCEDURE — 83036 HEMOGLOBIN GLYCOSYLATED A1C: CPT | Performed by: INTERNAL MEDICINE

## 2022-12-05 PROCEDURE — 80053 COMPREHEN METABOLIC PANEL: CPT | Performed by: INTERNAL MEDICINE

## 2022-12-05 RX ORDER — ALBUTEROL SULFATE 90 UG/1
2 AEROSOL, METERED RESPIRATORY (INHALATION) EVERY 4 HOURS PRN
Qty: 8 G | Refills: 12 | Status: SHIPPED | OUTPATIENT
Start: 2022-12-05

## 2022-12-05 RX ORDER — BUDESONIDE AND FORMOTEROL FUMARATE DIHYDRATE 80; 4.5 UG/1; UG/1
2 AEROSOL RESPIRATORY (INHALATION)
Qty: 10.2 G | Refills: 12 | Status: SHIPPED | OUTPATIENT
Start: 2022-12-05

## 2022-12-05 RX ORDER — TRAZODONE HYDROCHLORIDE 50 MG/1
25 TABLET ORAL NIGHTLY
Qty: 30 TABLET | Refills: 3 | Status: SHIPPED | OUTPATIENT
Start: 2022-12-05

## 2022-12-05 NOTE — PROGRESS NOTES
Office Note      Date: 2022  Patient Name: Martha Murray  MRN: [id number]  : 1955    Chief Complaint   Patient presents with   • Dizziness     PT states that she has been more off balanced than normal.        History of Present Illness: Martha Murray is a 67 y.o. female who presents for Dizziness (PT states that she has been more off balanced than normal. ). Has poor thirst. Still taking lasix. Having diarrhea. Feels off balanced, had flu like sx a few weeks ago. Waking up through the night, ran out of refills of trazodone. Has COPD and uses symbicort bid, has not had albuterol inhaler .  Subjective      Review of Systems:   Pertinent review of systems per HPI.    Review of Systems   Constitutional: Positive for fatigue. Negative for activity change, appetite change, chills, diaphoresis, fever and unexpected weight change.   HENT: Negative for congestion, dental problem, drooling, ear discharge, ear pain, facial swelling, hearing loss and mouth sores.    Eyes: Negative for pain, discharge and itching.   Respiratory: Negative for apnea, cough, choking, chest tightness and shortness of breath.    Cardiovascular: Negative for chest pain, palpitations and leg swelling.   Gastrointestinal: Negative for abdominal distention, abdominal pain, blood in stool, constipation and diarrhea.   Endocrine: Negative for cold intolerance, heat intolerance, polydipsia and polyuria.   Genitourinary: Negative for difficulty urinating, dysuria, frequency and hematuria.   Skin: Negative for color change, pallor, rash and wound.   Allergic/Immunologic: Negative for environmental allergies, food allergies and immunocompromised state.   Neurological: Positive for dizziness. Negative for weakness and light-headedness.   Psychiatric/Behavioral: Negative for agitation, behavioral problems, confusion, decreased concentration and self-injury. The patient is not nervous/anxious.    All other systems reviewed and are  "negative.    Allergies   Allergen Reactions   • Sulfa Antibiotics Rash       Objective     Physical Exam:  Vital Signs:   Vitals:    12/05/22 1122   BP: 134/78   BP Location: Left arm   Patient Position: Sitting   Cuff Size: Adult   Pulse: 78   Temp: 97.8 °F (36.6 °C)   TempSrc: Infrared   SpO2: 95%  Comment: resting room air   Weight: 71.5 kg (157 lb 9.6 oz)   Height: 154.9 cm (60.98\")      Body mass index is 29.79 kg/m².    Physical Exam  Vitals and nursing note reviewed.   Constitutional:       General: She is not in acute distress.     Appearance: She is well-developed.   HENT:      Head: Normocephalic and atraumatic.      Right Ear: External ear normal.      Left Ear: External ear normal.   Eyes:      General: No scleral icterus.        Right eye: No discharge.         Left eye: No discharge.      Conjunctiva/sclera: Conjunctivae normal.   Cardiovascular:      Rate and Rhythm: Normal rate and regular rhythm.      Heart sounds: Normal heart sounds. No murmur heard.    No friction rub. No gallop.   Pulmonary:      Effort: Pulmonary effort is normal. No respiratory distress.      Breath sounds: Wheezing present. No rales.   Skin:     General: Skin is warm and dry.      Coloration: Skin is not pale.         Assessment / Plan      Assessment & Plan:    1. Essential hypertension  BP well controlled. Cont ace and norvasc.  - Comprehensive Metabolic Panel    2. Pre-diabetes  Check a1c  - Hemoglobin A1c    3. Primary insomnia  Refilled trazodone  - traZODone (DESYREL) 50 MG tablet; Take 0.5 tablets by mouth Every Night.  Dispense: 30 tablet; Refill: 3    4. Chronic diastolic congestive heart failure (HCC)  5. Dizziness  Stop lasix as dehydration likely contributing to dizziness. Inc water intake. Check renal functin. F/u with cardiology  6. Chronic obstructive pulmonary disease, unspecified COPD type (HCC)  rx for rescue inhaler  - albuterol sulfate  (90 Base) MCG/ACT inhaler; Inhale 2 puffs Every 4 (Four) Hours " As Needed for Wheezing.  Dispense: 8 g; Refill: 12  - budesonide-formoterol (Symbicort) 80-4.5 MCG/ACT inhaler; Inhale 2 puffs 2 (Two) Times a Day.  Dispense: 10.2 g; Refill: 12    diziness and not feeling well likely multifactorial due to poor sleep, uncontrolled COPD, and dehydration.     Julia Stacy MD  12/05/2022   Answers for HPI/ROS submitted by the patient on 11/28/2022  What is the primary reason for your visit?: High Blood Pressure

## 2022-12-06 ENCOUNTER — TELEPHONE (OUTPATIENT)
Dept: INTERNAL MEDICINE | Facility: CLINIC | Age: 67
End: 2022-12-06

## 2022-12-06 LAB — HBA1C MFR BLD: 5.9 % (ref 4.8–5.6)

## 2022-12-06 NOTE — TELEPHONE ENCOUNTER
----- Message from Julia Stacy MD sent at 12/6/2022 11:43 AM EST -----  A1c improved.   Kidney function stable. Stop lasix. F/u with cardiology.

## 2023-01-10 ENCOUNTER — OFFICE VISIT (OUTPATIENT)
Dept: ORTHOPEDIC SURGERY | Facility: CLINIC | Age: 68
End: 2023-01-10
Payer: MEDICARE

## 2023-01-10 VITALS
WEIGHT: 163 LBS | HEIGHT: 61 IN | SYSTOLIC BLOOD PRESSURE: 180 MMHG | BODY MASS INDEX: 30.78 KG/M2 | DIASTOLIC BLOOD PRESSURE: 88 MMHG

## 2023-01-10 DIAGNOSIS — M17.0 PRIMARY OSTEOARTHRITIS OF BOTH KNEES: Primary | ICD-10-CM

## 2023-01-10 DIAGNOSIS — E66.09 CLASS 1 OBESITY DUE TO EXCESS CALORIES WITHOUT SERIOUS COMORBIDITY WITH BODY MASS INDEX (BMI) OF 30.0 TO 30.9 IN ADULT: ICD-10-CM

## 2023-01-10 PROCEDURE — 99214 OFFICE O/P EST MOD 30 MIN: CPT | Performed by: ORTHOPAEDIC SURGERY

## 2023-01-10 PROCEDURE — 20610 DRAIN/INJ JOINT/BURSA W/O US: CPT | Performed by: ORTHOPAEDIC SURGERY

## 2023-01-10 RX ORDER — TRIAMCINOLONE ACETONIDE 40 MG/ML
40 INJECTION, SUSPENSION INTRA-ARTICULAR; INTRAMUSCULAR
Status: COMPLETED | OUTPATIENT
Start: 2023-01-10 | End: 2023-01-10

## 2023-01-10 RX ORDER — LIDOCAINE HYDROCHLORIDE 10 MG/ML
4 INJECTION, SOLUTION EPIDURAL; INFILTRATION; INTRACAUDAL; PERINEURAL
Status: COMPLETED | OUTPATIENT
Start: 2023-01-10 | End: 2023-01-10

## 2023-01-10 RX ADMIN — TRIAMCINOLONE ACETONIDE 40 MG: 40 INJECTION, SUSPENSION INTRA-ARTICULAR; INTRAMUSCULAR at 08:28

## 2023-01-10 RX ADMIN — LIDOCAINE HYDROCHLORIDE 4 ML: 10 INJECTION, SOLUTION EPIDURAL; INFILTRATION; INTRACAUDAL; PERINEURAL at 08:28

## 2023-01-10 NOTE — PROGRESS NOTES
Orthopaedic Clinic Note: Knee Established Patient    Chief Complaint   Patient presents with   • Follow-up     5 months- Primary osteoarthritis of both knees        HPI    It has been 5  month(s) since Ms. Murray's last visit. She returns to clinic today for follow-up bilateral knee osteoarthritis.  Patient underwent cortisone injections both knees 3 months ago.  Injections provided about 4 months of relief.  Over the last month, her pain is gotten progressively worse.  She rates her pain 10/10 on the pain scale.  She is ambulating with no assistive device.  Denies fevers chills or constitutional symptoms.  Overall she is doing worse since the injections wore off.  She remains overweight the BMI 30.81.    Past Medical History:   Diagnosis Date   • HAMMAD (acute kidney injury) (HCC) 2021   • Allergic    • Anemia    • Depression    • Diabetes mellitus (HCC)    • History of heart attack    • Hyperlipidemia    • Hypertension       Past Surgical History:   Procedure Laterality Date   • BILATERAL BREAST REDUCTION     • BONE MARROW ASPIRATION     • CATARACT EXTRACTION Bilateral    • HYSTERECTOMY  1990    x 2   • REDUCTION MAMMAPLASTY Bilateral     MORE THAN 20 YEARS AGO   • SINUS SURGERY     • TONSILLECTOMY        Family History   Problem Relation Age of Onset   • Arthritis Maternal Grandmother    • Hypertension Mother    • Hyperlipidemia Mother    • Thyroid disease Mother    • Diabetes Sister    • Crohn's disease Sister    • Hypertension Sister    • Heart attack Maternal Grandfather    • Breast cancer Neg Hx    • Ovarian cancer Neg Hx      Social History     Socioeconomic History   • Marital status: Single   Tobacco Use   • Smoking status: Former     Packs/day: 2.00     Years: 30.00     Pack years: 60.00     Types: Cigarettes     Quit date:      Years since quittin.0   • Smokeless tobacco: Never   Vaping Use   • Vaping Use: Never used   Substance and Sexual Activity   • Alcohol use: No   • Drug use: No   •  Sexual activity: Defer      Current Outpatient Medications on File Prior to Visit   Medication Sig Dispense Refill   • albuterol sulfate  (90 Base) MCG/ACT inhaler Inhale 2 puffs Every 4 (Four) Hours As Needed for Wheezing. 8 g 12   • amLODIPine (NORVASC) 10 MG tablet TAKE ONE TABLET BY MOUTH DAILY 30 tablet 5   • aspirin (aspirin) 81 MG EC tablet Take 1 tablet by mouth Daily. 30 tablet 5   • atorvastatin (LIPITOR) 80 MG tablet TAKE ONE TABLET BY MOUTH DAILY 30 tablet 5   • budesonide-formoterol (Symbicort) 80-4.5 MCG/ACT inhaler Inhale 2 puffs 2 (Two) Times a Day. 10.2 g 12   • carvedilol (COREG) 25 MG tablet TAKE ONE TABLET BY MOUTH TWICE A DAY WITH MEALS 60 tablet 0   • FLUoxetine (PROzac) 20 MG capsule TAKE THREE CAPSULES BY MOUTH DAILY 90 capsule 0   • hydroxyurea (HYDREA) 500 MG capsule Take 1 capsule by mouth Daily. 30 capsule 2   • lisinopril (PRINIVIL,ZESTRIL) 20 MG tablet TAKE ONE TABLET BY MOUTH DAILY 30 tablet 0   • nitroglycerin (NITROSTAT) 0.4 MG SL tablet Place 1 tablet under the tongue Every 5 (Five) Minutes As Needed for Chest Pain. Take no more than 3 doses in 15 minutes. 25 tablet 5   • potassium chloride 10 MEQ CR tablet      • traZODone (DESYREL) 50 MG tablet Take 0.5 tablets by mouth Every Night. 30 tablet 3     No current facility-administered medications on file prior to visit.      Allergies   Allergen Reactions   • Sulfa Antibiotics Rash        Review of Systems   Constitutional: Negative.    HENT: Negative.    Eyes: Negative.    Respiratory: Negative.    Cardiovascular: Negative.    Gastrointestinal: Negative.    Endocrine: Negative.    Genitourinary: Negative.    Musculoskeletal: Positive for arthralgias.   Skin: Negative.    Allergic/Immunologic: Negative.    Neurological: Negative.    Hematological: Negative.    Psychiatric/Behavioral: Negative.         The patient's Review of Systems was personally reviewed and confirmed as accurate.    Physical Exam  Blood pressure 180/88,  height 154.9 cm (60.98\"), weight 73.9 kg (163 lb), not currently breastfeeding.    Body mass index is 30.81 kg/m².    GENERAL APPEARANCE: awake, alert, oriented, in no acute distress and well developed, well nourished  LUNGS:  breathing nonlabored  EXTREMITIES: no clubbing, cyanosis  PERIPHERAL PULSES: palpable dorsalis pedis and posterior tibial pulses bilaterally.    GAIT:  Antalgic        ----------  Bilateral Knee Exam:  ----------  ALIGNMENT: severe varus, correctable to neutral  ----------  RANGE OF MOTION:  Decreased (5 - 115 degrees) with no extensor lag  LIGAMENTOUS STABILITY:   stable to varus and valgus stress at terminal extension and 30 degrees; retensioning of the MCL is appreciated with valgus stress at 30 degrees consistent with medial compartment degeneration  ----------  STRENGTH:  KNEE FLEXION 5/5  KNEE EXTENSION  5/5  ANKLE DORSIFLEXION  5/5  ANKLE PLANTARFLEXION  5/5  ----------  PAIN WITH PALPATION:global  KNEE EFFUSION: yes, mild effusion bilateral  PAIN WITH KNEE ROM: yes  PATELLAR CREPITUS:  yes, painful and symptomatic  ----------  SENSATION TO LIGHT TOUCH:  DEEP PERONEAL/SUPERFICIAL PERONEAL/SURAL/SAPHENOUS/TIBIAL:    intact  ----------  EDEMA:  no  ERYTHEMA:    no  WOUNDS/INCISIONS:   no  _____________________________________________________________________  _____________________________________________________________________    RADIOGRAPHIC FINDINGS:   No new imaging today    Assessment/Plan:   Diagnosis Plan   1. Primary osteoarthritis of both knees        2. Class 1 obesity due to excess calories without serious comorbidity with body mass index (BMI) of 30.0 to 30.9 in adult          The patient has failed conservative treatment measures and is a candidate for joint arthroplasty.  I discussed the joint arthroplasty surgical process as well as the recovery and rehabilitation time frame.  The patient asked several questions regarding the joint arthroplasty surgery, which were answered  accordingly.  Ultimately, the patient declines surgical intervention at this time and wishes to continue with conservative treatment measures.  Alternative conservative treatment measures were discussed including bracing, therapy, topical/oral anti-inflammatories, activity modification, and weight loss.  The patient considered these treatment options and wishes to proceed with corticosteroid injection(s) today.  Therefore we will proceed with corticosteroid injection(s) today.  Follow-up as needed.    Patient has an elevated BMI. The patient has been instructed on various weight loss avenues including diet, portion control, calorie restriction, low/no impact exercise, referral to weight loss management and/or bariatric surgery.  It was explained that weight loss can improve joint pain alone by decreasing the joint reaction forces.  For every pound of weight change, the knee and hip joints see a 4 to 5 fold change in pressure.  Given these options, the patient will proceed with low calorie diet and low impact exercise.    Procedure Note:  I discussed with the patient the potential benefits of performing a therapeutic injections of the bilateral knees as well as potential risks including but not limited to infection, swelling, pain, bleeding, bruising, nerve/vessel damage, skin color changes, transient elevation in blood glucose levels, and fat atrophy. After informed consent and verifying correct patient, procedure site, and type of procedure, the areas were prepped with alcohol, ethyl chloride was used to numb the skin. Via the superior lateral approach, 4 cc of 1% lidocaine and 2 cc of 40mg/ml of Kenalog were each injected into the bilateral knees. The patient tolerated the procedures well. There were no complications. A sterile dressing was placed over each injection site.      Keaton Aldrich MD  01/10/23  08:45 EST

## 2023-01-10 NOTE — PROGRESS NOTES
Procedure   - Large Joint Arthrocentesis: bilateral knee on 1/10/2023 8:28 AM  Indications: pain  Details: (23g  ) needle, anterolateral approach  Medications (Right): 4 mL lidocaine PF 1% 1 %; 40 mg triamcinolone acetonide 40 MG/ML  Medications (Left): 4 mL lidocaine PF 1% 1 %; 40 mg triamcinolone acetonide 40 MG/ML  Outcome: tolerated well, no immediate complications  Procedure, treatment alternatives, risks and benefits explained, specific risks discussed. Consent was given by the patient. Immediately prior to procedure a time out was called to verify the correct patient, procedure, equipment, support staff and site/side marked as required. Patient was prepped and draped in the usual sterile fashion.

## 2023-01-15 ENCOUNTER — APPOINTMENT (OUTPATIENT)
Dept: CT IMAGING | Facility: HOSPITAL | Age: 68
End: 2023-01-15
Payer: MEDICARE

## 2023-01-15 ENCOUNTER — HOSPITAL ENCOUNTER (EMERGENCY)
Facility: HOSPITAL | Age: 68
Discharge: HOME | End: 2023-01-15
Attending: EMERGENCY MEDICINE | Admitting: EMERGENCY MEDICINE
Payer: MEDICARE

## 2023-01-15 VITALS
HEIGHT: 61 IN | BODY MASS INDEX: 31.15 KG/M2 | DIASTOLIC BLOOD PRESSURE: 76 MMHG | OXYGEN SATURATION: 94 % | RESPIRATION RATE: 18 BRPM | WEIGHT: 165 LBS | TEMPERATURE: 98.4 F | HEART RATE: 78 BPM | SYSTOLIC BLOOD PRESSURE: 179 MMHG

## 2023-01-15 DIAGNOSIS — N28.9 MILD RENAL INSUFFICIENCY: ICD-10-CM

## 2023-01-15 DIAGNOSIS — R10.10 UPPER ABDOMINAL PAIN: Primary | ICD-10-CM

## 2023-01-15 DIAGNOSIS — R11.2 NAUSEA AND VOMITING, UNSPECIFIED VOMITING TYPE: ICD-10-CM

## 2023-01-15 LAB
ALBUMIN SERPL-MCNC: 4.3 G/DL (ref 3.5–5.2)
ALBUMIN/GLOB SERPL: 1.7 G/DL
ALP SERPL-CCNC: 116 U/L (ref 39–117)
ALT SERPL W P-5'-P-CCNC: 18 U/L (ref 1–33)
ANION GAP SERPL CALCULATED.3IONS-SCNC: 12.9 MMOL/L (ref 5–15)
AST SERPL-CCNC: 23 U/L (ref 1–32)
BACTERIA UR QL AUTO: NORMAL /HPF
BASOPHILS # BLD AUTO: 0.14 10*3/MM3 (ref 0–0.2)
BASOPHILS NFR BLD AUTO: 0.7 % (ref 0–1.5)
BILIRUB SERPL-MCNC: 1.1 MG/DL (ref 0–1.2)
BILIRUB UR QL STRIP: NEGATIVE
BUN SERPL-MCNC: 28 MG/DL (ref 8–23)
BUN/CREAT SERPL: 23.9 (ref 7–25)
CALCIUM SPEC-SCNC: 9.1 MG/DL (ref 8.6–10.5)
CHLORIDE SERPL-SCNC: 101 MMOL/L (ref 98–107)
CLARITY UR: CLEAR
CO2 SERPL-SCNC: 23.1 MMOL/L (ref 22–29)
COLOR UR: YELLOW
CREAT SERPL-MCNC: 1.17 MG/DL (ref 0.57–1)
D-LACTATE SERPL-SCNC: 1.2 MMOL/L (ref 0.5–2)
DEPRECATED RDW RBC AUTO: 46.6 FL (ref 37–54)
EGFRCR SERPLBLD CKD-EPI 2021: 51.6 ML/MIN/1.73
EOSINOPHIL # BLD AUTO: 0.46 10*3/MM3 (ref 0–0.4)
EOSINOPHIL NFR BLD AUTO: 2.3 % (ref 0.3–6.2)
ERYTHROCYTE [DISTWIDTH] IN BLOOD BY AUTOMATED COUNT: 17.9 % (ref 12.3–15.4)
GLOBULIN UR ELPH-MCNC: 2.6 GM/DL
GLUCOSE SERPL-MCNC: 219 MG/DL (ref 65–99)
GLUCOSE UR STRIP-MCNC: NEGATIVE MG/DL
HCT VFR BLD AUTO: 54.1 % (ref 34–46.6)
HGB BLD-MCNC: 17.3 G/DL (ref 12–15.9)
HGB UR QL STRIP.AUTO: NEGATIVE
HOLD SPECIMEN: NORMAL
HOLD SPECIMEN: NORMAL
HYALINE CASTS UR QL AUTO: NORMAL /LPF
IMM GRANULOCYTES # BLD AUTO: 0.25 10*3/MM3 (ref 0–0.05)
IMM GRANULOCYTES NFR BLD AUTO: 1.3 % (ref 0–0.5)
KETONES UR QL STRIP: NEGATIVE
LEUKOCYTE ESTERASE UR QL STRIP.AUTO: NEGATIVE
LIPASE SERPL-CCNC: 60 U/L (ref 13–60)
LYMPHOCYTES # BLD AUTO: 0.56 10*3/MM3 (ref 0.7–3.1)
LYMPHOCYTES NFR BLD AUTO: 2.8 % (ref 19.6–45.3)
MCH RBC QN AUTO: 25.3 PG (ref 26.6–33)
MCHC RBC AUTO-ENTMCNC: 32 G/DL (ref 31.5–35.7)
MCV RBC AUTO: 79.1 FL (ref 79–97)
MONOCYTES # BLD AUTO: 1.75 10*3/MM3 (ref 0.1–0.9)
MONOCYTES NFR BLD AUTO: 8.8 % (ref 5–12)
NEUTROPHILS NFR BLD AUTO: 16.63 10*3/MM3 (ref 1.7–7)
NEUTROPHILS NFR BLD AUTO: 84.1 % (ref 42.7–76)
NITRITE UR QL STRIP: NEGATIVE
NRBC BLD AUTO-RTO: 0.2 /100 WBC (ref 0–0.2)
PH UR STRIP.AUTO: 6.5 [PH] (ref 5–8)
PLATELET # BLD AUTO: 805 10*3/MM3 (ref 140–450)
PMV BLD AUTO: 9.7 FL (ref 6–12)
POTASSIUM SERPL-SCNC: 3.9 MMOL/L (ref 3.5–5.2)
PROT SERPL-MCNC: 6.9 G/DL (ref 6–8.5)
PROT UR QL STRIP: ABNORMAL
RBC # BLD AUTO: 6.84 10*6/MM3 (ref 3.77–5.28)
RBC # UR STRIP: NORMAL /HPF
REF LAB TEST METHOD: NORMAL
SODIUM SERPL-SCNC: 137 MMOL/L (ref 136–145)
SP GR UR STRIP: 1.01 (ref 1–1.03)
SQUAMOUS #/AREA URNS HPF: NORMAL /HPF
TROPONIN T SERPL-MCNC: <0.01 NG/ML (ref 0–0.03)
UROBILINOGEN UR QL STRIP: ABNORMAL
WBC # UR STRIP: NORMAL /HPF
WBC NRBC COR # BLD: 19.79 10*3/MM3 (ref 3.4–10.8)
WHOLE BLOOD HOLD COAG: NORMAL
WHOLE BLOOD HOLD SPECIMEN: NORMAL

## 2023-01-15 PROCEDURE — 74176 CT ABD & PELVIS W/O CONTRAST: CPT

## 2023-01-15 PROCEDURE — 96375 TX/PRO/DX INJ NEW DRUG ADDON: CPT

## 2023-01-15 PROCEDURE — 85025 COMPLETE CBC W/AUTO DIFF WBC: CPT | Performed by: EMERGENCY MEDICINE

## 2023-01-15 PROCEDURE — 25010000002 KETOROLAC TROMETHAMINE PER 15 MG: Performed by: EMERGENCY MEDICINE

## 2023-01-15 PROCEDURE — 96361 HYDRATE IV INFUSION ADD-ON: CPT

## 2023-01-15 PROCEDURE — 93005 ELECTROCARDIOGRAM TRACING: CPT | Performed by: EMERGENCY MEDICINE

## 2023-01-15 PROCEDURE — 25010000002 ONDANSETRON PER 1 MG: Performed by: EMERGENCY MEDICINE

## 2023-01-15 PROCEDURE — 83690 ASSAY OF LIPASE: CPT | Performed by: EMERGENCY MEDICINE

## 2023-01-15 PROCEDURE — 96374 THER/PROPH/DIAG INJ IV PUSH: CPT

## 2023-01-15 PROCEDURE — 99284 EMERGENCY DEPT VISIT MOD MDM: CPT

## 2023-01-15 PROCEDURE — 25010000002 MORPHINE PER 10 MG: Performed by: EMERGENCY MEDICINE

## 2023-01-15 PROCEDURE — 83605 ASSAY OF LACTIC ACID: CPT | Performed by: EMERGENCY MEDICINE

## 2023-01-15 PROCEDURE — 80053 COMPREHEN METABOLIC PANEL: CPT | Performed by: EMERGENCY MEDICINE

## 2023-01-15 PROCEDURE — 81001 URINALYSIS AUTO W/SCOPE: CPT | Performed by: EMERGENCY MEDICINE

## 2023-01-15 PROCEDURE — 84484 ASSAY OF TROPONIN QUANT: CPT | Performed by: EMERGENCY MEDICINE

## 2023-01-15 RX ORDER — ONDANSETRON 2 MG/ML
4 INJECTION INTRAMUSCULAR; INTRAVENOUS ONCE
Status: COMPLETED | OUTPATIENT
Start: 2023-01-15 | End: 2023-01-15

## 2023-01-15 RX ORDER — ONDANSETRON 4 MG/1
4 TABLET, ORALLY DISINTEGRATING ORAL EVERY 8 HOURS PRN
Qty: 12 TABLET | Refills: 0 | Status: SHIPPED | OUTPATIENT
Start: 2023-01-15

## 2023-01-15 RX ORDER — DICYCLOMINE HCL 20 MG
20 TABLET ORAL EVERY 6 HOURS
Qty: 20 TABLET | Refills: 0 | Status: SHIPPED | OUTPATIENT
Start: 2023-01-15

## 2023-01-15 RX ORDER — SODIUM CHLORIDE 0.9 % (FLUSH) 0.9 %
10 SYRINGE (ML) INJECTION AS NEEDED
Status: DISCONTINUED | OUTPATIENT
Start: 2023-01-15 | End: 2023-01-15 | Stop reason: HOSPADM

## 2023-01-15 RX ORDER — KETOROLAC TROMETHAMINE 30 MG/ML
15 INJECTION, SOLUTION INTRAMUSCULAR; INTRAVENOUS EVERY 6 HOURS PRN
Status: DISCONTINUED | OUTPATIENT
Start: 2023-01-15 | End: 2023-01-15 | Stop reason: HOSPADM

## 2023-01-15 RX ADMIN — SODIUM CHLORIDE 1000 ML: 9 INJECTION, SOLUTION INTRAVENOUS at 03:24

## 2023-01-15 RX ADMIN — MORPHINE SULFATE 4 MG: 4 INJECTION, SOLUTION INTRAMUSCULAR; INTRAVENOUS at 03:23

## 2023-01-15 RX ADMIN — ONDANSETRON 4 MG: 2 INJECTION INTRAMUSCULAR; INTRAVENOUS at 03:24

## 2023-01-15 RX ADMIN — KETOROLAC TROMETHAMINE 15 MG: 30 INJECTION, SOLUTION INTRAMUSCULAR; INTRAVENOUS at 05:45

## 2023-02-27 DIAGNOSIS — F41.9 ANXIETY: ICD-10-CM

## 2023-02-27 DIAGNOSIS — I10 ESSENTIAL HYPERTENSION: Chronic | ICD-10-CM

## 2023-02-27 DIAGNOSIS — F33.41 RECURRENT MAJOR DEPRESSIVE DISORDER, IN PARTIAL REMISSION: ICD-10-CM

## 2023-02-28 RX ORDER — LISINOPRIL 20 MG/1
TABLET ORAL
Qty: 30 TABLET | Refills: 0 | Status: SHIPPED | OUTPATIENT
Start: 2023-02-28

## 2023-02-28 RX ORDER — FLUOXETINE HYDROCHLORIDE 20 MG/1
CAPSULE ORAL
Qty: 90 CAPSULE | Refills: 0 | Status: SHIPPED | OUTPATIENT
Start: 2023-02-28 | End: 2023-03-27

## 2023-03-27 DIAGNOSIS — F33.41 RECURRENT MAJOR DEPRESSIVE DISORDER, IN PARTIAL REMISSION: ICD-10-CM

## 2023-03-27 DIAGNOSIS — F41.9 ANXIETY: ICD-10-CM

## 2023-03-27 RX ORDER — FLUOXETINE HYDROCHLORIDE 20 MG/1
CAPSULE ORAL
Qty: 90 CAPSULE | Refills: 0 | Status: SHIPPED | OUTPATIENT
Start: 2023-03-27

## 2023-03-30 ENCOUNTER — TELEPHONE (OUTPATIENT)
Dept: ORTHOPEDIC SURGERY | Facility: CLINIC | Age: 68
End: 2023-03-30

## 2023-03-30 NOTE — TELEPHONE ENCOUNTER
Caller: ELVA BENITEZ    Relationship to patient: SELF    Best call back number: [phone]    Chief complaint: BILATERAL KNEE PAIN    Type of visit: INJECTIONS    Requested date: ASAP

## 2023-04-07 ENCOUNTER — OFFICE VISIT (OUTPATIENT)
Dept: ORTHOPEDIC SURGERY | Facility: CLINIC | Age: 68
End: 2023-04-07
Payer: MEDICARE

## 2023-04-07 VITALS
HEIGHT: 61 IN | WEIGHT: 165 LBS | SYSTOLIC BLOOD PRESSURE: 160 MMHG | DIASTOLIC BLOOD PRESSURE: 85 MMHG | BODY MASS INDEX: 31.15 KG/M2

## 2023-04-07 DIAGNOSIS — E66.09 CLASS 1 OBESITY DUE TO EXCESS CALORIES WITHOUT SERIOUS COMORBIDITY WITH BODY MASS INDEX (BMI) OF 31.0 TO 31.9 IN ADULT: ICD-10-CM

## 2023-04-07 DIAGNOSIS — M17.0 PRIMARY OSTEOARTHRITIS OF BOTH KNEES: Primary | ICD-10-CM

## 2023-04-07 PROCEDURE — 1159F MED LIST DOCD IN RCRD: CPT | Performed by: ORTHOPAEDIC SURGERY

## 2023-04-07 PROCEDURE — 3079F DIAST BP 80-89 MM HG: CPT | Performed by: ORTHOPAEDIC SURGERY

## 2023-04-07 PROCEDURE — 1160F RVW MEDS BY RX/DR IN RCRD: CPT | Performed by: ORTHOPAEDIC SURGERY

## 2023-04-07 PROCEDURE — 99214 OFFICE O/P EST MOD 30 MIN: CPT | Performed by: ORTHOPAEDIC SURGERY

## 2023-04-07 PROCEDURE — 3077F SYST BP >= 140 MM HG: CPT | Performed by: ORTHOPAEDIC SURGERY

## 2023-04-07 PROCEDURE — 20610 DRAIN/INJ JOINT/BURSA W/O US: CPT | Performed by: ORTHOPAEDIC SURGERY

## 2023-04-07 RX ORDER — TRIAMCINOLONE ACETONIDE 40 MG/ML
80 INJECTION, SUSPENSION INTRA-ARTICULAR; INTRAMUSCULAR
Status: COMPLETED | OUTPATIENT
Start: 2023-04-07 | End: 2023-04-07

## 2023-04-07 RX ORDER — LIDOCAINE HYDROCHLORIDE 10 MG/ML
3 INJECTION, SOLUTION EPIDURAL; INFILTRATION; INTRACAUDAL; PERINEURAL
Status: COMPLETED | OUTPATIENT
Start: 2023-04-07 | End: 2023-04-07

## 2023-04-07 RX ORDER — BUPIVACAINE HYDROCHLORIDE 2.5 MG/ML
3 INJECTION, SOLUTION EPIDURAL; INFILTRATION; INTRACAUDAL
Status: COMPLETED | OUTPATIENT
Start: 2023-04-07 | End: 2023-04-07

## 2023-04-07 RX ORDER — AMOXICILLIN 875 MG/1
TABLET, COATED ORAL
COMMUNITY
Start: 2023-04-06

## 2023-04-07 RX ADMIN — BUPIVACAINE HYDROCHLORIDE 3 ML: 2.5 INJECTION, SOLUTION EPIDURAL; INFILTRATION; INTRACAUDAL at 07:35

## 2023-04-07 RX ADMIN — TRIAMCINOLONE ACETONIDE 80 MG: 40 INJECTION, SUSPENSION INTRA-ARTICULAR; INTRAMUSCULAR at 07:35

## 2023-04-07 RX ADMIN — LIDOCAINE HYDROCHLORIDE 3 ML: 10 INJECTION, SOLUTION EPIDURAL; INFILTRATION; INTRACAUDAL; PERINEURAL at 07:35

## 2023-04-07 NOTE — PROGRESS NOTES
Orthopaedic Clinic Note: Knee Established Patient    Chief Complaint   Patient presents with   • Follow-up     3 month f/u-- Primary osteoarthritis of both knees        HPI    It has been 3  month(s) since Ms. Murray's last visit. She returns to clinic today for follow-up osteoarthritis.  Patient injections in both knees 3 months ago.  Injections provided 2 months of relief.  Pain is gradually returned.  She rates her pain 8/10 on the pain scale today.  Over the past month she has developed progressive worsening of her knee pain.  She pain is worse with walking weightbearing activities.  She is having increased swelling and difficulty with movement of the joints due to pain.  She is overweight with a BMI 31.19.  Overall she is doing worse.    Past Medical History:   Diagnosis Date   • HAMMAD (acute kidney injury) 2021   • Allergic    • Anemia    • Depression    • Diabetes mellitus    • History of heart attack    • Hyperlipidemia    • Hypertension       Past Surgical History:   Procedure Laterality Date   • BILATERAL BREAST REDUCTION     • BONE MARROW ASPIRATION     • CATARACT EXTRACTION Bilateral    • HYSTERECTOMY  1990    x 2   • REDUCTION MAMMAPLASTY Bilateral     MORE THAN 20 YEARS AGO   • SINUS SURGERY     • TONSILLECTOMY        Family History   Problem Relation Age of Onset   • Arthritis Maternal Grandmother    • Hypertension Mother    • Hyperlipidemia Mother    • Thyroid disease Mother    • Diabetes Sister    • Crohn's disease Sister    • Hypertension Sister    • Heart attack Maternal Grandfather    • Breast cancer Neg Hx    • Ovarian cancer Neg Hx      Social History     Socioeconomic History   • Marital status: Single   Tobacco Use   • Smoking status: Former     Packs/day: 2.00     Years: 30.00     Pack years: 60.00     Types: Cigarettes     Quit date:      Years since quittin.2   • Smokeless tobacco: Never   Vaping Use   • Vaping Use: Never used   Substance and Sexual Activity   • Alcohol use:  No   • Drug use: No   • Sexual activity: Defer      Current Outpatient Medications on File Prior to Visit   Medication Sig Dispense Refill   • albuterol sulfate  (90 Base) MCG/ACT inhaler Inhale 2 puffs Every 4 (Four) Hours As Needed for Wheezing. 8 g 12   • amLODIPine (NORVASC) 10 MG tablet TAKE ONE TABLET BY MOUTH DAILY 30 tablet 5   • amoxicillin (AMOXIL) 875 MG tablet      • aspirin (aspirin) 81 MG EC tablet Take 1 tablet by mouth Daily. 30 tablet 5   • atorvastatin (LIPITOR) 80 MG tablet TAKE ONE TABLET BY MOUTH DAILY 30 tablet 5   • budesonide-formoterol (Symbicort) 80-4.5 MCG/ACT inhaler Inhale 2 puffs 2 (Two) Times a Day. 10.2 g 12   • carvedilol (COREG) 25 MG tablet TAKE ONE TABLET BY MOUTH TWICE A DAY WITH MEALS 60 tablet 0   • dicyclomine (BENTYL) 20 MG tablet Take 1 tablet by mouth Every 6 (Six) Hours. 20 tablet 0   • FLUoxetine (PROzac) 20 MG capsule TAKE THREE CAPSULES BY MOUTH DAILY 90 capsule 0   • hydroxyurea (HYDREA) 500 MG capsule Take 1 capsule by mouth Daily. 30 capsule 2   • lisinopril (PRINIVIL,ZESTRIL) 20 MG tablet TAKE ONE TABLET BY MOUTH DAILY 30 tablet 0   • nitroglycerin (NITROSTAT) 0.4 MG SL tablet Place 1 tablet under the tongue Every 5 (Five) Minutes As Needed for Chest Pain. Take no more than 3 doses in 15 minutes. 25 tablet 5   • ondansetron ODT (ZOFRAN-ODT) 4 MG disintegrating tablet Place 1 tablet on the tongue Every 8 (Eight) Hours As Needed for Nausea. 12 tablet 0   • potassium chloride 10 MEQ CR tablet      • traZODone (DESYREL) 50 MG tablet Take 0.5 tablets by mouth Every Night. 30 tablet 3     No current facility-administered medications on file prior to visit.      Allergies   Allergen Reactions   • Sulfa Antibiotics Rash        Review of Systems   Constitutional: Negative.    HENT: Negative.    Eyes: Negative.    Respiratory: Negative.    Cardiovascular: Negative.    Gastrointestinal: Negative.    Endocrine: Negative.    Genitourinary: Negative.    Musculoskeletal:  "Positive for arthralgias.   Skin: Negative.    Allergic/Immunologic: Negative.    Neurological: Negative.    Hematological: Negative.    Psychiatric/Behavioral: Negative.         The patient's Review of Systems was personally reviewed and confirmed as accurate.    Physical Exam  Blood pressure 160/85, height 154.9 cm (60.98\"), weight 74.8 kg (165 lb), not currently breastfeeding.    Body mass index is 31.19 kg/m².    GENERAL APPEARANCE: awake, alert, oriented, in no acute distress and well developed, well nourished  LUNGS:  breathing nonlabored  EXTREMITIES: no clubbing, cyanosis  PERIPHERAL PULSES: palpable dorsalis pedis and posterior tibial pulses bilaterally.    GAIT:  Antalgic        ----------  Bilateral Knee Exam:  ----------  ALIGNMENT: severe varus, correctable to neutral  ----------  RANGE OF MOTION:  Decreased (5 - 115 degrees) with no extensor lag  LIGAMENTOUS STABILITY:   stable to varus and valgus stress at terminal extension and 30 degrees; retensioning of the MCL is appreciated with valgus stress at 30 degrees consistent with medial compartment degeneration  ----------  STRENGTH:  KNEE FLEXION 5/5  KNEE EXTENSION  5/5  ANKLE DORSIFLEXION  5/5  ANKLE PLANTARFLEXION  5/5  ----------  PAIN WITH PALPATION:global  KNEE EFFUSION: yes, trace effusion bilateral  PAIN WITH KNEE ROM: yes  PATELLAR CREPITUS:  yes, painful and symptomatic  ----------  SENSATION TO LIGHT TOUCH:  DEEP PERONEAL/SUPERFICIAL PERONEAL/SURAL/SAPHENOUS/TIBIAL:    intact  ----------  EDEMA:  no  ERYTHEMA:    no  WOUNDS/INCISIONS:   no  _____________________________________________________________________  _____________________________________________________________________    RADIOGRAPHIC FINDINGS:   Indication: Bilateral knee pain    Comparison: Todays xrays were compared to previous xrays from 8/18/2022    Knee films: moderate to severe tricompartmental arthritis with genu varum alignment and bone-on-bone articulation medial " compartment, periarticular osteophytes visualized in all compartments and No significant changes compared to prior radiographs.    Assessment/Plan:   Diagnosis Plan   1. Primary osteoarthritis of both knees  XR Knee 4+ View Bilateral      2. Class 1 obesity due to excess calories without serious comorbidity with body mass index (BMI) of 31.0 to 31.9 in adult          The patient has failed conservative treatment measures and is a candidate for joint arthroplasty.  I discussed the joint arthroplasty surgical process as well as the recovery and rehabilitation time frame.  The patient asked several questions regarding the joint arthroplasty surgery, which were answered accordingly.  Ultimately, the patient declines surgical intervention at this time and wishes to continue with conservative treatment measures.  Alternative conservative treatment measures were discussed including bracing, therapy, topical/oral anti-inflammatories, activity modification, and weight loss.  The patient considered these treatment options and wishes to proceed with corticosteroid injection(s) today.  Therefore we will proceed with corticosteroid injection(s) today.  Follow-up 3 months for repeat evaluation.    Patient has an elevated BMI. The patient has been instructed on various weight loss avenues including diet, portion control, calorie restriction, low/no impact exercise, referral to weight loss management and/or bariatric surgery.  It was explained that weight loss can improve joint pain alone by decreasing the joint reaction forces.  For every pound of weight change, the knee and hip joints see a 4 to 5 fold change in pressure.  Given these options, the patient will proceed with low calorie diet and low impact exercise.    Procedure Note:  I discussed with the patient the potential benefits of performing a therapeutic injections of the bilateral knees as well as potential risks including but not limited to infection, swelling, pain,  bleeding, bruising, nerve/vessel damage, skin color changes, transient elevation in blood glucose levels, and fat atrophy. After informed consent and verifying correct patient, procedure site, and type of procedure, the areas were prepped with alcohol, ethyl chloride was used to numb the skin. Via the superolateral approach, 3 cc of 1% lidocaine, 3 cc of 0.25% Marcaine and 2 cc of 40mg/ml of Kenalog were each injected into the bilateral knees. The patient tolerated the procedures well. There were no complications. A sterile dressing was placed over each injection site.      Keaton Aldrich MD  04/07/23  07:36 EDT

## 2023-04-07 NOTE — PROGRESS NOTES
Procedure   Large Joint Arthrocentesis: R knee  Date/Time: 4/7/2023 7:35 AM  Consent given by: patient  Site marked: site marked  Timeout: Immediately prior to procedure a time out was called to verify the correct patient, procedure, equipment, support staff and site/side marked as required   Supporting Documentation  Indications: pain   Procedure Details  Location: knee - R knee  Preparation: Patient was prepped and draped in the usual sterile fashion  Needle size: 22 G  Approach: anterolateral  Medications administered: 80 mg triamcinolone acetonide 40 MG/ML; 3 mL bupivacaine (PF) 0.25 %; 3 mL lidocaine PF 1% 1 %  Patient tolerance: patient tolerated the procedure well with no immediate complications    Large Joint Arthrocentesis: L knee  Date/Time: 4/7/2023 7:35 AM  Consent given by: patient  Site marked: site marked  Timeout: Immediately prior to procedure a time out was called to verify the correct patient, procedure, equipment, support staff and site/side marked as required   Supporting Documentation  Indications: pain   Procedure Details  Location: knee - L knee  Preparation: Patient was prepped and draped in the usual sterile fashion  Needle size: 22 G  Approach: anterolateral  Medications administered: 80 mg triamcinolone acetonide 40 MG/ML; 3 mL bupivacaine (PF) 0.25 %; 3 mL lidocaine PF 1% 1 %  Patient tolerance: patient tolerated the procedure well with no immediate complications

## 2023-04-27 DIAGNOSIS — F41.9 ANXIETY: ICD-10-CM

## 2023-04-27 DIAGNOSIS — F33.41 RECURRENT MAJOR DEPRESSIVE DISORDER, IN PARTIAL REMISSION: ICD-10-CM

## 2023-04-27 RX ORDER — FLUOXETINE HYDROCHLORIDE 20 MG/1
CAPSULE ORAL
Qty: 90 CAPSULE | Refills: 0 | Status: SHIPPED | OUTPATIENT
Start: 2023-04-27

## 2023-05-28 DIAGNOSIS — I10 ESSENTIAL HYPERTENSION: ICD-10-CM

## 2023-05-30 RX ORDER — AMLODIPINE BESYLATE 10 MG/1
TABLET ORAL
Qty: 30 TABLET | Refills: 5 | Status: SHIPPED | OUTPATIENT
Start: 2023-05-30

## 2023-07-20 ENCOUNTER — APPOINTMENT (OUTPATIENT)
Dept: MRI IMAGING | Facility: HOSPITAL | Age: 68
End: 2023-07-20
Payer: MEDICARE

## 2023-07-20 ENCOUNTER — APPOINTMENT (OUTPATIENT)
Dept: CT IMAGING | Facility: HOSPITAL | Age: 68
End: 2023-07-20
Payer: MEDICARE

## 2023-07-20 ENCOUNTER — APPOINTMENT (OUTPATIENT)
Dept: ULTRASOUND IMAGING | Facility: HOSPITAL | Age: 68
End: 2023-07-20
Payer: MEDICARE

## 2023-07-20 ENCOUNTER — HOSPITAL ENCOUNTER (EMERGENCY)
Facility: HOSPITAL | Age: 68
Discharge: HOME | End: 2023-07-20
Attending: EMERGENCY MEDICINE | Admitting: EMERGENCY MEDICINE
Payer: MEDICARE

## 2023-07-20 VITALS
SYSTOLIC BLOOD PRESSURE: 169 MMHG | DIASTOLIC BLOOD PRESSURE: 83 MMHG | TEMPERATURE: 98.3 F | BODY MASS INDEX: 31.91 KG/M2 | HEIGHT: 61 IN | OXYGEN SATURATION: 97 % | HEART RATE: 56 BPM | RESPIRATION RATE: 18 BRPM | WEIGHT: 169 LBS

## 2023-07-20 DIAGNOSIS — G44.89 OTHER HEADACHE SYNDROME: Primary | ICD-10-CM

## 2023-07-20 LAB
ALBUMIN SERPL-MCNC: 4.1 G/DL (ref 3.5–5.2)
ALBUMIN/GLOB SERPL: 2 G/DL
ALP SERPL-CCNC: 88 U/L (ref 39–117)
ALT SERPL W P-5'-P-CCNC: 20 U/L (ref 1–33)
ANION GAP SERPL CALCULATED.3IONS-SCNC: 13.2 MMOL/L (ref 5–15)
AST SERPL-CCNC: 24 U/L (ref 1–32)
BASOPHILS # BLD AUTO: 0.16 10*3/MM3 (ref 0–0.2)
BASOPHILS NFR BLD AUTO: 1.5 % (ref 0–1.5)
BILIRUB SERPL-MCNC: 1.1 MG/DL (ref 0–1.2)
BUN SERPL-MCNC: 12 MG/DL (ref 8–23)
BUN/CREAT SERPL: 9.3 (ref 7–25)
CALCIUM SPEC-SCNC: 8.7 MG/DL (ref 8.6–10.5)
CHLORIDE SERPL-SCNC: 106 MMOL/L (ref 98–107)
CO2 SERPL-SCNC: 21.8 MMOL/L (ref 22–29)
CREAT SERPL-MCNC: 1.29 MG/DL (ref 0.57–1)
DEPRECATED RDW RBC AUTO: 49.8 FL (ref 37–54)
EGFRCR SERPLBLD CKD-EPI 2021: 45.6 ML/MIN/1.73
EOSINOPHIL # BLD AUTO: 0.57 10*3/MM3 (ref 0–0.4)
EOSINOPHIL NFR BLD AUTO: 5.2 % (ref 0.3–6.2)
ERYTHROCYTE [DISTWIDTH] IN BLOOD BY AUTOMATED COUNT: 18.2 % (ref 12.3–15.4)
GLOBULIN UR ELPH-MCNC: 2.1 GM/DL
GLUCOSE SERPL-MCNC: 83 MG/DL (ref 65–99)
HCT VFR BLD AUTO: 53.4 % (ref 34–46.6)
HGB BLD-MCNC: 16.9 G/DL (ref 12–15.9)
IMM GRANULOCYTES # BLD AUTO: 0.06 10*3/MM3 (ref 0–0.05)
IMM GRANULOCYTES NFR BLD AUTO: 0.6 % (ref 0–0.5)
IRON 24H UR-MRATE: 67 MCG/DL (ref 37–145)
IRON SATN MFR SERPL: 17 % (ref 20–50)
LYMPHOCYTES # BLD AUTO: 1.24 10*3/MM3 (ref 0.7–3.1)
LYMPHOCYTES NFR BLD AUTO: 11.4 % (ref 19.6–45.3)
MAGNESIUM SERPL-MCNC: 1.9 MG/DL (ref 1.6–2.4)
MCH RBC QN AUTO: 26.2 PG (ref 26.6–33)
MCHC RBC AUTO-ENTMCNC: 31.6 G/DL (ref 31.5–35.7)
MCV RBC AUTO: 82.8 FL (ref 79–97)
MONOCYTES # BLD AUTO: 0.7 10*3/MM3 (ref 0.1–0.9)
MONOCYTES NFR BLD AUTO: 6.4 % (ref 5–12)
NEUTROPHILS NFR BLD AUTO: 74.9 % (ref 42.7–76)
NEUTROPHILS NFR BLD AUTO: 8.14 10*3/MM3 (ref 1.7–7)
NRBC BLD AUTO-RTO: 0 /100 WBC (ref 0–0.2)
PLATELET # BLD AUTO: 721 10*3/MM3 (ref 140–450)
PMV BLD AUTO: 9.4 FL (ref 6–12)
POTASSIUM SERPL-SCNC: 3.9 MMOL/L (ref 3.5–5.2)
PROT SERPL-MCNC: 6.2 G/DL (ref 6–8.5)
RBC # BLD AUTO: 6.45 10*6/MM3 (ref 3.77–5.28)
SODIUM SERPL-SCNC: 141 MMOL/L (ref 136–145)
T4 FREE SERPL-MCNC: 1.35 NG/DL (ref 0.93–1.7)
TIBC SERPL-MCNC: 383 MCG/DL (ref 298–536)
TRANSFERRIN SERPL-MCNC: 257 MG/DL (ref 200–360)
TSH SERPL DL<=0.05 MIU/L-ACNC: 1.41 UIU/ML (ref 0.27–4.2)
WBC NRBC COR # BLD: 10.87 10*3/MM3 (ref 3.4–10.8)

## 2023-07-20 PROCEDURE — 96374 THER/PROPH/DIAG INJ IV PUSH: CPT

## 2023-07-20 PROCEDURE — 93970 EXTREMITY STUDY: CPT

## 2023-07-20 PROCEDURE — 96375 TX/PRO/DX INJ NEW DRUG ADDON: CPT

## 2023-07-20 PROCEDURE — 93005 ELECTROCARDIOGRAM TRACING: CPT | Performed by: PHYSICIAN ASSISTANT

## 2023-07-20 PROCEDURE — 84443 ASSAY THYROID STIM HORMONE: CPT | Performed by: PHYSICIAN ASSISTANT

## 2023-07-20 PROCEDURE — 84439 ASSAY OF FREE THYROXINE: CPT | Performed by: PHYSICIAN ASSISTANT

## 2023-07-20 PROCEDURE — 25010000002 KETOROLAC TROMETHAMINE PER 15 MG: Performed by: PHYSICIAN ASSISTANT

## 2023-07-20 PROCEDURE — 36415 COLL VENOUS BLD VENIPUNCTURE: CPT

## 2023-07-20 PROCEDURE — 25010000002 METOCLOPRAMIDE PER 10 MG: Performed by: PHYSICIAN ASSISTANT

## 2023-07-20 PROCEDURE — 70551 MRI BRAIN STEM W/O DYE: CPT

## 2023-07-20 PROCEDURE — 96361 HYDRATE IV INFUSION ADD-ON: CPT

## 2023-07-20 PROCEDURE — 70450 CT HEAD/BRAIN W/O DYE: CPT

## 2023-07-20 PROCEDURE — 83540 ASSAY OF IRON: CPT | Performed by: EMERGENCY MEDICINE

## 2023-07-20 PROCEDURE — 83735 ASSAY OF MAGNESIUM: CPT | Performed by: PHYSICIAN ASSISTANT

## 2023-07-20 PROCEDURE — 84466 ASSAY OF TRANSFERRIN: CPT | Performed by: EMERGENCY MEDICINE

## 2023-07-20 PROCEDURE — 85025 COMPLETE CBC W/AUTO DIFF WBC: CPT | Performed by: PHYSICIAN ASSISTANT

## 2023-07-20 PROCEDURE — 80053 COMPREHEN METABOLIC PANEL: CPT | Performed by: PHYSICIAN ASSISTANT

## 2023-07-20 PROCEDURE — 99283 EMERGENCY DEPT VISIT LOW MDM: CPT

## 2023-07-20 RX ORDER — METOCLOPRAMIDE HYDROCHLORIDE 5 MG/ML
10 INJECTION INTRAMUSCULAR; INTRAVENOUS ONCE
Status: COMPLETED | OUTPATIENT
Start: 2023-07-20 | End: 2023-07-20

## 2023-07-20 RX ORDER — KETOROLAC TROMETHAMINE 30 MG/ML
15 INJECTION, SOLUTION INTRAMUSCULAR; INTRAVENOUS ONCE
Status: COMPLETED | OUTPATIENT
Start: 2023-07-20 | End: 2023-07-20

## 2023-07-20 RX ADMIN — METOCLOPRAMIDE HYDROCHLORIDE 10 MG: 5 INJECTION INTRAMUSCULAR; INTRAVENOUS at 10:13

## 2023-07-20 RX ADMIN — KETOROLAC TROMETHAMINE 15 MG: 30 INJECTION, SOLUTION INTRAMUSCULAR; INTRAVENOUS at 10:13

## 2023-07-20 RX ADMIN — SODIUM CHLORIDE 500 ML: 9 INJECTION, SOLUTION INTRAVENOUS at 10:13

## 2023-07-20 NOTE — ED PROVIDER NOTES
Subjective   History of Present Illness  Chief Complaint: Headache, calf pain and swelling, intermittent tingling left hand  History of Present Illness: This is a 68-year-old female who has had chronic headaches in the past presents with an increased headache over the past month, also notes some intermittent tingling to the left second third and fourth fingertips that has since resolved, also notes some pain and swelling to the bilateral calf since this morning, no recent travel recent surgery but says she is very sedentary.  Headache is located left side of her head, states this is very similar to her prior headaches, but has been ongoing, denies any vision changes, no nausea or vomiting, is having no numbness or tingling to the upper or lower extremities at this time.  Patient also states she has had a craving to eat ice over the past few months but has not had her iron checked.    Nurses Notes reviewed and agree, including vitals, allergies, social history and prior medical history.    REVIEW OF SYSTEMS: All systems reviewed and not pertinent unless noted.    Positive for: Headache, paresthesias, bilateral calf pain    Negative for: All other review of systems reviewed negative unspecified  Review of Systems    Past Medical History:   Diagnosis Date    HAMMAD (acute kidney injury) 7/1/2021    Allergic     Anemia     Depression     Diabetes mellitus     History of heart attack     Hyperlipidemia     Hypertension        Allergies   Allergen Reactions    Sulfa Antibiotics Rash       Past Surgical History:   Procedure Laterality Date    BILATERAL BREAST REDUCTION      BONE MARROW ASPIRATION      CATARACT EXTRACTION Bilateral     HYSTERECTOMY  1990    x 2    REDUCTION MAMMAPLASTY Bilateral     MORE THAN 20 YEARS AGO    SINUS SURGERY  2003    TONSILLECTOMY         Family History   Problem Relation Age of Onset    Arthritis Maternal Grandmother     Hypertension Mother     Hyperlipidemia Mother     Thyroid disease Mother      Diabetes Sister     Crohn's disease Sister     Hypertension Sister     Heart attack Maternal Grandfather     Breast cancer Neg Hx     Ovarian cancer Neg Hx        Social History     Socioeconomic History    Marital status: Single   Tobacco Use    Smoking status: Former     Packs/day: 2.00     Years: 30.00     Pack years: 60.00     Types: Cigarettes     Quit date:      Years since quittin.5    Smokeless tobacco: Never   Vaping Use    Vaping Use: Never used   Substance and Sexual Activity    Alcohol use: No    Drug use: No    Sexual activity: Defer           Objective   Physical Exam  Constitutional:       Appearance: Normal appearance.   HENT:      Head: Normocephalic and atraumatic.   Eyes:      Extraocular Movements: Extraocular movements intact.      Conjunctiva/sclera: Conjunctivae normal.      Pupils: Pupils are equal, round, and reactive to light.   Cardiovascular:      Rate and Rhythm: Normal rate and regular rhythm.      Pulses: Normal pulses.      Heart sounds: Normal heart sounds.   Pulmonary:      Effort: Pulmonary effort is normal.      Breath sounds: Normal breath sounds.   Abdominal:      General: Abdomen is flat. Bowel sounds are normal.      Palpations: Abdomen is soft.   Musculoskeletal:         General: Normal range of motion.      Cervical back: Neck supple.   Skin:     General: Skin is warm and dry.      Capillary Refill: Capillary refill takes less than 2 seconds.   Neurological:      General: No focal deficit present.      Mental Status: She is alert and oriented to person, place, and time.      Cranial Nerves: No cranial nerve deficit.      Sensory: No sensory deficit.      Motor: No weakness.      Coordination: Coordination normal.      Gait: Gait normal.      Deep Tendon Reflexes: Reflexes normal.   Psychiatric:         Mood and Affect: Mood normal.         Behavior: Behavior normal.         Thought Content: Thought content normal.         Judgment: Judgment normal.        Procedures           ED Course  ED Course as of 07/20/23 1358   Thu Jul 20, 2023   1155 EKG interpreted by me.  Normal sinus rhythm.  Rate of 57.  Intervals appropriate.  No obvious ST segment or T wave changes.  Normal EKG [CG]   1340 Iron Profile [CC]      ED Course User Index  [CC] Grzegorz Nicole PA  [CG] Jose M Alvarado, DO                                           Medical Decision Making  Initial impression of presenting illness: 68-year-old female presents with a left-sided headache along with intermittent left hand and arm tingling along with a squeezing pain to her bilateral calfs that has been going on for a month of worsening symptoms, but says she has had these intermittent episodes for the past few years is unable to get in with her primary care.  Tingling started yesterday that resolved this morning, but still has a left-sided headache.    DDX includes but is not limited to complex migraine, acute CVA, lecture abnormalities, DVT    Patient arrives dynamically stable, afebrile with no respiratory distress, with vitals interpreted by me. Pertinent features from physical exam: Unremarkable, no neurodeficits.    Initial diagnostic plan: Laboratory work-up, iron studies, electrolytes, CT head    Results from initial plan were reviewed and interpreted by me revealing CT showed an area of hypoattenuation recommend MRI, MRI was done that did show chronic white matter changes but no acute abnormalities.  Could be multiple sclerosis as there was demyelinating changes advise follow-up with primary care for possible neurology referral as it would explain some of her symptoms.  Labs are grossly unremarkable including iron studies.    Diagnostic information from other sources: None    Interventions / Re-evaluation: Patient was given Toradol and IV fluids with resolution of symptoms    Results/clinical rationale were discussed with patient    Consultations/Discussion of results with other physicians:  None    Disposition plan: Patient stable for discharge, will advise continued follow-up with primary care and possible neurology for further evaluation.  Made aware of return precautions.    Amount and/or Complexity of Data Reviewed  Labs: ordered. Decision-making details documented in ED Course.  Radiology: ordered.  ECG/medicine tests: ordered.    Risk  Prescription drug management.        Final diagnoses:   Other headache syndrome       ED Disposition  ED Disposition       ED Disposition   Discharge    Condition   Stable    Comment   --               No follow-up provider specified.       Medication List      No changes were made to your prescriptions during this visit.            Grzegorz Nicole, PA  07/20/23 3230     room air

## 2023-07-28 ENCOUNTER — OFFICE VISIT (OUTPATIENT)
Dept: ORTHOPEDIC SURGERY | Facility: CLINIC | Age: 68
End: 2023-07-28
Payer: MEDICARE

## 2023-07-28 VITALS
BODY MASS INDEX: 31.49 KG/M2 | DIASTOLIC BLOOD PRESSURE: 82 MMHG | SYSTOLIC BLOOD PRESSURE: 130 MMHG | HEIGHT: 61 IN | WEIGHT: 166.8 LBS

## 2023-07-28 DIAGNOSIS — E66.09 CLASS 1 OBESITY DUE TO EXCESS CALORIES WITHOUT SERIOUS COMORBIDITY WITH BODY MASS INDEX (BMI) OF 31.0 TO 31.9 IN ADULT: ICD-10-CM

## 2023-07-28 DIAGNOSIS — M17.0 PRIMARY OSTEOARTHRITIS OF BOTH KNEES: Primary | ICD-10-CM

## 2023-07-28 RX ORDER — BUPIVACAINE HYDROCHLORIDE 2.5 MG/ML
3 INJECTION, SOLUTION EPIDURAL; INFILTRATION; INTRACAUDAL
Status: COMPLETED | OUTPATIENT
Start: 2023-07-28 | End: 2023-07-28

## 2023-07-28 RX ORDER — TRIAMCINOLONE ACETONIDE 40 MG/ML
80 INJECTION, SUSPENSION INTRA-ARTICULAR; INTRAMUSCULAR
Status: COMPLETED | OUTPATIENT
Start: 2023-07-28 | End: 2023-07-28

## 2023-07-28 RX ORDER — LIDOCAINE HYDROCHLORIDE 10 MG/ML
3 INJECTION, SOLUTION EPIDURAL; INFILTRATION; INTRACAUDAL; PERINEURAL
Status: COMPLETED | OUTPATIENT
Start: 2023-07-28 | End: 2023-07-28

## 2023-07-28 RX ADMIN — LIDOCAINE HYDROCHLORIDE 3 ML: 10 INJECTION, SOLUTION EPIDURAL; INFILTRATION; INTRACAUDAL; PERINEURAL at 09:52

## 2023-07-28 RX ADMIN — TRIAMCINOLONE ACETONIDE 80 MG: 40 INJECTION, SUSPENSION INTRA-ARTICULAR; INTRAMUSCULAR at 09:52

## 2023-07-28 RX ADMIN — BUPIVACAINE HYDROCHLORIDE 3 ML: 2.5 INJECTION, SOLUTION EPIDURAL; INFILTRATION; INTRACAUDAL at 09:52

## 2023-07-28 NOTE — PROGRESS NOTES
Procedure   - Large Joint Arthrocentesis: bilateral knee on 7/28/2023 9:52 AM  Indications: pain  Details: 21 G needle, superolateral approach  Medications (Right): 3 mL bupivacaine (PF) 0.25 %; 3 mL lidocaine PF 1% 1 %; 80 mg triamcinolone acetonide 40 MG/ML  Medications (Left): 3 mL bupivacaine (PF) 0.25 %; 3 mL lidocaine PF 1% 1 %; 80 mg triamcinolone acetonide 40 MG/ML  Outcome: tolerated well, no immediate complications  Procedure, treatment alternatives, risks and benefits explained, specific risks discussed. Consent was given by the patient. Immediately prior to procedure a time out was called to verify the correct patient, procedure, equipment, support staff and site/side marked as required. Patient was prepped and draped in the usual sterile fashion.

## 2023-07-28 NOTE — PROGRESS NOTES
Orthopaedic Clinic Note: Knee Established Patient    Chief Complaint   Patient presents with    Follow-up     3 month follow up -- Primary osteoarthritis of both knees        HPI    It has been 3.5 month(s) since Ms. Murray's last visit. She returns to clinic today for follow-up bilateral knee osteoarthritis.  Patient was last seen 3.5 months ago and underwent intra-articular injections in both knees.  The injections worked well for about 2-1/2 months.  Patient missed her 3-month follow-up appointment and subsequently has developed worsening knee pain in both knees.  Rates it a 4/10 on the pain scale currently but with physical activity increases to an 8/10 on pain scale.  She is ambulating with no assistive device.  Denies fevers chills or constitutional symptoms.  She remains overweight with a BMI 31.52.  Overall she is doing worse.    Past Medical History:   Diagnosis Date    HMAMAD (acute kidney injury) 2021    Allergic     Anemia     Depression     Diabetes mellitus     History of heart attack     Hyperlipidemia     Hypertension       Past Surgical History:   Procedure Laterality Date    BILATERAL BREAST REDUCTION      BONE MARROW ASPIRATION      CATARACT EXTRACTION Bilateral     HYSTERECTOMY  1990    x 2    REDUCTION MAMMAPLASTY Bilateral     MORE THAN 20 YEARS AGO    SINUS SURGERY  2003    TONSILLECTOMY        Family History   Problem Relation Age of Onset    Arthritis Maternal Grandmother     Hypertension Mother     Hyperlipidemia Mother     Thyroid disease Mother     Diabetes Sister     Crohn's disease Sister     Hypertension Sister     Heart attack Maternal Grandfather     Breast cancer Neg Hx     Ovarian cancer Neg Hx      Social History     Socioeconomic History    Marital status: Single   Tobacco Use    Smoking status: Former     Packs/day: 2.00     Years: 30.00     Pack years: 60.00     Types: Cigarettes     Quit date:      Years since quittin.5    Smokeless tobacco: Never   Vaping Use     Vaping Use: Never used   Substance and Sexual Activity    Alcohol use: No    Drug use: No    Sexual activity: Defer      Current Outpatient Medications on File Prior to Visit   Medication Sig Dispense Refill    albuterol sulfate  (90 Base) MCG/ACT inhaler Inhale 2 puffs Every 4 (Four) Hours As Needed for Wheezing. 8 g 12    amLODIPine (NORVASC) 10 MG tablet TAKE ONE TABLET BY MOUTH DAILY 30 tablet 5    amoxicillin (AMOXIL) 875 MG tablet       aspirin (aspirin) 81 MG EC tablet Take 1 tablet by mouth Daily. 30 tablet 5    atorvastatin (LIPITOR) 80 MG tablet TAKE ONE TABLET BY MOUTH DAILY 30 tablet 5    budesonide-formoterol (Symbicort) 80-4.5 MCG/ACT inhaler Inhale 2 puffs 2 (Two) Times a Day. 10.2 g 12    carvedilol (COREG) 25 MG tablet TAKE ONE TABLET BY MOUTH TWICE A DAY WITH MEALS 60 tablet 0    dicyclomine (BENTYL) 20 MG tablet Take 1 tablet by mouth Every 6 (Six) Hours. 20 tablet 0    FLUoxetine (PROzac) 20 MG capsule TAKE THREE CAPSULES BY MOUTH DAILY 90 capsule 0    hydroxyurea (HYDREA) 500 MG capsule Take 1 capsule by mouth Daily. 30 capsule 2    lisinopril (PRINIVIL,ZESTRIL) 20 MG tablet TAKE ONE TABLET BY MOUTH DAILY 30 tablet 0    nitroglycerin (NITROSTAT) 0.4 MG SL tablet Place 1 tablet under the tongue Every 5 (Five) Minutes As Needed for Chest Pain. Take no more than 3 doses in 15 minutes. 25 tablet 5    ondansetron ODT (ZOFRAN-ODT) 4 MG disintegrating tablet Place 1 tablet on the tongue Every 8 (Eight) Hours As Needed for Nausea. 12 tablet 0    potassium chloride 10 MEQ CR tablet       traZODone (DESYREL) 50 MG tablet Take 0.5 tablets by mouth Every Night. 30 tablet 3     No current facility-administered medications on file prior to visit.      Allergies   Allergen Reactions    Sulfa Antibiotics Rash        Review of Systems   Constitutional: Negative.    HENT: Negative.     Eyes: Negative.    Respiratory: Negative.     Cardiovascular: Negative.    Gastrointestinal: Negative.    Endocrine:  "Negative.    Genitourinary: Negative.    Musculoskeletal:  Positive for arthralgias.   Skin: Negative.    Allergic/Immunologic: Negative.    Neurological: Negative.    Hematological: Negative.    Psychiatric/Behavioral: Negative.        The patient's Review of Systems was personally reviewed and confirmed as accurate.    Physical Exam  Blood pressure 130/82, height 154.9 cm (61\"), weight 75.7 kg (166 lb 12.8 oz), not currently breastfeeding.    Body mass index is 31.52 kg/m².    GENERAL APPEARANCE: awake, alert, oriented, in no acute distress and well developed, well nourished  LUNGS:  breathing nonlabored  EXTREMITIES: no clubbing, cyanosis  PERIPHERAL PULSES: palpable dorsalis pedis and posterior tibial pulses bilaterally.    GAIT:  Antalgic        ----------   Bilateral Knee Exam:  ----------  ALIGNMENT: severe varus, correctable to neutral  ----------  RANGE OF MOTION:  Decreased (5 - 115 degrees) with no extensor lag  LIGAMENTOUS STABILITY:   stable to varus and valgus stress at terminal extension and 30 degrees; retensioning of the MCL is appreciated with valgus stress at 30 degrees consistent with medial compartment degeneration  ----------  STRENGTH:  KNEE FLEXION 5/5  KNEE EXTENSION  5/5  ANKLE DORSIFLEXION  5/5  ANKLE PLANTARFLEXION  5/5  ----------  PAIN WITH PALPATION:global  KNEE EFFUSION: yes, mild effusion bilateral  PAIN WITH KNEE ROM: yes  PATELLAR CREPITUS:  yes, painful and symptomatic  ----------  SENSATION TO LIGHT TOUCH:  DEEP PERONEAL/SUPERFICIAL PERONEAL/SURAL/SAPHENOUS/TIBIAL:    intact  ----------  EDEMA:  no  ERYTHEMA:    no  WOUNDS/INCISIONS:   no  _____________________________________________________________________  _____________________________________________________________________    RADIOGRAPHIC FINDINGS:   No new imaging today    Assessment/Plan:   Diagnosis Plan   1. Primary osteoarthritis of both knees        2. Class 1 obesity due to excess calories without serious comorbidity " with body mass index (BMI) of 31.0 to 31.9 in adult          The patient has failed conservative treatment measures and is a candidate for joint arthroplasty.  I discussed the joint arthroplasty surgical process as well as the recovery and rehabilitation time frame.  The patient asked several questions regarding the joint arthroplasty surgery, which were answered accordingly.  Ultimately, the patient declines surgical intervention at this time and wishes to continue with conservative treatment measures.  Alternative conservative treatment measures were discussed including bracing, therapy, topical/oral anti-inflammatories, activity modification, and weight loss.  The patient considered these treatment options and wishes to proceed with corticosteroid injection(s) today.  Therefore we will proceed with corticosteroid injection(s) today.  Follow-up 3 months for repeat evaluation with x-ray 4 views bilateral knees on return.    Patient has an elevated BMI. The patient has been instructed on various weight loss avenues including diet, portion control, calorie restriction, low/no impact exercise, referral to weight loss management and/or bariatric surgery.  It was explained that weight loss can improve joint pain alone by decreasing the joint reaction forces.  For every pound of weight change, the knee and hip joints see a 4 to 5 fold change in pressure.  Given these options, the patient will proceed with low calorie diet and low impact exercise.    Procedure Note:  I discussed with the patient the potential benefits of performing a therapeutic injections of the bilateral knees as well as potential risks including but not limited to infection, swelling, pain, bleeding, bruising, nerve/vessel damage, skin color changes, transient elevation in blood glucose levels, and fat atrophy. After informed consent and verifying correct patient, procedure site, and type of procedure, the areas were prepped with alcohol, ethyl  chloride was used to numb the skin. Via the superior lateral approach, 3 cc of 1% lidocaine, 3 cc of 0.25% Marcaine and 2 cc of 40mg/ml of Kenalog were each injected into the bilateral knees. The patient tolerated the procedures well. There were no complications. A sterile dressing was placed over each injection site.      Keaton Aldrich MD  07/28/23  09:54 EDT

## 2023-07-29 ENCOUNTER — APPOINTMENT (OUTPATIENT)
Dept: CT IMAGING | Facility: HOSPITAL | Age: 68
End: 2023-07-29
Payer: MEDICARE

## 2023-07-29 ENCOUNTER — HOSPITAL ENCOUNTER (OUTPATIENT)
Facility: HOSPITAL | Age: 68
Setting detail: OBSERVATION
LOS: 1 days | Discharge: HOME | End: 2023-07-31
Attending: EMERGENCY MEDICINE | Admitting: STUDENT IN AN ORGANIZED HEALTH CARE EDUCATION/TRAINING PROGRAM
Payer: MEDICARE

## 2023-07-29 ENCOUNTER — APPOINTMENT (OUTPATIENT)
Dept: GENERAL RADIOLOGY | Facility: HOSPITAL | Age: 68
End: 2023-07-29
Payer: MEDICARE

## 2023-07-29 DIAGNOSIS — N17.9 AKI (ACUTE KIDNEY INJURY): Primary | ICD-10-CM

## 2023-07-29 DIAGNOSIS — R73.9 HYPERGLYCEMIA: ICD-10-CM

## 2023-07-29 LAB
ALBUMIN SERPL-MCNC: 4.6 G/DL (ref 3.5–5.2)
ALBUMIN/GLOB SERPL: 1.8 G/DL
ALP SERPL-CCNC: 114 U/L (ref 39–117)
ALT SERPL W P-5'-P-CCNC: 101 U/L (ref 1–33)
ANION GAP SERPL CALCULATED.3IONS-SCNC: 19.6 MMOL/L (ref 5–15)
AST SERPL-CCNC: 65 U/L (ref 1–32)
ATMOSPHERIC PRESS: 734 MMHG
BACTERIA UR QL AUTO: ABNORMAL /HPF
BASE EXCESS BLDV CALC-SCNC: -4.2 MMOL/L (ref 0–2)
BASOPHILS # BLD AUTO: 0.14 10*3/MM3 (ref 0–0.2)
BASOPHILS NFR BLD AUTO: 0.6 % (ref 0–1.5)
BDY SITE: ABNORMAL
BILIRUB SERPL-MCNC: 0.6 MG/DL (ref 0–1.2)
BILIRUB UR QL STRIP: NEGATIVE
BUN SERPL-MCNC: 30 MG/DL (ref 8–23)
BUN/CREAT SERPL: 22.7 (ref 7–25)
CALCIUM SPEC-SCNC: 9.4 MG/DL (ref 8.6–10.5)
CHLORIDE SERPL-SCNC: 99 MMOL/L (ref 98–107)
CLARITY UR: CLEAR
CO2 SERPL-SCNC: 16.4 MMOL/L (ref 22–29)
COHGB MFR BLD: 1.1 % (ref 0–5)
COLOR UR: YELLOW
CREAT SERPL-MCNC: 1.32 MG/DL (ref 0.57–1)
DEPRECATED RDW RBC AUTO: 49 FL (ref 37–54)
EGFRCR SERPLBLD CKD-EPI 2021: 44.3 ML/MIN/1.73
EOSINOPHIL # BLD AUTO: 0.01 10*3/MM3 (ref 0–0.4)
EOSINOPHIL NFR BLD AUTO: 0 % (ref 0.3–6.2)
ERYTHROCYTE [DISTWIDTH] IN BLOOD BY AUTOMATED COUNT: 18.3 % (ref 12.3–15.4)
GEN 5 2HR TROPONIN T REFLEX: 19 NG/L
GLOBULIN UR ELPH-MCNC: 2.6 GM/DL
GLUCOSE SERPL-MCNC: 290 MG/DL (ref 65–99)
GLUCOSE UR STRIP-MCNC: NEGATIVE MG/DL
HCO3 BLDV-SCNC: 19.7 MMOL/L (ref 22–28)
HCT VFR BLD AUTO: 55.9 % (ref 34–46.6)
HGB BLD-MCNC: 17.5 G/DL (ref 12–15.9)
HGB UR QL STRIP.AUTO: NEGATIVE
HOLD SPECIMEN: NORMAL
HOLD SPECIMEN: NORMAL
HYALINE CASTS UR QL AUTO: ABNORMAL /LPF
IMM GRANULOCYTES # BLD AUTO: 0.97 10*3/MM3 (ref 0–0.05)
IMM GRANULOCYTES NFR BLD AUTO: 4 % (ref 0–0.5)
INHALED O2 CONCENTRATION: 21 %
KETONES UR QL STRIP: NEGATIVE
LEUKOCYTE ESTERASE UR QL STRIP.AUTO: ABNORMAL
LYMPHOCYTES # BLD AUTO: 0.48 10*3/MM3 (ref 0.7–3.1)
LYMPHOCYTES NFR BLD AUTO: 2 % (ref 19.6–45.3)
Lab: ABNORMAL
MCH RBC QN AUTO: 25.4 PG (ref 26.6–33)
MCHC RBC AUTO-ENTMCNC: 31.3 G/DL (ref 31.5–35.7)
MCV RBC AUTO: 81.1 FL (ref 79–97)
METHGB BLD QL: 0 % (ref 0–3)
MODALITY: ABNORMAL
MONOCYTES # BLD AUTO: 1.19 10*3/MM3 (ref 0.1–0.9)
MONOCYTES NFR BLD AUTO: 4.9 % (ref 5–12)
NEUTROPHILS NFR BLD AUTO: 21.56 10*3/MM3 (ref 1.7–7)
NEUTROPHILS NFR BLD AUTO: 88.5 % (ref 42.7–76)
NITRITE UR QL STRIP: NEGATIVE
NOTE: ABNORMAL
NRBC BLD AUTO-RTO: 0.1 /100 WBC (ref 0–0.2)
OXYHGB MFR BLDV: 93.1 % (ref 40–70)
PCO2 BLDV: 33.1 MM HG (ref 40–50)
PH BLDV: 7.38 PH UNITS (ref 7.32–7.42)
PH UR STRIP.AUTO: 5.5 [PH] (ref 5–8)
PLATELET # BLD AUTO: 1011 10*3/MM3 (ref 140–450)
PMV BLD AUTO: 9.6 FL (ref 6–12)
PO2 BLDV: 65.6 MM HG (ref 30–50)
POTASSIUM SERPL-SCNC: 3.6 MMOL/L (ref 3.5–5.2)
PROT SERPL-MCNC: 7.2 G/DL (ref 6–8.5)
PROT UR QL STRIP: ABNORMAL
RBC # BLD AUTO: 6.89 10*6/MM3 (ref 3.77–5.28)
RBC # UR STRIP: ABNORMAL /HPF
RBC MORPH BLD: NORMAL
REF LAB TEST METHOD: ABNORMAL
SAO2 % BLDCOV: 94.1 % (ref 45–75)
SMALL PLATELETS BLD QL SMEAR: NORMAL
SODIUM SERPL-SCNC: 135 MMOL/L (ref 136–145)
SP GR UR STRIP: 1.02 (ref 1–1.03)
SQUAMOUS #/AREA URNS HPF: ABNORMAL /HPF
TROPONIN T DELTA: 1 NG/L
TROPONIN T SERPL HS-MCNC: 18 NG/L
UROBILINOGEN UR QL STRIP: ABNORMAL
VENTILATOR MODE: ABNORMAL
WBC # UR STRIP: ABNORMAL /HPF
WBC MORPH BLD: NORMAL
WBC NRBC COR # BLD: 24.35 10*3/MM3 (ref 3.4–10.8)
WHOLE BLOOD HOLD COAG: NORMAL
WHOLE BLOOD HOLD SPECIMEN: NORMAL

## 2023-07-29 PROCEDURE — 99285 EMERGENCY DEPT VISIT HI MDM: CPT

## 2023-07-29 PROCEDURE — 36415 COLL VENOUS BLD VENIPUNCTURE: CPT

## 2023-07-29 PROCEDURE — 71045 X-RAY EXAM CHEST 1 VIEW: CPT

## 2023-07-29 PROCEDURE — 85007 BL SMEAR W/DIFF WBC COUNT: CPT

## 2023-07-29 PROCEDURE — 74177 CT ABD & PELVIS W/CONTRAST: CPT

## 2023-07-29 PROCEDURE — 25510000001 IOPAMIDOL 61 % SOLUTION: Performed by: EMERGENCY MEDICINE

## 2023-07-29 PROCEDURE — 80053 COMPREHEN METABOLIC PANEL: CPT

## 2023-07-29 PROCEDURE — 96361 HYDRATE IV INFUSION ADD-ON: CPT

## 2023-07-29 PROCEDURE — 96375 TX/PRO/DX INJ NEW DRUG ADDON: CPT

## 2023-07-29 PROCEDURE — 81001 URINALYSIS AUTO W/SCOPE: CPT

## 2023-07-29 PROCEDURE — 25010000002 DIPHENHYDRAMINE PER 50 MG

## 2023-07-29 PROCEDURE — 82805 BLOOD GASES W/O2 SATURATION: CPT

## 2023-07-29 PROCEDURE — 82820 HEMOGLOBIN-OXYGEN AFFINITY: CPT

## 2023-07-29 PROCEDURE — 25010000002 METHYLPREDNISOLONE PER 125 MG

## 2023-07-29 PROCEDURE — 93005 ELECTROCARDIOGRAM TRACING: CPT

## 2023-07-29 PROCEDURE — 71275 CT ANGIOGRAPHY CHEST: CPT

## 2023-07-29 PROCEDURE — G0378 HOSPITAL OBSERVATION PER HR: HCPCS

## 2023-07-29 PROCEDURE — 85025 COMPLETE CBC W/AUTO DIFF WBC: CPT

## 2023-07-29 PROCEDURE — 94640 AIRWAY INHALATION TREATMENT: CPT

## 2023-07-29 PROCEDURE — 84484 ASSAY OF TROPONIN QUANT: CPT

## 2023-07-29 RX ORDER — DIPHENHYDRAMINE HYDROCHLORIDE 50 MG/ML
25 INJECTION INTRAMUSCULAR; INTRAVENOUS ONCE
Status: COMPLETED | OUTPATIENT
Start: 2023-07-29 | End: 2023-07-29

## 2023-07-29 RX ORDER — IPRATROPIUM BROMIDE AND ALBUTEROL SULFATE 2.5; .5 MG/3ML; MG/3ML
3 SOLUTION RESPIRATORY (INHALATION) ONCE
Status: COMPLETED | OUTPATIENT
Start: 2023-07-29 | End: 2023-07-29

## 2023-07-29 RX ORDER — METHYLPREDNISOLONE SODIUM SUCCINATE 125 MG/2ML
125 INJECTION, POWDER, LYOPHILIZED, FOR SOLUTION INTRAMUSCULAR; INTRAVENOUS ONCE
Status: COMPLETED | OUTPATIENT
Start: 2023-07-29 | End: 2023-07-29

## 2023-07-29 RX ORDER — SODIUM CHLORIDE 0.9 % (FLUSH) 0.9 %
10 SYRINGE (ML) INJECTION AS NEEDED
Status: DISCONTINUED | OUTPATIENT
Start: 2023-07-29 | End: 2023-07-31 | Stop reason: HOSPADM

## 2023-07-29 RX ADMIN — IOPAMIDOL 100 ML: 612 INJECTION, SOLUTION INTRAVENOUS at 21:11

## 2023-07-29 RX ADMIN — METHYLPREDNISOLONE SODIUM SUCCINATE 125 MG: 125 INJECTION, POWDER, FOR SOLUTION INTRAMUSCULAR; INTRAVENOUS at 20:33

## 2023-07-29 RX ADMIN — DIPHENHYDRAMINE HYDROCHLORIDE 25 MG: 50 INJECTION INTRAMUSCULAR; INTRAVENOUS at 20:33

## 2023-07-29 RX ADMIN — IPRATROPIUM BROMIDE AND ALBUTEROL SULFATE 3 ML: .5; 3 SOLUTION RESPIRATORY (INHALATION) at 20:28

## 2023-07-29 RX ADMIN — SODIUM CHLORIDE 1000 ML: 9 INJECTION, SOLUTION INTRAVENOUS at 21:13

## 2023-07-29 NOTE — Clinical Note
Level of Care: Telemetry [5]   Diagnosis: Acute kidney injury [[id number]]   Admitting Physician: TATIANA GUSMAN [[id number]]   Attending Physician: TATIANA GUSMAN [[id number]]   Certification: I Certify That Inpatient Hospital Services Are Medically Necessary For Greater Than 2 Midnights

## 2023-07-30 ENCOUNTER — APPOINTMENT (OUTPATIENT)
Dept: GENERAL RADIOLOGY | Facility: HOSPITAL | Age: 68
End: 2023-07-30
Payer: MEDICARE

## 2023-07-30 PROBLEM — E87.29 HIGH ANION GAP METABOLIC ACIDOSIS: Status: ACTIVE | Noted: 2023-07-30

## 2023-07-30 PROBLEM — E87.20 LACTIC ACIDOSIS: Status: ACTIVE | Noted: 2023-07-30

## 2023-07-30 PROBLEM — N17.9 ACUTE KIDNEY INJURY: Status: ACTIVE | Noted: 2023-07-30

## 2023-07-30 PROBLEM — R16.1 SPLENOMEGALY: Status: ACTIVE | Noted: 2023-07-30

## 2023-07-30 LAB
ALBUMIN SERPL-MCNC: 4.3 G/DL (ref 3.5–5.2)
ALBUMIN SERPL-MCNC: 4.5 G/DL (ref 3.5–5.2)
ALBUMIN/GLOB SERPL: 1.8 G/DL
ALBUMIN/GLOB SERPL: 1.9 G/DL
ALP SERPL-CCNC: 102 U/L (ref 39–117)
ALP SERPL-CCNC: 109 U/L (ref 39–117)
ALT SERPL W P-5'-P-CCNC: 81 U/L (ref 1–33)
ALT SERPL W P-5'-P-CCNC: 90 U/L (ref 1–33)
ANION GAP SERPL CALCULATED.3IONS-SCNC: 13.6 MMOL/L (ref 5–15)
ANION GAP SERPL CALCULATED.3IONS-SCNC: 13.6 MMOL/L (ref 5–15)
ANION GAP SERPL CALCULATED.3IONS-SCNC: 15.7 MMOL/L (ref 5–15)
ANION GAP SERPL CALCULATED.3IONS-SCNC: 18.3 MMOL/L (ref 5–15)
ANION GAP SERPL CALCULATED.3IONS-SCNC: 20.5 MMOL/L (ref 5–15)
APAP SERPL-MCNC: <5 MCG/ML (ref 0–30)
AST SERPL-CCNC: 44 U/L (ref 1–32)
AST SERPL-CCNC: 53 U/L (ref 1–32)
BASOPHILS # BLD AUTO: 0.1 10*3/MM3 (ref 0–0.2)
BASOPHILS NFR BLD AUTO: 0.5 % (ref 0–1.5)
BILIRUB SERPL-MCNC: 0.5 MG/DL (ref 0–1.2)
BILIRUB SERPL-MCNC: 0.5 MG/DL (ref 0–1.2)
BUN SERPL-MCNC: 24 MG/DL (ref 8–23)
BUN SERPL-MCNC: 27 MG/DL (ref 8–23)
BUN SERPL-MCNC: 28 MG/DL (ref 8–23)
BUN SERPL-MCNC: 28 MG/DL (ref 8–23)
BUN SERPL-MCNC: 29 MG/DL (ref 8–23)
BUN/CREAT SERPL: 21.1 (ref 7–25)
BUN/CREAT SERPL: 21.3 (ref 7–25)
BUN/CREAT SERPL: 21.7 (ref 7–25)
BUN/CREAT SERPL: 23.1 (ref 7–25)
BUN/CREAT SERPL: 23.6 (ref 7–25)
CALCIUM SPEC-SCNC: 8.7 MG/DL (ref 8.6–10.5)
CALCIUM SPEC-SCNC: 8.8 MG/DL (ref 8.6–10.5)
CALCIUM SPEC-SCNC: 8.9 MG/DL (ref 8.6–10.5)
CHLORIDE SERPL-SCNC: 103 MMOL/L (ref 98–107)
CHLORIDE SERPL-SCNC: 106 MMOL/L (ref 98–107)
CHLORIDE SERPL-SCNC: 106 MMOL/L (ref 98–107)
CHLORIDE SERPL-SCNC: 107 MMOL/L (ref 98–107)
CHLORIDE SERPL-SCNC: 108 MMOL/L (ref 98–107)
CO2 SERPL-SCNC: 13.5 MMOL/L (ref 22–29)
CO2 SERPL-SCNC: 15.7 MMOL/L (ref 22–29)
CO2 SERPL-SCNC: 17.3 MMOL/L (ref 22–29)
CO2 SERPL-SCNC: 18.4 MMOL/L (ref 22–29)
CO2 SERPL-SCNC: 18.4 MMOL/L (ref 22–29)
CREAT SERPL-MCNC: 1.14 MG/DL (ref 0.57–1)
CREAT SERPL-MCNC: 1.21 MG/DL (ref 0.57–1)
CREAT SERPL-MCNC: 1.23 MG/DL (ref 0.57–1)
CREAT SERPL-MCNC: 1.27 MG/DL (ref 0.57–1)
CREAT SERPL-MCNC: 1.29 MG/DL (ref 0.57–1)
D-LACTATE SERPL-SCNC: 1.8 MMOL/L (ref 0.5–2)
D-LACTATE SERPL-SCNC: 2.1 MMOL/L (ref 0.5–2)
D-LACTATE SERPL-SCNC: 3.3 MMOL/L (ref 0.5–2)
D-LACTATE SERPL-SCNC: 3.7 MMOL/L (ref 0.5–2)
DEPRECATED RDW RBC AUTO: 49.4 FL (ref 37–54)
EGFRCR SERPLBLD CKD-EPI 2021: 45.6 ML/MIN/1.73
EGFRCR SERPLBLD CKD-EPI 2021: 46.4 ML/MIN/1.73
EGFRCR SERPLBLD CKD-EPI 2021: 48.3 ML/MIN/1.73
EGFRCR SERPLBLD CKD-EPI 2021: 49.2 ML/MIN/1.73
EGFRCR SERPLBLD CKD-EPI 2021: 52.9 ML/MIN/1.73
EOSINOPHIL # BLD AUTO: 0 10*3/MM3 (ref 0–0.4)
EOSINOPHIL NFR BLD AUTO: 0 % (ref 0.3–6.2)
ERYTHROCYTE [DISTWIDTH] IN BLOOD BY AUTOMATED COUNT: 18.6 % (ref 12.3–15.4)
GLOBULIN UR ELPH-MCNC: 2.3 GM/DL
GLOBULIN UR ELPH-MCNC: 2.5 GM/DL
GLUCOSE BLDC GLUCOMTR-MCNC: 114 MG/DL (ref 70–130)
GLUCOSE BLDC GLUCOMTR-MCNC: 118 MG/DL (ref 70–130)
GLUCOSE BLDC GLUCOMTR-MCNC: 127 MG/DL (ref 70–130)
GLUCOSE BLDC GLUCOMTR-MCNC: 131 MG/DL (ref 70–130)
GLUCOSE BLDC GLUCOMTR-MCNC: 137 MG/DL (ref 70–130)
GLUCOSE BLDC GLUCOMTR-MCNC: 139 MG/DL (ref 70–130)
GLUCOSE BLDC GLUCOMTR-MCNC: 141 MG/DL (ref 70–130)
GLUCOSE BLDC GLUCOMTR-MCNC: 151 MG/DL (ref 70–130)
GLUCOSE BLDC GLUCOMTR-MCNC: 155 MG/DL (ref 70–130)
GLUCOSE BLDC GLUCOMTR-MCNC: 162 MG/DL (ref 70–130)
GLUCOSE BLDC GLUCOMTR-MCNC: 169 MG/DL (ref 70–130)
GLUCOSE BLDC GLUCOMTR-MCNC: 180 MG/DL (ref 70–130)
GLUCOSE BLDC GLUCOMTR-MCNC: 228 MG/DL (ref 70–130)
GLUCOSE BLDC GLUCOMTR-MCNC: 245 MG/DL (ref 70–130)
GLUCOSE BLDC GLUCOMTR-MCNC: 98 MG/DL (ref 70–130)
GLUCOSE BLDC GLUCOMTR-MCNC: 99 MG/DL (ref 70–130)
GLUCOSE SERPL-MCNC: 114 MG/DL (ref 65–99)
GLUCOSE SERPL-MCNC: 117 MG/DL (ref 65–99)
GLUCOSE SERPL-MCNC: 143 MG/DL (ref 65–99)
GLUCOSE SERPL-MCNC: 179 MG/DL (ref 65–99)
GLUCOSE SERPL-MCNC: 273 MG/DL (ref 65–99)
HBA1C MFR BLD: 5.9 % (ref 4.8–5.6)
HCT VFR BLD AUTO: 54.7 % (ref 34–46.6)
HGB BLD-MCNC: 17.5 G/DL (ref 12–15.9)
IMM GRANULOCYTES # BLD AUTO: 0.63 10*3/MM3 (ref 0–0.05)
IMM GRANULOCYTES NFR BLD AUTO: 2.9 % (ref 0–0.5)
LYMPHOCYTES # BLD AUTO: 0.39 10*3/MM3 (ref 0.7–3.1)
LYMPHOCYTES NFR BLD AUTO: 1.8 % (ref 19.6–45.3)
MAGNESIUM SERPL-MCNC: 1.9 MG/DL (ref 1.6–2.4)
MAGNESIUM SERPL-MCNC: 2.1 MG/DL (ref 1.6–2.4)
MCH RBC QN AUTO: 26 PG (ref 26.6–33)
MCHC RBC AUTO-ENTMCNC: 32 G/DL (ref 31.5–35.7)
MCV RBC AUTO: 81.4 FL (ref 79–97)
MONOCYTES # BLD AUTO: 0.93 10*3/MM3 (ref 0.1–0.9)
MONOCYTES NFR BLD AUTO: 4.3 % (ref 5–12)
NEUTROPHILS NFR BLD AUTO: 19.62 10*3/MM3 (ref 1.7–7)
NEUTROPHILS NFR BLD AUTO: 90.5 % (ref 42.7–76)
NRBC BLD AUTO-RTO: 0.1 /100 WBC (ref 0–0.2)
OSMOLALITY SERPL: 312 MOSM/KG (ref 280–301)
PHOSPHATE SERPL-MCNC: 2.8 MG/DL (ref 2.5–4.5)
PHOSPHATE SERPL-MCNC: 2.8 MG/DL (ref 2.5–4.5)
PHOSPHATE SERPL-MCNC: 3.2 MG/DL (ref 2.5–4.5)
PHOSPHATE SERPL-MCNC: 3.8 MG/DL (ref 2.5–4.5)
PLATELET # BLD AUTO: 654 10*3/MM3 (ref 140–450)
PMV BLD AUTO: 9.8 FL (ref 6–12)
POTASSIUM SERPL-SCNC: 3.9 MMOL/L (ref 3.5–5.2)
POTASSIUM SERPL-SCNC: 3.9 MMOL/L (ref 3.5–5.2)
POTASSIUM SERPL-SCNC: 4.2 MMOL/L (ref 3.5–5.2)
POTASSIUM SERPL-SCNC: 4.3 MMOL/L (ref 3.5–5.2)
POTASSIUM SERPL-SCNC: 4.5 MMOL/L (ref 3.5–5.2)
PROT SERPL-MCNC: 6.6 G/DL (ref 6–8.5)
PROT SERPL-MCNC: 7 G/DL (ref 6–8.5)
RBC # BLD AUTO: 6.72 10*6/MM3 (ref 3.77–5.28)
SALICYLATES SERPL-MCNC: <0.3 MG/DL
SODIUM SERPL-SCNC: 137 MMOL/L (ref 136–145)
SODIUM SERPL-SCNC: 138 MMOL/L (ref 136–145)
SODIUM SERPL-SCNC: 139 MMOL/L (ref 136–145)
SODIUM SERPL-SCNC: 140 MMOL/L (ref 136–145)
SODIUM SERPL-SCNC: 141 MMOL/L (ref 136–145)
WBC NRBC COR # BLD: 21.67 10*3/MM3 (ref 3.4–10.8)

## 2023-07-30 PROCEDURE — 80053 COMPREHEN METABOLIC PANEL: CPT

## 2023-07-30 PROCEDURE — 82948 REAGENT STRIP/BLOOD GLUCOSE: CPT

## 2023-07-30 PROCEDURE — 80179 DRUG ASSAY SALICYLATE: CPT

## 2023-07-30 PROCEDURE — 96368 THER/DIAG CONCURRENT INF: CPT

## 2023-07-30 PROCEDURE — 85025 COMPLETE CBC W/AUTO DIFF WBC: CPT | Performed by: INTERNAL MEDICINE

## 2023-07-30 PROCEDURE — 83605 ASSAY OF LACTIC ACID: CPT

## 2023-07-30 PROCEDURE — 71045 X-RAY EXAM CHEST 1 VIEW: CPT

## 2023-07-30 PROCEDURE — 25010000002 HYDRALAZINE PER 20 MG: Performed by: INTERNAL MEDICINE

## 2023-07-30 PROCEDURE — 96375 TX/PRO/DX INJ NEW DRUG ADDON: CPT

## 2023-07-30 PROCEDURE — 63710000001 INSULIN DETEMIR PER 5 UNITS: Performed by: STUDENT IN AN ORGANIZED HEALTH CARE EDUCATION/TRAINING PROGRAM

## 2023-07-30 PROCEDURE — 84100 ASSAY OF PHOSPHORUS: CPT | Performed by: INTERNAL MEDICINE

## 2023-07-30 PROCEDURE — 83930 ASSAY OF BLOOD OSMOLALITY: CPT | Performed by: INTERNAL MEDICINE

## 2023-07-30 PROCEDURE — 83735 ASSAY OF MAGNESIUM: CPT | Performed by: INTERNAL MEDICINE

## 2023-07-30 PROCEDURE — 63710000001 INSULIN ASPART PER 5 UNITS: Performed by: STUDENT IN AN ORGANIZED HEALTH CARE EDUCATION/TRAINING PROGRAM

## 2023-07-30 PROCEDURE — 63710000001 INSULIN REGULAR HUMAN PER 5 UNITS

## 2023-07-30 PROCEDURE — 25010000002 ENOXAPARIN PER 10 MG: Performed by: INTERNAL MEDICINE

## 2023-07-30 PROCEDURE — 96372 THER/PROPH/DIAG INJ SC/IM: CPT

## 2023-07-30 PROCEDURE — 80143 DRUG ASSAY ACETAMINOPHEN: CPT

## 2023-07-30 PROCEDURE — G0378 HOSPITAL OBSERVATION PER HR: HCPCS

## 2023-07-30 PROCEDURE — 96361 HYDRATE IV INFUSION ADD-ON: CPT

## 2023-07-30 PROCEDURE — 83036 HEMOGLOBIN GLYCOSYLATED A1C: CPT | Performed by: INTERNAL MEDICINE

## 2023-07-30 PROCEDURE — 96365 THER/PROPH/DIAG IV INF INIT: CPT

## 2023-07-30 PROCEDURE — 96366 THER/PROPH/DIAG IV INF ADDON: CPT

## 2023-07-30 RX ORDER — DEXTROSE MONOHYDRATE 25 G/50ML
10-50 INJECTION, SOLUTION INTRAVENOUS
Status: DISCONTINUED | OUTPATIENT
Start: 2023-07-30 | End: 2023-07-31 | Stop reason: HOSPADM

## 2023-07-30 RX ORDER — SODIUM CHLORIDE 0.9 % (FLUSH) 0.9 %
10 SYRINGE (ML) INJECTION EVERY 12 HOURS SCHEDULED
Status: DISCONTINUED | OUTPATIENT
Start: 2023-07-30 | End: 2023-07-31 | Stop reason: HOSPADM

## 2023-07-30 RX ORDER — DEXTROSE AND SODIUM CHLORIDE 5; .45 G/100ML; G/100ML
150 INJECTION, SOLUTION INTRAVENOUS CONTINUOUS PRN
Status: DISCONTINUED | OUTPATIENT
Start: 2023-07-30 | End: 2023-07-30

## 2023-07-30 RX ORDER — SODIUM CHLORIDE 9 MG/ML
40 INJECTION, SOLUTION INTRAVENOUS AS NEEDED
Status: DISCONTINUED | OUTPATIENT
Start: 2023-07-30 | End: 2023-07-31 | Stop reason: HOSPADM

## 2023-07-30 RX ORDER — SODIUM CHLORIDE AND POTASSIUM CHLORIDE 150; 900 MG/100ML; MG/100ML
250 INJECTION, SOLUTION INTRAVENOUS CONTINUOUS PRN
Status: DISCONTINUED | OUTPATIENT
Start: 2023-07-30 | End: 2023-07-30

## 2023-07-30 RX ORDER — DEXTROSE AND SODIUM CHLORIDE 5; .9 G/100ML; G/100ML
150 INJECTION, SOLUTION INTRAVENOUS CONTINUOUS PRN
Status: DISCONTINUED | OUTPATIENT
Start: 2023-07-30 | End: 2023-07-30

## 2023-07-30 RX ORDER — LISINOPRIL 20 MG/1
20 TABLET ORAL DAILY
Status: DISCONTINUED | OUTPATIENT
Start: 2023-07-30 | End: 2023-07-31 | Stop reason: HOSPADM

## 2023-07-30 RX ORDER — SODIUM CHLORIDE 9 MG/ML
40 INJECTION, SOLUTION INTRAVENOUS AS NEEDED
Status: DISCONTINUED | OUTPATIENT
Start: 2023-07-30 | End: 2023-07-30

## 2023-07-30 RX ORDER — SODIUM CHLORIDE AND POTASSIUM CHLORIDE 150; 450 MG/100ML; MG/100ML
250 INJECTION, SOLUTION INTRAVENOUS CONTINUOUS PRN
Status: DISCONTINUED | OUTPATIENT
Start: 2023-07-30 | End: 2023-07-30

## 2023-07-30 RX ORDER — INSULIN ASPART 100 [IU]/ML
1-200 INJECTION, SOLUTION INTRAVENOUS; SUBCUTANEOUS AS NEEDED
Status: DISCONTINUED | OUTPATIENT
Start: 2023-07-30 | End: 2023-07-31 | Stop reason: HOSPADM

## 2023-07-30 RX ORDER — SODIUM CHLORIDE 0.9 % (FLUSH) 0.9 %
10 SYRINGE (ML) INJECTION AS NEEDED
Status: DISCONTINUED | OUTPATIENT
Start: 2023-07-30 | End: 2023-07-31 | Stop reason: HOSPADM

## 2023-07-30 RX ORDER — SODIUM CHLORIDE 0.9 % (FLUSH) 0.9 %
20 SYRINGE (ML) INJECTION AS NEEDED
Status: DISCONTINUED | OUTPATIENT
Start: 2023-07-30 | End: 2023-07-31 | Stop reason: HOSPADM

## 2023-07-30 RX ORDER — ALBUTEROL SULFATE 90 UG/1
2 AEROSOL, METERED RESPIRATORY (INHALATION) EVERY 4 HOURS PRN
Status: DISCONTINUED | OUTPATIENT
Start: 2023-07-30 | End: 2023-07-31 | Stop reason: HOSPADM

## 2023-07-30 RX ORDER — INSULIN ASPART 100 [IU]/ML
1-200 INJECTION, SOLUTION INTRAVENOUS; SUBCUTANEOUS
Status: DISCONTINUED | OUTPATIENT
Start: 2023-07-30 | End: 2023-07-31 | Stop reason: HOSPADM

## 2023-07-30 RX ORDER — NITROGLYCERIN 0.4 MG/1
0.4 TABLET SUBLINGUAL
Status: DISCONTINUED | OUTPATIENT
Start: 2023-07-30 | End: 2023-07-31 | Stop reason: HOSPADM

## 2023-07-30 RX ORDER — SODIUM CHLORIDE AND POTASSIUM CHLORIDE 300; 900 MG/100ML; MG/100ML
250 INJECTION, SOLUTION INTRAVENOUS CONTINUOUS PRN
Status: DISCONTINUED | OUTPATIENT
Start: 2023-07-30 | End: 2023-07-30

## 2023-07-30 RX ORDER — DICYCLOMINE HYDROCHLORIDE 10 MG/1
20 CAPSULE ORAL 4 TIMES DAILY
Status: DISCONTINUED | OUTPATIENT
Start: 2023-07-30 | End: 2023-07-31 | Stop reason: HOSPADM

## 2023-07-30 RX ORDER — NICOTINE POLACRILEX 4 MG
15 LOZENGE BUCCAL
Status: DISCONTINUED | OUTPATIENT
Start: 2023-07-30 | End: 2023-07-30

## 2023-07-30 RX ORDER — ATORVASTATIN CALCIUM 80 MG/1
80 TABLET, FILM COATED ORAL DAILY
Status: DISCONTINUED | OUTPATIENT
Start: 2023-07-30 | End: 2023-07-31 | Stop reason: HOSPADM

## 2023-07-30 RX ORDER — DEXTROSE MONOHYDRATE, SODIUM CHLORIDE, AND POTASSIUM CHLORIDE 50; 1.49; 9 G/1000ML; G/1000ML; G/1000ML
150 INJECTION, SOLUTION INTRAVENOUS CONTINUOUS PRN
Status: DISCONTINUED | OUTPATIENT
Start: 2023-07-30 | End: 2023-07-30

## 2023-07-30 RX ORDER — NICOTINE POLACRILEX 4 MG
15 LOZENGE BUCCAL
Status: DISCONTINUED | OUTPATIENT
Start: 2023-07-30 | End: 2023-07-31 | Stop reason: HOSPADM

## 2023-07-30 RX ORDER — FLUOXETINE HYDROCHLORIDE 20 MG/1
60 CAPSULE ORAL DAILY
Status: DISCONTINUED | OUTPATIENT
Start: 2023-07-30 | End: 2023-07-31 | Stop reason: HOSPADM

## 2023-07-30 RX ORDER — ACETAMINOPHEN 325 MG/1
650 TABLET ORAL EVERY 6 HOURS PRN
Status: DISCONTINUED | OUTPATIENT
Start: 2023-07-30 | End: 2023-07-31 | Stop reason: HOSPADM

## 2023-07-30 RX ORDER — BUDESONIDE AND FORMOTEROL FUMARATE DIHYDRATE 80; 4.5 UG/1; UG/1
2 AEROSOL RESPIRATORY (INHALATION)
Status: DISCONTINUED | OUTPATIENT
Start: 2023-07-30 | End: 2023-07-30

## 2023-07-30 RX ORDER — CARVEDILOL 25 MG/1
25 TABLET ORAL 2 TIMES DAILY WITH MEALS
Status: DISCONTINUED | OUTPATIENT
Start: 2023-07-30 | End: 2023-07-31 | Stop reason: HOSPADM

## 2023-07-30 RX ORDER — DEXTROSE MONOHYDRATE 25 G/50ML
10-50 INJECTION, SOLUTION INTRAVENOUS
Status: DISCONTINUED | OUTPATIENT
Start: 2023-07-30 | End: 2023-07-30

## 2023-07-30 RX ORDER — POLYETHYLENE GLYCOL 3350 17 G/17G
17 POWDER, FOR SOLUTION ORAL DAILY PRN
Status: DISCONTINUED | OUTPATIENT
Start: 2023-07-30 | End: 2023-07-31 | Stop reason: HOSPADM

## 2023-07-30 RX ORDER — ONDANSETRON 4 MG/1
4 TABLET, ORALLY DISINTEGRATING ORAL EVERY 8 HOURS PRN
Status: DISCONTINUED | OUTPATIENT
Start: 2023-07-30 | End: 2023-07-31 | Stop reason: HOSPADM

## 2023-07-30 RX ORDER — HYDRALAZINE HYDROCHLORIDE 20 MG/ML
10 INJECTION INTRAMUSCULAR; INTRAVENOUS EVERY 6 HOURS PRN
Status: DISCONTINUED | OUTPATIENT
Start: 2023-07-30 | End: 2023-07-31 | Stop reason: HOSPADM

## 2023-07-30 RX ORDER — SODIUM CHLORIDE 0.9 % (FLUSH) 0.9 %
10 SYRINGE (ML) INJECTION AS NEEDED
Status: DISCONTINUED | OUTPATIENT
Start: 2023-07-30 | End: 2023-07-30

## 2023-07-30 RX ORDER — DEXTROSE MONOHYDRATE, SODIUM CHLORIDE, AND POTASSIUM CHLORIDE 50; 2.98; 4.5 G/1000ML; G/1000ML; G/1000ML
150 INJECTION, SOLUTION INTRAVENOUS CONTINUOUS PRN
Status: DISCONTINUED | OUTPATIENT
Start: 2023-07-30 | End: 2023-07-30

## 2023-07-30 RX ORDER — BISACODYL 10 MG
10 SUPPOSITORY, RECTAL RECTAL DAILY PRN
Status: DISCONTINUED | OUTPATIENT
Start: 2023-07-30 | End: 2023-07-31 | Stop reason: HOSPADM

## 2023-07-30 RX ORDER — INSULIN ASPART 100 [IU]/ML
1-200 INJECTION, SOLUTION INTRAVENOUS; SUBCUTANEOUS
Status: DISCONTINUED | OUTPATIENT
Start: 2023-07-30 | End: 2023-07-30

## 2023-07-30 RX ORDER — ASPIRIN 81 MG/1
81 TABLET ORAL DAILY
Status: DISCONTINUED | OUTPATIENT
Start: 2023-07-30 | End: 2023-07-31 | Stop reason: HOSPADM

## 2023-07-30 RX ORDER — BUDESONIDE AND FORMOTEROL FUMARATE DIHYDRATE 160; 4.5 UG/1; UG/1
1 AEROSOL RESPIRATORY (INHALATION)
Status: DISCONTINUED | OUTPATIENT
Start: 2023-07-30 | End: 2023-07-31 | Stop reason: HOSPADM

## 2023-07-30 RX ORDER — ENOXAPARIN SODIUM 100 MG/ML
40 INJECTION SUBCUTANEOUS DAILY
Status: DISCONTINUED | OUTPATIENT
Start: 2023-07-30 | End: 2023-07-31 | Stop reason: HOSPADM

## 2023-07-30 RX ORDER — SODIUM CHLORIDE 450 MG/100ML
250 INJECTION, SOLUTION INTRAVENOUS CONTINUOUS PRN
Status: DISCONTINUED | OUTPATIENT
Start: 2023-07-30 | End: 2023-07-30

## 2023-07-30 RX ORDER — TRAZODONE HYDROCHLORIDE 50 MG/1
25 TABLET ORAL NIGHTLY
Status: DISCONTINUED | OUTPATIENT
Start: 2023-07-30 | End: 2023-07-31 | Stop reason: HOSPADM

## 2023-07-30 RX ORDER — SODIUM CHLORIDE 9 MG/ML
250 INJECTION, SOLUTION INTRAVENOUS CONTINUOUS PRN
Status: DISCONTINUED | OUTPATIENT
Start: 2023-07-30 | End: 2023-07-30

## 2023-07-30 RX ORDER — AMLODIPINE BESYLATE 5 MG/1
10 TABLET ORAL DAILY
Status: DISCONTINUED | OUTPATIENT
Start: 2023-07-30 | End: 2023-07-31 | Stop reason: HOSPADM

## 2023-07-30 RX ORDER — DEXTROSE MONOHYDRATE, SODIUM CHLORIDE, AND POTASSIUM CHLORIDE 50; 2.98; 9 G/1000ML; G/1000ML; G/1000ML
150 INJECTION, SOLUTION INTRAVENOUS CONTINUOUS PRN
Status: DISCONTINUED | OUTPATIENT
Start: 2023-07-30 | End: 2023-07-30

## 2023-07-30 RX ORDER — IBUPROFEN 600 MG/1
1 TABLET ORAL
Status: DISCONTINUED | OUTPATIENT
Start: 2023-07-30 | End: 2023-07-31 | Stop reason: HOSPADM

## 2023-07-30 RX ORDER — BISACODYL 5 MG/1
5 TABLET, DELAYED RELEASE ORAL DAILY PRN
Status: DISCONTINUED | OUTPATIENT
Start: 2023-07-30 | End: 2023-07-31 | Stop reason: HOSPADM

## 2023-07-30 RX ORDER — SODIUM CHLORIDE 0.9 % (FLUSH) 0.9 %
10 SYRINGE (ML) INJECTION EVERY 12 HOURS SCHEDULED
Status: DISCONTINUED | OUTPATIENT
Start: 2023-07-30 | End: 2023-07-30

## 2023-07-30 RX ORDER — AMOXICILLIN 250 MG
2 CAPSULE ORAL 2 TIMES DAILY
Status: DISCONTINUED | OUTPATIENT
Start: 2023-07-30 | End: 2023-07-31 | Stop reason: HOSPADM

## 2023-07-30 RX ORDER — DEXTROSE MONOHYDRATE, SODIUM CHLORIDE, AND POTASSIUM CHLORIDE 50; 1.49; 4.5 G/1000ML; G/1000ML; G/1000ML
150 INJECTION, SOLUTION INTRAVENOUS CONTINUOUS PRN
Status: DISCONTINUED | OUTPATIENT
Start: 2023-07-30 | End: 2023-07-30

## 2023-07-30 RX ADMIN — FLUOXETINE 60 MG: 20 CAPSULE ORAL at 10:11

## 2023-07-30 RX ADMIN — ACETAMINOPHEN 650 MG: 325 TABLET, FILM COATED ORAL at 07:26

## 2023-07-30 RX ADMIN — ACETAMINOPHEN 650 MG: 325 TABLET, FILM COATED ORAL at 14:21

## 2023-07-30 RX ADMIN — DICYCLOMINE HYDROCHLORIDE 20 MG: 10 CAPSULE ORAL at 12:54

## 2023-07-30 RX ADMIN — AMLODIPINE BESYLATE 10 MG: 5 TABLET ORAL at 10:12

## 2023-07-30 RX ADMIN — Medication 10 ML: at 21:17

## 2023-07-30 RX ADMIN — ATORVASTATIN CALCIUM 80 MG: 80 TABLET, FILM COATED ORAL at 10:11

## 2023-07-30 RX ADMIN — SODIUM CHLORIDE 1000 ML: 9 INJECTION, SOLUTION INTRAVENOUS at 00:26

## 2023-07-30 RX ADMIN — Medication 10 ML: at 10:10

## 2023-07-30 RX ADMIN — DICYCLOMINE HYDROCHLORIDE 20 MG: 10 CAPSULE ORAL at 17:29

## 2023-07-30 RX ADMIN — ASPIRIN 81 MG: 81 TABLET, COATED ORAL at 10:11

## 2023-07-30 RX ADMIN — TRAZODONE HYDROCHLORIDE 25 MG: 50 TABLET ORAL at 21:16

## 2023-07-30 RX ADMIN — Medication 10 ML: at 21:18

## 2023-07-30 RX ADMIN — CARVEDILOL 25 MG: 25 TABLET, FILM COATED ORAL at 17:29

## 2023-07-30 RX ADMIN — Medication 10 ML: at 10:12

## 2023-07-30 RX ADMIN — ENOXAPARIN SODIUM 40 MG: 100 INJECTION SUBCUTANEOUS at 06:26

## 2023-07-30 RX ADMIN — LISINOPRIL 20 MG: 20 TABLET ORAL at 10:11

## 2023-07-30 RX ADMIN — HUMAN INSULIN 10 UNITS: 100 INJECTION, SOLUTION SUBCUTANEOUS at 00:23

## 2023-07-30 RX ADMIN — POTASSIUM CHLORIDE, DEXTROSE MONOHYDRATE AND SODIUM CHLORIDE 150 ML/HR: 150; 5; 450 INJECTION, SOLUTION INTRAVENOUS at 05:52

## 2023-07-30 RX ADMIN — DICYCLOMINE HYDROCHLORIDE 20 MG: 10 CAPSULE ORAL at 21:16

## 2023-07-30 RX ADMIN — SENNOSIDES AND DOCUSATE SODIUM 2 TABLET: 50; 8.6 TABLET ORAL at 10:11

## 2023-07-30 RX ADMIN — INSULIN ASPART 3 UNITS: 100 INJECTION, SOLUTION INTRAVENOUS; SUBCUTANEOUS at 17:24

## 2023-07-30 RX ADMIN — INSULIN DETEMIR 19 UNITS: 100 INJECTION, SOLUTION SUBCUTANEOUS at 17:24

## 2023-07-30 RX ADMIN — CARVEDILOL 25 MG: 25 TABLET, FILM COATED ORAL at 07:26

## 2023-07-30 RX ADMIN — INSULIN HUMAN 1.2 UNITS/HR: 1 INJECTION, SOLUTION INTRAVENOUS at 05:28

## 2023-07-30 RX ADMIN — HYDRALAZINE HYDROCHLORIDE 10 MG: 20 INJECTION, SOLUTION INTRAMUSCULAR; INTRAVENOUS at 07:26

## 2023-07-30 RX ADMIN — DICYCLOMINE HYDROCHLORIDE 20 MG: 10 CAPSULE ORAL at 10:11

## 2023-07-30 NOTE — H&P
[facility] HOSPITALIST   HISTORY AND PHYSICAL      Name:  Martha Murray   Age:  68 y.o.  Sex:  female  :  1955  MRN:  [id number]   Visit Number:  [id number]  Admission Date:  2023  Date Of Service:  23  Primary Care Physician:  Julia Stacy MD    Chief Complaint:     Left shoulder pain    History Of Presenting Illness:      68-year-old lady with a history of diabetes mellitus type 2, essential thrombocytosis with positive JAK2 mutationpreviously on hydroxyurea , chronic leukocytosis, chronic erythrocytosis, hyperlipidemia, tobacco use, chronic kidney disease, presents with left upper extremity pain.  The pain occurred acutely today this evening.  Was not associated with injury.  Was not associated with overuse.  Was in the left shoulder and upper arm.  Was a numbness and tingling.  Had resolved by the time of my exam.  Reports she has also had intermittent numbness and tingling in her fingers and toes.  But that this comes and goes.  She denied nausea she denied abdominal pain.  In the emergency department they were concerned about possible DKA so hospitalist were consulted for admission.  She elects to be full code.    Pertinent findings: Sodium 137 bicarb 13.5, anion gap 20.5, BUN 28, creatinine 1.29, glucose 2 28-40 5-73, AST 53 ALT 90, lactic acid 3.7 status post 2 L boluses, platelets 1011, hemoglobin 17.5, WBC 24.35, urinalysis 30 protein trace leukocytes no ketones.  Acetaminophen undetected.  Salicylate undetected.  CT abdomen shows splenomegaly renal cortical scarring.  EKG shows sinus rhythm with occasional APCs with nonspecific T wave changes.    ED Medications:    Medications   sodium chloride 0.9 % flush 10 mL (has no administration in time range)   methylPREDNISolone sodium succinate (SOLU-Medrol) injection 125 mg (125 mg Intravenous Given 23)   ipratropium-albuterol (DUO-NEB) nebulizer solution 3 mL (3 mL Nebulization Given 23)    diphenhydrAMINE (BENADRYL) injection 25 mg (25 mg Intravenous Given 7/29/23 2033)   sodium chloride 0.9 % bolus 1,000 mL (0 mL Intravenous Stopped 7/29/23 2256)   iopamidol (ISOVUE-300) 61 % injection 100 mL (100 mL Intravenous Given 7/29/23 2111)   sodium chloride 0.9 % bolus 1,000 mL (0 mL Intravenous Stopped 7/30/23 0230)   insulin regular (humuLIN R,novoLIN R) injection 10 Units (10 Units Subcutaneous Given 7/30/23 0023)       Edited by: Aleks Ace DO at 7/30/2023 0416     Review Of Systems:    All systems were reviewed and negative except as mentioned in history of presenting illness, assessment and plan.    Past Medical History: Patient  has a past medical history of HAMMAD (acute kidney injury) (7/1/2021), Allergic, Anemia, Depression, Diabetes mellitus, History of heart attack, Hyperlipidemia, and Hypertension.    Past Surgical History: Patient  has a past surgical history that includes Tonsillectomy; Breast Reduction; Sinus surgery (2003); Bone marrow aspiration; Hysterectomy (1990); Reduction mammaplasty (Bilateral); and Cataract extraction (Bilateral).    Social History: Patient  reports that she quit smoking about 26 years ago. Her smoking use included cigarettes. She has a 60.00 pack-year smoking history. She has never used smokeless tobacco. She reports that she does not drink alcohol and does not use drugs.    Family History:  Patient's family history has been reviewed and found to be noncontributory.     Allergies:      Sulfa antibiotics    Home Medications:    Prior to Admission Medications       Prescriptions Last Dose Informant Patient Reported? Taking?    albuterol sulfate  (90 Base) MCG/ACT inhaler   No No    Inhale 2 puffs Every 4 (Four) Hours As Needed for Wheezing.    amLODIPine (NORVASC) 10 MG tablet   No No    TAKE ONE TABLET BY MOUTH DAILY    amoxicillin (AMOXIL) 875 MG tablet   Yes No    aspirin (aspirin) 81 MG EC tablet   No No    Take 1 tablet by mouth Daily.     "atorvastatin (LIPITOR) 80 MG tablet   No No    TAKE ONE TABLET BY MOUTH DAILY    budesonide-formoterol (Symbicort) 80-4.5 MCG/ACT inhaler   No No    Inhale 2 puffs 2 (Two) Times a Day.    carvedilol (COREG) 25 MG tablet   No No    TAKE ONE TABLET BY MOUTH TWICE A DAY WITH MEALS    dicyclomine (BENTYL) 20 MG tablet   No No    Take 1 tablet by mouth Every 6 (Six) Hours.    FLUoxetine (PROzac) 20 MG capsule   No No    TAKE THREE CAPSULES BY MOUTH DAILY    hydroxyurea (HYDREA) 500 MG capsule   No No    Take 1 capsule by mouth Daily.    lisinopril (PRINIVIL,ZESTRIL) 20 MG tablet   No No    TAKE ONE TABLET BY MOUTH DAILY    nitroglycerin (NITROSTAT) 0.4 MG SL tablet   No No    Place 1 tablet under the tongue Every 5 (Five) Minutes As Needed for Chest Pain. Take no more than 3 doses in 15 minutes.    ondansetron ODT (ZOFRAN-ODT) 4 MG disintegrating tablet   No No    Place 1 tablet on the tongue Every 8 (Eight) Hours As Needed for Nausea.    potassium chloride 10 MEQ CR tablet   Yes No    traZODone (DESYREL) 50 MG tablet   No No    Take 0.5 tablets by mouth Every Night.              Vital Signs:  Temp:  [97.9 øF (36.6 øC)] 97.9 øF (36.6 øC)  Heart Rate:  [86-95] 89  Resp:  [24] 24  BP: (162-190)/(69-93) 178/80        07/29/23 2000   Weight: 77.1 kg (170 lb)     Body mass index is 32.12 kg/mý.    Physical Exam:     Most recent vital Signs: /80   Pulse 89   Temp 97.9 øF (36.6 øC) (Oral)   Resp 24   Ht 154.9 cm (61\")   Wt 77.1 kg (170 lb)   SpO2 97%   BMI 32.12 kg/mý     Constitutional: Awake, alert  Eyes: PERRLA, sclerae anicteric, no conjunctival injection  HENT: NCAT, mucous membranes moist  Neck: Supple, no thyromegaly, no lymphadenopathy, trachea midline  Respiratory: Clear to auscultation bilaterally, nonlabored respirations   Cardiovascular: RRR, no murmurs, rubs, or gallops, palpable pedal pulses bilaterally  Gastrointestinal: Positive bowel sounds, soft, minimal mid abdominal tenderness to palpation, " nondistended  Musculoskeletal: No bilateral ankle edema, no clubbing or cyanosis to extremities  Psychiatric: Appropriate affect, cooperative  Neurologic: Oriented x 3, speech clear  Skin: No rashes  Edited by: Aleks Ace DO at 7/30/2023 0416      Laboratory data:    I have reviewed the labs done in the emergency room.    Results from last 7 days   Lab Units 07/30/23  0134 07/29/23 2011   SODIUM mmol/L 137 135*   POTASSIUM mmol/L 3.9 3.6   CHLORIDE mmol/L 103 99   CO2 mmol/L 13.5* 16.4*   BUN mg/dL 28* 30*   CREATININE mg/dL 1.29* 1.32*   CALCIUM mg/dL 8.9 9.4   BILIRUBIN mg/dL 0.5 0.6   ALK PHOS U/L 109 114   ALT (SGPT) U/L 90* 101*   AST (SGOT) U/L 53* 65*   GLUCOSE mg/dL 273* 290*     Results from last 7 days   Lab Units 07/29/23 2011   WBC 10*3/mm3 24.35*   HEMOGLOBIN g/dL 17.5*   HEMATOCRIT % 55.9*   PLATELETS 10*3/mm3 1,011*         Results from last 7 days   Lab Units 07/29/23  2210 07/29/23 2011   HSTROP T ng/L 19* 18*                     Results from last 7 days   Lab Units 07/29/23 2210   COLOR UA  Yellow   GLUCOSE UA  Negative   KETONES UA  Negative   LEUKOCYTES UA  Trace*   PH, URINE  5.5   BILIRUBIN UA  Negative   UROBILINOGEN UA  0.2 E.U./dL   RBC UA /HPF None Seen   WBC UA /HPF 0-2*       Pain Management Panel  More data may exist         Latest Ref Rng & Units 8/4/2020 3/27/2019   Pain Management Panel   Creatinine, Urine mg/dL 234.4  289.8        EKG:      Sinus rhythm with APCs with nonspecific T wave changes    Radiology:    CT Abdomen Pelvis With Contrast    Result Date: 7/29/2023  FINAL REPORT TECHNIQUE: Thin section axial imaging from the lung bases through the symphysis pubis were performed with the administration of intravenous contrast.  This study was performed with techniques to keep radiation doses as low as reasonably achievable, (ALARA). Individualized dose reduction techniques using automated exposure control or adjustment of mA and/or kV according to the patient's size  were employed. CLINICAL HISTORY: r/o abdominal infection     This is a 68-year-old female, history of HAMMAD, anemia, depression, diabetes, heart attack, hyperlipidemia and hypertension presents emergency room today for evaluation of chest pain.  Patient reports that onset was earlier this date at [facility] when she felt chest pain that began radiating down the left arm.  She denies any current chest pain. Denies any shortness of air.  She reports chronic nonproductive cough likely due to allergies for multiple months.  She felt febrile earlier today with subjective fevers but no measurable fevers.  She is not reporting nausea vomiting or diarrhea.  She reports feeling fatigued as well.  No known sick contacts.  She does tell me that she lives at home by herself and got to thinking about living at home by herself where she subsequently developed some chest pain.  Located over the left anterior chest wall.  No known injuries.  Does not radiate straight through to the back.  Does not radiate into the neck.  No nausea or vomiting or diaphoresis associated with this.  She does not report any unilateral leg pain or swelling.  She additionally reports some itching and wheezing.  She additionally reports some spasms intermittently in the lower back, none currently. No chest pain currently. FINDINGS: The lung bases are clear.  The liver is unremarkable.  The gallbladder appears unremarkable.  Splenomegaly measures up to 16 cm.  The pancreas demonstrates normal enhancement with normal contours.  The adrenal glands are unremarkable.  The kidneys demonstrate renal cortical scarring bilaterally, but no stones or hydronephrosis.  The aorta has normal contour without significant plaque formation.  The GI tract demonstrates no obstruction.  There is no bowel wall thickening or inflammatory process.  The appendix is normal.  There is no free fluid or inflammatory process.  There is no adenopathy or mass.  There are no acute bony  abnormalities.     Splenomegaly.  Renal cortical scarring.  No hydronephrosis.  No free air or free fluid. Authenticated and Electronically Signed by Barry Peña MD on 07/29/2023 11:44:13 PM    CT Angiogram Chest Pulmonary Embolism    Result Date: 7/29/2023  FINAL REPORT TECHNIQUE: Postcontrast axial images of the chest were performed in a CTA protocol. The study was performed with techniques to keep radiation dose as low as reasonably achievable, (ALARA). Individual dose reduction techniques using automated exposure control or adjustment of mA and/or kV according to the patient's size were employed. CLINICAL HISTORY: rule out PE and pneumonia     This is a 68-year-old female, history of HAMMAD, anemia, depression, diabetes, heart attack, hyperlipidemia and hypertension presents emergency room today for evaluation of chest pain.  Patient reports that onset was earlier this date at [facility] when she felt chest pain that began radiating down the left arm.  She denies any current chest pain. Denies any shortness of air.  She reports chronic nonproductive cough likely due to allergies for multiple months.  She felt febrile earlier today with subjective fevers but no measurable fevers.  She is not reporting nausea vomiting or diarrhea.  She reports feeling fatigued as well.  No known sick contacts.  She does tell me that she lives at home by herself and got to thinking about living at home by herself where she subsequently developed some chest pain.  Located over the left anterior chest wall.  No known injuries.  Does not radiate straight through to the back.  Does not radiate into the neck.  No nausea or vomiting or diaphoresis associated with this.  She does not report any unilateral leg pain or swelling.  She additionally reports some itching and wheezing.  She additionally reports some spasms intermittently in the lower back, none currently. No chest pain currently. FINDINGS: The thoracic inlet is unremarkable.   Irregular calcified plaque of the descending thoracic aorta and mild coronary artery calcifications are noted.  There is no aneurysm or dissection. There is minimal pulmonary artery prominence suggesting pulmonary arterial hypertension.  The pulmonary arteries are well enhanced.  There is no pulmonary embolism.  The heart is normal in size.  There are no pleural or pericardial effusions. There is no adenopathy.  There is moderate emphysema.  There is no edema, infiltrate, or suspicious pulmonary nodule.  Limited imaging of the upper abdomen is unremarkable.     1.  No pulmonary embolism, dissection, or aneurysm.  2.  There is no edema, infiltrate, adenopathy, or effusion. Authenticated and Electronically Signed by Barry Peña MD on 07/29/2023 11:44:12 PM     Assessment/Plan:      High anion gap metabolic acidosis    Type 2 diabetes mellitus without complication, without long-term current use of insulin    Essential thrombocytosis    CKD (chronic kidney disease) stage 3, GFR 30-59 ml/min    Splenomegaly    Lactic acidosis    -As for a cause of her gap acidosis certainly lactic acid is playing a role which I suspect is due to small vessel ischemia due to hyperviscosity from her increased cell counts.  I think she needs to be on treatment for this.  We will discuss with oncology in the morning. Could also be having ischemia due to uncontrolled blood pressure.  We will give her home medicines for this and add as needed hydralazine.    There was concern raised by the ER that she may have diabetic ketoacidosis.  She does not have ketones in her urine.  Her blood glucose is not significantly elevated.  Her pH was not severely reduced.  However I see very little harm in treating for this.  So I will go ahead and put her on the Glucomander DKA protocol.  This will get her a lot of fluids as well which she also needs for the hyperviscosity.  With her splenomegaly and her increased cell counts she may eventually need  splenectomy this is something else we can discuss with oncology in the morning.    Disposition: Anticipate discharge to home in 1 to 2 days    Prophylaxis: Lovenox    Edited by: Aleks Ace DO at 7/30/2023 0420             Risk Assessment: High  DVT Prophylaxis: Lovenox  Code Status:   Code Status and Medical Interventions:   Ordered at: 07/30/23 0422     Code Status (Patient has no pulse and is not breathing):    CPR (Attempt to Resuscitate)     Medical Interventions (Patient has pulse or is breathing):    Full Support     Release to patient:    Routine Release      Diet:      Advance Care Planning   ACP discussion was held with the patient during this visit. Patient does not have an advance directive, information provided.           Aleks Ace DO  07/30/23  04:22 EDT    Dictated utilizing Dragon dictation.

## 2023-07-30 NOTE — CASE MANAGEMENT/SOCIAL WORK
Discharge Planning Assessment  [facility]     Patient Name: Martha Murray  MRN: [id number]  Today's Date: 7/30/2023    Admit Date: 7/29/2023    Plan: Home Lives alone   Discharge Needs Assessment       Row Name 07/30/23 0823       Living Environment    People in Home alone    Current Living Arrangements home    Potentially Unsafe Housing Conditions none    Primary Care Provided by self    Provides Primary Care For no one    Family Caregiver if Needed child(alexa), adult    Quality of Family Relationships helpful    Able to Return to Prior Arrangements yes       Resource/Environmental Concerns    Transportation Concerns none       Food Insecurity    Within the past 12 months, you worried that your food would run out before you got the money to buy more. Sometimes    Within the past 12 months, the food you bought just didn't last and you didn't have money to get more. Sometimes       Transition Planning    Patient/Family Anticipates Transition to home    Transportation Anticipated car, drives self;family or friend will provide       Discharge Needs Assessment    Equipment Currently Used at Home bp cuff    Concerns to be Addressed discharge planning    Anticipated Changes Related to Illness none    Equipment Needed After Discharge glucometer                   Discharge Plan       Row Name 07/30/23 0838       Plan    Plan Home Lives alone    Plan Comments plan is to return home lives alone address and phone number are correct Reports that her glucose meter is not working Has Agreed to Meds to Bed MD will need to send order to them                   Continued Care and Services - Admitted Since 7/29/2023    Coordination has not been started for this encounter.          Demographic Summary       Row Name 07/30/23 0822       General Information    Admission Type inpatient    Referral Source admission list                   Functional Status       Row Name 07/30/23 0822       Functional Status    Usual Activity Tolerance  good       Functional Status, IADL    Medications independent    Meal Preparation independent    Housekeeping independent    Laundry independent    Shopping independent       Mental Status    General Appearance WDL WDL                   Psychosocial    No documentation.                  Abuse/Neglect    No documentation.                  Legal    No documentation.                  Substance Abuse    No documentation.                  Patient Forms    No documentation.                     Claudia Bender RN

## 2023-07-30 NOTE — ED PROVIDER NOTES
Subjective  History of Present Illness:    This is a 68-year-old female, history of HAMMAD, anemia, depression, diabetes, heart attack, hyperlipidemia and hypertension presents emergency room today for evaluation of chest pain.  Patient reports that onset was earlier this date at [facility] when she felt chest pain that began radiating down the left arm.  She denies any current chest pain.  Denies any shortness of air.  She reports chronic nonproductive cough likely due to allergies for multiple months.  She felt febrile earlier today with subjective fevers but no measurable fevers.  She is not reporting nausea vomiting or diarrhea.  She reports feeling fatigued as well.  No known sick contacts.  She does tell me that she lives at home by herself and got to thinking about living at home by herself where she subsequently developed some chest pain.  Located over the left anterior chest wall.  No known injuries.  Does not radiate straight through to the back.  Does not radiate into the neck.  No nausea or vomiting or diaphoresis associated with this.  She does not report any unilateral leg pain or swelling.  She additionally reports some itching and wheezing.  She additionally reports some spasms intermittently in the lower back, none currently.  No chest pain currently. Hx of diabetes, not on meds currently and tells me she is controlling with diet.  Patient is aware that she has chronically elevated cell line levels and her CBC, tells me that she followed with [facility] [place] for a while for potential leukemia, however was never diagnosed and was cleared, had multiple blood work, scans, and spinal tap that were negative for leukemia back in 2006 or 2007 and has not followed up with them since.      Nurses Notes reviewed and agree, including vitals, allergies, social history and prior medical history.     REVIEW OF SYSTEMS: All systems reviewed and not pertinent unless noted.  Review of Systems   Constitutional:   "Positive for chills, fatigue and fever.   Respiratory:  Positive for cough and wheezing. Negative for shortness of breath.    Cardiovascular:  Positive for chest pain. Negative for leg swelling.   Gastrointestinal:  Negative for abdominal pain, diarrhea, nausea and vomiting.   All other systems reviewed and are negative.    Past Medical History:   Diagnosis Date    HAMMAD (acute kidney injury) 2021    Allergic     Anemia     Depression     Diabetes mellitus     History of heart attack     Hyperlipidemia     Hypertension        Allergies:    Sulfa antibiotics      Past Surgical History:   Procedure Laterality Date    BILATERAL BREAST REDUCTION      BONE MARROW ASPIRATION      CATARACT EXTRACTION Bilateral     HYSTERECTOMY  1990    x 2    REDUCTION MAMMAPLASTY Bilateral     MORE THAN 20 YEARS AGO    SINUS SURGERY  2003    TONSILLECTOMY           Social History     Socioeconomic History    Marital status: Single   Tobacco Use    Smoking status: Former     Packs/day: 2.00     Years: 30.00     Pack years: 60.00     Types: Cigarettes     Quit date:      Years since quittin.5    Smokeless tobacco: Never   Vaping Use    Vaping Use: Never used   Substance and Sexual Activity    Alcohol use: No    Drug use: No    Sexual activity: Defer         Family History   Problem Relation Age of Onset    Arthritis Maternal Grandmother     Hypertension Mother     Hyperlipidemia Mother     Thyroid disease Mother     Diabetes Sister     Crohn's disease Sister     Hypertension Sister     Heart attack Maternal Grandfather     Breast cancer Neg Hx     Ovarian cancer Neg Hx        Objective  Physical Exam:  /80   Pulse 89   Temp 97.9 øF (36.6 øC) (Oral)   Resp 24   Ht 154.9 cm (61\")   Wt 77.1 kg (170 lb)   SpO2 97%   BMI 32.12 kg/mý      Physical Exam  Vitals and nursing note reviewed.   Constitutional:       General: She is not in acute distress.     Appearance: She is well-developed and normal weight. She is not " ill-appearing, toxic-appearing or diaphoretic.   HENT:      Head: Normocephalic and atraumatic.   Cardiovascular:      Rate and Rhythm: Normal rate and regular rhythm.      Pulses:           Radial pulses are 2+ on the right side and 2+ on the left side.      Heart sounds: Normal heart sounds.   Pulmonary:      Breath sounds: Examination of the right-upper field reveals wheezing. Examination of the left-upper field reveals wheezing. Examination of the right-middle field reveals wheezing. Examination of the left-middle field reveals wheezing. Examination of the right-lower field reveals wheezing. Examination of the left-lower field reveals wheezing. Wheezing present. No decreased breath sounds, rhonchi or rales.   Chest:      Chest wall: No mass, deformity, tenderness or crepitus.   Abdominal:      Palpations: Abdomen is soft. There is no mass.      Tenderness: There is no abdominal tenderness. There is no guarding.   Musculoskeletal:         General: Normal range of motion.      Cervical back: Normal range of motion.      Right lower leg: No tenderness. No edema.      Left lower leg: No tenderness. No edema.   Skin:     General: Skin is warm and dry.      Capillary Refill: Capillary refill takes less than 2 seconds.   Neurological:      General: No focal deficit present.      Mental Status: She is alert and oriented to person, place, and time.   Psychiatric:         Mood and Affect: Mood normal. Mood is not anxious.         Behavior: Behavior normal. Behavior is not agitated.         Procedures    ED Course:    ED Course as of 07/30/23 0427   Sat Jul 29, 2023 2025 EKG interpreted by me. Sinus rhythm. Occassional PVC. Nonspecific Q wave. Rate of 91. ST segment depression. Abnormal EKG [CG]   Sun Jul 30, 2023   0237 Transfer of care to Dr. Alvarado at 2:30 in the morning.  Disposition pending hospitalist consult. [JR]      ED Course User Index  [CG] Jose M Alvarado,   [JR] Francis Ley, PA-C       Lab Results  (last 24 hours)       Procedure Component Value Units Date/Time    CBC Auto Differential [[id number]]  (Abnormal) Collected: 07/29/23 2011    Specimen: Blood Updated: 07/29/23 2029     WBC 24.35 10*3/mm3      RBC 6.89 10*6/mm3      Hemoglobin 17.5 g/dL      Hematocrit 55.9 %      MCV 81.1 fL      MCH 25.4 pg      MCHC 31.3 g/dL      RDW 18.3 %      RDW-SD 49.0 fl      MPV 9.6 fL      Platelets 1,011 10*3/mm3      Neutrophil % 88.5 %      Lymphocyte % 2.0 %      Monocyte % 4.9 %      Eosinophil % 0.0 %      Basophil % 0.6 %      Immature Grans % 4.0 %      Neutrophils, Absolute 21.56 10*3/mm3      Lymphocytes, Absolute 0.48 10*3/mm3      Monocytes, Absolute 1.19 10*3/mm3      Eosinophils, Absolute 0.01 10*3/mm3      Basophils, Absolute 0.14 10*3/mm3      Immature Grans, Absolute 0.97 10*3/mm3      nRBC 0.1 /100 WBC     Comprehensive Metabolic Panel [[id number]]  (Abnormal) Collected: 07/29/23 2011    Specimen: Blood Updated: 07/29/23 2040     Glucose 290 mg/dL      Comment: Glucose >180, Hemoglobin A1C recommended.        BUN 30 mg/dL      Creatinine 1.32 mg/dL      Sodium 135 mmol/L      Potassium 3.6 mmol/L      Comment: Slight hemolysis detected by analyzer. Results may be affected.        Chloride 99 mmol/L      CO2 16.4 mmol/L      Calcium 9.4 mg/dL      Total Protein 7.2 g/dL      Albumin 4.6 g/dL      ALT (SGPT) 101 U/L      AST (SGOT) 65 U/L      Alkaline Phosphatase 114 U/L      Total Bilirubin 0.6 mg/dL      Globulin 2.6 gm/dL      A/G Ratio 1.8 g/dL      BUN/Creatinine Ratio 22.7     Anion Gap 19.6 mmol/L      eGFR 44.3 mL/min/1.73     Narrative:      GFR Normal >60  Chronic Kidney Disease <60  Kidney Failure <15      High Sensitivity Troponin T [[id number]]  (Abnormal) Collected: 07/29/23 2011    Specimen: Blood Updated: 07/29/23 2042     HS Troponin T 18 ng/L     Narrative:      High Sensitive Troponin T Reference Range:  <10.0 ng/L- Negative Female for AMI  <15.0 ng/L- Negative Male for AMI  >=10 -  Abnormal Female indicating possible myocardial injury.  >=15 - Abnormal Male indicating possible myocardial injury.   Clinicians would have to utilize clinical acumen, EKG, Troponin, and serial changes to determine if it is an Acute Myocardial Infarction or myocardial injury due to an underlying chronic condition.         Scan Slide [[id number]] Collected: 07/29/23 2011    Specimen: Blood Updated: 07/29/23 2029     RBC Morphology Normal     WBC Morphology Normal     Platelet Estimate Increased    Blood Gas, Venous With Co-Ox [[id number]]  (Abnormal) Collected: 07/29/23 2100    Specimen: Venous Blood Updated: 07/29/23 2100     Site OTHER     pH, Venous 7.383 pH Units      pCO2, Venous 33.1 mm Hg      Comment: 84 Value below reference range        pO2, Venous 65.6 mm Hg      Comment: 83 Value above reference range        HCO3, Venous 19.7 mmol/L      Comment: 84 Value below reference range        Base Excess, Venous -4.2 mmol/L      Comment: 84 Value below reference range        O2 Saturation, Venous 94.1 %      Comment: 83 Value above reference range        Oxyhemoglobin Venous 93.1 %      Comment: 83 Value above reference range        Methemoglobin Venous 0.0 %      Carboxyhemoglobin Venous 1.1 %      Barometric Pressure for Blood Gas 734 mmHg      Modality Room Air     FIO2 21 %      Ventilator Mode NA     Note --     Collected by [id number]     Comment: Meter: [id number]     :  [id number]       High Sensitivity Troponin T 2Hr [[id number]]  (Abnormal) Collected: 07/29/23 2210    Specimen: Blood Updated: 07/29/23 2231     HS Troponin T 19 ng/L      Troponin T Delta 1 ng/L     Narrative:      High Sensitive Troponin T Reference Range:  <10.0 ng/L- Negative Female for AMI  <15.0 ng/L- Negative Male for AMI  >=10 - Abnormal Female indicating possible myocardial injury.  >=15 - Abnormal Male indicating possible myocardial injury.   Clinicians would have to utilize clinical acumen, EKG, Troponin, and serial changes  to determine if it is an Acute Myocardial Infarction or myocardial injury due to an underlying chronic condition.         Urinalysis With Culture If Indicated - Urine, Clean Catch [[id number]]  (Abnormal) Collected: 07/29/23 2210    Specimen: Urine, Clean Catch Updated: 07/29/23 2221     Color, UA Yellow     Appearance, UA Clear     pH, UA 5.5     Specific Gravity, UA 1.017     Glucose, UA Negative     Ketones, UA Negative     Bilirubin, UA Negative     Blood, UA Negative     Protein, UA 30 mg/dL (1+)     Leuk Esterase, UA Trace     Nitrite, UA Negative     Urobilinogen, UA 0.2 E.U./dL    Narrative:      In absence of clinical symptoms, the presence of pyuria, bacteria, and/or nitrites on the urinalysis result does not correlate with infection.    Urinalysis, Microscopic Only - Urine, Clean Catch [[id number]]  (Abnormal) Collected: 07/29/23 2210    Specimen: Urine, Clean Catch Updated: 07/29/23 2236     RBC, UA None Seen /HPF      WBC, UA 0-2 /HPF      Bacteria, UA None Seen /HPF      Squamous Epithelial Cells, UA 0-2 /HPF      Hyaline Casts, UA None Seen /LPF      Methodology Manual Light Microscopy    POC Glucose Once [[id number]]  (Abnormal) Collected: 07/30/23 0024    Specimen: Blood Updated: 07/30/23 0031     Glucose 228 mg/dL      Comment: Serial Number: [id number]:  [id number]       POC Glucose Once [[id number]]  (Abnormal) Collected: 07/30/23 0124    Specimen: Blood Updated: 07/30/23 0128     Glucose 245 mg/dL      Comment: Serial Number: [id number]:  [id number]       Comprehensive Metabolic Panel [[id number]]  (Abnormal) Collected: 07/30/23 0134    Specimen: Blood Updated: 07/30/23 0159     Glucose 273 mg/dL      Comment: Glucose >180, Hemoglobin A1C recommended.        BUN 28 mg/dL      Creatinine 1.29 mg/dL      Sodium 137 mmol/L      Potassium 3.9 mmol/L      Chloride 103 mmol/L      CO2 13.5 mmol/L      Calcium 8.9 mg/dL      Total Protein 7.0 g/dL      Albumin 4.5 g/dL      ALT (SGPT) 90 U/L       AST (SGOT) 53 U/L      Alkaline Phosphatase 109 U/L      Total Bilirubin 0.5 mg/dL      Globulin 2.5 gm/dL      A/G Ratio 1.8 g/dL      BUN/Creatinine Ratio 21.7     Anion Gap 20.5 mmol/L      eGFR 45.6 mL/min/1.73     Narrative:      GFR Normal >60  Chronic Kidney Disease <60  Kidney Failure <15      Salicylate Level [[id number]]  (Normal) Collected: 07/30/23 0134    Specimen: Blood Updated: 07/30/23 0318     Salicylate <0.3 mg/dL     Acetaminophen Level [[id number]]  (Normal) Collected: 07/30/23 0134    Specimen: Blood Updated: 07/30/23 0318     Acetaminophen <5.0 mcg/mL     Narrative:      Toxic = Greater than 150 mcg/mL    Lactic Acid, Plasma [[id number]]  (Abnormal) Collected: 07/30/23 0239    Specimen: Blood Updated: 07/30/23 0306     Lactate 3.7 mmol/L              CT Abdomen Pelvis With Contrast    Result Date: 7/29/2023  FINAL REPORT TECHNIQUE: Thin section axial imaging from the lung bases through the symphysis pubis were performed with the administration of intravenous contrast.  This study was performed with techniques to keep radiation doses as low as reasonably achievable, (ALARA). Individualized dose reduction techniques using automated exposure control or adjustment of mA and/or kV according to the patient's size were employed. CLINICAL HISTORY: r/o abdominal infection     This is a 68-year-old female, history of HAMMAD, anemia, depression, diabetes, heart attack, hyperlipidemia and hypertension presents emergency room today for evaluation of chest pain.  Patient reports that onset was earlier this date at [facility] when she felt chest pain that began radiating down the left arm.  She denies any current chest pain. Denies any shortness of air.  She reports chronic nonproductive cough likely due to allergies for multiple months.  She felt febrile earlier today with subjective fevers but no measurable fevers.  She is not reporting nausea vomiting or diarrhea.  She reports feeling fatigued as well.  No  known sick contacts.  She does tell me that she lives at home by herself and got to thinking about living at home by herself where she subsequently developed some chest pain.  Located over the left anterior chest wall.  No known injuries.  Does not radiate straight through to the back.  Does not radiate into the neck.  No nausea or vomiting or diaphoresis associated with this.  She does not report any unilateral leg pain or swelling.  She additionally reports some itching and wheezing.  She additionally reports some spasms intermittently in the lower back, none currently. No chest pain currently. FINDINGS: The lung bases are clear.  The liver is unremarkable.  The gallbladder appears unremarkable.  Splenomegaly measures up to 16 cm.  The pancreas demonstrates normal enhancement with normal contours.  The adrenal glands are unremarkable.  The kidneys demonstrate renal cortical scarring bilaterally, but no stones or hydronephrosis.  The aorta has normal contour without significant plaque formation.  The GI tract demonstrates no obstruction.  There is no bowel wall thickening or inflammatory process.  The appendix is normal.  There is no free fluid or inflammatory process.  There is no adenopathy or mass.  There are no acute bony abnormalities.     Impression: Splenomegaly.  Renal cortical scarring.  No hydronephrosis.  No free air or free fluid. Authenticated and Electronically Signed by Barry Peña MD on 07/29/2023 11:44:13 PM    CT Angiogram Chest Pulmonary Embolism    Result Date: 7/29/2023  FINAL REPORT TECHNIQUE: Postcontrast axial images of the chest were performed in a CTA protocol. The study was performed with techniques to keep radiation dose as low as reasonably achievable, (ALARA). Individual dose reduction techniques using automated exposure control or adjustment of mA and/or kV according to the patient's size were employed. CLINICAL HISTORY: rule out PE and pneumonia     This is a 68-year-old female,  history of HAMMAD, anemia, depression, diabetes, heart attack, hyperlipidemia and hypertension presents emergency room today for evaluation of chest pain.  Patient reports that onset was earlier this date at [facility] when she felt chest pain that began radiating down the left arm.  She denies any current chest pain. Denies any shortness of air.  She reports chronic nonproductive cough likely due to allergies for multiple months.  She felt febrile earlier today with subjective fevers but no measurable fevers.  She is not reporting nausea vomiting or diarrhea.  She reports feeling fatigued as well.  No known sick contacts.  She does tell me that she lives at home by herself and got to thinking about living at home by herself where she subsequently developed some chest pain.  Located over the left anterior chest wall.  No known injuries.  Does not radiate straight through to the back.  Does not radiate into the neck.  No nausea or vomiting or diaphoresis associated with this.  She does not report any unilateral leg pain or swelling.  She additionally reports some itching and wheezing.  She additionally reports some spasms intermittently in the lower back, none currently. No chest pain currently. FINDINGS: The thoracic inlet is unremarkable.  Irregular calcified plaque of the descending thoracic aorta and mild coronary artery calcifications are noted.  There is no aneurysm or dissection. There is minimal pulmonary artery prominence suggesting pulmonary arterial hypertension.  The pulmonary arteries are well enhanced.  There is no pulmonary embolism.  The heart is normal in size.  There are no pleural or pericardial effusions. There is no adenopathy.  There is moderate emphysema.  There is no edema, infiltrate, or suspicious pulmonary nodule.  Limited imaging of the upper abdomen is unremarkable.     Impression: 1.  No pulmonary embolism, dissection, or aneurysm.  2.  There is no edema, infiltrate, adenopathy, or effusion.  Authenticated and Electronically Signed by Barry Peña MD on 07/29/2023 11:44:12 PM        [facility]     Amount and/or Complexity of Data Reviewed  Decide to obtain previous medical records or to obtain history from someone other than the patient: yes        Initial impression of presenting illness: This is a 68-year-old female presents emergency room today for evaluation of chest pain    DDX: includes but is not limited to: ACS, electrolyte abnormality, pneumothorax, DKA, pneumonia, COPD exacerbation, viral illness, other    Patient arrives hemodynamically stable, some hypertension at 190/93, afebrile, not on hypoxic and nontoxic-appearing with vitals interpreted by myself.     Pertinent features from physical exam: There is wheezing present diffusely throughout the lungs that is mild in nature.  Moving good air bilaterally.  Cardiac auscultation with regular rate and rhythm, lungs were clear bilaterally.  Abdominal exam soft and nontender to palpation.  2+ radial pulses bilaterally neurovascular intact..    Initial diagnostic plan: CBC, CMP, troponin, EKG, chest x-ray, lactic acid, salicylate, acetaminophen levels    Results from initial plan were reviewed and interpreted by me revealing CBC with a white blood cell count of 24.35, hemoglobin of 17.5, hematocrit of 55.9, platelets of 1011.  Absolute neutrophils of 21.56.  EKG revealed occasional PVCs with sinus rhythm nonspecific Q wave at a rate of 91 with ST segment depression.  Initial high-sensitivity troponin of 18 with a repeat of 19 with a delta +1.  Urine clean-catch with trace leukocytes, protein, 0-2 white blood cells, otherwise nitrite negative.  Does not appear concerning for acute urinary tract infection.  Blood gas venous with normal pH, PCO2 of 33.1 and HCO3 of 19.7 with a PO2 venous of 65.6.  CMP with a glucose of 290, creatinine of 1.32, sodium of 135, CO2 of 16.4, ALT of 101 and AST of 65 anion gap of 19.6 and an EGFR of 44.3.  Appears patient has  chronic elevation in white blood cell count, RBCs, hemoglobin, and platelets.  Heart score 5.    CT abdomen pelvis per radiology with splenomegaly otherwise unremarkable, CT angiogram chest pulmonary embolism with aortic calcifications otherwise negative.        Repeat CMP revealed glucose of 273, BUN of 28, creatinine of 1.29, anion gap increased to 20.5, and a decrease of CO2 to 13.5.    Concern for progression to DKA given that bicarb decreased to 13.5 which is less than 15, glucose levels remaining persistently elevated and increasing despite 2 L of fluids and 10 units of subcutaneous insulin.  Additionally, anion gap increased to 20.5.  Although she does not meet all criteria for DKA, there is concern for progression to DKA given worsening responses as noted above after insulin and fluid rehydration.      Diagnostic information from other sources: Chart was reviewed.    Interventions / Re-evaluation: Given IV Benadryl, DuoNeb, Solu-Medrol and 1 L of fluids.  Given second liter of fluids and 10 units of subcutaneous insulin given that patient does not technically meet DKA criteria but comes close to meeting DKA criteria with anion gap, blood glucose level.  Patient's bicarb level is above criteria for DKA, no ketones in urine.  pH is normal.    Results/clinical rationale were discussed with patient at bedside.  She is agreeable to admission.    Consultations/Discussion of results with other physicians: Discussed with Dr. Ace, hospitalist for admission for concern for progression to DKA.  He told me that he would review the patient's chart and give me a call back.    Disposition plan: Admit to the hospitalist.  -----    Final diagnoses:   HAMMAD (acute kidney injury)   Hyperglycemia          Jose M Alvarado, DO  07/30/23 0427

## 2023-07-30 NOTE — PLAN OF CARE
Goal Outcome Evaluation:  Plan of Care Reviewed With: patient        Progress: improving  Outcome Evaluation: A/O, VSS, RA, insulin gtt discontinued today. SubQ glucommander initiated. Multiple BM's today. C/O headache x 2, prn's administered. Otherwise no complaints.

## 2023-07-30 NOTE — ED PROVIDER NOTES
"Subjective  History of Present Illness: Patient admitted for DKA, poor venous access.  Requested by hospitalist service to place central line to facilitate treatment    Referring physician: Dr. Kerley    Physical Exam:  /72   Pulse 82   Temp 98 øF (36.7 øC) (Temporal)   Resp 18   Ht 154.9 cm (61\")   Wt 74.4 kg (164 lb 0.4 oz)   SpO2 94%   BMI 30.99 kg/mý      Physical Exam    CONSTITUTIONAL: Well developed, nontoxic 68-year-old female,  in no acute distress.  VITAL SIGNS: per nursing, reviewed and noted  SKIN: exposed skin with no rashes, ulcerations or petechiae  EYES: Grossly EOMI, no icterus  ENT: Normal voice.  Moist mucous membranes   RESPIRATORY:  No increased work of breathing. No retractions.   CARDIOVASCULAR:   Extremities pink and warm.    GI: Abdomen without distention   MUSCULOSKELETAL: Age-appropriate bulk and tone, moves all 4 extremities  NEUROLOGIC: Alert, oriented x 3. No gross deficits. GCS 15.   PSYCH: appropriate affect.    Central Line At Bedside    Date/Time: 7/30/2023 11:12 AM  Performed by: Hernesto Castanon DO  Authorized by: Kerley, Brian Joseph, DO     Consent:     Consent obtained:  Written    Consent given by:  Patient    Risks discussed:  Arterial puncture, incorrect placement, nerve damage, pneumothorax, infection and bleeding  Universal protocol:     Patient identity confirmed:  Verbally with patient  Pre-procedure details:     Indication(s): central venous access      Hand hygiene: Hand hygiene performed prior to insertion      Sterile barrier technique: All elements of maximal sterile technique followed      Skin preparation:  Chlorhexidine with alcohol    Skin preparation agent: Skin preparation agent completely dried prior to procedure    Anesthesia:     Anesthesia method:  Local infiltration    Local anesthetic:  Lidocaine 1% w/o epi  Procedure details:     Location:  R internal jugular    Patient position:  Trendelenburg    Procedural supplies:  Triple lumen    " Catheter size:  7 Fr    Landmarks identified: yes      Ultrasound guidance: yes      Ultrasound guidance timing: prior to insertion and real time      Sterile ultrasound techniques: Sterile gel and sterile probe covers were used      Number of attempts:  1    Successful placement: yes    Post-procedure details:     Post-procedure:  Dressing applied and line sutured    Assessment:  Blood return through all ports and free fluid flow    Procedure completion:  Tolerated well, no immediate complications  Comments:      Chest x-ray pending      Final diagnoses:   HAMMAD (acute kidney injury)   Hyperglycemia            Hernesto Castanon, DO  07/30/23 1116       Hernesto Castanon,   07/30/23 1123

## 2023-07-30 NOTE — PLAN OF CARE
Goal Outcome Evaluation:  Plan of Care Reviewed With: patient        Progress: no change  Outcome Evaluation: Patient admitted this am and started on glucommander, VSS

## 2023-07-31 ENCOUNTER — READMISSION MANAGEMENT (OUTPATIENT)
Dept: CALL CENTER | Facility: HOSPITAL | Age: 68
End: 2023-07-31
Payer: MEDICARE

## 2023-07-31 VITALS
TEMPERATURE: 98 F | OXYGEN SATURATION: 94 % | RESPIRATION RATE: 18 BRPM | DIASTOLIC BLOOD PRESSURE: 66 MMHG | SYSTOLIC BLOOD PRESSURE: 149 MMHG | BODY MASS INDEX: 30.51 KG/M2 | HEART RATE: 78 BPM | WEIGHT: 161.6 LBS | HEIGHT: 61 IN

## 2023-07-31 PROBLEM — E87.29 HIGH ANION GAP METABOLIC ACIDOSIS: Status: RESOLVED | Noted: 2023-07-30 | Resolved: 2023-07-31

## 2023-07-31 LAB
ANION GAP SERPL CALCULATED.3IONS-SCNC: 11.7 MMOL/L (ref 5–15)
BASOPHILS # BLD AUTO: 0.06 10*3/MM3 (ref 0–0.2)
BASOPHILS NFR BLD AUTO: 0.3 % (ref 0–1.5)
BUN SERPL-MCNC: 30 MG/DL (ref 8–23)
BUN/CREAT SERPL: 23.8 (ref 7–25)
CALCIUM SPEC-SCNC: 8.5 MG/DL (ref 8.6–10.5)
CHLORIDE SERPL-SCNC: 110 MMOL/L (ref 98–107)
CO2 SERPL-SCNC: 19.3 MMOL/L (ref 22–29)
CREAT SERPL-MCNC: 1.26 MG/DL (ref 0.57–1)
DEPRECATED RDW RBC AUTO: 51.6 FL (ref 37–54)
EGFRCR SERPLBLD CKD-EPI 2021: 46.9 ML/MIN/1.73
EOSINOPHIL # BLD AUTO: 0 10*3/MM3 (ref 0–0.4)
EOSINOPHIL NFR BLD AUTO: 0 % (ref 0.3–6.2)
ERYTHROCYTE [DISTWIDTH] IN BLOOD BY AUTOMATED COUNT: 18.8 % (ref 12.3–15.4)
GLUCOSE BLDC GLUCOMTR-MCNC: 114 MG/DL (ref 70–130)
GLUCOSE SERPL-MCNC: 127 MG/DL (ref 65–99)
HCT VFR BLD AUTO: 52.9 % (ref 34–46.6)
HGB BLD-MCNC: 16.7 G/DL (ref 12–15.9)
IMM GRANULOCYTES # BLD AUTO: 0.34 10*3/MM3 (ref 0–0.05)
IMM GRANULOCYTES NFR BLD AUTO: 1.8 % (ref 0–0.5)
LYMPHOCYTES # BLD AUTO: 0.46 10*3/MM3 (ref 0.7–3.1)
LYMPHOCYTES NFR BLD AUTO: 2.5 % (ref 19.6–45.3)
MCH RBC QN AUTO: 25.7 PG (ref 26.6–33)
MCHC RBC AUTO-ENTMCNC: 31.6 G/DL (ref 31.5–35.7)
MCV RBC AUTO: 81.3 FL (ref 79–97)
MONOCYTES # BLD AUTO: 0.99 10*3/MM3 (ref 0.1–0.9)
MONOCYTES NFR BLD AUTO: 5.3 % (ref 5–12)
NEUTROPHILS NFR BLD AUTO: 16.78 10*3/MM3 (ref 1.7–7)
NEUTROPHILS NFR BLD AUTO: 90.1 % (ref 42.7–76)
NRBC BLD AUTO-RTO: 0.1 /100 WBC (ref 0–0.2)
PLATELET # BLD AUTO: 604 10*3/MM3 (ref 140–450)
PMV BLD AUTO: 9.5 FL (ref 6–12)
POTASSIUM SERPL-SCNC: 4.2 MMOL/L (ref 3.5–5.2)
RBC # BLD AUTO: 6.51 10*6/MM3 (ref 3.77–5.28)
SODIUM SERPL-SCNC: 141 MMOL/L (ref 136–145)
WBC NRBC COR # BLD: 18.63 10*3/MM3 (ref 3.4–10.8)

## 2023-07-31 PROCEDURE — G0378 HOSPITAL OBSERVATION PER HR: HCPCS

## 2023-07-31 PROCEDURE — 80048 BASIC METABOLIC PNL TOTAL CA: CPT | Performed by: STUDENT IN AN ORGANIZED HEALTH CARE EDUCATION/TRAINING PROGRAM

## 2023-07-31 PROCEDURE — 82948 REAGENT STRIP/BLOOD GLUCOSE: CPT

## 2023-07-31 PROCEDURE — 96372 THER/PROPH/DIAG INJ SC/IM: CPT

## 2023-07-31 PROCEDURE — 96376 TX/PRO/DX INJ SAME DRUG ADON: CPT

## 2023-07-31 PROCEDURE — 25010000002 ENOXAPARIN PER 10 MG: Performed by: INTERNAL MEDICINE

## 2023-07-31 PROCEDURE — 63710000001 INSULIN ASPART PER 5 UNITS: Performed by: STUDENT IN AN ORGANIZED HEALTH CARE EDUCATION/TRAINING PROGRAM

## 2023-07-31 PROCEDURE — 85025 COMPLETE CBC W/AUTO DIFF WBC: CPT | Performed by: STUDENT IN AN ORGANIZED HEALTH CARE EDUCATION/TRAINING PROGRAM

## 2023-07-31 PROCEDURE — 25010000002 HYDRALAZINE PER 20 MG: Performed by: INTERNAL MEDICINE

## 2023-07-31 RX ADMIN — INSULIN ASPART 4 UNITS: 100 INJECTION, SOLUTION INTRAVENOUS; SUBCUTANEOUS at 09:08

## 2023-07-31 RX ADMIN — Medication 10 ML: at 09:11

## 2023-07-31 RX ADMIN — SENNOSIDES AND DOCUSATE SODIUM 2 TABLET: 50; 8.6 TABLET ORAL at 09:09

## 2023-07-31 RX ADMIN — FLUOXETINE 60 MG: 20 CAPSULE ORAL at 09:09

## 2023-07-31 RX ADMIN — ENOXAPARIN SODIUM 40 MG: 100 INJECTION SUBCUTANEOUS at 09:08

## 2023-07-31 RX ADMIN — DICYCLOMINE HYDROCHLORIDE 20 MG: 10 CAPSULE ORAL at 09:09

## 2023-07-31 RX ADMIN — CARVEDILOL 25 MG: 25 TABLET, FILM COATED ORAL at 09:09

## 2023-07-31 RX ADMIN — ATORVASTATIN CALCIUM 80 MG: 80 TABLET, FILM COATED ORAL at 09:09

## 2023-07-31 RX ADMIN — LISINOPRIL 20 MG: 20 TABLET ORAL at 09:09

## 2023-07-31 RX ADMIN — AMLODIPINE BESYLATE 10 MG: 5 TABLET ORAL at 09:09

## 2023-07-31 RX ADMIN — BUDESONIDE AND FORMOTEROL FUMARATE DIHYDRATE 1 PUFF: 160; 4.5 AEROSOL RESPIRATORY (INHALATION) at 09:10

## 2023-07-31 RX ADMIN — ASPIRIN 81 MG: 81 TABLET, COATED ORAL at 09:09

## 2023-07-31 RX ADMIN — HYDRALAZINE HYDROCHLORIDE 10 MG: 20 INJECTION, SOLUTION INTRAMUSCULAR; INTRAVENOUS at 06:31

## 2023-07-31 NOTE — PLAN OF CARE
Problem: Adult Inpatient Plan of Care  Goal: Plan of Care Review  Outcome: Ongoing, Progressing  Flowsheets (Taken 7/31/2023 0644)  Progress: improving  Plan of Care Reviewed With: patient  Outcome Evaluation: A/O, VSS, RA. SQ glucommander maintained. up to bedside cammode without issues.. hydralazine admisniteed 1x for htn SBP > 150, prn's administered. Otherwise no complaints.     Problem: Adult Inpatient Plan of Care  Goal: Absence of Hospital-Acquired Illness or Injury  Intervention: Identify and Manage Fall Risk  Recent Flowsheet Documentation  Taken 7/31/2023 0400 by Rory Ball RN  Safety Promotion/Fall Prevention:   activity supervised   clutter free environment maintained   fall prevention program maintained   lighting adjusted   room organization consistent   safety round/check completed  Taken 7/31/2023 0000 by Rory Ball RN  Safety Promotion/Fall Prevention:   activity supervised   clutter free environment maintained   fall prevention program maintained   lighting adjusted   room organization consistent   safety round/check completed  Taken 7/30/2023 2000 by Rory Ball RN  Safety Promotion/Fall Prevention:   activity supervised   clutter free environment maintained   fall prevention program maintained   lighting adjusted   room organization consistent   safety round/check completed  Intervention: Prevent Skin Injury  Recent Flowsheet Documentation  Taken 7/31/2023 0400 by Rory Ball RN  Body Position:   position changed independently   legs elevated  Taken 7/31/2023 0000 by Rory Ball RN  Body Position:   position changed independently   neutral head position   neutral body alignment  Taken 7/30/2023 2000 by Rory Ball RN  Body Position:   position changed independently   sitting up in bed  Intervention: Prevent and Manage VTE (Venous Thromboembolism) Risk  Recent Flowsheet Documentation  Taken 7/31/2023 0400 by Rory Ball RN  Activity Management: activity encouraged  VTE Prevention/Management:  sequential compression devices on  Range of Motion: active ROM (range of motion) encouraged  Taken 7/31/2023 0000 by Rory Ball RN  Activity Management: activity encouraged  VTE Prevention/Management: sequential compression devices on  Range of Motion: active ROM (range of motion) encouraged  Taken 7/30/2023 2000 by Rory Ball RN  Activity Management: activity encouraged  VTE Prevention/Management: sequential compression devices on  Range of Motion: active ROM (range of motion) encouraged  Intervention: Prevent Infection  Recent Flowsheet Documentation  Taken 7/31/2023 0400 by Rory Ball RN  Infection Prevention:   cohorting utilized   environmental surveillance performed   equipment surfaces disinfected   personal protective equipment utilized   hand hygiene promoted   single patient room provided   rest/sleep promoted  Taken 7/31/2023 0000 by Rory Ball RN  Infection Prevention:   cohorting utilized   environmental surveillance performed   equipment surfaces disinfected   hand hygiene promoted   rest/sleep promoted   personal protective equipment utilized   single patient room provided  Taken 7/30/2023 2000 by Rory Ball RN  Infection Prevention:   cohorting utilized   environmental surveillance performed   equipment surfaces disinfected   hand hygiene promoted   personal protective equipment utilized   rest/sleep promoted   single patient room provided  Goal: Optimal Comfort and Wellbeing  Intervention: Provide Person-Centered Care  Recent Flowsheet Documentation  Taken 7/30/2023 2000 by Rory Ball RN  Trust Relationship/Rapport:   care explained   choices provided   questions encouraged   thoughts/feelings acknowledged   reassurance provided     Problem: Pain Chronic (Persistent) (Comorbidity Management)  Goal: Acceptable Pain Control and Functional Ability  Intervention: Manage Persistent Pain  Recent Flowsheet Documentation  Taken 7/31/2023 0000 by Rory Ball RN  Medication Review/Management:  medications reviewed  Taken 7/30/2023 2000 by Rory Ball RN  Medication Review/Management: medications reviewed     Problem: Skin Injury Risk Increased  Goal: Skin Health and Integrity  Intervention: Optimize Skin Protection  Recent Flowsheet Documentation  Taken 7/31/2023 0400 by Rory Ball RN  Head of Bed (HOB) Positioning: HOB at 30 degrees  Taken 7/31/2023 0000 by Rory Ball RN  Head of Bed (HOB) Positioning: HOB at 20-30 degrees  Taken 7/30/2023 2000 by Rory Ball RN  Head of Bed (HOB) Positioning: HOB at 45 degrees   Goal Outcome Evaluation:  Plan of Care Reviewed With: patient        Progress: improving  Outcome Evaluation: A/O, VSS, RA. SQ glucommander maintained. up to bedside cammode without issues.. hydralazine admisniteed 1x for htn SBP > 150, prn's administered. Otherwise no complaints.

## 2023-07-31 NOTE — PROGRESS NOTES
Nutrition Services    Patient Name:  Martha Murray  YOB: 1955  MRN: [id number]  Admit Date:  7/29/2023    CDE consulted for insulin drip. A1c 5.9%, diet education not appropriate at this time and RD is not certified to provide education regarding insulin. Available PRN.     Electronically signed by:  Lubna Collins RD  07/31/23 09:54 EDT

## 2023-07-31 NOTE — CASE MANAGEMENT/SOCIAL WORK
Case Management Discharge Note                Selected Continued Care - Discharged on 7/31/2023 Admission date: 7/29/2023 - Discharge disposition: Home or Self Care      Destination    No services have been selected for the patient.                Durable Medical Equipment    No services have been selected for the patient.                Dialysis/Infusion    No services have been selected for the patient.                Home Medical Care    No services have been selected for the patient.                Therapy    No services have been selected for the patient.                Community Resources    No services have been selected for the patient.                Community & DME    No services have been selected for the patient.                    Transportation Services  Private: Car    Final Discharge Disposition Code: 01 - home or self-care

## 2023-07-31 NOTE — DISCHARGE SUMMARY
[facility]   DISCHARGE SUMMARY      Name:  Martha Murray   Age:  68 y.o.  Sex:  female  :  1955  MRN:  [id number]   Visit Number:  [id number]    Admission Date:  2023  Date of Discharge:  2023  Primary Care Physician:  Julia Stacy MD    Important issues to note:    -Stable for discharge home on 2023.  Hospitalization for high anion gap acidosis, suspected associated with her chronic increased cell counts, acidosis improved during course.  May also have been associated with uncontrolled blood pressure.  -Patient to follow-up with PCP regarding her chronic conditions including thrombocytosis, CKD, chronic erythrocytosis, type 2 diabetes, splenomegaly.  Her A1c appears to be well controlled with diet, A1c 5.9%.    Discharge Diagnoses:     Splenomegaly  CKD  Essential thrombocytosis  Chronic leukocytosis  High anion gap metabolic acidosis, resolved      Problem List:     Active Hospital Problems    Diagnosis  POA    Splenomegaly [R16.1]  Yes    Lactic acidosis [E87.20]  Yes    CKD (chronic kidney disease) stage 3, GFR 30-59 ml/min [N18.30]  Yes    Essential thrombocytosis [D47.3]  Yes    Type 2 diabetes mellitus without complication, without long-term current use of insulin [E11.9]  Yes      Resolved Hospital Problems    Diagnosis Date Resolved POA    **High anion gap metabolic acidosis [E87.29] 2023 Yes     Presenting Problem:    Chief Complaint   Patient presents with    Chest Pain      Consults:     Consulting Physician(s)               None            Procedures Performed:    None    History of presenting illness/Hospital Course:    Patient is a 68-year-old woman with a history of diabetes mellitus type 2, essential thrombocytosis with positive JAK2 mutation previously on hydroxyurea , chronic leukocytosis, chronic erythrocytosis, hyperlipidemia, tobacco use, chronic kidney disease, presents with left upper extremity pain.  The pain occurred  acutely evening of presentation.  Was not associated with injury.  Was not associated with overuse.  Was in the left shoulder and upper arm.  Was a numbness and tingling.  Had resolved by the time of my exam.  Reports she has also had intermittent numbness and tingling in her fingers and toes.  But that this comes and goes.  She denied nausea she denied abdominal pain.  In the emergency department they were concerned about possible DKA so hospitalist were consulted for admission.  She elects to be full code.     Pertinent findings: Sodium 137 bicarb 13.5, anion gap 20.5, BUN 28, creatinine 1.29, glucose 2 28-40 5-73, AST 53 ALT 90, lactic acid 3.7 status post 2 L boluses, platelets 1011, hemoglobin 17.5, WBC 24.35, urinalysis 30 protein trace leukocytes no ketones.  Acetaminophen undetected.  Salicylate undetected.  CT abdomen shows splenomegaly renal cortical scarring.  EKG shows sinus rhythm with occasional APCs with nonspecific T wave changes.    Patient's elevated lactic acid with high anion gap acidosis may be related to small vessel ischemia due to hyperviscosity with her chronic increased cell counts.    -Stable for discharge home on 7/31/2023.  Hospitalization for high anion gap acidosis, suspected associated with her chronic increased cell counts, acidosis improved during course.  May also have been associated with uncontrolled blood pressure.  -Patient to follow-up with PCP regarding her chronic conditions including thrombocytosis, CKD, chronic erythrocytosis, type 2 diabetes, splenomegaly.  Her A1c appears to be well controlled with diet, A1c 5.9%.    Vital Signs:    Temp:  [97.5 øF (36.4 øC)-98 øF (36.7 øC)] 98 øF (36.7 øC)  Heart Rate:  [] 102  Resp:  [18-19] 19  BP: (124-180)/(48-87) 163/76    Physical Exam:    General Appearance:  Alert and cooperative.    Head:  Atraumatic and normocephalic.   Eyes: Conjunctivae and sclerae normal, no icterus. No pallor.   Ears:  Ears with no abnormalities  noted.   Throat: No oral lesions, no thrush, oral mucosa moist.   Neck: Supple, trachea midline, no thyromegaly.   Back:   No kyphoscoliosis present. No tenderness to palpation.   Lungs:   Breath sounds heard bilaterally equally.  No crackles or wheezing. No Pleural rub or bronchial breathing.   Heart:  Normal S1 and S2, no murmur, no gallop, no rub. No JVD.   Abdomen:   Normal bowel sounds, no masses, no organomegaly. Soft, nontender, nondistended, no rebound tenderness.   Extremities: Supple, no edema, no cyanosis, no clubbing.   Pulses: Pulses palpable bilaterally.   Skin: Warm.   Neurologic: Alert and oriented x 3. No facial asymmetry. Moves all four limbs. No tremors.     Pertinent Lab Results:     Results from last 7 days   Lab Units 07/31/23 0430 07/30/23 2010 07/30/23  1557 07/30/23  1223 07/30/23  0827 07/30/23  0134 07/29/23 2011   SODIUM mmol/L 141 138 139   < > 141 137 135*   POTASSIUM mmol/L 4.2 4.5 4.3   < > 3.9 3.9 3.6   CHLORIDE mmol/L 110* 106 106   < > 107 103 99   CO2 mmol/L 19.3* 18.4* 17.3*   < > 15.7* 13.5* 16.4*   BUN mg/dL 30* 29* 28*   < > 24* 28* 30*   CREATININE mg/dL 1.26* 1.23* 1.21*   < > 1.14* 1.29* 1.32*   CALCIUM mg/dL 8.5* 8.8 8.7   < > 8.9 8.9 9.4   BILIRUBIN mg/dL  --   --   --   --  0.5 0.5 0.6   ALK PHOS U/L  --   --   --   --  102 109 114   ALT (SGPT) U/L  --   --   --   --  81* 90* 101*   AST (SGOT) U/L  --   --   --   --  44* 53* 65*   GLUCOSE mg/dL 127* 117* 143*   < > 179* 273* 290*    < > = values in this interval not displayed.     Results from last 7 days   Lab Units 07/31/23 0430 07/30/23  0827 07/29/23 2011   WBC 10*3/mm3 18.63* 21.67* 24.35*   HEMOGLOBIN g/dL 16.7* 17.5* 17.5*   HEMATOCRIT % 52.9* 54.7* 55.9*   PLATELETS 10*3/mm3 604* 654* 1,011*         Results from last 7 days   Lab Units 07/29/23  2210 07/29/23 2011   HSTROP T ng/L 19* 18*                           Pertinent Radiology Results:    Imaging Results (All)       Procedure Component Value Units  Date/Time    XR Chest 1 View [[id number]] Collected: 07/30/23 1009     Updated: 07/30/23 1112    Narrative:      PORTABLE CHEST    7/30/2023 12:02 PM      HISTORY: Central line placement.     COMPARISON: 1 day prior.     FINDINGS: Normal heart size. Old granulomatous disease. No  consolidation. No pleural effusion or pneumothorax. Right IJ central  line, tip in low SVC.        Impression:      Well-positioned right IJ central line without pneumothorax.     This report was signed and finalized on 7/30/2023 11:10 AM by Dr Rey Addison DO.       XR Chest 1 View [[id number]] Collected: 07/30/23 0554     Updated: 07/30/23 0657    Narrative:      PORTABLE CHEST    7/29/2023 9:33 PM      HISTORY:  Precordial chest pain .     COMPARISON: October 2017.     FINDINGS: The heart is  normal in size . Small calcified nodes from old  granulomatous disease.  The lungs are clear .  There is no pneumothorax  . The osseous structures  are unremarkable .       Impression:      No acute cardiopulmonary process .        This report was signed and finalized on 7/30/2023 6:55 AM by Dr Rey Addison DO.       CT Abdomen Pelvis With Contrast [[id number]] Collected: 07/29/23 2344     Updated: 07/29/23 2345    Narrative:      FINAL REPORT    TECHNIQUE:  Thin section axial imaging from the lung bases through the  symphysis pubis were performed with the administration of  intravenous contrast.  This study was performed with techniques  to keep radiation doses as low as reasonably achievable,  (ALARA). Individualized dose reduction techniques using  automated exposure control or adjustment of mA and/or kV  according to the patient's size were employed.    CLINICAL HISTORY:  r/o abdominal infection     This is a 68-year-old female,  history of HAMMAD, anemia, depression, diabetes, heart attack,  hyperlipidemia and hypertension presents emergency room today  for evaluation of chest pain.  Patient reports that onset was  earlier this date at [facility] when she  felt chest pain that began  radiating down the left arm.  She denies any current chest pain.  Denies any shortness of air.  She reports chronic nonproductive  cough likely due to allergies for multiple months.  She felt  febrile earlier today with subjective fevers but no measurable  fevers.  She is not reporting nausea vomiting or diarrhea.  She  reports feeling fatigued as well.  No known sick contacts.  She  does tell me that she lives at home by herself and got to  thinking about living at home by herself where she subsequently  developed some chest pain.  Located over the left anterior chest  wall.  No known injuries.  Does not radiate straight through to  the back.  Does not radiate into the neck.  No nausea or  vomiting or diaphoresis associated with this.  She does not  report any unilateral leg pain or swelling.  She additionally  reports some itching and wheezing.  She additionally reports  some spasms intermittently in the lower back, none currently.  No chest pain currently.    FINDINGS:  The lung bases are clear.  The liver is unremarkable.  The  gallbladder appears unremarkable.  Splenomegaly measures up to  16 cm.  The pancreas demonstrates normal enhancement with normal  contours.  The adrenal glands are unremarkable.  The kidneys  demonstrate renal cortical scarring bilaterally, but no stones  or hydronephrosis.  The aorta has normal contour without  significant plaque formation.  The GI tract demonstrates no  obstruction.  There is no bowel wall thickening or inflammatory  process.  The appendix is normal.  There is no free fluid or  inflammatory process.  There is no adenopathy or mass.  There  are no acute bony abnormalities.      Impression:      Splenomegaly.  Renal cortical scarring.  No hydronephrosis.  No  free air or free fluid.    Authenticated and Electronically Signed by Barry Peña MD on  07/29/2023 11:44:13 PM    CT Angiogram Chest Pulmonary Embolism [[id number]] Collected: 07/29/23  2344     Updated: 07/29/23 2345    Narrative:      FINAL REPORT    TECHNIQUE:  Postcontrast axial images of the chest were performed in a CTA  protocol. The study was performed with techniques to keep  radiation dose as low as reasonably achievable, (ALARA).  Individual dose reduction techniques using automated exposure  control or adjustment of mA and/or kV according to the patient's  size were employed.    CLINICAL HISTORY:  rule out PE and pneumonia     This is a 68-year-old female,  history of HAMMAD, anemia, depression, diabetes, heart attack,  hyperlipidemia and hypertension presents emergency room today  for evaluation of chest pain.  Patient reports that onset was  earlier this date at [facility] when she felt chest pain that began  radiating down the left arm.  She denies any current chest pain.  Denies any shortness of air.  She reports chronic nonproductive  cough likely due to allergies for multiple months.  She felt  febrile earlier today with subjective fevers but no measurable  fevers.  She is not reporting nausea vomiting or diarrhea.  She  reports feeling fatigued as well.  No known sick contacts.  She  does tell me that she lives at home by herself and got to  thinking about living at home by herself where she subsequently  developed some chest pain.  Located over the left anterior chest  wall.  No known injuries.  Does not radiate straight through to  the back.  Does not radiate into the neck.  No nausea or  vomiting or diaphoresis associated with this.  She does not  report any unilateral leg pain or swelling.  She additionally  reports some itching and wheezing.  She additionally reports  some spasms intermittently in the lower back, none currently.  No chest pain currently.    FINDINGS:  The thoracic inlet is unremarkable.  Irregular calcified plaque  of the descending thoracic aorta and mild coronary artery  calcifications are noted.  There is no aneurysm or dissection.  There is minimal pulmonary  artery prominence suggesting  pulmonary arterial hypertension.  The pulmonary arteries are  well enhanced.  There is no pulmonary embolism.  The heart is  normal in size.  There are no pleural or pericardial effusions.  There is no adenopathy.  There is moderate emphysema.  There is  no edema, infiltrate, or suspicious pulmonary nodule.  Limited  imaging of the upper abdomen is unremarkable.      Impression:      1.  No pulmonary embolism, dissection, or aneurysm.  2.  There  is no edema, infiltrate, adenopathy, or effusion.    Authenticated and Electronically Signed by Barry Peña MD on  07/29/2023 11:44:12 PM            Echo:    Results for orders placed during the hospital encounter of 07/01/21    Adult Transthoracic Echo Complete W/ Cont if Necessary Per Protocol    Interpretation Summary  1.  Normal left ventricular size and systolic function, LVEF 60-65%.  2.  Grade 1 diastolic dysfunction.  3.  Mild concentric LVH.  4.  Moderately increased left atrial volume index.  5.  Normal right ventricular size and systolic function.  6.  Mild aortic regurgitation.    Condition on Discharge:      Stable.    Code status during the hospital stay:    Code Status and Medical Interventions:   Ordered at: 07/30/23 0422     Code Status (Patient has no pulse and is not breathing):    CPR (Attempt to Resuscitate)     Medical Interventions (Patient has pulse or is breathing):    Full Support     Release to patient:    Routine Release     Discharge Disposition:    Home or Self Care    Discharge Medications:       Discharge Medications        Continue These Medications        Instructions Start Date   albuterol sulfate  (90 Base) MCG/ACT inhaler  Commonly known as: PROVENTIL HFA;VENTOLIN HFA;PROAIR HFA   2 puffs, Inhalation, Every 4 Hours PRN      amLODIPine 10 MG tablet  Commonly known as: NORVASC   TAKE ONE TABLET BY MOUTH DAILY      aspirin 81 MG EC tablet   81 mg, Oral, Daily      atorvastatin 80 MG tablet  Commonly  known as: LIPITOR   TAKE ONE TABLET BY MOUTH DAILY      budesonide-formoterol 80-4.5 MCG/ACT inhaler  Commonly known as: Symbicort   2 puffs, Inhalation, 2 Times Daily - RT      carvedilol 25 MG tablet  Commonly known as: COREG   TAKE ONE TABLET BY MOUTH TWICE A DAY WITH MEALS      dicyclomine 20 MG tablet  Commonly known as: BENTYL   20 mg, Oral, Every 6 Hours      FLUoxetine 20 MG capsule  Commonly known as: PROzac   TAKE THREE CAPSULES BY MOUTH DAILY      hydroxyurea 500 MG capsule  Commonly known as: HYDREA   500 mg, Oral, Daily      lisinopril 20 MG tablet  Commonly known as: PRINIVIL,ZESTRIL   TAKE ONE TABLET BY MOUTH DAILY      nitroglycerin 0.4 MG SL tablet  Commonly known as: NITROSTAT   0.4 mg, Sublingual, Every 5 Minutes PRN, Take no more than 3 doses in 15 minutes.      ondansetron ODT 4 MG disintegrating tablet  Commonly known as: ZOFRAN-ODT   4 mg, Translingual, Every 8 Hours PRN      potassium chloride 10 MEQ CR tablet   No dose, route, or frequency recorded.      traZODone 50 MG tablet  Commonly known as: DESYREL   25 mg, Oral, Nightly             Stop These Medications      amoxicillin 875 MG tablet  Commonly known as: AMOXIL            Discharge Diet:     Diet Instructions       Diet: Cardiac Diets, Diabetic Diets, Renal Diets; Healthy Heart (2-3 Na+); Regular Texture (IDDSI 7); Thin (IDDSI 0); Gestational Consistent Carbohydrate; Low Sodium (2-3g), Low Potassium, Low Phosphorus      Discharge Diet:  Cardiac Diets  Diabetic Diets  Renal Diets       Cardiac Diet: Healthy Heart (2-3 Na+)    Texture: Regular Texture (IDDSI 7)    Fluid Consistency: Thin (IDDSI 0)    Diabetic Diet: Gestational Consistent Carbohydrate    Renal Diet:  Low Sodium (2-3g)  Low Potassium  Low Phosphorus             Activity at Discharge:     Activity Instructions       Activity as Tolerated            Follow-up Appointments:     Follow-up Information       Julia Stacy MD .    Specialty: Internal Medicine  Contact  information:  [address] [place] [address]  [phone]                           Future Appointments   Date Time Provider Department Center   8/7/2023  8:30 AM Jenny Carvajal APRN [facility]     Test Results Pending at Discharge:           Brian Joseph Kerley, DO  07/31/23  08:02 EDT    Time: I spent 35 minutes on this discharge activity which included: face-to-face encounter with the patient, reviewing the data in the system, coordination of the care with the nursing staff as well as consultants, documentation, and entering orders.     Dictated utilizing Dragon dictation.

## 2023-08-02 ENCOUNTER — READMISSION MANAGEMENT (OUTPATIENT)
Dept: CALL CENTER | Facility: HOSPITAL | Age: 68
End: 2023-08-02
Payer: MEDICARE

## 2023-08-02 LAB — REF LAB TEST METHOD: NORMAL

## 2023-08-07 ENCOUNTER — OFFICE VISIT (OUTPATIENT)
Dept: INTERNAL MEDICINE | Facility: CLINIC | Age: 68
End: 2023-08-07
Payer: MEDICARE

## 2023-08-07 ENCOUNTER — LAB (OUTPATIENT)
Dept: INTERNAL MEDICINE | Facility: CLINIC | Age: 68
End: 2023-08-07
Payer: MEDICARE

## 2023-08-07 VITALS
WEIGHT: 166 LBS | DIASTOLIC BLOOD PRESSURE: 80 MMHG | BODY MASS INDEX: 31.34 KG/M2 | OXYGEN SATURATION: 98 % | SYSTOLIC BLOOD PRESSURE: 128 MMHG | TEMPERATURE: 97.8 F | HEIGHT: 61 IN | RESPIRATION RATE: 16 BRPM | HEART RATE: 67 BPM

## 2023-08-07 DIAGNOSIS — Z13.220 LIPID SCREENING: ICD-10-CM

## 2023-08-07 DIAGNOSIS — R16.1 SPLENOMEGALY: ICD-10-CM

## 2023-08-07 DIAGNOSIS — R73.03 BORDERLINE DIABETES: ICD-10-CM

## 2023-08-07 DIAGNOSIS — Z23 VACCINE FOR STREPTOCOCCUS PNEUMONIAE AND INFLUENZA: ICD-10-CM

## 2023-08-07 DIAGNOSIS — I10 ESSENTIAL HYPERTENSION: Chronic | ICD-10-CM

## 2023-08-07 DIAGNOSIS — Z23 PNEUMOCOCCAL VACCINE ADMINISTERED: ICD-10-CM

## 2023-08-07 DIAGNOSIS — J44.9 CHRONIC OBSTRUCTIVE PULMONARY DISEASE, UNSPECIFIED COPD TYPE: Primary | ICD-10-CM

## 2023-08-07 DIAGNOSIS — E13.9 OTHER SPECIFIED DIABETES MELLITUS WITHOUT COMPLICATION, WITHOUT LONG-TERM CURRENT USE OF INSULIN: ICD-10-CM

## 2023-08-07 DIAGNOSIS — D75.839 THROMBOCYTOSIS: ICD-10-CM

## 2023-08-07 DIAGNOSIS — F51.01 PRIMARY INSOMNIA: ICD-10-CM

## 2023-08-07 DIAGNOSIS — I50.32 CHRONIC DIASTOLIC CONGESTIVE HEART FAILURE: ICD-10-CM

## 2023-08-07 DIAGNOSIS — D72.829 LEUKOCYTOSIS, UNSPECIFIED TYPE: ICD-10-CM

## 2023-08-07 DIAGNOSIS — E55.9 VITAMIN D DEFICIENCY: ICD-10-CM

## 2023-08-07 DIAGNOSIS — Z13.21 ENCOUNTER FOR VITAMIN DEFICIENCY SCREENING: ICD-10-CM

## 2023-08-07 DIAGNOSIS — Z12.31 ENCOUNTER FOR SCREENING MAMMOGRAM FOR MALIGNANT NEOPLASM OF BREAST: ICD-10-CM

## 2023-08-07 DIAGNOSIS — R74.8 ELEVATED LIVER ENZYMES: ICD-10-CM

## 2023-08-07 PROBLEM — E66.9 OBESITY (BMI 30.0-34.9): Status: ACTIVE | Noted: 2023-08-07

## 2023-08-07 PROBLEM — E66.811 OBESITY (BMI 30.0-34.9): Status: ACTIVE | Noted: 2023-08-07

## 2023-08-07 LAB
25(OH)D3 SERPL-MCNC: 25.3 NG/ML (ref 30–100)
ERYTHROCYTE [SEDIMENTATION RATE] IN BLOOD: 4 MM/HR (ref 0–30)
FOLATE SERPL-MCNC: 13.9 NG/ML (ref 4.78–24.2)
NT-PROBNP SERPL-MCNC: 515.2 PG/ML (ref 0–900)
VIT B12 BLD-MCNC: 250 PG/ML (ref 211–946)

## 2023-08-07 PROCEDURE — 86140 C-REACTIVE PROTEIN: CPT | Performed by: NURSE PRACTITIONER

## 2023-08-07 PROCEDURE — 85652 RBC SED RATE AUTOMATED: CPT | Performed by: NURSE PRACTITIONER

## 2023-08-07 PROCEDURE — 83880 ASSAY OF NATRIURETIC PEPTIDE: CPT | Performed by: NURSE PRACTITIONER

## 2023-08-07 PROCEDURE — 86038 ANTINUCLEAR ANTIBODIES: CPT | Performed by: NURSE PRACTITIONER

## 2023-08-07 PROCEDURE — 82306 VITAMIN D 25 HYDROXY: CPT | Performed by: NURSE PRACTITIONER

## 2023-08-07 PROCEDURE — 80061 LIPID PANEL: CPT | Performed by: NURSE PRACTITIONER

## 2023-08-07 PROCEDURE — 80053 COMPREHEN METABOLIC PANEL: CPT | Performed by: NURSE PRACTITIONER

## 2023-08-07 PROCEDURE — 82607 VITAMIN B-12: CPT | Performed by: NURSE PRACTITIONER

## 2023-08-07 PROCEDURE — 85025 COMPLETE CBC W/AUTO DIFF WBC: CPT | Performed by: NURSE PRACTITIONER

## 2023-08-07 PROCEDURE — 86431 RHEUMATOID FACTOR QUANT: CPT | Performed by: NURSE PRACTITIONER

## 2023-08-07 PROCEDURE — 36415 COLL VENOUS BLD VENIPUNCTURE: CPT | Performed by: NURSE PRACTITIONER

## 2023-08-07 PROCEDURE — 82746 ASSAY OF FOLIC ACID SERUM: CPT | Performed by: NURSE PRACTITIONER

## 2023-08-07 RX ORDER — ALBUTEROL SULFATE 2.5 MG/3ML
2.5 SOLUTION RESPIRATORY (INHALATION) EVERY 4 HOURS PRN
Qty: 90 ML | Refills: 1 | Status: SHIPPED | OUTPATIENT
Start: 2023-08-07

## 2023-08-07 NOTE — PROGRESS NOTES
Subjective   Chief Complaint   Patient presents with    Establish Care    Fatigue     Enlarged spleen and some pain      Martha Murray is a 68 y.o. female.     HPI  The patient is here today to establish care with this provider. Previous patient of Dr. Stacy.    The patient has an enlarged spleen of 16 cm. She was told it will eventually need surgery. It is not bothering her, but it is discomfort. She has not felt good for a long time. She went to the emergency room. Her blood pressure and blood glucose shot up. She is not eating properly. She goes to the food bank and eats what she has. She does not go to different doctors because she does not have gas money. She was supposed to go to the dentist, but she ended up in the hospital.    She was checked for sleep apnea in 2004 or 2006. She did not have it at the time. She does snore frequently. She wakes herself up snoring.     She was on hydroxyurea in the past. She was checked for everything from head to toe. They told her it was nothing. They just treated her.    She was on metformin but had side effects. She was on insulin at the hospital.    She has irritable bowel syndrome.    She has a headache every day. She takes Tylenol 500 mg. She was taking ibuprofen.    She had an MRI of her brain on 07/20/2023 that showed ischemic vessel disease.    She has been diagnosed with mild heart failure. She has not seen a cardiologist. She was home one morning, and she started having chest pains. She went to [facility] and they sent her to . They told her she was having a heart attack. She has a heart murmur.    She has ringing in her ears. It is constant. Her hearing is getting worse with time.    She was wheezing badly when she was in the hospital. Diagnosed with COPD. She was given breathing treatment. Sometimes she can use her albuterol. She still wheezes sometimes. She coughs up green stuff. She used to smoke years ago. Symbicort has not been helping.     The patient  "is requesting refills on her albuterol.    Provider's note:  Hemat, sleep med, needs jury duty letter.     I have reviewed the following portions of the patient's history and confirmed they are accurate: allergies, current medications, past family history, past medical history, past social history, past surgical history, and problem list    Review of Systems  Pertinent items are noted in HPI.     Current Outpatient Medications on File Prior to Visit   Medication Sig    albuterol sulfate  (90 Base) MCG/ACT inhaler Inhale 2 puffs Every 4 (Four) Hours As Needed for Wheezing.    amLODIPine (NORVASC) 10 MG tablet TAKE ONE TABLET BY MOUTH DAILY    aspirin (aspirin) 81 MG EC tablet Take 1 tablet by mouth Daily.    budesonide-formoterol (Symbicort) 80-4.5 MCG/ACT inhaler Inhale 2 puffs 2 (Two) Times a Day.    FLUoxetine (PROzac) 20 MG capsule TAKE THREE CAPSULES BY MOUTH DAILY    lisinopril (PRINIVIL,ZESTRIL) 20 MG tablet TAKE ONE TABLET BY MOUTH DAILY    nitroglycerin (NITROSTAT) 0.4 MG SL tablet Place 1 tablet under the tongue Every 5 (Five) Minutes As Needed for Chest Pain. Take no more than 3 doses in 15 minutes.    traZODone (DESYREL) 50 MG tablet Take 0.5 tablets by mouth Every Night.     No current facility-administered medications on file prior to visit.       Objective   Vitals:    08/07/23 0840   BP: 128/80   Pulse: 67   Resp: 16   Temp: 97.8 øF (36.6 øC)   TempSrc: Tympanic   SpO2: 98%   Weight: 75.3 kg (166 lb)   Height: 154.9 cm (61\")     Body mass index is 31.37 kg/mý.    Physical Exam  Vitals reviewed.   Constitutional:       Appearance: Normal appearance. She is well-developed.   HENT:      Head: Normocephalic and atraumatic.      Nose: Nose normal.   Eyes:      General: Lids are normal.      Conjunctiva/sclera: Conjunctivae normal.      Pupils: Pupils are equal, round, and reactive to light.   Neck:      Thyroid: No thyromegaly.      Trachea: Trachea normal.   Cardiovascular:      Rate and Rhythm: " Normal rate and regular rhythm.      Heart sounds: Normal heart sounds.   Pulmonary:      Effort: Pulmonary effort is normal. No respiratory distress.      Breath sounds: Wheezing present.   Skin:     General: Skin is warm and dry.   Neurological:      Mental Status: She is alert and oriented to person, place, and time.      GCS: GCS eye subscore is 4. GCS verbal subscore is 5. GCS motor subscore is 6.   Psychiatric:         Attention and Perception: Attention normal.         Mood and Affect: Mood normal.         Speech: Speech normal.         Behavior: Behavior normal. Behavior is cooperative.         Thought Content: Thought content normal.       Assessment & Plan   Problem List Items Addressed This Visit          Cardiac and Vasculature    Essential hypertension (Chronic)    Overview     Target blood pressure <130/80 mmHg  Renal duplex (7/15/2021): No evidence of renal artery stenosis.            Endocrine and Metabolic    Vitamin D deficiency    Relevant Orders    Vitamin D,25-Hydroxy (Completed)       Gastrointestinal Abdominal     Elevated liver enzymes    Splenomegaly    Relevant Orders    Comprehensive Metabolic Panel (Completed)    CBC Auto Differential (Completed)    Ambulatory Referral to Hematology     Other Visit Diagnoses       Chronic obstructive pulmonary disease, unspecified COPD type    -  Primary    Relevant Medications    albuterol (PROVENTIL) (2.5 MG/3ML) 0.083% nebulizer solution    Umeclidinium-Vilanterol (ANORO ELLIPTA) 62.5-25 MCG/ACT aerosol powder  inhaler    Leukocytosis, unspecified type        Relevant Orders    Comprehensive Metabolic Panel (Completed)    CBC Auto Differential (Completed)    C-reactive Protein (Completed)    Sedimentation Rate (Completed)    Rheumatoid Factor (Completed)    MJ With / DsDNA, RNP, Sjogrens A / B, Martin (Completed)    HLA-B27 Antigen    Ambulatory Referral to Hematology    Borderline diabetes        Relevant Orders    Microalbumin / Creatinine Urine  Ratio - Urine, Clean Catch    Chronic diastolic congestive heart failure        Relevant Orders    proBNP (Completed)    Ambulatory Referral to Sleep Medicine    Primary insomnia        Relevant Orders    Ambulatory Referral to Sleep Medicine    Vaccine for streptococcus pneumoniae and influenza        Relevant Orders    Pneumococcal Conjugate Vaccine 20-Valent (PCV20)    Encounter for screening mammogram for malignant neoplasm of breast        Lipid screening        Relevant Orders    Lipid Panel (Completed)    Encounter for vitamin deficiency screening        Relevant Orders    Vitamin B12 & Folate (Completed)    Vitamin D,25-Hydroxy (Completed)    Thrombocytosis        Relevant Orders    Ambulatory Referral to Hematology               Current Outpatient Medications:     albuterol sulfate  (90 Base) MCG/ACT inhaler, Inhale 2 puffs Every 4 (Four) Hours As Needed for Wheezing., Disp: 8 g, Rfl: 12    amLODIPine (NORVASC) 10 MG tablet, TAKE ONE TABLET BY MOUTH DAILY, Disp: 30 tablet, Rfl: 5    aspirin (aspirin) 81 MG EC tablet, Take 1 tablet by mouth Daily., Disp: 30 tablet, Rfl: 5    budesonide-formoterol (Symbicort) 80-4.5 MCG/ACT inhaler, Inhale 2 puffs 2 (Two) Times a Day., Disp: 10.2 g, Rfl: 12    FLUoxetine (PROzac) 20 MG capsule, TAKE THREE CAPSULES BY MOUTH DAILY, Disp: 90 capsule, Rfl: 0    lisinopril (PRINIVIL,ZESTRIL) 20 MG tablet, TAKE ONE TABLET BY MOUTH DAILY, Disp: 30 tablet, Rfl: 0    nitroglycerin (NITROSTAT) 0.4 MG SL tablet, Place 1 tablet under the tongue Every 5 (Five) Minutes As Needed for Chest Pain. Take no more than 3 doses in 15 minutes., Disp: 25 tablet, Rfl: 5    traZODone (DESYREL) 50 MG tablet, Take 0.5 tablets by mouth Every Night., Disp: 30 tablet, Rfl: 3    albuterol (PROVENTIL) (2.5 MG/3ML) 0.083% nebulizer solution, Take 2.5 mg by nebulization Every 4 (Four) Hours As Needed for Wheezing., Disp: 90 mL, Rfl: 1    Umeclidinium-Vilanterol (ANORO ELLIPTA) 62.5-25 MCG/ACT aerosol  powder  inhaler, Inhale 1 puff Daily., Disp: 1 each, Rfl: 5     1. COPD.  - Chronic, unstable.  - Wanted to start patient on Trelegy inhaler with samples but did not have samples available for this.  - I am starting patient on Anoro.  - We will mail some Trelegy samples to her with instructions if available.  - Patient will hold her Symbicort and start Anoro.  - I provided the patient with nebulizer machine and called in nebulizer treatments to see if this will be cheaper with her insurance.  - She will start these as needed.  - Referring to sleep medicine for consultation.  - Consider getting a pulmonary function test and referral to pulmonology, but this is not feasible currently due to financial concern.    2. Hypertension.  - Chronic, stable.  - Continue lisinopril.  - Follow heart healthy and amlodipine.  - Follow heart healthy low cholesterol diet.  - Patient unable to follow up with cardiology currently due to financial concern.    3. Chronic diastolic congestive heart failure.  - Chronic, stable.  - Continue lisinopril and amlodipine.  - Completing BMP today.  - I encouraged patient to follow up with cardiology, but unable to do so at this time because of financial concerns.  - I am referring patient to sleep medicine for consultation.    4. Leukocytosis.  - Chronic, unstable.  - Referring to hematology for consultation.  - Completing CBC with differential and CMP.    5. Borderline diabetes.  - Chronic, stable.  - Continue with diabetic diet and lifestyle management.    6. Insomnia.  - Chronic, unstable.  - Continue trazodone.  - Referring to sleep medicine for consultation.    7. Splenomegaly.  - Chronic, unstable.  - Referring patient to hematology for consultation.  - Completing CBC, CMP.  - CT scan of abdomen up to date.    Plan of care reviewed with the patient at the conclusion of today's visit.  Education was provided regarding diagnosis, management, and any prescribed or recommended OTC medications.   Patient verbalized understanding of and agreement with management plan.     Return in 1 month (on 9/7/2023), or if symptoms worsen or fail to improve, for Follow-up.      Transcribed from ambient dictation for NAYELI Matos by Claudia López.  08/07/23   10:11 EDT    Patient or patient representative verbalized consent to the visit recording.  I have personally performed the services described in this document as transcribed by the above individual, and it is both accurate and complete.

## 2023-08-08 ENCOUNTER — READMISSION MANAGEMENT (OUTPATIENT)
Dept: CALL CENTER | Facility: HOSPITAL | Age: 68
End: 2023-08-08
Payer: MEDICARE

## 2023-08-08 LAB
ALBUMIN SERPL-MCNC: 4.9 G/DL (ref 3.5–5.2)
ALBUMIN/GLOB SERPL: 2.5 G/DL
ALP SERPL-CCNC: 98 U/L (ref 39–117)
ALT SERPL W P-5'-P-CCNC: 27 U/L (ref 1–33)
ANION GAP SERPL CALCULATED.3IONS-SCNC: 17 MMOL/L (ref 5–15)
AST SERPL-CCNC: 26 U/L (ref 1–32)
BASOPHILS # BLD AUTO: 0.11 10*3/MM3 (ref 0–0.2)
BASOPHILS NFR BLD AUTO: 0.6 % (ref 0–1.5)
BILIRUB SERPL-MCNC: 0.7 MG/DL (ref 0–1.2)
BUN SERPL-MCNC: 24 MG/DL (ref 8–23)
BUN/CREAT SERPL: 18 (ref 7–25)
CALCIUM SPEC-SCNC: 9.6 MG/DL (ref 8.6–10.5)
CHLORIDE SERPL-SCNC: 101 MMOL/L (ref 98–107)
CHOLEST SERPL-MCNC: 189 MG/DL (ref 0–200)
CHROMATIN AB SERPL-ACNC: <10 IU/ML (ref 0–14)
CO2 SERPL-SCNC: 18 MMOL/L (ref 22–29)
CREAT SERPL-MCNC: 1.33 MG/DL (ref 0.57–1)
CRP SERPL-MCNC: <0.3 MG/DL (ref 0–0.5)
DEPRECATED RDW RBC AUTO: 47.2 FL (ref 37–54)
EGFRCR SERPLBLD CKD-EPI 2021: 43.9 ML/MIN/1.73
EOSINOPHIL # BLD AUTO: 0.3 10*3/MM3 (ref 0–0.4)
EOSINOPHIL NFR BLD AUTO: 1.7 % (ref 0.3–6.2)
ERYTHROCYTE [DISTWIDTH] IN BLOOD BY AUTOMATED COUNT: 18.2 % (ref 12.3–15.4)
GLOBULIN UR ELPH-MCNC: 2 GM/DL
GLUCOSE SERPL-MCNC: 102 MG/DL (ref 65–99)
HCT VFR BLD AUTO: 58.8 % (ref 34–46.6)
HDLC SERPL-MCNC: 46 MG/DL (ref 40–60)
HGB BLD-MCNC: 19 G/DL (ref 12–15.9)
LDLC SERPL CALC-MCNC: 105 MG/DL (ref 0–100)
LDLC/HDLC SERPL: 2.13 {RATIO}
LYMPHOCYTES # BLD AUTO: 1.23 10*3/MM3 (ref 0.7–3.1)
LYMPHOCYTES NFR BLD AUTO: 6.9 % (ref 19.6–45.3)
MCH RBC QN AUTO: 26.4 PG (ref 26.6–33)
MCHC RBC AUTO-ENTMCNC: 32.3 G/DL (ref 31.5–35.7)
MCV RBC AUTO: 79.3 FL (ref 79–97)
MONOCYTES # BLD AUTO: 1.29 10*3/MM3 (ref 0.1–0.9)
MONOCYTES NFR BLD AUTO: 7.3 % (ref 5–12)
NEUTROPHILS NFR BLD AUTO: 14.29 10*3/MM3 (ref 1.7–7)
NEUTROPHILS NFR BLD AUTO: 80.8 % (ref 42.7–76)
PLATELET # BLD AUTO: 834 10*3/MM3 (ref 140–450)
PMV BLD AUTO: 10 FL (ref 6–12)
POTASSIUM SERPL-SCNC: 5.1 MMOL/L (ref 3.5–5.2)
PROT SERPL-MCNC: 6.9 G/DL (ref 6–8.5)
RBC # BLD AUTO: 7.21 10*6/MM3 (ref 3.77–5.28)
SODIUM SERPL-SCNC: 136 MMOL/L (ref 136–145)
TRIGL SERPL-MCNC: 224 MG/DL (ref 0–150)
VLDLC SERPL-MCNC: 38 MG/DL (ref 5–40)
WBC NRBC COR # BLD: 17.7 10*3/MM3 (ref 3.4–10.8)

## 2023-08-08 NOTE — OUTREACH NOTE
Medical Week 2 Survey      Flowsheet Row Responses   [facility] patient discharged from? [facility]   Does the patient have one of the following disease processes/diagnoses(primary or secondary)? Other   Week 2 attempt successful? Yes   Call start time 1415   Discharge diagnosis Splenomegaly   Call end time 1417   Meds reviewed with patient/caregiver? Yes   Is the patient having any side effects they believe may be caused by any medication additions or changes? No   Does the patient have all medications ordered at discharge? Yes   Is the patient taking all medications as directed (includes completed medication regime)? Yes   Does the patient have a primary care provider?  Yes   Does the patient have an appointment with their PCP within 7 days of discharge? Yes   Has the patient kept scheduled appointments due by today? Yes   Psychosocial issues? No   Did the patient receive a copy of their discharge instructions? Yes   Nursing interventions Reviewed instructions with patient   What is the patient's perception of their health status since discharge? Same   Is the patient/caregiver able to teach back signs and symptoms related to disease process for when to call PCP? Yes   Is the patient/caregiver able to teach back signs and symptoms related to disease process for when to call 911? Yes   If the patient is a current smoker, are they able to teach back resources for cessation? Not a smoker   Week 2 Call Completed? Yes   Is the patient interested in additional calls from an ambulatory ? No   Would this patient benefit from a Referral to Amb Social Work? No   Wrap up additional comments pt stated bottoms of her feet and toes hurt, had a bunch of labs deawn yesterday. Waiting on results   Call end time 1417            EDISON SHEN - Registered Nurse

## 2023-08-09 LAB — ANA SER QL: NEGATIVE

## 2023-08-16 LAB — HLA-B27 QL NAA+PROBE: NEGATIVE

## 2023-08-17 ENCOUNTER — READMISSION MANAGEMENT (OUTPATIENT)
Dept: CALL CENTER | Facility: HOSPITAL | Age: 68
End: 2023-08-17
Payer: MEDICARE

## 2023-08-17 NOTE — OUTREACH NOTE
Medical Week 3 Survey      Flowsheet Row Responses   [facility] facility patient discharged from? [facility]   Does the patient have one of the following disease processes/diagnoses(primary or secondary)? Other   Week 3 attempt successful? No   Unsuccessful attempts Attempt 1            Julia TORRES - Registered Nurse

## 2023-08-18 ENCOUNTER — READMISSION MANAGEMENT (OUTPATIENT)
Dept: CALL CENTER | Facility: HOSPITAL | Age: 68
End: 2023-08-18
Payer: MEDICARE

## 2023-08-18 NOTE — OUTREACH NOTE
Medical Week 3 Survey      Flowsheet Row Responses   [facility] patient discharged from? [facility]   Does the patient have one of the following disease processes/diagnoses(primary or secondary)? Other   Week 3 attempt successful? Yes   Call start time 1236   Call end time 1240   Discharge diagnosis Splenomegaly   Meds reviewed with patient/caregiver? Yes   Is the patient having any side effects they believe may be caused by any medication additions or changes? No   Does the patient have all medications ordered at discharge? Yes   Is the patient taking all medications as directed (includes completed medication regime)? Yes   Does the patient have a primary care provider?  Yes   Does the patient have an appointment with their PCP within 7 days of discharge? Yes   Has the patient kept scheduled appointments due by today? Yes   Has home health visited the patient within 72 hours of discharge? N/A   Psychosocial issues? No   Did the patient receive a copy of their discharge instructions? Yes   Nursing interventions Reviewed instructions with patient   What is the patient's perception of their health status since discharge? Improving   Is the patient/caregiver able to teach back signs and symptoms related to disease process for when to call PCP? Yes   Is the patient/caregiver able to teach back signs and symptoms related to disease process for when to call 911? Yes   Is the patient/caregiver able to teach back the hierarchy of who to call/visit for symptoms/problems? PCP, Specialist, Home health nurse, Urgent Care, ED, 911 Yes   Additional teach back comments states free of SOB since starting on breathing treatments   Week 3 Call Completed? Yes   Graduated Yes   Is the patient interested in additional calls from an ambulatory ? No   Would this patient benefit from a Referral to Amb Social Work? No   Call end time 1240            Julia TORRES - Registered Nurse

## 2023-09-20 ENCOUNTER — TELEPHONE (OUTPATIENT)
Dept: INTERNAL MEDICINE | Facility: CLINIC | Age: 68
End: 2023-09-20
Payer: MEDICARE

## 2023-09-20 DIAGNOSIS — F51.01 PRIMARY INSOMNIA: ICD-10-CM

## 2023-09-20 RX ORDER — DIPHENOXYLATE HYDROCHLORIDE AND ATROPINE SULFATE 2.5; .025 MG/1; MG/1
1 TABLET ORAL DAILY
Qty: 30 TABLET | Refills: 5 | Status: SHIPPED | OUTPATIENT
Start: 2023-09-20

## 2023-09-20 RX ORDER — TRAZODONE HYDROCHLORIDE 50 MG/1
25 TABLET ORAL NIGHTLY
Qty: 30 TABLET | Refills: 3 | Status: SHIPPED | OUTPATIENT
Start: 2023-09-20

## 2023-09-20 RX ORDER — CYANOCOBALAMIN (VITAMIN B-12) 1000 MCG
1 TABLET ORAL DAILY
Qty: 30 TABLET | Refills: 5 | Status: SHIPPED | OUTPATIENT
Start: 2023-09-20

## 2023-09-20 RX ORDER — ERGOCALCIFEROL 1.25 MG/1
50000 CAPSULE ORAL WEEKLY
Qty: 4 CAPSULE | Refills: 5 | Status: SHIPPED | OUTPATIENT
Start: 2023-09-20

## 2023-09-20 NOTE — TELEPHONE ENCOUNTER
Caller: Martha Murray    Relationship: Self    Best call back number: [phone]    Caller requesting test results: PATIENT    What test was performed: LABS    When was the test performed: 8/7/2023    Where was the test performed: OFFICE    Additional notes: HAS NOT HEARD ABOUT THESE RESULTS AND WOULD LIKE THEM CALLED TO HER TODAY

## 2023-09-20 NOTE — TELEPHONE ENCOUNTER
Caller: Martha Murray Mony    Relationship: Self    Best call back number: [phone]    Requested Prescriptions:   Requested Prescriptions     Pending Prescriptions Disp Refills    traZODone (DESYREL) 50 MG tablet 30 tablet 3     Sig: Take 0.5 tablets by mouth Every Night.        Pharmacy where request should be sent: [facility] [id number] [address] AT [facility] [phone] [facility] [phone] FX     Last office visit with prescribing clinician: 8/7/2023   Last telemedicine visit with prescribing clinician: Visit date not found   Next office visit with prescribing clinician: 11/7/2023     Additional details provided by patient: PT IS OUT AND NEEDS FILLED TODAY    Does the patient have less than a 3 day supply:  [x] Yes  [] No    Would you like a call back once the refill request has been completed: [x] Yes [] No    If the office needs to give you a call back, can they leave a voicemail: [] Yes [] No    Kris Zuñiga Rep   09/20/23 11:41 EDT

## 2023-09-21 NOTE — TELEPHONE ENCOUNTER
Please contact patient and advise:    Your heart failure lab was normal. Your autoimmune was normal. Your blood counts are abnormal like we talked about. I did referrals to hematology and sleep medicine on 8/16/23. Have you received information about appts? I can see you have an appt with hematology, Dr. Graves, on 10/5 but do not see one for sleep medicine. Please go to the appt with dr. Graves. Some of your vitamins a bit low. I will resend vitamin supplements to the pharmacy for you. Based on your cholesterol labs you need to be on medicine for this but I know you stopped the atorvastatin in the past. Let me know if you are willing to restart this. You have appt with me on 11/7. Please make sure to keep our appt

## 2023-09-26 DIAGNOSIS — E78.5 HYPERLIPIDEMIA LDL GOAL <70: Primary | Chronic | ICD-10-CM

## 2023-09-26 RX ORDER — ATORVASTATIN CALCIUM 80 MG/1
80 TABLET, FILM COATED ORAL NIGHTLY
Qty: 30 TABLET | Refills: 5 | Status: SHIPPED | OUTPATIENT
Start: 2023-09-26

## 2023-10-02 ENCOUNTER — TELEPHONE (OUTPATIENT)
Dept: ONCOLOGY | Facility: CLINIC | Age: 68
End: 2023-10-02
Payer: MEDICARE

## 2023-10-02 NOTE — TELEPHONE ENCOUNTER
Caller: Martha Murray    Relationship to patient: Self    Best call back number: [phone]    Chief complaint:     Type of visit: F/U 1     Requested date: 10/19/2023 CALL TO R/S     If rescheduling, when is the original appointment: 10/5/2023    Additional notes:

## 2023-10-02 NOTE — TELEPHONE ENCOUNTER
Lvm to reschedule appt for pt. Dr. Graves is out on 10/19. Advised patient to call back so we could schedule.

## 2023-10-03 NOTE — TELEPHONE ENCOUNTER
Caller: Martha Murray    Relationship to patient: Self    Best call back number: [phone]    Patient is needing: TO REQUEST CALL BACK. SHE IS RETURNING CALL ABOUT APPT. SHE STATES SHE CAN DO 10-5-23, IF POSSIBLE.

## 2023-10-05 ENCOUNTER — SPECIALTY PHARMACY (OUTPATIENT)
Dept: ONCOLOGY | Facility: HOSPITAL | Age: 68
End: 2023-10-05
Payer: MEDICARE

## 2023-10-05 ENCOUNTER — OFFICE VISIT (OUTPATIENT)
Dept: ONCOLOGY | Facility: CLINIC | Age: 68
End: 2023-10-05
Payer: MEDICARE

## 2023-10-05 VITALS
WEIGHT: 171 LBS | HEIGHT: 61 IN | DIASTOLIC BLOOD PRESSURE: 78 MMHG | SYSTOLIC BLOOD PRESSURE: 140 MMHG | TEMPERATURE: 97.3 F | HEART RATE: 88 BPM | OXYGEN SATURATION: 98 % | RESPIRATION RATE: 16 BRPM | BODY MASS INDEX: 32.28 KG/M2

## 2023-10-05 DIAGNOSIS — D47.3 ESSENTIAL THROMBOCYTOSIS: Primary | ICD-10-CM

## 2023-10-05 PROCEDURE — 3078F DIAST BP <80 MM HG: CPT | Performed by: INTERNAL MEDICINE

## 2023-10-05 PROCEDURE — 3077F SYST BP >= 140 MM HG: CPT | Performed by: INTERNAL MEDICINE

## 2023-10-05 PROCEDURE — 1126F AMNT PAIN NOTED NONE PRSNT: CPT | Performed by: INTERNAL MEDICINE

## 2023-10-05 PROCEDURE — 99214 OFFICE O/P EST MOD 30 MIN: CPT | Performed by: INTERNAL MEDICINE

## 2023-10-05 RX ORDER — HYDROXYUREA 500 MG/1
500 CAPSULE ORAL DAILY
Qty: 30 CAPSULE | Refills: 11 | Status: SHIPPED | OUTPATIENT
Start: 2023-10-11

## 2023-10-05 NOTE — PROGRESS NOTES
Follow Up Office Visit      Date: 10/05/2023     Patient Name: Martha Murray  MRN: [id number]  : 1955  Referring Physician: Julia Stacy     Chief Complaint:  Follow-up for JAK2 positive essential thrombocytosis     History of Present Illness: Martha Murray is a pleasant 66 y.o. female past medical history of anxiety, hypertension, hyperlipidemia, COPD, tobacco abuse, type 2 diabetes, CKD who presents today for evaluation of thrombocytosis. The patient has been followed by other PCP is monitoring CBCs which were notable for thrombocytosis. Most recently her platelet count was 1280K in 2021. Per chart review over the last several years her platelet count has ranged between 700K-900K. She does note excessive fatigue during this timeframe as well as increased cravings for ice. She denies any recurrent infections or inflammatory processes. Of note, she recent had a CT scan which showed some slight splenomegaly up to around 16 cm. She denies any chest pain or discomfort. Denies any shortness of breath, cough, vision changes, headaches     Interval History:  Presents clinic for follow-up.  Did not  her Hydrea after her appointment with me in 2022.  Notes continued easy bruising but denies any worsening fatigue, tiredness, night sweats, fevers, weight loss    Oncology History:    Oncology/Hematology History   Essential thrombocytosis   3/28/2019 Initial Diagnosis    Essential thrombocytosis     10/12/2023 -  Chemotherapy    OP POLYCYTHEMIA VERA/ESSENTIAL THROMBOCYTOSIS Hydroxyurea           Subjective      Review of Systems:   Constitutional: Negative for fevers, chills, or weight loss  Eyes: Negative for blurred vision or discharge         Ear/Nose/Throat: Negative for difficulty swallowing, sore throat, LAD                                                       Respiratory: Negative for cough, SOA, wheezing                                                                                         Cardiovascular: Negative for chest pain or palpitations                                                                  Gastrointestinal: Negative for nausea, vomiting or diarrhea                                                                     Genitourinary: Negative for dysuria or hematuria                                                                                           Musculoskeletal: Negative for any joint pains or muscle aches                                                                        Neurologic: Negative for any weakness, headaches, dizziness                                                                         Hematologic: Negative for any easy bleeding or bruising                                                                                   Psychiatric: Negative for anxiety or depression                          Past Medical History/Past Surgical History/ Family History/ Social History: Reviewed by me and unchanged from my previous documentation done on February 2022.     Medications:     Current Outpatient Medications:     albuterol (PROVENTIL) (2.5 MG/3ML) 0.083% nebulizer solution, Take 2.5 mg by nebulization Every 4 (Four) Hours As Needed for Wheezing., Disp: 90 mL, Rfl: 1    albuterol sulfate  (90 Base) MCG/ACT inhaler, Inhale 2 puffs Every 4 (Four) Hours As Needed for Wheezing., Disp: 8 g, Rfl: 12    amLODIPine (NORVASC) 10 MG tablet, TAKE ONE TABLET BY MOUTH DAILY, Disp: 30 tablet, Rfl: 5    aspirin (aspirin) 81 MG EC tablet, Take 1 tablet by mouth Daily., Disp: 30 tablet, Rfl: 5    atorvastatin (LIPITOR) 80 MG tablet, Take 1 tablet by mouth Every Night., Disp: 30 tablet, Rfl: 5    budesonide-formoterol (Symbicort) 80-4.5 MCG/ACT inhaler, Inhale 2 puffs 2 (Two) Times a Day., Disp: 10.2 g, Rfl: 12    Cyanocobalamin (B-12) 1000 MCG tablet, Take 1 tablet by mouth Daily., Disp: 30 tablet, Rfl: 5    FLUoxetine (PROzac) 20 MG capsule, TAKE THREE CAPSULES  "BY MOUTH DAILY, Disp: 90 capsule, Rfl: 0    lisinopril (PRINIVIL,ZESTRIL) 20 MG tablet, TAKE ONE TABLET BY MOUTH DAILY, Disp: 30 tablet, Rfl: 0    multivitamin (Multiple Vitamin) tablet tablet, Take 1 tablet by mouth Daily., Disp: 30 tablet, Rfl: 5    nitroglycerin (NITROSTAT) 0.4 MG SL tablet, Place 1 tablet under the tongue Every 5 (Five) Minutes As Needed for Chest Pain. Take no more than 3 doses in 15 minutes., Disp: 25 tablet, Rfl: 5    traZODone (DESYREL) 50 MG tablet, Take 0.5 tablets by mouth Every Night., Disp: 30 tablet, Rfl: 3    Umeclidinium-Vilanterol (ANORO ELLIPTA) 62.5-25 MCG/ACT aerosol powder  inhaler, Inhale 1 puff Daily., Disp: 1 each, Rfl: 5    vitamin D (ERGOCALCIFEROL) 1.25 MG (31960 UT) capsule capsule, Take 1 capsule by mouth 1 (One) Time Per Week., Disp: 4 capsule, Rfl: 5    Allergies:   Allergies   Allergen Reactions    Sulfa Antibiotics Rash       Objective     Physical Exam:  Vital Signs:   Vitals:    10/05/23 1139   BP: 140/78   Pulse: 88   Resp: 16   Temp: 97.3 °F (36.3 °C)   TempSrc: Temporal   SpO2: 98%   Weight: 77.6 kg (171 lb)   Height: 154.9 cm (61\")   PainSc: 0-No pain     Pain Score    10/05/23 1139   PainSc: 0-No pain     ECOG Performance Status: 1 - Symptomatic but completely ambulatory    Constitutional: NAD, ECOG 1  Eyes: PERRLA, scleral anicteric  ENT: No LAD, no thyromegaly  Respiratory: CTAB, no wheezing, rales, rhonchi  Cardiovascular: RRR, no murmurs, pulses 2+ bilaterally  Abdomen: soft, NT/ND, no HSM  Musculoskeletal: strength 5/5 bilaterally, no c/c/e  Neurologic: A&O x 3, CN II-XII intact grossly    Results Review:   No visits with results within 2 Week(s) from this visit.   Latest known visit with results is:   Office Visit on 08/07/2023   Component Date Value Ref Range Status    Glucose 08/07/2023 102 (H)  65 - 99 mg/dL Final    BUN 08/07/2023 24 (H)  8 - 23 mg/dL Final    Creatinine 08/07/2023 1.33 (H)  0.57 - 1.00 mg/dL Final    Sodium 08/07/2023 136  136 - 145 " mmol/L Final    Potassium 08/07/2023 5.1  3.5 - 5.2 mmol/L Final    Chloride 08/07/2023 101  98 - 107 mmol/L Final    CO2 08/07/2023 18.0 (L)  22.0 - 29.0 mmol/L Final    Calcium 08/07/2023 9.6  8.6 - 10.5 mg/dL Final    Total Protein 08/07/2023 6.9  6.0 - 8.5 g/dL Final    Albumin 08/07/2023 4.9  3.5 - 5.2 g/dL Final    ALT (SGPT) 08/07/2023 27  1 - 33 U/L Final    AST (SGOT) 08/07/2023 26  1 - 32 U/L Final    Alkaline Phosphatase 08/07/2023 98  39 - 117 U/L Final    Total Bilirubin 08/07/2023 0.7  0.0 - 1.2 mg/dL Final    Globulin 08/07/2023 2.0  gm/dL Final    A/G Ratio 08/07/2023 2.5  g/dL Final    BUN/Creatinine Ratio 08/07/2023 18.0  7.0 - 25.0 Final    Anion Gap 08/07/2023 17.0 (H)  5.0 - 15.0 mmol/L Final    eGFR 08/07/2023 43.9 (L)  >60.0 mL/min/1.73 Final    Total Cholesterol 08/07/2023 189  0 - 200 mg/dL Final    Triglycerides 08/07/2023 224 (H)  0 - 150 mg/dL Final    HDL Cholesterol 08/07/2023 46  40 - 60 mg/dL Final    LDL Cholesterol  08/07/2023 105 (H)  0 - 100 mg/dL Final    VLDL Cholesterol 08/07/2023 38  5 - 40 mg/dL Final    LDL/HDL Ratio 08/07/2023 2.13   Final    Folate 08/07/2023 13.90  4.78 - 24.20 ng/mL Final    Vitamin B-12 08/07/2023 250  211 - 946 pg/mL Final    25 Hydroxy, Vitamin D 08/07/2023 25.3 (L)  30.0 - 100.0 ng/ml Final    WBC 08/07/2023 17.70 (H)  3.40 - 10.80 10*3/mm3 Final    RBC 08/07/2023 7.21 (H)  3.77 - 5.28 10*6/mm3 Final    Hemoglobin 08/07/2023 19.0 (H)  12.0 - 15.9 g/dL Final    Hematocrit 08/07/2023 58.8 (H)  34.0 - 46.6 % Final    MCV 08/07/2023 79.3  79.0 - 97.0 fL Final    MCH 08/07/2023 26.4 (L)  26.6 - 33.0 pg Final    MCHC 08/07/2023 32.3  31.5 - 35.7 g/dL Final    RDW 08/07/2023 18.2 (H)  12.3 - 15.4 % Final    RDW-SD 08/07/2023 47.2  37.0 - 54.0 fl Final    MPV 08/07/2023 10.0  6.0 - 12.0 fL Final    Platelets 08/07/2023 834 (H)  140 - 450 10*3/mm3 Final    Neutrophil % 08/07/2023 80.8 (H)  42.7 - 76.0 % Final    Lymphocyte % 08/07/2023 6.9 (L)  19.6 - 45.3 %  Final    Monocyte % 08/07/2023 7.3  5.0 - 12.0 % Final    Eosinophil % 08/07/2023 1.7  0.3 - 6.2 % Final    Basophil % 08/07/2023 0.6  0.0 - 1.5 % Final    Neutrophils, Absolute 08/07/2023 14.29 (H)  1.70 - 7.00 10*3/mm3 Final    Lymphocytes, Absolute 08/07/2023 1.23  0.70 - 3.10 10*3/mm3 Final    Monocytes, Absolute 08/07/2023 1.29 (H)  0.10 - 0.90 10*3/mm3 Final    Eosinophils, Absolute 08/07/2023 0.30  0.00 - 0.40 10*3/mm3 Final    Basophils, Absolute 08/07/2023 0.11  0.00 - 0.20 10*3/mm3 Final    C-Reactive Protein 08/07/2023 <0.30  0.00 - 0.50 mg/dL Final    Sed Rate 08/07/2023 4  0 - 30 mm/hr Final    Rheumatoid Factor Quantitative 08/07/2023 <10.0  0.0 - 14.0 IU/mL Final    MJ Direct 08/07/2023 Negative  Negative Final    HLA B27 08/07/2023 Negative   Final    HLA-B*27 Negative  B27 allele interpretation for all loci based on IMGT/HLA  database version 3.44  This test was developed and its performance characteristics  determined by [facility].  It has not been cleared or approved  by the Food and Drug Administration.  [facility] CLIA ID Number [id number]  THis test was performed using Polymerase Chain Reaction (PCR) and  Sequence Specific Oligonucleotide Probes (SSOP) technique. Sequence  Based Typing (SBT) may be used as a supplemental method when  necessary.  If you have questions, please call [facility] customer service at  [phone] or email at [email or web address].    proBNP 08/07/2023 515.2  0.0 - 900.0 pg/mL Final       No results found.    Assessment / Plan      Assessment/Plan:   1. Essential thrombocytosis (HCC) (Primary)  -Platelet count 1280K in December 2021  -CT scan July 2021 showed slight splenomegaly up to 16 cm  -Concern for myelofibrosis given the clinical picture as well as iron deficiency given her symptoms  -JAK2 mutational testing positive for the [id number] mutation  -Von Willebrand studies consistent with an acquired von Willebrand's disease secondary to a myeloproliferative disorder  -Initially  ordered Hydrea in February 2022 however patient did not  the medication  -Have reordered Hydrea through our pharmacy program today.  Ideally given her financial stress, she can get the medication for free  -Okay to continue baby aspirin at this time  -Counseled on compliance with medications with patient today     2.  Iron deficiency  -Noted on previous iron studies  -Given her JAK2 mutation positivity, will hold off on iron supplementation at this time         Follow Up:   Follow-up in 6 weeks       Paulo Graves MD  Hematology and Oncology     Please note that portions of this note may have been completed with a voice recognition program. Efforts were made to edit the dictations, but occasionally words are mistranscribed.

## 2023-10-05 NOTE — PROGRESS NOTES
Oral Chemotherapy - Double Check    Drug: hydroxyurea  Strength: 500mg capsule  Directions: Take 1 capsule PO daily  QTY: 30  RF:11    Released to pharmacy: [facility]    Completed independent double check on medication order/RX.    Augusto ZhangD, [facility]  Clinical Oncology Pharmacy Specialist  Phone: [phone]    10/5/2023  14:44 EDT

## 2023-10-05 NOTE — PROGRESS NOTES
Oral Chemotherapy - New Referral    Received a referral from Dr. Graves    Treatment Plan: Hydrea (hydroxyurea)  Start date of treatment planned for: As soon as oral specialty medication is available.  Indication: essential thrombocythemia  Relevant past treatments: none, but she is currently taking aspirin 81 mg PO daily and will continue this  Is the therapy appropriate based on treatment guidelines and FDA labeling?: yes  Therapeutic Goals: Continue treatment until progression or intolerable toxicity  Patient can self-administer oral medications: Yes    Drug-Drug Interactions: The current medication list was reviewed and there are no relevant drug-drug interactions with the specialty medication.  Medication Allergies: The patient has no relevant allergies as it relates to their oral specialty medication  Review of Labs/Dose Adjustments: The patient's most recent labs were reviewed and all are WNL to start treatment at this dose.   8/7/23 CrCl 50 mL/min.  PI recommends starting at 50% full dose for decreased renal function.  The typical dose range to start is 500 - 1000 mg PO daily, so I think starting her at 500 mg/day is very reasonable.    A prescription was released to [facility] for   Drug: Hydrea (hydroxyurea)  Strength: 500 mg  Directions: Take 1 capsule by mouth daily  Quantity: 30  Refills: 11  Copay $10    Pharmacy education is 10/12/23 at 2 pm. CCA and consent will be signed at that time.    Augusto AlvarezD, [facility]  Oncology Clinical Pharmacist   Phone: [phone]      10/5/2023  14:04 EDT

## 2023-10-06 ENCOUNTER — DOCUMENTATION (OUTPATIENT)
Dept: SOCIAL WORK | Facility: HOSPITAL | Age: 68
End: 2023-10-06
Payer: MEDICARE

## 2023-10-06 NOTE — PROGRESS NOTES
Case Management/Social Work    Patient Name:  Martha Murray  YOB: 1955  MRN: [id number]  Admit Date:  (Not on file)        PANCHO received a staff message from Oncology, PANCHO Ordoñez at [facility], stating that Pt. May need financial resources. SW called Pt. And left her a VM to return SW phone call to discuss some financial assistance resources. PANCHO asked for a return phone call. PANCHO will assist as needed.       Electronically signed by:  Prateek Sims  10/06/23 14:50 EDT

## 2023-10-12 ENCOUNTER — SPECIALTY PHARMACY (OUTPATIENT)
Dept: ONCOLOGY | Facility: HOSPITAL | Age: 68
End: 2023-10-12
Payer: MEDICARE

## 2023-10-12 ENCOUNTER — HOSPITAL ENCOUNTER (OUTPATIENT)
Dept: ONCOLOGY | Facility: HOSPITAL | Age: 68
Discharge: HOME | End: 2023-10-12
Payer: MEDICARE

## 2023-10-12 DIAGNOSIS — D47.3 ESSENTIAL THROMBOCYTOSIS: Primary | ICD-10-CM

## 2023-10-12 RX ORDER — ONDANSETRON HYDROCHLORIDE 8 MG/1
8 TABLET, FILM COATED ORAL 3 TIMES DAILY PRN
Qty: 30 TABLET | Refills: 5 | Status: SHIPPED | OUTPATIENT
Start: 2023-10-12

## 2023-10-12 NOTE — PROGRESS NOTES
Specialty Pharmacy Patient Management Program  Oncology Initial Assessment       Martha Murray is a 68 y.o. female with essential thromocythemia seen by an Oncology provider and enrolled in the Oncology Patient Management program offered by [facility].  An initial outreach was conducted, including assessment of therapy appropriateness and specialty medication education for Hydrea (hydroxyurea). The patient was introduced to services offered by [facility], including: regular assessments, refill coordination, curbside pick-up or mail order delivery options, prior authorization maintenance, and financial assistance programs as applicable. The patient was also provided with contact information for the pharmacy team.     Regimen: Hydroxyurea 500 mg capsule - Take 1 capsule by mouth daily    Start date of oral specialty medication:  10/13/23    Relevant Past Medical History, Comorbidities, and Vaccines  Relevant medical history and concomitant health conditions were discussed with the patient. The patient's chart has been reviewed for relevant past medical history and comorbid health conditions and updated as necessary.  Vaccines are coordinated by the patient's oncologist and primary care provider.  Past Medical History:   Diagnosis Date    HAMMAD (acute kidney injury) 2021    Allergic     Anemia     Depression     Diabetes mellitus     History of heart attack     Hyperlipidemia     Hypertension      Social History     Socioeconomic History    Marital status: Single   Tobacco Use    Smoking status: Former     Packs/day: 2.00     Years: 30.00     Additional pack years: 0.00     Total pack years: 60.00     Types: Cigarettes     Quit date:      Years since quittin.7    Smokeless tobacco: Never   Vaping Use    Vaping Use: Never used   Substance and Sexual Activity    Alcohol use: No    Drug use: No    Sexual activity: Defer       Allergies  Known allergies and  reactions were discussed with the patient. The patient's chart has been reviewed for allergy information and updated as necessary.   Allergies   Allergen Reactions    Sulfa Antibiotics Rash        Current Medication List  This medication list has been reviewed with the patient and evaluated for any interactions or necessary modifications/recommendations, and updated to include all prescription medications, OTC medications, and supplements the patient is currently taking.  This list reflects what is contained in the patient's profile, which has also been marked as reviewed to communicate to other providers it is the most up to date version of the patient's current medication therapy.   Prior to Admission medications    Medication Sig Start Date End Date Taking? Authorizing Provider   albuterol (PROVENTIL) (2.5 MG/3ML) 0.083% nebulizer solution Take 2.5 mg by nebulization Every 4 (Four) Hours As Needed for Wheezing. 8/7/23   Jenny Carvajal APRN   albuterol sulfate  (90 Base) MCG/ACT inhaler Inhale 2 puffs Every 4 (Four) Hours As Needed for Wheezing. 12/5/22   Julia Stacy MD   amLODIPine (NORVASC) 10 MG tablet TAKE ONE TABLET BY MOUTH DAILY 5/30/23   Nishant Olvera APRN   aspirin (aspirin) 81 MG EC tablet Take 1 tablet by mouth Daily. 5/14/21   Tommie Saha IV, MD   atorvastatin (LIPITOR) 80 MG tablet Take 1 tablet by mouth Every Night. 9/26/23   Jenny Carvajal APRN   budesonide-formoterol (Symbicort) 80-4.5 MCG/ACT inhaler Inhale 2 puffs 2 (Two) Times a Day. 12/5/22   Julia Stacy MD   Cyanocobalamin (B-12) 1000 MCG tablet Take 1 tablet by mouth Daily. 9/20/23   Jenny Carvajal APRN   FLUoxetine (PROzac) 20 MG capsule TAKE THREE CAPSULES BY MOUTH DAILY 4/27/23   Julia Stacy MD   hydroxyurea (HYDREA) 500 MG capsule Take 1 capsule by mouth Daily. 10/11/23   Paulo Graves MD   lisinopril (PRINIVIL,ZESTRIL) 20 MG tablet TAKE ONE  TABLET BY MOUTH DAILY 2/28/23   Tommie Saha IV, MD   multivitamin (Multiple Vitamin) tablet tablet Take 1 tablet by mouth Daily. 9/20/23   Jenny Carvajal APRN   nitroglycerin (NITROSTAT) 0.4 MG SL tablet Place 1 tablet under the tongue Every 5 (Five) Minutes As Needed for Chest Pain. Take no more than 3 doses in 15 minutes. 5/14/21   Tommie Saha IV, MD   traZODone (DESYREL) 50 MG tablet Take 0.5 tablets by mouth Every Night. 9/20/23   Jenny Carvajal APRN   Umeclidinium-Vilanterol (ANORO ELLIPTA) 62.5-25 MCG/ACT aerosol powder  inhaler Inhale 1 puff Daily. 8/7/23   Jenny Carvajal APRN   vitamin D (ERGOCALCIFEROL) 1.25 MG (31882 UT) capsule capsule Take 1 capsule by mouth 1 (One) Time Per Week. 9/20/23   Jenny Carvajal APRN   traZODone (DESYREL) 50 MG tablet Take 0.5 tablets by mouth Every Night. 12/5/22 9/20/23  Julia Stacy MD       Drug Interactions  Reviewed concomitant medications, allergies, labs, comorbidities/medical history, and immunization history.   Drug-drug interactions noted and discussed during education: no significant drug interactions noted. . Reminded the patient to let us know before making any changes or starting any new prescription or OTC medications so we can first assess drug interactions.  Drug-food interactions: None    Recommended Medications Assessment  Bone Health (such as calcium/vitamin D, bisphosphonate, RANKL inhibitor) - Not Indicated   VTE prophylaxis - Not Indicated   Prophylactic antimicrobials - Not Indicated     Relevant Laboratory Values  Lab Results   Component Value Date    GLUCOSE 102 (H) 08/07/2023    CALCIUM 9.6 08/07/2023     08/07/2023    K 5.1 08/07/2023    CO2 18.0 (L) 08/07/2023     08/07/2023    BUN 24 (H) 08/07/2023    CREATININE 1.33 (H) 08/07/2023    EGFRIFAFRI 43 (L) 07/28/2021    EGFRIFNONA 35 (L) 01/20/2022    BCR 18.0 08/07/2023    ANIONGAP 17.0 (H) 08/07/2023     Lab  Results   Component Value Date    WBC 17.70 (H) 08/07/2023    RBC 7.21 (H) 08/07/2023    HGB 19.0 (H) 08/07/2023    HCT 58.8 (H) 08/07/2023    MCV 79.3 08/07/2023    MCH 26.4 (L) 08/07/2023    MCHC 32.3 08/07/2023    RDW 18.2 (H) 08/07/2023    RDWSD 47.2 08/07/2023    MPV 10.0 08/07/2023     (H) 08/07/2023    NEUTRORELPCT 80.8 (H) 08/07/2023    LYMPHORELPCT 6.9 (L) 08/07/2023    MONORELPCT 7.3 08/07/2023    EOSRELPCT 1.7 08/07/2023    BASORELPCT 0.6 08/07/2023    AUTOIGPER 1.8 (H) 07/31/2023    NEUTROABS 14.29 (H) 08/07/2023    LYMPHSABS 1.23 08/07/2023    MONOSABS 1.29 (H) 08/07/2023    EOSABS 0.30 08/07/2023    BASOSABS 0.11 08/07/2023    AUTOIGNUM 0.34 (H) 07/31/2023    NRBC 0.1 07/31/2023       The above labs have been reviewed. No dose adjustments are needed for the oral specialty medication(s) based on the labs.    Initial Education Provided for Specialty Medication  The patient has been provided with the following education. All questions and concerns have been addressed prior to the patient receiving the medication, and the patient has verbalized understanding of the education and any materials provided.  Additional patient education shall be provided and documented upon request by the patient, provider or payer.      Provided patient with:   Chemo calendar to help improve adherence., Education sheets about the medication, 24-hour clinic phone number and my contact information and instructions to call should additional questions arise.     Medication Education Sheets Provided: (select all that apply)  Oral Specialty Medication: Hydrea (hydroxyurea)    Other Education Sheets Provided: (select all that apply)  Adherence and Symptom Tracker Sheet and ELIZA Information    TOPICS COMMENTS   Storage and Handling of Oral Specialty Medication Store in the original container, in a dry location out of direct sunlight, and out of reach of children or pets. and Store at room temperature.  Discussed safe handling  and what to do with any unused medication.   Administration of Oral Specialty Medication Take with or without food at the same time(s) each day. and Do not crush or chew tablets.   Adherence to Oral Specialty Regimen and Handling Missed Doses Patient is likely to have good treatment adherence; reinforced the importance of adherence. Reviewed how to address missed doses and encouraged the patient to let us know of any missed doses.   Anemia: role of RBC, cause, s/s, ways to manage, role of transfusion Reviewed the role of RBC and the use of transfusions if hemoglobin decreases too much.  Patient to notify us if she experiences shortness of breath, dizziness, or palpitations.  Also let patient know that she could feel more tired than usual and to try to stay active, but rest if she needs to.    Thrombocytopenia: role of platelet, cause, s/s, ways to prevent bleeding, things to avoid, when to seek help Reviewed the role of platelets in blood clotting and when to call clinic (bloody nose that bleeds for 5 minutes despite pressure, a cut that won't stop bleeding despite pressure, gums that bleed excessively with brushing or flossing). Recommended using an electric razor, soft bristle toothbrush, and blowing your nose gently.    Neutropenia: role of WBC, cause, infection precautions, s/s of infection, when to call MD Reviewed the role of WBC, good infection prevention practices, and when to call the clinic (temperature 100.4F, sore throat, burning urination, etc).   Nausea/Vomiting: cause, use of antiemetics, dietary changes, when to call MD Emetic risk: Minimal  Premeds: None  Scheduled meds: None  PRN home meds: Ondansetron 8 mg PO TID PRN N/V  Pharmacy home meds sent to: [facility] [place]    Instructed the patient to take a dose of the PRN medication at the first onset of nausea and if it's not working to call us for additional medications.     Survivorship: distress, distress assessment, secondary malignancies,  early/late effects, follow-up, social issues, social support Discussed the rare, but possible risk of secondary malignancies months to years after treatment, most commonly acute myeloid leukemia.   Miscellaneous Financial Issues: Copay $10 at [facility].  Lab Draws:  Weekly until Dr. Graves advises differently.   Infertility and Sexuality:  causes, fertility preservation options, sexuality changes, ways to manage, importance of birth control Oral Oncology Therapy: Reviewed safe sex practices and the importance of minimizing exposure to body fluids while on oral oncology therapy., The patient is not of childbearing potential.   Home Care: how to manage bodily fluids Counseled on management of soiled linens and proper flush technique.  Discussed how to manage all the side effects at home and advised when to contact the MD office     Adherence and Self-Administration  Barriers to Patient Adherence and/or Self-Administration: None  Methods for Supporting Patient Adherence and/or Self-Administration: dosing calendar  Expected duration of therapy: Until disease progression or intolerable toxicity    Goals of Therapy  Patient Goals of Therapy:   Consistently take medications as prescribed  Patient will adhere to medication regimen  Patient will report any medication side effects to healthcare provider  Clinical Goals:    Goals Addressed Today        Specialty Pharmacy General Goal      Clinical goal/therapeutic target: disease control, per the recent oncology clinic notes and labs.                Support patient understanding of medication regimen  Ensure patient knows the pharmacy contact information  Schedule regular follow-up to monitor the treatment serious adverse events  Schedule regular follow-up to confirm medication adherence  Schedule regular follow-up to monitor disease progression or stability    Reassessment Plan & Follow-Up  Pharmacist to perform regular reassessments no more than (6) months from the previous  assessment.  Welcome information and patient satisfaction survey to be sent by retail team with patient's initial fill.  Care Coordinator to set up future refill outreaches, coordinate prescription delivery, and escalate clinical questions to pharmacist.     Additional Plans, Therapy Recommendations or Therapy Problems to Be Addressed: Patient picked up the medication from the pharmacy after education.  She discussed financial concerns with me today so I have reached out through Epic to the  in [facility].  My team also put her on a wait list for copay support for hydrea.     Attestation  I attest the patient was actively involved in and has agreed to the above plan of care. If the prescribed therapy is at any point deemed not appropriate based on the current or future assessments, a consultation will be initiated with the patient's specialty care provider to determine the best course of action. The revised plan of therapy will be documented along with any required assessments and/or additional patient education provided.     Smita Fulton, PharmD, [facility]  Oncology Clinical Pharmacist   Phone: [phone]      Date and Time: 10/12/2023  14:09 EDT

## 2023-10-16 ENCOUNTER — LAB (OUTPATIENT)
Dept: LAB | Facility: HOSPITAL | Age: 68
End: 2023-10-16
Payer: MEDICARE

## 2023-10-16 DIAGNOSIS — D47.3 ESSENTIAL THROMBOCYTOSIS: ICD-10-CM

## 2023-10-16 LAB
ALBUMIN SERPL-MCNC: 4.5 G/DL (ref 3.5–5.2)
ALBUMIN/GLOB SERPL: 1.7 G/DL
ALP SERPL-CCNC: 94 U/L (ref 39–117)
ALT SERPL W P-5'-P-CCNC: 21 U/L (ref 1–33)
ANION GAP SERPL CALCULATED.3IONS-SCNC: 12.2 MMOL/L (ref 5–15)
AST SERPL-CCNC: 31 U/L (ref 1–32)
BASOPHILS # BLD AUTO: 0.24 10*3/MM3 (ref 0–0.2)
BASOPHILS NFR BLD AUTO: 2.1 % (ref 0–1.5)
BILIRUB SERPL-MCNC: 1.1 MG/DL (ref 0–1.2)
BUN SERPL-MCNC: 21 MG/DL (ref 8–23)
BUN/CREAT SERPL: 15.8 (ref 7–25)
CALCIUM SPEC-SCNC: 9.9 MG/DL (ref 8.6–10.5)
CHLORIDE SERPL-SCNC: 104 MMOL/L (ref 98–107)
CO2 SERPL-SCNC: 25.8 MMOL/L (ref 22–29)
CREAT SERPL-MCNC: 1.33 MG/DL (ref 0.57–1)
DEPRECATED RDW RBC AUTO: 50.4 FL (ref 37–54)
EGFRCR SERPLBLD CKD-EPI 2021: 43.9 ML/MIN/1.73
EOSINOPHIL # BLD AUTO: 0.69 10*3/MM3 (ref 0–0.4)
EOSINOPHIL NFR BLD AUTO: 5.9 % (ref 0.3–6.2)
ERYTHROCYTE [DISTWIDTH] IN BLOOD BY AUTOMATED COUNT: 17.7 % (ref 12.3–15.4)
GLOBULIN UR ELPH-MCNC: 2.6 GM/DL
GLUCOSE SERPL-MCNC: 108 MG/DL (ref 65–99)
HCT VFR BLD AUTO: 55.4 % (ref 34–46.6)
HGB BLD-MCNC: 17.5 G/DL (ref 12–15.9)
IMM GRANULOCYTES # BLD AUTO: 0.08 10*3/MM3 (ref 0–0.05)
IMM GRANULOCYTES NFR BLD AUTO: 0.7 % (ref 0–0.5)
LYMPHOCYTES # BLD AUTO: 1.53 10*3/MM3 (ref 0.7–3.1)
LYMPHOCYTES NFR BLD AUTO: 13.1 % (ref 19.6–45.3)
MCH RBC QN AUTO: 26.8 PG (ref 26.6–33)
MCHC RBC AUTO-ENTMCNC: 31.6 G/DL (ref 31.5–35.7)
MCV RBC AUTO: 84.8 FL (ref 79–97)
MONOCYTES # BLD AUTO: 0.61 10*3/MM3 (ref 0.1–0.9)
MONOCYTES NFR BLD AUTO: 5.2 % (ref 5–12)
NEUTROPHILS NFR BLD AUTO: 73 % (ref 42.7–76)
NEUTROPHILS NFR BLD AUTO: 8.49 10*3/MM3 (ref 1.7–7)
NRBC BLD AUTO-RTO: 0 /100 WBC (ref 0–0.2)
PLATELET # BLD AUTO: 951 10*3/MM3 (ref 140–450)
PMV BLD AUTO: 9.7 FL (ref 6–12)
POTASSIUM SERPL-SCNC: 4.8 MMOL/L (ref 3.5–5.2)
PROT SERPL-MCNC: 7.1 G/DL (ref 6–8.5)
RBC # BLD AUTO: 6.53 10*6/MM3 (ref 3.77–5.28)
SODIUM SERPL-SCNC: 142 MMOL/L (ref 136–145)
WBC NRBC COR # BLD: 11.64 10*3/MM3 (ref 3.4–10.8)

## 2023-10-16 PROCEDURE — 36415 COLL VENOUS BLD VENIPUNCTURE: CPT

## 2023-10-16 PROCEDURE — 80053 COMPREHEN METABOLIC PANEL: CPT

## 2023-10-16 PROCEDURE — 85025 COMPLETE CBC W/AUTO DIFF WBC: CPT

## 2023-10-31 ENCOUNTER — OFFICE VISIT (OUTPATIENT)
Dept: ORTHOPEDIC SURGERY | Facility: CLINIC | Age: 68
End: 2023-10-31
Payer: MEDICARE

## 2023-10-31 VITALS
BODY MASS INDEX: 31.68 KG/M2 | SYSTOLIC BLOOD PRESSURE: 160 MMHG | DIASTOLIC BLOOD PRESSURE: 102 MMHG | HEIGHT: 61 IN | WEIGHT: 167.8 LBS

## 2023-10-31 DIAGNOSIS — E66.09 CLASS 1 OBESITY DUE TO EXCESS CALORIES WITHOUT SERIOUS COMORBIDITY WITH BODY MASS INDEX (BMI) OF 32.0 TO 32.9 IN ADULT: ICD-10-CM

## 2023-10-31 DIAGNOSIS — M17.0 PRIMARY OSTEOARTHRITIS OF BOTH KNEES: Primary | ICD-10-CM

## 2023-10-31 RX ORDER — LIDOCAINE HYDROCHLORIDE 10 MG/ML
3 INJECTION, SOLUTION EPIDURAL; INFILTRATION; INTRACAUDAL; PERINEURAL
Status: COMPLETED | OUTPATIENT
Start: 2023-10-31 | End: 2023-10-31

## 2023-10-31 RX ORDER — TRIAMCINOLONE ACETONIDE 40 MG/ML
80 INJECTION, SUSPENSION INTRA-ARTICULAR; INTRAMUSCULAR
Status: COMPLETED | OUTPATIENT
Start: 2023-10-31 | End: 2023-10-31

## 2023-10-31 RX ORDER — BUPIVACAINE HYDROCHLORIDE 5 MG/ML
2 INJECTION, SOLUTION EPIDURAL; INTRACAUDAL
Status: COMPLETED | OUTPATIENT
Start: 2023-10-31 | End: 2023-10-31

## 2023-10-31 RX ADMIN — BUPIVACAINE HYDROCHLORIDE 2 ML: 5 INJECTION, SOLUTION EPIDURAL; INTRACAUDAL at 13:26

## 2023-10-31 RX ADMIN — LIDOCAINE HYDROCHLORIDE 3 ML: 10 INJECTION, SOLUTION EPIDURAL; INFILTRATION; INTRACAUDAL; PERINEURAL at 13:26

## 2023-10-31 RX ADMIN — TRIAMCINOLONE ACETONIDE 80 MG: 40 INJECTION, SUSPENSION INTRA-ARTICULAR; INTRAMUSCULAR at 13:26

## 2023-10-31 NOTE — PROGRESS NOTES
Procedure   - Large Joint Arthrocentesis: bilateral knee on 10/31/2023 1:26 PM  Indications: pain  Details: 21 G needle, anterolateral approach  Medications (Right): 2 mL bupivacaine (PF) 0.5 %; 3 mL lidocaine PF 1% 1 %; 80 mg triamcinolone acetonide 40 MG/ML  Medications (Left): 2 mL bupivacaine (PF) 0.5 %; 3 mL lidocaine PF 1% 1 %; 80 mg triamcinolone acetonide 40 MG/ML  Outcome: tolerated well, no immediate complications  Procedure, treatment alternatives, risks and benefits explained, specific risks discussed. Consent was given by the patient. Immediately prior to procedure a time out was called to verify the correct patient, procedure, equipment, support staff and site/side marked as required. Patient was prepped and draped in the usual sterile fashion.

## 2023-10-31 NOTE — PROGRESS NOTES
Orthopaedic Clinic Note: Knee Established Patient    Chief Complaint   Patient presents with    Follow-up     3 mo f/u - Primary osteoarthritis of both knees        HPI    It has been 3  month(s) since Ms. Murray's last visit. She returns to clinic today for follow-up bilateral knee osteoarthritis.  Patient went cortisone injections both knees 3 months ago.  The injections worked well for about 2-1/2 months.  Her pain has gradually returned.  She rates it a 8/10 on the pain scale today.  She is ambulating with no assistive device.  She is complaining of worsening swelling and stiffness in the knees.  Denies fevers chills or constitutional symptoms.  She remains overweight the BMI 32.33.  Overall she is doing worse.    Past Medical History:   Diagnosis Date    HAMMAD (acute kidney injury) 2021    Allergic     Anemia     Depression     Diabetes mellitus     History of heart attack     Hyperlipidemia     Hypertension       Past Surgical History:   Procedure Laterality Date    BILATERAL BREAST REDUCTION      BONE MARROW ASPIRATION      CATARACT EXTRACTION Bilateral     HYSTERECTOMY  1990    x 2    REDUCTION MAMMAPLASTY Bilateral     MORE THAN 20 YEARS AGO    SINUS SURGERY  2003    TONSILLECTOMY        Family History   Problem Relation Age of Onset    Arthritis Maternal Grandmother     Hypertension Mother     Hyperlipidemia Mother     Thyroid disease Mother     Diabetes Sister     Crohn's disease Sister     Hypertension Sister     Heart attack Maternal Grandfather     Breast cancer Neg Hx     Ovarian cancer Neg Hx      Social History     Socioeconomic History    Marital status: Single   Tobacco Use    Smoking status: Former     Packs/day: 2.00     Years: 30.00     Additional pack years: 0.00     Total pack years: 60.00     Types: Cigarettes     Quit date:      Years since quittin.8    Smokeless tobacco: Never   Vaping Use    Vaping Use: Never used   Substance and Sexual Activity    Alcohol use: No    Drug use: No     Sexual activity: Defer      Current Outpatient Medications on File Prior to Visit   Medication Sig Dispense Refill    albuterol (PROVENTIL) (2.5 MG/3ML) 0.083% nebulizer solution Take 2.5 mg by nebulization Every 4 (Four) Hours As Needed for Wheezing. 90 mL 1    albuterol sulfate  (90 Base) MCG/ACT inhaler Inhale 2 puffs Every 4 (Four) Hours As Needed for Wheezing. 8 g 12    amLODIPine (NORVASC) 10 MG tablet TAKE ONE TABLET BY MOUTH DAILY 30 tablet 5    aspirin (aspirin) 81 MG EC tablet Take 1 tablet by mouth Daily. 30 tablet 5    atorvastatin (LIPITOR) 80 MG tablet Take 1 tablet by mouth Every Night. 30 tablet 5    budesonide-formoterol (Symbicort) 80-4.5 MCG/ACT inhaler Inhale 2 puffs 2 (Two) Times a Day. 10.2 g 12    Cyanocobalamin (B-12) 1000 MCG tablet Take 1 tablet by mouth Daily. 30 tablet 5    FLUoxetine (PROzac) 20 MG capsule TAKE THREE CAPSULES BY MOUTH DAILY 90 capsule 0    hydroxyurea (HYDREA) 500 MG capsule Take 1 capsule by mouth Daily. 30 capsule 11    lisinopril (PRINIVIL,ZESTRIL) 20 MG tablet TAKE ONE TABLET BY MOUTH DAILY 30 tablet 0    multivitamin (Multiple Vitamin) tablet tablet Take 1 tablet by mouth Daily. 30 tablet 5    nitroglycerin (NITROSTAT) 0.4 MG SL tablet Place 1 tablet under the tongue Every 5 (Five) Minutes As Needed for Chest Pain. Take no more than 3 doses in 15 minutes. 25 tablet 5    ondansetron (ZOFRAN) 8 MG tablet Take 1 tablet by mouth 3 (Three) Times a Day As Needed for Nausea or Vomiting. 30 tablet 5    traZODone (DESYREL) 50 MG tablet Take 0.5 tablets by mouth Every Night. 30 tablet 3    Umeclidinium-Vilanterol (ANORO ELLIPTA) 62.5-25 MCG/ACT aerosol powder  inhaler Inhale 1 puff Daily. 1 each 5    vitamin D (ERGOCALCIFEROL) 1.25 MG (39343 UT) capsule capsule Take 1 capsule by mouth 1 (One) Time Per Week. 4 capsule 5     No current facility-administered medications on file prior to visit.      Allergies   Allergen Reactions    Sulfa Antibiotics Rash        Review  "of Systems   Constitutional: Negative.    HENT: Negative.     Eyes: Negative.    Respiratory: Negative.     Cardiovascular: Negative.    Gastrointestinal: Negative.    Endocrine: Negative.    Genitourinary: Negative.    Musculoskeletal:  Positive for arthralgias.   Skin: Negative.    Allergic/Immunologic: Negative.    Neurological: Negative.    Hematological: Negative.    Psychiatric/Behavioral: Negative.        The patient's Review of Systems was personally reviewed and confirmed as accurate.    Physical Exam  Blood pressure (!) 160/102, height 154.9 cm (60.98\"), weight 76.1 kg (167 lb 12.8 oz), not currently breastfeeding.    Body mass index is 31.72 kg/m².    GENERAL APPEARANCE: awake, alert, oriented, in no acute distress and well developed, well nourished  LUNGS:  breathing nonlabored  EXTREMITIES: no clubbing, cyanosis  PERIPHERAL PULSES: palpable dorsalis pedis and posterior tibial pulses bilaterally.    GAIT:  Antalgic        ----------   Bilateral Knee Exam:  ----------  ALIGNMENT: severe varus, correctable to neutral  ----------  RANGE OF MOTION:  Decreased (5 - 115 degrees) with no extensor lag  LIGAMENTOUS STABILITY:   stable to varus and valgus stress at terminal extension and 30 degrees; retensioning of the MCL is appreciated with valgus stress at 30 degrees consistent with medial compartment degeneration  ----------  STRENGTH:  KNEE FLEXION 5/5  KNEE EXTENSION  5/5  ANKLE DORSIFLEXION  5/5  ANKLE PLANTARFLEXION  5/5  ----------  PAIN WITH PALPATION:global  KNEE EFFUSION: yes, mild effusion bilateral  PAIN WITH KNEE ROM: yes  PATELLAR CREPITUS:  yes, painful and symptomatic  ----------  SENSATION TO LIGHT TOUCH:  DEEP PERONEAL/SUPERFICIAL PERONEAL/SURAL/SAPHENOUS/TIBIAL:    intact  ----------  EDEMA:  no  ERYTHEMA:    no  WOUNDS/INCISIONS:   no  _____________________________________________________________________  _____________________________________________________________________    RADIOGRAPHIC " FINDINGS:   No new imaging today    Assessment/Plan:   Diagnosis Plan   1. Primary osteoarthritis of both knees        2. Class 1 obesity due to excess calories without serious comorbidity with body mass index (BMI) of 32.0 to 32.9 in adult          The patient has failed conservative treatment measures and is a candidate for joint arthroplasty.  I discussed the joint arthroplasty surgical process as well as the recovery and rehabilitation time frame.  The patient asked several questions regarding the joint arthroplasty surgery, which were answered accordingly.  Ultimately, the patient declines surgical intervention at this time and wishes to continue with conservative treatment measures.  She is interested in pursuing surgical intervention next year.  Alternative conservative treatment measures were discussed including bracing, therapy, topical/oral anti-inflammatories, activity modification, and weight loss.  The patient considered these treatment options and wishes to proceed with corticosteroid injection(s) today.  Therefore we will proceed with corticosteroid injection(s) today.  Follow-up 3 months for repeat evaluation and x-ray 4 views bilateral knees on return..    Patient has an elevated BMI. The patient has been instructed on various weight loss avenues including diet, portion control, calorie restriction, low/no impact exercise, referral to weight loss management and/or bariatric surgery.  It was explained that weight loss can improve joint pain alone by decreasing the joint reaction forces.  For every pound of weight change, the knee and hip joints see a 4 to 5 fold change in pressure.  Given these options, the patient will proceed with low calorie diet and low impact exercise.    Procedure Note:  I discussed with the patient the potential benefits of performing a therapeutic injections of the bilateral knees as well as potential risks including but not limited to infection, swelling, pain, bleeding,  bruising, nerve/vessel damage, skin color changes, transient elevation in blood glucose levels, and fat atrophy. After informed consent and verifying correct patient, procedure site, and type of procedure, the areas were prepped with alcohol, ethyl chloride was used to numb the skin. Via the superior lateral approach, 3 cc of 1% lidocaine, 1 cc 0.5% bupivicaine, and 2 cc of 40mg/ml of Kenalog were each injected into the bilateral knees. The patient tolerated the procedures well. There were no complications. A sterile dressing was placed over each injection site.      Keaton Aldrich MD  10/31/23  13:28 EDT

## 2023-11-07 ENCOUNTER — LAB (OUTPATIENT)
Dept: INTERNAL MEDICINE | Facility: CLINIC | Age: 68
End: 2023-11-07
Payer: MEDICARE

## 2023-11-07 ENCOUNTER — OFFICE VISIT (OUTPATIENT)
Dept: INTERNAL MEDICINE | Facility: CLINIC | Age: 68
End: 2023-11-07
Payer: MEDICARE

## 2023-11-07 VITALS
HEIGHT: 61 IN | SYSTOLIC BLOOD PRESSURE: 160 MMHG | BODY MASS INDEX: 31.53 KG/M2 | HEART RATE: 89 BPM | OXYGEN SATURATION: 96 % | TEMPERATURE: 96.8 F | WEIGHT: 167 LBS | DIASTOLIC BLOOD PRESSURE: 110 MMHG

## 2023-11-07 DIAGNOSIS — E11.9 TYPE 2 DIABETES MELLITUS WITHOUT COMPLICATION, WITHOUT LONG-TERM CURRENT USE OF INSULIN: ICD-10-CM

## 2023-11-07 DIAGNOSIS — E78.5 HYPERLIPIDEMIA LDL GOAL <70: Chronic | ICD-10-CM

## 2023-11-07 DIAGNOSIS — D45 POLYCYTHEMIA VERA: ICD-10-CM

## 2023-11-07 DIAGNOSIS — Z59.9 FINANCIAL DIFFICULTIES: ICD-10-CM

## 2023-11-07 DIAGNOSIS — J44.9 CHRONIC OBSTRUCTIVE PULMONARY DISEASE, UNSPECIFIED COPD TYPE: ICD-10-CM

## 2023-11-07 DIAGNOSIS — F41.8 DEPRESSION WITH ANXIETY: ICD-10-CM

## 2023-11-07 DIAGNOSIS — I10 ESSENTIAL HYPERTENSION: ICD-10-CM

## 2023-11-07 DIAGNOSIS — Z00.00 MEDICARE ANNUAL WELLNESS VISIT, SUBSEQUENT: Primary | ICD-10-CM

## 2023-11-07 DIAGNOSIS — D47.3 ESSENTIAL THROMBOCYTOSIS: ICD-10-CM

## 2023-11-07 LAB
EXPIRATION DATE: ABNORMAL
HBA1C MFR BLD: 6.1 % (ref 4.5–5.7)
Lab: ABNORMAL

## 2023-11-07 PROCEDURE — 85025 COMPLETE CBC W/AUTO DIFF WBC: CPT | Performed by: NURSE PRACTITIONER

## 2023-11-07 PROCEDURE — 36415 COLL VENOUS BLD VENIPUNCTURE: CPT | Performed by: NURSE PRACTITIONER

## 2023-11-07 PROCEDURE — 82570 ASSAY OF URINE CREATININE: CPT | Performed by: NURSE PRACTITIONER

## 2023-11-07 PROCEDURE — 82043 UR ALBUMIN QUANTITATIVE: CPT | Performed by: NURSE PRACTITIONER

## 2023-11-07 PROCEDURE — 80053 COMPREHEN METABOLIC PANEL: CPT | Performed by: INTERNAL MEDICINE

## 2023-11-07 RX ORDER — LISINOPRIL 40 MG/1
40 TABLET ORAL DAILY
Qty: 90 TABLET | Refills: 1 | Status: SHIPPED | OUTPATIENT
Start: 2023-11-07

## 2023-11-07 RX ORDER — FLUOXETINE HYDROCHLORIDE 40 MG/1
40 CAPSULE ORAL DAILY
Qty: 90 CAPSULE | Refills: 1 | Status: SHIPPED | OUTPATIENT
Start: 2023-11-07 | End: 2023-11-13

## 2023-11-07 SDOH — ECONOMIC STABILITY - INCOME SECURITY: PROBLEM RELATED TO HOUSING AND ECONOMIC CIRCUMSTANCES, UNSPECIFIED: Z59.9

## 2023-11-07 NOTE — PROGRESS NOTES
The ABCs of the Annual Wellness Visit  Subsequent Medicare Wellness Visit    Chief Complaint   Patient presents with    Medicare Wellness-subsequent       Subjective   History of Present Illness:  Martha Murray is a 68 y.o. female who presents for a Subsequent Medicare Wellness Visit.    The patient is doing well overall. She notes that it is just a struggle to get food or to pay bills. She is living on unhealthy food and visits the food bank once a month, half of the stuff is . She eats bologna sandwiches, hot dogs, peanut butter sandwiches, and eggs. She denies taking vitamins; however, she is deciding to take a multivitamin. She is experiencing fatigue. She needs to schedule a mammogram. She was supposed to have a sleep apnea test, but she denies going because she could not afford to drive to [place]. Her colon cancer screening is up to date. She is experiencing diarrhea; however, only when she drinks coffee in the morning, otherwise it is not bothering her. Her dental is fine, she got teeth extraction. She has to get approved for knee surgery. She receives cortisone injections. She would like to increase prozac due to her recent worsening of depression and anxiety.     The patient's blood pressure is elevated today. She has not been consistent with BP meds. She has upcoming sleep study scheduled. She uses oxygen at night. She has not followed up with cardiologist in a bit.     She is seeing hematology, Dr. Graves, and being treated for essential thrombocytosis. She has started hydroxyurea.     The patient needs to get something done about her tinnitus, which is getting louder and worse. She has to sleep with a TV on at night because she hears the ringing all the time. Reports she cannot afford to see ENT specialist at this time.     Patient experiencing increased wheezing and intermittent cough for past 2 weeks. Feels she is having flare up of COPD.     Overall healthy:  No, fatigued and tired.    Regular exercise:  Physically Active  Diet is well balanced:  No and Educated on heart healthy diet  Vitamin Supplement:  Yes, multi-vitamin  Alcohol intake:  No  Tobacco use:  No  Cardiovascular risk is low:  Moderate  Menstrual cycle regular:  Hysterectomy  PAP:  last pap normal  :  No urinary issues  Mammogram:  due but can't afford transporation  Colonoscopy:  up to date  GI:  normal  Regular dental exam:  Up to date  Regular eye exam:  Due and will self schedule  Immunizations up to date:  Up to date  Wear a seatbelt regularly:  Yes  Wear sunscreen regularly when outdoors:  No and Educated on sun safety    HEALTH RISK ASSESSMENT    Recent Hospitalizations:  No hospitalization(s) within the last year.    Current Medical Providers:  Patient Care Team:  Jenny Carvajal APRN as PCP - General (Nurse Practitioner)    Smoking Status:  Social History     Tobacco Use   Smoking Status Former    Packs/day: 2.00    Years: 30.00    Additional pack years: 0.00    Total pack years: 60.00    Types: Cigarettes    Quit date:     Years since quittin.8   Smokeless Tobacco Never       Alcohol Consumption:  Social History     Substance and Sexual Activity   Alcohol Use No       Depression Screen:       2023     8:35 AM   PHQ-2/PHQ-9 Depression Screening   Little Interest or Pleasure in Doing Things 3-->nearly every day   Feeling Down, Depressed or Hopeless 3-->nearly every day   Trouble Falling or Staying Asleep, or Sleeping Too Much 3-->nearly every day   Feeling Tired or Having Little Energy 3-->nearly every day   Poor Appetite or Overeating 3-->nearly every day   Feeling Bad about Yourself - or that You are a Failure or Have Let Yourself or Your Family Down 3-->nearly every day   Trouble Concentrating on Things, Such as Reading the Newspaper or Watching Television 2-->more than half the days   Moving or Speaking So Slowly that Other People Could Have Noticed? Or the Opposite - Being So Fidgety 0-->not  at all   PHQ-9: Brief Depression Severity Measure Score 20       Fall Risk Screen:  NIKA Fall Risk Assessment was completed, and patient is at LOW risk for falls.Assessment completed on:2023    Health Habits and Functional and Cognitive Screenin/7/2023     8:37 AM   Functional & Cognitive Status   Do you have difficulty preparing food and eating? No   Do you have difficulty bathing yourself, getting dressed or grooming yourself? No   Do you have difficulty using the toilet? No   Do you have difficulty moving around from place to place? No   Do you have trouble with steps or getting out of a bed or a chair? Yes   Current Diet Well Balanced Diet   Dental Exam Up to date   Eye Exam Up to date   Exercise (times per week) 0 times per week   Current Exercises Include No Regular Exercise   Do you need help using the phone?  No   Are you deaf or do you have serious difficulty hearing?  No   Do you need help to go to places out of walking distance? No   Do you need help shopping? No   Do you need help preparing meals?  No   Do you need help with housework?  Yes   Do you need help with laundry? No   Do you need help taking your medications? No   Do you need help managing money? No   Do you ever drive or ride in a car without wearing a seat belt? No   Have you felt unusual stress, anger or loneliness in the last month? Yes   Who do you live with? Alone   If you need help, do you have trouble finding someone available to you? Yes   Have you been bothered in the last four weeks by sexual problems? No   Do you have difficulty concentrating, remembering or making decisions? No         Does the patient have evidence of cognitive impairment? No    Asprin use counseling:Taking ASA appropriately as indicated    Age-appropriate Screening Schedule:  Refer to the list below for future screening recommendations based on patient's age, sex and/or medical conditions. Orders for these recommended tests are listed in the plan  section. The patient has been provided with a written plan.    Health Maintenance   Topic Date Due    DXA SCAN  Never done    ZOSTER VACCINE (1 of 2) Never done    DIABETIC FOOT EXAM  03/27/2020    URINE MICROALBUMIN  08/04/2021    ANNUAL WELLNESS VISIT  12/14/2022    DIABETIC EYE EXAM  02/21/2023    MAMMOGRAM  06/15/2023    INFLUENZA VACCINE  08/01/2023    COVID-19 Vaccine (4 - 2023-24 season) 09/01/2023    HEMOGLOBIN A1C  05/07/2024    LIPID PANEL  08/07/2024    BMI FOLLOWUP  08/07/2024    COLORECTAL CANCER SCREENING  01/30/2025    TDAP/TD VACCINES (2 - Td or Tdap) 03/27/2029    HEPATITIS C SCREENING  Completed    Pneumococcal Vaccine 65+  Completed       Transcribed from ambient dictation for NAYELI Matos by NAYELI Matos.  11/07/23   09:27 EST    Patient or patient representative verbalized consent to the visit recording.  I have personally performed the services described in this document as transcribed by the above individual, and it is both accurate and complete.       The following portions of the patient's history were reviewed and updated as appropriate: allergies, current medications, past family history, past medical history, past social history, past surgical history, and problem list.    Review of Systems  Pertinent items are noted in HPI.     Outpatient Medications Prior to Visit   Medication Sig Dispense Refill    albuterol sulfate  (90 Base) MCG/ACT inhaler Inhale 2 puffs Every 4 (Four) Hours As Needed for Wheezing. 8 g 12    amLODIPine (NORVASC) 10 MG tablet TAKE ONE TABLET BY MOUTH DAILY 30 tablet 5    aspirin (aspirin) 81 MG EC tablet Take 1 tablet by mouth Daily. 30 tablet 5    atorvastatin (LIPITOR) 80 MG tablet Take 1 tablet by mouth Every Night. 30 tablet 5    budesonide-formoterol (Symbicort) 80-4.5 MCG/ACT inhaler Inhale 2 puffs 2 (Two) Times a Day. 10.2 g 12    hydroxyurea (HYDREA) 500 MG capsule Take 1 capsule by mouth Daily. 30 capsule 11     multivitamin (Multiple Vitamin) tablet tablet Take 1 tablet by mouth Daily. 30 tablet 5    nitroglycerin (NITROSTAT) 0.4 MG SL tablet Place 1 tablet under the tongue Every 5 (Five) Minutes As Needed for Chest Pain. Take no more than 3 doses in 15 minutes. 25 tablet 5    ondansetron (ZOFRAN) 8 MG tablet Take 1 tablet by mouth 3 (Three) Times a Day As Needed for Nausea or Vomiting. 30 tablet 5    traZODone (DESYREL) 50 MG tablet Take 0.5 tablets by mouth Every Night. 30 tablet 3    Umeclidinium-Vilanterol (ANORO ELLIPTA) 62.5-25 MCG/ACT aerosol powder  inhaler Inhale 1 puff Daily. 1 each 5    FLUoxetine (PROzac) 20 MG capsule TAKE THREE CAPSULES BY MOUTH DAILY 90 capsule 0    lisinopril (PRINIVIL,ZESTRIL) 20 MG tablet TAKE ONE TABLET BY MOUTH DAILY 30 tablet 0    Cyanocobalamin (B-12) 1000 MCG tablet Take 1 tablet by mouth Daily. (Patient not taking: Reported on 11/7/2023) 30 tablet 5    vitamin D (ERGOCALCIFEROL) 1.25 MG (30227 UT) capsule capsule Take 1 capsule by mouth 1 (One) Time Per Week. (Patient not taking: Reported on 11/7/2023) 4 capsule 5    albuterol (PROVENTIL) (2.5 MG/3ML) 0.083% nebulizer solution Take 2.5 mg by nebulization Every 4 (Four) Hours As Needed for Wheezing. (Patient not taking: Reported on 11/7/2023) 90 mL 1     No facility-administered medications prior to visit.       Patient Active Problem List   Diagnosis    Essential hypertension    Mild intermittent asthma without complication    Type 2 diabetes mellitus without complication, without long-term current use of insulin    Hyperlipidemia LDL goal <70    Chronic pain of both knees    Depression    History of tobacco abuse    Vitamin D deficiency    Essential thrombocytosis    Elevated liver enzymes    Onychomycosis with ingrown toenail    CKD (chronic kidney disease) stage 3, GFR 30-59 ml/min    Atypical angina    Primary osteoarthritis of both knees    Irritable bowel syndrome with diarrhea    Other insomnia    Right hand pain    Acute  "kidney injury    Splenomegaly    Lactic acidosis    Obesity (BMI 30.0-34.9)         Advanced Care Planning:  ACP discussion was held with the patient during this visit. Patient does not have an advance directive, information provided.    Compared to one year ago, the patient feels her physical health is worse.  Compared to one year ago, the patient feels her mental health is worse.    Reviewed chart for potential of high risk medication in the elderly: yes  Reviewed chart for potential of harmful drug interactions in the elderly:yes    Objective         Vitals:    11/07/23 0838   BP: (!) 160/110   BP Location: Right arm   Patient Position: Sitting   Cuff Size: Adult   Pulse: 89   Temp: 96.8 °F (36 °C)   TempSrc: Temporal   SpO2: 96%   Weight: 75.8 kg (167 lb)   Height: 154.9 cm (61\")   PainSc: 0-No pain       Body mass index is 31.55 kg/m².  Discussed the patient's BMI with her. The BMI is above average; BMI management plan is completed.    Physical Exam  Vitals reviewed.   Constitutional:       Appearance: Normal appearance. She is well-developed.   HENT:      Head: Normocephalic and atraumatic.      Right Ear: Hearing, tympanic membrane, ear canal and external ear normal.      Left Ear: Hearing, tympanic membrane, ear canal and external ear normal.      Nose: Nose normal.      Mouth/Throat:      Lips: Pink.      Mouth: Mucous membranes are moist.      Pharynx: Oropharynx is clear. Uvula midline.   Eyes:      General: Lids are normal.      Extraocular Movements: Extraocular movements intact.      Conjunctiva/sclera: Conjunctivae normal.      Pupils: Pupils are equal, round, and reactive to light.   Neck:      Thyroid: No thyromegaly.      Trachea: Trachea normal.   Cardiovascular:      Rate and Rhythm: Normal rate and regular rhythm.      Pulses:           Carotid pulses are 2+ on the right side and 2+ on the left side.     Heart sounds: Normal heart sounds.   Pulmonary:      Effort: Pulmonary effort is normal. No " respiratory distress.      Breath sounds: Wheezing and rhonchi present.   Abdominal:      General: Bowel sounds are normal.      Palpations: Abdomen is soft.      Tenderness: There is no abdominal tenderness.   Musculoskeletal:      Right knee: Tenderness present.      Left knee: Tenderness present.   Skin:     General: Skin is warm and dry.      Capillary Refill: Capillary refill takes 2 to 3 seconds.   Neurological:      Mental Status: She is alert and oriented to person, place, and time.      GCS: GCS eye subscore is 4. GCS verbal subscore is 5. GCS motor subscore is 6.   Psychiatric:         Attention and Perception: Attention normal.         Mood and Affect: Mood normal. Mood is anxious and depressed.         Speech: Speech normal.         Behavior: Behavior normal. Behavior is cooperative.         Thought Content: Thought content normal.                Lab Results   Component Value Date    HGBA1C 6.1 (A) 11/07/2023        Assessment & Plan   Medicare Risks and Personalized Health Plan  CMS Preventative Services Quick Reference  Advance Directive Discussion  Breast Cancer/Mammogram Screening  Cardiovascular risk  Chronic Pain   Depression/Dysphoria  Inadequate Social Support, Isolation, Loneliness, Lack of Transportation, Financial Difficulties, or Caregiver Stress   Inactivity/Sedentary  Obesity/Overweight   Osteoporosis Risk    The above risks/problems have been discussed with the patient.  Pertinent information has been shared with the patient in the After Visit Summary.  Follow up plans and orders are seen below in the Assessment/Plan Section.    Diagnoses and all orders for this visit:    1. Medicare annual wellness visit, subsequent (Primary)    2. Essential hypertension  -     lisinopril (PRINIVIL,ZESTRIL) 40 MG tablet; Take 1 tablet by mouth Daily.  Dispense: 90 tablet; Refill: 1    3. Type 2 diabetes mellitus without complication, without long-term current use of insulin  -     POC Glycosylated  Hemoglobin (Hb A1C)  -     Microalbumin / Creatinine Urine Ratio - Urine, Clean Catch; Future    4. Depression with anxiety    5. Hyperlipidemia LDL goal <70    6. Chronic obstructive pulmonary disease, unspecified COPD type    7. Polycythemia vera  -     CBC Auto Differential  -     Comprehensive Metabolic Panel    8. Financial difficulties    Other orders  -     FLUoxetine (PROzac) 40 MG capsule; Take 1 capsule by mouth Daily.  Dispense: 90 capsule; Refill: 1        1. COPD with exacerbation  - Chronic, unstable.   - Start azithromycin.  - Continue Ventolin, Singulair, Trelegy.   - Continue follow-ups with pulmonology and sleep medicine.   - Continue oxygen at bedtime.   - Patient has upcoming sleep study.    2. Hypertension  - Chronic, unstable.   - Continue metoprolol, Entresto, Lasix, and prazosin.  - encourage taking medications daily. Has upcoming sleep study scheduled.   - Continue follow-ups with cardiology. Encouraged her to schedule follow up asap.   - Follow heart healthy low cholesterol diet.   - Patient getting fasting lipid panel completed today.    3. Borderline diabetes mellitus  - Chronic, unstable.   - Completing hemoglobin A1c.   - Follow low carb, low sugar diet.    4. Chronic low back pain  - Chronic, stable.   - Continue hydrocodone and gabapentin.    5. Obstructive sleep apnea  - Chronic, unstable.   - Continue oxygen use nightly.   - Continue follow-ups with sleep medicine.   - Patient has upcoming sleep study scheduled.    6.Hyperlipidemia  - Chronic, unstable.  - Continue atorvastatin.   - Follow heart healthy low cholesterol diet.   - Completing fasting lipid panel today.    7. Folate deficiency  - Chronic, unstable.  - Completing vitamin B12, folate labs.  - Continue daily multivitamin.    Follow Up:  Return in about 3 months (around 2/7/2024), or if symptoms worsen or fail to improve, for Follow-up.     An After Visit Summary and PPPS were given to the patient.         Transcribed from  ambient dictation for NAYELI Matos by Rissa Monge.  11/07/23   10:28 EST    Patient or patient representative verbalized consent to the visit recording.  I have personally performed the services described in this document as transcribed by the above individual, and it is both accurate and complete.

## 2023-11-08 ENCOUNTER — SPECIALTY PHARMACY (OUTPATIENT)
Dept: ONCOLOGY | Facility: HOSPITAL | Age: 68
End: 2023-11-08
Payer: MEDICARE

## 2023-11-08 DIAGNOSIS — D47.3 ESSENTIAL THROMBOCYTOSIS: ICD-10-CM

## 2023-11-08 LAB
ALBUMIN SERPL-MCNC: 4.3 G/DL (ref 3.5–5.2)
ALBUMIN UR-MCNC: 190 MG/DL
ALBUMIN/GLOB SERPL: 2.2 G/DL
ALP SERPL-CCNC: 80 U/L (ref 39–117)
ALT SERPL W P-5'-P-CCNC: 19 U/L (ref 1–33)
ANION GAP SERPL CALCULATED.3IONS-SCNC: 15 MMOL/L (ref 5–15)
AST SERPL-CCNC: 24 U/L (ref 1–32)
BASOPHILS # BLD AUTO: 0.07 10*3/MM3 (ref 0–0.2)
BASOPHILS NFR BLD AUTO: 0.7 % (ref 0–1.5)
BILIRUB SERPL-MCNC: 1 MG/DL (ref 0–1.2)
BUN SERPL-MCNC: 22 MG/DL (ref 8–23)
BUN/CREAT SERPL: 16.9 (ref 7–25)
CALCIUM SPEC-SCNC: 9.1 MG/DL (ref 8.6–10.5)
CHLORIDE SERPL-SCNC: 106 MMOL/L (ref 98–107)
CO2 SERPL-SCNC: 17 MMOL/L (ref 22–29)
CREAT SERPL-MCNC: 1.3 MG/DL (ref 0.57–1)
CREAT UR-MCNC: 250.5 MG/DL
DEPRECATED RDW RBC AUTO: 56.1 FL (ref 37–54)
EGFRCR SERPLBLD CKD-EPI 2021: 45.2 ML/MIN/1.73
EOSINOPHIL # BLD AUTO: 0.38 10*3/MM3 (ref 0–0.4)
EOSINOPHIL NFR BLD AUTO: 3.8 % (ref 0.3–6.2)
ERYTHROCYTE [DISTWIDTH] IN BLOOD BY AUTOMATED COUNT: 19.7 % (ref 12.3–15.4)
GLOBULIN UR ELPH-MCNC: 2 GM/DL
GLUCOSE SERPL-MCNC: 85 MG/DL (ref 65–99)
HCT VFR BLD AUTO: 58.9 % (ref 34–46.6)
HGB BLD-MCNC: 19.3 G/DL (ref 12–15.9)
IMM GRANULOCYTES # BLD AUTO: 0.05 10*3/MM3 (ref 0–0.05)
IMM GRANULOCYTES NFR BLD AUTO: 0.5 % (ref 0–0.5)
LYMPHOCYTES # BLD AUTO: 1.18 10*3/MM3 (ref 0.7–3.1)
LYMPHOCYTES NFR BLD AUTO: 11.8 % (ref 19.6–45.3)
MCH RBC QN AUTO: 28 PG (ref 26.6–33)
MCHC RBC AUTO-ENTMCNC: 32.8 G/DL (ref 31.5–35.7)
MCV RBC AUTO: 85.5 FL (ref 79–97)
MICROALBUMIN/CREAT UR: 758.5 MG/G (ref 0–29)
MONOCYTES # BLD AUTO: 0.85 10*3/MM3 (ref 0.1–0.9)
MONOCYTES NFR BLD AUTO: 8.5 % (ref 5–12)
NEUTROPHILS NFR BLD AUTO: 7.46 10*3/MM3 (ref 1.7–7)
NEUTROPHILS NFR BLD AUTO: 74.7 % (ref 42.7–76)
NRBC BLD AUTO-RTO: 0 /100 WBC (ref 0–0.2)
PLATELET # BLD AUTO: 606 10*3/MM3 (ref 140–450)
PMV BLD AUTO: 9.8 FL (ref 6–12)
POTASSIUM SERPL-SCNC: 4.6 MMOL/L (ref 3.5–5.2)
PROT SERPL-MCNC: 6.3 G/DL (ref 6–8.5)
RBC # BLD AUTO: 6.89 10*6/MM3 (ref 3.77–5.28)
SODIUM SERPL-SCNC: 138 MMOL/L (ref 136–145)
WBC NRBC COR # BLD: 9.99 10*3/MM3 (ref 3.4–10.8)

## 2023-11-08 RX ORDER — HYDROXYUREA 500 MG/1
500 CAPSULE ORAL DAILY
Qty: 30 CAPSULE | Refills: 11 | Status: SHIPPED | OUTPATIENT
Start: 2023-11-08

## 2023-11-08 NOTE — PROGRESS NOTES
Re: Refills of Oral Specialty Medication - Hydrea (hydroxyurea)    Drug-Drug Interactions: The current medication list was reviewed and there are no relevant drug-drug interactions with the specialty medication.  Medication Allergies: The patient has no relevant allergies as it relates to their oral specialty medication  Review of Labs/Dose Adjustments: NO DOSE CHANGE - I reviewed the most recent note and labs and the patient will continue without any dose changes.  I sent refills as described below.    Drug: Hydrea (hydroxyurea)  Strength: 500 mg  Directions: Take 1 capsule by mouth daily  Quantity: 30  Refills: 11  Pharmacy prescription sent to: [facility] - she has [employer] insurance which prohibits us from mailing this to her from [facility], so I've sent this to  [facility].    Smita Fulton, PharmD, [facility]  Oncology Clinical Pharmacist   Phone: [phone]      11/8/2023  10:23 EST          Playful/Appropriate

## 2023-11-08 NOTE — PROGRESS NOTES
Specialty Pharmacy Refill Coordination Note     Martha is a 68 y.o. female contacted today regarding refills of  hydroxyurea 500 mg PO daily specialty medication(s).    Patient would like to  in [place] since she lives there. Unable to mail due to insurance restrictions ([employer]).    Specialty medication(s) and dose(s) confirmed: yes    Refill Questions      Flowsheet Row Most Recent Value   Changes to allergies? No   Changes to medications? No   New conditions since last clinic visit No   Unplanned office visit, urgent care, ED, or hospital admission in the last 4 weeks  No   How does patient/caregiver feel medication is working? Very good   Financial problems or insurance changes  No   Since the previous refill, were any specialty medication doses or scheduled injections missed or delayed?  No   Does this patient require a clinical escalation to a pharmacist? No            Delivery Questions      Flowsheet Row Most Recent Value   Delivery method  at Pharmacy   Delivery phone number n/a,  patient will  at [facility] pharmacy   Number of medications in delivery 1   Medication(s) being filled and delivered Hydroxyurea (Antineoplastics Misc.)   Doses left of specialty medications around 4-5 days   Is there any medication that is due not being filled? No   Supplies needed? No supplies needed   Cooler needed? No   Do any medications need mixed or dated? No   Additional comments verified $0 copay with patient   Questions or concerns for the pharmacist? No   Explain any questions or concerns for the pharmacist n/a   Are any medications first time fills? No                   Follow-up: 30 day(s)     Dai Tsai, Pharmacy Technician  Specialty Pharmacy Technician

## 2023-11-09 ENCOUNTER — SPECIALTY PHARMACY (OUTPATIENT)
Dept: ONCOLOGY | Facility: HOSPITAL | Age: 68
End: 2023-11-09
Payer: MEDICARE

## 2023-11-09 ENCOUNTER — OFFICE VISIT (OUTPATIENT)
Dept: ONCOLOGY | Facility: CLINIC | Age: 68
End: 2023-11-09
Payer: MEDICARE

## 2023-11-09 VITALS
RESPIRATION RATE: 20 BRPM | OXYGEN SATURATION: 98 % | SYSTOLIC BLOOD PRESSURE: 196 MMHG | DIASTOLIC BLOOD PRESSURE: 88 MMHG | HEIGHT: 61 IN | WEIGHT: 168 LBS | TEMPERATURE: 96.9 F | HEART RATE: 78 BPM | BODY MASS INDEX: 31.72 KG/M2

## 2023-11-09 DIAGNOSIS — D47.3 ESSENTIAL THROMBOCYTOSIS: Primary | ICD-10-CM

## 2023-11-09 PROCEDURE — 3079F DIAST BP 80-89 MM HG: CPT | Performed by: INTERNAL MEDICINE

## 2023-11-09 PROCEDURE — 1126F AMNT PAIN NOTED NONE PRSNT: CPT | Performed by: INTERNAL MEDICINE

## 2023-11-09 PROCEDURE — 99214 OFFICE O/P EST MOD 30 MIN: CPT | Performed by: INTERNAL MEDICINE

## 2023-11-09 PROCEDURE — 3077F SYST BP >= 140 MM HG: CPT | Performed by: INTERNAL MEDICINE

## 2023-11-09 NOTE — PROGRESS NOTES
Follow Up Office Visit      Date: 2023     Patient Name: Martha Murray  MRN: [id number]  : 1955  Referring Physician: Julia Stacy     Chief Complaint:  Follow-up for JAK2 positive essential thrombocytosis     History of Present Illness: Martha Murray is a pleasant 66 y.o. female past medical history of anxiety, hypertension, hyperlipidemia, COPD, tobacco abuse, type 2 diabetes, CKD who presents today for evaluation of thrombocytosis. The patient has been followed by other PCP is monitoring CBCs which were notable for thrombocytosis. Most recently her platelet count was 1280K in 2021. Per chart review over the last several years her platelet count has ranged between 700K-900K. She does note excessive fatigue during this timeframe as well as increased cravings for ice. She denies any recurrent infections or inflammatory processes. Of note, she recent had a CT scan which showed some slight splenomegaly up to around 16 cm. She denies any chest pain or discomfort. Denies any shortness of breath, cough, vision changes, headaches     Interval History:  Presents clinic for follow-up.  Started back on Hydrea in 2023.  Tolerating medication well.  Denies any abdominal pain or discomfort.  Denies any lightheadedness or dizziness    Oncology History:    Oncology/Hematology History   Essential thrombocytosis   3/28/2019 Initial Diagnosis    Essential thrombocytosis     10/12/2023 -  Chemotherapy    OP POLYCYTHEMIA VERA/ESSENTIAL THROMBOCYTOSIS Hydroxyurea         Subjective      Review of Systems:   Constitutional: Negative for fevers, chills, or weight loss  Eyes: Negative for blurred vision or discharge         Ear/Nose/Throat: Negative for difficulty swallowing, sore throat, LAD                                                       Respiratory: Negative for cough, SOA, wheezing                                                                                        Cardiovascular:  Negative for chest pain or palpitations                                                                  Gastrointestinal: Negative for nausea, vomiting or diarrhea                                                                     Genitourinary: Negative for dysuria or hematuria                                                                                           Musculoskeletal: Negative for any joint pains or muscle aches                                                                        Neurologic: Negative for any weakness, headaches, dizziness                                                                         Hematologic: Negative for any easy bleeding or bruising                                                                                   Psychiatric: Negative for anxiety or depression                          Past Medical History/Past Surgical History/ Family History/ Social History: Reviewed by me and unchanged from my previous documentation done on October 2023.     Medications:     Current Outpatient Medications:     amLODIPine (NORVASC) 10 MG tablet, TAKE ONE TABLET BY MOUTH DAILY, Disp: 30 tablet, Rfl: 5    aspirin (aspirin) 81 MG EC tablet, Take 1 tablet by mouth Daily., Disp: 30 tablet, Rfl: 5    FLUoxetine (PROzac) 40 MG capsule, Take 1 capsule by mouth Daily., Disp: 90 capsule, Rfl: 1    hydroxyurea (HYDREA) 500 MG capsule, Take 1 capsule by mouth Daily., Disp: 30 capsule, Rfl: 11    lisinopril (PRINIVIL,ZESTRIL) 40 MG tablet, Take 1 tablet by mouth Daily., Disp: 90 tablet, Rfl: 1    nitroglycerin (NITROSTAT) 0.4 MG SL tablet, Place 1 tablet under the tongue Every 5 (Five) Minutes As Needed for Chest Pain. Take no more than 3 doses in 15 minutes., Disp: 25 tablet, Rfl: 5    traZODone (DESYREL) 50 MG tablet, Take 0.5 tablets by mouth Every Night., Disp: 30 tablet, Rfl: 3    Allergies:   Allergies   Allergen Reactions    Sulfa Antibiotics Rash       Objective     Physical  "Exam:  Vital Signs:   Vitals:    11/09/23 1342   BP: (!) 196/88  Comment: LUE   Pulse: 78   Resp: 20   Temp: 96.9 °F (36.1 °C)   TempSrc: Infrared   SpO2: 98%  Comment: RA   Weight: 76.2 kg (168 lb)   Height: 154.9 cm (61\")   PainSc: 0-No pain     Pain Score    11/09/23 1342   PainSc: 0-No pain     ECOG Performance Status: 0 - Asymptomatic    Constitutional: NAD, ECOG 0  Eyes: PERRLA, scleral anicteric  ENT: No LAD, no thyromegaly  Respiratory: CTAB, no wheezing, rales, rhonchi  Cardiovascular: RRR, no murmurs, pulses 2+ bilaterally  Abdomen: soft, NT/ND, no HSM  Musculoskeletal: strength 5/5 bilaterally, no c/c/e  Neurologic: A&O x 3, CN II-XII intact grossly    Results Review:   Lab on 11/07/2023   Component Date Value Ref Range Status    Glucose 11/07/2023 85  65 - 99 mg/dL Final    BUN 11/07/2023 22  8 - 23 mg/dL Final    Creatinine 11/07/2023 1.30 (H)  0.57 - 1.00 mg/dL Final    Sodium 11/07/2023 138  136 - 145 mmol/L Final    Potassium 11/07/2023 4.6  3.5 - 5.2 mmol/L Final    Slight hemolysis detected by analyzer. Result may be falsely elevated.    Chloride 11/07/2023 106  98 - 107 mmol/L Final    CO2 11/07/2023 17.0 (L)  22.0 - 29.0 mmol/L Final    Calcium 11/07/2023 9.1  8.6 - 10.5 mg/dL Final    Total Protein 11/07/2023 6.3  6.0 - 8.5 g/dL Final    Albumin 11/07/2023 4.3  3.5 - 5.2 g/dL Final    ALT (SGPT) 11/07/2023 19  1 - 33 U/L Final    AST (SGOT) 11/07/2023 24  1 - 32 U/L Final    Alkaline Phosphatase 11/07/2023 80  39 - 117 U/L Final    Total Bilirubin 11/07/2023 1.0  0.0 - 1.2 mg/dL Final    Globulin 11/07/2023 2.0  gm/dL Final    A/G Ratio 11/07/2023 2.2  g/dL Final    BUN/Creatinine Ratio 11/07/2023 16.9  7.0 - 25.0 Final    Anion Gap 11/07/2023 15.0  5.0 - 15.0 mmol/L Final    eGFR 11/07/2023 45.2 (L)  >60.0 mL/min/1.73 Final    Microalbumin/Creatinine Ratio 11/07/2023 758.5 (H)  0.0 - 29.0 mg/g Final    Creatinine, Urine 11/07/2023 250.5  mg/dL Final    Microalbumin, Urine 11/07/2023 190.0  " mg/dL Final   Office Visit on 11/07/2023   Component Date Value Ref Range Status    Hemoglobin A1C 11/07/2023 6.1 (A)  4.5 - 5.7 % Final    Lot Number 11/07/2023 [id number]   Final    Expiration Date 11/07/2023 06/11/2025   Final    WBC 11/07/2023 9.99  3.40 - 10.80 10*3/mm3 Final    RBC 11/07/2023 6.89 (H)  3.77 - 5.28 10*6/mm3 Final    Hemoglobin 11/07/2023 19.3 (H)  12.0 - 15.9 g/dL Final    Hematocrit 11/07/2023 58.9 (H)  34.0 - 46.6 % Final    MCV 11/07/2023 85.5  79.0 - 97.0 fL Final    MCH 11/07/2023 28.0  26.6 - 33.0 pg Final    MCHC 11/07/2023 32.8  31.5 - 35.7 g/dL Final    RDW 11/07/2023 19.7 (H)  12.3 - 15.4 % Final    RDW-SD 11/07/2023 56.1 (H)  37.0 - 54.0 fl Final    MPV 11/07/2023 9.8  6.0 - 12.0 fL Final    Platelets 11/07/2023 606 (H)  140 - 450 10*3/mm3 Final    Neutrophil % 11/07/2023 74.7  42.7 - 76.0 % Final    Lymphocyte % 11/07/2023 11.8 (L)  19.6 - 45.3 % Final    Monocyte % 11/07/2023 8.5  5.0 - 12.0 % Final    Eosinophil % 11/07/2023 3.8  0.3 - 6.2 % Final    Basophil % 11/07/2023 0.7  0.0 - 1.5 % Final    Immature Grans % 11/07/2023 0.5  0.0 - 0.5 % Final    Neutrophils, Absolute 11/07/2023 7.46 (H)  1.70 - 7.00 10*3/mm3 Final    Lymphocytes, Absolute 11/07/2023 1.18  0.70 - 3.10 10*3/mm3 Final    Monocytes, Absolute 11/07/2023 0.85  0.10 - 0.90 10*3/mm3 Final    Eosinophils, Absolute 11/07/2023 0.38  0.00 - 0.40 10*3/mm3 Final    Basophils, Absolute 11/07/2023 0.07  0.00 - 0.20 10*3/mm3 Final    Immature Grans, Absolute 11/07/2023 0.05  0.00 - 0.05 10*3/mm3 Final    nRBC 11/07/2023 0.0  0.0 - 0.2 /100 WBC Final       No results found.    Assessment / Plan      Assessment/Plan:   1. Essential thrombocytosis (HCC) (Primary)  -Platelet count 1280K in December 2021  -CT scan July 2021 showed slight splenomegaly up to 16 cm  -Concern for myelofibrosis given the clinical picture as well as iron deficiency given her symptoms  -JAK2 mutational testing positive for the [id number] mutation  -Von  Willebrand studies consistent with an acquired von Willebrand's disease secondary to a myeloproliferative disorder  -Initially ordered Hydrea in February 2022 however patient did not  the medication  -Started on Hydrea in October 2023.  Tolerating well.  Platelet count 606K in November 2023  -Okay to continue baby aspirin at this time  -Okay to continue with Hydrea 500 mg daily today     2.  Iron deficiency  -Noted on previous iron studies  -Given her JAK2 mutation positivity, will hold off on iron supplementation at this time         Follow Up:   Follow-up in 2 months     Paulo Graves MD  Hematology and Oncology     Please note that portions of this note may have been completed with a voice recognition program. Efforts were made to edit the dictations, but occasionally words are mistranscribed.

## 2023-11-10 ENCOUNTER — TELEPHONE (OUTPATIENT)
Dept: INTERNAL MEDICINE | Facility: CLINIC | Age: 68
End: 2023-11-10
Payer: MEDICARE

## 2023-11-10 NOTE — TELEPHONE ENCOUNTER
Caller: Martha Murray    Relationship: Self    Best call back number: [phone]    Which medication are you concerned about: FLUOXETINE 40MG    Who prescribed you this medication: DEPAUL      What are your concerns: PT WAS TAKING 3 20MG TABLETS A DAY AND NOW THE RX STATES TAKE ONE 40MG ONCE DAILY. SHE STATES SHE SPOKE TO [facility] AND THEY ARE GOING TO RAISE THE DOSAGE BUT THAT IS NOT WHAT HAS HAPPENED. PLEASE CALL HER AND LET HER KNOW WHAT TO DO AT THIS POINT    How long have you had these concerns: TODAY

## 2023-11-13 RX ORDER — FLUOXETINE HYDROCHLORIDE 20 MG/1
60 CAPSULE ORAL DAILY
Qty: 270 CAPSULE | Refills: 1 | Status: SHIPPED | OUTPATIENT
Start: 2023-11-13

## 2023-11-13 NOTE — TELEPHONE ENCOUNTER
Looks like prozac 60mg was discontinued by outside source. I have reordered this for her and sent to pharmacy

## 2023-11-13 NOTE — TELEPHONE ENCOUNTER
SHE IS CALLING IN TO CHECK THE STATUS OF THIS REQUEST. SHE FEELS LIKE THIS IS URGENT AND SHE IS VERY UPSET THAT SHE HAS NOT HEARD FROM ANYONE.    PLEASE CALL AND ADVISE

## 2023-12-01 DIAGNOSIS — I10 ESSENTIAL HYPERTENSION: ICD-10-CM

## 2023-12-01 RX ORDER — AMLODIPINE BESYLATE 10 MG/1
10 TABLET ORAL DAILY
Qty: 90 TABLET | Refills: 2 | Status: SHIPPED | OUTPATIENT
Start: 2023-12-01

## 2024-01-08 ENCOUNTER — TELEPHONE (OUTPATIENT)
Dept: ONCOLOGY | Facility: CLINIC | Age: 69
End: 2024-01-08
Payer: MEDICARE

## 2024-01-08 NOTE — TELEPHONE ENCOUNTER
Caller: Martha Murray    Relationship: Self    Best call back number: [phone]    What is the best time to reach you: ANYTIME    Who are you requesting to speak with (clinical staff, provider, specific staff member): CLINICAL    What was the call regarding: PATIENT STATED SHE LOST HER PRESCRIPTION OF HYDROXYUREA - SHE JUST FILLED IT ON 1/2/24 AND TOOK ABOUT 2 PILLS AND CANNOT FIND IT. SHE WASN'T SURE WHAT TO DO.     PLEASE CALL TO ADVISE.

## 2024-01-08 NOTE — TELEPHONE ENCOUNTER
Return call to Martha.  No answer, left message.  Advised that she has refills at the pharmacy.  Cost may be out of pocket since it would be too early too fill.  Recommended to call the pharmacy

## 2024-01-30 ENCOUNTER — LAB (OUTPATIENT)
Dept: LAB | Facility: HOSPITAL | Age: 69
End: 2024-01-30
Payer: MEDICARE

## 2024-01-30 DIAGNOSIS — D47.3 ESSENTIAL THROMBOCYTOSIS: ICD-10-CM

## 2024-01-30 LAB
ALBUMIN SERPL-MCNC: 4.3 G/DL (ref 3.5–5.2)
ALBUMIN/GLOB SERPL: 1.8 G/DL
ALP SERPL-CCNC: 90 U/L (ref 39–117)
ALT SERPL W P-5'-P-CCNC: 19 U/L (ref 1–33)
ANION GAP SERPL CALCULATED.3IONS-SCNC: 11.5 MMOL/L (ref 5–15)
AST SERPL-CCNC: 23 U/L (ref 1–32)
BASOPHILS # BLD AUTO: 0.1 10*3/MM3 (ref 0–0.2)
BASOPHILS NFR BLD AUTO: 1.3 % (ref 0–1.5)
BILIRUB SERPL-MCNC: 1 MG/DL (ref 0–1.2)
BUN SERPL-MCNC: 25 MG/DL (ref 8–23)
BUN/CREAT SERPL: 17.9 (ref 7–25)
CALCIUM SPEC-SCNC: 8.9 MG/DL (ref 8.6–10.5)
CHLORIDE SERPL-SCNC: 101 MMOL/L (ref 98–107)
CO2 SERPL-SCNC: 22.5 MMOL/L (ref 22–29)
CREAT SERPL-MCNC: 1.4 MG/DL (ref 0.57–1)
DEPRECATED RDW RBC AUTO: 66.8 FL (ref 37–54)
EGFRCR SERPLBLD CKD-EPI 2021: 41.3 ML/MIN/1.73
EOSINOPHIL # BLD AUTO: 0.33 10*3/MM3 (ref 0–0.4)
EOSINOPHIL NFR BLD AUTO: 4.4 % (ref 0.3–6.2)
ERYTHROCYTE [DISTWIDTH] IN BLOOD BY AUTOMATED COUNT: 18.4 % (ref 12.3–15.4)
GLOBULIN UR ELPH-MCNC: 2.4 GM/DL
GLUCOSE SERPL-MCNC: 143 MG/DL (ref 65–99)
HCT VFR BLD AUTO: 43.6 % (ref 34–46.6)
HGB BLD-MCNC: 14.9 G/DL (ref 12–15.9)
IMM GRANULOCYTES # BLD AUTO: 0.06 10*3/MM3 (ref 0–0.05)
IMM GRANULOCYTES NFR BLD AUTO: 0.8 % (ref 0–0.5)
LYMPHOCYTES # BLD AUTO: 1.07 10*3/MM3 (ref 0.7–3.1)
LYMPHOCYTES NFR BLD AUTO: 14.3 % (ref 19.6–45.3)
MCH RBC QN AUTO: 33.6 PG (ref 26.6–33)
MCHC RBC AUTO-ENTMCNC: 34.2 G/DL (ref 31.5–35.7)
MCV RBC AUTO: 98.2 FL (ref 79–97)
MONOCYTES # BLD AUTO: 0.53 10*3/MM3 (ref 0.1–0.9)
MONOCYTES NFR BLD AUTO: 7.1 % (ref 5–12)
NEUTROPHILS NFR BLD AUTO: 5.37 10*3/MM3 (ref 1.7–7)
NEUTROPHILS NFR BLD AUTO: 72.1 % (ref 42.7–76)
NRBC BLD AUTO-RTO: 0 /100 WBC (ref 0–0.2)
PLATELET # BLD AUTO: 570 10*3/MM3 (ref 140–450)
PMV BLD AUTO: 9.7 FL (ref 6–12)
POTASSIUM SERPL-SCNC: 4.2 MMOL/L (ref 3.5–5.2)
PROT SERPL-MCNC: 6.7 G/DL (ref 6–8.5)
RBC # BLD AUTO: 4.44 10*6/MM3 (ref 3.77–5.28)
SODIUM SERPL-SCNC: 135 MMOL/L (ref 136–145)
WBC NRBC COR # BLD AUTO: 7.46 10*3/MM3 (ref 3.4–10.8)

## 2024-01-30 PROCEDURE — 80053 COMPREHEN METABOLIC PANEL: CPT

## 2024-01-30 PROCEDURE — 36415 COLL VENOUS BLD VENIPUNCTURE: CPT

## 2024-01-30 PROCEDURE — 85025 COMPLETE CBC W/AUTO DIFF WBC: CPT

## 2024-02-22 ENCOUNTER — SPECIALTY PHARMACY (OUTPATIENT)
Dept: ONCOLOGY | Facility: HOSPITAL | Age: 69
End: 2024-02-22
Payer: MEDICARE

## 2024-02-22 ENCOUNTER — OFFICE VISIT (OUTPATIENT)
Dept: ONCOLOGY | Facility: CLINIC | Age: 69
End: 2024-02-22
Payer: MEDICARE

## 2024-02-22 VITALS
OXYGEN SATURATION: 99 % | RESPIRATION RATE: 12 BRPM | DIASTOLIC BLOOD PRESSURE: 75 MMHG | WEIGHT: 166 LBS | TEMPERATURE: 97.3 F | HEIGHT: 61 IN | SYSTOLIC BLOOD PRESSURE: 187 MMHG | HEART RATE: 75 BPM | BODY MASS INDEX: 31.34 KG/M2

## 2024-02-22 DIAGNOSIS — D47.3 ESSENTIAL THROMBOCYTOSIS: Primary | ICD-10-CM

## 2024-02-22 PROCEDURE — 1126F AMNT PAIN NOTED NONE PRSNT: CPT | Performed by: INTERNAL MEDICINE

## 2024-02-22 PROCEDURE — 3077F SYST BP >= 140 MM HG: CPT | Performed by: INTERNAL MEDICINE

## 2024-02-22 PROCEDURE — 99214 OFFICE O/P EST MOD 30 MIN: CPT | Performed by: INTERNAL MEDICINE

## 2024-02-22 PROCEDURE — 3078F DIAST BP <80 MM HG: CPT | Performed by: INTERNAL MEDICINE

## 2024-02-22 NOTE — PROGRESS NOTES
Follow Up Office Visit      Date: 2024     Patient Name: Martha Murray  MRN: [id number]  : 1955  Referring Physician: Julia Stacy     Chief Complaint:  Follow-up for JAK2 positive essential thrombocytosis     History of Present Illness: Martha Murray is a pleasant 66 y.o. female past medical history of anxiety, hypertension, hyperlipidemia, COPD, tobacco abuse, type 2 diabetes, CKD who presents today for evaluation of thrombocytosis. The patient has been followed by other PCP is monitoring CBCs which were notable for thrombocytosis. Most recently her platelet count was 1280K in 2021. Per chart review over the last several years her platelet count has ranged between 700K-900K. She does note excessive fatigue during this timeframe as well as increased cravings for ice. She denies any recurrent infections or inflammatory processes. Of note, she recent had a CT scan which showed some slight splenomegaly up to around 16 cm. She denies any chest pain or discomfort. Denies any shortness of breath, cough, vision changes, headaches     Interval History:  Presents clinic for follow-up.  Started back on Hydrea in 2023.  Continues to tolerate treatment well.  Denies any abdominal pain or discomfort.  Denies any rash or dizziness.  Denies any chest pain or shortness of breath    Oncology History:    Oncology/Hematology History   Essential thrombocytosis   3/28/2019 Initial Diagnosis    Essential thrombocytosis     10/12/2023 -  Chemotherapy    OP POLYCYTHEMIA VERA/ESSENTIAL THROMBOCYTOSIS Hydroxyurea         Subjective      Review of Systems:   Constitutional: Negative for fevers, chills, or weight loss  Eyes: Negative for blurred vision or discharge         Ear/Nose/Throat: Negative for difficulty swallowing, sore throat, LAD                                                       Respiratory: Negative for cough, SOA, wheezing                                                                                         Cardiovascular: Negative for chest pain or palpitations                                                                  Gastrointestinal: Negative for nausea, vomiting or diarrhea                                                                     Genitourinary: Negative for dysuria or hematuria                                                                                           Musculoskeletal: Negative for any joint pains or muscle aches                                                                        Neurologic: Negative for any weakness, headaches, dizziness                                                                         Hematologic: Negative for any easy bleeding or bruising                                                                                   Psychiatric: Negative for anxiety or depression                          Past Medical History/Past Surgical History/ Family History/ Social History: Reviewed by me and unchanged from my previous documentation done on November 2023.     Medications:     Current Outpatient Medications:     amLODIPine (NORVASC) 10 MG tablet, TAKE 1 TABLET BY MOUTH DAILY, Disp: 90 tablet, Rfl: 2    aspirin (aspirin) 81 MG EC tablet, Take 1 tablet by mouth Daily., Disp: 30 tablet, Rfl: 5    FLUoxetine (PROzac) 20 MG capsule, Take 3 capsules by mouth Daily., Disp: 270 capsule, Rfl: 1    hydroxyurea (HYDREA) 500 MG capsule, Take 1 capsule by mouth Daily., Disp: 30 capsule, Rfl: 11    lisinopril (PRINIVIL,ZESTRIL) 40 MG tablet, Take 1 tablet by mouth Daily., Disp: 90 tablet, Rfl: 1    nitroglycerin (NITROSTAT) 0.4 MG SL tablet, Place 1 tablet under the tongue Every 5 (Five) Minutes As Needed for Chest Pain. Take no more than 3 doses in 15 minutes., Disp: 25 tablet, Rfl: 5    traZODone (DESYREL) 50 MG tablet, Take 0.5 tablets by mouth Every Night., Disp: 30 tablet, Rfl: 3    Allergies:   Allergies   Allergen Reactions    Sulfa Antibiotics  "Rash       Objective     Physical Exam:  Vital Signs:   Vitals:    02/22/24 0859   BP: (!) 187/75   Pulse: 75   Resp: 12   Temp: 97.3 °F (36.3 °C)   SpO2: 99%   Weight: 75.3 kg (166 lb)   Height: 154.9 cm (61\")   PainSc: 0-No pain     Pain Score    02/22/24 0859   PainSc: 0-No pain     ECOG Performance Status: 0 - Asymptomatic    Constitutional: NAD, ECOG 0  Eyes: PERRLA, scleral anicteric  ENT: No LAD, no thyromegaly  Respiratory: CTAB, no wheezing, rales, rhonchi  Cardiovascular: RRR, no murmurs, pulses 2+ bilaterally  Abdomen: soft, NT/ND, no HSM  Musculoskeletal: strength 5/5 bilaterally, no c/c/e  Neurologic: A&O x 3, CN II-XII intact grossly    Results Review:   No visits with results within 2 Week(s) from this visit.   Latest known visit with results is:   Lab on 01/30/2024   Component Date Value Ref Range Status    Glucose 01/30/2024 143 (H)  65 - 99 mg/dL Final    BUN 01/30/2024 25 (H)  8 - 23 mg/dL Final    Creatinine 01/30/2024 1.40 (H)  0.57 - 1.00 mg/dL Final    Sodium 01/30/2024 135 (L)  136 - 145 mmol/L Final    Potassium 01/30/2024 4.2  3.5 - 5.2 mmol/L Final    Chloride 01/30/2024 101  98 - 107 mmol/L Final    CO2 01/30/2024 22.5  22.0 - 29.0 mmol/L Final    Calcium 01/30/2024 8.9  8.6 - 10.5 mg/dL Final    Total Protein 01/30/2024 6.7  6.0 - 8.5 g/dL Final    Albumin 01/30/2024 4.3  3.5 - 5.2 g/dL Final    ALT (SGPT) 01/30/2024 19  1 - 33 U/L Final    AST (SGOT) 01/30/2024 23  1 - 32 U/L Final    Alkaline Phosphatase 01/30/2024 90  39 - 117 U/L Final    Total Bilirubin 01/30/2024 1.0  0.0 - 1.2 mg/dL Final    Globulin 01/30/2024 2.4  gm/dL Final    A/G Ratio 01/30/2024 1.8  g/dL Final    BUN/Creatinine Ratio 01/30/2024 17.9  7.0 - 25.0 Final    Anion Gap 01/30/2024 11.5  5.0 - 15.0 mmol/L Final    eGFR 01/30/2024 41.3 (L)  >60.0 mL/min/1.73 Final    WBC 01/30/2024 7.46  3.40 - 10.80 10*3/mm3 Final    RBC 01/30/2024 4.44  3.77 - 5.28 10*6/mm3 Final    Hemoglobin 01/30/2024 14.9  12.0 - 15.9 g/dL " Final    Hematocrit 01/30/2024 43.6  34.0 - 46.6 % Final    MCV 01/30/2024 98.2 (H)  79.0 - 97.0 fL Final    MCH 01/30/2024 33.6 (H)  26.6 - 33.0 pg Final    MCHC 01/30/2024 34.2  31.5 - 35.7 g/dL Final    RDW 01/30/2024 18.4 (H)  12.3 - 15.4 % Final    RDW-SD 01/30/2024 66.8 (H)  37.0 - 54.0 fl Final    MPV 01/30/2024 9.7  6.0 - 12.0 fL Final    Platelets 01/30/2024 570 (H)  140 - 450 10*3/mm3 Final    Neutrophil % 01/30/2024 72.1  42.7 - 76.0 % Final    Lymphocyte % 01/30/2024 14.3 (L)  19.6 - 45.3 % Final    Monocyte % 01/30/2024 7.1  5.0 - 12.0 % Final    Eosinophil % 01/30/2024 4.4  0.3 - 6.2 % Final    Basophil % 01/30/2024 1.3  0.0 - 1.5 % Final    Immature Grans % 01/30/2024 0.8 (H)  0.0 - 0.5 % Final    Neutrophils, Absolute 01/30/2024 5.37  1.70 - 7.00 10*3/mm3 Final    Lymphocytes, Absolute 01/30/2024 1.07  0.70 - 3.10 10*3/mm3 Final    Monocytes, Absolute 01/30/2024 0.53  0.10 - 0.90 10*3/mm3 Final    Eosinophils, Absolute 01/30/2024 0.33  0.00 - 0.40 10*3/mm3 Final    Basophils, Absolute 01/30/2024 0.10  0.00 - 0.20 10*3/mm3 Final    Immature Grans, Absolute 01/30/2024 0.06 (H)  0.00 - 0.05 10*3/mm3 Final    nRBC 01/30/2024 0.0  0.0 - 0.2 /100 WBC Final       No results found.    Assessment / Plan      Assessment/Plan:   1. Essential thrombocytosis (HCC) (Primary)  -Platelet count 1280K in December 2021  -CT scan July 2021 showed slight splenomegaly up to 16 cm  -Concern for myelofibrosis given the clinical picture as well as iron deficiency given her symptoms  -JAK2 mutational testing positive for the V617F mutation  -Von Willebrand studies consistent with an acquired von Willebrand's disease secondary to a myeloproliferative disorder  -Initially ordered Hydrea in February 2022 however patient did not  the medication  -Started on Hydrea in October 2023.  Tolerating well.  Platelet count 606K in November 2023  -Hemoglobin 14.9 with a platelet count of 570K in February 2023  -Okay to continue baby  aspirin at this time  -Okay to continue with Hydrea 500 mg daily today     2.  Iron deficiency  -Noted on previous iron studies  -Given her JAK2 mutation positivity, will hold off on iron supplementation at this time         Follow Up:   Follow-up in 3 months     Paulo Graves MD  Hematology and Oncology     Please note that portions of this note may have been completed with a voice recognition program. Efforts were made to edit the dictations, but occasionally words are mistranscribed.

## 2024-02-27 ENCOUNTER — PATIENT OUTREACH (OUTPATIENT)
Dept: CASE MANAGEMENT | Facility: OTHER | Age: 69
End: 2024-02-27
Payer: MEDICARE

## 2024-03-15 ENCOUNTER — APPOINTMENT (OUTPATIENT)
Dept: GENERAL RADIOLOGY | Facility: HOSPITAL | Age: 69
End: 2024-03-15
Payer: MEDICARE

## 2024-03-15 ENCOUNTER — HOSPITAL ENCOUNTER (EMERGENCY)
Facility: HOSPITAL | Age: 69
Discharge: HOME | End: 2024-03-15
Attending: EMERGENCY MEDICINE
Payer: MEDICARE

## 2024-03-15 VITALS
SYSTOLIC BLOOD PRESSURE: 179 MMHG | BODY MASS INDEX: 31.34 KG/M2 | DIASTOLIC BLOOD PRESSURE: 83 MMHG | OXYGEN SATURATION: 97 % | HEIGHT: 61 IN | RESPIRATION RATE: 20 BRPM | HEART RATE: 96 BPM | TEMPERATURE: 97.7 F | WEIGHT: 166 LBS

## 2024-03-15 DIAGNOSIS — S83.92XA SPRAIN OF LEFT KNEE, UNSPECIFIED LIGAMENT, INITIAL ENCOUNTER: Primary | ICD-10-CM

## 2024-03-15 PROCEDURE — 73590 X-RAY EXAM OF LOWER LEG: CPT

## 2024-03-15 PROCEDURE — 99283 EMERGENCY DEPT VISIT LOW MDM: CPT

## 2024-03-15 PROCEDURE — 73562 X-RAY EXAM OF KNEE 3: CPT

## 2024-03-15 NOTE — ED PROVIDER NOTES
EMERGENCY DEPARTMENT ENCOUNTER    Pt Name: Martha Murray  MRN: [id number]  Pt :   1955  Room Number:  01SF/01  Date of encounter:  3/15/2024  PCP: Jenny Carvajal APRN  ED Provider: Andrea Hanks MD    Historian: Patient      HPI:  Chief Complaint   Patient presents with    Fall     Pt fell on Wednesday and has left leg and both knees are hurting. Pt does not know what cause her to fall. Pt also has left elbow pain.           Context: Martha Murray is a 68 y.o. female who presents to the ED c/o left knee pain, patient reports 2 days ago she had a mechanical fall, landing on her left knee.  Since that time she had increasing pain in the left knee, radiating down the left shin.  Denies any head trauma.  Also reports hitting her left elbow but reports that this is not as bad      PAST MEDICAL HISTORY  Past Medical History:   Diagnosis Date    HAMMAD (acute kidney injury) 2021    Allergic     Anemia     Depression     Diabetes mellitus     History of heart attack     Hyperlipidemia     Hypertension          PAST SURGICAL HISTORY  Past Surgical History:   Procedure Laterality Date    BILATERAL BREAST REDUCTION      BONE MARROW ASPIRATION      CATARACT EXTRACTION Bilateral     HYSTERECTOMY  1990    x 2    REDUCTION MAMMAPLASTY Bilateral     MORE THAN 20 YEARS AGO    SINUS SURGERY  2003    TONSILLECTOMY           FAMILY HISTORY  Family History   Problem Relation Age of Onset    Arthritis Maternal Grandmother     Hypertension Mother     Hyperlipidemia Mother     Thyroid disease Mother     Diabetes Sister     Crohn's disease Sister     Hypertension Sister     Heart attack Maternal Grandfather     Breast cancer Neg Hx     Ovarian cancer Neg Hx          SOCIAL HISTORY  Social History     Socioeconomic History    Marital status: Single   Tobacco Use    Smoking status: Former     Current packs/day: 0.00     Average packs/day: 2.0 packs/day for 30.0 years (60.0 ttl pk-yrs)     Types:  Cigarettes     Start date:      Quit date:      Years since quittin.2    Smokeless tobacco: Never   Vaping Use    Vaping status: Never Used   Substance and Sexual Activity    Alcohol use: No    Drug use: No    Sexual activity: Defer         ALLERGIES  Sulfa antibiotics        REVIEW OF SYSTEMS  Review of Systems   Constitutional:  Negative for chills and fever.   HENT:  Negative for sore throat and trouble swallowing.    Eyes:  Negative for pain and redness.   Respiratory:  Negative for cough and shortness of breath.    Cardiovascular:  Negative for chest pain and leg swelling.   Gastrointestinal:  Negative for abdominal pain, nausea and vomiting.   Genitourinary:  Negative for dysuria and urgency.   Musculoskeletal:  Negative for back pain and neck pain.        Left knee pain   Skin:  Negative for rash and wound.   Neurological:  Negative for dizziness and weakness.        All systems reviewed and negative except for those discussed in HPI.       PHYSICAL EXAM    I have reviewed the triage vital signs and nursing notes.    ED Triage Vitals   Temp Heart Rate Resp BP SpO2   03/15/24 0936 03/15/24 0935 03/15/24 0935 03/15/24 0935 03/15/24 0935   97.7 °F (36.5 °C) 96 20 179/83 97 %      Temp src Heart Rate Source Patient Position BP Location FiO2 (%)   03/15/24 0935 03/15/24 0935 03/15/24 0935 03/15/24 0935 --   Oral Monitor Sitting Left arm        Physical Exam  Constitutional:       Appearance: Normal appearance. She is not ill-appearing.   HENT:      Head: Normocephalic and atraumatic.      Right Ear: External ear normal.      Left Ear: External ear normal.      Nose: Nose normal.      Mouth/Throat:      Mouth: Mucous membranes are moist.      Pharynx: Oropharynx is clear.   Eyes:      Extraocular Movements: Extraocular movements intact.      Conjunctiva/sclera: Conjunctivae normal.      Pupils: Pupils are equal, round, and reactive to light.   Cardiovascular:      Rate and Rhythm: Normal rate and  regular rhythm.      Pulses:           Radial pulses are 2+ on the right side and 2+ on the left side.   Pulmonary:      Effort: Pulmonary effort is normal.      Breath sounds: Normal breath sounds.   Abdominal:      General: There is no distension.      Palpations: Abdomen is soft.      Tenderness: There is no abdominal tenderness.   Musculoskeletal:         General: No tenderness or deformity. Normal range of motion.      Right elbow: Normal.      Left elbow: No swelling or deformity. Tenderness present in olecranon process.      Cervical back: Normal range of motion and neck supple.      Right knee: Normal.      Left knee: Swelling and bony tenderness present. Tenderness present over the patellar tendon.      Right lower leg: No edema.      Left lower leg: No edema.   Skin:     General: Skin is warm and dry.      Capillary Refill: Capillary refill takes less than 2 seconds.   Neurological:      General: No focal deficit present.      Mental Status: She is alert and oriented to person, place, and time.            LAB RESULTS  No results found for this or any previous visit (from the past 24 hour(s)).    If labs were ordered, I independently reviewed the results and considered them in treating the patient.        RADIOLOGY  XR Knee 3 View Left    Result Date: 3/15/2024  PROCEDURE: XR KNEE 3 VW LEFT-  3 VIEW  HISTORY: fall, pain  FINDINGS:  Three views show no evidence of an acute, displaced fracture or dislocation of the visualized bony architecture. Severe tricompartment degenerative joint disease is present.      Degenerative changes.  No acute bony abnormality.     This report was signed and finalized on 3/15/2024 10:20 AM by Sam Thakkar MD.      XR Tibia Fibula 2 View Left    Result Date: 3/15/2024  PROCEDURE: XR TIBIA FIBULA 2 VW LEFT-  2 VIEW  HISTORY: fall, pain  FINDINGS:  Two views show no evidence of an acute, displaced fracture or dislocation of the visualized bony architecture. Mild degenerative joint  disease is present.      Degenerative changes.  No acute bony abnormality.     This report was signed and finalized on 3/15/2024 10:20 AM by Sam Thakkar MD.           PROCEDURES    Procedures    Interpretations    O2 Sat: The patients oxygen saturation was 97% on Room Air.  This was independently interpreted by me as Normal    Radiology: I ordered and independently reviewed the above noted radiographic studies.  I viewed images of Xray of the left knee and tib-fib  which showed No fracture per my independent interpretation. See radiologist's dictation for official interpretation.         MEDICATIONS GIVEN IN ER    Medications - No data to display      MEDICAL DECISION MAKING, PROGRESS, and CONSULTS    All labs, if obtained, have been independently reviewed by me.  All radiology studies, if obtained, have been reviewed by me and the radiologist dictating the report.  All EKG's, if obtained, have been independently viewed and interpreted by me      Discussion below represents my analysis of pertinent findings related to patient's condition, differential diagnosis, treatment plan and final disposition.      Differential diagnosis:    68-year-old female presents ED after a fall, she tripped, hitting her left knee.  Has some tenderness right over the anterior knee, certainly concern for patellar injury, versus knee sprain, versus tib-fib injury.  Will obtain x-ray of the tib-fib.    Additional Sources:  None      Orders placed during this visit:  Orders Placed This Encounter   Procedures    [facility] 12.  Standard Walker Folding; Prevents Accomplishing MRADLs; Able to Safely Use Equipment; Mobility Deficit Can Be Sufficiently Resolved By Use of Equipment    XR Knee 3 View Left    XR Tibia Fibula 2 View Left    Apply ace wrap         Additional orders considered but not ordered:  Imaging: Left elbow, no decreased ROM or deformity patient would like to defer    ED Course:    Consultants:  None    ED Course as  of 03/15/24 1030   Fri Mar 15, 2024   1025 X-rays are negative, patient will be given cane or walker as per her preference, follow-up with orthopedics as needed [CS]      ED Course User Index  [CS] Andrea Hanks MD           After my consideration of clinical presentation and any laboratory/radiology studies obtained, I discussed the findings with the patient/patient representative who is in agreement with the treatment plan and the final disposition. Risks and benefits of discharge were discussed.     AS OF 10:30 EDT VITALS:    BP - 179/83  HR - 96  TEMP - 97.7 °F (36.5 °C) (Oral)  O2 SATS - 97%    I reviewed the patients prescription monitoring report if available prior to discharge    DIAGNOSIS  Final diagnoses:   Sprain of left knee, unspecified ligament, initial encounter         DISPOSITION  ED Disposition       ED Disposition   Discharge    Condition   Stable    Comment   --                   Please note that portions of this document were completed with voice recognition software.        Andrea Hanks MD  03/15/24 1034

## 2024-03-21 ENCOUNTER — OFFICE VISIT (OUTPATIENT)
Dept: ORTHOPEDIC SURGERY | Facility: CLINIC | Age: 69
End: 2024-03-21
Payer: MEDICARE

## 2024-03-21 VITALS
WEIGHT: 166 LBS | DIASTOLIC BLOOD PRESSURE: 78 MMHG | SYSTOLIC BLOOD PRESSURE: 152 MMHG | HEIGHT: 61 IN | BODY MASS INDEX: 31.34 KG/M2

## 2024-03-21 DIAGNOSIS — M17.0 PRIMARY OSTEOARTHRITIS OF BOTH KNEES: Primary | ICD-10-CM

## 2024-03-21 DIAGNOSIS — E66.09 CLASS 1 OBESITY DUE TO EXCESS CALORIES WITHOUT SERIOUS COMORBIDITY WITH BODY MASS INDEX (BMI) OF 31.0 TO 31.9 IN ADULT: ICD-10-CM

## 2024-03-21 RX ORDER — BUPIVACAINE HYDROCHLORIDE 2.5 MG/ML
3 INJECTION, SOLUTION EPIDURAL; INFILTRATION; INTRACAUDAL
Status: COMPLETED | OUTPATIENT
Start: 2024-03-21 | End: 2024-03-21

## 2024-03-21 RX ORDER — TRIAMCINOLONE ACETONIDE 40 MG/ML
80 INJECTION, SUSPENSION INTRA-ARTICULAR; INTRAMUSCULAR
Status: COMPLETED | OUTPATIENT
Start: 2024-03-21 | End: 2024-03-21

## 2024-03-21 RX ORDER — LIDOCAINE HYDROCHLORIDE 10 MG/ML
3 INJECTION, SOLUTION EPIDURAL; INFILTRATION; INTRACAUDAL; PERINEURAL
Status: COMPLETED | OUTPATIENT
Start: 2024-03-21 | End: 2024-03-21

## 2024-03-21 RX ADMIN — BUPIVACAINE HYDROCHLORIDE 3 ML: 2.5 INJECTION, SOLUTION EPIDURAL; INFILTRATION; INTRACAUDAL at 09:36

## 2024-03-21 RX ADMIN — LIDOCAINE HYDROCHLORIDE 3 ML: 10 INJECTION, SOLUTION EPIDURAL; INFILTRATION; INTRACAUDAL; PERINEURAL at 09:36

## 2024-03-21 RX ADMIN — TRIAMCINOLONE ACETONIDE 80 MG: 40 INJECTION, SUSPENSION INTRA-ARTICULAR; INTRAMUSCULAR at 09:36

## 2024-03-21 NOTE — PROGRESS NOTES
Procedure   - Large Joint Arthrocentesis: bilateral knee on 3/21/2024 9:36 AM  Indications: pain  Details: 21 G needle, anterolateral approach  Medications (Right): 3 mL bupivacaine (PF) 0.25 %; 3 mL lidocaine PF 1% 1 %; 80 mg triamcinolone acetonide 40 MG/ML  Medications (Left): 3 mL bupivacaine (PF) 0.25 %; 3 mL lidocaine PF 1% 1 %; 80 mg triamcinolone acetonide 40 MG/ML  Outcome: tolerated well, no immediate complications  Procedure, treatment alternatives, risks and benefits explained, specific risks discussed. Consent was given by the patient. Immediately prior to procedure a time out was called to verify the correct patient, procedure, equipment, support staff and site/side marked as required. Patient was prepped and draped in the usual sterile fashion.

## 2024-03-21 NOTE — PROGRESS NOTES
Orthopaedic Clinic Note: Knee Established Patient    Chief Complaint   Patient presents with    Follow-up     5 month follow up -- Primary osteoarthritis of both knees        HPI    It has been 5  month(s) since Ms. Murray's last visit. She returns to clinic today for follow-up bilateral knee osteoarthritis.  Patient underwent cortisone injections in both knees 5 months ago.  The injections worked well for about 3 months.  Over the course of the past 2 months her pain has returned.  She rates her pain 6/10 on the pain scale.  Over the last week it has gotten severe resulting in her going to the ER for further evaluation due to concern for increased left knee pain.  She was diagnosed with arthritic changes of the knee with an arthritic exacerbation.  She now comes clinic today to discuss further treatment options were worsening knee pain.  She remains overweight the BMI 31.37.    Past Medical History:   Diagnosis Date    HAMMAD (acute kidney injury) 7/1/2021    Allergic     Anemia     Depression     Diabetes mellitus     History of heart attack     Hyperlipidemia     Hypertension       Past Surgical History:   Procedure Laterality Date    BILATERAL BREAST REDUCTION      BONE MARROW ASPIRATION      CATARACT EXTRACTION Bilateral     HYSTERECTOMY  1990    x 2    REDUCTION MAMMAPLASTY Bilateral     MORE THAN 20 YEARS AGO    SINUS SURGERY  2003    TONSILLECTOMY        Family History   Problem Relation Age of Onset    Arthritis Maternal Grandmother     Hypertension Mother     Hyperlipidemia Mother     Thyroid disease Mother     Diabetes Sister     Crohn's disease Sister     Hypertension Sister     Heart attack Maternal Grandfather     Breast cancer Neg Hx     Ovarian cancer Neg Hx      Social History     Socioeconomic History    Marital status: Single   Tobacco Use    Smoking status: Former     Current packs/day: 0.00     Average packs/day: 2.0 packs/day for 30.0 years (60.0 ttl pk-yrs)     Types: Cigarettes     Start date:  "     Quit date:      Years since quittin.2    Smokeless tobacco: Never   Vaping Use    Vaping status: Never Used   Substance and Sexual Activity    Alcohol use: No    Drug use: No    Sexual activity: Defer      Current Outpatient Medications on File Prior to Visit   Medication Sig Dispense Refill    amLODIPine (NORVASC) 10 MG tablet TAKE 1 TABLET BY MOUTH DAILY 90 tablet 2    aspirin (aspirin) 81 MG EC tablet Take 1 tablet by mouth Daily. 30 tablet 5    FLUoxetine (PROzac) 20 MG capsule Take 3 capsules by mouth Daily. 270 capsule 1    hydroxyurea (HYDREA) 500 MG capsule Take 1 capsule by mouth Daily. 30 capsule 11    lisinopril (PRINIVIL,ZESTRIL) 40 MG tablet Take 1 tablet by mouth Daily. 90 tablet 1    nitroglycerin (NITROSTAT) 0.4 MG SL tablet Place 1 tablet under the tongue Every 5 (Five) Minutes As Needed for Chest Pain. Take no more than 3 doses in 15 minutes. 25 tablet 5    traZODone (DESYREL) 50 MG tablet Take 0.5 tablets by mouth Every Night. 30 tablet 3     No current facility-administered medications on file prior to visit.      Allergies   Allergen Reactions    Sulfa Antibiotics Rash        Review of Systems   Constitutional: Negative.    HENT: Negative.     Eyes: Negative.    Respiratory: Negative.     Cardiovascular: Negative.    Gastrointestinal: Negative.    Endocrine: Negative.    Genitourinary: Negative.    Musculoskeletal:  Positive for arthralgias.   Skin: Negative.    Allergic/Immunologic: Negative.    Neurological: Negative.    Hematological: Negative.    Psychiatric/Behavioral: Negative.          The patient's Review of Systems was personally reviewed and confirmed as accurate.    Physical Exam  Blood pressure 152/78, height 154.9 cm (61\"), weight 75.3 kg (166 lb), not currently breastfeeding.    Body mass index is 31.37 kg/m².    GENERAL APPEARANCE: awake, alert, oriented, in no acute distress and well developed, well nourished  LUNGS:  breathing nonlabored  EXTREMITIES: no " clubbing, cyanosis  PERIPHERAL PULSES: palpable dorsalis pedis and posterior tibial pulses bilaterally.    GAIT:  Antalgic        ----------  Bilateral Knee Exam:  ----------  ALIGNMENT: severe varus, correctable to neutral  ----------  RANGE OF MOTION:  Decreased (5 - 115 degrees) with no extensor lag  LIGAMENTOUS STABILITY:   stable to varus and valgus stress at terminal extension and 30 degrees; retensioning of the MCL is appreciated with valgus stress at 30 degrees consistent with medial compartment degeneration  ----------  STRENGTH:  KNEE FLEXION 5/5  KNEE EXTENSION  5/5  ANKLE DORSIFLEXION  5/5  ANKLE PLANTARFLEXION  5/5  ----------  PAIN WITH PALPATION:global  KNEE EFFUSION: yes, mild effusion bilateral  PAIN WITH KNEE ROM: yes  PATELLAR CREPITUS:  yes, painful and symptomatic  ----------  SENSATION TO LIGHT TOUCH:  DEEP PERONEAL/SUPERFICIAL PERONEAL/SURAL/SAPHENOUS/TIBIAL:    intact  ----------  EDEMA:  no  ERYTHEMA:    no  WOUNDS/INCISIONS:   no  _____________________________________________________________________  _____________________________________________________________________    RADIOGRAPHIC FINDINGS:   Indication: Right knee pain    Comparison: Todays xrays were compared to previous xrays from 4/7/2023    Knee films: moderate to severe tricompartmental arthritis with genu varum alignment, periarticular osteophytes visualized in all compartments and No significant changes compared to prior radiographs.    Left knee images from 3/15/2024 personally interpreted.  Radiographs demonstrate moderate to severe tricompartmental Arth with genu varum alignment and bone-on-bone articulation medial compartment.  No acute bony injury or fracture.    Assessment/Plan:   Diagnosis Plan   1. Primary osteoarthritis of both knees  XR Knee 4+ View Right      2. Class 1 obesity due to excess calories without serious comorbidity with body mass index (BMI) of 31.0 to 31.9 in adult          Patient experiencing an  arthritic exacerbation of the bilateral knees.  The patient has failed conservative treatment measures and is a candidate for joint arthroplasty.  I discussed the joint arthroplasty surgical process as well as the recovery and rehabilitation time frame.  The patient asked several questions regarding the joint arthroplasty surgery, which were answered accordingly.  Ultimately, the patient declines surgical intervention at this time and wishes to continue with conservative treatment measures.  Alternative conservative treatment measures were discussed including bracing, therapy, topical/oral anti-inflammatories, activity modification, and weight loss.  The patient considered these treatment options and wishes to proceed with corticosteroid injection(s) today.  Therefore we will proceed with corticosteroid injection(s) today.  Follow-up 2 months for planning of staged total knee arthroplasty.    Patient has an elevated BMI. The patient has been instructed on various weight loss avenues including diet, portion control, calorie restriction, low/no impact exercise, referral to weight loss management and/or bariatric surgery.  It was explained that weight loss can improve joint pain alone by decreasing the joint reaction forces.  For every pound of weight change, the knee and hip joints see a 4 to 5 fold change in pressure.  Given these options, the patient will proceed with low calorie diet and low impact exercise.    Procedure Note:  I discussed with the patient the potential benefits of performing a therapeutic injections of the bilateral knees as well as potential risks including but not limited to infection, swelling, pain, bleeding, bruising, nerve/vessel damage, skin color changes, transient elevation in blood glucose levels, and fat atrophy. After informed consent and verifying correct patient, procedure site, and type of procedure, the areas were prepped with alcohol, ethyl chloride was used to numb the skin. Via  the superolateral approach, 3 cc of 1% lidocaine, 3 cc of 0.25% Marcaine and 2 cc of 40mg/ml of Kenalog were each injected into the bilateral knees. The patient tolerated the procedures well. There were no complications. A sterile dressing was placed over each injection site.      Keaton Aldrich MD  03/21/24  09:39 EDT

## 2024-03-31 NOTE — TELEPHONE ENCOUNTER
Provider: DR WILKERSON    Caller: ELVA BENITEZ    Relationship to Patient: SELF    Phone Number: [phone]    Reason for Call: PATIENT CALLED AND STATED THAT SHE WOULD LIKE TO HAVE AN INJECTION SCHEDULED PLEASE ADVISE     
52

## 2024-04-02 ENCOUNTER — SPECIALTY PHARMACY (OUTPATIENT)
Dept: ONCOLOGY | Facility: HOSPITAL | Age: 69
End: 2024-04-02
Payer: MEDICARE

## 2024-05-21 ENCOUNTER — OFFICE VISIT (OUTPATIENT)
Dept: ORTHOPEDIC SURGERY | Facility: CLINIC | Age: 69
End: 2024-05-21
Payer: MEDICARE

## 2024-05-21 VITALS
BODY MASS INDEX: 31.53 KG/M2 | DIASTOLIC BLOOD PRESSURE: 110 MMHG | SYSTOLIC BLOOD PRESSURE: 178 MMHG | WEIGHT: 167 LBS | HEIGHT: 61 IN

## 2024-05-21 DIAGNOSIS — M17.0 PRIMARY OSTEOARTHRITIS OF BOTH KNEES: Primary | ICD-10-CM

## 2024-05-21 DIAGNOSIS — F51.01 PRIMARY INSOMNIA: ICD-10-CM

## 2024-05-21 PROBLEM — M17.10 ARTHRITIS OF KNEE: Status: ACTIVE | Noted: 2024-05-21

## 2024-05-21 PROCEDURE — 1160F RVW MEDS BY RX/DR IN RCRD: CPT | Performed by: ORTHOPAEDIC SURGERY

## 2024-05-21 PROCEDURE — 3077F SYST BP >= 140 MM HG: CPT | Performed by: ORTHOPAEDIC SURGERY

## 2024-05-21 PROCEDURE — 1159F MED LIST DOCD IN RCRD: CPT | Performed by: ORTHOPAEDIC SURGERY

## 2024-05-21 PROCEDURE — 3080F DIAST BP >= 90 MM HG: CPT | Performed by: ORTHOPAEDIC SURGERY

## 2024-05-21 PROCEDURE — 99214 OFFICE O/P EST MOD 30 MIN: CPT | Performed by: ORTHOPAEDIC SURGERY

## 2024-05-21 RX ORDER — TRAZODONE HYDROCHLORIDE 50 MG/1
25 TABLET ORAL NIGHTLY
Qty: 30 TABLET | Refills: 3 | Status: SHIPPED | OUTPATIENT
Start: 2024-05-21

## 2024-05-21 RX ORDER — CHLORHEXIDINE GLUCONATE 40 MG/ML
SOLUTION TOPICAL
Qty: 236 ML | Refills: 0 | Status: SHIPPED | OUTPATIENT
Start: 2024-05-21

## 2024-05-21 RX ORDER — PREGABALIN 75 MG/1
75 CAPSULE ORAL ONCE
OUTPATIENT
Start: 2024-05-21 | End: 2024-05-21

## 2024-05-21 RX ORDER — MELOXICAM 7.5 MG/1
15 TABLET ORAL ONCE
OUTPATIENT
Start: 2024-05-21 | End: 2024-05-21

## 2024-05-21 RX ORDER — ACETAMINOPHEN 325 MG/1
1000 TABLET ORAL ONCE
OUTPATIENT
Start: 2024-05-21 | End: 2024-05-21

## 2024-05-21 NOTE — PROGRESS NOTES
Orthopaedic Clinic Note: Knee Established Patient    Chief Complaint   Patient presents with    Follow-up     2 month follow up - Primary osteoarthritis of both knees         HPI    It has been 2  month(s) since Ms. Murray's last visit. She returns to clinic today for follow-up bilateral knee osteoarthritis.  Patient was last seen 2 months ago and underwent intra-articular injections in both knees.  The injections provided about 3 weeks of relief before pain returned.  She comes clinic today complaining of worsening bilateral knee pain, right worse than left.  Rates her pain a 6/10 on the pain scale.  She is ready to discuss proceeding to total knee arthroplasty starting with the right.    Past Medical History:   Diagnosis Date    HAMMAD (acute kidney injury) 2021    Allergic     Anemia     Depression     Diabetes mellitus     History of heart attack     Hyperlipidemia     Hypertension       Past Surgical History:   Procedure Laterality Date    BILATERAL BREAST REDUCTION      BONE MARROW ASPIRATION      CATARACT EXTRACTION Bilateral     HYSTERECTOMY  1990    x 2    REDUCTION MAMMAPLASTY Bilateral     MORE THAN 20 YEARS AGO    SINUS SURGERY  2003    TONSILLECTOMY        Family History   Problem Relation Age of Onset    Arthritis Maternal Grandmother     Hypertension Mother     Hyperlipidemia Mother     Thyroid disease Mother     Diabetes Sister     Crohn's disease Sister     Hypertension Sister     Heart attack Maternal Grandfather     Breast cancer Neg Hx     Ovarian cancer Neg Hx      Social History     Socioeconomic History    Marital status: Single   Tobacco Use    Smoking status: Former     Current packs/day: 0.00     Average packs/day: 2.0 packs/day for 30.0 years (60.0 ttl pk-yrs)     Types: Cigarettes     Start date:      Quit date:      Years since quittin.4    Smokeless tobacco: Never   Vaping Use    Vaping status: Never Used   Substance and Sexual Activity    Alcohol use: No    Drug use: No     Sexual activity: Defer      Current Outpatient Medications on File Prior to Visit   Medication Sig Dispense Refill    amLODIPine (NORVASC) 10 MG tablet TAKE 1 TABLET BY MOUTH DAILY 90 tablet 2    aspirin (aspirin) 81 MG EC tablet Take 1 tablet by mouth Daily. 30 tablet 5    ciprofloxacin (CILOXAN) 0.3 % ophthalmic solution Apply 1-2 drops q 4 h to affected eye(s) while awake for 5 days or until symptoms resolve 1 each 0    doxycycline (MONODOX) 100 MG capsule 1 po bid 20 capsule 0    FLUoxetine (PROzac) 20 MG capsule Take 3 capsules by mouth Daily. 270 capsule 1    hydroxyurea (HYDREA) 500 MG capsule Take 1 capsule by mouth Daily. 30 capsule 11    ipratropium-albuterol (DUO-NEB) 0.5-2.5 mg/3 ml nebulizer Nebulize q 4 h prn wheezing / shortness of breath 360 mL 0    lisinopril (PRINIVIL,ZESTRIL) 40 MG tablet Take 1 tablet by mouth Daily. 90 tablet 1    nitroglycerin (NITROSTAT) 0.4 MG SL tablet Place 1 tablet under the tongue Every 5 (Five) Minutes As Needed for Chest Pain. Take no more than 3 doses in 15 minutes. 25 tablet 5    predniSONE (DELTASONE) 20 MG tablet 3 po daily for 2 days, 2 po daily for 2 days, 1 po daily for 2 days 12 tablet 0    [DISCONTINUED] traZODone (DESYREL) 50 MG tablet Take 0.5 tablets by mouth Every Night. 30 tablet 3     No current facility-administered medications on file prior to visit.      Allergies   Allergen Reactions    Sulfa Antibiotics Rash        Review of Systems   Constitutional: Negative.    HENT: Negative.     Eyes: Negative.    Respiratory: Negative.     Cardiovascular: Negative.    Gastrointestinal: Negative.    Endocrine: Negative.    Genitourinary: Negative.    Musculoskeletal:  Positive for arthralgias.   Skin: Negative.    Allergic/Immunologic: Negative.    Neurological: Negative.    Hematological: Negative.    Psychiatric/Behavioral: Negative.          The patient's Review of Systems was personally reviewed and confirmed as accurate.    Physical Exam  Blood pressure (!)  "178/110, height 154.9 cm (60.98\"), weight 75.8 kg (167 lb), not currently breastfeeding.    Body mass index is 31.57 kg/m².    GENERAL APPEARANCE: awake, alert, oriented, in no acute distress and well developed, well nourished  LUNGS:  breathing nonlabored  EXTREMITIES: no clubbing, cyanosis  PERIPHERAL PULSES: palpable dorsalis pedis and posterior tibial pulses bilaterally.    GAIT:  Antalgic        ----------  Bilateral Knee Exam:  ----------  ALIGNMENT: severe varus, correctable to neutral  ----------  RANGE OF MOTION:  Decreased (5 - 115 degrees) with no extensor lag  LIGAMENTOUS STABILITY:   stable to varus and valgus stress at terminal extension and 30 degrees; retensioning of the MCL is appreciated with valgus stress at 30 degrees consistent with medial compartment degeneration  ----------  STRENGTH:  KNEE FLEXION 5/5  KNEE EXTENSION  5/5  ANKLE DORSIFLEXION  5/5  ANKLE PLANTARFLEXION  5/5  ----------  PAIN WITH PALPATION:global  KNEE EFFUSION: yes, mild effusion bilateral  PAIN WITH KNEE ROM: yes  PATELLAR CREPITUS:  yes, painful and symptomatic  ----------  SENSATION TO LIGHT TOUCH:  DEEP PERONEAL/SUPERFICIAL PERONEAL/SURAL/SAPHENOUS/TIBIAL:    intact  ----------  EDEMA:  no  ERYTHEMA:    no  WOUNDS/INCISIONS:   no  _____________________________________________________________________  _____________________________________________________________________    RADIOGRAPHIC FINDINGS:   No new imaging today.  Prior imaging from 3/21/2024 and 3/15/2024 reveal moderate to severe tricompartmental osteoarthritis with genu varum alignment (Kellgren-Kenji grade 4)    Assessment/Plan:   Diagnosis Plan   1. Primary osteoarthritis of both knees  Case Request    CBC and Differential    Comprehensive metabolic panel    Protime-INR    APTT    Hemoglobin A1c    ECG 12 Lead    Nicotine & Metabolite, Quant    Tranexamic Acid 1,000 mg in sodium chloride 0.9 % 100 mL    Tranexamic Acid 1,000 mg in sodium chloride 0.9 % 100 mL "    ethyl alcohol 62 % 2 each    ceFAZolin (ANCEF) 2 g in sodium chloride 0.9 % 100 mL IVPB    acetaminophen (TYLENOL) tablet 975 mg    meloxicam (MOBIC) tablet 15 mg    pregabalin (LYRICA) capsule 75 mg    Case Request    CT Lower Extremity Right Without Contrast        Patient is failed conservative treatment and is ready to proceed to staged total knee arthroplasty starting with the right as it is the most symptomatic.  We will schedule her 3 months out from her last injection.    The patient has clinical and radiographic evidence of end-stage right knee joint degeneration. Conservative measures have been tried for 3 months or longer, but have failed to adequately treat or improve the patient's symptoms. Pain is restricting the patient's daily activities as well as quality of life. The recommendation at this time is to proceed with a right total knee arthroplasty with the goal to improve patient function and pain. The risks, benefits, potential complications, and alternatives were discussed with the patient in detail. Risks included but were not limited to bleeding, infection, anesthesia risks, damage to neurovascular structures, osteolysis, aseptic loosening, instability, dislocation, pain, continued pain, iatrogenic fracture, possible need for future surgery including the potential for amputation, blood clots, myocardial infarction, stroke, and death. Clarissa-operative blood management and the potential for blood transfusion were discussed with risks and options clearly outlined. Specific details of the surgical procedure, hospitalization, recovery, rehabilitation, and long-term precautions were also presented. Pre-operative teaching was provided. Implant/prosthesis selection was outlined, and the many options available were explained; the final choice will be made at the time of the procedure to match the anatomy and condition of the bone, ligaments, tendons, and muscles. Given this instruction, the patient  elected to proceed with the right total knee arthroplasty. The patient will be seen by pre-admission testing for pre-operative optimization and risk assessment and will be scheduled for surgery once this is completed.    The patient is considered standard risk for DVT based on patient risk factors and will be placed on aspirin postoperatively for DVT prophylaxis.        Keaton Aldrich MD  05/21/24  12:08 EDT

## 2024-05-22 ENCOUNTER — TELEPHONE (OUTPATIENT)
Dept: ONCOLOGY | Facility: CLINIC | Age: 69
End: 2024-05-22

## 2024-06-14 ENCOUNTER — LAB (OUTPATIENT)
Dept: LAB | Facility: HOSPITAL | Age: 69
End: 2024-06-14
Payer: MEDICARE

## 2024-06-14 DIAGNOSIS — M17.11 PRIMARY OSTEOARTHRITIS OF RIGHT KNEE: Primary | ICD-10-CM

## 2024-06-14 DIAGNOSIS — D47.3 ESSENTIAL THROMBOCYTOSIS: ICD-10-CM

## 2024-06-14 DIAGNOSIS — M17.0 PRIMARY OSTEOARTHRITIS OF BOTH KNEES: ICD-10-CM

## 2024-06-14 LAB
ALBUMIN SERPL-MCNC: 4 G/DL (ref 3.5–5.2)
ALBUMIN/GLOB SERPL: 1.6 G/DL
ALP SERPL-CCNC: 99 U/L (ref 39–117)
ALT SERPL W P-5'-P-CCNC: 20 U/L (ref 1–33)
ANION GAP SERPL CALCULATED.3IONS-SCNC: 11.2 MMOL/L (ref 5–15)
APTT PPP: 33.7 SECONDS (ref 23–35)
AST SERPL-CCNC: 33 U/L (ref 1–32)
BASOPHILS # BLD AUTO: 0.12 10*3/MM3 (ref 0–0.2)
BASOPHILS NFR BLD AUTO: 1.3 % (ref 0–1.5)
BILIRUB SERPL-MCNC: 0.8 MG/DL (ref 0–1.2)
BUN SERPL-MCNC: 17 MG/DL (ref 8–23)
BUN/CREAT SERPL: 12.9 (ref 7–25)
CALCIUM SPEC-SCNC: 9.2 MG/DL (ref 8.6–10.5)
CHLORIDE SERPL-SCNC: 104 MMOL/L (ref 98–107)
CO2 SERPL-SCNC: 24.8 MMOL/L (ref 22–29)
CREAT SERPL-MCNC: 1.32 MG/DL (ref 0.57–1)
DEPRECATED RDW RBC AUTO: 55.2 FL (ref 37–54)
EGFRCR SERPLBLD CKD-EPI 2021: 44.1 ML/MIN/1.73
EOSINOPHIL # BLD AUTO: 0.56 10*3/MM3 (ref 0–0.4)
EOSINOPHIL NFR BLD AUTO: 6.2 % (ref 0.3–6.2)
ERYTHROCYTE [DISTWIDTH] IN BLOOD BY AUTOMATED COUNT: 14.9 % (ref 12.3–15.4)
GLOBULIN UR ELPH-MCNC: 2.5 GM/DL
GLUCOSE SERPL-MCNC: 97 MG/DL (ref 65–99)
HBA1C MFR BLD: 5.6 % (ref 4.8–5.6)
HCT VFR BLD AUTO: 44 % (ref 34–46.6)
HGB BLD-MCNC: 14.9 G/DL (ref 12–15.9)
IMM GRANULOCYTES # BLD AUTO: 0.04 10*3/MM3 (ref 0–0.05)
IMM GRANULOCYTES NFR BLD AUTO: 0.4 % (ref 0–0.5)
INR PPP: 1.06 (ref 0.9–1.1)
LYMPHOCYTES # BLD AUTO: 1.23 10*3/MM3 (ref 0.7–3.1)
LYMPHOCYTES NFR BLD AUTO: 13.6 % (ref 19.6–45.3)
MCH RBC QN AUTO: 34.6 PG (ref 26.6–33)
MCHC RBC AUTO-ENTMCNC: 33.9 G/DL (ref 31.5–35.7)
MCV RBC AUTO: 102.1 FL (ref 79–97)
MONOCYTES # BLD AUTO: 0.59 10*3/MM3 (ref 0.1–0.9)
MONOCYTES NFR BLD AUTO: 6.5 % (ref 5–12)
NEUTROPHILS NFR BLD AUTO: 6.48 10*3/MM3 (ref 1.7–7)
NEUTROPHILS NFR BLD AUTO: 72 % (ref 42.7–76)
NRBC BLD AUTO-RTO: 0 /100 WBC (ref 0–0.2)
PLATELET # BLD AUTO: 708 10*3/MM3 (ref 140–450)
PMV BLD AUTO: 9.5 FL (ref 6–12)
POTASSIUM SERPL-SCNC: 4.9 MMOL/L (ref 3.5–5.2)
PROT SERPL-MCNC: 6.5 G/DL (ref 6–8.5)
PROTHROMBIN TIME: 14.4 SECONDS (ref 12.3–15.1)
RBC # BLD AUTO: 4.31 10*6/MM3 (ref 3.77–5.28)
SODIUM SERPL-SCNC: 140 MMOL/L (ref 136–145)
WBC NRBC COR # BLD AUTO: 9.02 10*3/MM3 (ref 3.4–10.8)

## 2024-06-14 PROCEDURE — 83036 HEMOGLOBIN GLYCOSYLATED A1C: CPT

## 2024-06-14 PROCEDURE — 80053 COMPREHEN METABOLIC PANEL: CPT

## 2024-06-14 PROCEDURE — G0480 DRUG TEST DEF 1-7 CLASSES: HCPCS

## 2024-06-14 PROCEDURE — 36415 COLL VENOUS BLD VENIPUNCTURE: CPT

## 2024-06-14 PROCEDURE — 85730 THROMBOPLASTIN TIME PARTIAL: CPT

## 2024-06-14 PROCEDURE — 85610 PROTHROMBIN TIME: CPT

## 2024-06-14 PROCEDURE — 85025 COMPLETE CBC W/AUTO DIFF WBC: CPT

## 2024-06-18 LAB
COTININE SERPL-MCNC: <1 NG/ML
NICOTINE SERPL-MCNC: <1 NG/ML

## 2024-06-19 ENCOUNTER — PRE-ADMISSION TESTING (OUTPATIENT)
Dept: PREADMISSION TESTING | Facility: HOSPITAL | Age: 69
End: 2024-06-19
Payer: MEDICARE

## 2024-06-19 VITALS — BODY MASS INDEX: 32.72 KG/M2 | HEIGHT: 61 IN | WEIGHT: 173.28 LBS

## 2024-06-19 DIAGNOSIS — M17.0 PRIMARY OSTEOARTHRITIS OF BOTH KNEES: ICD-10-CM

## 2024-06-19 LAB
DEPRECATED RDW RBC AUTO: 57.2 FL (ref 37–54)
ERYTHROCYTE [DISTWIDTH] IN BLOOD BY AUTOMATED COUNT: 15.2 % (ref 12.3–15.4)
HCT VFR BLD AUTO: 46.9 % (ref 34–46.6)
HGB BLD-MCNC: 15.7 G/DL (ref 12–15.9)
MCH RBC QN AUTO: 34.1 PG (ref 26.6–33)
MCHC RBC AUTO-ENTMCNC: 33.5 G/DL (ref 31.5–35.7)
MCV RBC AUTO: 101.7 FL (ref 79–97)
PLATELET # BLD AUTO: 781 10*3/MM3 (ref 140–450)
PMV BLD AUTO: 9.1 FL (ref 6–12)
POTASSIUM SERPL-SCNC: 5 MMOL/L (ref 3.5–5.2)
QT INTERVAL: 386 MS
QTC INTERVAL: 425 MS
RBC # BLD AUTO: 4.61 10*6/MM3 (ref 3.77–5.28)
WBC NRBC COR # BLD AUTO: 9.39 10*3/MM3 (ref 3.4–10.8)

## 2024-06-19 PROCEDURE — 93010 ELECTROCARDIOGRAM REPORT: CPT | Performed by: INTERNAL MEDICINE

## 2024-06-19 PROCEDURE — 36415 COLL VENOUS BLD VENIPUNCTURE: CPT

## 2024-06-19 PROCEDURE — 93005 ELECTROCARDIOGRAM TRACING: CPT

## 2024-06-19 PROCEDURE — 84132 ASSAY OF SERUM POTASSIUM: CPT

## 2024-06-19 PROCEDURE — 85027 COMPLETE CBC AUTOMATED: CPT

## 2024-06-19 NOTE — PAT
Discussed with patient options for receiving total joint replacement education and assessed patient's ability and preference. Joint Replacement Guide given to patient during PAT visit since not received a copy within the last year. Encouraged patient/family to read guide thoroughly and notify PAT staff with any questions or concerns. Handout provided directing patient to links to watch online videos related to joint replacement surgery on the [facility] website. The handout gives detailed instructions for joining an online joint replacement class through Zoom or phone conference offered on . Patient agreed to participate by joining an online class through phone conference. Patient verbalized understanding of instructions and to complete the online learning tool survey. Encouraged to share information with family and/or . An overview of the joint replacement education was provided during the visit including general perioperative instructions that are routine for all surgical patients (PAT PASS, wipes, directions to pre-op, etc.).    Prescription for Chlorhexidine shower called into patient's pharmacy or [facility] pharmacy by patient's surgeon.  Reinforced with patient to  the prescription from applicable pharmacy if they haven't already.  Verbal and written instructions given regarding proper use of Chlorhexidine body wash to patient and/or famlily during PAT visit. Patient/family also instructed to complete checklist and return it to Pre-op on the day of surgery.  Patient and/or family verbalized understanding.    Patient to apply Chlorhexadine wipes  to surgical area (as instructed) the night before procedure and the AM of procedure. Wipes provided.    Patient instructed to drink 20 ounces of Gatorade or Gatorlyte (if diabetic) and it needs to be completed 1 hour (for Main OR patients) or 2 hours (scheduled  section & [facility] patients) before given arrival time for procedure (NO RED Gatorade  and NO Gatorade Zero).    Patient verbalized understanding.    Per Anesthesia Request, patient instructed not to take their ACE/ARB medications on the AM of surgery.    Dental clearance on chart.     Pt has an appointment with Dr. Graves at  for clearance tomorrow.  She states she sees him for high platelets.

## 2024-06-20 ENCOUNTER — SPECIALTY PHARMACY (OUTPATIENT)
Dept: ONCOLOGY | Facility: HOSPITAL | Age: 69
End: 2024-06-20
Payer: MEDICARE

## 2024-06-20 ENCOUNTER — OFFICE VISIT (OUTPATIENT)
Dept: ONCOLOGY | Facility: CLINIC | Age: 69
End: 2024-06-20
Payer: MEDICARE

## 2024-06-20 VITALS
HEART RATE: 104 BPM | RESPIRATION RATE: 16 BRPM | SYSTOLIC BLOOD PRESSURE: 184 MMHG | OXYGEN SATURATION: 98 % | WEIGHT: 172 LBS | HEIGHT: 61 IN | TEMPERATURE: 97.5 F | BODY MASS INDEX: 32.47 KG/M2 | DIASTOLIC BLOOD PRESSURE: 84 MMHG

## 2024-06-20 DIAGNOSIS — D47.3 ESSENTIAL THROMBOCYTOSIS: Primary | ICD-10-CM

## 2024-06-20 PROCEDURE — 3079F DIAST BP 80-89 MM HG: CPT | Performed by: INTERNAL MEDICINE

## 2024-06-20 PROCEDURE — 3077F SYST BP >= 140 MM HG: CPT | Performed by: INTERNAL MEDICINE

## 2024-06-20 PROCEDURE — 1126F AMNT PAIN NOTED NONE PRSNT: CPT | Performed by: INTERNAL MEDICINE

## 2024-06-20 PROCEDURE — 99214 OFFICE O/P EST MOD 30 MIN: CPT | Performed by: INTERNAL MEDICINE

## 2024-06-20 NOTE — PROGRESS NOTES
Follow Up Office Visit      Date: 2024     Patient Name: Martha Murray  MRN: [id number]  : 1955  Referring Physician: Julia Stacy     Chief Complaint:  Follow-up for JAK2 positive essential thrombocytosis     History of Present Illness: Martha Murray is a pleasant 66 y.o. female past medical history of anxiety, hypertension, hyperlipidemia, COPD, tobacco abuse, type 2 diabetes, CKD who presents today for evaluation of thrombocytosis. The patient has been followed by other PCP is monitoring CBCs which were notable for thrombocytosis. Most recently her platelet count was 1280K in 2021. Per chart review over the last several years her platelet count has ranged between 700K-900K. She does note excessive fatigue during this timeframe as well as increased cravings for ice. She denies any recurrent infections or inflammatory processes. Of note, she recent had a CT scan which showed some slight splenomegaly up to around 16 cm. She denies any chest pain or discomfort. Denies any shortness of breath, cough, vision changes, headaches     Interval History:  Presents clinic for follow-up.  Started back on Hydrea in 2023.  Tolerating treatment well thus far.  Has had worsening bilateral knee pain.  Scheduled for right total knee replacement in 2 weeks    Oncology History:    Oncology/Hematology History   Essential thrombocytosis   3/28/2019 Initial Diagnosis    Essential thrombocytosis     10/12/2023 -  Chemotherapy    OP POLYCYTHEMIA VERA/ESSENTIAL THROMBOCYTOSIS Hydroxyurea         Subjective      Review of Systems:   Constitutional: Negative for fevers, chills, or weight loss  Eyes: Negative for blurred vision or discharge         Ear/Nose/Throat: Negative for difficulty swallowing, sore throat, LAD                                                       Respiratory: Negative for cough, SOA, wheezing                                                                                         Cardiovascular: Negative for chest pain or palpitations                                                                  Gastrointestinal: Negative for nausea, vomiting or diarrhea                                                                     Genitourinary: Negative for dysuria or hematuria                                                                                           Musculoskeletal: Negative for any joint pains or muscle aches                                                                        Neurologic: Negative for any weakness, headaches, dizziness                                                                         Hematologic: Negative for any easy bleeding or bruising                                                                                   Psychiatric: Negative for anxiety or depression                          Past Medical History/Past Surgical History/ Family History/ Social History: Reviewed by me and unchanged from my previous documentation done on February 2024.     Medications:     Current Outpatient Medications:     amLODIPine (NORVASC) 10 MG tablet, TAKE 1 TABLET BY MOUTH DAILY (Patient taking differently: Take 1 tablet by mouth Daily. Pt unsure if taking), Disp: 90 tablet, Rfl: 2    aspirin (aspirin) 81 MG EC tablet, Take 1 tablet by mouth Daily. (Patient taking differently: Take 1 tablet by mouth Daily. Pt states she will hold ASA 2 weeks prior to knee surgery.), Disp: 30 tablet, Rfl: 5    Chlorhexidine Gluconate 4 % solution, Shower daily with hibiclens solution as directed 5 days prior to surgery., Disp: 236 mL, Rfl: 0    FLUoxetine (PROzac) 20 MG capsule, Take 3 capsules by mouth Daily., Disp: 270 capsule, Rfl: 1    hydroxyurea (HYDREA) 500 MG capsule, Take 1 capsule by mouth Daily., Disp: 30 capsule, Rfl: 11    ipratropium-albuterol (DUO-NEB) 0.5-2.5 mg/3 ml nebulizer, Nebulize q 4 h prn wheezing / shortness of breath, Disp: 360 mL, Rfl: 0    lisinopril  "(PRINIVIL,ZESTRIL) 40 MG tablet, Take 1 tablet by mouth Daily., Disp: 90 tablet, Rfl: 1    nitroglycerin (NITROSTAT) 0.4 MG SL tablet, Place 1 tablet under the tongue Every 5 (Five) Minutes As Needed for Chest Pain. Take no more than 3 doses in 15 minutes., Disp: 25 tablet, Rfl: 5    traZODone (DESYREL) 50 MG tablet, TAKE 0.5 TABLETS BY MOUTH EVERY NIGHT, Disp: 30 tablet, Rfl: 3    Allergies:   Allergies   Allergen Reactions    Sulfa Antibiotics Rash       Objective     Physical Exam:  Vital Signs:   Vitals:    06/20/24 1342   BP: (!) 184/84   Pulse: 104   Resp: 16   Temp: 97.5 °F (36.4 °C)   SpO2: 98%   Weight: 78 kg (172 lb)   Height: 154.9 cm (61\")   PainSc: 0-No pain     Pain Score    06/20/24 1342   PainSc: 0-No pain     ECOG Performance Status: 0 - Asymptomatic    Constitutional: NAD, ECOG 0  Eyes: PERRLA, scleral anicteric  ENT: No LAD, no thyromegaly  Respiratory: CTAB, no wheezing, rales, rhonchi  Cardiovascular: RRR, no murmurs, pulses 2+ bilaterally  Abdomen: soft, NT/ND, no HSM  Musculoskeletal: strength 5/5 bilaterally, no c/c/e  Neurologic: A&O x 3, CN II-XII intact grossly    Results Review:   Pre-Admission Testing on 06/19/2024   Component Date Value Ref Range Status    QT Interval 06/19/2024 386  ms Final    QTC Interval 06/19/2024 425  ms Final    Potassium 06/19/2024 5.0  3.5 - 5.2 mmol/L Final    Slight hemolysis detected by analyzer. Result may be falsely elevated.    WBC 06/19/2024 9.39  3.40 - 10.80 10*3/mm3 Final    RBC 06/19/2024 4.61  3.77 - 5.28 10*6/mm3 Final    Hemoglobin 06/19/2024 15.7  12.0 - 15.9 g/dL Final    Hematocrit 06/19/2024 46.9 (H)  34.0 - 46.6 % Final    MCV 06/19/2024 101.7 (H)  79.0 - 97.0 fL Final    MCH 06/19/2024 34.1 (H)  26.6 - 33.0 pg Final    MCHC 06/19/2024 33.5  31.5 - 35.7 g/dL Final    RDW 06/19/2024 15.2  12.3 - 15.4 % Final    RDW-SD 06/19/2024 57.2 (H)  37.0 - 54.0 fl Final    MPV 06/19/2024 9.1  6.0 - 12.0 fL Final    Platelets 06/19/2024 781 (H)  140 - 450 " 10*3/mm3 Final   Lab on 06/14/2024   Component Date Value Ref Range Status    Glucose 06/14/2024 97  65 - 99 mg/dL Final    BUN 06/14/2024 17  8 - 23 mg/dL Final    Creatinine 06/14/2024 1.32 (H)  0.57 - 1.00 mg/dL Final    Sodium 06/14/2024 140  136 - 145 mmol/L Final    Potassium 06/14/2024 4.9  3.5 - 5.2 mmol/L Final    Chloride 06/14/2024 104  98 - 107 mmol/L Final    CO2 06/14/2024 24.8  22.0 - 29.0 mmol/L Final    Calcium 06/14/2024 9.2  8.6 - 10.5 mg/dL Final    Total Protein 06/14/2024 6.5  6.0 - 8.5 g/dL Final    Albumin 06/14/2024 4.0  3.5 - 5.2 g/dL Final    ALT (SGPT) 06/14/2024 20  1 - 33 U/L Final    AST (SGOT) 06/14/2024 33 (H)  1 - 32 U/L Final    Alkaline Phosphatase 06/14/2024 99  39 - 117 U/L Final    Total Bilirubin 06/14/2024 0.8  0.0 - 1.2 mg/dL Final    Globulin 06/14/2024 2.5  gm/dL Final    A/G Ratio 06/14/2024 1.6  g/dL Final    BUN/Creatinine Ratio 06/14/2024 12.9  7.0 - 25.0 Final    Anion Gap 06/14/2024 11.2  5.0 - 15.0 mmol/L Final    eGFR 06/14/2024 44.1 (L)  >60.0 mL/min/1.73 Final    Protime 06/14/2024 14.4  12.3 - 15.1 Seconds Final    INR 06/14/2024 1.06  0.90 - 1.10 Final    PTT 06/14/2024 33.7  23.0 - 35.0 seconds Final    Hemoglobin A1C 06/14/2024 5.60  4.80 - 5.60 % Final    Nicotine 06/14/2024 <1.0  ng/mL Final    This test was developed and its performance characteristics  determined by [employer]. It has not been cleared or approved  by the Food and Drug Administration.  Nicotine levels greater than 2.0 are consistent with the use of  tobacco or tobacco cessation products.    Cotinine 06/14/2024 <1.0  ng/mL Final    This test was developed and its performance characteristics  determined by [employer]. It has not been cleared or approved  by the Food and Drug Administration.  Cotinine levels greater than 20.0 are consistent with the use of  tobacco or tobacco cessation products.    WBC 06/14/2024 9.02  3.40 - 10.80 10*3/mm3 Final    RBC 06/14/2024 4.31  3.77 - 5.28 10*6/mm3 Final     Hemoglobin 06/14/2024 14.9  12.0 - 15.9 g/dL Final    Hematocrit 06/14/2024 44.0  34.0 - 46.6 % Final    MCV 06/14/2024 102.1 (H)  79.0 - 97.0 fL Final    MCH 06/14/2024 34.6 (H)  26.6 - 33.0 pg Final    MCHC 06/14/2024 33.9  31.5 - 35.7 g/dL Final    RDW 06/14/2024 14.9  12.3 - 15.4 % Final    RDW-SD 06/14/2024 55.2 (H)  37.0 - 54.0 fl Final    MPV 06/14/2024 9.5  6.0 - 12.0 fL Final    Platelets 06/14/2024 708 (H)  140 - 450 10*3/mm3 Final    Neutrophil % 06/14/2024 72.0  42.7 - 76.0 % Final    Lymphocyte % 06/14/2024 13.6 (L)  19.6 - 45.3 % Final    Monocyte % 06/14/2024 6.5  5.0 - 12.0 % Final    Eosinophil % 06/14/2024 6.2  0.3 - 6.2 % Final    Basophil % 06/14/2024 1.3  0.0 - 1.5 % Final    Immature Grans % 06/14/2024 0.4  0.0 - 0.5 % Final    Neutrophils, Absolute 06/14/2024 6.48  1.70 - 7.00 10*3/mm3 Final    Lymphocytes, Absolute 06/14/2024 1.23  0.70 - 3.10 10*3/mm3 Final    Monocytes, Absolute 06/14/2024 0.59  0.10 - 0.90 10*3/mm3 Final    Eosinophils, Absolute 06/14/2024 0.56 (H)  0.00 - 0.40 10*3/mm3 Final    Basophils, Absolute 06/14/2024 0.12  0.00 - 0.20 10*3/mm3 Final    Immature Grans, Absolute 06/14/2024 0.04  0.00 - 0.05 10*3/mm3 Final    nRBC 06/14/2024 0.0  0.0 - 0.2 /100 WBC Final       No results found.    Assessment / Plan      Assessment/Plan:   1. Essential thrombocytosis (HCC) (Primary)  -Platelet count 1280K in December 2021  -CT scan July 2021 showed slight splenomegaly up to 16 cm  -Concern for myelofibrosis given the clinical picture as well as iron deficiency given her symptoms  -JAK2 mutational testing positive for the [id number] mutation  -Von Willebrand studies consistent with an acquired von Willebrand's disease secondary to a myeloproliferative disorder  -Initially ordered Hydrea in February 2022 however patient did not  the medication  -Started on Hydrea in October 2023.  Tolerating well.  Platelet count 606K in November 2023  -Hemoglobin 14.9 with a platelet count of 570K  in February 2023  -Platelet count 781K in June 2024 likely related to increased inflammation in her knees  -Okay to hold Hydrea 500 mg daily for 1 week prior to her surgical intervention as well as 1 week after and then can resume treatment  -Plans to hold her aspirin for 2 weeks prior to surgery which is okay with me     2.  Iron deficiency  -Noted on previous iron studies  -Given her JAK2 mutation positivity, will hold off on iron supplementation at this time    3.  Right knee pain  -Scheduled for right total knee replacement on 7/3  -Okay from hematologic perspective for surgery  -Hold Hydrea for 1 week prior to surgery and then can resume 1 week after surgery  -Holding aspirin for 2 weeks per orthopedic recommendations    Follow Up:   Follow-up in 3 months     Paulo Graves MD  Hematology and Oncology     Please note that portions of this note may have been completed with a voice recognition program. Efforts were made to edit the dictations, but occasionally words are mistranscribed.

## 2024-06-26 ENCOUNTER — TRANSITIONAL CARE MANAGEMENT TELEPHONE ENCOUNTER (OUTPATIENT)
Dept: ORTHOPEDIC SURGERY | Facility: CLINIC | Age: 69
End: 2024-06-26
Payer: MEDICARE

## 2024-06-26 ENCOUNTER — HOSPITAL ENCOUNTER (OUTPATIENT)
Dept: CT IMAGING | Facility: HOSPITAL | Age: 69
Discharge: HOME | End: 2024-06-26
Admitting: ORTHOPAEDIC SURGERY
Payer: MEDICARE

## 2024-06-26 DIAGNOSIS — M17.0 PRIMARY OSTEOARTHRITIS OF BOTH KNEES: ICD-10-CM

## 2024-06-26 PROCEDURE — 73700 CT LOWER EXTREMITY W/O DYE: CPT

## 2024-06-26 NOTE — OUTREACH NOTE
Pre-Operative Care Plan          Patient: Martha Murray       YOB: 1955         Age/Gender: 69 y.o. female     Medical Record Number: [id number]     Surgeon:  DR. BRUCE WILKERSON    Surgical Procedure Planned:    RT TKA    Date of Surgery Planned: 07/03/2024    Clearances Needed: DENTAL - OBTAINED AND GIVEN TO PAT. PATIENT HAS COPY.    A1C: 5.60    BMI: 32.50        Pharmacy: [facility] - MEDS TO BEDS    Living Arrangements:  LIVES ALONE - WILL HAVE GRANDDAUGHTER STAYING WITH HER     Appropriate DME: HAS WALKER AND CANE.     Physical Therapy Location: [facility] ([facility]), TRANSFERRING TO [place] LOCATION.      First Physical Therapy Appointment:  07-05 [facility] THEN TRANSFERRING TO [facility] ON 07/08    Surgery : SON - [facility]    DVT PX Plan: ASPIRIN 81 MG BID    D/C Plan: OUTPATIENT, D/C HOME WITH ASSISTANCE FROM SON AND GRANDDAUGHTER. HAS WALKER/CANE, EDUCATED ON ALL OTHER DME. [facility] WILL SEE THE PATIENT ON FRIDAY 07/01, AND SHE WILL THEN TRANSFER TO [facility] [place] 2[place] ON 07/08. PATIENT WILL TAKE ASPIRIN 81 MG BID FOR DVT PROPHYLAXIS.     PATIENT STATED HER GRANDDAUGHTER WOULD ONLY BE WITH HER FOR TWO DAYS AFTER SURGERY. PATIENT WAS STRONGLY ENCOURAGED TO SEEK ASSISTANCE AT HOME FOR A WHOLE WEEK AFTER SURGERY. PATIENT VERBALIZED UNDERSTANDING, AND STATED SHE WOULD ASK HER GRANDDAUGHTER.

## 2024-07-02 ENCOUNTER — ANESTHESIA EVENT (OUTPATIENT)
Dept: PERIOP | Facility: HOSPITAL | Age: 69
End: 2024-07-02
Payer: MEDICARE

## 2024-07-02 RX ORDER — SODIUM CHLORIDE 0.9 % (FLUSH) 0.9 %
10 SYRINGE (ML) INJECTION EVERY 12 HOURS SCHEDULED
Status: CANCELLED | OUTPATIENT
Start: 2024-07-02

## 2024-07-02 RX ORDER — SODIUM CHLORIDE, SODIUM LACTATE, POTASSIUM CHLORIDE, CALCIUM CHLORIDE 600; 310; 30; 20 MG/100ML; MG/100ML; MG/100ML; MG/100ML
9 INJECTION, SOLUTION INTRAVENOUS CONTINUOUS
Status: CANCELLED | OUTPATIENT
Start: 2024-07-02

## 2024-07-02 RX ORDER — SODIUM CHLORIDE 0.9 % (FLUSH) 0.9 %
10 SYRINGE (ML) INJECTION AS NEEDED
Status: CANCELLED | OUTPATIENT
Start: 2024-07-02

## 2024-07-02 RX ORDER — MIDAZOLAM HYDROCHLORIDE 1 MG/ML
0.5 INJECTION INTRAMUSCULAR; INTRAVENOUS
Status: CANCELLED | OUTPATIENT
Start: 2024-07-02

## 2024-07-02 RX ORDER — FAMOTIDINE 10 MG/ML
20 INJECTION, SOLUTION INTRAVENOUS ONCE
Status: CANCELLED | OUTPATIENT
Start: 2024-07-02 | End: 2024-07-02

## 2024-07-02 RX ORDER — SODIUM CHLORIDE 9 MG/ML
40 INJECTION, SOLUTION INTRAVENOUS AS NEEDED
Status: CANCELLED | OUTPATIENT
Start: 2024-07-02

## 2024-07-02 RX ORDER — LIDOCAINE HYDROCHLORIDE 10 MG/ML
0.5 INJECTION, SOLUTION EPIDURAL; INFILTRATION; INTRACAUDAL; PERINEURAL ONCE AS NEEDED
Status: CANCELLED | OUTPATIENT
Start: 2024-07-02

## 2024-07-03 ENCOUNTER — HOSPITAL ENCOUNTER (OUTPATIENT)
Facility: HOSPITAL | Age: 69
Discharge: HOME | End: 2024-07-05
Attending: ORTHOPAEDIC SURGERY | Admitting: ORTHOPAEDIC SURGERY
Payer: MEDICARE

## 2024-07-03 ENCOUNTER — APPOINTMENT (OUTPATIENT)
Dept: GENERAL RADIOLOGY | Facility: HOSPITAL | Age: 69
End: 2024-07-03
Payer: MEDICARE

## 2024-07-03 ENCOUNTER — ANESTHESIA (OUTPATIENT)
Dept: PERIOP | Facility: HOSPITAL | Age: 69
End: 2024-07-03
Payer: MEDICARE

## 2024-07-03 ENCOUNTER — ANESTHESIA EVENT CONVERTED (OUTPATIENT)
Dept: ANESTHESIOLOGY | Facility: HOSPITAL | Age: 69
End: 2024-07-03
Payer: MEDICARE

## 2024-07-03 DIAGNOSIS — Z96.651 S/P TOTAL KNEE ARTHROPLASTY, RIGHT: Primary | ICD-10-CM

## 2024-07-03 DIAGNOSIS — M17.0 PRIMARY OSTEOARTHRITIS OF BOTH KNEES: ICD-10-CM

## 2024-07-03 DIAGNOSIS — D47.3 ESSENTIAL THROMBOCYTOSIS: ICD-10-CM

## 2024-07-03 DIAGNOSIS — I20.89 ATYPICAL ANGINA: ICD-10-CM

## 2024-07-03 PROBLEM — R74.8 ELEVATED LIVER ENZYMES: Status: RESOLVED | Noted: 2019-03-28 | Resolved: 2024-07-03

## 2024-07-03 PROBLEM — M25.561 CHRONIC PAIN OF BOTH KNEES: Status: RESOLVED | Noted: 2017-11-06 | Resolved: 2024-07-03

## 2024-07-03 PROBLEM — M79.641 RIGHT HAND PAIN: Status: RESOLVED | Noted: 2022-01-02 | Resolved: 2024-07-03

## 2024-07-03 PROBLEM — E87.20 LACTIC ACIDOSIS: Status: RESOLVED | Noted: 2023-07-30 | Resolved: 2024-07-03

## 2024-07-03 PROBLEM — N17.9 ACUTE KIDNEY INJURY: Status: RESOLVED | Noted: 2023-07-30 | Resolved: 2024-07-03

## 2024-07-03 PROBLEM — G89.29 CHRONIC PAIN OF BOTH KNEES: Status: RESOLVED | Noted: 2017-11-06 | Resolved: 2024-07-03

## 2024-07-03 PROBLEM — R16.1 SPLENOMEGALY: Status: RESOLVED | Noted: 2023-07-30 | Resolved: 2024-07-03

## 2024-07-03 PROBLEM — G47.09 OTHER INSOMNIA: Status: RESOLVED | Noted: 2022-01-02 | Resolved: 2024-07-03

## 2024-07-03 PROBLEM — Z87.891 HISTORY OF TOBACCO ABUSE: Status: RESOLVED | Noted: 2019-02-15 | Resolved: 2024-07-03

## 2024-07-03 PROBLEM — M17.10 ARTHRITIS OF KNEE: Status: RESOLVED | Noted: 2024-05-21 | Resolved: 2024-07-03

## 2024-07-03 PROBLEM — E55.9 VITAMIN D DEFICIENCY: Status: RESOLVED | Noted: 2019-03-28 | Resolved: 2024-07-03

## 2024-07-03 PROBLEM — M25.562 CHRONIC PAIN OF BOTH KNEES: Status: RESOLVED | Noted: 2017-11-06 | Resolved: 2024-07-03

## 2024-07-03 PROBLEM — M17.10 ARTHRITIS OF KNEE: Status: ACTIVE | Noted: 2024-07-03

## 2024-07-03 PROBLEM — K58.0 IRRITABLE BOWEL SYNDROME WITH DIARRHEA: Status: RESOLVED | Noted: 2022-01-02 | Resolved: 2024-07-03

## 2024-07-03 PROBLEM — I25.119 CORONARY ARTERY DISEASE INVOLVING NATIVE CORONARY ARTERY OF NATIVE HEART WITH ANGINA PECTORIS: Status: ACTIVE | Noted: 2024-07-03

## 2024-07-03 LAB
GEN 5 2HR TROPONIN T REFLEX: 11 NG/L
GLUCOSE BLDC GLUCOMTR-MCNC: 103 MG/DL (ref 70–130)
GLUCOSE BLDC GLUCOMTR-MCNC: 145 MG/DL (ref 70–130)
GLUCOSE BLDC GLUCOMTR-MCNC: 183 MG/DL (ref 70–130)
TROPONIN T DELTA: -1 NG/L
TROPONIN T SERPL HS-MCNC: 12 NG/L

## 2024-07-03 PROCEDURE — 25010000002 CEFAZOLIN PER 500 MG: Performed by: ORTHOPAEDIC SURGERY

## 2024-07-03 PROCEDURE — 25010000002 PHENYLEPHRINE 10 MG/ML SOLUTION 1 ML VIAL: Performed by: ANESTHESIOLOGY

## 2024-07-03 PROCEDURE — 25010000002 MORPHINE PER 10 MG: Performed by: ORTHOPAEDIC SURGERY

## 2024-07-03 PROCEDURE — 25010000002 ROPIVACAINE HCL-NACL 0.2-0.9 % SOLUTION: Performed by: NURSE ANESTHETIST, CERTIFIED REGISTERED

## 2024-07-03 PROCEDURE — 82948 REAGENT STRIP/BLOOD GLUCOSE: CPT

## 2024-07-03 PROCEDURE — G0378 HOSPITAL OBSERVATION PER HR: HCPCS

## 2024-07-03 PROCEDURE — 25010000002 DEXAMETHASONE PER 1 MG: Performed by: ANESTHESIOLOGY

## 2024-07-03 PROCEDURE — 71045 X-RAY EXAM CHEST 1 VIEW: CPT

## 2024-07-03 PROCEDURE — 25010000002 CEFAZOLIN PER 500 MG: Performed by: INTERNAL MEDICINE

## 2024-07-03 PROCEDURE — 25010000002 ONDANSETRON PER 1 MG: Performed by: ANESTHESIOLOGY

## 2024-07-03 PROCEDURE — 63710000001 INSULIN LISPRO (HUMAN) PER 5 UNITS: Performed by: INTERNAL MEDICINE

## 2024-07-03 PROCEDURE — C1776 JOINT DEVICE (IMPLANTABLE): HCPCS | Performed by: ORTHOPAEDIC SURGERY

## 2024-07-03 PROCEDURE — 25810000003 SODIUM CHLORIDE 0.9 % SOLUTION: Performed by: INTERNAL MEDICINE

## 2024-07-03 PROCEDURE — 25010000002 KETOROLAC TROMETHAMINE PER 15 MG: Performed by: ORTHOPAEDIC SURGERY

## 2024-07-03 PROCEDURE — 25810000003 LACTATED RINGERS PER 1000 ML: Performed by: INTERNAL MEDICINE

## 2024-07-03 PROCEDURE — A4648 IMPLANTABLE TISSUE MARKER: HCPCS | Performed by: ORTHOPAEDIC SURGERY

## 2024-07-03 PROCEDURE — C1755 CATHETER, INTRASPINAL: HCPCS | Performed by: ORTHOPAEDIC SURGERY

## 2024-07-03 PROCEDURE — 25010000002 HYDRALAZINE PER 20 MG: Performed by: NURSE ANESTHETIST, CERTIFIED REGISTERED

## 2024-07-03 PROCEDURE — 97162 PT EVAL MOD COMPLEX 30 MIN: CPT

## 2024-07-03 PROCEDURE — 73560 X-RAY EXAM OF KNEE 1 OR 2: CPT

## 2024-07-03 PROCEDURE — 25010000002 HYDRALAZINE PER 20 MG

## 2024-07-03 PROCEDURE — 84484 ASSAY OF TROPONIN QUANT: CPT | Performed by: ANESTHESIOLOGY

## 2024-07-03 PROCEDURE — 25010000002 PROPOFOL 10 MG/ML EMULSION: Performed by: ANESTHESIOLOGY

## 2024-07-03 PROCEDURE — 25010000002 ROPIVACAINE HCL-NACL 0.2-0.9 % SOLUTION: Performed by: INTERNAL MEDICINE

## 2024-07-03 PROCEDURE — 25010000002 MEPIVACAINE HCL (PF) 1.5 % SOLUTION: Performed by: ANESTHESIOLOGY

## 2024-07-03 PROCEDURE — 97110 THERAPEUTIC EXERCISES: CPT

## 2024-07-03 PROCEDURE — 25010000002 BUPIVACAINE (PF) 0.25 % SOLUTION: Performed by: ANESTHESIOLOGY

## 2024-07-03 PROCEDURE — 25010000002 ROPIVACAINE PER 1 MG: Performed by: ORTHOPAEDIC SURGERY

## 2024-07-03 PROCEDURE — 99204 OFFICE O/P NEW MOD 45 MIN: CPT | Performed by: NURSE PRACTITIONER

## 2024-07-03 PROCEDURE — 25810000003 LACTATED RINGERS PER 1000 ML: Performed by: ANESTHESIOLOGY

## 2024-07-03 PROCEDURE — 25010000002 LABETALOL 5 MG/ML SOLUTION

## 2024-07-03 DEVICE — PATELLA
Type: IMPLANTABLE DEVICE | Site: KNEE | Status: FUNCTIONAL
Brand: TRIATHLON

## 2024-07-03 DEVICE — TIBIAL BEARING INSERT - CS
Type: IMPLANTABLE DEVICE | Site: KNEE | Status: FUNCTIONAL
Brand: TRIATHLON

## 2024-07-03 DEVICE — STRATAFIX (SPIRAL PDS PLUS), BIDIRECTIONAL
Type: IMPLANTABLE DEVICE | Site: KNEE | Status: FUNCTIONAL
Brand: STRATAFIX

## 2024-07-03 DEVICE — TIBIAL COMPONENT
Type: IMPLANTABLE DEVICE | Site: KNEE | Status: FUNCTIONAL
Brand: TRIATHLON

## 2024-07-03 DEVICE — IMPLANTABLE DEVICE: Type: IMPLANTABLE DEVICE | Site: KNEE | Status: FUNCTIONAL

## 2024-07-03 DEVICE — CRUCIATE RETAINING FEMORAL
Type: IMPLANTABLE DEVICE | Site: KNEE | Status: FUNCTIONAL
Brand: TRIATHLON

## 2024-07-03 RX ORDER — DEXAMETHASONE SODIUM PHOSPHATE 4 MG/ML
INJECTION, SOLUTION INTRA-ARTICULAR; INTRALESIONAL; INTRAMUSCULAR; INTRAVENOUS; SOFT TISSUE AS NEEDED
Status: DISCONTINUED | OUTPATIENT
Start: 2024-07-03 | End: 2024-07-03 | Stop reason: SURG

## 2024-07-03 RX ORDER — HYDROMORPHONE HYDROCHLORIDE 1 MG/ML
0.2 INJECTION, SOLUTION INTRAMUSCULAR; INTRAVENOUS; SUBCUTANEOUS
Status: DISCONTINUED | OUTPATIENT
Start: 2024-07-03 | End: 2024-07-03

## 2024-07-03 RX ORDER — TRANEXAMIC ACID 10 MG/ML
1000 INJECTION, SOLUTION INTRAVENOUS ONCE
Status: COMPLETED | OUTPATIENT
Start: 2024-07-03 | End: 2024-07-03

## 2024-07-03 RX ORDER — SODIUM CHLORIDE 9 MG/ML
40 INJECTION, SOLUTION INTRAVENOUS AS NEEDED
Status: DISCONTINUED | OUTPATIENT
Start: 2024-07-03 | End: 2024-07-05 | Stop reason: HOSPADM

## 2024-07-03 RX ORDER — SODIUM CHLORIDE 9 MG/ML
40 INJECTION, SOLUTION INTRAVENOUS AS NEEDED
Status: DISCONTINUED | OUTPATIENT
Start: 2024-07-03 | End: 2024-07-03

## 2024-07-03 RX ORDER — SODIUM CHLORIDE 0.9 % (FLUSH) 0.9 %
10 SYRINGE (ML) INJECTION EVERY 12 HOURS SCHEDULED
Status: DISCONTINUED | OUTPATIENT
Start: 2024-07-03 | End: 2024-07-05 | Stop reason: HOSPADM

## 2024-07-03 RX ORDER — METOPROLOL TARTRATE 1 MG/ML
5 INJECTION, SOLUTION INTRAVENOUS
Status: DISCONTINUED | OUTPATIENT
Start: 2024-07-03 | End: 2024-07-03

## 2024-07-03 RX ORDER — DEXMEDETOMIDINE HYDROCHLORIDE 4 UG/ML
INJECTION, SOLUTION INTRAVENOUS AS NEEDED
Status: DISCONTINUED | OUTPATIENT
Start: 2024-07-03 | End: 2024-07-03 | Stop reason: SURG

## 2024-07-03 RX ORDER — PREGABALIN 75 MG/1
75 CAPSULE ORAL ONCE
Status: COMPLETED | OUTPATIENT
Start: 2024-07-03 | End: 2024-07-03

## 2024-07-03 RX ORDER — PROMETHAZINE HYDROCHLORIDE 25 MG/1
25 SUPPOSITORY RECTAL ONCE AS NEEDED
Status: DISCONTINUED | OUTPATIENT
Start: 2024-07-03 | End: 2024-07-03

## 2024-07-03 RX ORDER — ALUMINA, MAGNESIA, AND SIMETHICONE 2400; 2400; 240 MG/30ML; MG/30ML; MG/30ML
15 SUSPENSION ORAL AS NEEDED
Status: DISCONTINUED | OUTPATIENT
Start: 2024-07-03 | End: 2024-07-03 | Stop reason: HOSPADM

## 2024-07-03 RX ORDER — NALOXONE HCL 0.4 MG/ML
0.1 VIAL (ML) INJECTION
Status: DISCONTINUED | OUTPATIENT
Start: 2024-07-03 | End: 2024-07-05 | Stop reason: HOSPADM

## 2024-07-03 RX ORDER — SODIUM CHLORIDE 9 MG/ML
100 INJECTION, SOLUTION INTRAVENOUS CONTINUOUS
Status: DISCONTINUED | OUTPATIENT
Start: 2024-07-03 | End: 2024-07-05 | Stop reason: HOSPADM

## 2024-07-03 RX ORDER — NITROGLYCERIN 0.4 MG/1
0.8 TABLET SUBLINGUAL
Status: DISCONTINUED | OUTPATIENT
Start: 2024-07-03 | End: 2024-07-03

## 2024-07-03 RX ORDER — LABETALOL HYDROCHLORIDE 5 MG/ML
10 INJECTION, SOLUTION INTRAVENOUS ONCE
Status: COMPLETED | OUTPATIENT
Start: 2024-07-03 | End: 2024-07-03

## 2024-07-03 RX ORDER — METOPROLOL TARTRATE 50 MG/1
50 TABLET, FILM COATED ORAL ONCE
Status: COMPLETED | OUTPATIENT
Start: 2024-07-04 | End: 2024-07-04

## 2024-07-03 RX ORDER — IPRATROPIUM BROMIDE AND ALBUTEROL SULFATE 2.5; .5 MG/3ML; MG/3ML
3 SOLUTION RESPIRATORY (INHALATION) ONCE AS NEEDED
Status: DISCONTINUED | OUTPATIENT
Start: 2024-07-03 | End: 2024-07-03

## 2024-07-03 RX ORDER — LIDOCAINE HYDROCHLORIDE 10 MG/ML
0.5 INJECTION, SOLUTION EPIDURAL; INFILTRATION; INTRACAUDAL; PERINEURAL ONCE AS NEEDED
Status: COMPLETED | OUTPATIENT
Start: 2024-07-03 | End: 2024-07-03

## 2024-07-03 RX ORDER — TRAZODONE HYDROCHLORIDE 50 MG/1
25 TABLET ORAL NIGHTLY
Status: DISCONTINUED | OUTPATIENT
Start: 2024-07-03 | End: 2024-07-05 | Stop reason: HOSPADM

## 2024-07-03 RX ORDER — SODIUM CHLORIDE 0.9 % (FLUSH) 0.9 %
3 SYRINGE (ML) INJECTION EVERY 12 HOURS SCHEDULED
Status: DISCONTINUED | OUTPATIENT
Start: 2024-07-03 | End: 2024-07-05 | Stop reason: HOSPADM

## 2024-07-03 RX ORDER — PROMETHAZINE HYDROCHLORIDE 25 MG/1
25 TABLET ORAL ONCE AS NEEDED
Status: DISCONTINUED | OUTPATIENT
Start: 2024-07-03 | End: 2024-07-03

## 2024-07-03 RX ORDER — BUPIVACAINE HYDROCHLORIDE 2.5 MG/ML
INJECTION, SOLUTION EPIDURAL; INFILTRATION; INTRACAUDAL
Status: DISCONTINUED | OUTPATIENT
Start: 2024-07-03 | End: 2024-07-03 | Stop reason: SURG

## 2024-07-03 RX ORDER — LISINOPRIL 40 MG/1
40 TABLET ORAL DAILY
Status: DISCONTINUED | OUTPATIENT
Start: 2024-07-03 | End: 2024-07-05 | Stop reason: HOSPADM

## 2024-07-03 RX ORDER — ASPIRIN 81 MG/1
81 TABLET ORAL 2 TIMES DAILY
Qty: 60 TABLET | Refills: 0 | Status: SHIPPED | OUTPATIENT
Start: 2024-07-04

## 2024-07-03 RX ORDER — FAMOTIDINE 20 MG/1
20 TABLET, FILM COATED ORAL ONCE
Status: COMPLETED | OUTPATIENT
Start: 2024-07-03 | End: 2024-07-03

## 2024-07-03 RX ORDER — ASPIRIN 81 MG/1
81 TABLET ORAL DAILY
Start: 2024-08-03

## 2024-07-03 RX ORDER — OXYCODONE HYDROCHLORIDE 5 MG/1
5 TABLET ORAL EVERY 4 HOURS PRN
Qty: 40 TABLET | Refills: 0 | Status: SHIPPED | OUTPATIENT
Start: 2024-07-03

## 2024-07-03 RX ORDER — AMLODIPINE BESYLATE 10 MG/1
10 TABLET ORAL DAILY
Status: DISCONTINUED | OUTPATIENT
Start: 2024-07-03 | End: 2024-07-05 | Stop reason: HOSPADM

## 2024-07-03 RX ORDER — SODIUM CHLORIDE 0.9 % (FLUSH) 0.9 %
3 SYRINGE (ML) INJECTION EVERY 12 HOURS SCHEDULED
Status: DISCONTINUED | OUTPATIENT
Start: 2024-07-03 | End: 2024-07-03

## 2024-07-03 RX ORDER — PROPOFOL 10 MG/ML
VIAL (ML) INTRAVENOUS CONTINUOUS PRN
Status: DISCONTINUED | OUTPATIENT
Start: 2024-07-03 | End: 2024-07-03 | Stop reason: SURG

## 2024-07-03 RX ORDER — ACETAMINOPHEN 500 MG
1000 TABLET ORAL EVERY 8 HOURS
Status: DISCONTINUED | OUTPATIENT
Start: 2024-07-03 | End: 2024-07-05 | Stop reason: HOSPADM

## 2024-07-03 RX ORDER — HYDRALAZINE HYDROCHLORIDE 20 MG/ML
5 INJECTION INTRAMUSCULAR; INTRAVENOUS
Status: DISCONTINUED | OUTPATIENT
Start: 2024-07-03 | End: 2024-07-03

## 2024-07-03 RX ORDER — LABETALOL HYDROCHLORIDE 5 MG/ML
INJECTION, SOLUTION INTRAVENOUS
Status: COMPLETED
Start: 2024-07-03 | End: 2024-07-03

## 2024-07-03 RX ORDER — NITROGLYCERIN 0.4 MG/1
0.4 TABLET SUBLINGUAL
Status: DISCONTINUED | OUTPATIENT
Start: 2024-07-03 | End: 2024-07-03

## 2024-07-03 RX ORDER — ONDANSETRON 4 MG/1
4 TABLET, ORALLY DISINTEGRATING ORAL EVERY 6 HOURS PRN
Status: DISCONTINUED | OUTPATIENT
Start: 2024-07-03 | End: 2024-07-05 | Stop reason: HOSPADM

## 2024-07-03 RX ORDER — SODIUM CHLORIDE 0.9 % (FLUSH) 0.9 %
10 SYRINGE (ML) INJECTION AS NEEDED
Status: DISCONTINUED | OUTPATIENT
Start: 2024-07-03 | End: 2024-07-05 | Stop reason: HOSPADM

## 2024-07-03 RX ORDER — IPRATROPIUM BROMIDE AND ALBUTEROL SULFATE 2.5; .5 MG/3ML; MG/3ML
3 SOLUTION RESPIRATORY (INHALATION) EVERY 6 HOURS PRN
Status: DISCONTINUED | OUTPATIENT
Start: 2024-07-03 | End: 2024-07-05 | Stop reason: HOSPADM

## 2024-07-03 RX ORDER — LIDOCAINE HYDROCHLORIDE 10 MG/ML
INJECTION, SOLUTION EPIDURAL; INFILTRATION; INTRACAUDAL; PERINEURAL AS NEEDED
Status: DISCONTINUED | OUTPATIENT
Start: 2024-07-03 | End: 2024-07-03 | Stop reason: SURG

## 2024-07-03 RX ORDER — METOPROLOL TARTRATE 50 MG/1
50 TABLET, FILM COATED ORAL EVERY 12 HOURS SCHEDULED
Status: DISCONTINUED | OUTPATIENT
Start: 2024-07-04 | End: 2024-07-03

## 2024-07-03 RX ORDER — SODIUM CHLORIDE 0.9 % (FLUSH) 0.9 %
3-10 SYRINGE (ML) INJECTION AS NEEDED
Status: DISCONTINUED | OUTPATIENT
Start: 2024-07-03 | End: 2024-07-03

## 2024-07-03 RX ORDER — ACETAMINOPHEN 500 MG
1000 TABLET ORAL ONCE
Status: COMPLETED | OUTPATIENT
Start: 2024-07-03 | End: 2024-07-03

## 2024-07-03 RX ORDER — ONDANSETRON 2 MG/ML
4 INJECTION INTRAMUSCULAR; INTRAVENOUS EVERY 6 HOURS PRN
Status: DISCONTINUED | OUTPATIENT
Start: 2024-07-03 | End: 2024-07-05 | Stop reason: HOSPADM

## 2024-07-03 RX ORDER — ROPIVACAINE HYDROCHLORIDE 2 MG/ML
INJECTION, SOLUTION EPIDURAL; INFILTRATION; PERINEURAL CONTINUOUS
Status: DISCONTINUED | OUTPATIENT
Start: 2024-07-03 | End: 2024-07-05 | Stop reason: HOSPADM

## 2024-07-03 RX ORDER — DROPERIDOL 2.5 MG/ML
0.62 INJECTION, SOLUTION INTRAMUSCULAR; INTRAVENOUS
Status: DISCONTINUED | OUTPATIENT
Start: 2024-07-03 | End: 2024-07-03

## 2024-07-03 RX ORDER — HYDROCODONE BITARTRATE AND ACETAMINOPHEN 5; 325 MG/1; MG/1
1 TABLET ORAL ONCE AS NEEDED
Status: DISCONTINUED | OUTPATIENT
Start: 2024-07-03 | End: 2024-07-03

## 2024-07-03 RX ORDER — MAGNESIUM HYDROXIDE 1200 MG/15ML
LIQUID ORAL AS NEEDED
Status: DISCONTINUED | OUTPATIENT
Start: 2024-07-03 | End: 2024-07-03 | Stop reason: HOSPADM

## 2024-07-03 RX ORDER — LABETALOL HYDROCHLORIDE 5 MG/ML
5 INJECTION, SOLUTION INTRAVENOUS
Status: DISCONTINUED | OUTPATIENT
Start: 2024-07-03 | End: 2024-07-03

## 2024-07-03 RX ORDER — NICOTINE POLACRILEX 4 MG
15 LOZENGE BUCCAL
Status: DISCONTINUED | OUTPATIENT
Start: 2024-07-03 | End: 2024-07-05 | Stop reason: HOSPADM

## 2024-07-03 RX ORDER — NITROGLYCERIN 0.4 MG/1
0.4 TABLET SUBLINGUAL
Status: DISCONTINUED | OUTPATIENT
Start: 2024-07-03 | End: 2024-07-05 | Stop reason: HOSPADM

## 2024-07-03 RX ORDER — ONDANSETRON 2 MG/ML
INJECTION INTRAMUSCULAR; INTRAVENOUS AS NEEDED
Status: DISCONTINUED | OUTPATIENT
Start: 2024-07-03 | End: 2024-07-03 | Stop reason: SURG

## 2024-07-03 RX ORDER — HYDROXYUREA 500 MG/1
500 CAPSULE ORAL DAILY
Status: DISCONTINUED | OUTPATIENT
Start: 2024-07-03 | End: 2024-07-05 | Stop reason: HOSPADM

## 2024-07-03 RX ORDER — INSULIN LISPRO 100 [IU]/ML
2-7 INJECTION, SOLUTION INTRAVENOUS; SUBCUTANEOUS
Status: DISCONTINUED | OUTPATIENT
Start: 2024-07-03 | End: 2024-07-05 | Stop reason: HOSPADM

## 2024-07-03 RX ORDER — SODIUM CHLORIDE, SODIUM LACTATE, POTASSIUM CHLORIDE, CALCIUM CHLORIDE 600; 310; 30; 20 MG/100ML; MG/100ML; MG/100ML; MG/100ML
9 INJECTION, SOLUTION INTRAVENOUS CONTINUOUS
Status: DISCONTINUED | OUTPATIENT
Start: 2024-07-03 | End: 2024-07-05 | Stop reason: HOSPADM

## 2024-07-03 RX ORDER — MIDAZOLAM HYDROCHLORIDE 1 MG/ML
0.5 INJECTION INTRAMUSCULAR; INTRAVENOUS
Status: DISCONTINUED | OUTPATIENT
Start: 2024-07-03 | End: 2024-07-03 | Stop reason: HOSPADM

## 2024-07-03 RX ORDER — FENTANYL CITRATE 50 UG/ML
50 INJECTION, SOLUTION INTRAMUSCULAR; INTRAVENOUS
Status: DISCONTINUED | OUTPATIENT
Start: 2024-07-03 | End: 2024-07-03

## 2024-07-03 RX ORDER — TRANEXAMIC ACID 10 MG/ML
1000 INJECTION, SOLUTION INTRAVENOUS ONCE
Status: DISCONTINUED | OUTPATIENT
Start: 2024-07-03 | End: 2024-07-03 | Stop reason: HOSPADM

## 2024-07-03 RX ORDER — SODIUM CHLORIDE 0.9 % (FLUSH) 0.9 %
10 SYRINGE (ML) INJECTION EVERY 12 HOURS SCHEDULED
Status: DISCONTINUED | OUTPATIENT
Start: 2024-07-03 | End: 2024-07-03

## 2024-07-03 RX ORDER — IBUPROFEN 600 MG/1
1 TABLET ORAL
Status: DISCONTINUED | OUTPATIENT
Start: 2024-07-03 | End: 2024-07-05 | Stop reason: HOSPADM

## 2024-07-03 RX ORDER — FLUOXETINE HYDROCHLORIDE 20 MG/1
60 CAPSULE ORAL DAILY
Status: DISCONTINUED | OUTPATIENT
Start: 2024-07-03 | End: 2024-07-05 | Stop reason: HOSPADM

## 2024-07-03 RX ORDER — DOCUSATE SODIUM 100 MG/1
100 CAPSULE, LIQUID FILLED ORAL 2 TIMES DAILY
Qty: 30 CAPSULE | Refills: 0 | Status: SHIPPED | OUTPATIENT
Start: 2024-07-03 | End: 2024-07-20

## 2024-07-03 RX ORDER — LIDOCAINE HYDROCHLORIDE 10 MG/ML
0.5 INJECTION, SOLUTION EPIDURAL; INFILTRATION; INTRACAUDAL; PERINEURAL ONCE AS NEEDED
Status: DISCONTINUED | OUTPATIENT
Start: 2024-07-03 | End: 2024-07-03

## 2024-07-03 RX ORDER — MELOXICAM 15 MG/1
15 TABLET ORAL ONCE
Status: COMPLETED | OUTPATIENT
Start: 2024-07-03 | End: 2024-07-03

## 2024-07-03 RX ORDER — SODIUM CHLORIDE 0.9 % (FLUSH) 0.9 %
10 SYRINGE (ML) INJECTION AS NEEDED
Status: DISCONTINUED | OUTPATIENT
Start: 2024-07-03 | End: 2024-07-03

## 2024-07-03 RX ORDER — DEXTROSE MONOHYDRATE 25 G/50ML
25 INJECTION, SOLUTION INTRAVENOUS
Status: DISCONTINUED | OUTPATIENT
Start: 2024-07-03 | End: 2024-07-05 | Stop reason: HOSPADM

## 2024-07-03 RX ORDER — SODIUM CHLORIDE 9 MG/ML
40 INJECTION, SOLUTION INTRAVENOUS AS NEEDED
Status: DISCONTINUED | OUTPATIENT
Start: 2024-07-03 | End: 2024-07-03 | Stop reason: HOSPADM

## 2024-07-03 RX ORDER — METOPROLOL TARTRATE 50 MG/1
50 TABLET, FILM COATED ORAL ONCE
Status: DISCONTINUED | OUTPATIENT
Start: 2024-07-03 | End: 2024-07-03

## 2024-07-03 RX ORDER — OXYCODONE HYDROCHLORIDE 10 MG/1
10 TABLET ORAL EVERY 4 HOURS PRN
Status: DISCONTINUED | OUTPATIENT
Start: 2024-07-03 | End: 2024-07-05 | Stop reason: HOSPADM

## 2024-07-03 RX ORDER — MELOXICAM 15 MG/1
15 TABLET ORAL DAILY
Status: DISCONTINUED | OUTPATIENT
Start: 2024-07-03 | End: 2024-07-03

## 2024-07-03 RX ORDER — LIDOCAINE HYDROCHLORIDE 10 MG/ML
0.5 INJECTION, SOLUTION EPIDURAL; INFILTRATION; INTRACAUDAL; PERINEURAL ONCE AS NEEDED
Status: DISCONTINUED | OUTPATIENT
Start: 2024-07-03 | End: 2024-07-05 | Stop reason: HOSPADM

## 2024-07-03 RX ORDER — ONDANSETRON 2 MG/ML
4 INJECTION INTRAMUSCULAR; INTRAVENOUS ONCE AS NEEDED
Status: DISCONTINUED | OUTPATIENT
Start: 2024-07-03 | End: 2024-07-03

## 2024-07-03 RX ORDER — ASPIRIN 81 MG/1
81 TABLET ORAL EVERY 12 HOURS SCHEDULED
Status: DISCONTINUED | OUTPATIENT
Start: 2024-07-04 | End: 2024-07-05 | Stop reason: HOSPADM

## 2024-07-03 RX ORDER — METOPROLOL TARTRATE 50 MG/1
50 TABLET, FILM COATED ORAL ONCE
Status: COMPLETED | OUTPATIENT
Start: 2024-07-03 | End: 2024-07-03

## 2024-07-03 RX ORDER — NALOXONE HCL 0.4 MG/ML
0.4 VIAL (ML) INJECTION AS NEEDED
Status: DISCONTINUED | OUTPATIENT
Start: 2024-07-03 | End: 2024-07-03

## 2024-07-03 RX ORDER — ROPIVACAINE HYDROCHLORIDE 2 MG/ML
1 INJECTION, SOLUTION EPIDURAL; INFILTRATION; PERINEURAL CONTINUOUS
Start: 2024-07-03

## 2024-07-03 RX ORDER — OXYCODONE HYDROCHLORIDE 5 MG/1
5 TABLET ORAL EVERY 4 HOURS PRN
Status: DISCONTINUED | OUTPATIENT
Start: 2024-07-03 | End: 2024-07-05 | Stop reason: HOSPADM

## 2024-07-03 RX ORDER — SODIUM CHLORIDE 0.9 % (FLUSH) 0.9 %
3-10 SYRINGE (ML) INJECTION AS NEEDED
Status: DISCONTINUED | OUTPATIENT
Start: 2024-07-03 | End: 2024-07-05 | Stop reason: HOSPADM

## 2024-07-03 RX ORDER — ACETAMINOPHEN 500 MG
1000 TABLET ORAL EVERY 8 HOURS
Qty: 60 TABLET | Refills: 0 | Status: SHIPPED | OUTPATIENT
Start: 2024-07-03 | End: 2024-07-15

## 2024-07-03 RX ORDER — HYDRALAZINE HYDROCHLORIDE 20 MG/ML
INJECTION INTRAMUSCULAR; INTRAVENOUS
Status: COMPLETED
Start: 2024-07-03 | End: 2024-07-03

## 2024-07-03 RX ADMIN — LABETALOL HYDROCHLORIDE 10 MG: 5 INJECTION, SOLUTION INTRAVENOUS at 14:04

## 2024-07-03 RX ADMIN — MELOXICAM 15 MG: 15 TABLET ORAL at 18:21

## 2024-07-03 RX ADMIN — OXYCODONE HYDROCHLORIDE 5 MG: 5 TABLET ORAL at 20:51

## 2024-07-03 RX ADMIN — DEXAMETHASONE SODIUM PHOSPHATE 4 MG: 4 INJECTION INTRA-ARTICULAR; INTRALESIONAL; INTRAMUSCULAR; INTRAVENOUS; SOFT TISSUE at 10:25

## 2024-07-03 RX ADMIN — MELOXICAM 15 MG: 15 TABLET ORAL at 08:39

## 2024-07-03 RX ADMIN — METOPROLOL TARTRATE 50 MG: 50 TABLET, FILM COATED ORAL at 20:45

## 2024-07-03 RX ADMIN — ACETAMINOPHEN 1000 MG: 500 TABLET ORAL at 18:21

## 2024-07-03 RX ADMIN — PROPOFOL 30 MG: 10 INJECTION, EMULSION INTRAVENOUS at 10:25

## 2024-07-03 RX ADMIN — AMLODIPINE BESYLATE 10 MG: 10 TABLET ORAL at 18:21

## 2024-07-03 RX ADMIN — INSULIN LISPRO 2 UNITS: 100 INJECTION, SOLUTION INTRAVENOUS; SUBCUTANEOUS at 21:32

## 2024-07-03 RX ADMIN — FAMOTIDINE 20 MG: 20 TABLET, FILM COATED ORAL at 08:39

## 2024-07-03 RX ADMIN — PHENYLEPHRINE HYDROCHLORIDE 10 MCG/MIN: 10 INJECTION INTRAVENOUS at 10:38

## 2024-07-03 RX ADMIN — TRANEXAMIC ACID 1000 MG: 10 INJECTION, SOLUTION INTRAVENOUS at 10:25

## 2024-07-03 RX ADMIN — FLUOXETINE HYDROCHLORIDE 60 MG: 20 CAPSULE ORAL at 20:45

## 2024-07-03 RX ADMIN — DEXMEDETOMIDINE HYDROCHLORIDE IN 0.9% SODIUM CHLORIDE 12 MCG: 4 INJECTION INTRAVENOUS at 10:25

## 2024-07-03 RX ADMIN — BUPIVACAINE HYDROCHLORIDE 20 ML: 2.5 INJECTION, SOLUTION EPIDURAL; INFILTRATION; INTRACAUDAL; PERINEURAL at 12:25

## 2024-07-03 RX ADMIN — LIDOCAINE HYDROCHLORIDE 50 MG: 10 INJECTION, SOLUTION EPIDURAL; INFILTRATION; INTRACAUDAL; PERINEURAL at 10:18

## 2024-07-03 RX ADMIN — SODIUM CHLORIDE 2 G: 900 INJECTION INTRAVENOUS at 10:20

## 2024-07-03 RX ADMIN — SODIUM CHLORIDE, POTASSIUM CHLORIDE, SODIUM LACTATE AND CALCIUM CHLORIDE 9 ML/HR: 600; 310; 30; 20 INJECTION, SOLUTION INTRAVENOUS at 08:30

## 2024-07-03 RX ADMIN — Medication 1000 MG: at 12:36

## 2024-07-03 RX ADMIN — SODIUM CHLORIDE 100 ML/HR: 9 INJECTION, SOLUTION INTRAVENOUS at 18:24

## 2024-07-03 RX ADMIN — HYDRALAZINE HYDROCHLORIDE 2.5 MG: 20 INJECTION INTRAMUSCULAR; INTRAVENOUS at 13:35

## 2024-07-03 RX ADMIN — Medication 3 ML: at 20:45

## 2024-07-03 RX ADMIN — LIDOCAINE HYDROCHLORIDE 0.5 ML: 10 INJECTION, SOLUTION EPIDURAL; INFILTRATION; INTRACAUDAL; PERINEURAL at 08:30

## 2024-07-03 RX ADMIN — ACETAMINOPHEN 1000 MG: 500 TABLET ORAL at 08:38

## 2024-07-03 RX ADMIN — PREGABALIN 75 MG: 75 CAPSULE ORAL at 08:39

## 2024-07-03 RX ADMIN — NITROGLYCERIN 0.5 INCH: 20 OINTMENT TOPICAL at 14:31

## 2024-07-03 RX ADMIN — HYDRALAZINE HYDROCHLORIDE 2.5 MG: 20 INJECTION INTRAMUSCULAR; INTRAVENOUS at 13:23

## 2024-07-03 RX ADMIN — ONDANSETRON 4 MG: 2 INJECTION INTRAMUSCULAR; INTRAVENOUS at 10:25

## 2024-07-03 RX ADMIN — Medication 3 ML: at 18:21

## 2024-07-03 RX ADMIN — Medication 10 ML: at 20:46

## 2024-07-03 RX ADMIN — PROPOFOL 100 MCG/KG/MIN: 10 INJECTION, EMULSION INTRAVENOUS at 10:18

## 2024-07-03 RX ADMIN — DEXMEDETOMIDINE HYDROCHLORIDE IN 0.9% SODIUM CHLORIDE 8 MCG: 4 INJECTION INTRAVENOUS at 11:32

## 2024-07-03 RX ADMIN — MEPIVACAINE HYDROCHLORIDE 4 ML: 15 INJECTION, SOLUTION EPIDURAL; INFILTRATION at 10:18

## 2024-07-03 RX ADMIN — LISINOPRIL 40 MG: 40 TABLET ORAL at 14:21

## 2024-07-03 RX ADMIN — TRAZODONE HYDROCHLORIDE 25 MG: 50 TABLET ORAL at 20:45

## 2024-07-03 RX ADMIN — SODIUM CHLORIDE 2000 MG: 900 INJECTION INTRAVENOUS at 18:21

## 2024-07-03 RX ADMIN — TRANEXAMIC ACID 1000 MG: 10 INJECTION, SOLUTION INTRAVENOUS at 11:26

## 2024-07-03 NOTE — THERAPY EVALUATION
Patient Name: Martha Murray  : 1955    MRN: [id number]                              Today's Date: 7/3/2024       Admit Date: 7/3/2024    Visit Dx:     ICD-10-CM ICD-9-CM   1. S/P total knee arthroplasty, right  Z96.651 V43.65   2. Primary osteoarthritis of both knees  M17.0 715.16   3. Atypical angina  I20.89 413.9     Patient Active Problem List   Diagnosis    Essential hypertension    Mild intermittent asthma without complication    Type 2 diabetes mellitus without complication, without long-term current use of insulin    Hyperlipidemia LDL goal <70    Depression    Essential thrombocytosis    Onychomycosis with ingrown toenail    CKD (chronic kidney disease) stage 3, GFR 30-59 ml/min    Obesity (BMI 30.0-34.9)    Coronary artery disease involving native coronary artery of native heart with angina pectoris    S/P total knee arthroplasty, right    Arthritis of knee     Past Medical History:   Diagnosis Date    HAMMAD (acute kidney injury) 2021    Allergic     Anemia     Anesthesia complication     Slow to wake    Arthritis     COPD (chronic obstructive pulmonary disease)     Depression     Diabetes mellitus     History of heart attack     Hyperlipidemia     Hypertension      Past Surgical History:   Procedure Laterality Date    BILATERAL BREAST REDUCTION      BONE MARROW ASPIRATION      CATARACT EXTRACTION Bilateral     COLONOSCOPY      HYSTERECTOMY  1990    x 2    REDUCTION MAMMAPLASTY Bilateral     MORE THAN 20 YEARS AGO    SINUS SURGERY      TONSILLECTOMY        General Information       Row Name 24 1814          Physical Therapy Time and Intention    Document Type evaluation  -     Mode of Treatment physical therapy  -       Row Name 24 1814          General Information    Patient Profile Reviewed yes  -HW     Prior Level of Function min assist:;all household mobility;community mobility;gait;transfer;bed mobility;ADL's  -HW     Existing Precautions/Restrictions fall;other (see  comments)  adductor canal nerve cath  -     Barriers to Rehab medically complex  -       Row Name 07/03/24 1814          Living Environment    People in Home alone;other (see comments)  granddtr and son planning to assist at d/c  -       Row Name 07/03/24 1814          Home Main Entrance    Number of Stairs, Main Entrance one;three;other (see comments)  1+3  -       Row Name 07/03/24 1814          Stairs Within Home, Primary    Number of Stairs, Within Home, Primary none  -       Row Name 07/03/24 1814          Cognition    Orientation Status (Cognition) oriented x 3  -HW       Row Name 07/03/24 1814          Safety Issues, Functional Mobility    Safety Issues Affecting Function (Mobility) awareness of need for assistance;insight into deficits/self-awareness  -     Impairments Affecting Function (Mobility) balance;endurance/activity tolerance;pain;range of motion (ROM);strength  -               User Key  (r) = Recorded By, (t) = Taken By, (c) = Cosigned By      Initials Name Provider Type     Carmen Pugh PT Physical Therapist                   Mobility       Row Name 07/03/24 1709          Bed Mobility    Bed Mobility supine-sit  -     Supine-Sit [place] (Bed Mobility) standby assist  -     Comment, (Bed Mobility) VC for line management  -       Row Name 07/03/24 1709          Transfers    Comment, (Transfers) VC for hand placement; pt continued to pull up on FWW instead  -       Row Name 07/03/24 1709          Sit-Stand Transfer    Sit-Stand [place] (Transfers) contact guard;nonverbal cues (demo/gesture);verbal cues;2 person assist  -     Assistive Device (Sit-Stand Transfers) walker, front-wheeled  -       Row Name 07/03/24 1709          Gait/Stairs (Locomotion)    [place] Level (Gait) contact guard;nonverbal cues (demo/gesture);verbal cues;2 person assist  -     Assistive Device (Gait) walker, front-wheeled  -     Patient was able to Ambulate yes  -     Distance  in Feet (Gait) 40  -     Deviations/Abnormal Patterns (Gait) bilateral deviations;gait speed decreased;stride length decreased;weight shifting decreased  -     Bilateral Gait Deviations forward flexed posture;heel strike decreased  -     Comment, (Gait/Stairs) Pt amb to/from bathroom with step-through gait pattern. Pt demonstrated slow van and stopped walker with each transition of weight shifting onto RLE. VC for keeping walker advancing forward at constant pace. Improved weight shifting otno RLE noted. No knee buckling or LOB noted. Activity limited by fatigue. Pt denied chest pain throughout mobility.  -       Row Name 07/03/24 1701          Mobility    Extremity Weight-bearing Status right lower extremity  -     Right Lower Extremity (Weight-bearing Status) weight-bearing as tolerated (WBAT)  -               User Key  (r) = Recorded By, (t) = Taken By, (c) = Cosigned By      Initials Name Provider Type     Carmen Pugh PT Physical Therapist                   Obj/Interventions       [facility] Name 07/03/24 1826          Range of Motion Comprehensive    General Range of Motion lower extremity range of motion deficits identified  -     Comment, General Range of Motion Surgical knee ROM: 8-76  -[facility] Name 07/03/24 1826          Strength Comprehensive (MMT)    General Manual Muscle Testing (MMT) Assessment lower extremity strength deficits identified  -     Comment, General Manual Muscle Testing (MMT) Assessment Pt able to perform B active ankle DF and SLR  -[facility] Name 07/03/24 1826          Motor Skills    Therapeutic Exercise knee;ankle  -[facility] Name 07/03/24 1826          Knee (Therapeutic Exercise)    Knee (Therapeutic Exercise) isometric exercises;strengthening exercise  -     Knee Isometrics (Therapeutic Exercise) quad sets;right;10 repetitions  -     Knee Strengthening (Therapeutic Exercise) SLR (straight leg raise);SAQ (short arc quad);LAQ (long arc quad);heel  slides;right;10 repetitions  -       Row Name 07/03/24 1826          Ankle (Therapeutic Exercise)    Ankle (Therapeutic Exercise) AROM (active range of motion)  -     Ankle AROM (Therapeutic Exercise) bilateral;dorsiflexion;plantarflexion;10 repetitions  -       Row Name 07/03/24 1826          Balance    Balance Assessment sitting static balance;sitting dynamic balance;sit to stand dynamic balance;standing static balance;standing dynamic balance  -     Static Sitting Balance standby assist  -     Dynamic Sitting Balance standby assist  -     Position, Sitting Balance sitting edge of bed  -     Static Standing Balance contact guard  -     Dynamic Standing Balance contact guard  -     Position/Device Used, Standing Balance supported;walker, rolling  -     Balance Interventions sitting;standing;sit to stand;occupation based/functional task  -       Row Name 07/03/24 1826          Sensory Assessment (Somatosensory)    Sensory Assessment (Somatosensory) LE sensation intact  -               User Key  (r) = Recorded By, (t) = Taken By, (c) = Cosigned By      Initials Name Provider Type     Carmen Pugh, PT Physical Therapist                   Goals/Plan       Row Name 07/03/24 1830          Bed Mobility Goal 1 (PT)    Activity/Assistive Device (Bed Mobility Goal 1, PT) sit to supine/supine to sit  -     [place] Level/Cues Needed (Bed Mobility Goal 1, PT) modified independence  -     Time Frame (Bed Mobility Goal 1, PT) short term goal (STG);2 days  -[facility] Name 07/03/24 1830          Transfer Goal 1 (PT)    Activity/Assistive Device (Transfer Goal 1, PT) sit-to-stand/stand-to-sit  -     [place] Level/Cues Needed (Transfer Goal 1, PT) modified independence  -     Time Frame (Transfer Goal 1, PT) long term goal (LTG);3 days  -[facility] Name 07/03/24 1830          Gait Training Goal 1 (PT)    Activity/Assistive Device (Gait Training Goal 1, PT) gait (walking locomotion)   -HW     [place] Level (Gait Training Goal 1, PT) modified independence  -HW     Distance (Gait Training Goal 1, PT) 500  -HW     Time Frame (Gait Training Goal 1, PT) long term goal (LTG);3 days  -       Row Name 07/03/24 1830          ROM Goal 1 (PT)    ROM Goal 1 (PT) Surgical Knee ROM: 0-90  -HW     Time Frame (ROM Goal 1, PT) long-term goal (LTG);3 days  -       Row Name 07/03/24 1830          Stairs Goal 1 (PT)    Activity/Assistive Device (Stairs Goal 1, PT) ascending stairs;descending stairs  -     [place] Level/Cues Needed (Stairs Goal 1, PT) modified independence  -HW     Number of Stairs (Stairs Goal 1, PT) 4  -HW     Time Frame (Stairs Goal 1, PT) long term goal (LTG);3 days  -       Row Name 07/03/24 1830          Therapy Assessment/Plan (PT)    Planned Therapy Interventions (PT) balance training;bed mobility training;gait training;home exercise program;ROM (range of motion);patient/family education;stair training;strengthening;stretching;transfer training  -               User Key  (r) = Recorded By, (t) = Taken By, (c) = Cosigned By      Initials Name Provider Type     Carmen Pugh, PAPI Physical Therapist                   Clinical Impression       Row Name 07/03/24 1827          Pain    Pretreatment Pain Rating 0/10 - no pain  -     Posttreatment Pain Rating 0/10 - no pain  -     Pain Intervention(s) Ambulation/increased activity;Repositioned;Elevated;Cold applied  -       Row Name 07/03/24 1827          Plan of Care Review    Plan of Care Reviewed With patient  -     Progress no change  -     Outcome Evaluation Pt amb 40' with FWW and CGAx2. No knee buckling or LOB noted. Decreased weight shifting onto RLE initially that improved with VC. Activity limited by fatigue. Pt denied chest pain throughout mobility. HEP and precautions reviewed with pt. Frequent ambulation tonight with nsg encouraged. Recommend d/c home with assist and OPPT when medically appropriate.  -        Row Name 07/03/24 1827          Therapy Assessment/Plan (PT)    Rehab Potential (PT) good, to achieve stated therapy goals  -     Criteria for Skilled Interventions Met (PT) yes;meets criteria;skilled treatment is necessary  -     Therapy Frequency (PT) 2 times/day  -     Predicted Duration of Therapy Intervention (PT) two days  -[facility] Name 07/03/24 1827          Vital Signs    Pre Systolic BP Rehab 139  -HW     Pre Treatment Diastolic BP 74  -HW     Post Systolic BP Rehab 142  -HW     Post Treatment Diastolic BP 69  -HW     Pretreatment Heart Rate (beats/min) 70  -HW     Intratreatment Heart Rate (beats/min) 81  -HW     Posttreatment Heart Rate (beats/min) 65  -HW     Post SpO2 (%) 98  -HW     O2 Delivery Post Treatment room air  -     Pre Patient Position Supine  -     Intra Patient Position Standing  -     Post Patient Position Sitting  -[facility] Name 07/03/24 1827          Positioning and Restraints    Pre-Treatment Position in bed  -     Post Treatment Position chair  -HW     In Chair notified nsg;reclined;sitting;call light within reach;encouraged to call for assist;exit alarm on;legs elevated;compression device  ice applied; pillow under R calf  -               User Key  (r) = Recorded By, (t) = Taken By, (c) = Cosigned By      Initials Name Provider Type    HW Carmen Pugh, PAPI Physical Therapist                   Outcome Measures       Row Name 07/03/24 1830 07/03/24 1626       How much help from another person do you currently need...    Turning from your back to your side while in flat bed without using bedrails? 3  - 3  -AC    Moving from lying on back to sitting on the side of a flat bed without bedrails? 3  - 3  -AC    Moving to and from a bed to a chair (including a wheelchair)? 3  -HW 3  -AC    Standing up from a chair using your arms (e.g., wheelchair, bedside chair)? 3  -HW 2  -AC    Climbing 3-5 steps with a railing? 3  -HW 2  -AC    To walk in hospital room? 3   - 2  -AC    AM-PAC 6 Clicks Score (PT) 18  - 15  -    Highest Level of Mobility Goal 6 --> Walk 10 steps or more  - 4 --> Transfer to chair/commode  -      Row Name 07/03/24 1830          PADD    Diagnosis 1  -     Gender 1  -     Age Group 2  -     Gait Distance 0  -     Assist Level 1  -     Home Support 3  -     PADD Score 7  -     Patient Preference home with home health  -     Prediction by PADD Score extended rehabilitation  -       Row Name 07/03/24 1830          Functional Assessment    Outcome Measure Options AM-PAC 6 Clicks Basic Mobility (PT);PADD  -               User Key  (r) = Recorded By, (t) = Taken By, (c) = Cosigned By      Initials Name Provider Type     Norma Munoz RN Registered Nurse     Carmen Pugh, PT Physical Therapist                                 Physical Therapy Education       Title: PT OT SLP Therapies (Done)       Topic: Physical Therapy (Done)       Point: Mobility training (Done)       Learning Progress Summary             Patient Acceptance, E,D, VU,NR by  at 7/3/2024 1830                         Point: Home exercise program (Done)       Learning Progress Summary             Patient Acceptance, E,D, VU,NR by  at 7/3/2024 1830                         Point: Body mechanics (Done)       Learning Progress Summary             Patient Acceptance, E,D, VU,NR by  at 7/3/2024 1830                         Point: Precautions (Done)       Learning Progress Summary             Patient Acceptance, E,D, VU,NR by  at 7/3/2024 1830                                         User Key       Initials Effective Dates Name Provider Type Discipline     12/15/23 -  Carmen Pugh, PAPI Physical Therapist PT                  PT Recommendation and Plan  Planned Therapy Interventions (PT): balance training, bed mobility training, gait training, home exercise program, ROM (range of motion), patient/family education, stair training, strengthening, stretching, transfer  training  Plan of Care Reviewed With: patient  Progress: no change  Outcome Evaluation: Pt amb 40' with FWW and CGAx2. No knee buckling or LOB noted. Decreased weight shifting onto RLE initially that improved with VC. Activity limited by fatigue. Pt denied chest pain throughout mobility. HEP and precautions reviewed with pt. Frequent ambulation tonight with nsg encouraged. Recommend d/c home with assist and OPPT when medically appropriate.     Time Calculation:   PT Evaluation Complexity  History, PT Evaluation Complexity: 1-2 personal factors and/or comorbidities  Examination of Body Systems (PT Eval Complexity): total of 3 or more elements  Clinical Presentation (PT Evaluation Complexity): evolving  Clinical Decision Making (PT Evaluation Complexity): moderate complexity  Overall Complexity (PT Evaluation Complexity): moderate complexity     PT Charges       Row Name 07/03/24 1831             Time Calculation    Start Time 1746  -HW      PT Received On 07/03/24  -HW      PT Goal Re-Cert Due Date 07/13/24  -         Timed Charges    51051 - PT Therapeutic Exercise Minutes 8  -HW         Untimed Charges    PT Eval/Re-eval Minutes 46  -HW         Total Minutes    Timed Charges Total Minutes 8  -HW      Untimed Charges Total Minutes 46  -HW       Total Minutes 54  -HW                User Key  (r) = Recorded By, (t) = Taken By, (c) = Cosigned By      Initials Name Provider Type     Carmen Pugh, PAPI Physical Therapist                  Therapy Charges for Today       Code Description Service Date Service Provider Modifiers Qty    62241060349 HC PT THER PROC EA 15 MIN 7/3/2024 Carmen Pugh, PT GP 1    35364854270 HC PT EVAL MOD COMPLEXITY 4 7/3/2024 Carmen Pugh, PT GP 1    14566380853 HC PT THER SUPP EA 15 MIN 7/3/2024 Carmen Pugh, PT GP 3            PT G-Codes  Outcome Measure Options: AM-PAC 6 Clicks Basic Mobility (PT), PADD  AM-PAC 6 Clicks Score (PT): 18  PT Discharge Summary  Anticipated Discharge Disposition  (PT): home with assist, home with home health    Carmen Pugh, PT  7/3/2024

## 2024-07-03 NOTE — ANESTHESIA PROCEDURE NOTES
Adductor canal cath      Patient reassessed immediately prior to procedure    Patient location during procedure: post-op  Start time: 7/3/2024 12:14 PM  Stop time: 7/3/2024 12:25 PM  Reason for block: at surgeon's request and post-op pain management  Performed by  CRNA/CAA: Kaz Humphrey CRNASRNA: Deb Pace SRNA  Preanesthetic Checklist  Completed: patient identified, IV checked, site marked, risks and benefits discussed, surgical consent, monitors and equipment checked, pre-op evaluation and timeout performed  Prep:  Pt Position: supine  Sterile barriers:cap, gloves, mask, sterile barriers and washed/disinfected hands  Prep: ChloraPrep  Patient monitoring: blood pressure monitoring, continuous pulse oximetry and EKG  Procedure  Performed under: spinal  Guidance:ultrasound guided    ULTRASOUND INTERPRETATION.  Using ultrasound guidance a 20 G gauge needle was placed in close proximity to the nerve, at which point, under ultrasound guidance anesthetic was injected in the area of the nerve and spread of the anesthesia was seen on ultrasound in close proximity thereto.  There were no abnormalities seen on ultrasound; a digital image was taken; and the patient tolerated the procedure with no complications. Images:still images obtained, printed/placed on chart    Laterality:right  Block Type:adductor canal block  Injection Technique:catheter  Needle Type:Tuohy and echogenic  Needle Gauge:18 G  Resistance on Injection: none  Catheter Size:20 G (20g)  Cath Depth at skin: 8 cm    Medications Used: bupivacaine PF (MARCAINE) 0.25 % injection - Injection   20 mL - 7/3/2024 12:25:00 PM      Post Assessment  Injection Assessment: negative aspiration for heme, incremental injection and no paresthesia on injection  Patient Tolerance:comfortable throughout block  Complications:no  Additional Notes  CATHETER   A high-frequency linear transducer, with sterile cover, was placed on the anterior mid-thigh (between the  "anterior superior iliac spine and patella). The transducer was then moved medially to identify the Sartorius muscle (Micheal), Vastus Medialis muscle (VMM), Superficial Femoral Artery (SFA) and Vein. The transducer was then moved cephalad or caudad to position the SFA in the middle of the Micheal. The insertion site was prepped and draped in sterile fashion. Skin and cutaneous tissue was infiltrated with 2-5 ml of 1% Lidocaine. Using ultrasound-guidance, an 18-gauge Contiplex Ultra 360 Touhy needle was advanced in plane from lateral to medial. Preservative-free normal saline was utilized for hydro-dissection of tissue, advancement of Touhy, and to confirm needle placement below the fascial plane of the Micheal where the Nerve to the VMM is located. Local anesthetic (LA) 5 ml deposited here. The Touhy needle continues its path lateral to the SFA at the level of the Saphenous Nerve. The remainder of the LA was deposited at the 10-11 o'clock position of the SFA. This injection created a space between the Micheal and the SFA. Aspiration every 5 ml to prevent intravascular injection. Injection was completed with negative aspiration of blood and negative intravascular injection. Injection pressures were normal with minimal resistance. A 20-gauge Contiplex Echo catheter was placed through the needle and advance out the tip of the Touhy 3-5 cm anterior to the SFA. The Touhy needle was then removed, and final catheter position verified at the 12 o'clock position to the SFA. The catheter was secured in the usual fashion with skin glue, benzoin, steri-strips, CHG tegaderm and label noting \"Nerve Block Catheter\". Jerk tape applied at yellow connector and catheter connection.   Performed by: Kaz Humphrey, CRNA            "

## 2024-07-03 NOTE — SIGNIFICANT NOTE
1230  Pt experiencing chest pain/pressure.  Ordered a STAT ECG and called Anesthesiology.  Dr Zarco to bedside.  Cardiology called to consult.  NAYELI Faustin with Dr Saha's office to bedside.      See orders.

## 2024-07-03 NOTE — PLAN OF CARE
Goal Outcome Evaluation:  Plan of Care Reviewed With: patient        Progress: no change  Outcome Evaluation: Pt amb 40' with FWW and CGAx2. No knee buckling or LOB noted. Decreased weight shifting onto RLE initially that improved with VC. Activity limited by fatigue. Pt denied chest pain throughout mobility. HEP and precautions reviewed with pt. Frequent ambulation tonight with nsg encouraged. Recommend d/c home with assist and OPPT when medically appropriate.      Anticipated Discharge Disposition (PT): home with assist, home with home health

## 2024-07-03 NOTE — CONSULTS
Consults    [facility] at [facility]  Cardiovascular Consultation Note    Reason for consult: Chest pain     Patient is a 69-year-old female with a history of atypical chest pain, type 2 diabetes mellitus, hypertension hyperlipidemia and tobacco abuse who underwent right knee replacement with Dr. Aldrich earlier today.  While in the recovery area the patient complained of chest pressure for which we were asked to evaluate.  The patient has been hypertensive with a systolic in the 190s and diastolic in the 80s.  She has been treated with IV hydralazine.  She is currently sitting in the bed and reports chest pressure with radiation into her back between her shoulder blades.  She has not been seen by cardiology since 2021.  At that time she was having atypical chest pain and stress testing was performed which showed no ischemia.  The patient reports prior to her surgery she was having no exertional chest pain or shortness of breath and had been doing well with the exception of her arthritic knees.  The patient did have a CT angiogram of the chest last year that showed mild coronary artery calcification.  EKG obtained shows sinus bradycardia with no acute ischemic changes.  First troponin is negative.     Cardiac risk factors: Type 2 diabetes mellitus, hypertension, hyperlipidemia, advanced age    Past medical and surgical history, social and family history reviewed in EMR.    REVIEW OF SYSTEMS:   H&P ROS reviewed and pertinent CV ROS as noted in HPI.         Vital Sign Min/Max for last 24 hours  Temp  Min: 97.1 °F (36.2 °C)  Max: 97.2 °F (36.2 °C)   BP  Min: 133/65  Max: 182/92   Pulse  Min: 60  Max: 74   Resp  Min: 14  Max: 20   SpO2  Min: 97 %  Max: 100 %   No data recorded      Intake/Output Summary (Last 24 hours) at 7/3/2024 1350  Last data filed at 7/3/2024 1155  Gross per 24 hour   Intake 800 ml   Output 100 ml   Net 700 ml           Constitutional:       Appearance: Healthy appearance.  Well-developed.   Eyes:      General: Lids are normal. No scleral icterus.     Conjunctiva/sclera: Conjunctivae normal.   HENT:      Head: Normocephalic and atraumatic.   Neck:      Thyroid: No thyromegaly.      Vascular: No carotid bruit or JVD.   Pulmonary:      Effort: Pulmonary effort is normal.      Breath sounds: Normal breath sounds. No wheezing. No rhonchi. No rales.   Cardiovascular:      Normal rate. Regular rhythm.      Murmurs: There is no murmur.      No gallop.  No rub.   Pulses:     Intact distal pulses.   Edema:     Peripheral edema absent.   Abdominal:      General: There is no distension.      Palpations: Abdomen is soft. There is no abdominal mass.   Musculoskeletal:      Cervical back: Normal range of motion. Skin:     General: Skin is warm and dry.      Findings: No rash.   Neurological:      General: No focal deficit present.      Mental Status: Alert and oriented to person, place, and time.      Gait: Gait is intact.   Psychiatric:         Attention and Perception: Attention normal.         Mood and Affect: Mood normal.         Behavior: Behavior normal.          EK/3/2024    Lab Review:   Labs reviewed in the electronic medical record.  Pertinent findings include:  Lab Results   Component Value Date    GLUCOSE 97 2024    BUN 17 2024    CREATININE 1.32 (H) 2024    EGFR 44.1 (L) 2024    BCR 12.9 2024    K 5.0 2024    CO2 24.8 2024    CALCIUM 9.2 2024    PROTENTOTREF 6.0 2021    ALBUMIN 4.0 2024    BILITOT 0.8 2024    AST 33 (H) 2024    ALT 20 2024     Lab Results   Component Value Date    WBC 9.39 2024    HGB 15.7 2024    HCT 46.9 (H) 2024    .7 (H) 2024     (H) 2024     Lab Results   Component Value Date    CHOL 189 2023    CHLPL 114 2021    TRIG 224 (H) 2023    HDL 46 2023     (H) 2023                Active Hospital Problems     Diagnosis     **Primary osteoarthritis of both knees     Coronary artery disease involving native coronary artery of native heart with angina pectoris      CT angiogram of the chest (7/2023): Mild coronary calcification      Atypical angina      Intermittent short-lived chest pain symptoms  EKG (5/14/2021): Sinus rhythm with LVH  Echocardiogram (07/02/2021): LVEF 60-65%. Mild LVH. Mild AR.  Pharmacologic nuclear stress (7/13/2021): Normal perfusion.  LVEF >70%      CKD (chronic kidney disease) stage 3, GFR 30-59 ml/min     Type 2 diabetes mellitus without complication, without long-term current use of insulin     Essential hypertension      Target blood pressure <130/80 mmHg  Renal duplex (7/15/2021): No evidence of renal artery stenosis.      Hyperlipidemia LDL goal <70      High intensity statin therapy indicated given the presence of PAD               Continue to trend troponins.  Will need repeat troponin in 2 hours   Give home doses of antihypertensives with lisinopril 40 mg and amlodipine 10 mg now  Place half-inch Nitropaste to chest wall  If second troponin is negative will obtain CT coronary angiogram tomorrow.           NAYELI Rios

## 2024-07-03 NOTE — H&P
Pre-Op H&P  Martha Murray  [id number]  1955      Chief complaint: Right Knee Pain      Subjective:  Patient is a 69 y.o.female presents for scheduled surgery by Dr. Aldrihc. She anticipates a TOTAL KNEE ARTHROPLASTY WITH NAINA ROBOT RIGHT - Right today.  The patient has had right knee pain for over the past 5 years.  Because of her pain, she has had a reduction in her overall mobility.  Rest and ibuprofen both helped to alleviate her pain.  Up to this point, conservative treatment methods have failed.  She does use a walker to ambulate.    Review of Systems:  Constitutional-- No fever, chills or sweats. No fatigue.  CV-- No chest pain, palpitation or syncope. +HTN, HLD.  Resp-- No SOB, cough, hemoptysis  Skin--No rashes or lesions      Allergies:   Allergies   Allergen Reactions    Sulfa Antibiotics Rash         Home Meds:  Medications Prior to Admission   Medication Sig Dispense Refill Last Dose    amLODIPine (NORVASC) 10 MG tablet TAKE 1 TABLET BY MOUTH DAILY (Patient taking differently: Take 1 tablet by mouth Daily. Pt unsure if taking) 90 tablet 2 7/2/2024    Chlorhexidine Gluconate 4 % solution Shower daily with hibiclens solution as directed 5 days prior to surgery. 236 mL 0 7/2/2024    FLUoxetine (PROzac) 20 MG capsule Take 3 capsules by mouth Daily. 270 capsule 1 7/2/2024    hydroxyurea (HYDREA) 500 MG capsule Take 1 capsule by mouth Daily. 30 capsule 11 Past Month    lisinopril (PRINIVIL,ZESTRIL) 40 MG tablet Take 1 tablet by mouth Daily. 90 tablet 1 Past Week    traZODone (DESYREL) 50 MG tablet TAKE 0.5 TABLETS BY MOUTH EVERY NIGHT 30 tablet 3 Past Week    aspirin (aspirin) 81 MG EC tablet Take 1 tablet by mouth Daily. (Patient taking differently: Take 1 tablet by mouth Daily. Pt states she will hold ASA 2 weeks prior to knee surgery.) 30 tablet 5 5/29/2024    ipratropium-albuterol (DUO-NEB) 0.5-2.5 mg/3 ml nebulizer Nebulize q 4 h prn wheezing / shortness of breath 360 mL 0 More than a month     nitroglycerin (NITROSTAT) 0.4 MG SL tablet Place 1 tablet under the tongue Every 5 (Five) Minutes As Needed for Chest Pain. Take no more than 3 doses in 15 minutes. 25 tablet 5 Unknown         PMH:   Past Medical History:   Diagnosis Date    HAMMAD (acute kidney injury) 2021    Allergic     Anemia     Anesthesia complication     Slow to wake    Arthritis     COPD (chronic obstructive pulmonary disease)     Depression     Diabetes mellitus     History of heart attack     Hyperlipidemia     Hypertension      PSH:    Past Surgical History:   Procedure Laterality Date    BILATERAL BREAST REDUCTION      BONE MARROW ASPIRATION      CATARACT EXTRACTION Bilateral     COLONOSCOPY      HYSTERECTOMY  1990    x 2    REDUCTION MAMMAPLASTY Bilateral     MORE THAN 20 YEARS AGO    SINUS SURGERY  2003    TONSILLECTOMY         Immunization History:  Influenza: No  Pneumococcal: No  Tetanus: No  Covid : x2    Social History:   Tobacco:   Social History     Tobacco Use   Smoking Status Former    Current packs/day: 0.00    Average packs/day: 2.0 packs/day for 30.0 years (60.0 ttl pk-yrs)    Types: Cigarettes    Start date:     Quit date:     Years since quittin.5   Smokeless Tobacco Never      Alcohol:     Social History     Substance and Sexual Activity   Alcohol Use No         Physical Exam:BP (!) 182/92 (BP Location: Right arm, Patient Position: Lying)   Pulse 74   Temp 97.2 °F (36.2 °C) (Temporal)   Resp 18   SpO2 97%       General Appearance:    Alert, cooperative, no distress, appears stated age   Head:    Normocephalic, without obvious abnormality, atraumatic   Lungs:     Clear to auscultation bilaterally, respirations unlabored    Heart:   Regular rate and rhythm, S1 and S2 normal. +Murmur.    Abdomen:    Soft without tenderness   Extremities:   Extremities normal, atraumatic, no cyanosis or edema   Skin:   Skin color, texture, turgor normal, no rashes or lesions   Neurologic:   Grossly intact     Results  Review:     LABS:  Lab Results   Component Value Date    WBC 9.39 06/19/2024    HGB 15.7 06/19/2024    HCT 46.9 (H) 06/19/2024    .7 (H) 06/19/2024     (H) 06/19/2024    NEUTROABS 6.48 06/14/2024    GLUCOSE 97 06/14/2024    BUN 17 06/14/2024    CREATININE 1.32 (H) 06/14/2024    EGFRIFNONA 35 (L) 01/20/2022    EGFRIFAFRI 43 (L) 07/28/2021     06/14/2024    K 5.0 06/19/2024     06/14/2024    CO2 24.8 06/14/2024    MG 2.1 07/30/2023    PHOS 3.2 07/30/2023    CALCIUM 9.2 06/14/2024    ALBUMIN 4.0 06/14/2024    AST 33 (H) 06/14/2024    ALT 20 06/14/2024    BILITOT 0.8 06/14/2024       RADIOLOGY:  Imaging Results (Last 72 Hours)       ** No results found for the last 72 hours. **            I reviewed the patient's new clinical results.    Cancer Staging (if applicable)  Cancer Patient: __ yes _x_no __unknown; If yes, clinical stage T:__ N:__M:__, stage group or __N/A      Impression: Primary osteoarthritis of both knees      Plan: TOTAL KNEE ARTHROPLASTY WITH NAINA ROBOT RIGHT - Right       Javier Wesley, APRN   7/3/2024   09:01 EDT

## 2024-07-03 NOTE — OP NOTE
OPERATIVE REPORT     DATE OF PROCEDURE: 7/3/2024    SURGEON: Keaton Aldrich M.D.     ASSISTANT(S): Circulator: Keila Pham RN; Keaton Hawkins RN; Oscar Storm RN  Scrub Person: Nicole Fitzgerald; Paxton Brito  Nursing Assistant: Sam Arzola  Assistant: Oscar Nunez PA-C  Assistant: Oscar Nunez PA-C    Note-PA was utilized during the case to facilitate positioning the patient, exposure, retraction, placement of final components and definitive closure.    PREOPERATIVE DIAGNOSIS: Advanced degenerative joint disease of the right knee secondary to osteoarthritis    POSTOPERATIVE DIAGNOSIS: same     PROCEDURE: Right total Knee Arthroplasty CPT 96035, Use of computer-assisted surgical navigation system CPT 46577     SURGICAL DETAILS:     APPROACH: Medial parapatellar     ANESTHESIA: Spinal plus local periarticular block    PREOPERATIVE ANTIBIOTICS: Ancef 2 g IV    TRANEXAMIC ACID: IV    TOURNIQUET TIME: 54 min @300 mmHg     ESTIMATED BLOOD LOSS: 25 cc     SPECIMENS: None    IMPLANTS:   /Brand: Leonore triathlon  Tibial component size: 3 pressfit tritanium baseplate   Femoral component size: 2 pressfit cruciate retaining   Tibial polyethylene insert: 9 mm cruciate stabilizing   Patellar component: 29 mm asymmetric tritanium  Cement: None    DRAINS: None    LOCAL INJECTION: 1 cc Toradol 30mg/ml, 4 cc duramorph 2mg/ml, 20 cc 0.5% ropivicaine, 20 cc 0.5% lidocaine with 1:200,000 epinephrine, 15 cc preservative free normal saline     MODIFIER(S): None    COMPLICATIONS: None apparent    INDICATIONS FOR PROCEDURE: The patient has a long history of progressive knee pain, arthritis, and degeneration resulting in deformity in the right knee from predominantly medial wear and bone loss. Non-operative treatment and conservative therapeutic measures have been attempted, but have not improved or controlled the symptoms and pain that occurs during normal daily activities. Knee motion has also become  limited and is restricting the patient. Total knee arthroplasty was recommended. The risks, benefits, alternatives, and potential complications of the arthroplasty surgery were discussed with the patient in detail to include but not be limited to infection, bleeding, anesthesia risks, damage to neurovascular structures, osteolysis, aseptic loosening, instability, anterior knee pain, continued pain, iatrogenic fracture, dislocation, need for future surgery including the potential for amputation, blood clots, myocardial infarction, stroke, and death. Specific details of the surgical procedure, hospitalization, recovery, rehabilitation, and long-term precautions were also presented. Pre-operative teaching was provided. Implant/prosthesis selection was outlined, and the many options available were explained; the final choice will be made at the time of the procedure to match the anatomy and condition of the bone, ligaments, tendons, and muscles. The patient completed preoperative medical optimization and risk assessment, joint arthroplasty education, and MRSA decolonization using a universal decolonization protocol. Perioperative blood management and the potential for blood transfusion were discussed with risks and options clearly outlined.     INTRAOPERATIVE FINDINGS: Advanced tricompartmental osteoarthritis with genu varum alignment    PROCEDURE: The patient was identified in the preoperative holding area. The operative site was confirmed and marked. A sequential compression device was placed on the nonoperative leg. The risks, benefits, and alternatives to surgery were again confirmed with the patient and the patient wished to proceed. The patient was brought to the operating room and placed on the operating room table in the supine position. All bony prominences were padded. A huddle was performed with the patient and all vital surgical team members to confirm the correct operative site, procedure, anesthesia type,  and operative plan with the patient. After anesthesia was performed, a tourniquet was applied to the upper thigh of the operative leg. A full knee exam was performed once anesthesia was in full effect. Intravenous antibiotic prophylaxis was given and confirmed with the anesthesia team.     The operative leg was prepped and draped in the usual sterile fashion. A surgical time out was performed immediately preceding the incision with all personnel in the operating room to confirm patient identity, the correct operative site and extremity, correct radiographic studies, availability of appropriate surgical equipment and agreement on the planned procedure. The operative knee was elevated and exsanguinated using an esmarch and the tourniquet was inflated. The knee was exposed using a limited anterior-midline skin incision. Dissection was carried down through skin and subcutaneous tissue to the extensor mechanism with a scalpel. A medial parapatellar arthrotomy was made to enter the knee space sharply. A large amount of normal appearing joint fluid was encountered and suctioned. The synovium was thickened, hypertrophic, and inflamed. A partial synovectomy was performed for exposure, and the medial and lateral gutters were cleared of scar and synovial reflections. The superficial medial collateral ligament was carefully elevated off osteophytes .  The patellar synovial reflections were released and the patella exposed to reveal complete wear through the articular surface. The trochlea demonstrated similar severe wear. The patella was then subluxed laterally. The knee was then flexed up to 90 degrees.     Assessment of the knee joint revealed severe end-stage articular damage with no remaining medial weight bearing cartilage. The medial compartment was severely eburnated with bone loss on the medial tibia and medial femoral condyle, resulting in the varus deformity.     With the exposure complete, femoral pins for navigation  were drilled and placed intra-incisional in the region of the medial distal femoral metaphysis just proximal to the medial epicondyle.  Tibial pins for navigation were then placed extra-incisional 4 fingerbreadths below the tibial tubercle taking care to engage the far cortex of the tibia but not perforate the cortex.  The navigation arrays were then placed onto the pins, adjusted, and tightened definitively.  The femoral and tibial checkpoints were then placed.    The knee was then taken through range of motion to find the hip center.  The medial and lateral malleolus were then marked to find the ankle center. Next, using a rongeur and osteotome, marginal osteophytes were then removed from the tibia and the femur and the ACL resected.  Baseline measurements of the knee were then taken and the knee was balanced with adjustments made to varus and valgus on the femur and tibia as well as femoral rotation.  Adjustments were as follows:    Femur: 0.5 degrees varus, 2.5 degrees external rotation relative to the transepicondylar axis.  1 mm distal and 1.5 mm anterior translation  Tibia: Neutral, 3 degrees posterior slope, 1 mm proximal translation    Satisfied with the balance of the knee, attention was turned to bony resection.  The tibia was cut first and the tibial bone removed.  A tensioner was then placed on the resected surface to tension the knee in both flexion and extension.  The adjustments made precut were found to remain grossly unchanged with overall good alignment and balance of the knee in flexion and extension.  Next, femoral cuts were made starting with the posterior femoral cuts followed by the anterior femoral cut, followed by the anterior chamfer.  The sawblade was then changed and the distal femoral cut and posterior chamfers were completed.    A lamina  was placed with the knee in 90 degrees of flexion and a large curved osteotome, rongeur, and curettes were utilized to clear posterior  osteophytes. The medial/lateral meniscal remnants were then excised along with any loose bodies.     A femoral trial implant was placed; excellent fit was confirmed. The medial-lateral and anterior-posterior dimensions were checked; anatomic fit and coverage were achieved.The proximal tibia baseplate trial was placed with its mid-point at the junction of medial one-third and lateral two-thirds of the tibial tubercle and pinned to this fixed position. A trial reduction was then performed. Trial reduction demonstrated the knee achieved full extension with excellent stability and range of motion, and no tendency toward instability with varus-valgus stress at full extension, mid-flexion, or 90 degrees of flexion. The PCL was also found to be appropriately tensioned with normal posterior tibial excursion.  The tibia and femoral pins and arrays were then removed along with the femoral and tibial checkpoints.    Next, attention was turned to the patella. It was measured and the posterior 9-10 mm was resected leaving a healthy remnant with greater than 11mm thickness. The patella was sized with the asymmetric guide, and drill holes were made. A trial button was placed and tracking of the patella and the entire knee trial was tested. The patella tracking was excellent throughout range of motion with no instability. Punches and drills were then placed through the trials to accommodate the final implants. All trials were removed.     The wound was copiously lavaged with a pulse irrigation/suction system. The posterior recess of the knee and areas of known bleeding were treated with the electrocautery to reduce post-operative bleeding. A pain cocktail was injected into the iam-articular tissues. The cut bone surfaces were then irrigated again, suctioned, and dried. The final implants were impacted into place, tibia followed by tibial polyethylene followed by femur followed by patella. The tourniquet was released and no  excessive bleeding was encountered. Synovial bleeding was further treated with the electrocautery until adequate hemostasis was obtained.     The wound was again irrigated with dilute betadine solution followed by saline. The extensor mechanism and capsule was then anatomically closed with interrupted #1 Vicryl suture and a running #2 Stratafix stitch. Knee stability and range of motion with the capsule closed was excellent, and range of motion was 0 to 135 degrees without excessive stress on the repair. Instrument and sponge count was completed and confirmed correct. Deep and superficial subcutaneous tissue was closed with interrupted 2-0 Vicryl suture. A running 3-0 Monocryl subcuticular stitch was used to re-approximate the skin edges followed by skin glue adhesive to seal the wound. A silver impregnated dressing was then placed over the knee incision and a Covaderm over the tibial pin incisions, followed by a sequential compression device to the operative limb. The patient was sufficiently recovered from anesthesia, transferred to a hospital bed and taken to the PACU in stable condition.     One gram (1000 mg) of intravenous tranexamic acid was administered prior to incision. A second one gram (1000 mg) intravenous dose was given prior to wound closure.    No apparent complications occurred during the procedure. Instrument, sponge and needle counts were correct x 2.     The patient underwent risk stratification preoperatively and aspirin was chosen for DVT prophylaxis. Delay in starting chemical prophylaxis for 23 hours from surgical incision was over concerns for hematoma formation and wound related issues.     POST OPERATIVE PLAN:   Weight bearing as tolerated with knee range of motion as tolerated   Pain control with PO/IV meds   Adductor canal catheter placement by Anesthesia Pain Management Team in PACU.   23 hours perioperative antibiotic prophylaxis   PT/OT for mobilization and medical equipment needs    Keep silver dressing in place for 7 days post op. Change dressing only if saturated.   SCDs to bilateral lower extremities   Social work for discharge planning needs   Follow up in 3 weeks for post operative wound check with 3 views of operative knee.

## 2024-07-03 NOTE — ANESTHESIA PROCEDURE NOTES
Spinal Block      Patient reassessed immediately prior to procedure    Patient location during procedure: OR  Indication:at surgeon's request  Performed By  CRNA/CAA: Kaz Humphrey CRNA  Preanesthetic Checklist  Completed: patient identified, IV checked, site marked, risks and benefits discussed, surgical consent, monitors and equipment checked, pre-op evaluation and timeout performed  Spinal Block Prep:  Patient Position:sitting  Sterile Tech:cap, gloves, sterile barriers and mask  Prep:Chloraprep  Patient Monitoring:blood pressure monitoring, continuous pulse oximetry and EKG    Spinal Block Procedure  Approach:midline  Guidance:landmark technique and palpation technique  Location:L4-L5  Needle Type:Quincke  Needle Gauge:22 G  Placement of Spinal needle event:cerebrospinal fluid aspirated  Paresthesia: no  Fluid Appearance:clear  Medications: Mepivacaine HCl (PF) (CARBOCAINE) 1.5 % injection - Injection   4 mL - 7/3/2024 10:18:00 AM   Post Assessment  Patient Tolerance:patient tolerated the procedure well with no apparent complications  Complications no  Additional Notes  Procedure:  Pt assisted to sitting position, with legs in position of comfort over side of bed.  Pt. instructed in optimal spine presentation, the spine was prepped/ Draped and the skin at insertion site was anesthetized with 1% Lidocaine 2 ml.  The spinal needle was then advanced until CSF flow was obtained and LA was injected:

## 2024-07-03 NOTE — ANESTHESIA PREPROCEDURE EVALUATION
Anesthesia Evaluation                  Airway   Mallampati: I  TM distance: >3 FB  Neck ROM: full  No difficulty expected  Dental      Pulmonary    (+) COPD, asthma,  Cardiovascular     ECG reviewed    (+) hypertension, angina      Neuro/Psych  (+) psychiatric history  GI/Hepatic/Renal/Endo    (+) renal disease-, diabetes mellitus    Musculoskeletal     Abdominal    Substance History      OB/GYN          Other   arthritis,   history of cancer                  Anesthesia Plan    ASA 3     spinal     (Acb,  right marked)  intravenous induction     Anesthetic plan, risks, benefits, and alternatives have been provided, discussed and informed consent has been obtained with: patient.    Plan discussed with CRNA.    CODE STATUS:

## 2024-07-03 NOTE — CASE MANAGEMENT/SOCIAL WORK
Continued Stay Note  [facility]     Patient Name: Martha Murray  MRN: [id number]  Today's Date: 7/3/2024    Admit Date: 7/3/2024    Plan: home with PT2U   Discharge Plan       Row Name 07/03/24 1451       Plan    Plan home with PT2U    Plan Comments Pt lives alone in [place] [facility] She reports she is independent with ADLs and mobility prior to admit. She owns a walker. Pt is followed by her PCP and has drug coverage. Pt reports she has arranged for family members to assist at discharge. Her plan for discharge is to return home. PT2U has been arranged for PT. No other dc needs at this time    Final Discharge Disposition Code 01 - home or self-care                   Discharge Codes    No documentation.                       Samra Eric RN

## 2024-07-03 NOTE — DISCHARGE INSTRUCTIONS
"DISCHARGE INSTRUCTIONS   Dr. Aldrich     Total Knee Replacement/ Partial (Uni) Knee Replacement     Wound Care   1) Keep wound / incision area clean and dry.   2) Dressing to remain in place until post-operative day 7. Upon dressing removal, assess for wound drainage. If no drainage is present, keep wound / incision area open to air as much as possible. If drainage is present, place sterile dressing to cover wound and assess daily. If drainage continues to occur after post-operative day 14, call the office for an urgent appointment. (You should be seen in the clinic within 1-2 days of calling). DO NOT REMOVE SUTURES (IF PRESENT) UNDER ANY CIRCUMSTANCES PRIOR TO FOLLOW UP APPOINTMENT.  3) No baths or swimming until otherwise instructed. The wound must remain dry for 10 days after surgery. After 10 days, you may begin to shower only if no drainage is present. No submerging the wound under standing water until cleared by your physician (no baths, hot tubs, swimming pools, etc). Sponge baths are the best way to perform personal hygiene while at the same time protecting the wound from moisture.   4) Prior to showering, the wound must remain dry for 72 consecutive hours (no drainage whatsoever) prior to showering. If the wound drains or spots, the clock \"resets\" - make sure the wound has been drainage-free for 72 consecutive hours.   5) Once you are allowed to get the wound wet, please use gentle soap to wash the wound area. DO NOT aggressively scrub the wound with a washcloth or bath sponge. Please visually inspect your wound(s) at least once daily. If the wound(s) are in a difficult to see location, please use a mirror or have someone else assist with visual inspection.   6) No scrubbing the wound. You may \"pad dry\" the wound, but do not rub, as this may open up the wound and pre-dispose to wound infection.   7) Do not apply lotions or creams to incision site, unless instructed otherwise.   8) Observe for redness, " "swelling, or drainage. Please call the clinic immediately if you have fevers, chills with warmth/redness surrounding wound site or if you notice pus drainage from the wound site     Activity   No heavy lifting objects greater than 10 pounds.   No driving while on narcotic pain medication.   No submerging wound under standing water (pool, bath tub, etc.) until otherwise instructed.   You may be protected weightbearing as tolerated on your operative (right lower) extremity   Use crutches or a walker for ambulation.   Wean as appropriate per physical therapist's discretion.   Do not sleep with a pillow behind your knee. You may sleep with a pillow behind your Achilles or foot. This will prevent your knee from getting stiff in the flexed (\"bent\") position and will encourage full extension (leg straightening).   Be vigilant in terms of working on full knee extension and flexion. Your goal should be 0 to 90 degrees by 2 weeks post-op - MINIMUM!   Knee range of motion as tolerated.    Blood Clot Prophylaxis   (Aspirin vs. Lovenox vs. Eliquis administration is determined by your surgeon and tailored to your specific risk profile. You will be discharged with one of these medications.) You will need to complete a total 4 week course of enteric coated aspirin 325 mg (or 81mg) twice daily or Eliquis 2.5mg twice daily, in order to minimize your risk of blood clots following surgery. You will be supplied with a prescription to obtain this. Alternatively, you will need to compete a total 2 week course of Lovenox after surgery (followed by a 2 week course of aspirin twice daily), in order to minimize the risk of blood clots following surgery. Lovenox requires a single shot in the abdomen, to be taken once daily. You will be supplied with the prescription to obtain this. Prior to your discharge from the hospital, the nursing staff will instruct you on self-administration of the Lovenox, if you will be returning directly home from " "the hospital.     Discharge Pain Medications   You will be given a prescription for pain medication. You should start taking this the same day after your surgery. Wean off as tolerated. Do not wait to take the pain medication until the pain is severe, as it will be difficult to \"catch up\" once this occurs. The pain medication usually reaches its full effect ~1 hour after ingesting. If you have been sent home on Colace, this medication should be taken until you are off all narcotic (i.e. Oxycodone, etc) pain medications, in order to prevent constipation. If you have been sent home with a combination of oxycodone and Tylenol, please take Tylenol as scheduled.  You must be careful not to exceed 4,000mg (4 grams) of Tylenol. The oxycodone is to be taken as needed for \"breakthrough\" pain.  Some common side effects of the narcotic pain medications include nausea and itching. Benadryl is a great over the counter medication that helps calm your stomach, decreases your anxiety levels, and minimizes the itching. You can easily purchase this at your local pharmacy as an over-the-counter medication. Please abide by the instructions as printed on the bottle. If your nausea persists, make sure to take small amounts of crackers or other lighter foods.     Follow-Up   Follow-up with Dr. Aldrich's office in 3 weeks from the surgery date for a post-operative evaluation. Have the following xrays done upon arrival to the follow-up appointment: 3 views of operative knee. Please call Dr. Aldrich's office at [phone] for orthopaedic appointments or questions.        InfuBLOCK - Patient Information    What is a pain pump?  The InfuBLOCK pump delivers post-operative, non-narcotic, numbing medication to the nerve near the surgical site for pain relief.     Where can I find information about my pain pump?           For more information about your pain pump, scan the QR code.  For additional patient resources, visit " [email or web address]-[email or web address].                                                                                               While your physician is your primary source for information about your treatment there may be times during your treatment that you need assistance with your infusion pump.     If you need assistance take the following steps:    The [employer] Nursing Hotline is Here for You 24/7.  Please call [phone] for the following concerns or complications:    Answers to questions about your infusion pump                 Tubing disconnect  Assistance with pump alarms                                                      Dislodged catheter  Excessive leakage noted from pump                                         Inadequate pain control    2.   [facility] Anesthesia Acute Pain Service: [phone] is available 24/7 for any further needs or concerns about medication or pain control.     -------------------------------------------------------------------------    Nerve Catheter Removal Instructions  When your device is empty:    Remove your catheter by pulling the dressing off slowly (like you would remove a regular bandage). The catheter should pull right out of the skin.  Check that the BLUE tip is intact.                                                                                     If the catheter is stuck, reposition your   extremity and pull slowly until removed.  *If catheter is HURTING and WON'T come out, stop and call [phone] for further assistance.    Remove medication bag from the black carrying case.  Cut the tubing on right and left side of pump, and discard the medication bag and tubing into garbage.  Place the pump and black carrying case into the plastic bag and then place this into the return box.  Seal box with blue stickers and return to [employer] postal service. THIS IS PRE-PAID  POSTAGE.        -------------------------------------------------------------------------    SMI COLD THERAPY - PATIENT INSTRUCTION SHEET    Cold Compression Therapy for your comfort and rehabilitation  Your caregivers want you to be productive in your rehab and comfortable during your stay. In keeping with those goals, you will be receiving an SMI Cold Therapy Wrap to help ease post-operative pain and swelling that might keep you from getting back on track! Your SMI Cold Therapy Wrap is effective and simple-to-use, and you will be encouraged to apply it throughout your hospital stay and at home through the duration of your recovery.    When you are ready to go home  Be sure to take your SMI Cold Therapy Wrap and both sets of Gel Bags with you for continued comfort and use throughout your rehabilitation. If you don't already have them, ask your nurse or aide to retrieve your SMI Gel Bags from the patient freezer.    Home use precautions  Always follow your medical professional's application instructions upon discharge. Your SMI Cold Therapy Wrap and Gel Bags are designed to last for months following your surgery. Never heat the Gel Bags unless specified by your healthcare provider. Supervision is advised when using this product on children or geriatric patients. To avoid danger of suffocation, please keep the outer plastic packaging away from children & pets.    Cold Therapy Instructions  Place Gel Bags in a freezer set ¾ of the way to max temperature for at least (4) hours. For best results, lay the Gel Bags flat and [id number]-shsk in the freezer. Once frozen, slide Gel Bags into the gel pouch and secure your wrap to the affected area with the straps.  Gel wraps that have been stored in a freezer for an extended period of time may require a (10) minute period of softening up in a room temperature environment before application.  The gel pouch acts as a protective barrier. NEVER place frozen bags directly onto skin,  as this may cause frostbite injury.  The [facility] Cold Therapy Wrap is designed to be able to be worm while ambulating. The compression straps can be secured well enough so that the Wrap won't fall off while moving.  Wrap Application Videos can be viewed at [employer].  An additional protective barrier such as clothing, a washcloth, hand-towel or pillowcase may be used during prolonged treatment applications.  The Gel-Pouch and Wrap are both Latex-Free and the Gel Bag ingredients are non toxic.    [facility] Wrap care instructions  The [facility] Cold Therapy Wrap may be hand washed and hung to dry when needed.    [facility] re-order information  Additional [facility] body specific wraps and/or Gel Bags can be re-ordered from [facility] or call [employer] ([phone])

## 2024-07-03 NOTE — H&P
Patient Name: Martha Murray  MRN: [id number]  : 1955  DOS: 7/3/2024    Attending: Keaton Aldrich MD    Primary Care Provider: Jenny Carvajal APRN      Chief complaint: Right knee Pain.    Subjective   Patient is a pleasant 69 y.o. female presented for scheduled surgery by Dr. Aldrich.    Per his note (The patient has a long history of progressive knee pain, arthritis, and degeneration resulting in deformity in the right knee from predominantly medial wear and bone loss. Non-operative treatment and conservative therapeutic measures have been attempted, but have not improved or controlled the symptoms and pain that occurs during normal daily activities. Knee motion has also become limited and is restricting the patient. Total knee arthroplasty was recommended.).    She underwent right total knee arthroplasty under spinal anesthesia periarticular block, tolerated surgery well.    While in PACU she complained of substernal chest pain with radiation.  She was seen by cardiology, was ruled out for MI by serial troponins and EKG.    Reviewed patient's past medical history including hypertension and tobacco abuse, hyperlipidemia, diabetes mellitus type 2, and history of mild coronary artery disease on CT coronary angiogram in .         Allergies   Allergen Reactions    Sulfa Antibiotics Rash          Medications Prior to Admission   Medication Sig Dispense Refill Last Dose    amLODIPine (NORVASC) 10 MG tablet TAKE 1 TABLET BY MOUTH DAILY (Patient taking differently: Take 1 tablet by mouth Daily. Pt unsure if taking) 90 tablet 2 2024    Chlorhexidine Gluconate 4 % solution Shower daily with hibiclens solution as directed 5 days prior to surgery. 236 mL 0 2024    FLUoxetine (PROzac) 20 MG capsule Take 3 capsules by mouth Daily. 270 capsule 1 2024    hydroxyurea (HYDREA) 500 MG capsule Take 1 capsule by mouth Daily. 30 capsule 11 Past Month    lisinopril (PRINIVIL,ZESTRIL) 40 MG  tablet Take 1 tablet by mouth Daily. 90 tablet 1 Past Week    traZODone (DESYREL) 50 MG tablet TAKE 0.5 TABLETS BY MOUTH EVERY NIGHT 30 tablet 3 Past Week    aspirin (aspirin) 81 MG EC tablet Take 1 tablet by mouth Daily. (Patient taking differently: Take 1 tablet by mouth Daily. Pt states she will hold ASA 2 weeks prior to knee surgery.) 30 tablet 5 2024    ipratropium-albuterol (DUO-NEB) 0.5-2.5 mg/3 ml nebulizer Nebulize q 4 h prn wheezing / shortness of breath 360 mL 0 More than a month    nitroglycerin (NITROSTAT) 0.4 MG SL tablet Place 1 tablet under the tongue Every 5 (Five) Minutes As Needed for Chest Pain. Take no more than 3 doses in 15 minutes. 25 tablet 5 Unknown          Past Medical History:   Diagnosis Date    HAMMAD (acute kidney injury) 2021    Allergic     Anemia     Anesthesia complication     Slow to wake    Arthritis     COPD (chronic obstructive pulmonary disease)     Depression     Diabetes mellitus     History of heart attack     Hyperlipidemia     Hypertension      Past Surgical History:   Procedure Laterality Date    BILATERAL BREAST REDUCTION      BONE MARROW ASPIRATION      CATARACT EXTRACTION Bilateral     COLONOSCOPY      HYSTERECTOMY  1990    x 2    REDUCTION MAMMAPLASTY Bilateral     MORE THAN 20 YEARS AGO    SINUS SURGERY  2003    TONSILLECTOMY       Family History   Problem Relation Age of Onset    Arthritis Maternal Grandmother     Hypertension Mother     Hyperlipidemia Mother     Thyroid disease Mother     Diabetes Sister     Crohn's disease Sister     Hypertension Sister     Heart attack Maternal Grandfather     Breast cancer Neg Hx     Ovarian cancer Neg Hx      Social History     Tobacco Use    Smoking status: Former     Current packs/day: 0.00     Average packs/day: 2.0 packs/day for 30.0 years (60.0 ttl pk-yrs)     Types: Cigarettes     Start date:      Quit date:      Years since quittin.5    Smokeless tobacco: Never   Vaping Use    Vaping status: Never  "Used   Substance Use Topics    Alcohol use: No    Drug use: No   Single    Review of Systems  Pertinent items are noted in HPI    Vital Signs  /82   Pulse 64   Temp 97.1 °F (36.2 °C) (Temporal)   Resp 20   SpO2 100%     Physical Exam:    General Appearance:    Alert, cooperative, in no acute distress   Head:    Normocephalic, without obvious abnormality, atraumatic   Eyes:            Lids and lashes normal, conjunctivae and sclerae normal, no   icterus, no pallor, corneas clear    Ears:    Ears appear intact with no abnormalities noted   Throat:   No oral lesions, no thrush, oral mucosa moist   Neck:   No adenopathy, supple, trachea midline, no thyromegaly         Lungs:     Clear to auscultation,respirations regular, even and                   unlabored    Heart:    Regular rhythm and normal rate, normal S1 and S2, no       murmur, no gallop   Abdomen:     Normal bowel sounds, no masses, no organomegaly, soft        non-tender, non-distended, no guarding, no rebound                 tenderness   Genitalia:    Deferred   Extremities:   LE, CDI dressing on knee, PNB cath present.    Pulses:   Pulses palpable and equal bilaterally   Skin:   No bleeding, bruising or rash   Neurologic:   Cranial nerves 2 - 12 grossly intact, Flexion and dorsiflexion intact bilateral feet.        I reviewed the patient's new clinical results.             Invalid input(s): \"NEUTOPHILPCT\"        Invalid input(s): \"LABALBU\", \"PROT\"  Lab Results   Component Value Date    HGBA1C 5.60 06/14/2024      Latest Reference Range & Units 06/19/24 12:01   WBC 3.40 - 10.80 10*3/mm3 9.39   RBC 3.77 - 5.28 10*6/mm3 4.61   Hemoglobin 12.0 - 15.9 g/dL 15.7   Hematocrit 34.0 - 46.6 % 46.9 (H)   Platelets 140 - 450 10*3/mm3 781 (H)   RDW 12.3 - 15.4 % 15.2   MCV 79.0 - 97.0 fL 101.7 (H)   MCH 26.6 - 33.0 pg 34.1 (H)   MCHC 31.5 - 35.7 g/dL 33.5   MPV 6.0 - 12.0 fL 9.1   RDW-SD 37.0 - 54.0 fl 57.2 (H)   (H): Data is abnormally high     Latest " Reference Range & Units 06/14/24 09:31   BUN 8 - 23 mg/dL 17   Creatinine 0.57 - 1.00 mg/dL 1.32 (H)   BUN/Creatinine Ratio 7.0 - 25.0  12.9   eGFR >60.0 mL/min/1.73 44.1 (L)   Glucose 65 - 99 mg/dL 97   Calcium 8.6 - 10.5 mg/dL 9.2   Alkaline Phosphatase 39 - 117 U/L 99   Total Protein 6.0 - 8.5 g/dL 6.5   Albumin 3.5 - 5.2 g/dL 4.0   Globulin gm/dL 2.5   A/G Ratio g/dL 1.6   AST (SGOT) 1 - 32 U/L 33 (H)   ALT (SGPT) 1 - 33 U/L 20   Total Bilirubin 0.0 - 1.2 mg/dL 0.8   (H): Data is abnormally high  (L): Data is abnormally low    Assessment and Plan:       S/P total knee arthroplasty, right    Essential hypertension    Type 2 diabetes mellitus without complication, without long-term current use of insulin    Hyperlipidemia LDL goal <70    CKD (chronic kidney disease) stage 3, GFR 30-59 ml/min    Primary osteoarthritis of both knees    Coronary artery disease involving native coronary artery of native heart with angina pectoris    Essential thrombocytosis    Plan  1. PT/OT,  Weight bearing as tolerated right LE  2. Pain control-prns, ACB cath with ropivacaine infusion.  3. IS-encourage  4. DVT proph- Mechanicals and aspirin  5. Bowel regimen  6. Resume home medications as appropriate  7. Monitor post-op labs  8. DC planning for home    - Hypertension:  Resume home medications as appropriate, formulary substitution when indicated.  Holding parameters.  Prn medications for elevated blood pressure.    -DM type 2  Hgb A1C 5.6  Hold OHA as appropriate  FSBG AC/HS, ( q 6 when NPO), along with correction humalog.  Long acting insulin if needed.    -Chest pain: Workup per cardiology, ruled out for MI.  Beta-blocker added per cardiology.    -Essential thrombocytosis: Resume Hydrea.    -COPD: Bronchodilators as needed.  Monitor oxygenation.    Dragon disclaimer:  Part of this encounter note is an electronic transcription/translation of spoken language to printed text. The electronic translation of spoken language may permit  erroneous, or at times, nonsensical words or phrases to be inadvertently transcribed; Although I have reviewed the note for such errors, some may still exist.    Rod Carter MD  07/03/24  14:23 EDT

## 2024-07-03 NOTE — ANESTHESIA POSTPROCEDURE EVALUATION
Patient: Martha Murray    Procedure Summary       Date: 07/03/24 Room / Location:  [facility] /  [facility]    Anesthesia Start: 1004 Anesthesia Stop: 1213    Procedure: TOTAL KNEE ARTHROPLASTY WITH NAINA ROBOT RIGHT (Right: Knee) Diagnosis:       Primary osteoarthritis of both knees      (Primary osteoarthritis of both knees [M17.0])    Surgeons: Keaton Aldrich MD Provider: Juan José Guthrie MD    Anesthesia Type: spinal ASA Status: 3            Anesthesia Type: spinal    Vitals  Vitals Value Taken Time   /65 07/03/24 1220   Temp 97.1 °F (36.2 °C) 07/03/24 1212   Pulse 62 07/03/24 1225   Resp 14 07/03/24 1212   SpO2 100 % 07/03/24 1225   Vitals shown include unfiled device data.        Post Anesthesia Care and Evaluation    Patient location during evaluation: PACU  Patient participation: complete - patient participated  Level of consciousness: awake and alert  Pain management: adequate    Airway patency: patent  Anesthetic complications: No anesthetic complications  PONV Status: none  Cardiovascular status: hemodynamically stable and acceptable  Respiratory status: nonlabored ventilation, acceptable and nasal cannula  Hydration status: acceptable

## 2024-07-03 NOTE — BRIEF OP NOTE
TOTAL KNEE ARTHROPLASTY WITH NAINA ROBOT  Progress Note    Martha Murray  7/3/2024    Pre-op Diagnosis:   Primary osteoarthritis of both knees [M17.0]       Post-Op Diagnosis Codes:     * Primary osteoarthritis of both knees [M17.0]    Procedure/CPT® Codes:  NH ARTHRP KNE CONDYLE&PLATU MEDIAL&LAT COMPARTMENTS [65799]  NH CPTR-ASST SURGICAL NAVIGATION IMAGE-LESS [16627]      Procedure(s):  TOTAL KNEE ARTHROPLASTY WITH NAINA ROBOT RIGHT    Surgical Approach: Knee Medial Parapatellar            Surgeon(s):  Keaton Aldrich MD    Anesthesia: Spinal    Staff:   Circulator: Keila Pham, RN; Keaton Hawkins RN; Oscar Storm RN  Scrub Person: Nicole Fitzgerald; Paxton Brito  Nursing Assistant: Sam Arzola  Assistant: Oscar Nuenz PA-C  Assistant: Oscar Nunez PA-C      Estimated Blood Loss:  25 mL    Urine Voided: * No values recorded between 7/3/2024 10:04 AM and 7/3/2024 11:32 AM *    Specimens:                None          Drains: * No LDAs found *    Findings:-advanced tricompartmental osteoarthritis with genu varum alignment        Complications: None apparent    Assistant: Oscar Nunez PA-C  was responsible for performing the following activities: Retraction, Suction, Irrigation, Suturing, Closing, and Placing Dressing and their skilled assistance was necessary for the success of this case.    Keaton Aldrich MD     Date: 7/3/2024  Time: 11:52 EDT

## 2024-07-04 ENCOUNTER — APPOINTMENT (OUTPATIENT)
Dept: CT IMAGING | Facility: HOSPITAL | Age: 69
End: 2024-07-04
Payer: MEDICARE

## 2024-07-04 LAB
ANION GAP SERPL CALCULATED.3IONS-SCNC: 11 MMOL/L (ref 5–15)
BUN SERPL-MCNC: 19 MG/DL (ref 8–23)
BUN/CREAT SERPL: 12.2 (ref 7–25)
CALCIUM SPEC-SCNC: 8.2 MG/DL (ref 8.6–10.5)
CHLORIDE SERPL-SCNC: 108 MMOL/L (ref 98–107)
CO2 SERPL-SCNC: 22 MMOL/L (ref 22–29)
CREAT SERPL-MCNC: 1.56 MG/DL (ref 0.57–1)
EGFRCR SERPLBLD CKD-EPI 2021: 36.1 ML/MIN/1.73
GLUCOSE BLDC GLUCOMTR-MCNC: 116 MG/DL (ref 70–130)
GLUCOSE BLDC GLUCOMTR-MCNC: 134 MG/DL (ref 70–130)
GLUCOSE BLDC GLUCOMTR-MCNC: 171 MG/DL (ref 70–130)
GLUCOSE BLDC GLUCOMTR-MCNC: 86 MG/DL (ref 70–130)
GLUCOSE SERPL-MCNC: 121 MG/DL (ref 65–99)
HCT VFR BLD AUTO: 39 % (ref 34–46.6)
HGB BLD-MCNC: 12.9 G/DL (ref 12–15.9)
POTASSIUM SERPL-SCNC: 4.3 MMOL/L (ref 3.5–5.2)
SODIUM SERPL-SCNC: 141 MMOL/L (ref 136–145)
TROPONIN T SERPL HS-MCNC: 15 NG/L

## 2024-07-04 PROCEDURE — 97535 SELF CARE MNGMENT TRAINING: CPT

## 2024-07-04 PROCEDURE — 82948 REAGENT STRIP/BLOOD GLUCOSE: CPT

## 2024-07-04 PROCEDURE — 84484 ASSAY OF TROPONIN QUANT: CPT | Performed by: NURSE PRACTITIONER

## 2024-07-04 PROCEDURE — 25010000002 ONDANSETRON PER 1 MG: Performed by: ORTHOPAEDIC SURGERY

## 2024-07-04 PROCEDURE — 85018 HEMOGLOBIN: CPT | Performed by: INTERNAL MEDICINE

## 2024-07-04 PROCEDURE — 99214 OFFICE O/P EST MOD 30 MIN: CPT | Performed by: INTERNAL MEDICINE

## 2024-07-04 PROCEDURE — 25010000002 CEFAZOLIN PER 500 MG: Performed by: INTERNAL MEDICINE

## 2024-07-04 PROCEDURE — 25510000001 IOPAMIDOL PER 1 ML: Performed by: ORTHOPAEDIC SURGERY

## 2024-07-04 PROCEDURE — 80048 BASIC METABOLIC PNL TOTAL CA: CPT | Performed by: INTERNAL MEDICINE

## 2024-07-04 PROCEDURE — 75574 CT ANGIO HRT W/3D IMAGE: CPT

## 2024-07-04 PROCEDURE — 97116 GAIT TRAINING THERAPY: CPT

## 2024-07-04 PROCEDURE — 85014 HEMATOCRIT: CPT | Performed by: INTERNAL MEDICINE

## 2024-07-04 PROCEDURE — 97110 THERAPEUTIC EXERCISES: CPT

## 2024-07-04 PROCEDURE — 63710000001 INSULIN LISPRO (HUMAN) PER 5 UNITS: Performed by: INTERNAL MEDICINE

## 2024-07-04 PROCEDURE — 97166 OT EVAL MOD COMPLEX 45 MIN: CPT

## 2024-07-04 PROCEDURE — 25810000003 SODIUM CHLORIDE 0.9 % SOLUTION: Performed by: INTERNAL MEDICINE

## 2024-07-04 RX ORDER — NITROGLYCERIN 0.4 MG/1
0.8 TABLET SUBLINGUAL
Status: COMPLETED | OUTPATIENT
Start: 2024-07-04 | End: 2024-07-04

## 2024-07-04 RX ORDER — HYDROXYUREA 500 MG/1
500 CAPSULE ORAL DAILY
Start: 2024-07-11

## 2024-07-04 RX ORDER — DOCUSATE SODIUM 100 MG/1
100 CAPSULE, LIQUID FILLED ORAL 2 TIMES DAILY
Qty: 30 CAPSULE | Refills: 0 | Status: SHIPPED | OUTPATIENT
Start: 2024-07-04 | End: 2024-07-19

## 2024-07-04 RX ORDER — ACETAMINOPHEN 500 MG
1000 TABLET ORAL EVERY 8 HOURS
Qty: 60 TABLET | Refills: 0 | Status: SHIPPED | OUTPATIENT
Start: 2024-07-04 | End: 2024-07-14

## 2024-07-04 RX ORDER — NITROGLYCERIN 0.4 MG/1
0.8 TABLET SUBLINGUAL
Qty: 2 TABLET | Refills: 0 | Status: DISCONTINUED | OUTPATIENT
Start: 2024-07-05 | End: 2024-07-04

## 2024-07-04 RX ORDER — ASPIRIN 81 MG/1
81 TABLET ORAL 2 TIMES DAILY
Qty: 60 TABLET | Refills: 0 | Status: SHIPPED | OUTPATIENT
Start: 2024-07-05

## 2024-07-04 RX ORDER — ROSUVASTATIN CALCIUM 20 MG/1
20 TABLET, COATED ORAL NIGHTLY
Status: DISCONTINUED | OUTPATIENT
Start: 2024-07-04 | End: 2024-07-05 | Stop reason: HOSPADM

## 2024-07-04 RX ORDER — OXYCODONE HYDROCHLORIDE 5 MG/1
5 TABLET ORAL EVERY 4 HOURS PRN
Qty: 40 TABLET | Refills: 0 | Status: SHIPPED | OUTPATIENT
Start: 2024-07-04

## 2024-07-04 RX ADMIN — OXYCODONE HYDROCHLORIDE 5 MG: 5 TABLET ORAL at 10:31

## 2024-07-04 RX ADMIN — ACETAMINOPHEN 1000 MG: 500 TABLET ORAL at 17:29

## 2024-07-04 RX ADMIN — NITROGLYCERIN 0.8 MG: 0.4 TABLET SUBLINGUAL at 11:22

## 2024-07-04 RX ADMIN — ONDANSETRON 4 MG: 2 INJECTION INTRAMUSCULAR; INTRAVENOUS at 00:10

## 2024-07-04 RX ADMIN — INSULIN LISPRO 2 UNITS: 100 INJECTION, SOLUTION INTRAVENOUS; SUBCUTANEOUS at 17:29

## 2024-07-04 RX ADMIN — ASPIRIN 81 MG: 81 TABLET, COATED ORAL at 20:49

## 2024-07-04 RX ADMIN — Medication 10 ML: at 20:50

## 2024-07-04 RX ADMIN — METOPROLOL TARTRATE 25 MG: 25 TABLET, FILM COATED ORAL at 20:48

## 2024-07-04 RX ADMIN — ASPIRIN 81 MG: 81 TABLET, COATED ORAL at 12:26

## 2024-07-04 RX ADMIN — AMLODIPINE BESYLATE 10 MG: 10 TABLET ORAL at 12:26

## 2024-07-04 RX ADMIN — SODIUM CHLORIDE 100 ML/HR: 9 INJECTION, SOLUTION INTRAVENOUS at 05:53

## 2024-07-04 RX ADMIN — ACETAMINOPHEN 1000 MG: 500 TABLET ORAL at 12:26

## 2024-07-04 RX ADMIN — Medication 3 ML: at 20:50

## 2024-07-04 RX ADMIN — TRAZODONE HYDROCHLORIDE 25 MG: 50 TABLET ORAL at 20:49

## 2024-07-04 RX ADMIN — METOPROLOL TARTRATE 50 MG: 50 TABLET, FILM COATED ORAL at 09:17

## 2024-07-04 RX ADMIN — IOPAMIDOL 65 ML: 755 INJECTION, SOLUTION INTRAVENOUS at 12:13

## 2024-07-04 RX ADMIN — OXYCODONE HYDROCHLORIDE 5 MG: 5 TABLET ORAL at 17:32

## 2024-07-04 RX ADMIN — SODIUM CHLORIDE 2000 MG: 900 INJECTION INTRAVENOUS at 02:28

## 2024-07-04 RX ADMIN — LISINOPRIL 40 MG: 40 TABLET ORAL at 12:26

## 2024-07-04 RX ADMIN — ROSUVASTATIN CALCIUM 20 MG: 20 TABLET, COATED ORAL at 20:48

## 2024-07-04 NOTE — PLAN OF CARE
Problem: Adult Inpatient Plan of Care  Goal: Plan of Care Review  Recent Flowsheet Documentation  Taken 7/4/2024 0843 by Astrid Mark OT  Progress: improving  Plan of Care Reviewed With: patient  Outcome Evaluation: OT IE completed. Pt is A/Ox4 and motivated to work with therapy. Able to transition to EOB sitting from flat surface Independently. BUE AROM, strength, and sensation are intact for activities. Pt stands with SBA. Up in room/bathroom with RW support and CGAx1. No dizziness, knee buckling, or LOB. Appropriate AE issued and teaching completed for ADL retaining including instruction for nerve catheter line mgmt. Min A LB dressing tasks. OT will d/c at this time. Recommend pt return home with family to assist when medically ready. Pt denies DME needs.

## 2024-07-04 NOTE — PROGRESS NOTES
"[facility] at [facility] Progress Note     LOS: 0 days   Patient Care Team:  Jenny Carvajal APRN as PCP - General (Nurse Practitioner)  PCP:  Jenny Carvajal APRN    Chief Complaint: Follow-up chest pain and hypertension    Subjective: Patient denies any chest pain today.  Her blood pressure is also better today.  She states at home she was recently taking off of lisinopril and had only been on amlodipine.  Here she is on amlodipine, lisinopril and metoprolol.  She had a coronary CTA today that shows some mild calcified plaque but with only minimal stenosis 1 to 25%.  Normal ejection fraction 76%      Review of Systems:   All systems have been reviewed and are negative with the exception of those mentioned above.      Objective:    Vital Sign Min/Max for last 24 hours  Temp  Min: 97.7 °F (36.5 °C)  Max: 98.6 °F (37 °C)   BP  Min: 108/67  Max: 167/87   Pulse  Min: 51  Max: 75   Resp  Min: 16  Max: 18   SpO2  Min: 91 %  Max: 100 %   No data recorded   Weight  Min: 79.1 kg (174 lb 6.1 oz)  Max: 79.1 kg (174 lb 6.1 oz)     Flowsheet Rows      Flowsheet Row First Filed Value   Admission Height 154.9 cm (60.98\") Documented at 07/03/2024 1626   Admission Weight 79.1 kg (174 lb 6.1 oz) Documented at 07/03/2024 1626            Telemetry: Sinus rhythm      Intake/Output Summary (Last 24 hours) at 7/4/2024 1402  Last data filed at 7/3/2024 1821  Gross per 24 hour   Intake --   Output 300 ml   Net -300 ml     Intake & Output (last 3 days)         07/01 0701  07/02 0700 07/02 0701  07/03 0700 07/03 0701  07/04 0700 07/04 0701  07/05 0700    I.V. (mL/kg)   600 (7.6)     IV Piggyback   200     Total Intake(mL/kg)   800 (10.1)     Urine (mL/kg/hr)   300     Blood   100     Total Output   400     Net   +400                      Physical Exam:  Constitutional:       Appearance: Healthy appearance. Not in distress.   Pulmonary:      Effort: Pulmonary effort is normal. "   Cardiovascular:      Regular rhythm.      Murmurs: There is no murmur.   Musculoskeletal:      Comments: Right lower extremity wrapped after the surgery        LABS/DIAGNOSTIC DATA:  Results from last 7 days   Lab Units 07/04/24  0422   HEMOGLOBIN g/dL 12.9   HEMATOCRIT % 39.0     Lab Results   Lab Value Date/Time    TROPONINT 15 (H) 07/04/2024 0422    TROPONINT 11 07/03/2024 1511    TROPONINT 12 07/03/2024 1259    TROPONINT 19 (H) 07/29/2023 2210    TROPONINT 18 (H) 07/29/2023 2011    TROPONINT <0.010 01/15/2023 0238         Results from last 7 days   Lab Units 07/04/24  0422   SODIUM mmol/L 141   POTASSIUM mmol/L 4.3   CHLORIDE mmol/L 108*   CO2 mmol/L 22.0   BUN mg/dL 19   CREATININE mg/dL 1.56*   CALCIUM mg/dL 8.2*   GLUCOSE mg/dL 121*                       Medication Review:   acetaminophen, 1,000 mg, Oral, Q8H  amLODIPine, 10 mg, Oral, Daily  aspirin, 81 mg, Oral, Q12H  FLUoxetine, 60 mg, Oral, Daily  [Held by provider] hydroxyurea, 500 mg, Oral, Daily  insulin lispro, 2-7 Units, Subcutaneous, 4x Daily AC & at Bedtime  lisinopril, 40 mg, Oral, Daily  metoprolol tartrate, 25 mg, Oral, Q12H  sodium chloride, 10 mL, Intravenous, Q12H  sodium chloride, 3 mL, Intravenous, Q12H  traZODone, 25 mg, Oral, Nightly       lactated ringers, 9 mL/hr, Last Rate: Stopped (07/03/24 1155)  ropivacaine,   sodium chloride, 100 mL/hr, Last Rate: 100 mL/hr (07/04/24 0553)           S/P total knee arthroplasty, right    Essential hypertension    Type 2 diabetes mellitus without complication, without long-term current use of insulin    Hyperlipidemia LDL goal <70    Essential thrombocytosis    CKD (chronic kidney disease) stage 3, GFR 30-59 ml/min    Coronary artery disease involving native coronary artery of native heart with angina pectoris    Arthritis of knee      Assessment/Plan    Chest pain  Coronary CTA reviewed.  Official read will be placed in her chart tomorrow.  Mild nonobstructive CAD.  Troponin this morning minimally  elevated at 15.  No evidence of ACS.  Chest pain may have been related to uncontrolled hypertension which is now better controlled  Will continue amlodipine lisinopril and metoprolol  Continue aspirin 81 mg daily and add Crestor 20 mg daily.    Hypertension  Controlled now on combination of amlodipine lisinopril and metoprolol  Monitor renal function appears near baseline.  Has evidence of CKD.      Patient is stable for discharge from a cardiac standpoint.  The plan is for her to go home tomorrow.  Her home pharmacy is closed today.  Discussed with patient and her RN Yanique Pereira MD [facility]  07/04/24

## 2024-07-04 NOTE — THERAPY TREATMENT NOTE
Patient Name: Martha Murray  : 1955    MRN: [id number]                              Today's Date: 2024       Admit Date: 7/3/2024    Visit Dx:     ICD-10-CM ICD-9-CM   1. S/P total knee arthroplasty, right  Z96.651 V43.65   2. Primary osteoarthritis of both knees  M17.0 715.16   3. Atypical angina  I20.89 413.9     Patient Active Problem List   Diagnosis    Essential hypertension    Mild intermittent asthma without complication    Type 2 diabetes mellitus without complication, without long-term current use of insulin    Hyperlipidemia LDL goal <70    Depression    Essential thrombocytosis    Onychomycosis with ingrown toenail    CKD (chronic kidney disease) stage 3, GFR 30-59 ml/min    Obesity (BMI 30.0-34.9)    Coronary artery disease involving native coronary artery of native heart with angina pectoris    S/P total knee arthroplasty, right    Arthritis of knee     Past Medical History:   Diagnosis Date    HAMMAD (acute kidney injury) 2021    Allergic     Anemia     Anesthesia complication     Slow to wake    Arthritis     COPD (chronic obstructive pulmonary disease)     Depression     Diabetes mellitus     History of heart attack     Hyperlipidemia     Hypertension      Past Surgical History:   Procedure Laterality Date    BILATERAL BREAST REDUCTION      BONE MARROW ASPIRATION      CATARACT EXTRACTION Bilateral     COLONOSCOPY      HYSTERECTOMY  1990    x 2    REDUCTION MAMMAPLASTY Bilateral     MORE THAN 20 YEARS AGO    SINUS SURGERY      TONSILLECTOMY        General Information       Row Name 24 0903          Physical Therapy Time and Intention    Document Type therapy note (daily note)  -CK     Mode of Treatment physical therapy;individual therapy  -CK       Row Name 24 0903          General Information    Patient Profile Reviewed yes  -CK     Existing Precautions/Restrictions fall;other (see comments);cardiac  RLE WBAT, adductor nerve catheter, postop chest pain  -CK      Barriers to Rehab medically complex  -CK       Row Name 07/04/24 0903          Cognition    Orientation Status (Cognition) oriented x 4  -CK       Row Name 07/04/24 0903          Safety Issues, Functional Mobility    Safety Issues Affecting Function (Mobility) insight into deficits/self-awareness;safety precaution awareness;safety precautions follow-through/compliance  -CK     Impairments Affecting Function (Mobility) pain;strength;range of motion (ROM);balance;shortness of breath  -CK               User Key  (r) = Recorded By, (t) = Taken By, (c) = Cosigned By      Initials Name Provider Type    CK Kim Anne PT Physical Therapist                   Mobility       Row Name 07/04/24 0905          Bed Mobility    Comment, (Bed Mobility) [facility] pre/post treatment  -CK       Row Name 07/04/24 0905          Sit-Stand Transfer    Sit-Stand Bennett (Transfers) standby assist;verbal cues  -CK     Assistive Device (Sit-Stand Transfers) walker, front-wheeled  -CK     Comment, (Sit-Stand Transfer) cues to push up from chair and for safety awareness with nerve catheter placement  -CK       Row Name 07/04/24 0905          Gait/Stairs (Locomotion)    Bennett Level (Gait) contact guard;1 person assist  progressed to SBA  -CK     Assistive Device (Gait) walker, front-wheeled  -CK     Distance in Feet (Gait) 250  -CK     Deviations/Abnormal Patterns (Gait) bilateral deviations;van decreased;gait speed decreased;stride length decreased;right sided deviations;antalgic  -CK     Bilateral Gait Deviations heel strike decreased  -CK     Bennett Level (Stairs) contact guard;1 person assist;verbal cues  -CK     Assistive Device (Stairs) cane, straight;other (see comments)  HHA  -CK     Number of Steps (Stairs) 3  -CK     Ascending Technique (Stairs) step-to-step  -CK     Descending Technique (Stairs) step-to-step  -CK     Comment, (Gait/Stairs) Patient ambulated in palm with step through gait pattern, good  sequencing with AD. She had increased mediolateral sway but no LOB. Stair training completed with SPC and HHA with cues for sequencing. Patient with some difficulty controlling eccentric descension on steps, but able to complete with cues for pushing through LUE HHA. Her son is available to assist in this manner at D/C. She required seated rest break before and after stair training due to fatigue. No LOB or buckling with any mobility.  -CK       Row Name 07/04/24 0905          Mobility    Extremity Weight-bearing Status right lower extremity  -CK     Right Lower Extremity (Weight-bearing Status) weight-bearing as tolerated (WBAT)  -CK               User Key  (r) = Recorded By, (t) = Taken By, (c) = Cosigned By      Initials Name Provider Type    CK Kim Anne, PAPI Physical Therapist                   Obj/Interventions       Row Name 07/04/24 0910          Range of Motion Comprehensive    Comment, General Range of Motion R knee AROM: 3-100  -CK       Row Name 07/04/24 0910          Motor Skills    Therapeutic Exercise hip;knee;ankle  -CK       Row Name 07/04/24 0910          Hip (Therapeutic Exercise)    Hip (Therapeutic Exercise) strengthening exercise  -CK     Hip Strengthening (Therapeutic Exercise) right;heel slides;10 repetitions  -CK       Row Name 07/04/24 0910          Knee (Therapeutic Exercise)    Knee (Therapeutic Exercise) isometric exercises;strengthening exercise  -CK     Knee Isometrics (Therapeutic Exercise) right;quad sets;10 repetitions;3 second hold  -CK     Knee Strengthening (Therapeutic Exercise) right;SLR (straight leg raise);SAQ (short arc quad);LAQ (long arc quad);sitting;heel slides;10 repetitions  -CK       Row Name 07/04/24 0910          Ankle (Therapeutic Exercise)    Ankle (Therapeutic Exercise) AROM (active range of motion)  -CK     Ankle AROM (Therapeutic Exercise) bilateral;dorsiflexion;plantarflexion;10 repetitions  -CK       Row Name 07/04/24 0910          Balance    Balance  Assessment sitting static balance;standing static balance;standing dynamic balance  -CK     Static Sitting Balance independent  -CK     Position, Sitting Balance unsupported;sitting in chair  -CK     Static Standing Balance standby assist  -CK     Dynamic Standing Balance standby assist  -CK     Position/Device Used, Standing Balance supported;walker, front-wheeled  -CK     Comment, Balance no LOB or buckling  -CK               User Key  (r) = Recorded By, (t) = Taken By, (c) = Cosigned By      Initials Name Provider Type    CK Kim Anne PT Physical Therapist                   Goals/Plan    No documentation.                  Clinical Impression       Row Name 07/04/24 0911          Pain    Pretreatment Pain Rating 0/10 - no pain  -CK     Posttreatment Pain Rating 3/10  -CK     Pain Location - Side/Orientation Right  -CK     Pain Location posterior  -CK     Pain Location - knee  -CK     Pain Intervention(s) Ambulation/increased activity;Repositioned;Cold applied  -CK       Row Name 07/04/24 0911          Plan of Care Review    Plan of Care Reviewed With patient  -CK     Progress improving  -CK     Outcome Evaluation Patient able to progress ambulation distance to 250' SBA with FWW and complete stair training on 3 steps per her home setup with CGA. No LOB, buckling, or chest pain with mobility. HEP reviewed with good effort from patient. IPPT remains indicated to address current deficits. Patient is cleared from a mobility standpoint to D/C home with family assist and HHPT today if medically appropriate. She owns a FWW and also SPC for stair navigation.  -CK       Row Name 07/04/24 0911          Vital Signs    Pre Systolic BP Rehab 108  -CK     Pre Treatment Diastolic BP 67  -CK     Pretreatment Heart Rate (beats/min) 57  -CK     Posttreatment Heart Rate (beats/min) 57  -CK     Pre SpO2 (%) 100  -CK     O2 Delivery Pre Treatment room air  -CK     O2 Delivery Intra Treatment room air  -CK     Post SpO2 (%) 99   -CK     O2 Delivery Post Treatment room air  -CK     Pre Patient Position Sitting  -CK     Post Patient Position Sitting  -CK       Row Name 07/04/24 0911          Positioning and Restraints    Pre-Treatment Position sitting in chair/recliner  -CK     Post Treatment Position chair  -CK     In Chair reclined;call light within reach;encouraged to call for assist;exit alarm on;waffle cushion;compression device;notified nsg  -CK               User Key  (r) = Recorded By, (t) = Taken By, (c) = Cosigned By      Initials Name Provider Type    CK Kim Anne, PAPI Physical Therapist                   Outcome Measures       Row Name 07/04/24 0914          How much help from another person do you currently need...    Turning from your back to your side while in flat bed without using bedrails? 4  -CK     Moving from lying on back to sitting on the side of a flat bed without bedrails? 4  -CK     Moving to and from a bed to a chair (including a wheelchair)? 3  -CK     Standing up from a chair using your arms (e.g., wheelchair, bedside chair)? 3  -CK     Climbing 3-5 steps with a railing? 3  -CK     To walk in hospital room? 3  -CK     AM-PAC 6 Clicks Score (PT) 20  -CK     Highest Level of Mobility Goal 6 --> Walk 10 steps or more  -CK       Row Name 07/04/24 0914 07/04/24 0849       Functional Assessment    Outcome Measure Options AM-PAC 6 Clicks Basic Mobility (PT)  -CK AM-PAC 6 Clicks Daily Activity (OT)  -TB              User Key  (r) = Recorded By, (t) = Taken By, (c) = Cosigned By      Initials Name Provider Type    TB Astrid Mark, OT Occupational Therapist    Kim Ramsay, PAPI Physical Therapist                                 Physical Therapy Education       Title: PT OT SLP Therapies (In Progress)       Topic: Physical Therapy (Done)       Point: Mobility training (Done)       Learning Progress Summary             Patient Acceptance, E, VU by CK at 7/4/2024 0914    Acceptance, E,D, VU,NR by   at 7/3/2024 1830                         Point: Home exercise program (Done)       Learning Progress Summary             Patient Acceptance, E, VU by  at 7/4/2024 0914    Acceptance, E,D, VU,NR by  at 7/3/2024 1830                         Point: Body mechanics (Done)       Learning Progress Summary             Patient Acceptance, E, VU by  at 7/4/2024 0914    Acceptance, E,D, VU,NR by  at 7/3/2024 1830                         Point: Precautions (Done)       Learning Progress Summary             Patient Acceptance, E, VU by  at 7/4/2024 0914    Acceptance, E,D, VU,NR by  at 7/3/2024 1830                                         User Key       Initials Effective Dates Name Provider Type Discipline     12/15/23 -  Carmen Pugh, PT Physical Therapist PT     02/06/24 -  Kim Anne PT Physical Therapist PT                  PT Recommendation and Plan     Plan of Care Reviewed With: patient  Progress: improving  Outcome Evaluation: Patient able to progress ambulation distance to 250' SBA with FWW and complete stair training on 3 steps per her home setup with CGA. No LOB, buckling, or chest pain with mobility. HEP reviewed with good effort from patient. IPPT remains indicated to address current deficits. Patient is cleared from a mobility standpoint to D/C home with family assist and HHPT today if medically appropriate. She owns a FWW and also SPC for stair navigation.     Time Calculation:         PT Charges       Row Name 07/04/24 0914             Time Calculation    Start Time 0822  -CK      PT Received On 07/04/24  -CK         Timed Charges    96759 - PT Therapeutic Exercise Minutes 16  -CK      26477 - Gait Training Minutes  23  -CK         Total Minutes    Timed Charges Total Minutes 39  -CK       Total Minutes 39  -CK                User Key  (r) = Recorded By, (t) = Taken By, (c) = Cosigned By      Initials Name Provider Type    CK Kim Anne, PT Physical Therapist                   Therapy Charges for Today       Code Description Service Date Service Provider Modifiers Qty    88242326724 HC PT THER PROC EA 15 MIN 7/4/2024 Kim Anne, PT GP 1    26097597339 HC GAIT TRAINING EA 15 MIN 7/4/2024 Kim Anne, PT GP 2            PT G-Codes  Outcome Measure Options: AM-PAC 6 Clicks Basic Mobility (PT)  AM-PAC 6 Clicks Score (PT): 20  AM-PAC 6 Clicks Score (OT): 22  PT Discharge Summary  Anticipated Discharge Disposition (PT): home with assist, home with home health    Kim Anne, PT  7/4/2024

## 2024-07-04 NOTE — THERAPY TREATMENT NOTE
Patient Name: Martha Murray  : 1955    MRN: [id number]                              Today's Date: 2024       Admit Date: 7/3/2024    Visit Dx:     ICD-10-CM ICD-9-CM   1. S/P total knee arthroplasty, right  Z96.651 V43.65   2. Primary osteoarthritis of both knees  M17.0 715.16   3. Atypical angina  I20.89 413.9   4. Essential thrombocytosis  D47.3 238.71     Patient Active Problem List   Diagnosis    Essential hypertension    Mild intermittent asthma without complication    Type 2 diabetes mellitus without complication, without long-term current use of insulin    Hyperlipidemia LDL goal <70    Depression    Essential thrombocytosis    Onychomycosis with ingrown toenail    CKD (chronic kidney disease) stage 3, GFR 30-59 ml/min    Obesity (BMI 30.0-34.9)    Coronary artery disease involving native coronary artery of native heart with angina pectoris    S/P total knee arthroplasty, right    Arthritis of knee     Past Medical History:   Diagnosis Date    HAMMAD (acute kidney injury) 2021    Allergic     Anemia     Anesthesia complication     Slow to wake    Arthritis     COPD (chronic obstructive pulmonary disease)     Depression     Diabetes mellitus     History of heart attack     Hyperlipidemia     Hypertension      Past Surgical History:   Procedure Laterality Date    BILATERAL BREAST REDUCTION      BONE MARROW ASPIRATION      CATARACT EXTRACTION Bilateral     COLONOSCOPY      HYSTERECTOMY  1990    x 2    REDUCTION MAMMAPLASTY Bilateral     MORE THAN 20 YEARS AGO    SINUS SURGERY  2003    TONSILLECTOMY        General Information       Row Name 24 1419 24 0903       Physical Therapy Time and Intention    Document Type therapy note (daily note)  -CK therapy note (daily note)  -CK    Mode of Treatment physical therapy;individual therapy  -CK physical therapy;individual therapy  -CK      Row Name 24 1419 24 0903       General Information    Patient Profile Reviewed yes  -CK yes   -CK    Existing Precautions/Restrictions fall;other (see comments);cardiac  RLE WBAT, adductor nerve catheter, postop chest pain  -CK fall;other (see comments);cardiac  RLE WBAT, adductor nerve catheter, postop chest pain  -CK    Barriers to Rehab medically complex  -CK medically complex  -CK      Row Name 07/04/24 1419 07/04/24 0903       Cognition    Orientation Status (Cognition) oriented x 4  -CK oriented x 4  -CK      Row Name 07/04/24 1419 07/04/24 0903       Safety Issues, Functional Mobility    Safety Issues Affecting Function (Mobility) insight into deficits/self-awareness;safety precaution awareness;safety precautions follow-through/compliance  -CK insight into deficits/self-awareness;safety precaution awareness;safety precautions follow-through/compliance  -CK    Impairments Affecting Function (Mobility) pain;strength;range of motion (ROM);balance;shortness of breath  -CK pain;strength;range of motion (ROM);balance;shortness of breath  -CK              User Key  (r) = Recorded By, (t) = Taken By, (c) = Cosigned By      Initials Name Provider Type    CK Kim Anne, PT Physical Therapist                   Mobility       Row Name 07/04/24 1421 07/04/24 0905       Bed Mobility    Bed Mobility supine-sit-supine  -CK --    Supine-Sit-Supine Alexander (Bed Mobility) supervision  -CK --    Assistive Device (Bed Mobility) bed rails  -CK --    Comment, (Bed Mobility) increased time and effort  -CK UIC pre/post treatment  -CK      Row Name 07/04/24 1421 07/04/24 0905       Sit-Stand Transfer    Sit-Stand Alexander (Transfers) standby assist;verbal cues  -CK standby assist;verbal cues  -CK    Assistive Device (Sit-Stand Transfers) walker, front-wheeled  -CK walker, front-wheeled  -CK    Comment, (Sit-Stand Transfer) still cues for safety awareness with nerve catheter placement  -CK cues to push up from chair and for safety awareness with nerve catheter placement  -CK      Row Name 07/04/24 1421  07/04/24 0905       Gait/Stairs (Locomotion)    [place] Level (Gait) contact guard;1 person assist;verbal cues  -CK contact guard;1 person assist  progressed to SBA  -CK    Assistive Device (Gait) walker, front-wheeled  -CK walker, front-wheeled  -CK    Distance in Feet (Gait) 225  -  -CK    Deviations/Abnormal Patterns (Gait) bilateral deviations;van decreased;gait speed decreased;stride length decreased;right sided deviations;antalgic;base of support, wide  -CK bilateral deviations;van decreased;gait speed decreased;stride length decreased;right sided deviations;antalgic  -CK    Bilateral Gait Deviations heel strike decreased  -CK heel strike decreased  -CK    [place] Level (Stairs) -- contact guard;1 person assist;verbal cues  -CK    Assistive Device (Stairs) -- cane, straight;other (see comments)  HHA  -CK    Number of Steps (Stairs) -- 3  -CK    Ascending Technique (Stairs) -- step-to-step  -CK    Descending Technique (Stairs) -- step-to-step  -CK    Comment, (Gait/Stairs) Patient ambulated in palm with step through gait pattern. No LOB despite wide ASIF and increased mediolateral sway. She required 1 standing rest break due to SOA/fatigue, SpO2 >90% on RA upon return to room. Patient deferred additional stair training.  -CK Patient ambulated in palm with step through gait pattern, good sequencing with AD. She had increased mediolateral sway but no LOB. Stair training completed with SPC and HHA with cues for sequencing. Patient with some difficulty controlling eccentric descension on steps, but able to complete with cues for pushing through LUE HHA. Her son is available to assist in this manner at D/C. She required seated rest break before and after stair training due to fatigue. No LOB or buckling with any mobility.  -CK      Row Name 07/04/24 1421 07/04/24 0905       Mobility    Extremity Weight-bearing Status right lower extremity  -CK right lower extremity  -CK    Right Lower  Extremity (Weight-bearing Status) weight-bearing as tolerated (WBAT)  -CK weight-bearing as tolerated (WBAT)  -CK              User Key  (r) = Recorded By, (t) = Taken By, (c) = Cosigned By      Initials Name Provider Type    CK Kim Anne, PT Physical Therapist                   Obj/Interventions       Row Name 07/04/24 0910          Range of Motion Comprehensive    Comment, General Range of Motion R knee AROM: 3-100  -CK       Row Name 07/04/24 1507 07/04/24 0910       Motor Skills    Therapeutic Exercise hip;knee;ankle  -CK hip;knee;ankle  -CK      Row Name 07/04/24 1507 07/04/24 0910       Hip (Therapeutic Exercise)    Hip (Therapeutic Exercise) strengthening exercise  -CK strengthening exercise  -CK    Hip Strengthening (Therapeutic Exercise) right;heel slides;10 repetitions  -CK right;heel slides;10 repetitions  -CK      Row Name 07/04/24 1507 07/04/24 0910       Knee (Therapeutic Exercise)    Knee (Therapeutic Exercise) isometric exercises;strengthening exercise  -CK isometric exercises;strengthening exercise  -CK    Knee Isometrics (Therapeutic Exercise) right;quad sets;10 repetitions;3 second hold  -CK right;quad sets;10 repetitions;3 second hold  -CK    Knee Strengthening (Therapeutic Exercise) right;SLR (straight leg raise);SAQ (short arc quad);LAQ (long arc quad);10 repetitions  -CK right;SLR (straight leg raise);SAQ (short arc quad);LAQ (long arc quad);sitting;heel slides;10 repetitions  -CK      Row Name 07/04/24 1507 07/04/24 0910       Ankle (Therapeutic Exercise)    Ankle (Therapeutic Exercise) AROM (active range of motion)  -CK AROM (active range of motion)  -CK    Ankle AROM (Therapeutic Exercise) bilateral;dorsiflexion;plantarflexion;10 repetitions  -CK bilateral;dorsiflexion;plantarflexion;10 repetitions  -CK      Row Name 07/04/24 1507 07/04/24 0910       Balance    Balance Assessment sitting static balance;standing static balance;standing dynamic balance  -CK sitting static  balance;standing static balance;standing dynamic balance  -CK    Static Sitting Balance independent  -CK independent  -CK    Position, Sitting Balance unsupported;sitting edge of bed  -CK unsupported;sitting in chair  -CK    Static Standing Balance standby assist  -CK standby assist  -CK    Dynamic Standing Balance contact guard  -CK standby assist  -CK    Position/Device Used, Standing Balance supported;walker, front-wheeled  -CK supported;walker, front-wheeled  -CK    Comment, Balance no LOB or buckling  -CK no LOB or buckling  -CK              User Key  (r) = Recorded By, (t) = Taken By, (c) = Cosigned By      Initials Name Provider Type    CK Kim Anne PT Physical Therapist                   Goals/Plan    No documentation.                  Clinical Impression       Row Name 07/04/24 1508 07/04/24 0911       Pain    Pretreatment Pain Rating -- 0/10 - no pain  -CK    Posttreatment Pain Rating -- 3/10  -CK    Pain Location - Side/Orientation -- Right  -CK    Pain Location -- posterior  -CK    Pain Location - -- knee  -CK    Pain Intervention(s) Ambulation/increased activity;Repositioned;Rest  -CK Ambulation/increased activity;Repositioned;Cold applied  -CK    Additional Documentation Pain Scale: FACES Pre/Post-Treatment (Group)  -CK --      Row Name 07/04/24 1508          Pain Scale: FACES Pre/Post-Treatment    Pain: FACES Scale, Pretreatment 2-->hurts little bit  -CK     Posttreatment Pain Rating 4-->hurts little more  -CK     Pain Location - Side/Orientation Right  -CK     Pain Location generalized  -CK     Pain Location - knee  -CK       Row Name 07/04/24 1508 07/04/24 0911       Plan of Care Review    Plan of Care Reviewed With patient  -CK patient  -CK    Progress improving  -CK improving  -CK    Outcome Evaluation Patient ambulated additional 225' this afternoon CGA with FWW, still limited in distance by fatigue. No LOB or buckling with any mobility. [place] with HEP improving and patient gives  good effort with this. IPPT remains indicated to address current deficits. Continue to recommend D/C home with assist and HHPT when medically appropriate.  -CK Patient able to progress ambulation distance to 250' SBA with FWW and complete stair training on 3 steps per her home setup with CGA. No LOB, buckling, or chest pain with mobility. HEP reviewed with good effort from patient. IPPT remains indicated to address current deficits. Patient is cleared from a mobility standpoint to D/C home with family assist and HHPT today if medically appropriate. She owns a FWW and also SPC for stair navigation.  -CK      Row Name 07/04/24 1508 07/04/24 0911       Vital Signs    Pre Systolic BP Rehab -- 108  -CK    Pre Treatment Diastolic BP -- 67  -CK    Pretreatment Heart Rate (beats/min) -- 57  -CK    Posttreatment Heart Rate (beats/min) -- 57  -CK    Pre SpO2 (%) -- 100  -CK    O2 Delivery Pre Treatment room air  -CK room air  -CK    O2 Delivery Intra Treatment room air  -CK room air  -CK    Post SpO2 (%) -- 99  -CK    O2 Delivery Post Treatment room air  -CK room air  -CK    Pre Patient Position -- Sitting  -CK    Post Patient Position -- Sitting  -CK      Row Name 07/04/24 1508 07/04/24 0911       Positioning and Restraints    Pre-Treatment Position in bed  -CK sitting in chair/recliner  -CK    Post Treatment Position bed  -CK chair  -CK    In Bed fowlers;call light within reach;encouraged to call for assist;exit alarm on;notified nsg  -CK --    In Chair -- reclined;call light within reach;encouraged to call for assist;exit alarm on;waffle cushion;compression device;notified nsg  -CK              User Key  (r) = Recorded By, (t) = Taken By, (c) = Cosigned By      Initials Name Provider Type    CK Kim Anne PT Physical Therapist                   Outcome Measures       Row Name 07/04/24 1514 07/04/24 0914       How much help from another person do you currently need...    Turning from your back to your side while in  flat bed without using bedrails? 4  -CK 4  -CK    Moving from lying on back to sitting on the side of a flat bed without bedrails? 4  -CK 4  -CK    Moving to and from a bed to a chair (including a wheelchair)? 3  -CK 3  -CK    Standing up from a chair using your arms (e.g., wheelchair, bedside chair)? 3  -CK 3  -CK    Climbing 3-5 steps with a railing? 3  -CK 3  -CK    To walk in hospital room? 3  -CK 3  -CK    AM-PAC 6 Clicks Score (PT) 20  -CK 20  -CK    Highest Level of Mobility Goal 6 --> Walk 10 steps or more  -CK 6 --> Walk 10 steps or more  -CK      Row Name 07/04/24 1514 07/04/24 0914       Functional Assessment    Outcome Measure Options AM-PAC 6 Clicks Basic Mobility (PT)  -CK AM-PAC 6 Clicks Basic Mobility (PT)  -CK      Row Name 07/04/24 0849          Functional Assessment    Outcome Measure Options AM-PAC 6 Clicks Daily Activity (OT)  -TB               User Key  (r) = Recorded By, (t) = Taken By, (c) = Cosigned By      Initials Name Provider Type    TB Astrid Mark, OT Occupational Therapist    CK Kim Anne, PT Physical Therapist                                 Physical Therapy Education       Title: PT OT SLP Therapies (In Progress)       Topic: Physical Therapy (Done)       Point: Mobility training (Done)       Learning Progress Summary             Patient Acceptance, E, VU by CK at 7/4/2024 1514    Acceptance, E, VU by CK at 7/4/2024 0914    Acceptance, E,D, VU,NR by  at 7/3/2024 1830                         Point: Home exercise program (Done)       Learning Progress Summary             Patient Acceptance, E, VU by CK at 7/4/2024 1514    Acceptance, E, VU by CK at 7/4/2024 0914    Acceptance, E,D, VU,NR by  at 7/3/2024 1830                         Point: Body mechanics (Done)       Learning Progress Summary             Patient Acceptance, E, VU by CK at 7/4/2024 1514    Acceptance, E, VU by CK at 7/4/2024 0914    Acceptance, E,D, VU,NR by  at 7/3/2024 1830                          Point: Precautions (Done)       Learning Progress Summary             Patient Acceptance, E, VU by CK at 7/4/2024 1514    Acceptance, E, VU by  at 7/4/2024 0914    Acceptance, E,D, VU,NR by  at 7/3/2024 1830                                         User Key       Initials Effective Dates Name Provider Type Discipline     12/15/23 -  Carmen Pugh, PT Physical Therapist PT     02/06/24 -  Kim Anne PT Physical Therapist PT                  PT Recommendation and Plan     Plan of Care Reviewed With: patient  Progress: improving  Outcome Evaluation: Patient ambulated additional 225' this afternoon CGA with FWW, still limited in distance by fatigue. No LOB or buckling with any mobility. [place] with HEP improving and patient gives good effort with this. IPPT remains indicated to address current deficits. Continue to recommend D/C home with assist and HHPT when medically appropriate.     Time Calculation:         PT Charges       Row Name 07/04/24 1514 07/04/24 0914          Time Calculation    Start Time 1345  -CK 0822  -CK     PT Received On 07/04/24  -CK 07/04/24  -CK        Timed Charges    83917 - PT Therapeutic Exercise Minutes 15  -CK 16  -CK     39602 - Gait Training Minutes  10  -CK 23  -CK        Total Minutes    Timed Charges Total Minutes 25  -CK 39  -CK      Total Minutes 25  -CK 39  -CK               User Key  (r) = Recorded By, (t) = Taken By, (c) = Cosigned By      Initials Name Provider Type     Kim Anne, PAPI Physical Therapist                  Therapy Charges for Today       Code Description Service Date Service Provider Modifiers Qty    62738451759 HC PT THER PROC EA 15 MIN 7/4/2024 Kim Anne, PT GP 1    11603842609 HC GAIT TRAINING EA 15 MIN 7/4/2024 Kim Anne, PT GP 2    57763168260 HC PT THER PROC EA 15 MIN 7/4/2024 Kim Anne, PT GP 1    72823042946 HC GAIT TRAINING EA 15 MIN 7/4/2024 Kim Anne, PT GP 1            PT  G-Codes  Outcome Measure Options: AM-PAC 6 Clicks Basic Mobility (PT)  AM-PAC 6 Clicks Score (PT): 20  AM-PAC 6 Clicks Score (OT): 22  PT Discharge Summary  Anticipated Discharge Disposition (PT): home with assist, home with home health    Kim Anne, PT  7/4/2024

## 2024-07-04 NOTE — PLAN OF CARE
Goal Outcome Evaluation:  Plan of Care Reviewed With: patient        Progress: improving  Outcome Evaluation: Patient ambulated additional 225' this afternoon CGA with FWW, still limited in distance by fatigue. No LOB or buckling with any mobility. [place] with HEP improving and patient gives good effort with this. IPPT remains indicated to address current deficits. Continue to recommend D/C home with assist and HHPT when medically appropriate.      Anticipated Discharge Disposition (PT): home with assist, home with home health

## 2024-07-04 NOTE — PROGRESS NOTES
[facility]    Acute pain service Inpatient Progress Note    Patient Name: Martha Murray  :  1955  MRN:  [id number]        Acute Pain  Service Inpatient Progress Note:    Analgesia:Good  LOC: alert and awake  Resp Status: room air  Cardiac: VS stable  Side Effects:None  Catheter Site:clean, dressing intact and dry  Cath type: peripheral nerve cath with ON Q  Volume: 1mL,8ml, 8ml InfuSystem Pump.  Catheter Plan:Catheter to remain Insitu and Continue catheter infusion rate unchanged  Comments:

## 2024-07-04 NOTE — PROGRESS NOTES
"Orthopedic Daily Progress Note      CC: Status post right total knee arthroplasty    Pain  controlled  General: no fevers, chills  Abdomen: no nausea, vomiting, or diarrhea    No other complaints    Physical Exam:  I have reviewed the vital signs.  Temp:  [97.1 °F (36.2 °C)-98.6 °F (37 °C)] 98 °F (36.7 °C)  Heart Rate:  [51-75] 60  Resp:  [14-20] 18  BP: (108-196)/() 126/64    Objective:  Vital signs: (most recent): Blood pressure 126/64, pulse 60, temperature 98 °F (36.7 °C), temperature source Oral, resp. rate 18, height 154.9 cm (60.98\"), weight 79.1 kg (174 lb 6.1 oz), SpO2 91%, not currently breastfeeding.              General Appearance:    Alert, cooperative, no distress  Extremities: No clubbing, cyanosis, or edema to lower extremities  Pulses:  2+ in distal surgical extremity  Skin: Dressing Clean/dry/intact      Results Review:    I have reviewed the labs, radiology results and diagnostic studies:    Results from last 7 days   Lab Units 07/04/24  0422   HEMOGLOBIN g/dL 12.9     Results from last 7 days   Lab Units 07/04/24  0422   SODIUM mmol/L 141   POTASSIUM mmol/L 4.3   CO2 mmol/L 22.0   CREATININE mg/dL 1.56*   GLUCOSE mg/dL 121*       I have reviewed the medications.    Assessment/Problem List  1 Day Post-Op S/p right total knee arthroplasty    Plan  Continue physical therapy weight-bear as tolerated  Discharge Planning    Juma Carmona MD  07/04/24  09:43 EDT            "

## 2024-07-04 NOTE — PLAN OF CARE
Goal Outcome Evaluation:  Plan of Care Reviewed With: patient        Progress: improving  Outcome Evaluation: Patient able to progress ambulation distance to 250' SBA with FWW and complete stair training on 3 steps per her home setup with CGA. No LOB, buckling, or chest pain with mobility. HEP reviewed with good effort from patient. IPPT remains indicated to address current deficits. Patient is cleared from a mobility standpoint to D/C home with family assist and HHPT today if medically appropriate. She owns a FWW and also SPC for stair navigation.      Anticipated Discharge Disposition (PT): home with assist, home with home health

## 2024-07-04 NOTE — THERAPY DISCHARGE NOTE
Acute Care - Occupational Therapy Discharge  [facility]    Patient Name: Martha Murray  : 1955    MRN: [id number]                              Today's Date: 2024       Admit Date: 7/3/2024    Visit Dx:     ICD-10-CM ICD-9-CM   1. S/P total knee arthroplasty, right  Z96.651 V43.65   2. Primary osteoarthritis of both knees  M17.0 715.16   3. Atypical angina  I20.89 413.9     Patient Active Problem List   Diagnosis    Essential hypertension    Mild intermittent asthma without complication    Type 2 diabetes mellitus without complication, without long-term current use of insulin    Hyperlipidemia LDL goal <70    Depression    Essential thrombocytosis    Onychomycosis with ingrown toenail    CKD (chronic kidney disease) stage 3, GFR 30-59 ml/min    Obesity (BMI 30.0-34.9)    Coronary artery disease involving native coronary artery of native heart with angina pectoris    S/P total knee arthroplasty, right    Arthritis of knee     Past Medical History:   Diagnosis Date    HAMMAD (acute kidney injury) 2021    Allergic     Anemia     Anesthesia complication     Slow to wake    Arthritis     COPD (chronic obstructive pulmonary disease)     Depression     Diabetes mellitus     History of heart attack     Hyperlipidemia     Hypertension      Past Surgical History:   Procedure Laterality Date    BILATERAL BREAST REDUCTION      BONE MARROW ASPIRATION      CATARACT EXTRACTION Bilateral     COLONOSCOPY      HYSTERECTOMY  1990    x 2    REDUCTION MAMMAPLASTY Bilateral     MORE THAN 20 YEARS AGO    SINUS SURGERY      TONSILLECTOMY        General Information       Row Name 24 0831          OT Time and Intention    Document Type discharge evaluation/summary;therapy note (daily note)  -TB     Mode of Treatment occupational therapy;individual therapy  -TB       Row Name 24 0831          General Information    Patient Profile Reviewed yes  -TB     Prior Level of Function independent:;all household  mobility;community mobility;transfer;bed mobility;ADL's;home management  Pt lives alone and reports limited by pain, but Independent.  -TB     Existing Precautions/Restrictions fall;other (see comments)  s/p R TKA, WBAT, RLE AC nerve catheter. Post Op chest pain.  -TB     Barriers to Rehab medically complex  -TB       Row Name 07/04/24 0831          Occupational Profile    Reason for Services/Referral (Occupational Profile) Occupational decline  -TB     Environmental Supports and Barriers (Occupational Profile) Pt lives alone in a single story home with 1+3 steps to enter. Tub/shower. Comfort ht commodes. RW available. Independent with all BADLs/IADLs.  -TB       Row Name 07/04/24 0831          Living Environment    People in Home alone;other (see comments)  Granddaughter, Sister, and Son to assist as needed at d/c.  -TB       Row Name 07/04/24 0831          Home Main Entrance    Number of Stairs, Main Entrance one;three  -TB     Stair Railings, Main Entrance none  -TB       Row Name 07/04/24 0831          Stairs Within Home, Primary    Number of Stairs, Within Home, Primary none  -TB       Row Name 07/04/24 0831          Cognition    Orientation Status (Cognition) oriented x 4  -TB       Row Name 07/04/24 0831          Safety Issues, Functional Mobility    Safety Issues Affecting Function (Mobility) insight into deficits/self-awareness;awareness of need for assistance;safety precaution awareness;safety precautions follow-through/compliance;sequencing abilities;positioning of assistive device  -TB     Impairments Affecting Function (Mobility) pain;strength;range of motion (ROM)  -TB     Comment, Safety Issues/Impairments (Mobility) Pt is up in room/bathroom with RW support and CGAx1. No dizziness, knee buckling, or LOB.  -TB               User Key  (r) = Recorded By, (t) = Taken By, (c) = Cosigned By      Initials Name Provider Type    TB Astrid Mark, OT Occupational Therapist                    Mobility/ADL's       Row Name 07/04/24 0836          Bed Mobility    Bed Mobility supine-sit;scooting/bridging  -TB     Scooting/Bridging [place] (Bed Mobility) independent  -TB     Supine-Sit [place] (Bed Mobility) independent  -TB     Bed Mobility, Safety Issues decreased use of legs for bridging/pushing  -TB     Comment, (Bed Mobility) Pt transitions from flat surface to EOB sitting in a timely manner.  -TB       Row Name 07/04/24 0836          Transfers    Transfers sit-stand transfer;stand-sit transfer;toilet transfer;bed-chair transfer  -TB     Comment, (Transfers) Education and cues for hand placement and sequencing. Good effort.  -TB       Row Name 07/04/24 0836          Bed-Chair Transfer    Bed-Chair [place] (Transfers) contact guard;verbal cues  -TB     Assistive Device (Bed-Chair Transfers) walker, front-wheeled  -TB       Row Name 07/04/24 0836          Sit-Stand Transfer    Sit-Stand [place] (Transfers) standby assist;verbal cues  -TB     Assistive Device (Sit-Stand Transfers) walker, front-wheeled  -TB       Row Name 07/04/24 0836          Stand-Sit Transfer    Stand-Sit [place] (Transfers) standby assist;verbal cues  -TB     Assistive Device (Stand-Sit Transfers) walker, front-wheeled  -TB       Row Name 07/04/24 0836          Toilet Transfer    Type (Toilet Transfer) sit-stand;stand-sit  -TB     [place] Level (Toilet Transfer) contact guard;verbal cues  -TB     Assistive Device (Toilet Transfer) raised toilet seat;grab bars/safety frame;walker, front-wheeled  -TB       Row Name 07/04/24 0836          Functional Mobility    Functional Mobility- Ind. Level contact guard assist;verbal cues required  -TB     Functional Mobility- Device walker, front-wheeled  -TB     Functional Mobility-Distance (Feet) 40  -TB     Functional Mobility- Safety Issues step length decreased;sequencing ability decreased  -TB     Functional Mobility- Comment Pt is up in room/bathroom with good  effort. No knee buckling or LOB.  -TB     Patient was able to Ambulate yes  -TB       Row Name 07/04/24 0836          Activities of Daily Living    BADL Assessment/Intervention bathing;upper body dressing;lower body dressing;grooming;feeding;toileting  -TB       Row Name 07/04/24 08          Mobility    Extremity Weight-bearing Status right lower extremity  -TB     Right Lower Extremity (Weight-bearing Status) weight-bearing as tolerated (WBAT)  -TB       Row Name 07/04/24 08          Bathing Assessment/Intervention    [place] Level (Bathing) set up;distal lower extremities/feet  -TB     Assistive Devices (Bathing) long-handled sponge  -TB     Position (Bathing) unsupported sitting  -TB     Comment, (Bathing) Education completed for precautions and ADL retraining including instruction that nerve catheter cannot get wet and that MD parameters limit showering until POD#10.  -TB       Row Name 07/04/24 08          Upper Body Dressing Assessment/Training    [place] Level (Upper Body Dressing) don;pajama/robe;set up  -TB     Position (Upper Body Dressing) edge of bed sitting  -TB       Row Name 07/04/24 08          Lower Body Dressing Assessment/Training    [place] Level (Lower Body Dressing) doff;don;socks;minimum assist (75% patient effort);verbal cues  -TB     Position (Lower Body Dressing) unsupported sitting  -TB     Comment, (Lower Body Dressing) Education completed for precautions and ADL retraining including instruction for nerve catheter line mgmt to prevent dislodgement.  -TB       Row Name 07/04/24 0836          Grooming Assessment/Training    [place] Level (Grooming) set up;supervision;wash face, hands;oral care regimen;hair care, combing/brushing  -TB     Oral Care teeth brushed - regular toothbrush;tongue brushed  -TB     Position (Grooming) sink side  -TB     Comment, (Grooming) Education and cues for RW positioning for safety at sink side  -TB       Row Name 07/04/24 08           Self-Feeding Assessment/Training    [place] Level (Feeding) independent;liquids to mouth  -TB     Position (Self-Feeding) supported sitting  -TB       Row Name 07/04/24 0836          Toileting Assessment/Training    [place] Level (Toileting) contact guard assist;adjust/manage clothing;independent;perform perineal hygiene  -TB     Position (Toileting) unsupported sitting;unsupported standing  -TB               User Key  (r) = Recorded By, (t) = Taken By, (c) = Cosigned By      Initials Name Provider Type    TB Astrid Mark, OT Occupational Therapist                   Obj/Interventions       Row Name 07/04/24 0842          Sensory Assessment (Somatosensory)    Sensory Assessment (Somatosensory) UE sensation intact  -TB       Row Name 07/04/24 0842          Vision Assessment/Intervention    Visual Impairment/Limitations WFL  -TB       Row Name 07/04/24 0842          Range of Motion Comprehensive    General Range of Motion bilateral upper extremity ROM WFL  -TB     Comment, General Range of Motion BUE AROM WFL for self-care performance  -TB       Row Name 07/04/24 0842          Balance    Balance Assessment sitting dynamic balance;sit to stand dynamic balance;standing dynamic balance;standing static balance  -TB     Dynamic Sitting Balance independent  -TB     Position, Sitting Balance unsupported;sitting in chair;sitting edge of bed;other (see comments)  Commode  -TB     Sit to Stand Dynamic Balance standby assist  -TB     Static Standing Balance standby assist  -TB     Dynamic Standing Balance contact guard  -TB     Position/Device Used, Standing Balance supported;walker, front-wheeled  -TB     Balance Interventions sitting;standing;sit to stand;supported;static;dynamic;dynamic reaching;occupation based/functional task;UE activity with balance activity  -TB     Comment, Balance No LOB. No knee buckling  -TB               User Key  (r) = Recorded By, (t) = Taken By, (c) = Cosigned By       Initials Name Provider Type    TB Astrid Mark, ANA Occupational Therapist                   Goals/Plan    No documentation.                  Clinical Impression       Row Name 07/04/24 0843          Pain Assessment    Pain Intervention(s) Ambulation/increased activity;Repositioned;Elevated;Cold applied;Other (Comment)  RLE AC nerve catheter  -TB     Additional Documentation Pain Scale: FACES Pre/Post-Treatment (Group)  -TB       Row Name 07/04/24 0843          Pain Scale: FACES Pre/Post-Treatment    Pain: FACES Scale, Pretreatment 2-->hurts little bit  -TB     Posttreatment Pain Rating 2-->hurts little bit  -TB     Pain Location - Side/Orientation Right  -TB     Pain Location generalized  -TB     Pain Location - knee  -TB     Pre/Posttreatment Pain Comment Pt tolerates dynamic OOB activity well  -TB       Row Name 07/04/24 0843          Plan of Care Review    Plan of Care Reviewed With patient  -TB     Progress improving  -TB     Outcome Evaluation OT IE completed. Pt is A/Ox4 and motivated to work with therapy. Able to transition to EOB sitting from flat surface Independently. BUE AROM, strength, and sensation are intact for activities. Pt stands with SBA. Up in room/bathroom with RW support and CGAx1. No dizziness, knee buckling, or LOB. Appropriate AE issued and teaching completed for ADL retaining including instruction for nerve catheter line mgmt. Min A LB dressing tasks. OT will d/c at this time. Recommend pt return home with family to assist when medically ready. Pt denies DME needs.  -TB       Row Name 07/04/24 0843          Therapy Assessment/Plan (OT)    Therapy Frequency (OT) evaluation only  -TB       Row Name 07/04/24 0843          Therapy Plan Review/Discharge Plan (OT)    Anticipated Discharge Disposition (OT) home with assist  -TB       Row Name 07/04/24 0843          Vital Signs    Pre Systolic BP Rehab --  RN cleared OT  -TB     Pre SpO2 (%) 95  -TB     O2 Delivery Pre Treatment room air   -TB     Pre Patient Position Supine  -TB     Intra Patient Position Standing  -TB     Post Patient Position Sitting  -TB       Row Name 07/04/24 0843          Positioning and Restraints    Pre-Treatment Position in bed  -TB     Post Treatment Position chair  -TB     In Chair notified nsg;reclined;call light within reach;encouraged to call for assist;with PT  -TB               User Key  (r) = Recorded By, (t) = Taken By, (c) = Cosigned By      Initials Name Provider Type    Astrid Pickett OT Occupational Therapist                   Outcome Measures       Row Name 07/04/24 0849          How much help from another is currently needed...    Putting on and taking off regular lower body clothing? 3  -TB     Bathing (including washing, rinsing, and drying) 3  -TB     Toileting (which includes using toilet bed pan or urinal) 4  -TB     Putting on and taking off regular upper body clothing 4  -TB     Taking care of personal grooming (such as brushing teeth) 4  -TB     Eating meals 4  -TB     AM-PAC 6 Clicks Score (OT) 22  -TB       Row Name 07/04/24 0849          Functional Assessment    Outcome Measure Options AM-PAC 6 Clicks Daily Activity (OT)  -TB               User Key  (r) = Recorded By, (t) = Taken By, (c) = Cosigned By      Initials Name Provider Type    Astrid Pickett OT Occupational Therapist                  Occupational Therapy Education       Title: PT OT SLP Therapies (In Progress)       Topic: Occupational Therapy (In Progress)       Point: ADL training (Done)       Description:   Instruct learner(s) on proper safety adaptation and remediation techniques during self care or transfers.   Instruct in proper use of assistive devices.                  Learning Progress Summary             Patient Acceptance, E,D,TB, VU,DU by TB at 7/4/2024 0849                         Point: Home exercise program (Not Started)       Description:   Instruct learner(s) on appropriate technique for monitoring,  assisting and/or progressing therapeutic exercises/activities.                  Learner Progress:  Not documented in this visit.              Point: Precautions (Done)       Description:   Instruct learner(s) on prescribed precautions during self-care and functional transfers.                  Learning Progress Summary             Patient Acceptance, E,D,TB, VU,DU by TB at 7/4/2024 0849                         Point: Body mechanics (Not Started)       Description:   Instruct learner(s) on proper positioning and spine alignment during self-care, functional mobility activities and/or exercises.                  Learner Progress:  Not documented in this visit.                              User Key       Initials Effective Dates Name Provider Type Discipline     07/11/23 -  Astrid Mark, OT Occupational Therapist OT                  OT Recommendation and Plan  Therapy Frequency (OT): evaluation only  Plan of Care Review  Plan of Care Reviewed With: patient  Progress: improving  Outcome Evaluation: OT IE completed. Pt is A/Ox4 and motivated to work with therapy. Able to transition to EOB sitting from flat surface Independently. BUE AROM, strength, and sensation are intact for activities. Pt stands with SBA. Up in room/bathroom with RW support and CGAx1. No dizziness, knee buckling, or LOB. Appropriate AE issued and teaching completed for ADL retaining including instruction for nerve catheter line mgmt. Min A LB dressing tasks. OT will d/c at this time. Recommend pt return home with family to assist when medically ready. Pt denies DME needs.  Plan of Care Reviewed With: patient  Outcome Evaluation: OT IE completed. Pt is A/Ox4 and motivated to work with therapy. Able to transition to EOB sitting from flat surface Independently. BUE AROM, strength, and sensation are intact for activities. Pt stands with SBA. Up in room/bathroom with RW support and CGAx1. No dizziness, knee buckling, or LOB. Appropriate AE  issued and teaching completed for ADL retaining including instruction for nerve catheter line mgmt. Min A LB dressing tasks. OT will d/c at this time. Recommend pt return home with family to assist when medically ready. Pt denies DME needs.     Time Calculation:   Evaluation Complexity (OT)  Review Occupational Profile/Medical/Therapy History Complexity: expanded/moderate complexity  Assessment, Occupational Performance/Identification of Deficit Complexity: 3-5 performance deficits  Clinical Decision Making Complexity (OT): detailed assessment/moderate complexity  Overall Complexity of Evaluation (OT): moderate complexity     Time Calculation- OT       Row Name 07/04/24 0744             Time Calculation- OT    OT Start Time 0744  -TB      OT Received On 07/04/24  -TB         Timed Charges    49595 - OT Self Care/Mgmt Minutes 28  -TB         Untimed Charges    OT Eval/Re-eval Minutes 40  -TB         Total Minutes    Timed Charges Total Minutes 28  -TB      Untimed Charges Total Minutes 40  -TB       Total Minutes 68  -TB                User Key  (r) = Recorded By, (t) = Taken By, (c) = Cosigned By      Initials Name Provider Type    TB Astrid Mark OT Occupational Therapist                  Therapy Charges for Today       Code Description Service Date Service Provider Modifiers Qty    13507806499  OT SELF CARE/MGMT/TRAIN EA 15 MIN 7/4/2024 Astrid Mark OT GO 2    [id number]  OT EVAL MOD COMPLEXITY 3 7/4/2024 Astrid Mark OT GO 1               OT Discharge Summary  Anticipated Discharge Disposition (OT): home with assist    Astrid Mark OT  7/4/2024

## 2024-07-05 ENCOUNTER — APPOINTMENT (OUTPATIENT)
Dept: CT IMAGING | Facility: HOSPITAL | Age: 69
End: 2024-07-05
Payer: MEDICARE

## 2024-07-05 VITALS
WEIGHT: 174.38 LBS | BODY MASS INDEX: 32.92 KG/M2 | RESPIRATION RATE: 16 BRPM | TEMPERATURE: 98.9 F | HEIGHT: 61 IN | SYSTOLIC BLOOD PRESSURE: 134 MMHG | HEART RATE: 60 BPM | OXYGEN SATURATION: 96 % | DIASTOLIC BLOOD PRESSURE: 72 MMHG

## 2024-07-05 LAB
ANION GAP SERPL CALCULATED.3IONS-SCNC: 10 MMOL/L (ref 5–15)
BUN SERPL-MCNC: 32 MG/DL (ref 8–23)
BUN/CREAT SERPL: 19.4 (ref 7–25)
CALCIUM SPEC-SCNC: 8.4 MG/DL (ref 8.6–10.5)
CHLORIDE SERPL-SCNC: 108 MMOL/L (ref 98–107)
CO2 SERPL-SCNC: 21 MMOL/L (ref 22–29)
CREAT SERPL-MCNC: 1.65 MG/DL (ref 0.57–1)
EGFRCR SERPLBLD CKD-EPI 2021: 33.7 ML/MIN/1.73
GLUCOSE BLDC GLUCOMTR-MCNC: 101 MG/DL (ref 70–130)
GLUCOSE BLDC GLUCOMTR-MCNC: 135 MG/DL (ref 70–130)
GLUCOSE SERPL-MCNC: 113 MG/DL (ref 65–99)
HCT VFR BLD AUTO: 39.8 % (ref 34–46.6)
HGB BLD-MCNC: 12.6 G/DL (ref 12–15.9)
POTASSIUM SERPL-SCNC: 4.7 MMOL/L (ref 3.5–5.2)
SODIUM SERPL-SCNC: 139 MMOL/L (ref 136–145)

## 2024-07-05 PROCEDURE — 82948 REAGENT STRIP/BLOOD GLUCOSE: CPT

## 2024-07-05 PROCEDURE — 75574 CT ANGIO HRT W/3D IMAGE: CPT | Performed by: INTERNAL MEDICINE

## 2024-07-05 PROCEDURE — 85018 HEMOGLOBIN: CPT | Performed by: INTERNAL MEDICINE

## 2024-07-05 PROCEDURE — 97116 GAIT TRAINING THERAPY: CPT

## 2024-07-05 PROCEDURE — 85014 HEMATOCRIT: CPT | Performed by: INTERNAL MEDICINE

## 2024-07-05 PROCEDURE — 97110 THERAPEUTIC EXERCISES: CPT

## 2024-07-05 PROCEDURE — 80048 BASIC METABOLIC PNL TOTAL CA: CPT | Performed by: INTERNAL MEDICINE

## 2024-07-05 RX ADMIN — ACETAMINOPHEN 1000 MG: 500 TABLET ORAL at 08:46

## 2024-07-05 RX ADMIN — AMLODIPINE BESYLATE 10 MG: 10 TABLET ORAL at 08:46

## 2024-07-05 RX ADMIN — ACETAMINOPHEN 1000 MG: 500 TABLET ORAL at 00:37

## 2024-07-05 RX ADMIN — Medication 3 ML: at 08:48

## 2024-07-05 RX ADMIN — OXYCODONE HYDROCHLORIDE 5 MG: 5 TABLET ORAL at 08:46

## 2024-07-05 RX ADMIN — METOPROLOL TARTRATE 25 MG: 25 TABLET, FILM COATED ORAL at 08:48

## 2024-07-05 RX ADMIN — OXYCODONE HYDROCHLORIDE 10 MG: 10 TABLET ORAL at 00:38

## 2024-07-05 RX ADMIN — LISINOPRIL 40 MG: 40 TABLET ORAL at 08:46

## 2024-07-05 RX ADMIN — FLUOXETINE HYDROCHLORIDE 60 MG: 20 CAPSULE ORAL at 08:46

## 2024-07-05 RX ADMIN — ASPIRIN 81 MG: 81 TABLET, COATED ORAL at 08:46

## 2024-07-05 RX ADMIN — Medication 10 ML: at 08:46

## 2024-07-05 NOTE — PROGRESS NOTES
"IM progress note      Martha Murray  [id number]  1955     LOS: 0 days     Attending: Keaton Aldrich MD    Primary Care Provider: Jenny Carvajal APRN      Chief Complaint/Reason for visit:  No chief complaint on file.    Late entry, for visit on 2024  Subjective   Doing well with fair pain control.  No nausea, vomiting, or shortness of breath.  No complaints of chest pain.  Was seen by physical therapy did well.  Was awaiting CT coronary angiogram when I saw her.  Noted results later with no significant disease.  Objective        Visit Vitals  /56 (BP Location: Left arm, Patient Position: Lying)   Pulse 59   Temp 98.7 °F (37.1 °C) (Oral)   Resp 18   Ht 154.9 cm (60.98\")   Wt 79.1 kg (174 lb 6.1 oz)   SpO2 92%   BMI 32.97 kg/m²     Temp (24hrs), Av.3 °F (36.8 °C), Min:98 °F (36.7 °C), Max:98.7 °F (37.1 °C)      Intake/Output:    Intake/Output Summary (Last 24 hours) at 2024 0659  Last data filed at 2024 1700  Gross per 24 hour   Intake --   Output 200 ml   Net -200 ml        Physical Therapy:  Kim Anne, PAPI   Physical Therapist  Specialty: Physical Therapy     Plan of Care     Signed     Date of Service: 24 1345  Creation Time: 24 1514     Signed         Goal Outcome Evaluation:  Plan of Care Reviewed With: patient  Progress: improving  Outcome Evaluation: Patient ambulated additional 225' this afternoon CGA with FWW, still limited in distance by fatigue. No LOB or buckling with any mobility. [place] with HEP improving and patient gives good effort with this. IPPT remains indicated to address current deficits. Continue to recommend D/C home with assist and HHPT when medically appropriate.        Anticipated Discharge Disposition (PT): home with assist, home with home health                 Physical Exam:     General Appearance:    Alert, cooperative, in no acute distress   Head:    Normocephalic, without obvious abnormality, atraumatic    Lungs:  "    Normal effort, symmetric chest rise,  clear to      auscultation bilaterally              Heart:    Regular rhythm and normal rate, normal S1 and S2    Abdomen:     Normal bowel sounds, no masses, no organomegaly, soft        non-tender, non-distended, no guarding, no rebound                tenderness   Extremities: Clean dry and intact dressing.  Peripheral nerve block catheter present.  Intact flexion dorsiflexion bilateral feet.  No clubbing, cyanosis or edema.  No deformities.    Pulses:   Pulses palpable and equal bilaterally   Skin:   No bleeding, bruising or rash          Results Review:     I reviewed the patient's new clinical results.   Results from last 7 days   Lab Units 07/04/24  0422   HEMOGLOBIN g/dL 12.9   HEMATOCRIT % 39.0     Results from last 7 days   Lab Units 07/04/24  0422   SODIUM mmol/L 141   POTASSIUM mmol/L 4.3   CHLORIDE mmol/L 108*   CO2 mmol/L 22.0   BUN mg/dL 19   CREATININE mg/dL 1.56*   CALCIUM mg/dL 8.2*   GLUCOSE mg/dL 121*     I reviewed the patient's new imaging including images and reports.    All medications reviewed.   acetaminophen, 1,000 mg, Oral, Q8H  amLODIPine, 10 mg, Oral, Daily  aspirin, 81 mg, Oral, Q12H  FLUoxetine, 60 mg, Oral, Daily  [Held by provider] hydroxyurea, 500 mg, Oral, Daily  insulin lispro, 2-7 Units, Subcutaneous, 4x Daily AC & at Bedtime  lisinopril, 40 mg, Oral, Daily  metoprolol tartrate, 25 mg, Oral, Q12H  rosuvastatin, 20 mg, Oral, Nightly  sodium chloride, 10 mL, Intravenous, Q12H  sodium chloride, 3 mL, Intravenous, Q12H  traZODone, 25 mg, Oral, Nightly      dextrose, 25 g, Q15 Min PRN  dextrose, 15 g, Q15 Min PRN  glucagon (human recombinant), 1 mg, Q15 Min PRN  HYDROmorphone, 0.5 mg, Q2H PRN   And  naloxone, 0.1 mg, Q5 Min PRN  ipratropium-albuterol, 3 mL, Q6H PRN  lidocaine PF 1%, 0.5 mL, Once PRN  nitroglycerin, 0.4 mg, Q5 Min PRN  ondansetron ODT, 4 mg, Q6H PRN   Or  ondansetron, 4 mg, Q6H PRN  oxyCODONE, 10 mg, Q4H PRN  oxyCODONE, 5 mg,  Q4H PRN  sodium chloride, 10 mL, PRN  sodium chloride, 3-10 mL, PRN  sodium chloride, 40 mL, PRN        Assessment & Plan       S/P total knee arthroplasty, right    Essential hypertension    Type 2 diabetes mellitus without complication, without long-term current use of insulin    Hyperlipidemia LDL goal <70    Essential thrombocytosis    CKD (chronic kidney disease) stage 3, GFR 30-59 ml/min    Coronary artery disease involving native coronary artery of native heart with angina pectoris    Arthritis of knee         Plan  1. PT/OT, protected weightbearing as tolerated right lower extremity  2. Pain control-prns, peripheral nerve block catheter, multimodal approach.  Avoiding NSAIDs.   3. IS-encouraged  4. DVT proph-mechanicals and aspirin.  5. Bowel regimen  6. Monitor post-op labs  7. DC planning    -Hypertension: Continue current regimen including beta-blocker, ACE inhibitor, and calcium channel blocker.    -Coronary artery disease: Continue aspirin, Crestor added by cardiology.    -CKD: Fairly stable.    -DM type 2  Hgb A1C 5.6  Hold OHA as appropriate  FSBG AC/HS, ( q 6 when NPO), along with correction humalog.       Rod Carter MD  07/05/24  06:59 EDT

## 2024-07-05 NOTE — THERAPY TREATMENT NOTE
Patient Name: Martha Murray  : 1955    MRN: [id number]                              Today's Date: 2024       Admit Date: 7/3/2024    Visit Dx:     ICD-10-CM ICD-9-CM   1. S/P total knee arthroplasty, right  Z96.651 V43.65   2. Primary osteoarthritis of both knees  M17.0 715.16   3. Atypical angina  I20.89 413.9   4. Essential thrombocytosis  D47.3 238.71     Patient Active Problem List   Diagnosis    Essential hypertension    Mild intermittent asthma without complication    Type 2 diabetes mellitus without complication, without long-term current use of insulin    Hyperlipidemia LDL goal <70    Depression    Essential thrombocytosis    Onychomycosis with ingrown toenail    CKD (chronic kidney disease) stage 3, GFR 30-59 ml/min    Obesity (BMI 30.0-34.9)    Coronary artery disease involving native coronary artery of native heart with angina pectoris    S/P total knee arthroplasty, right    Arthritis of knee     Past Medical History:   Diagnosis Date    HAMMAD (acute kidney injury) 2021    Allergic     Anemia     Anesthesia complication     Slow to wake    Arthritis     COPD (chronic obstructive pulmonary disease)     Depression     Diabetes mellitus     History of heart attack     Hyperlipidemia     Hypertension      Past Surgical History:   Procedure Laterality Date    BILATERAL BREAST REDUCTION      BONE MARROW ASPIRATION      CATARACT EXTRACTION Bilateral     COLONOSCOPY      HYSTERECTOMY  1990    x 2    REDUCTION MAMMAPLASTY Bilateral     MORE THAN 20 YEARS AGO    SINUS SURGERY  2003    TONSILLECTOMY        General Information       Row Name 24 0856          Physical Therapy Time and Intention    Document Type therapy note (daily note)  -CK     Mode of Treatment physical therapy;individual therapy  -CK       Row Name 24 0856          General Information    Patient Profile Reviewed yes  -CK     Existing Precautions/Restrictions fall;other (see comments);cardiac  RLE WBAT, adductor  nerve catheter, postop chest pain  -CK     Barriers to Rehab medically complex  -CK       Row Name 07/05/24 0856          Cognition    Orientation Status (Cognition) oriented x 4  -CK       Row Name 07/05/24 0856          Safety Issues, Functional Mobility    Safety Issues Affecting Function (Mobility) insight into deficits/self-awareness;safety precaution awareness  -CK     Impairments Affecting Function (Mobility) pain;strength;range of motion (ROM);balance;shortness of breath  -CK               User Key  (r) = Recorded By, (t) = Taken By, (c) = Cosigned By      Initials Name Provider Type    CK Kim Anne, PT Physical Therapist                   Mobility       Row Name 07/05/24 0856          Bed Mobility    Bed Mobility sit-supine  -CK     Sit-Supine [place] (Bed Mobility) supervision;verbal cues  -CK     Assistive Device (Bed Mobility) head of bed elevated;leg   -CK     Comment, (Bed Mobility) [facility] pre treatment, cues for sequencing with leg  for sit to sup  -CK       Row Name 07/05/24 0856          Sit-Stand Transfer    Sit-Stand [place] (Transfers) contact guard  -CK     Assistive Device (Sit-Stand Transfers) walker, front-wheeled  -CK       Row Name 07/05/24 0856          Gait/Stairs (Locomotion)    [place] Level (Gait) contact guard  -CK     Assistive Device (Gait) walker, front-wheeled  -CK     Distance in Feet (Gait) 350  -CK     Deviations/Abnormal Patterns (Gait) bilateral deviations;van decreased;gait speed decreased;stride length decreased;right sided deviations;antalgic;base of support, wide  -CK     Bilateral Gait Deviations heel strike decreased  -CK     [place] Level (Stairs) contact guard;1 person assist;verbal cues  -CK     Assistive Device (Stairs) cane, straight;other (see comments)  HHA  -CK     Number of Steps (Stairs) 3  -CK     Ascending Technique (Stairs) step-to-step  -CK     Descending Technique (Stairs) step-to-step  -CK     Comment,  (Gait/Stairs) Step to progressing to step through gait pattern. Cues to bring FWW closer to ASIF at times. Increased mediolateral sway but no LOB. Stair training reviewed with unilateral SPC and unilateral HHA, patient with improved eccentric control with descending steps, but still requiring increased support through HHA which her son will provide at D/C.  -       Row Name 07/05/24 0856          Mobility    Extremity Weight-bearing Status right lower extremity  -     Right Lower Extremity (Weight-bearing Status) weight-bearing as tolerated (WBAT)  -               User Key  (r) = Recorded By, (t) = Taken By, (c) = Cosigned By      Initials Name Provider Type    CK Kim Anne, PT Physical Therapist                   Obj/Interventions       Row Name 07/05/24 0888          Range of Motion Comprehensive    Comment, General Range of Motion R knee AROM 5-90  -       Row Name 07/05/24 0800          Motor Skills    Therapeutic Exercise hip;knee;ankle  -       Row Name 07/05/24 0805          Hip (Therapeutic Exercise)    Hip (Therapeutic Exercise) strengthening exercise  -     Hip Strengthening (Therapeutic Exercise) right;heel slides;10 repetitions  -       Row Name 07/05/24 0859          Knee (Therapeutic Exercise)    Knee (Therapeutic Exercise) isometric exercises;strengthening exercise  -     Knee Isometrics (Therapeutic Exercise) right;quad sets;10 repetitions;3 second hold  -     Knee Strengthening (Therapeutic Exercise) right;SLR (straight leg raise);SAQ (short arc quad);10 repetitions  -       Row Name 07/05/24 0859          Ankle (Therapeutic Exercise)    Ankle (Therapeutic Exercise) AROM (active range of motion)  -     Ankle AROM (Therapeutic Exercise) bilateral;dorsiflexion;plantarflexion;10 repetitions  -       Row Name 07/05/24 0885          Balance    Balance Assessment sitting static balance;standing static balance;standing dynamic balance  -     Static Sitting Balance  independent  -CK     Position, Sitting Balance unsupported;sitting edge of bed;sitting in chair  -CK     Static Standing Balance standby assist  -CK     Dynamic Standing Balance standby assist  -CK     Position/Device Used, Standing Balance supported;walker, front-wheeled  -CK     Comment, Balance no LOB or buckling  -CK               User Key  (r) = Recorded By, (t) = Taken By, (c) = Cosigned By      Initials Name Provider Type    CK Kim Anne, PT Physical Therapist                   Goals/Plan       Row Name 07/05/24 0902          Bed Mobility Goal 1 (PT)    Activity/Assistive Device (Bed Mobility Goal 1, PT) sit to supine/supine to sit  -CK     [place] Level/Cues Needed (Bed Mobility Goal 1, PT) modified independence  -CK     Time Frame (Bed Mobility Goal 1, PT) short term goal (STG);2 days  -CK     Progress/Outcomes (Bed Mobility Goal 1, PT) goal ongoing  -CK       Row Name 07/05/24 0902          Transfer Goal 1 (PT)    Activity/Assistive Device (Transfer Goal 1, PT) sit-to-stand/stand-to-sit  -CK     [place] Level/Cues Needed (Transfer Goal 1, PT) modified independence  -CK     Time Frame (Transfer Goal 1, PT) long term goal (LTG);3 days  -CK     Progress/Outcome (Transfer Goal 1, PT) goal ongoing  -CK       Row Name 07/05/24 0902          Gait Training Goal 1 (PT)    Activity/Assistive Device (Gait Training Goal 1, PT) gait (walking locomotion)  -CK     [place] Level (Gait Training Goal 1, PT) modified independence  -CK     Distance (Gait Training Goal 1, PT) 500'  -CK     Time Frame (Gait Training Goal 1, PT) long term goal (LTG);3 days  -CK     Progress/Outcome (Gait Training Goal 1, PT) goal ongoing  -CK       Row Name 07/05/24 0902          ROM Goal 1 (PT)    ROM Goal 1 (PT) Surgical Knee ROM: 0-110  -CK     Time Frame (ROM Goal 1, PT) long-term goal (LTG);3 days  -CK     Progress/Outcome (ROM Goal 1, PT) goal revised this date  -CK       Row Name 07/05/24 0902          Stairs Goal  1 (PT)    Activity/Assistive Device (Stairs Goal 1, PT) ascending stairs;descending stairs  -CK     [place] Level/Cues Needed (Stairs Goal 1, PT) modified independence  -CK     Number of Stairs (Stairs Goal 1, PT) 4  -CK     Time Frame (Stairs Goal 1, PT) long term goal (LTG);3 days  -CK     Progress/Outcome (Stairs Goal 1, PT) goal ongoing  -CK       Row Name 07/05/24 0902          Therapy Assessment/Plan (PT)    Planned Therapy Interventions (PT) balance training;bed mobility training;gait training;home exercise program;stretching;strengthening;stair training;ROM (range of motion);postural re-education;patient/family education;neuromuscular re-education;transfer training  -CK               User Key  (r) = Recorded By, (t) = Taken By, (c) = Cosigned By      Initials Name Provider Type    CK Kim Anne, PT Physical Therapist                   Clinical Impression       Row Name 07/05/24 0900          Pain    Pretreatment Pain Rating 6/10  -CK     Posttreatment Pain Rating 5/10  -CK     Pain Location - Side/Orientation Right  -CK     Pain Location anterior  -CK     Pain Location - knee  -CK     Pre/Posttreatment Pain Comment patient utilized PCA dose on nerve catheter  -CK     Pain Intervention(s) Ambulation/increased activity;Repositioned;Cold applied;Nursing Notified  -CK       Row Name 07/05/24 0900          Plan of Care Review    Plan of Care Reviewed With patient  -CK     Progress improving  -CK     Outcome Evaluation Patient with some increased pain today, however was able to increase ambulation distance to 350' CGA with FWW, stair training reviewed with patient able to navigate 3 steps per her home setup with SPC, HHA, and CGA. IPPT remains indicated to address current deficits. Continue to recommend D/C home with assist and HHPT when medically appropriate.  -CK       Row Name 07/05/24 0900          Vital Signs    Pre Systolic BP Rehab 121  -CK     Pre Treatment Diastolic BP 72  -CK      Pretreatment Heart Rate (beats/min) 67  -CK     Posttreatment Heart Rate (beats/min) 67  -CK     Pre SpO2 (%) 98  -CK     O2 Delivery Pre Treatment room air  -CK     O2 Delivery Intra Treatment room air  -CK     Post SpO2 (%) 96  -CK     O2 Delivery Post Treatment room air  -CK     Pre Patient Position Sitting  -CK     Post Patient Position Supine  -CK       Row Name 07/05/24 0900          Positioning and Restraints    Pre-Treatment Position sitting in chair/recliner  -CK     Post Treatment Position bed  -CK     In Bed fowlers;call light within reach;encouraged to call for assist;exit alarm on;SCD pump applied;notified nsg  -CK               User Key  (r) = Recorded By, (t) = Taken By, (c) = Cosigned By      Initials Name Provider Type    Kim Ramsay PT Physical Therapist                   Outcome Measures       Row Name 07/05/24 0903 07/05/24 0205       How much help from another person do you currently need...    Turning from your back to your side while in flat bed without using bedrails? 4  -CK 4  -TA    Moving from lying on back to sitting on the side of a flat bed without bedrails? 4  -CK 4  -TA    Moving to and from a bed to a chair (including a wheelchair)? 3  -CK 3  -TA    Standing up from a chair using your arms (e.g., wheelchair, bedside chair)? 3  -CK 3  -TA    Climbing 3-5 steps with a railing? 3  -CK 3  -TA    To walk in hospital room? 3  -CK 3  -TA    AM-PAC 6 Clicks Score (PT) 20  -CK 20  -TA    Highest Level of Mobility Goal 6 --> Walk 10 steps or more  -CK 6 --> Walk 10 steps or more  -TA      Row Name 07/05/24 0903          Functional Assessment    Outcome Measure Options AM-PAC 6 Clicks Basic Mobility (PT)  -CK               User Key  (r) = Recorded By, (t) = Taken By, (c) = Cosigned By      Initials Name Provider Type    Kim Ramsay PT Physical Therapist    Efra Hsu, RN Registered Nurse                                 Physical Therapy Education       Title: PT OT  SLP Therapies (In Progress)       Topic: Physical Therapy (Done)       Point: Mobility training (Done)       Learning Progress Summary             Patient Acceptance, E, VU by CK at 7/5/2024 0903    Acceptance, E, VU by CK at 7/4/2024 1514    Acceptance, E, VU by CK at 7/4/2024 0914    Acceptance, E,D, VU,NR by  at 7/3/2024 1830                         Point: Home exercise program (Done)       Learning Progress Summary             Patient Acceptance, E, VU by CK at 7/5/2024 0903    Acceptance, E, VU by CK at 7/4/2024 1514    Acceptance, E, VU by CK at 7/4/2024 0914    Acceptance, E,D, VU,NR by  at 7/3/2024 1830                         Point: Body mechanics (Done)       Learning Progress Summary             Patient Acceptance, E, VU by CK at 7/5/2024 0903    Acceptance, E, VU by CK at 7/4/2024 1514    Acceptance, E, VU by CK at 7/4/2024 0914    Acceptance, E,D, VU,NR by  at 7/3/2024 1830                         Point: Precautions (Done)       Learning Progress Summary             Patient Acceptance, E, VU by CK at 7/5/2024 0903    Acceptance, E, VU by CK at 7/4/2024 1514    Acceptance, E, VU by CK at 7/4/2024 0914    Acceptance, E,D, VU,NR by  at 7/3/2024 1830                                         User Key       Initials Effective Dates Name Provider Type Discipline     12/15/23 -  Carmen Pugh, PT Physical Therapist PT     02/06/24 -  Kim Anne PT Physical Therapist PT                  PT Recommendation and Plan  Planned Therapy Interventions (PT): balance training, bed mobility training, gait training, home exercise program, stretching, strengthening, stair training, ROM (range of motion), postural re-education, patient/family education, neuromuscular re-education, transfer training  Plan of Care Reviewed With: patient  Progress: improving  Outcome Evaluation: Patient with some increased pain today, however was able to increase ambulation distance to 350' CGA with FWW, stair training  reviewed with patient able to navigate 3 steps per her home setup with SPC, HHA, and CGA. IPPT remains indicated to address current deficits. Continue to recommend D/C home with assist and HHPT when medically appropriate.     Time Calculation:         PT Charges       Row Name 07/05/24 0903             Time Calculation    Start Time 0821  -CK      PT Received On 07/05/24  -CK         Timed Charges    00430 - PT Therapeutic Exercise Minutes 15  -CK      23637 - Gait Training Minutes  15  -CK         Total Minutes    Timed Charges Total Minutes 30  -CK       Total Minutes 30  -CK                User Key  (r) = Recorded By, (t) = Taken By, (c) = Cosigned By      Initials Name Provider Type    CK Kim Anne, PT Physical Therapist                  Therapy Charges for Today       Code Description Service Date Service Provider Modifiers Qty    06564158509 HC PT THER PROC EA 15 MIN 7/4/2024 Kim Anne, PT GP 1    70608439486 HC GAIT TRAINING EA 15 MIN 7/4/2024 Kim Anne, PT GP 2    09840060982 HC PT THER PROC EA 15 MIN 7/4/2024 Kim Anne, PT GP 1    91660388789 HC GAIT TRAINING EA 15 MIN 7/4/2024 Kim Anne, PT GP 1    37124131113 HC PT THER PROC EA 15 MIN 7/5/2024 Kim Anne, PT GP 1    88962302123 HC GAIT TRAINING EA 15 MIN 7/5/2024 Kim Anne, PT GP 1            PT G-Codes  Outcome Measure Options: AM-PAC 6 Clicks Basic Mobility (PT)  AM-PAC 6 Clicks Score (PT): 20  AM-PAC 6 Clicks Score (OT): 22  PT Discharge Summary  Anticipated Discharge Disposition (PT): home with assist, home with home health    Kim Anne PT  7/5/2024

## 2024-07-05 NOTE — PLAN OF CARE
Problem: Adult Inpatient Plan of Care  Goal: Plan of Care Review  Outcome: Ongoing, Progressing  Goal: Patient-Specific Goal (Individualized)  Outcome: Ongoing, Progressing  Goal: Absence of Hospital-Acquired Illness or Injury  Outcome: Ongoing, Progressing  Intervention: Identify and Manage Fall Risk  Recent Flowsheet Documentation  Taken 7/5/2024 0409 by Efra Fitzgerald RN  Safety Promotion/Fall Prevention:   activity supervised   assistive device/personal items within reach   clutter free environment maintained   fall prevention program maintained   gait belt   mobility aid in reach   nonskid shoes/slippers when out of bed   room organization consistent   safety round/check completed  Taken 7/5/2024 0205 by Efra Fitzgerald RN  Safety Promotion/Fall Prevention:   activity supervised   assistive device/personal items within reach   clutter free environment maintained   fall prevention program maintained   mobility aid in reach   nonskid shoes/slippers when out of bed   room organization consistent   safety round/check completed  Taken 7/4/2024 2150 by Efra Fitzgerald RN  Safety Promotion/Fall Prevention:   activity supervised   assistive device/personal items within reach   clutter free environment maintained   fall prevention program maintained   gait belt   mobility aid in reach   nonskid shoes/slippers when out of bed   room organization consistent   safety round/check completed  Taken 7/4/2024 1941 by Efra Fitzgerald RN  Safety Promotion/Fall Prevention:   activity supervised   assistive device/personal items within reach   clutter free environment maintained   fall prevention program maintained   gait belt   mobility aid in reach   nonskid shoes/slippers when out of bed   room organization consistent   safety round/check completed  Intervention: Prevent Skin Injury  Recent Flowsheet Documentation  Taken 7/5/2024 0409 by Efra Fitzgerald RN  Body Position: position changed independently  Taken 7/5/2024 0205 by  Efra Fitzgerald RN  Body Position: position changed independently  Taken 7/5/2024 0014 by Efra Fitzgerald RN  Body Position: position changed independently  Taken 7/4/2024 2150 by Efra Fitzgerald RN  Body Position: position changed independently  Taken 7/4/2024 1941 by Efra Fitzgerald RN  Body Position: position changed independently  Skin Protection:   adhesive use limited   tubing/devices free from skin contact  Intervention: Prevent and Manage VTE (Venous Thromboembolism) Risk  Recent Flowsheet Documentation  Taken 7/5/2024 0409 by Efra Fitzgerald RN  VTE Prevention/Management:   bilateral   sequential compression devices on  Taken 7/5/2024 0205 by Efra Fitzgerald RN  VTE Prevention/Management:   bilateral   sequential compression devices on  Taken 7/5/2024 0014 by Efra Fitzgerald RN  VTE Prevention/Management:   bilateral   sequential compression devices on  Taken 7/4/2024 2200 by Efra Fitzgerald RN  Activity Management: ambulated outside room  Taken 7/4/2024 2150 by Efra Fitzgerald RN  VTE Prevention/Management:   bilateral   sequential compression devices on  Taken 7/4/2024 1941 by Efra Fitzgerald RN  VTE Prevention/Management:   bilateral   sequential compression devices on  Intervention: Prevent Infection  Recent Flowsheet Documentation  Taken 7/5/2024 0409 by Efra Fitzgerald RN  Infection Prevention:   environmental surveillance performed   rest/sleep promoted   single patient room provided  Taken 7/5/2024 0205 by Efra Fitzgerald RN  Infection Prevention:   environmental surveillance performed   rest/sleep promoted   single patient room provided  Taken 7/5/2024 0014 by Efra Fitzgerald RN  Infection Prevention:   environmental surveillance performed   rest/sleep promoted   single patient room provided  Taken 7/4/2024 2150 by Efra Fitzgerald RN  Infection Prevention:   environmental surveillance performed   rest/sleep promoted   single patient room provided  Taken 7/4/2024 1941 by Efra Fitzgerald  RN  Infection Prevention:   environmental surveillance performed   rest/sleep promoted   single patient room provided  Goal: Optimal Comfort and Wellbeing  Outcome: Ongoing, Progressing  Intervention: Monitor Pain and Promote Comfort  Recent Flowsheet Documentation  Taken 7/5/2024 0409 by Efra Fitzgerald RN  Pain Management Interventions: no interventions per patient request  Taken 7/5/2024 0205 by Efra Fitzgerald RN  Pain Management Interventions: no interventions per patient request  Taken 7/5/2024 0014 by Efra Fitzgerald RN  Pain Management Interventions: pain pump in use  Taken 7/4/2024 2150 by Efra Fitzgerald RN  Pain Management Interventions:   pain pump in use   no interventions per patient request  Taken 7/4/2024 1941 by Efra Fitzgerald RN  Pain Management Interventions:   pain pump in use   position adjusted  Intervention: Provide Person-Centered Care  Recent Flowsheet Documentation  Taken 7/5/2024 0014 by Efra Fitzgerald RN  Trust Relationship/Rapport: care explained  Taken 7/4/2024 1941 by Efra Fitzgerald RN  Trust Relationship/Rapport: care explained  Goal: Readiness for Transition of Care  Outcome: Ongoing, Progressing     Problem: Adjustment to Surgery (Knee Arthroplasty)  Goal: Optimal Coping  Outcome: Ongoing, Progressing     Problem: Bleeding (Knee Arthroplasty)  Goal: Absence of Bleeding  Outcome: Ongoing, Progressing  Intervention: Monitor and Manage Bleeding  Recent Flowsheet Documentation  Taken 7/5/2024 0205 by Efra Fitzgerald RN  Bleeding Management: dressing monitored  Taken 7/4/2024 1941 by Efra Fitzgerald RN  Bleeding Management: dressing monitored     Problem: Bowel Motility Impaired (Knee Arthroplasty)  Goal: Effective Bowel Elimination  Outcome: Ongoing, Progressing     Problem: Fluid and Electrolyte Imbalance (Knee Arthroplasty)  Goal: Fluid and Electrolyte Balance  Outcome: Ongoing, Progressing     Problem: Functional Ability Impaired (Knee Arthroplasty)  Goal: Optimal Functional  Ability  Outcome: Ongoing, Progressing  Intervention: Promote Optimal Functional Status  Recent Flowsheet Documentation  Taken 7/4/2024 2200 by Efra Fitzgerald RN  Activity Management: ambulated outside room     Problem: Infection (Knee Arthroplasty)  Goal: Absence of Infection Signs and Symptoms  Outcome: Ongoing, Progressing  Intervention: Prevent or Manage Infection  Recent Flowsheet Documentation  Taken 7/5/2024 0409 by Efra Fitzgerald RN  Infection Prevention:   environmental surveillance performed   rest/sleep promoted   single patient room provided  Taken 7/5/2024 0205 by Efra Fitzgerald RN  Infection Prevention:   environmental surveillance performed   rest/sleep promoted   single patient room provided  Taken 7/5/2024 0014 by Efra Fitzgerald RN  Infection Prevention:   environmental surveillance performed   rest/sleep promoted   single patient room provided  Taken 7/4/2024 2150 by Efra Fitzgerald RN  Infection Prevention:   environmental surveillance performed   rest/sleep promoted   single patient room provided  Taken 7/4/2024 1941 by Efra Fitzgerald RN  Infection Prevention:   environmental surveillance performed   rest/sleep promoted   single patient room provided     Problem: Neurovascular Compromise (Knee Arthroplasty)  Goal: Intact Neurovascular Status  Outcome: Ongoing, Progressing     Problem: Ongoing Anesthesia Effects (Knee Arthroplasty)  Goal: Anesthesia/Sedation Recovery  Outcome: Ongoing, Progressing  Intervention: Optimize Anesthesia Recovery  Recent Flowsheet Documentation  Taken 7/5/2024 0409 by Efra Fitzgerald RN  Safety Promotion/Fall Prevention:   activity supervised   assistive device/personal items within reach   clutter free environment maintained   fall prevention program maintained   gait belt   mobility aid in reach   nonskid shoes/slippers when out of bed   room organization consistent   safety round/check completed  Taken 7/5/2024 0205 by Efra Fitzgerald RN  Safety Promotion/Fall  Prevention:   activity supervised   assistive device/personal items within reach   clutter free environment maintained   fall prevention program maintained   mobility aid in reach   nonskid shoes/slippers when out of bed   room organization consistent   safety round/check completed  Taken 7/4/2024 2150 by Efra Fitzgerald RN  Safety Promotion/Fall Prevention:   activity supervised   assistive device/personal items within reach   clutter free environment maintained   fall prevention program maintained   gait belt   mobility aid in reach   nonskid shoes/slippers when out of bed   room organization consistent   safety round/check completed  Administration (IS): self-administered  Taken 7/4/2024 1941 by Efra Fitzgerald RN  Safety Promotion/Fall Prevention:   activity supervised   assistive device/personal items within reach   clutter free environment maintained   fall prevention program maintained   gait belt   mobility aid in reach   nonskid shoes/slippers when out of bed   room organization consistent   safety round/check completed  Administration (IS): self-administered     Problem: Pain (Knee Arthroplasty)  Goal: Acceptable Pain Control  Outcome: Ongoing, Progressing  Intervention: Prevent or Manage Pain  Recent Flowsheet Documentation  Taken 7/5/2024 0409 by Efra Fitzgerald RN  Pain Management Interventions: no interventions per patient request  Taken 7/5/2024 0205 by Efra Fitzgerald RN  Pain Management Interventions: no interventions per patient request  Taken 7/5/2024 0014 by Efra Fitzgerald RN  Pain Management Interventions: pain pump in use  Taken 7/4/2024 2150 by Efra Fitzgerald RN  Pain Management Interventions:   pain pump in use   no interventions per patient request  Taken 7/4/2024 1941 by Efra Fitzgerald RN  Pain Management Interventions:   pain pump in use   position adjusted  Diversional Activities: television     Problem: Postoperative Nausea and Vomiting (Knee Arthroplasty)  Goal: Nausea and Vomiting  Relief  Outcome: Ongoing, Progressing     Problem: Postoperative Urinary Retention (Knee Arthroplasty)  Goal: Effective Urinary Elimination  Outcome: Ongoing, Progressing     Problem: Respiratory Compromise (Knee Arthroplasty)  Goal: Effective Oxygenation and Ventilation  Outcome: Ongoing, Progressing  Intervention: Optimize Oxygenation and Ventilation  Recent Flowsheet Documentation  Taken 7/5/2024 0409 by Efra Fitzgerald RN  Head of Bed (HOB) Positioning: HOB elevated  Taken 7/5/2024 0205 by Efra Fitzgerald RN  Head of Bed (HOB) Positioning: HOB elevated  Taken 7/5/2024 0014 by Efra Fitzgerald RN  Head of Bed (HOB) Positioning: HOB elevated  Taken 7/4/2024 2150 by Efra Fitzgerald RN  Administration (IS): self-administered  Head of Bed (HOB) Positioning: HOB elevated  Taken 7/4/2024 1941 by Efra Fitzgerald RN  Administration (IS): self-administered  Head of Bed (HOB) Positioning: HOB elevated     Problem: Fall Injury Risk  Goal: Absence of Fall and Fall-Related Injury  Outcome: Ongoing, Progressing  Intervention: Identify and Manage Contributors  Recent Flowsheet Documentation  Taken 7/4/2024 1941 by Efra Fitzgerald RN  Medication Review/Management: medications reviewed  Intervention: Promote Injury-Free Environment  Recent Flowsheet Documentation  Taken 7/5/2024 0409 by Efra Fitzgerald RN  Safety Promotion/Fall Prevention:   activity supervised   assistive device/personal items within reach   clutter free environment maintained   fall prevention program maintained   gait belt   mobility aid in reach   nonskid shoes/slippers when out of bed   room organization consistent   safety round/check completed  Taken 7/5/2024 0205 by Efra Fitzgerald RN  Safety Promotion/Fall Prevention:   activity supervised   assistive device/personal items within reach   clutter free environment maintained   fall prevention program maintained   mobility aid in reach   nonskid shoes/slippers when out of bed   room organization consistent    safety round/check completed  Taken 7/4/2024 2150 by Efra Fitzgerald, RN  Safety Promotion/Fall Prevention:   activity supervised   assistive device/personal items within reach   clutter free environment maintained   fall prevention program maintained   gait belt   mobility aid in reach   nonskid shoes/slippers when out of bed   room organization consistent   safety round/check completed  Taken 7/4/2024 1941 by Efra Fitzgerald, RN  Safety Promotion/Fall Prevention:   activity supervised   assistive device/personal items within reach   clutter free environment maintained   fall prevention program maintained   gait belt   mobility aid in reach   nonskid shoes/slippers when out of bed   room organization consistent   safety round/check completed   Goal Outcome Evaluation:      Patient resting in bed. VSS. Pt ambulated approx 200 ft this shift with staff assist. [employer] in use and patent.

## 2024-07-05 NOTE — PROGRESS NOTES
"CHIEF COMPLAINT: Follow-up right total knee arthroplasty    SUBJECTIVE  Patient resting comfortably.  Pain well-controlled.  No events overnight.    PHYSICAL THERAPY PROGRESS  Outcome Evaluation: Patient ambulated additional 225' this afternoon CGA with FWW, still limited in distance by fatigue. No LOB or buckling with any mobility. [place] with HEP improving and patient gives good effort with this. IPPT remains indicated to address current deficits. Continue to recommend D/C home with assist and HHPT when medically appropriate. (24 1508)     OBJECTIVE  Temp (24hrs), Av.3 °F (36.8 °C), Min:98 °F (36.7 °C), Max:98.7 °F (37.1 °C)    Blood pressure 105/56, pulse 59, temperature 98.7 °F (37.1 °C), temperature source Oral, resp. rate 18, height 154.9 cm (60.98\"), weight 79.1 kg (174 lb 6.1 oz), SpO2 92%, not currently breastfeeding.    Lab Results (last 24 hours)       Procedure Component Value Units Date/Time    Basic Metabolic Panel [[id number]]  (Abnormal) Collected: 24 0502    Specimen: Blood Updated: 24 05     Glucose 113 mg/dL      BUN 32 mg/dL      Creatinine 1.65 mg/dL      Sodium 139 mmol/L      Potassium 4.7 mmol/L      Chloride 108 mmol/L      CO2 21.0 mmol/L      Calcium 8.4 mg/dL      BUN/Creatinine Ratio 19.4     Anion Gap 10.0 mmol/L      eGFR 33.7 mL/min/1.73     Narrative:      GFR Normal >60  Chronic Kidney Disease <60  Kidney Failure <15      Hemoglobin & Hematocrit, Blood [[id number]]  (Normal) Collected: 24 0502    Specimen: Blood Updated: 24 0536     Hemoglobin 12.6 g/dL      Hematocrit 39.8 %     POC Glucose Once [[id number]]  (Abnormal) Collected: 24 2043    Specimen: Blood Updated: 24 2044     Glucose 134 mg/dL     POC Glucose Once [[id number]]  (Abnormal) Collected: 24 1637    Specimen: Blood Updated: 24 1638     Glucose 171 mg/dL     POC Glucose Once [[id number]]  (Normal) Collected: 24 1219    Specimen: Blood Updated: 24 " 1220     Glucose 86 mg/dL     POC Glucose Once [[id number]]  (Normal) Collected: 07/04/24 0720    Specimen: Blood Updated: 07/04/24 0722     Glucose 116 mg/dL               PHYSICAL EXAM  Right lower extremity: Dressing clean, dry and intact.  Able to perform straight leg raise without assist.  Intact EHL, FHL, tibialis anterior, and gastrocsoleus. Sensation intact to light touch to deep peroneal, superficial peroneal, sural, saphenous, tibial nerves. 2+ palpable DP and PT pulses.         S/P total knee arthroplasty, right    Essential hypertension    Type 2 diabetes mellitus without complication, without long-term current use of insulin    Hyperlipidemia LDL goal <70    Essential thrombocytosis    CKD (chronic kidney disease) stage 3, GFR 30-59 ml/min    Coronary artery disease involving native coronary artery of native heart with angina pectoris    Arthritis of knee      PLAN / DISPOSITION:  2 Days Post-Op right total knee arthroplasty    Protected weight bearing as tolerated right lower extremity, knee range of motion as tolerated  Pain control  PT/OT for post op mobilization and medical equipment needs   23 hours perioperative antibiotic prophylaxis   SCD's bilateral lower extremities   Aspirin for DVT prophylaxis   Social work for discharge planning.  Anticipate discharge home today.  Dressing to remain in place for 7 days. May remove on POD#7. If no drainage, may shower on POD#10. No submerging wound in water. If drainage is noted, sterile dressing should be placed and wound checked daily. No showering until wound has remained dry for 72 consecutive hours.   Follow up in 3 weeks for re-assessment.      Future Appointments   Date Time Provider Department Center   7/5/2024  1:00 PM Reynaldo Haines, PT [facility] None   7/8/2024 11:00 AM Kathe Fuller, PT [facility]   7/10/2024 10:00 AM Kathe Fuller, PT [facility]   7/15/2024 12:30 PM Kathe Fuller, PT [facility]   7/17/2024 10:00 AM  Kathe Fuller, PT [facility]   7/22/2024  1:30 PM Kathe Fuller, PT [facility]   7/23/2024  1:40 PM Amberly Shields PA-C [facility]   7/24/2024 10:00 AM Kathe Fuller, PT [facility]   9/19/2024  2:30 PM Paulo Graves MD [facility]       Keaton Aldrich MD  07/05/24  07:00 EDT

## 2024-07-05 NOTE — PROGRESS NOTES
[facility]    Acute pain service Inpatient Progress Note    Patient Name: Martha Murray  :  1955  MRN:  [id number]        Acute Pain  Service Inpatient Progress Note:    Analgesia:Good  Pain Score:2/10  LOC: alert and awake  Resp Status: room air  Cardiac: VS stable  Side Effects:None  Catheter Site:clean, dressing intact and dry  Cath type: peripheral nerve cath with ON Q  Volume: 1mL,8ml, 8ml InfuSystem Pump.  Catheter Plan:Catheter to remain Insitu and Continue catheter infusion rate unchanged  Comments:

## 2024-07-05 NOTE — PLAN OF CARE
Goal Outcome Evaluation:  Plan of Care Reviewed With: patient        Progress: improving  Outcome Evaluation: Patient with some increased pain today, however was able to increase ambulation distance to 350' CGA with FWW, stair training reviewed with patient able to navigate 3 steps per her home setup with SPC, HHA, and CGA. IPPT remains indicated to address current deficits. Continue to recommend D/C home with assist and HHPT when medically appropriate.      Anticipated Discharge Disposition (PT): home with assist, home with home health

## 2024-07-05 NOTE — DISCHARGE SUMMARY
Patient Name: Martha Murray  MRN: [id number]  : 1955  DOS: 2024    Attending: Keaton Aldrich MD    Primary Care Provider: Jenny Carvajal APRN    Date of Admission:.7/3/2024  7:51 AM    Date of Discharge:  2024    Discharge Diagnosis:   S/P total knee arthroplasty, right    Essential hypertension    Type 2 diabetes mellitus without complication, without long-term current use of insulin    Hyperlipidemia LDL goal <70    Essential thrombocytosis    CKD (chronic kidney disease) stage 3, GFR 30-59 ml/min    Coronary artery disease involving native coronary artery of native heart with angina pectoris    Arthritis of knee      Hospital Course    At admit:  Patient is a pleasant 69 y.o. female presented for scheduled surgery by Dr. Aldrich.     Per his note (The patient has a long history of progressive knee pain, arthritis, and degeneration resulting in deformity in the right knee from predominantly medial wear and bone loss. Non-operative treatment and conservative therapeutic measures have been attempted, but have not improved or controlled the symptoms and pain that occurs during normal daily activities. Knee motion has also become limited and is restricting the patient. Total knee arthroplasty was recommended.).     She underwent right total knee arthroplasty under spinal anesthesia periarticular block, tolerated surgery well.     While in PACU she complained of substernal chest pain with radiation.  She was seen by cardiology, was ruled out for MI by serial troponins and EKG.     Reviewed patient's past medical history including hypertension and tobacco abuse, hyperlipidemia, diabetes mellitus type 2, and history of mild coronary artery disease on CT coronary angiogram in .     After admit:    Patient was provided pain medications as needed for pain control, along with adductor canal nerve block infusion of Ropivacaine.      Adjustments were made to pain medications to optimize  postop pain management. Risks and benefits of opiate medications discussed with patient. JIGAR report was reviewed.    She was seen by PT and OT and has progressed well over her stay.    Patient used an IS for atelectasis prophylaxis and aspirin along with mechanicals for DVT prophylaxis.    She was worked up for her chest pain with CT coronary angiogram showing no significant disease.  Beta-blocker was added to her regimen for better blood pressure control.  Was also prescribed at discharge.    Home medications were resumed as appropriate, and labs were monitored and remained fairly stable.     With the progress she has made, Ms. Murray is ready for DC home today.      She will have an Infupump ( instructed on it during this admit).    Discussed with patient regarding plan and she shows understanding and agreement.    Patient will have PT following discharge.        Procedures Performed  Procedure(s):  TOTAL KNEE ARTHROPLASTY WITH NAINA ROBOT RIGHT       Pertinent Test Results:    I reviewed the patient's new clinical results.   Results from last 7 days   Lab Units 07/05/24  0502 07/04/24  0422   HEMOGLOBIN g/dL 12.6 12.9   HEMATOCRIT % 39.8 39.0     Results from last 7 days   Lab Units 07/05/24  0502 07/04/24  0422   SODIUM mmol/L 139 141   POTASSIUM mmol/L 4.7 4.3   CHLORIDE mmol/L 108* 108*   CO2 mmol/L 21.0* 22.0   BUN mg/dL 32* 19   CREATININE mg/dL 1.65* 1.56*   CALCIUM mg/dL 8.4* 8.2*   GLUCOSE mg/dL 113* 121*     I reviewed the patient's new imaging including images and reports.      Physical therapy  Kim Anne, PT   Physical Therapist  Specialty: Physical Therapy     Plan of Care     Signed     Date of Service: 07/05/24 0821  Creation Time: 07/05/24 0903     Signed         Goal Outcome Evaluation:  Plan of Care Reviewed With: patient  Progress: improving  Outcome Evaluation: Patient with some increased pain today, however was able to increase ambulation distance to 350' CGA with FWW, stair  "training reviewed with patient able to navigate 3 steps per her home setup with SPC, HHA, and CGA. IPPT remains indicated to address current deficits. Continue to recommend D/C home with assist and HHPT when medically appropriate.        Anticipated Discharge Disposition (PT): home with assist, home with home health              Discharge Assessment:       Visit Vitals  /72 (BP Location: Left arm, Patient Position: Lying)   Pulse 68   Temp 98.8 °F (37.1 °C) (Oral)   Resp 18   Ht 154.9 cm (60.98\")   Wt 79.1 kg (174 lb 6.1 oz)   SpO2 93%   BMI 32.97 kg/m²     Temp (24hrs), Av.5 °F (36.9 °C), Min:98 °F (36.7 °C), Max:98.8 °F (37.1 °C)      General Appearance:    Alert, cooperative, in no acute distress   Lungs:     Clear to auscultation,respirations regular, even and                   unlabored    Heart:    Regular rhythm and normal rate, normal S1 and S2   Abdomen:     Normal bowel sounds, no masses, no organomegaly, soft        non-tender, non-distended, no guarding, no rebound                 tenderness   Extremities:   CDI dressing surgical knee . ACB cath present, infupump.   Pulses:   Pulses palpable and equal bilaterally   Skin:   No bleeding, bruising or rash   Neurologic:   Cranial nerves 2 - 12 grossly intact, sensation intact, Flexion and dorsiflexion intact bilateral feet.         Discharge Disposition: Home        Discharge Medications        New Medications        Instructions Start Date   Acetaminophen Extra Strength 500 MG tablet   1,000 mg, Oral, Every 8 Hours, Take every 8 hours  as needed after 1 week      acetaminophen 500 MG tablet  Commonly known as: TYLENOL   1,000 mg, Oral, Every 8 Hours, Take every 8 hours  as needed after 1 week      metoprolol tartrate 25 MG tablet  Commonly known as: LOPRESSOR   25 mg, Oral, Every 12 Hours Scheduled      oxyCODONE 5 MG immediate release tablet  Commonly known as: Roxicodone   5 mg, Oral, Every 4 Hours PRN      oxyCODONE 5 MG immediate release " tablet  Commonly known as: Roxicodone   5 mg, Oral, Every 4 Hours PRN      ropivacaine 0.2 % infusion (INFUSYSTEM)  Commonly known as: NAROPIN   1 mL/hr (2 mg/hr), Peripheral Nerve, Continuous      Stool Softener 100 MG capsule  Generic drug: docusate sodium   100 mg, Oral, 2 Times Daily      docusate sodium 100 MG capsule  Commonly known as: COLACE   100 mg, Oral, 2 Times Daily             Changes to Medications        Instructions Start Date   Aspirin Low Dose 81 MG EC tablet  Generic drug: aspirin  What changed: You were already taking a medication with the same name, and this prescription was added. Make sure you understand how and when to take each.   81 mg, Oral, 2 Times Daily      aspirin 81 MG EC tablet  Commonly known as: ASPIR  What changed: You were already taking a medication with the same name, and this prescription was added. Make sure you understand how and when to take each.   81 mg, Oral, 2 Times Daily      aspirin 81 MG EC tablet  What changed: These instructions start on August 3, 2024. If you are unsure what to do until then, ask your doctor or other care provider.   81 mg, Oral, Daily   Start Date: August 3, 2024     hydroxyurea 500 MG capsule  Commonly known as: HYDREA  What changed: These instructions start on July 11, 2024. If you are unsure what to do until then, ask your doctor or other care provider.   500 mg, Oral, Daily   Start Date: July 11, 2024            Continue These Medications        Instructions Start Date   amLODIPine 10 MG tablet  Commonly known as: NORVASC   10 mg, Oral, Daily      FLUoxetine 20 MG capsule  Commonly known as: PROzac   60 mg, Oral, Daily      ipratropium-albuterol 0.5-2.5 mg/3 ml nebulizer  Commonly known as: DUO-NEB   Nebulize q 4 h prn wheezing / shortness of breath      lisinopril 40 MG tablet  Commonly known as: PRINIVIL,ZESTRIL   40 mg, Oral, Daily      nitroglycerin 0.4 MG SL tablet  Commonly known as: NITROSTAT   0.4 mg, Sublingual, Every 5 Minutes PRN,  Take no more than 3 doses in 15 minutes.      traZODone 50 MG tablet  Commonly known as: DESYREL   25 mg, Oral, Nightly             Stop These Medications      Chlorhexidine Gluconate 4 % solution              Discharge Diet: Cardiac    Activity at Discharge: Protected Weightbearing as tolerated       Future Appointments   Date Time Provider Department Center   7/5/2024  1:00 PM Reynaldo Haines, PT [facility] None   7/8/2024 11:00 AM Jonny Kathe, PT [facility]   7/10/2024 10:00 AM Jonny, Kathe, PT [facility]   7/15/2024 12:30 PM Jonny, Kathe, PT [facility]   7/17/2024 10:00 AM Jonny, Kathe, PT [facility]   7/22/2024  1:30 PM Jonny, Kathe, PT [facility]   7/23/2024  1:40 PM Amberly Shields PA-C [facility]   7/24/2024 10:00 AM JonnyKathe, PT [facility]   9/19/2024  2:30 PM Paulo Graves MD [facility]         Dragon disclaimer:  Part of this encounter note is an electronic transcription/translation of spoken language to printed text. The electronic translation of spoken language may permit erroneous, or at times, nonsensical words or phrases to be inadvertently transcribed; Although I have reviewed the note for such errors, some may still exist.       Rod Carter MD  07/05/24  11:37 EDT

## 2024-07-05 NOTE — PLAN OF CARE
Goal Outcome Evaluation:      Patient discharged home with family. Surgical dressing C/D/I. Infu pump in place and infusing

## 2024-07-07 ENCOUNTER — NURSE TRIAGE (OUTPATIENT)
Dept: CALL CENTER | Facility: HOSPITAL | Age: 69
End: 2024-07-07
Payer: MEDICARE

## 2024-07-08 ENCOUNTER — TELEPHONE (OUTPATIENT)
Dept: ORTHOPEDIC SURGERY | Facility: CLINIC | Age: 69
End: 2024-07-08
Payer: MEDICARE

## 2024-07-08 RX ORDER — ONDANSETRON 4 MG/1
4 TABLET, FILM COATED ORAL EVERY 8 HOURS PRN
Qty: 10 TABLET | Refills: 1 | Status: SHIPPED | OUTPATIENT
Start: 2024-07-08

## 2024-07-08 NOTE — TELEPHONE ENCOUNTER
Patient called stating that the medication she is taking might be making her sick, she is throwing up and her knee is in pain and also running a fever. Patient had surgery with Dr Aldrich on 7/3/24.    Best call back # [phone]    Please advise, thank you

## 2024-07-08 NOTE — TELEPHONE ENCOUNTER
Reason for Disposition   [1] MILD-MODERATE post-op pain (e.g., pain scale 1-7) AND [2] not controlled with pain medications   [1] Vomiting AND [2] present < 4 hours    Additional Information   Negative: Sounds like a life-threatening emergency to the triager   Negative: Chest pain   Negative: Difficulty breathing   Negative: Acting confused (e.g., disoriented, slurred speech) or excessively sleepy   Negative: Post-Op tonsil and adenoid surgery, symptoms or questions about   Negative: Surgical incision symptoms and questions   Negative: [1] Pain or burning with passing urine (urination) AND [2] male   Negative: [1] Pain or burning with passing urine (urination) AND [2] female   Negative: Constipation   Negative: New or worsening leg (calf, thigh) pain   Negative: New or worsening leg swelling   Negative: Dizziness is severe, or persists > 24 hours after surgery   Negative: Pain, redness, swelling, or pus at IV Site   Negative: Symptoms arising from use of a urinary catheter (e.g., Coude, Shrestha)   Negative: Cast problems or questions   Negative: Medication question   Negative: [1] Widespread rash AND [2] bright red, sunburn-like   Negative: [1] SEVERE headache AND [2] after spinal (epidural) anesthesia   Negative: [1] Vomiting AND [2] persists > 4 hours   Negative: [1] Vomiting AND [2] abdomen looks much more swollen than usual   Negative: [1] Drinking very little AND [2] dehydration suspected (e.g., no urine > 12 hours, very dry mouth, very lightheaded)   Negative: Patient sounds very sick or weak to the triager   Negative: Sounds like a serious complication to the triager   Negative: Fever > 100.4 F (38.0 C)   Negative: [1] SEVERE post-op pain (e.g., excruciating, pain scale 8-10) AND [2] not controlled with pain medications   Negative: [1] Caller has URGENT question AND [2] triager unable to answer question   Negative: [1] Headache AND [2] after spinal (epidural) anesthesia AND [3] not severe   Negative: Fever  "present > 3 days (72 hours)   Negative: [1] Caller has NON-URGENT question AND [2] triager unable to answer question   Negative: General activity, questions about   Negative: Resuming driving, questions about   Negative: Resuming sexual relations, questions about   Negative: Getting the incision wet, questions about   Negative: Throat pain after surgery, questions about    Answer Assessment - Initial Assessment Questions  1. SYMPTOM: \"What's the main symptom you're concerned about?\" (e.g., pain, fever, vomiting)      pain  2. ONSET: \"When did pain  start?\"      today  3. SURGERY: \"What surgery did you have?\"      7/3 knee replacement  4. DATE of SURGERY: \"When was the surgery?\"       7/3  5. ANESTHESIA: \" What type of anesthesia did you have?\" (e.g., general, spinal, epidural, local)      general  6. PAIN: \"Is there any pain?\" If Yes, ask: \"How bad is it?\"  (Scale 1-10; or mild, moderate, severe)      moderate  7. FEVER: \"Do you have a fever?\" If Yes, ask: \"What is your temperature, how was it measured, and when did it start?\"      no  8. VOMITING: \"Is there any vomiting?\" If Yes, ask: \"How many times?\"      2 or 3 times  9. BLEEDING: \"Is there any bleeding?\" If Yes, ask: \"How much?\" and \"Where?\"      no  10. OTHER SYMPTOMS: \"Do you have any other symptoms?\" (e.g., drainage from wound, painful urination, constipation)        Nausea and pain    Protocols used: Post-Op Symptoms and Questions-ADULT-    "

## 2024-07-10 ENCOUNTER — TELEPHONE (OUTPATIENT)
Dept: PHYSICAL THERAPY | Facility: CLINIC | Age: 69
End: 2024-07-10

## 2024-07-10 NOTE — TELEPHONE ENCOUNTER
Caller: Martha Murray    Relationship: Self         What was the call regarding: STILL SICK

## 2024-07-13 ENCOUNTER — APPOINTMENT (OUTPATIENT)
Dept: CT IMAGING | Facility: HOSPITAL | Age: 69
End: 2024-07-13
Payer: MEDICARE

## 2024-07-13 ENCOUNTER — HOSPITAL ENCOUNTER (EMERGENCY)
Facility: HOSPITAL | Age: 69
Discharge: HOME | End: 2024-07-13
Attending: STUDENT IN AN ORGANIZED HEALTH CARE EDUCATION/TRAINING PROGRAM
Payer: MEDICARE

## 2024-07-13 VITALS
SYSTOLIC BLOOD PRESSURE: 170 MMHG | HEART RATE: 89 BPM | WEIGHT: 175 LBS | HEIGHT: 61 IN | DIASTOLIC BLOOD PRESSURE: 73 MMHG | TEMPERATURE: 98.9 F | RESPIRATION RATE: 18 BRPM | OXYGEN SATURATION: 100 % | BODY MASS INDEX: 33.04 KG/M2

## 2024-07-13 DIAGNOSIS — K63.89 CECUM MASS: ICD-10-CM

## 2024-07-13 DIAGNOSIS — K52.9 GASTROENTERITIS: ICD-10-CM

## 2024-07-13 DIAGNOSIS — N30.00 ACUTE CYSTITIS WITHOUT HEMATURIA: Primary | ICD-10-CM

## 2024-07-13 LAB
ALBUMIN SERPL-MCNC: 3.7 G/DL (ref 3.5–5.2)
ALBUMIN/GLOB SERPL: 1.4 G/DL
ALP SERPL-CCNC: 190 U/L (ref 39–117)
ALT SERPL W P-5'-P-CCNC: 14 U/L (ref 1–33)
ANION GAP SERPL CALCULATED.3IONS-SCNC: 13.9 MMOL/L (ref 5–15)
AST SERPL-CCNC: 18 U/L (ref 1–32)
BACTERIA UR QL AUTO: ABNORMAL /HPF
BILIRUB SERPL-MCNC: 0.8 MG/DL (ref 0–1.2)
BILIRUB UR QL STRIP: NEGATIVE
BUN SERPL-MCNC: 21 MG/DL (ref 8–23)
BUN/CREAT SERPL: 18.6 (ref 7–25)
CALCIUM SPEC-SCNC: 8.8 MG/DL (ref 8.6–10.5)
CHLORIDE SERPL-SCNC: 103 MMOL/L (ref 98–107)
CLARITY UR: CLEAR
CO2 SERPL-SCNC: 19.1 MMOL/L (ref 22–29)
COLOR UR: YELLOW
CREAT SERPL-MCNC: 1.13 MG/DL (ref 0.57–1)
CRP SERPL-MCNC: 6.44 MG/DL (ref 0–0.5)
D-LACTATE SERPL-SCNC: 0.9 MMOL/L (ref 0.5–2)
DEPRECATED RDW RBC AUTO: 55.4 FL (ref 37–54)
EGFRCR SERPLBLD CKD-EPI 2021: 53.1 ML/MIN/1.73
EOSINOPHIL # BLD MANUAL: 0.28 10*3/MM3 (ref 0–0.4)
EOSINOPHIL NFR BLD MANUAL: 1 % (ref 0.3–6.2)
ERYTHROCYTE [DISTWIDTH] IN BLOOD BY AUTOMATED COUNT: 15.2 % (ref 12.3–15.4)
FLUAV RNA RESP QL NAA+PROBE: NOT DETECTED
FLUBV RNA RESP QL NAA+PROBE: NOT DETECTED
GEN 5 2HR TROPONIN T REFLEX: 12 NG/L
GLOBULIN UR ELPH-MCNC: 2.6 GM/DL
GLUCOSE SERPL-MCNC: 129 MG/DL (ref 65–99)
GLUCOSE UR STRIP-MCNC: NEGATIVE MG/DL
HCT VFR BLD AUTO: 38.1 % (ref 34–46.6)
HGB BLD-MCNC: 13 G/DL (ref 12–15.9)
HGB UR QL STRIP.AUTO: NEGATIVE
HYALINE CASTS UR QL AUTO: ABNORMAL /LPF
KETONES UR QL STRIP: ABNORMAL
LARGE PLATELETS: ABNORMAL
LEUKOCYTE ESTERASE UR QL STRIP.AUTO: ABNORMAL
LIPASE SERPL-CCNC: 59 U/L (ref 13–60)
LYMPHOCYTES # BLD MANUAL: 0.83 10*3/MM3 (ref 0.7–3.1)
LYMPHOCYTES NFR BLD MANUAL: 8 % (ref 5–12)
MAGNESIUM SERPL-MCNC: 1.7 MG/DL (ref 1.6–2.4)
MCH RBC QN AUTO: 33.6 PG (ref 26.6–33)
MCHC RBC AUTO-ENTMCNC: 34.1 G/DL (ref 31.5–35.7)
MCV RBC AUTO: 98.4 FL (ref 79–97)
METAMYELOCYTES NFR BLD MANUAL: 5 % (ref 0–0)
MONOCYTES # BLD: 2.21 10*3/MM3 (ref 0.1–0.9)
NEUTROPHILS # BLD AUTO: 22.94 10*3/MM3 (ref 1.7–7)
NEUTROPHILS NFR BLD MANUAL: 77 % (ref 42.7–76)
NEUTS BAND NFR BLD MANUAL: 6 % (ref 0–5)
NITRITE UR QL STRIP: NEGATIVE
PH UR STRIP.AUTO: 6 [PH] (ref 5–8)
PLATELET # BLD AUTO: 972 10*3/MM3 (ref 140–450)
PMV BLD AUTO: 9.5 FL (ref 6–12)
POTASSIUM SERPL-SCNC: 3.2 MMOL/L (ref 3.5–5.2)
PROT SERPL-MCNC: 6.3 G/DL (ref 6–8.5)
PROT UR QL STRIP: ABNORMAL
RBC # BLD AUTO: 3.87 10*6/MM3 (ref 3.77–5.28)
RBC # UR STRIP: ABNORMAL /HPF
RBC MORPH BLD: NORMAL
REF LAB TEST METHOD: ABNORMAL
SARS-COV-2 RNA RESP QL NAA+PROBE: NOT DETECTED
SCAN SLIDE: NORMAL
SMALL PLATELETS BLD QL SMEAR: ABNORMAL
SODIUM SERPL-SCNC: 136 MMOL/L (ref 136–145)
SP GR UR STRIP: 1.02 (ref 1–1.03)
SQUAMOUS #/AREA URNS HPF: ABNORMAL /HPF
TROPONIN T DELTA: -1 NG/L
TROPONIN T SERPL HS-MCNC: 13 NG/L
UROBILINOGEN UR QL STRIP: ABNORMAL
VARIANT LYMPHS NFR BLD MANUAL: 3 % (ref 19.6–45.3)
WBC # UR STRIP: ABNORMAL /HPF
WBC MORPH BLD: NORMAL
WBC NRBC COR # BLD AUTO: 27.64 10*3/MM3 (ref 3.4–10.8)

## 2024-07-13 PROCEDURE — 81001 URINALYSIS AUTO W/SCOPE: CPT | Performed by: STUDENT IN AN ORGANIZED HEALTH CARE EDUCATION/TRAINING PROGRAM

## 2024-07-13 PROCEDURE — 85025 COMPLETE CBC W/AUTO DIFF WBC: CPT | Performed by: STUDENT IN AN ORGANIZED HEALTH CARE EDUCATION/TRAINING PROGRAM

## 2024-07-13 PROCEDURE — 86140 C-REACTIVE PROTEIN: CPT | Performed by: STUDENT IN AN ORGANIZED HEALTH CARE EDUCATION/TRAINING PROGRAM

## 2024-07-13 PROCEDURE — 25510000001 IOPAMIDOL 61 % SOLUTION: Performed by: STUDENT IN AN ORGANIZED HEALTH CARE EDUCATION/TRAINING PROGRAM

## 2024-07-13 PROCEDURE — 74177 CT ABD & PELVIS W/CONTRAST: CPT

## 2024-07-13 PROCEDURE — 96374 THER/PROPH/DIAG INJ IV PUSH: CPT

## 2024-07-13 PROCEDURE — 87086 URINE CULTURE/COLONY COUNT: CPT | Performed by: STUDENT IN AN ORGANIZED HEALTH CARE EDUCATION/TRAINING PROGRAM

## 2024-07-13 PROCEDURE — 83690 ASSAY OF LIPASE: CPT | Performed by: STUDENT IN AN ORGANIZED HEALTH CARE EDUCATION/TRAINING PROGRAM

## 2024-07-13 PROCEDURE — 99285 EMERGENCY DEPT VISIT HI MDM: CPT

## 2024-07-13 PROCEDURE — 80053 COMPREHEN METABOLIC PANEL: CPT | Performed by: STUDENT IN AN ORGANIZED HEALTH CARE EDUCATION/TRAINING PROGRAM

## 2024-07-13 PROCEDURE — 36415 COLL VENOUS BLD VENIPUNCTURE: CPT

## 2024-07-13 PROCEDURE — 25010000002 ONDANSETRON PER 1 MG: Performed by: STUDENT IN AN ORGANIZED HEALTH CARE EDUCATION/TRAINING PROGRAM

## 2024-07-13 PROCEDURE — 85007 BL SMEAR W/DIFF WBC COUNT: CPT | Performed by: STUDENT IN AN ORGANIZED HEALTH CARE EDUCATION/TRAINING PROGRAM

## 2024-07-13 PROCEDURE — 87636 SARSCOV2 & INF A&B AMP PRB: CPT | Performed by: STUDENT IN AN ORGANIZED HEALTH CARE EDUCATION/TRAINING PROGRAM

## 2024-07-13 PROCEDURE — 83735 ASSAY OF MAGNESIUM: CPT | Performed by: STUDENT IN AN ORGANIZED HEALTH CARE EDUCATION/TRAINING PROGRAM

## 2024-07-13 PROCEDURE — 83605 ASSAY OF LACTIC ACID: CPT | Performed by: STUDENT IN AN ORGANIZED HEALTH CARE EDUCATION/TRAINING PROGRAM

## 2024-07-13 PROCEDURE — 84484 ASSAY OF TROPONIN QUANT: CPT | Performed by: STUDENT IN AN ORGANIZED HEALTH CARE EDUCATION/TRAINING PROGRAM

## 2024-07-13 PROCEDURE — 25810000003 LACTATED RINGERS SOLUTION: Performed by: STUDENT IN AN ORGANIZED HEALTH CARE EDUCATION/TRAINING PROGRAM

## 2024-07-13 PROCEDURE — 93005 ELECTROCARDIOGRAM TRACING: CPT | Performed by: STUDENT IN AN ORGANIZED HEALTH CARE EDUCATION/TRAINING PROGRAM

## 2024-07-13 RX ORDER — ONDANSETRON 4 MG/1
4 TABLET, ORALLY DISINTEGRATING ORAL EVERY 8 HOURS PRN
Qty: 20 TABLET | Refills: 0 | Status: SHIPPED | OUTPATIENT
Start: 2024-07-13

## 2024-07-13 RX ORDER — CEPHALEXIN 500 MG/1
500 CAPSULE ORAL 2 TIMES DAILY
Qty: 10 CAPSULE | Refills: 0 | Status: SHIPPED | OUTPATIENT
Start: 2024-07-13 | End: 2024-07-18

## 2024-07-13 RX ORDER — ONDANSETRON 2 MG/ML
4 INJECTION INTRAMUSCULAR; INTRAVENOUS ONCE
Status: COMPLETED | OUTPATIENT
Start: 2024-07-13 | End: 2024-07-13

## 2024-07-13 RX ADMIN — IOPAMIDOL 100 ML: 612 INJECTION, SOLUTION INTRAVENOUS at 11:39

## 2024-07-13 RX ADMIN — SODIUM CHLORIDE, POTASSIUM CHLORIDE, SODIUM LACTATE AND CALCIUM CHLORIDE 1000 ML: 600; 310; 30; 20 INJECTION, SOLUTION INTRAVENOUS at 09:54

## 2024-07-13 RX ADMIN — ONDANSETRON 4 MG: 2 INJECTION INTRAMUSCULAR; INTRAVENOUS at 09:54

## 2024-07-13 NOTE — DISCHARGE INSTRUCTIONS
You were evaluated for nausea vomiting diarrhea and abdominal pain.  We got labs and a CT scan that showed findings consistent with a viral gastroenteritis or a GI bug.  You are now stable for discharge.  We gave you IV fluids and Zofran we will send you home with Zofran.  We also tested your urine and found a weakly positive urine.  For this we will treat you with Keflex to ensure that you do not develop a urinary tract infection.  We also found a small fat-containing lesion off of the first part of your large intestine.  This is likely a benign growth however, radiologist recommends that you follow-up for colonoscopy.  We have provided a referral to our gastroenterologist to discuss this with you.  If you have severe increase in abdominal pain please [place] to emergency department for further evaluation.  You are now stable for discharge.

## 2024-07-13 NOTE — ED PROVIDER NOTES
Subjective:  History of Present Illness:    Patient is a 69-year-old female history of COPD, diabetes mellitus, hypertension, hyperlipidemia, CAD, recent right knee replacement who presents today with nausea vomiting diarrhea.  Reports 5 days of the symptoms.  Denies any fevers.  States that she has been feeling ill since she was sent home from her operative procedure.  Denies any dysuria.  No abnormal vaginal bleeding or discharge.  States that her postoperative wounds appear well and she is having minimal pain with physical therapy.  Denies chest pain or shortness of breath.      Nurses Notes reviewed and agree, including vitals, allergies, social history and prior medical history.     REVIEW OF SYSTEMS: All systems reviewed and not pertinent unless noted.  Review of Systems   Constitutional:  Positive for activity change, appetite change, chills and fatigue. Negative for fever.   HENT:  Positive for congestion. Negative for rhinorrhea, sinus pressure and sinus pain.    Eyes:  Negative for discharge and itching.   Respiratory:  Negative for cough and shortness of breath.    Cardiovascular:  Negative for chest pain and leg swelling.   Gastrointestinal:  Positive for abdominal pain, diarrhea, nausea and vomiting. Negative for abdominal distention and constipation.   Endocrine: Negative for cold intolerance and heat intolerance.   Genitourinary:  Negative for decreased urine volume, difficulty urinating, flank pain, frequency, urgency, vaginal bleeding, vaginal discharge and vaginal pain.   Musculoskeletal:  Negative for gait problem, neck pain and neck stiffness.   Skin:  Negative for color change.   Allergic/Immunologic: Negative for environmental allergies.   Neurological:  Negative for seizures, syncope, facial asymmetry and speech difficulty.   Psychiatric/Behavioral:  Negative for self-injury and suicidal ideas.        Past Medical History:   Diagnosis Date    HAMMAD (acute kidney injury) 07/01/2021    Allergic   "   Anemia     Anesthesia complication     Slow to wake    Arthritis     COPD (chronic obstructive pulmonary disease)     Depression     Diabetes mellitus     History of heart attack     Hyperlipidemia     Hypertension        Allergies:    Sulfa antibiotics      Past Surgical History:   Procedure Laterality Date    BILATERAL BREAST REDUCTION      BONE MARROW ASPIRATION      CATARACT EXTRACTION Bilateral     COLONOSCOPY      HYSTERECTOMY  1990    x 2    REDUCTION MAMMAPLASTY Bilateral     MORE THAN 20 YEARS AGO    SINUS SURGERY  2003    TONSILLECTOMY      TOTAL KNEE ARTHROPLASTY Right 7/3/2024    Procedure: TOTAL KNEE ARTHROPLASTY WITH NAINA ROBOT RIGHT;  Surgeon: Keaton Aldrich MD;  Location: [facility];  Service: Robotics - Ortho;  Laterality: Right;         Social History     Socioeconomic History    Marital status: Single   Tobacco Use    Smoking status: Former     Current packs/day: 0.00     Average packs/day: 2.0 packs/day for 30.0 years (60.0 ttl pk-yrs)     Types: Cigarettes     Start date:      Quit date:      Years since quittin.5    Smokeless tobacco: Never   Vaping Use    Vaping status: Never Used   Substance and Sexual Activity    Alcohol use: No    Drug use: No    Sexual activity: Defer         Family History   Problem Relation Age of Onset    Arthritis Maternal Grandmother     Hypertension Mother     Hyperlipidemia Mother     Thyroid disease Mother     Diabetes Sister     Crohn's disease Sister     Hypertension Sister     Heart attack Maternal Grandfather     Breast cancer Neg Hx     Ovarian cancer Neg Hx        Objective  Physical Exam:  /73   Pulse 89   Temp 98.9 °F (37.2 °C) (Oral)   Resp 18   Ht 154.9 cm (61\")   Wt 79.4 kg (175 lb)   SpO2 100%   BMI 33.07 kg/m²      Physical Exam  Constitutional:       General: She is not in acute distress.     Appearance: Normal appearance. She is obese. She is not ill-appearing.   HENT:      Head: Normocephalic and atraumatic.      " Nose: Nose normal. No congestion or rhinorrhea.      Mouth/Throat:      Mouth: Mucous membranes are dry.      Pharynx: Oropharynx is clear. No oropharyngeal exudate or posterior oropharyngeal erythema.   Eyes:      Extraocular Movements: Extraocular movements intact.      Conjunctiva/sclera: Conjunctivae normal.      Pupils: Pupils are equal, round, and reactive to light.   Cardiovascular:      Rate and Rhythm: Normal rate and regular rhythm.      Pulses: Normal pulses.      Heart sounds: Normal heart sounds.   Pulmonary:      Effort: Pulmonary effort is normal. No respiratory distress.      Breath sounds: Normal breath sounds.   Abdominal:      General: Abdomen is flat. Bowel sounds are normal. There is no distension.      Palpations: Abdomen is soft.      Tenderness: There is generalized abdominal tenderness. There is no right CVA tenderness, left CVA tenderness, guarding or rebound.   Musculoskeletal:         General: No swelling or tenderness. Normal range of motion.      Cervical back: Normal range of motion and neck supple.   Skin:     General: Skin is warm and dry.      Capillary Refill: Capillary refill takes less than 2 seconds.   Neurological:      General: No focal deficit present.      Mental Status: She is alert and oriented to person, place, and time. Mental status is at baseline.      Cranial Nerves: No cranial nerve deficit.      Sensory: No sensory deficit.      Motor: No weakness.      Coordination: Coordination normal.   Psychiatric:         Mood and Affect: Mood normal.         Behavior: Behavior normal.         Thought Content: Thought content normal.         Judgment: Judgment normal.         Procedures    ED Course:    ED Course as of 07/13/24 1412   Sat Jul 13, 2024   1003 EKG interpreted by me, normal sinus rhythm with no concerning ST changes noted, rate of 95 [JE]      ED Course User Index  [JE] Reynaldo Altman MD       Lab Results (last 24 hours)       Procedure Component Value Units  Date/Time    COVID-19 and FLU A/B PCR, 1 HR TAT - Swab, Nasopharynx [[id number]]  (Normal) Collected: 07/13/24 0949    Specimen: Swab from Nasopharynx Updated: 07/13/24 1014     COVID19 Not Detected     Influenza A PCR Not Detected     Influenza B PCR Not Detected    Narrative:      Fact sheet for providers: https://www.fda.gov/media/764258/download    Fact sheet for patients: https://www.fda.gov/media/573347/download    Test performed by PCR.    CBC & Differential [[id number]]  (Abnormal) Collected: 07/13/24 0954    Specimen: Blood Updated: 07/13/24 1041    Narrative:      The following orders were created for panel order CBC & Differential.  Procedure                               Abnormality         Status                     ---------                               -----------         ------                     CBC Auto Differential[[id number]]        Abnormal            Final result               Scan Slide[[id number]]                                       Final result                 Please view results for these tests on the individual orders.    Lactic Acid, Plasma [[id number]]  (Normal) Collected: 07/13/24 0954    Specimen: Blood Updated: 07/13/24 1025     Lactate 0.9 mmol/L     CBC Auto Differential [[id number]]  (Abnormal) Collected: 07/13/24 0954    Specimen: Blood Updated: 07/13/24 1038     WBC 27.64 10*3/mm3      RBC 3.87 10*6/mm3      Hemoglobin 13.0 g/dL      Hematocrit 38.1 %      MCV 98.4 fL      MCH 33.6 pg      MCHC 34.1 g/dL      RDW 15.2 %      RDW-SD 55.4 fl      MPV 9.5 fL      Platelets 972 10*3/mm3     Scan Slide [[id number]] Collected: 07/13/24 0954    Specimen: Blood Updated: 07/13/24 1041     Scan Slide --     Comment: See Manual Differential Results       Manual Differential [[id number]]  (Abnormal) Collected: 07/13/24 0954    Specimen: Blood Updated: 07/13/24 1041     Neutrophil % 77.0 %      Lymphocyte % 3.0 %      Monocyte % 8.0 %      Eosinophil % 1.0 %      Bands %  6.0 %      Metamyelocyte  % 5.0 %      Neutrophils Absolute 22.94 10*3/mm3      Lymphocytes Absolute 0.83 10*3/mm3      Monocytes Absolute 2.21 10*3/mm3      Eosinophils Absolute 0.28 10*3/mm3      RBC Morphology Normal     WBC Morphology Normal     Platelet Estimate Increased     Large Platelets Slight/1+    Urinalysis With Culture If Indicated - Urine, Clean Catch [[id number]]  (Abnormal) Collected: 07/13/24 1042    Specimen: Urine, Clean Catch Updated: 07/13/24 1052     Color, UA Yellow     Appearance, UA Clear     pH, UA 6.0     Specific Gravity, UA 1.025     Glucose, UA Negative     Ketones, UA 15 mg/dL (1+)     Bilirubin, UA Negative     Blood, UA Negative     Protein,  mg/dL (2+)     Leuk Esterase, UA Small (1+)     Nitrite, UA Negative     Urobilinogen, UA 0.2 E.U./dL    Narrative:      In absence of clinical symptoms, the presence of pyuria, bacteria, and/or nitrites on the urinalysis result does not correlate with infection.    Urinalysis, Microscopic Only - Urine, Clean Catch [[id number]]  (Abnormal) Collected: 07/13/24 1042    Specimen: Urine, Clean Catch Updated: 07/13/24 1103     RBC, UA 0-2 /HPF      WBC, UA 6-10 /HPF      Bacteria, UA 1+ /HPF      Squamous Epithelial Cells, UA 7-12 /HPF      Hyaline Casts, UA None Seen /LPF      Methodology Manual Light Microscopy    Urine Culture - Urine, Urine, Clean Catch [[id number]] Collected: 07/13/24 1042    Specimen: Urine, Clean Catch Updated: 07/13/24 1103    Comprehensive Metabolic Panel [[id number]]  (Abnormal) Collected: 07/13/24 1050    Specimen: Blood Updated: 07/13/24 1120     Glucose 129 mg/dL      BUN 21 mg/dL      Creatinine 1.13 mg/dL      Sodium 136 mmol/L      Potassium 3.2 mmol/L      Chloride 103 mmol/L      CO2 19.1 mmol/L      Calcium 8.8 mg/dL      Total Protein 6.3 g/dL      Albumin 3.7 g/dL      ALT (SGPT) 14 U/L      AST (SGOT) 18 U/L      Alkaline Phosphatase 190 U/L      Total Bilirubin 0.8 mg/dL      Globulin 2.6 gm/dL      A/G Ratio 1.4 g/dL       BUN/Creatinine Ratio 18.6     Anion Gap 13.9 mmol/L      eGFR 53.1 mL/min/1.73     Narrative:      GFR Normal >60  Chronic Kidney Disease <60  Kidney Failure <15      Lipase [[id number]]  (Normal) Collected: 07/13/24 1050    Specimen: Blood Updated: 07/13/24 1120     Lipase 59 U/L     High Sensitivity Troponin T [[id number]]  (Normal) Collected: 07/13/24 1050    Specimen: Blood Updated: 07/13/24 1123     HS Troponin T 13 ng/L     Narrative:      High Sensitive Troponin T Reference Range:  <14.0 ng/L- Negative Female for AMI  <22.0 ng/L- Negative Male for AMI  >=14 - Abnormal Female indicating possible myocardial injury.  >=22 - Abnormal Male indicating possible myocardial injury.   Clinicians would have to utilize clinical acumen, EKG, Troponin, and serial changes to determine if it is an Acute Myocardial Infarction or myocardial injury due to an underlying chronic condition.         C-reactive Protein [[id number]]  (Abnormal) Collected: 07/13/24 1050    Specimen: Blood Updated: 07/13/24 1120     C-Reactive Protein 6.44 mg/dL     Magnesium [[id number]]  (Normal) Collected: 07/13/24 1050    Specimen: Blood Updated: 07/13/24 1120     Magnesium 1.7 mg/dL     High Sensitivity Troponin T 2Hr [[id number]]  (Normal) Collected: 07/13/24 1251    Specimen: Blood Updated: 07/13/24 1312     HS Troponin T 12 ng/L      Troponin T Delta -1 ng/L     Narrative:      High Sensitive Troponin T Reference Range:  <14.0 ng/L- Negative Female for AMI  <22.0 ng/L- Negative Male for AMI  >=14 - Abnormal Female indicating possible myocardial injury.  >=22 - Abnormal Male indicating possible myocardial injury.   Clinicians would have to utilize clinical acumen, EKG, Troponin, and serial changes to determine if it is an Acute Myocardial Infarction or myocardial injury due to an underlying chronic condition.                  CT Abdomen Pelvis With Contrast    Result Date: 7/13/2024  PROCEDURE: CT ABDOMEN PELVIS W CONTRAST-  HISTORY: Diffuse  abdominal pain x 72 hours, eval SBO, acute process  COMPARISON: July 29, 2023.  PROCEDURE: The patient was injected with IV contrast. Axial images were obtained from the lung bases to the pubic symphysis by computed tomography. This study was performed with techniques to keep radiation doses as low as reasonably achievable, (ALARA). Individualized dose reduction techniques using automated exposure control or adjustment of mA and/or kV according to the patient size were employed.  FINDINGS:  ABDOMEN: The lung bases are clear. The heart is proper size. The liver enlarged at 19 cm. No focal mass seen except for calcified granulomas. Portal vein is mildly dilated at 1.6 cm. There are metallic surgical clips in the right upper quadrant consistent with previous cholecystectomy. The spleen enlarged at 15 cm. Recommend correlation with liver function test. There is fullness of the adrenal glands bilaterally, similar to prior exam. The pancreas is unremarkable. The kidneys are unremarkable, without evidence of mass or hydronephrosis except for stable scarring mid left kidney. Vascular calcifications noted.. The aorta is proper caliber. There is no free fluid or adenopathy.  PELVIS: The GI tract demonstrates no obstruction. In the proximal cecum close to the ileocecal valve there is a predominately fat attenuation circumscribed, 31 x 20 x 17 mm lesion. This appears to have enlarged compared to the prior exam. This likely represents a lipoma but given the interval enlargement, colonoscopy recommended to exclude suspicious solid mass. There is wall thickening throughout the entire colon, nonspecific but consider colitis. Uterus is diminutive or absent. The appendix is not identified but no secondary signs of appendicitis seen. The urinary bladder is almost completely collapsed. No free fluid identified. No bony destructive lesion seen.      Impression: Wall thickening throughout the colon, nonspecific but consider colitis.  3  cm fat-containing cecal lesion, possible lymph lipoma; recommend colonoscopy to exclude malignancy.  Hepatosplenomegaly with mildly dilated portal vein but no ascites; recommend correlation with liver function test.  CTDI: 9.56 mGy DLP:412.27 mGy.cm  This report was signed and finalized on 7/13/2024 12:10 PM by Adelina Holliday MD.          MDM      Initial impression of presenting illness: Abdominal pain nausea vomiting diarrhea    DDX: includes but is not limited to: Gastroenteritis, ileus, diverticulitis, SBO    Patient arrives stable with vitals interpreted by myself.     Pertinent features from physical exam: Clear to auscultation, regular rate and rhythm, no murmur, endorsing diffuse tenderness to abdominal palpation.    Initial diagnostic plan: CBC, CMP, lipase, UA, CRP, lactic acid, troponin, EKG, CT abdomen pelvis    Results from initial plan were reviewed and interpreted by me revealing patient CT scan consistent with gastroenteritis.  Note of cecal lipoma made.    Diagnostic information from other sources: Reviewed past medical records    Interventions / Re-evaluation: Given IV fluids, Zofran for symptom control    Results/clinical rationale were discussed with patient at bedside    Consultations/Discussion of results with other physicians: Discussed likely diagnosis of gastroenteritis is etiology of patient's symptoms.  Have prescribed Zofran and encourage oral hydration.  I did discuss findings of cecal mass with patient at bedside and have referred patient to gastroenterology for further evaluation.  UTI noted on urine.  Will treat with Keflex.  Given strict return precaution for severe increase in abdominal pain, inability tolerate p.o.    Disposition plan: Discharge  -----        Final diagnoses:   Acute cystitis without hematuria   Gastroenteritis   Cecum mass          Agapito, Reynaldo Messina MD  07/13/24 4836

## 2024-07-13 NOTE — Clinical Note
[facility] EMERGENCY DEPARTMENT  [address] [address]  Phone: [phone]    Martha Murray was seen and treated in our emergency department on 7/13/2024.  She may return to work on 07/16/2024.         Thank you for choosing [facility].    Reynaldo Altman MD

## 2024-07-14 LAB — BACTERIA SPEC AEROBE CULT: NORMAL

## 2024-07-15 ENCOUNTER — TELEPHONE (OUTPATIENT)
Dept: ORTHOPEDIC SURGERY | Facility: CLINIC | Age: 69
End: 2024-07-15
Payer: MEDICARE

## 2024-07-15 NOTE — TELEPHONE ENCOUNTER
Patient's Physical Therapy office notified us today that the patient has not been to any of her PT appointments since her date of surgery.     I attempted to call the patient to check on her, and see if I could be of assistance to her, and to see how she was doing with her knee.     Received no answer. LVM for the patient to return my call. Please transfer her to [phone] if she returns my call.     I also attempted to reach out to her son, Jose, who is on her verbal release. No answer. LVM for him to return my call as well.

## 2024-07-15 NOTE — TELEPHONE ENCOUNTER
I have also called and LVM for [facility] to return my call, to see if the patient accepted or declined the bike.

## 2024-07-15 NOTE — TELEPHONE ENCOUNTER
"Patient returned my call. Patient stated the day of d/c she started with a fever, diarrhea, nausea, vomiting, and the inability to keep anything down by mouth.   Patient stated this continued until her son took her to the  ED in [place] on 07/13/24.     Patient stated she was diagnosed with \"some form of intestinal infection\", and the put her on an antibiotic and some other medication.     Patient advised the only thing she has been able to keep down since she left the ED is applesauce, and water. Patient was going to try tuna, but I advised the patient to follow the BRAT diet (bananas, rice, applesauce, and toast) until she was able to tolerate more solid foods.     Patient stated she is able to ambulate well, and is only having a small amount of soreness with her right knee. Patient states she is doing exercises while sitting the best she can, and she is trying to walk some, but she does not have the strength to walk much.     Patient is wanting to know if there is anyway she can have [facility] come out to her house for a few visits while she builds strength back up, and is able to drive.       "

## 2024-07-16 DIAGNOSIS — Z96.651 S/P TKR (TOTAL KNEE REPLACEMENT), RIGHT: Primary | ICD-10-CM

## 2024-07-17 ENCOUNTER — TREATMENT (OUTPATIENT)
Age: 69
End: 2024-07-17
Payer: MEDICARE

## 2024-07-17 DIAGNOSIS — M17.10 ARTHRITIS OF KNEE: Primary | ICD-10-CM

## 2024-07-17 DIAGNOSIS — Z96.651 S/P TOTAL KNEE ARTHROPLASTY, RIGHT: ICD-10-CM

## 2024-07-17 DIAGNOSIS — Z47.89 ORTHOPEDIC AFTERCARE: ICD-10-CM

## 2024-07-17 NOTE — PROGRESS NOTES
"  Physical Therapy Initial Evaluation and Plan of Care  PT2U In-Home Evaluation        Patient gives consent to treatment to be performed in their home. Will be transitioned to outpatient facility as appropriate and as functional mobility allows.     Patient: Martha Murray   : 1955  Diagnosis/ICD-10 Code:  Arthritis of knee [M17.10]  Referring practitioner: Keaton Aldrich MD  Date of Initial Visit: 2024  Today's Date: 2024  Patient seen for 1 session         Visit Diagnoses:    ICD-10-CM ICD-9-CM   1. Arthritis of knee  M17.10 716.96   2. S/P total knee arthroplasty, right  Z96.651 V43.65   3. Orthopedic aftercare  Z47.89 V54.9         Subjective Questionnaire: LEFS:       Subjective Evaluation    History of Present Illness  Date of surgery: 7/3/2024  Mechanism of injury: Patient had right TKA two weeks ago today  She has not done any of the exercises she was given in the hospital- could not find them; she has not been elevating leg    She has been ill since the surgery, unrelated to her knee; she went to ER on  and was told she had \"some kind of stomach infection or virus\" ; she has been nauseated, vomiting, diarrhea for several days and difficulty keeping any food down; she has been very mobile around her house and states that she has been walking a lot    She drove herself to the [facility] earlier today without any difficulty- she mistakenly thought she had appt there today- she is concerned that she may not be able to attend therapy on rainy days due to her car not currently having [employer]    Patient is independent with ambulation, bed/chair/car transfers; lives alone and is taking care of herself post-operatively; she has a son and sister in town that can come by to assist prn    Current pain: 5/10 in stomach, 7/10 in knee    She complains of edema in her foot and ankle/ bruising but has not been elevating  She states she is icing 2 x per day         Patient " "Occupation: retired [employer] ; works at [employer] prn Quality of life: fair    Pain  Current pain ratin  Quality: discomfort, dull ache and tight  Relieving factors: ice, medications and change in position  Aggravating factors: movement, standing, stairs, ambulation, sleeping, prolonged positioning and squatting  Progression: improved    Social Support  Lives in: one-story house  Lives with: alone    Patient Goals  Patient goals for therapy: decreased pain, decreased edema, improved balance, increased motion, increased strength, independence with ADLs/IADLs, return to sport/leisure activities and return to work             Objective          Observations     Additional Knee Observation Details  Minimal edema in right knee, moderate edema in right foot and ankle along with ecchymosis    Neurological Testing     Sensation     Knee   Left Knee   Intact: Light touch    Right Knee   Intact: light touch     Active Range of Motion     Right Knee   Flexion: 100 degrees   Extension: 0 degrees     Strength/Myotome Testing     Right Knee   Flexion: 4  Extension: 4  Quadriceps contraction: fair    Tests     Additional Tests Details  Negative Homans     General Comments     Knee Comments  Patient demonstrated ability to ambulate barefoot in her front yard without assistive device with minimal to no antalgia or loss of balance, including ascending and descending 2 steps from her porch       Access Code: [email or web address]  URL: [email or web address]  Date: 2024  Prepared by: Reynaldo Haines    Exercises  - Supine Quad Set  - 3 x daily - 7 x weekly - 1-2 sets - 10 reps - 5\" hold  - Supine Heel Slides  - 3 x daily - 7 x weekly - 1-2 sets - 10 reps  - Supine Active Straight Leg Raise  - 3 x daily - 7 x weekly - 1-2 sets - 10 reps  - Supine Knee Extension Strengthening  - 3 x daily - 7 x weekly - 1-2 sets - 10 reps  - Supine Knee Extension Stretch on Towel Roll  - 3 x daily - 7 x weekly - 5-10 minutes or as " "tolerated  hold  - Seated Knee Flexion AAROM  - 3 x daily - 7 x weekly - 1-2 sets - 10 reps - 5-10\" hold  - Seated Passive Knee Extension  - 3 x daily - 7 x weekly - 5-10 minutes or as tolerated hold  - Seated Long Arc Quad  - 3 x daily - 7 x weekly - 1-2 sets - 10 reps  - Supine Ankle Pumps  - 3 x daily - 7 x weekly - 3 sets - 10 reps    Assessment & Plan       Assessment  Impairments: abnormal or restricted ROM, activity intolerance, impaired balance, impaired physical strength, lacks appropriate home exercise program, pain with function and weight-bearing intolerance   Functional limitations: carrying objects, lifting, sleeping, walking, pulling, pushing, uncomfortable because of pain, moving in bed, sitting, standing and stooping   Assessment details: Very pleasant 69-year-old seen today following right total knee arthroplasty 2 weeks ago; she has not attended outpatient therapy as scheduled due to stomach illness since her surgery; she did drive to physical therapy clinic today independently, but actually did not have a scheduled appointment today; patient has not been doing any home exercise program secondary to the fact that she cannot find her exercise sheets given to her in the hospital; she admits she has not been elevating her foot but has been icing her knee twice a day; she complains of pain 7 out of 10 in the knee, most notably when flexing her knee; she actually displays very good range of motion today especially considering she has not been doing much in the way of exercises-her range of motion today was 0 to 100 degrees; she has a good quad activation and is ambulating independently without an assistive device and with minimal to no antalgia; she lives alone and has been taking care of herself;  she is independent with all transfers and is driving  Barriers to therapy: Transportation, comorbidities/past medical history  Prognosis: fair  Prognosis details: Patient voices that it may be very difficult " for her to get to physical therapy due to transportation; she was encouraged to at least try to attend 1 physical therapy session per week, but 2-3 visits would be better    Goals  Plan Goals: 4 weeks  1.  Patient to be independent with home exercise program  2.  Patient to improve right knee range of motion to 0 to 120 degrees for ADLs  3.  Patient to ambulate functional distances greater than 250 feet with good heel-toe gait without loss of balance and without significant increase in pain with least restrictive assistive device  8 weeks  1.  Patient to improve lower extremity functional scale score by 1M GILBERT  2.  Patient to improve right lower extremity manual muscle test strength to greater than 4 out of 5 in all planes for ADLs  3.  Patient to improve right knee active range of motion to 0 to 130 degrees  4.  Patient to participate in up to 60 minutes of functional strength/balance/endurance exercises without a significant increase in pain for ADLs    Plan  Therapy options: will be seen for skilled therapy services  Planned modality interventions: iontophoresis, TENS, thermotherapy (hydrocollator packs), ultrasound, high voltage pulsed current (pain management), electrical stimulation/Russian stimulation, dry needling and cryotherapy  Planned therapy interventions: manual therapy, neuromuscular re-education, postural training, soft tissue mobilization, spinal/joint mobilization, strengthening, stretching, therapeutic activities, joint mobilization, home exercise program, gait training, functional ROM exercises, flexibility, body mechanics training and balance/weight-bearing training  Frequency: 3x week  Duration in weeks: 12  Treatment plan discussed with: patient          Timed:         Manual Therapy:         mins  78605;     Therapeutic Exercise:    15     mins  25363;     Neuromuscular Eric:    15    mins  67108;    Therapeutic Activity:     10     mins  28014;     Gait Training:           mins  81912;      Ultrasound:          mins  86838;    Ionto                                   mins   82628  Self Care                            mins   83609  Canalith Repos         mins 71058      Un-Timed:  Electrical Stimulation:         mins  70493 ( );  Dry Needling          mins self-pay  Traction          mins 43928  Low Eval     20     Mins  57845  Mod Eval          Mins  73286  High Eval                            Mins  06373        Timed Treatment:   40   mins   Total Treatment:     60   mins          PT: EDUARD Haines PT     License Number: [id number]  Electronically signed by EDUARD Haines PT, 07/17/24, 5:39 PM EDT    Certification Period: 7/17/2024 thru 10/14/2024  I certify that the therapy services are furnished while this patient is under my care.  The services outlined above are required by this patient, and will be reviewed every 90 days.         Physician Signature:__________________________________________________    PHYSICIAN: Keaton Aldrich MD  NPI: [id number]                                      DATE:      Please sign and return via fax to [email or web address] . Thank you, [facility].

## 2024-07-18 ENCOUNTER — PATIENT OUTREACH (OUTPATIENT)
Dept: CASE MANAGEMENT | Facility: OTHER | Age: 69
End: 2024-07-18
Payer: MEDICARE

## 2024-07-18 NOTE — OUTREACH NOTE
Patient Outreach    AMBULATORY CASE MANAGEMENT NOTE    Names and Relationships of Patient/Support Persons:  -      Unable to reach pt times 3 after recent ED visit despite message left. No answer or return calls. Will route call to MD. Beth GIBBS  Ambulatory Case Management    7/18/2024, 11:04 EDT

## 2024-07-23 ENCOUNTER — OFFICE VISIT (OUTPATIENT)
Dept: ORTHOPEDIC SURGERY | Facility: CLINIC | Age: 69
End: 2024-07-23
Payer: MEDICARE

## 2024-07-23 DIAGNOSIS — Z96.651 S/P TKR (TOTAL KNEE REPLACEMENT), RIGHT: Primary | ICD-10-CM

## 2024-07-23 PROCEDURE — 99024 POSTOP FOLLOW-UP VISIT: CPT | Performed by: PHYSICIAN ASSISTANT

## 2024-07-23 PROCEDURE — 1159F MED LIST DOCD IN RCRD: CPT | Performed by: PHYSICIAN ASSISTANT

## 2024-07-23 PROCEDURE — 1160F RVW MEDS BY RX/DR IN RCRD: CPT | Performed by: PHYSICIAN ASSISTANT

## 2024-07-23 NOTE — PROGRESS NOTES
[facility] Orthopaedic Surgery Clinic Note        Subjective     Post-op (3 weeks S/P TOTAL KNEE ARTHROPLASTY WITH SELWYN ROBOT RIGHT (DOS 07/03/2024))       MIRTHA Murray is a 69 y.o. female.  Patient presents for their initial postop visit following right TKA with Selwyn robot performed on the above date by Dr. Aldrich.    Pain scale: 2-3/10. Associated symptoms pain, swelling, warmth, stiffness/tightness. Pain with walking, stairs. Pain eased by resting. Using nothing to assist with ambulation.  Had single home health PT visit last week.  Outpatient PT to start tomorrow.  She reports that she has been scheduled for once a week.  Will increase that to 2 times per week..    Patient denies any fever, chills, night sweats or other constitutional symptoms.          Objective      Physical Exam  There were no vitals taken for this visit.    There is no height or weight on file to calculate BMI.        Ortho Exam  Integument:   Right knee: Incision is healing well without redness, warmth, drainage or signs of infection.    Lower Extremities:   Right knee:    Tenderness:  Generalized pain typical following TKA    Effusion:  1+    Swelling: Positive with soft, nontender calf    Crepitus:  None    Range of motion:  Extension: 0°       Flexion: °  Instability:  No varus laxity, no valgus laxity, negative anterior drawer  Deformities:  None      Imaging Reviewed and Interpreted:  Ordered right knee plain films.  Imaging read/interpreted by Dr. Aldrich.    Imaging Results (Last 24 Hours)       Procedure Component Value Units Date/Time    XR Knee 3+ View With [place] Right [[id number]] Resulted: 07/23/24 1335     Updated: 07/23/24 1335    Narrative:      Indication: Status post right total knee arthroplasty    Comparison: Todays xrays were compared to previous xrays from 7/3/2024      Impression:           Right Knee: Demonstrate well positioned knee arthroplasty components in   satisfactory alignment without  evidence of wear, loosening, subsidence,   fracture, or osteolysis and No significant changes compared to prior   radiographs.                Assessment:  1. S/P TKR (total knee replacement), right        Plan:  Status post right TKA with Selwyn robot, stable.  Reviewed imaging with patient.  Keep appointment to start outpatient PT tomorrow.  Informed them that we like her to have 2 appointments per week at least until the next follow-up visit.  Recommend OTC NSAIDS/pain medication as needed.  Continue with ASA as directed for DVT prophylaxis.  No dental procedures until minimum of 3 months out from surgery.  Patient will require indefinite antibiotic prophylaxis before dental procedures.  Follow up with Dr. Aldrich in 3 weeks for repeat evaluation. Needs knee imaging.  Questions and concerns answered.      Amberly Shields PA-C  07/23/24  16:34 EDT      Dictated Utilizing Dragon Dictation.

## 2024-07-24 ENCOUNTER — TREATMENT (OUTPATIENT)
Dept: PHYSICAL THERAPY | Facility: CLINIC | Age: 69
End: 2024-07-24
Payer: MEDICARE

## 2024-07-24 DIAGNOSIS — Z47.89 ORTHOPEDIC AFTERCARE: ICD-10-CM

## 2024-07-24 DIAGNOSIS — Z96.651 S/P TOTAL KNEE ARTHROPLASTY, RIGHT: Primary | ICD-10-CM

## 2024-07-24 NOTE — PROGRESS NOTES
Physical Therapy Daily Treatment Note  [address] [place] [address]      Patient: Martha Murray   : 1955  Referring practitioner: Keaton Aldrich MD  Date of Initial Visit: Type: THERAPY  Noted: 2024  Today's Date: 2024  Patient seen for 2 sessions         Visit Diagnoses:    ICD-10-CM ICD-9-CM   1. S/P total knee arthroplasty, right  Z96.651 V43.65   2. Orthopedic aftercare  Z47.89 V54.9       Subjective   Patient reports that she had home health therapy but she did not care for him because he did not even come into her house. Pt reports she had PT in this very clinic and it did not go well. States the MD wants her to do PT 2X a week but she is just not sure that she will be able to do that. States the home health PT did not give her any handouts for exercises but did send her an email but she has not been able to get into her computer to see them.    Objective   See Exercise, Manual, and Modality Logs for complete treatment.   R knee ROM 3-100 deg  Gait: decreased knee flexion/extension, decreased Wbing R,  no AD    Assessment/Plan  Pt with reports of pain during treatment today with movement but was able to tolerate it. Educated pt regarding the importance of adherence to HEP and PT attendance to get the most out of her surgery. Pt was given a printed copy of HEP to perform at home. Continue with current POT progressing pt per tolerance    Timed:         Manual Therapy: 10        mins  31129;     Therapeutic Exercise:     29    mins  47840;     Neuromuscular Eric:      mins  04493;    Therapeutic Activity:          mins  61595;     Gait Training:           mins  20387;     Ultrasound:          mins  85898;    Ionto                                   mins   82746  Self Care                            mins   94057  Canalith Repos         mins 06617      Un-Timed:  Electrical Stimulation:        mins  01616 ( );  Dry Needling          mins self-pay  Traction           mins 67692      Timed Treatment:   39   mins   Total Treatment:     42   mins    Kathe Fuller, PT  [place] License: [id number]

## 2024-07-31 ENCOUNTER — TELEPHONE (OUTPATIENT)
Dept: PHYSICAL THERAPY | Facility: OTHER | Age: 69
End: 2024-07-31
Payer: MEDICARE

## 2024-07-31 NOTE — TELEPHONE ENCOUNTER
Caller: Martha Murray    Relationship: Self      What was the call regarding: PATIENT CANCELLED TODAYS APPT BECAUSE OF THE WEATHER

## 2024-08-02 ENCOUNTER — TELEPHONE (OUTPATIENT)
Dept: PHYSICAL THERAPY | Facility: OTHER | Age: 69
End: 2024-08-02
Payer: MEDICARE

## 2024-08-07 ENCOUNTER — TREATMENT (OUTPATIENT)
Dept: PHYSICAL THERAPY | Facility: CLINIC | Age: 69
End: 2024-08-07
Payer: MEDICARE

## 2024-08-07 DIAGNOSIS — Z96.651 S/P TOTAL KNEE ARTHROPLASTY, RIGHT: Primary | ICD-10-CM

## 2024-08-07 DIAGNOSIS — Z47.89 ORTHOPEDIC AFTERCARE: ICD-10-CM

## 2024-08-09 ENCOUNTER — TREATMENT (OUTPATIENT)
Dept: PHYSICAL THERAPY | Facility: CLINIC | Age: 69
End: 2024-08-09
Payer: MEDICARE

## 2024-08-09 DIAGNOSIS — Z96.651 S/P TOTAL KNEE ARTHROPLASTY, RIGHT: Primary | ICD-10-CM

## 2024-08-09 DIAGNOSIS — Z47.89 ORTHOPEDIC AFTERCARE: ICD-10-CM

## 2024-08-09 NOTE — PROGRESS NOTES
Physical Therapy Daily Treatment Note  [address] [place] [address]      Patient: Martha Murray   : 1955  Referring practitioner: Keaton Aldrich MD  Date of Initial Visit: Type: THERAPY  Noted: 2024  Today's Date: 2024  Patient seen for 4 sessions         Visit Diagnoses:    ICD-10-CM ICD-9-CM   1. S/P total knee arthroplasty, right  Z96.651 V43.65   2. Orthopedic aftercare  Z47.89 V54.9       Subjective   Patient reports that the knee has been really sore. Has been doing the exercises. Sees MD next week.    Objective   See Exercise, Manual, and Modality Logs for complete treatment.   0-115 deg  Gait:improved heel strike/toe off with knee flexion/extension and equal Wbing and stance time    Assessment/Plan  Pt with minimal reports of pain during treatment mostly with supine heel slides in the patellar tendon that improved following STM and patellar mobs. Pt continues to make good progress but does require cuing for normal gait mechanics. Educated pt to continue to work with her heel slides for quad stretching to aid with tightness in the knee. Also discussed that transitional motions and discomfort are still normal at this stage. Continue with current POT progressing pt per tolerance..    Timed:         Manual Therapy: 12        mins  23351;     Therapeutic Exercise:     26    mins  47602;     Neuromuscular Eric:      mins  23388;    Therapeutic Activity:          mins  39586;     Gait Training:           mins  49457;     Ultrasound:          mins  00412;    Ionto                                   mins   45710  Self Care                            mins   87423  Canalith Repos         mins 78947      Un-Timed:  Electrical Stimulation:        mins  72121 ( );  Dry Needling          mins self-pay  Traction          mins 77185      Timed Treatment:   38   mins   Total Treatment:     50   mins    Kathe Fuller, PT  [place] License: [id number]

## 2024-08-12 ENCOUNTER — TREATMENT (OUTPATIENT)
Dept: PHYSICAL THERAPY | Facility: CLINIC | Age: 69
End: 2024-08-12
Payer: MEDICARE

## 2024-08-12 DIAGNOSIS — Z47.89 ORTHOPEDIC AFTERCARE: ICD-10-CM

## 2024-08-12 DIAGNOSIS — Z96.651 S/P TOTAL KNEE ARTHROPLASTY, RIGHT: Primary | ICD-10-CM

## 2024-08-12 PROCEDURE — 97140 MANUAL THERAPY 1/> REGIONS: CPT

## 2024-08-12 PROCEDURE — 97110 THERAPEUTIC EXERCISES: CPT

## 2024-08-12 NOTE — PROGRESS NOTES
Physical Therapy Daily Treatment Note  [address] [place] [address]      Patient: Martha Murray   : 1955  Referring practitioner: Keaton Aldrich MD  Date of Initial Visit: Type: THERAPY  Noted: 2024  Today's Date: 2024  Patient seen for 5 sessions         Visit Diagnoses:    ICD-10-CM ICD-9-CM   1. S/P total knee arthroplasty, right  Z96.651 V43.65   2. Orthopedic aftercare  Z47.89 V54.9       Subjective   Patient reports that the knee has been ok. Has been icing it and doing her exercises. Notes she had a lot of pain and problems trying to put her shoe on this morning.    Objective   See Exercise, Manual, and Modality Logs for complete treatment.   0-120 deg  Gait: cued again for heel/toe pattern and increased time in stance phase    Assessment/Plan  Pt with minimal reports of pain during treatment today. Continues to improve her knee ROM but is still tight on the lateral knee. Updated HEP to include distal ITB stretching to aid with being able to put her shoe on easier and to aid with tightness. Discussed use of gait belt for her heel slides. Continue with current POT progressing pt per tolerance.     Timed:         Manual Therapy: 14        mins  18489;     Therapeutic Exercise:     40    mins  26848;     Neuromuscular Eric:      mins  17137;    Therapeutic Activity:          mins  34893;     Gait Training:           mins  73952;     Ultrasound:          mins  44391;    Ionto                                   mins   29901  Self Care                            mins   58590  Canalith Repos         mins 99505      Un-Timed:  Electrical Stimulation:        mins  81579 ( );  Dry Needling          mins self-pay  Traction          mins 29926      Timed Treatment:   54   mins   Total Treatment:     64   mins    Kathe Fuller, PT  [place] License: [id number]

## 2024-08-13 ENCOUNTER — OFFICE VISIT (OUTPATIENT)
Dept: ORTHOPEDIC SURGERY | Facility: CLINIC | Age: 69
End: 2024-08-13
Payer: MEDICARE

## 2024-08-13 DIAGNOSIS — Z96.651 S/P TKR (TOTAL KNEE REPLACEMENT), RIGHT: Primary | ICD-10-CM

## 2024-08-13 PROCEDURE — 1160F RVW MEDS BY RX/DR IN RCRD: CPT | Performed by: ORTHOPAEDIC SURGERY

## 2024-08-13 PROCEDURE — 99024 POSTOP FOLLOW-UP VISIT: CPT | Performed by: ORTHOPAEDIC SURGERY

## 2024-08-13 PROCEDURE — 1159F MED LIST DOCD IN RCRD: CPT | Performed by: ORTHOPAEDIC SURGERY

## 2024-08-13 NOTE — PROGRESS NOTES
Orthopaedic Clinic Note:  Knee Post Op    Chief Complaint   Patient presents with    Post-op     3 week follow up --6 weeks S/P TOTAL KNEE ARTHROPLASTY WITH NAINA ROBOT RIGHT (DOS 07/03/2024))        HPI    Ms. Murray is 6  week(s) s/p right total knee arthroplasty.  Patient rates her pain a 5/10 on the pain scale.  She is ambulating with no assistive device.  She is continue with outpatient physical therapy.  Denies fevers chills or constitutional symptoms.  She is taking ibuprofen and Tylenol as needed.  Overall she is happy with her progress.  Denies complications.    Past Medical History:   Diagnosis Date    HAMMAD (acute kidney injury) 07/01/2021    Allergic     Anemia     Anesthesia complication     Slow to wake    Arthritis     COPD (chronic obstructive pulmonary disease)     Depression     Diabetes mellitus     History of heart attack     Hyperlipidemia     Hypertension       Past Surgical History:   Procedure Laterality Date    BILATERAL BREAST REDUCTION      BONE MARROW ASPIRATION      CATARACT EXTRACTION Bilateral     COLONOSCOPY      HYSTERECTOMY  1990    x 2    REDUCTION MAMMAPLASTY Bilateral     MORE THAN 20 YEARS AGO    SINUS SURGERY  2003    TONSILLECTOMY      TOTAL KNEE ARTHROPLASTY Right 7/3/2024    Procedure: TOTAL KNEE ARTHROPLASTY WITH NAINA ROBOT RIGHT;  Surgeon: Keaton Aldrich MD;  Location: [facility];  Service: Robotics - Ortho;  Laterality: Right;     Family History   Problem Relation Age of Onset    Arthritis Maternal Grandmother     Hypertension Mother     Hyperlipidemia Mother     Thyroid disease Mother     Diabetes Sister     Crohn's disease Sister     Hypertension Sister     Heart attack Maternal Grandfather     Breast cancer Neg Hx     Ovarian cancer Neg Hx       Social History     Socioeconomic History    Marital status: Single   Tobacco Use    Smoking status: Former     Current packs/day: 0.00     Average packs/day: 2.0 packs/day for 30.0 years (60.0 ttl pk-yrs)     Types: Cigarettes      Start date:      Quit date:      Years since quittin.6    Smokeless tobacco: Never   Vaping Use    Vaping status: Never Used   Substance and Sexual Activity    Alcohol use: No    Drug use: No    Sexual activity: Defer      Current Outpatient Medications on File Prior to Visit   Medication Sig Dispense Refill    amLODIPine (NORVASC) 10 MG tablet TAKE 1 TABLET BY MOUTH DAILY (Patient taking differently: Take 1 tablet by mouth Daily. Pt unsure if taking) 90 tablet 2    aspirin (ASPIR) 81 MG EC tablet Take 1 tablet by mouth 2 (Two) Times a Day. 60 tablet 0    aspirin (ASPIR) 81 MG EC tablet Take 1 tablet by mouth 2 (Two) Times a Day. 60 tablet 0    aspirin 81 MG EC tablet Take 1 tablet by mouth Daily.      FLUoxetine (PROzac) 20 MG capsule Take 3 capsules by mouth Daily. 270 capsule 1    hydroxyurea (HYDREA) 500 MG capsule Take 1 capsule by mouth Daily. 30 capsule 11    hydroxyurea (HYDREA) 500 MG capsule Take 1 capsule by mouth Daily.      ipratropium-albuterol (DUO-NEB) 0.5-2.5 mg/3 ml nebulizer Nebulize q 4 h prn wheezing / shortness of breath 360 mL 0    lisinopril (PRINIVIL,ZESTRIL) 40 MG tablet Take 1 tablet by mouth Daily. 90 tablet 1    metoprolol tartrate (LOPRESSOR) 25 MG tablet Take 1 tablet by mouth Every 12 (Twelve) Hours. 60 tablet 1    nitroglycerin (NITROSTAT) 0.4 MG SL tablet Place 1 tablet under the tongue Every 5 (Five) Minutes As Needed for Chest Pain. Take no more than 3 doses in 15 minutes. 25 tablet 5    ondansetron (Zofran) 4 MG tablet Take 1 tablet by mouth Every 8 (Eight) Hours As Needed for Nausea or Vomiting. 10 tablet 1    ondansetron ODT (ZOFRAN-ODT) 4 MG disintegrating tablet Place 1 tablet on the tongue Every 8 (Eight) Hours As Needed for Nausea or Vomiting. 20 tablet 0    oxyCODONE (Roxicodone) 5 MG immediate release tablet Take 1 tablet by mouth Every 4 (Four) Hours As Needed for Moderate Pain. 40 tablet 0    oxyCODONE (Roxicodone) 5 MG immediate release tablet Take 1  tablet by mouth Every 4 (Four) Hours As Needed for Moderate Pain. 40 tablet 0    ropivacaine (NAROPIN) 0.2 % infusion (INFUSYSTEM) 2 mg/hr by Peripheral Nerve route Continuous.      traZODone (DESYREL) 50 MG tablet TAKE 0.5 TABLETS BY MOUTH EVERY NIGHT 30 tablet 3     No current facility-administered medications on file prior to visit.      Allergies   Allergen Reactions    Sulfa Antibiotics Rash        Review of Systems   Constitutional: Negative.    HENT: Negative.     Eyes: Negative.    Respiratory: Negative.     Cardiovascular: Negative.    Gastrointestinal: Negative.    Endocrine: Negative.    Genitourinary: Negative.    Musculoskeletal:  Positive for arthralgias.   Skin: Negative.    Allergic/Immunologic: Negative.    Neurological: Negative.    Hematological: Negative.    Psychiatric/Behavioral: Negative.          Physical Exam  not currently breastfeeding.    There is no height or weight on file to calculate BMI.    GENERAL APPEARANCE: awake, alert, oriented, in no acute distress and well developed, well nourished  LUNGS:  breathing nonlabored  EXTREMITIES: no clubbing, cyanosis  PERIPHERAL PULSES: palpable dorsalis pedis and posterior tibial pulses bilaterally.    GAIT:  Normal          Right Knee Exam:  ----------  ALIGNMENT: neutral  ----------  RANGE OF MOTION:  Decreased (0 - 115 degrees) with no extensor lag  LIGAMENTOUS STABILITY:   stable to varus and valgus stress at terminal extension and 30 degrees without any evidence of laxity  ----------  STRENGTH:  KNEE FLEXION 5/5  KNEE EXTENSION  5/5  ANKLE DORSIFLEXION  5/5  ANKLE PLANTARFLEXION  5/5  ----------  PAIN WITH PALPATION:denies tenderness to palpation about the knee  KNEE EFFUSION: yes, trace effusion  PAIN WITH KNEE ROM: no  PATELLAR CREPITUS:  no  ----------  SENSATION TO LIGHT TOUCH:  DEEP PERONEAL/SUPERFICIAL PERONEAL/SURAL/SAPHENOUS/TIBIAL:    intact  ----------  EDEMA:  no  ERYTHEMA:    no  WOUNDS/INCISIONS:   yes, well healed surgical  incision without evidence of erythema or drainage  _____________________________________________________________________  _____________________________________________________________________    RADIOGRAPHIC FINDINGS:   Indication: Status post right total knee arthroplasty    Comparison: Todays xrays were compared to previous xrays from 7/23/2024    Knee films: Demonstrate well positioned knee arthroplasty components in satisfactory alignment without evidence of wear, loosening, subsidence, fracture, or osteolysis and No significant changes compared to prior radiographs.       Assessment/Plan:   Diagnosis Plan   1. S/P TKR (total knee replacement), right  XR Knee 3+ View With [place] Right        Patient doing well 6 weeks status post right total knee arthroplasty.  Encouraged patient to continue working on edema control, range of motion exercises, gait training, and strengthening.  Otherwise, she is doing extremely well.  She is otherwise able to continue activity as tolerated without restrictions.  I will see her back in 2 months for repeat assessment x-ray 3 views right knee on return.  She is welcome follow-up sooner should problems arise.    Keaton Aldrich MD  08/13/24  11:54 EDT

## 2024-08-14 ENCOUNTER — TREATMENT (OUTPATIENT)
Dept: PHYSICAL THERAPY | Facility: CLINIC | Age: 69
End: 2024-08-14
Payer: MEDICARE

## 2024-08-14 DIAGNOSIS — Z96.651 S/P TOTAL KNEE ARTHROPLASTY, RIGHT: Primary | ICD-10-CM

## 2024-08-14 DIAGNOSIS — Z47.89 ORTHOPEDIC AFTERCARE: ICD-10-CM

## 2024-08-14 PROCEDURE — 97110 THERAPEUTIC EXERCISES: CPT

## 2024-08-14 PROCEDURE — 97116 GAIT TRAINING THERAPY: CPT

## 2024-08-14 PROCEDURE — 97140 MANUAL THERAPY 1/> REGIONS: CPT

## 2024-08-15 NOTE — PROGRESS NOTES
Physical Therapy Daily Treatment Note  [address] [place] [address]      Patient: Martha Murray   : 1955  Referring practitioner: Keaton Aldrich MD  Date of Initial Visit: Type: THERAPY  Noted: 2024  Today's Date: 2024  Patient seen for 6 sessions         Visit Diagnoses:    ICD-10-CM ICD-9-CM   1. S/P total knee arthroplasty, right  Z96.651 V43.65   2. Orthopedic aftercare  Z47.89 V54.9       Subjective   Patient reports  that the MD was very happy. Did not tell her she had to come back to therapy but to work on ROM and walking. Mowed without much issue just had to go slow getting off the mower. Notes main pain is on the outside of the knee still.     Objective   See Exercise, Manual, and Modality Logs for complete treatment.   Observation: incision very red possibly from recent sun exposure  Gait: cued pt for heel toe pattern, TKE in standing and equal Wbing/ stance time  R knee ROM after stretchin-120 deg    Assessment/Plan  Pt with minimal reports of pain during treatment today.Encouraged pt to at least follow up with PT every 2 weeks to make sure progress is still on schedule. Educated pt again regarding normal gait pattern and the importance of continuing to work on her knee flexion ROM. Continue with current POT but decrease frequency to 1X every 2 weeks due to pt's difficulty getting to PT. Progress per tolerance.     Timed:         Manual Therapy: 9      mins  20474;     Therapeutic Exercise:     24    mins  57934;     Neuromuscular Eric:      mins  26645;    Therapeutic Activity:          mins  48796;     Gait Trainin      mins  23554;     Ultrasound:          mins  09720;    Ionto                                   mins   40054  Self Care                            mins   44601  Canalith Repos         mins 95764      Un-Timed:  Electrical Stimulation:        mins  72444 ( );  Dry Needling          mins self-pay  Traction          mins  63810      Timed Treatment:   41   mins   Total Treatment:     41   mins    Kathe Fuller, PT  [place] License: [id number]

## 2024-09-05 RX ORDER — SODIUM PICOSULFATE, MAGNESIUM OXIDE, AND ANHYDROUS CITRIC ACID 12; 3.5; 1 G/175ML; G/175ML; MG/175ML
350 LIQUID ORAL TAKE AS DIRECTED
Qty: 350 ML | Refills: 0 | Status: SHIPPED | OUTPATIENT
Start: 2024-09-05 | End: 2024-09-06

## 2024-09-10 ENCOUNTER — TELEPHONE (OUTPATIENT)
Dept: GASTROENTEROLOGY | Facility: CLINIC | Age: 69
End: 2024-09-10
Payer: MEDICARE

## 2024-09-10 NOTE — TELEPHONE ENCOUNTER
Hub staff attempted to follow warm transfer process and was unsuccessful     Caller: Martha Murray    Relationship to patient: Self    Best call back number: [phone]    Patient is needing: PT IS CALLING TO CONFIRM HER PROCEDURE SCHEDULED FOR 09/16/2024. ALSO PT HAS NOT RECEIVED HER PREP MEDICATION FOR PROCEDURE. PLEASE SEND PREP TO THE [facility] IN [place]. IF NEEDING TO REACH OUT TO PT AND CAN NOT REACH PT. IT IS OKAY TO LVM.

## 2024-09-11 ENCOUNTER — PRIOR AUTHORIZATION (OUTPATIENT)
Dept: GASTROENTEROLOGY | Facility: CLINIC | Age: 69
End: 2024-09-11
Payer: MEDICARE

## 2024-09-11 NOTE — TELEPHONE ENCOUNTER
Outcome  Approved today by [employer] 2017  [place] Case: [id number], Status: Approved, Coverage Starts on: 6/12/2024 12:00:00 AM, Coverage Ends on: 9/11/2025 12:00:00 AM.  Authorization Expiration Date: 9/10/2025

## 2024-09-18 ENCOUNTER — LAB (OUTPATIENT)
Dept: LAB | Facility: HOSPITAL | Age: 69
End: 2024-09-18
Payer: MEDICARE

## 2024-09-18 DIAGNOSIS — D47.3 ESSENTIAL THROMBOCYTOSIS: Primary | ICD-10-CM

## 2024-09-18 DIAGNOSIS — D47.3 ESSENTIAL THROMBOCYTOSIS: ICD-10-CM

## 2024-09-18 LAB
ALBUMIN SERPL-MCNC: 4.3 G/DL (ref 3.5–5.2)
ALBUMIN/GLOB SERPL: 1.7 G/DL
ALP SERPL-CCNC: 113 U/L (ref 39–117)
ALT SERPL W P-5'-P-CCNC: 16 U/L (ref 1–33)
ANION GAP SERPL CALCULATED.3IONS-SCNC: 12.9 MMOL/L (ref 5–15)
AST SERPL-CCNC: 32 U/L (ref 1–32)
BASOPHILS # BLD AUTO: 0.15 10*3/MM3 (ref 0–0.2)
BASOPHILS NFR BLD AUTO: 1.4 % (ref 0–1.5)
BILIRUB SERPL-MCNC: 1.1 MG/DL (ref 0–1.2)
BUN SERPL-MCNC: 12 MG/DL (ref 8–23)
BUN/CREAT SERPL: 10.7 (ref 7–25)
CALCIUM SPEC-SCNC: 9.2 MG/DL (ref 8.6–10.5)
CHLORIDE SERPL-SCNC: 103 MMOL/L (ref 98–107)
CO2 SERPL-SCNC: 25.1 MMOL/L (ref 22–29)
CREAT SERPL-MCNC: 1.12 MG/DL (ref 0.57–1)
DEPRECATED RDW RBC AUTO: 54.7 FL (ref 37–54)
EGFRCR SERPLBLD CKD-EPI 2021: 53.7 ML/MIN/1.73
EOSINOPHIL # BLD AUTO: 0.63 10*3/MM3 (ref 0–0.4)
EOSINOPHIL NFR BLD AUTO: 5.7 % (ref 0.3–6.2)
ERYTHROCYTE [DISTWIDTH] IN BLOOD BY AUTOMATED COUNT: 14.7 % (ref 12.3–15.4)
GLOBULIN UR ELPH-MCNC: 2.5 GM/DL
GLUCOSE SERPL-MCNC: 106 MG/DL (ref 65–99)
HCT VFR BLD AUTO: 45.3 % (ref 34–46.6)
HGB BLD-MCNC: 15 G/DL (ref 12–15.9)
IMM GRANULOCYTES # BLD AUTO: 0.07 10*3/MM3 (ref 0–0.05)
IMM GRANULOCYTES NFR BLD AUTO: 0.6 % (ref 0–0.5)
LYMPHOCYTES # BLD AUTO: 1.33 10*3/MM3 (ref 0.7–3.1)
LYMPHOCYTES NFR BLD AUTO: 12 % (ref 19.6–45.3)
MCH RBC QN AUTO: 33.3 PG (ref 26.6–33)
MCHC RBC AUTO-ENTMCNC: 33.1 G/DL (ref 31.5–35.7)
MCV RBC AUTO: 100.4 FL (ref 79–97)
MONOCYTES # BLD AUTO: 0.72 10*3/MM3 (ref 0.1–0.9)
MONOCYTES NFR BLD AUTO: 6.5 % (ref 5–12)
NEUTROPHILS NFR BLD AUTO: 73.8 % (ref 42.7–76)
NEUTROPHILS NFR BLD AUTO: 8.17 10*3/MM3 (ref 1.7–7)
NRBC BLD AUTO-RTO: 0 /100 WBC (ref 0–0.2)
PLATELET # BLD AUTO: 682 10*3/MM3 (ref 140–450)
PMV BLD AUTO: 9.9 FL (ref 6–12)
POTASSIUM SERPL-SCNC: 4.1 MMOL/L (ref 3.5–5.2)
PROT SERPL-MCNC: 6.8 G/DL (ref 6–8.5)
RBC # BLD AUTO: 4.51 10*6/MM3 (ref 3.77–5.28)
SODIUM SERPL-SCNC: 141 MMOL/L (ref 136–145)
WBC NRBC COR # BLD AUTO: 11.07 10*3/MM3 (ref 3.4–10.8)

## 2024-09-18 PROCEDURE — 85025 COMPLETE CBC W/AUTO DIFF WBC: CPT

## 2024-09-18 PROCEDURE — 36415 COLL VENOUS BLD VENIPUNCTURE: CPT

## 2024-09-18 PROCEDURE — 80053 COMPREHEN METABOLIC PANEL: CPT

## 2024-09-19 ENCOUNTER — OFFICE VISIT (OUTPATIENT)
Dept: ONCOLOGY | Facility: CLINIC | Age: 69
End: 2024-09-19
Payer: MEDICARE

## 2024-09-19 VITALS
HEART RATE: 96 BPM | SYSTOLIC BLOOD PRESSURE: 197 MMHG | DIASTOLIC BLOOD PRESSURE: 76 MMHG | TEMPERATURE: 97.5 F | RESPIRATION RATE: 18 BRPM | HEIGHT: 61 IN | BODY MASS INDEX: 30.89 KG/M2 | OXYGEN SATURATION: 98 % | WEIGHT: 163.6 LBS

## 2024-09-19 DIAGNOSIS — D47.3 ESSENTIAL THROMBOCYTOSIS: Primary | ICD-10-CM

## 2024-09-20 ENCOUNTER — SPECIALTY PHARMACY (OUTPATIENT)
Dept: ONCOLOGY | Facility: HOSPITAL | Age: 69
End: 2024-09-20
Payer: MEDICARE

## 2024-10-18 ENCOUNTER — OFFICE VISIT (OUTPATIENT)
Dept: ORTHOPEDIC SURGERY | Facility: CLINIC | Age: 69
End: 2024-10-18
Payer: MEDICARE

## 2024-10-18 VITALS
DIASTOLIC BLOOD PRESSURE: 80 MMHG | SYSTOLIC BLOOD PRESSURE: 140 MMHG | BODY MASS INDEX: 30.78 KG/M2 | WEIGHT: 163 LBS | HEIGHT: 61 IN

## 2024-10-18 DIAGNOSIS — Z96.651 S/P TKR (TOTAL KNEE REPLACEMENT), RIGHT: Primary | ICD-10-CM

## 2024-10-22 ENCOUNTER — TELEPHONE (OUTPATIENT)
Dept: ORTHOPEDIC SURGERY | Facility: CLINIC | Age: 69
End: 2024-10-22
Payer: MEDICARE

## 2024-10-22 NOTE — TELEPHONE ENCOUNTER
Provider:  DR. BRUCE WILKERSON    Caller:  ELVA BENITEZ    Relationship to Patient: SELF        Phone Number:  [phone]    Reason for Call:  PATIENT SAYS SHE GOT A LETTER STATING SHE WAS A NO SHOW ON 10/17/24. PATIENT SAYS HER CAR BROKE DOWN ON WAY TO APPT. AND SHE CALLED AND MOVED APPT. TO THE NEXT DAY AND WAS SEEN ON 10/18/24 INSTEAD. PATIENT WANTS THE NO SHOW STATUS FOR THE 10/17 TO BE CORRECTED. PLEASE CALL PATIENT WHEN DONE.    When was the patient last seen:  10/18/24

## 2024-10-23 NOTE — TELEPHONE ENCOUNTER
Called patient and spoke with her about the no show policy. Patient was upset but verbalized understanding.

## 2024-10-28 ENCOUNTER — SPECIALTY PHARMACY (OUTPATIENT)
Dept: ONCOLOGY | Facility: HOSPITAL | Age: 69
End: 2024-10-28
Payer: MEDICARE

## 2024-10-28 DIAGNOSIS — D47.3 ESSENTIAL THROMBOCYTOSIS: ICD-10-CM

## 2024-10-28 RX ORDER — HYDROXYUREA 500 MG/1
500 CAPSULE ORAL DAILY
Qty: 30 CAPSULE | Refills: 11 | Status: ON HOLD | OUTPATIENT
Start: 2024-10-28

## 2024-10-28 NOTE — PROGRESS NOTES
Re: Refills of Oral Specialty Medication - Hydrea (hydroxyurea)    Drug-Drug Interactions: The current medication list was reviewed and there are no relevant drug-drug interactions with the specialty medication.  Medication Allergies: The patient has no relevant allergies as it relates to their oral specialty medication  Review of Labs/Dose Adjustments: NO DOSE CHANGE - I reviewed the most recent note and labs and the patient will continue without any dose changes.  I sent refills as described below.    Drug: Hydrea (hydroxyurea)  Strength: 500 mg  Directions: Take 1 capsule by mouth daily  Quantity: 30 capsules  Refills: 11  Pharmacy prescription sent to: [facility] (The patient has [employer] insurance - hydroxyurea cannot be mailed from [facility] or [facility])    Tata Tang, PharmD  Clinic On[facility] Pharmacy Specialist  [phone]    10/28/2024  12:37 EDT

## 2024-10-31 ENCOUNTER — HOSPITAL ENCOUNTER (INPATIENT)
Facility: HOSPITAL | Age: 69
LOS: 3 days | Discharge: NURSING, REHAB OR LONG-TERM CARE | End: 2024-11-05
Attending: STUDENT IN AN ORGANIZED HEALTH CARE EDUCATION/TRAINING PROGRAM | Admitting: INTERNAL MEDICINE
Payer: MEDICARE

## 2024-10-31 DIAGNOSIS — D75.839 THROMBOCYTOSIS: ICD-10-CM

## 2024-10-31 DIAGNOSIS — I63.9 CEREBROVASCULAR ACCIDENT (CVA), UNSPECIFIED MECHANISM: ICD-10-CM

## 2024-10-31 DIAGNOSIS — Z91.148 NONCOMPLIANCE WITH MEDICATION REGIMEN: ICD-10-CM

## 2024-10-31 DIAGNOSIS — R00.2 PALPITATIONS: ICD-10-CM

## 2024-10-31 DIAGNOSIS — I10 ESSENTIAL HYPERTENSION: ICD-10-CM

## 2024-10-31 DIAGNOSIS — R42 VERTIGO: Primary | ICD-10-CM

## 2024-10-31 DIAGNOSIS — I10 UNCONTROLLED HYPERTENSION: ICD-10-CM

## 2024-10-31 PROCEDURE — 99285 EMERGENCY DEPT VISIT HI MDM: CPT

## 2024-10-31 PROCEDURE — 36415 COLL VENOUS BLD VENIPUNCTURE: CPT

## 2024-11-01 ENCOUNTER — APPOINTMENT (OUTPATIENT)
Dept: CT IMAGING | Facility: HOSPITAL | Age: 69
End: 2024-11-01
Payer: MEDICARE

## 2024-11-01 ENCOUNTER — APPOINTMENT (OUTPATIENT)
Dept: CARDIOLOGY | Facility: HOSPITAL | Age: 69
End: 2024-11-01
Payer: MEDICARE

## 2024-11-01 ENCOUNTER — APPOINTMENT (OUTPATIENT)
Dept: MRI IMAGING | Facility: HOSPITAL | Age: 69
End: 2024-11-01
Payer: MEDICARE

## 2024-11-01 ENCOUNTER — APPOINTMENT (OUTPATIENT)
Dept: GENERAL RADIOLOGY | Facility: HOSPITAL | Age: 69
End: 2024-11-01
Payer: MEDICARE

## 2024-11-01 PROBLEM — I63.9 STROKE: Status: ACTIVE | Noted: 2024-11-01

## 2024-11-01 LAB
ALBUMIN SERPL-MCNC: 4.1 G/DL (ref 3.5–5.2)
ALBUMIN/GLOB SERPL: 1.7 G/DL
ALP SERPL-CCNC: 100 U/L (ref 39–117)
ALT SERPL W P-5'-P-CCNC: 15 U/L (ref 1–33)
ANION GAP SERPL CALCULATED.3IONS-SCNC: 13.6 MMOL/L (ref 5–15)
ANION GAP SERPL CALCULATED.3IONS-SCNC: 14.7 MMOL/L (ref 5–15)
APTT PPP: 33.2 SECONDS (ref 70–100)
ASCENDING AORTA: 3.8 CM
AST SERPL-CCNC: 21 U/L (ref 1–32)
BACTERIA UR QL AUTO: NORMAL /HPF
BASOPHILS # BLD AUTO: 0.2 10*3/MM3 (ref 0–0.2)
BASOPHILS NFR BLD AUTO: 0.9 % (ref 0–1.5)
BH CV ECHO MEAS - AI P1/2T: 565.9 MSEC
BH CV ECHO MEAS - AO MAX PG: 15.8 MMHG
BH CV ECHO MEAS - AO MEAN PG: 7 MMHG
BH CV ECHO MEAS - AO ROOT DIAM: 2.5 CM
BH CV ECHO MEAS - AO V2 MAX: 199 CM/SEC
BH CV ECHO MEAS - AO V2 VTI: 35.4 CM
BH CV ECHO MEAS - AVA(I,D): 2.31 CM2
BH CV ECHO MEAS - EDV(CUBED): 52.7 ML
BH CV ECHO MEAS - EDV(MOD-SP2): 49.7 ML
BH CV ECHO MEAS - EDV(MOD-SP4): 73.8 ML
BH CV ECHO MEAS - EF(MOD-BP): 68.3 %
BH CV ECHO MEAS - EF(MOD-SP2): 75.7 %
BH CV ECHO MEAS - EF(MOD-SP4): 61.8 %
BH CV ECHO MEAS - EF_3D-VOL: 71 %
BH CV ECHO MEAS - ESV(CUBED): 20.6 ML
BH CV ECHO MEAS - ESV(MOD-SP2): 12.1 ML
BH CV ECHO MEAS - ESV(MOD-SP4): 28.2 ML
BH CV ECHO MEAS - FS: 26.9 %
BH CV ECHO MEAS - IVS/LVPW: 1.2 CM
BH CV ECHO MEAS - IVSD: 1.38 CM
BH CV ECHO MEAS - LA DIMENSION: 4.3 CM
BH CV ECHO MEAS - LAT PEAK E' VEL: 6.7 CM/SEC
BH CV ECHO MEAS - LV DIASTOLIC VOL/BSA (35-75): 43 CM2
BH CV ECHO MEAS - LV MASS(C)D: 162.8 GRAMS
BH CV ECHO MEAS - LV MAX PG: 6.9 MMHG
BH CV ECHO MEAS - LV MEAN PG: 4 MMHG
BH CV ECHO MEAS - LV SYSTOLIC VOL/BSA (12-30): 16.4 CM2
BH CV ECHO MEAS - LV V1 MAX: 131 CM/SEC
BH CV ECHO MEAS - LV V1 VTI: 29.2 CM
BH CV ECHO MEAS - LVIDD: 3.8 CM
BH CV ECHO MEAS - LVIDS: 2.7 CM
BH CV ECHO MEAS - LVOT AREA: 2.8 CM2
BH CV ECHO MEAS - LVOT DIAM: 1.89 CM
BH CV ECHO MEAS - LVPWD: 1.15 CM
BH CV ECHO MEAS - MED PEAK E' VEL: 6.1 CM/SEC
BH CV ECHO MEAS - MV A MAX VEL: 121 CM/SEC
BH CV ECHO MEAS - MV DEC SLOPE: 617 CM/SEC2
BH CV ECHO MEAS - MV DEC TIME: 0.14 SEC
BH CV ECHO MEAS - MV E MAX VEL: 86.9 CM/SEC
BH CV ECHO MEAS - MV E/A: 0.72
BH CV ECHO MEAS - MV MAX PG: 6 MMHG
BH CV ECHO MEAS - MV MEAN PG: 4 MMHG
BH CV ECHO MEAS - MV V2 VTI: 24.7 CM
BH CV ECHO MEAS - MVA(VTI): 3.3 CM2
BH CV ECHO MEAS - PA ACC TIME: 0.09 SEC
BH CV ECHO MEAS - PA V2 MAX: 155 CM/SEC
BH CV ECHO MEAS - RAP SYSTOLE: 3 MMHG
BH CV ECHO MEAS - RV MAX PG: 3.5 MMHG
BH CV ECHO MEAS - RV V1 MAX: 93.8 CM/SEC
BH CV ECHO MEAS - RV V1 VTI: 21.3 CM
BH CV ECHO MEAS - SV(LVOT): 81.9 ML
BH CV ECHO MEAS - SV(MOD-SP2): 37.6 ML
BH CV ECHO MEAS - SV(MOD-SP4): 45.6 ML
BH CV ECHO MEAS - SVI(LVOT): 47.7 ML/M2
BH CV ECHO MEAS - SVI(MOD-SP2): 21.9 ML/M2
BH CV ECHO MEAS - SVI(MOD-SP4): 26.5 ML/M2
BH CV ECHO MEAS - TAPSE (>1.6): 2.41 CM
BH CV ECHO MEASUREMENTS AVERAGE E/E' RATIO: 13.58
BH CV ECHO SHUNT ASSESSMENT PERFORMED (HIDDEN SCRIPTING): 1
BH CV XLRA - RV BASE: 2.23 CM
BH CV XLRA - RV LENGTH: 7.2 CM
BH CV XLRA - RV MID: 1.95 CM
BH CV XLRA - TDI S': 17.4 CM/SEC
BILIRUB SERPL-MCNC: 0.5 MG/DL (ref 0–1.2)
BILIRUB UR QL STRIP: NEGATIVE
BUN SERPL-MCNC: 15 MG/DL (ref 8–23)
BUN SERPL-MCNC: 15 MG/DL (ref 8–23)
BUN/CREAT SERPL: 11.4 (ref 7–25)
BUN/CREAT SERPL: 12.2 (ref 7–25)
CALCIUM SPEC-SCNC: 8.7 MG/DL (ref 8.6–10.5)
CALCIUM SPEC-SCNC: 8.9 MG/DL (ref 8.6–10.5)
CHLORIDE SERPL-SCNC: 103 MMOL/L (ref 98–107)
CHLORIDE SERPL-SCNC: 105 MMOL/L (ref 98–107)
CHOLEST SERPL-MCNC: 201 MG/DL (ref 0–200)
CLARITY UR: CLEAR
CO2 SERPL-SCNC: 19.3 MMOL/L (ref 22–29)
CO2 SERPL-SCNC: 22.4 MMOL/L (ref 22–29)
COLOR UR: YELLOW
CREAT SERPL-MCNC: 1.23 MG/DL (ref 0.57–1)
CREAT SERPL-MCNC: 1.32 MG/DL (ref 0.57–1)
DEPRECATED RDW RBC AUTO: 48.4 FL (ref 37–54)
DEPRECATED RDW RBC AUTO: 50.2 FL (ref 37–54)
EGFRCR SERPLBLD CKD-EPI 2021: 44.1 ML/MIN/1.73
EGFRCR SERPLBLD CKD-EPI 2021: 48 ML/MIN/1.73
EOSINOPHIL # BLD AUTO: 0.35 10*3/MM3 (ref 0–0.4)
EOSINOPHIL NFR BLD AUTO: 1.6 % (ref 0.3–6.2)
ERYTHROCYTE [DISTWIDTH] IN BLOOD BY AUTOMATED COUNT: 13.6 % (ref 12.3–15.4)
ERYTHROCYTE [DISTWIDTH] IN BLOOD BY AUTOMATED COUNT: 13.7 % (ref 12.3–15.4)
GEN 5 2HR TROPONIN T REFLEX: 11 NG/L
GLOBULIN UR ELPH-MCNC: 2.4 GM/DL
GLUCOSE BLDC GLUCOMTR-MCNC: 114 MG/DL (ref 70–130)
GLUCOSE BLDC GLUCOMTR-MCNC: 135 MG/DL (ref 70–130)
GLUCOSE BLDC GLUCOMTR-MCNC: 160 MG/DL (ref 70–130)
GLUCOSE BLDC GLUCOMTR-MCNC: 164 MG/DL (ref 70–130)
GLUCOSE SERPL-MCNC: 185 MG/DL (ref 65–99)
GLUCOSE SERPL-MCNC: 206 MG/DL (ref 65–99)
GLUCOSE UR STRIP-MCNC: NEGATIVE MG/DL
HBA1C MFR BLD: 5.5 % (ref 4.8–5.6)
HCT VFR BLD AUTO: 46.8 % (ref 34–46.6)
HCT VFR BLD AUTO: 47 % (ref 34–46.6)
HDLC SERPL-MCNC: 46 MG/DL (ref 40–60)
HGB BLD-MCNC: 15.4 G/DL (ref 12–15.9)
HGB BLD-MCNC: 15.8 G/DL (ref 12–15.9)
HGB UR QL STRIP.AUTO: NEGATIVE
HOLD SPECIMEN: NORMAL
HOLD SPECIMEN: NORMAL
HYALINE CASTS UR QL AUTO: NORMAL /LPF
IMM GRANULOCYTES # BLD AUTO: 0.19 10*3/MM3 (ref 0–0.05)
IMM GRANULOCYTES NFR BLD AUTO: 0.9 % (ref 0–0.5)
INR PPP: 1.14 (ref 0.9–1.1)
KETONES UR QL STRIP: NEGATIVE
LDLC SERPL CALC-MCNC: 135 MG/DL (ref 0–100)
LDLC/HDLC SERPL: 2.9 {RATIO}
LEFT ATRIUM VOLUME INDEX: 29.3 ML/M2
LEUKOCYTE ESTERASE UR QL STRIP.AUTO: NEGATIVE
LYMPHOCYTES # BLD AUTO: 1.01 10*3/MM3 (ref 0.7–3.1)
LYMPHOCYTES NFR BLD AUTO: 4.7 % (ref 19.6–45.3)
MCH RBC QN AUTO: 32.5 PG (ref 26.6–33)
MCH RBC QN AUTO: 32.5 PG (ref 26.6–33)
MCHC RBC AUTO-ENTMCNC: 32.9 G/DL (ref 31.5–35.7)
MCHC RBC AUTO-ENTMCNC: 33.6 G/DL (ref 31.5–35.7)
MCV RBC AUTO: 96.7 FL (ref 79–97)
MCV RBC AUTO: 98.7 FL (ref 79–97)
MONOCYTES # BLD AUTO: 0.71 10*3/MM3 (ref 0.1–0.9)
MONOCYTES NFR BLD AUTO: 3.3 % (ref 5–12)
NEUTROPHILS NFR BLD AUTO: 18.94 10*3/MM3 (ref 1.7–7)
NEUTROPHILS NFR BLD AUTO: 88.6 % (ref 42.7–76)
NITRITE UR QL STRIP: NEGATIVE
NRBC BLD AUTO-RTO: 0 /100 WBC (ref 0–0.2)
PH UR STRIP.AUTO: 5.5 [PH] (ref 5–8)
PLATELET # BLD AUTO: 662 10*3/MM3 (ref 140–450)
PLATELET # BLD AUTO: 817 10*3/MM3 (ref 140–450)
PMV BLD AUTO: 10.2 FL (ref 6–12)
PMV BLD AUTO: 9.9 FL (ref 6–12)
POTASSIUM SERPL-SCNC: 3.2 MMOL/L (ref 3.5–5.2)
POTASSIUM SERPL-SCNC: 3.5 MMOL/L (ref 3.5–5.2)
PROT SERPL-MCNC: 6.5 G/DL (ref 6–8.5)
PROT UR QL STRIP: ABNORMAL
PROTHROMBIN TIME: 15.2 SECONDS (ref 12.3–15.1)
RBC # BLD AUTO: 4.74 10*6/MM3 (ref 3.77–5.28)
RBC # BLD AUTO: 4.86 10*6/MM3 (ref 3.77–5.28)
RBC # UR STRIP: NORMAL /HPF
REF LAB TEST METHOD: NORMAL
SODIUM SERPL-SCNC: 139 MMOL/L (ref 136–145)
SODIUM SERPL-SCNC: 139 MMOL/L (ref 136–145)
SP GR UR STRIP: >=1.03 (ref 1–1.03)
SQUAMOUS #/AREA URNS HPF: NORMAL /HPF
TRIGL SERPL-MCNC: 108 MG/DL (ref 0–150)
TROPONIN T DELTA: 0 NG/L
TROPONIN T SERPL HS-MCNC: 11 NG/L
TSH SERPL DL<=0.05 MIU/L-ACNC: 2.11 UIU/ML (ref 0.27–4.2)
UFH PPP CHRO-ACNC: 0.1 IU/ML (ref 0.3–0.7)
UFH PPP CHRO-ACNC: 0.1 IU/ML (ref 0.3–0.7)
UROBILINOGEN UR QL STRIP: ABNORMAL
VIT B12 BLD-MCNC: 332 PG/ML (ref 211–946)
VLDLC SERPL-MCNC: 20 MG/DL (ref 5–40)
WBC # UR STRIP: NORMAL /HPF
WBC NRBC COR # BLD AUTO: 18.97 10*3/MM3 (ref 3.4–10.8)
WBC NRBC COR # BLD AUTO: 21.4 10*3/MM3 (ref 3.4–10.8)
WHOLE BLOOD HOLD COAG: NORMAL
WHOLE BLOOD HOLD SPECIMEN: NORMAL

## 2024-11-01 PROCEDURE — 70450 CT HEAD/BRAIN W/O DYE: CPT

## 2024-11-01 PROCEDURE — 25010000002 METOCLOPRAMIDE PER 10 MG: Performed by: NURSE PRACTITIONER

## 2024-11-01 PROCEDURE — 63710000001 INSULIN LISPRO (HUMAN) PER 5 UNITS: Performed by: INTERNAL MEDICINE

## 2024-11-01 PROCEDURE — 25010000002 ONDANSETRON PER 1 MG: Performed by: STUDENT IN AN ORGANIZED HEALTH CARE EDUCATION/TRAINING PROGRAM

## 2024-11-01 PROCEDURE — 93005 ELECTROCARDIOGRAM TRACING: CPT | Performed by: STUDENT IN AN ORGANIZED HEALTH CARE EDUCATION/TRAINING PROGRAM

## 2024-11-01 PROCEDURE — 25010000002 DEXAMETHASONE PER 1 MG: Performed by: NURSE PRACTITIONER

## 2024-11-01 PROCEDURE — 93306 TTE W/DOPPLER COMPLETE: CPT

## 2024-11-01 PROCEDURE — 85520 HEPARIN ASSAY: CPT

## 2024-11-01 PROCEDURE — 25010000002 HYDRALAZINE PER 20 MG: Performed by: INTERNAL MEDICINE

## 2024-11-01 PROCEDURE — 70551 MRI BRAIN STEM W/O DYE: CPT

## 2024-11-01 PROCEDURE — 82948 REAGENT STRIP/BLOOD GLUCOSE: CPT | Performed by: INTERNAL MEDICINE

## 2024-11-01 PROCEDURE — 85610 PROTHROMBIN TIME: CPT | Performed by: STUDENT IN AN ORGANIZED HEALTH CARE EDUCATION/TRAINING PROGRAM

## 2024-11-01 PROCEDURE — 25010000002 PROCHLORPERAZINE 10 MG/2ML SOLUTION: Performed by: NURSE PRACTITIONER

## 2024-11-01 PROCEDURE — G0378 HOSPITAL OBSERVATION PER HR: HCPCS

## 2024-11-01 PROCEDURE — 84484 ASSAY OF TROPONIN QUANT: CPT | Performed by: STUDENT IN AN ORGANIZED HEALTH CARE EDUCATION/TRAINING PROGRAM

## 2024-11-01 PROCEDURE — 81001 URINALYSIS AUTO W/SCOPE: CPT | Performed by: INTERNAL MEDICINE

## 2024-11-01 PROCEDURE — 0042T HC CT CEREBRAL PERFUSION W/WO CONTRAST: CPT

## 2024-11-01 PROCEDURE — 71045 X-RAY EXAM CHEST 1 VIEW: CPT

## 2024-11-01 PROCEDURE — 82948 REAGENT STRIP/BLOOD GLUCOSE: CPT

## 2024-11-01 PROCEDURE — 99222 1ST HOSP IP/OBS MODERATE 55: CPT | Performed by: INTERNAL MEDICINE

## 2024-11-01 PROCEDURE — 85730 THROMBOPLASTIN TIME PARTIAL: CPT | Performed by: STUDENT IN AN ORGANIZED HEALTH CARE EDUCATION/TRAINING PROGRAM

## 2024-11-01 PROCEDURE — 93306 TTE W/DOPPLER COMPLETE: CPT | Performed by: INTERNAL MEDICINE

## 2024-11-01 PROCEDURE — 70496 CT ANGIOGRAPHY HEAD: CPT

## 2024-11-01 PROCEDURE — 82607 VITAMIN B-12: CPT | Performed by: INTERNAL MEDICINE

## 2024-11-01 PROCEDURE — 83036 HEMOGLOBIN GLYCOSYLATED A1C: CPT | Performed by: INTERNAL MEDICINE

## 2024-11-01 PROCEDURE — 85520 HEPARIN ASSAY: CPT | Performed by: STUDENT IN AN ORGANIZED HEALTH CARE EDUCATION/TRAINING PROGRAM

## 2024-11-01 PROCEDURE — 85025 COMPLETE CBC W/AUTO DIFF WBC: CPT | Performed by: STUDENT IN AN ORGANIZED HEALTH CARE EDUCATION/TRAINING PROGRAM

## 2024-11-01 PROCEDURE — 70498 CT ANGIOGRAPHY NECK: CPT

## 2024-11-01 PROCEDURE — 84443 ASSAY THYROID STIM HORMONE: CPT | Performed by: INTERNAL MEDICINE

## 2024-11-01 PROCEDURE — 85027 COMPLETE CBC AUTOMATED: CPT | Performed by: INTERNAL MEDICINE

## 2024-11-01 PROCEDURE — 80053 COMPREHEN METABOLIC PANEL: CPT | Performed by: STUDENT IN AN ORGANIZED HEALTH CARE EDUCATION/TRAINING PROGRAM

## 2024-11-01 PROCEDURE — 25010000002 LABETALOL 5 MG/ML SOLUTION: Performed by: STUDENT IN AN ORGANIZED HEALTH CARE EDUCATION/TRAINING PROGRAM

## 2024-11-01 PROCEDURE — 25510000001 IOPAMIDOL 61 % SOLUTION: Performed by: STUDENT IN AN ORGANIZED HEALTH CARE EDUCATION/TRAINING PROGRAM

## 2024-11-01 PROCEDURE — 99233 SBSQ HOSP IP/OBS HIGH 50: CPT | Performed by: STUDENT IN AN ORGANIZED HEALTH CARE EDUCATION/TRAINING PROGRAM

## 2024-11-01 PROCEDURE — 80061 LIPID PANEL: CPT | Performed by: INTERNAL MEDICINE

## 2024-11-01 PROCEDURE — 99222 1ST HOSP IP/OBS MODERATE 55: CPT | Performed by: PSYCHIATRY & NEUROLOGY

## 2024-11-01 PROCEDURE — 25010000002 HEPARIN (PORCINE) PER 1000 UNITS: Performed by: STUDENT IN AN ORGANIZED HEALTH CARE EDUCATION/TRAINING PROGRAM

## 2024-11-01 RX ORDER — PROCHLORPERAZINE EDISYLATE 5 MG/ML
5 INJECTION INTRAMUSCULAR; INTRAVENOUS EVERY 6 HOURS PRN
Status: DISCONTINUED | OUTPATIENT
Start: 2024-11-01 | End: 2024-11-05 | Stop reason: HOSPADM

## 2024-11-01 RX ORDER — AMLODIPINE BESYLATE 5 MG/1
5 TABLET ORAL DAILY
COMMUNITY
End: 2024-11-05 | Stop reason: HOSPADM

## 2024-11-01 RX ORDER — DIAZEPAM 2 MG/1
2 TABLET ORAL ONCE
Status: COMPLETED | OUTPATIENT
Start: 2024-11-01 | End: 2024-11-01

## 2024-11-01 RX ORDER — NICOTINE POLACRILEX 4 MG
15 LOZENGE BUCCAL
Status: DISCONTINUED | OUTPATIENT
Start: 2024-11-01 | End: 2024-11-05 | Stop reason: HOSPADM

## 2024-11-01 RX ORDER — MECLIZINE HYDROCHLORIDE 25 MG/1
25 TABLET ORAL ONCE
Status: COMPLETED | OUTPATIENT
Start: 2024-11-01 | End: 2024-11-01

## 2024-11-01 RX ORDER — IOPAMIDOL 612 MG/ML
100 INJECTION, SOLUTION INTRAVASCULAR
Status: COMPLETED | OUTPATIENT
Start: 2024-11-01 | End: 2024-11-01

## 2024-11-01 RX ORDER — METOCLOPRAMIDE HYDROCHLORIDE 5 MG/ML
10 INJECTION INTRAMUSCULAR; INTRAVENOUS ONCE
Status: COMPLETED | OUTPATIENT
Start: 2024-11-01 | End: 2024-11-01

## 2024-11-01 RX ORDER — LABETALOL HYDROCHLORIDE 5 MG/ML
10 INJECTION, SOLUTION INTRAVENOUS ONCE
Status: COMPLETED | OUTPATIENT
Start: 2024-11-01 | End: 2024-11-01

## 2024-11-01 RX ORDER — INSULIN LISPRO 100 [IU]/ML
2-7 INJECTION, SOLUTION INTRAVENOUS; SUBCUTANEOUS
Status: DISCONTINUED | OUTPATIENT
Start: 2024-11-01 | End: 2024-11-05 | Stop reason: HOSPADM

## 2024-11-01 RX ORDER — SODIUM CHLORIDE 0.9 % (FLUSH) 0.9 %
10 SYRINGE (ML) INJECTION AS NEEDED
Status: DISCONTINUED | OUTPATIENT
Start: 2024-11-01 | End: 2024-11-05 | Stop reason: HOSPADM

## 2024-11-01 RX ORDER — HYDRALAZINE HYDROCHLORIDE 20 MG/ML
10 INJECTION INTRAMUSCULAR; INTRAVENOUS EVERY 6 HOURS PRN
Status: DISCONTINUED | OUTPATIENT
Start: 2024-11-01 | End: 2024-11-05 | Stop reason: HOSPADM

## 2024-11-01 RX ORDER — ONDANSETRON 2 MG/ML
4 INJECTION INTRAMUSCULAR; INTRAVENOUS ONCE
Status: COMPLETED | OUTPATIENT
Start: 2024-11-01 | End: 2024-11-01

## 2024-11-01 RX ORDER — LISINOPRIL 20 MG/1
40 TABLET ORAL DAILY
Status: DISCONTINUED | OUTPATIENT
Start: 2024-11-01 | End: 2024-11-05 | Stop reason: HOSPADM

## 2024-11-01 RX ORDER — AMLODIPINE BESYLATE 5 MG/1
10 TABLET ORAL
Status: DISCONTINUED | OUTPATIENT
Start: 2024-11-01 | End: 2024-11-05 | Stop reason: HOSPADM

## 2024-11-01 RX ORDER — ACETAMINOPHEN 325 MG/1
650 TABLET ORAL EVERY 4 HOURS PRN
Status: DISCONTINUED | OUTPATIENT
Start: 2024-11-01 | End: 2024-11-05 | Stop reason: HOSPADM

## 2024-11-01 RX ORDER — HYDROXYUREA 500 MG/1
500 CAPSULE ORAL DAILY
Status: DISCONTINUED | OUTPATIENT
Start: 2024-11-01 | End: 2024-11-05 | Stop reason: HOSPADM

## 2024-11-01 RX ORDER — IOPAMIDOL 612 MG/ML
50 INJECTION, SOLUTION INTRAVASCULAR
Status: COMPLETED | OUTPATIENT
Start: 2024-11-01 | End: 2024-11-01

## 2024-11-01 RX ORDER — DEXAMETHASONE SODIUM PHOSPHATE 10 MG/ML
10 INJECTION INTRAMUSCULAR; INTRAVENOUS ONCE
Status: COMPLETED | OUTPATIENT
Start: 2024-11-01 | End: 2024-11-01

## 2024-11-01 RX ORDER — MECLIZINE HYDROCHLORIDE 25 MG/1
25 TABLET ORAL 3 TIMES DAILY PRN
Status: DISCONTINUED | OUTPATIENT
Start: 2024-11-01 | End: 2024-11-02

## 2024-11-01 RX ORDER — SODIUM CHLORIDE 0.9 % (FLUSH) 0.9 %
10 SYRINGE (ML) INJECTION EVERY 12 HOURS SCHEDULED
Status: DISCONTINUED | OUTPATIENT
Start: 2024-11-01 | End: 2024-11-05 | Stop reason: HOSPADM

## 2024-11-01 RX ORDER — HEPARIN SODIUM 10000 [USP'U]/100ML
15 INJECTION, SOLUTION INTRAVENOUS
Status: DISCONTINUED | OUTPATIENT
Start: 2024-11-01 | End: 2024-11-02

## 2024-11-01 RX ORDER — SODIUM CHLORIDE 9 MG/ML
40 INJECTION, SOLUTION INTRAVENOUS AS NEEDED
Status: DISCONTINUED | OUTPATIENT
Start: 2024-11-01 | End: 2024-11-05 | Stop reason: HOSPADM

## 2024-11-01 RX ORDER — ACETAMINOPHEN 650 MG/1
650 SUPPOSITORY RECTAL EVERY 4 HOURS PRN
Status: DISCONTINUED | OUTPATIENT
Start: 2024-11-01 | End: 2024-11-05 | Stop reason: HOSPADM

## 2024-11-01 RX ORDER — SCOLOPAMINE TRANSDERMAL SYSTEM 1 MG/1
1 PATCH, EXTENDED RELEASE TRANSDERMAL
Status: DISCONTINUED | OUTPATIENT
Start: 2024-11-01 | End: 2024-11-05 | Stop reason: HOSPADM

## 2024-11-01 RX ORDER — PROCHLORPERAZINE 25 MG
25 SUPPOSITORY, RECTAL RECTAL EVERY 12 HOURS PRN
Status: DISCONTINUED | OUTPATIENT
Start: 2024-11-01 | End: 2024-11-05 | Stop reason: HOSPADM

## 2024-11-01 RX ORDER — ASPIRIN 81 MG/1
81 TABLET ORAL DAILY
Status: DISCONTINUED | OUTPATIENT
Start: 2024-11-01 | End: 2024-11-02

## 2024-11-01 RX ORDER — CLOPIDOGREL BISULFATE 75 MG/1
75 TABLET ORAL DAILY
Status: DISCONTINUED | OUTPATIENT
Start: 2024-11-01 | End: 2024-11-01

## 2024-11-01 RX ORDER — DEXTROSE MONOHYDRATE 25 G/50ML
25 INJECTION, SOLUTION INTRAVENOUS
Status: DISCONTINUED | OUTPATIENT
Start: 2024-11-01 | End: 2024-11-05 | Stop reason: HOSPADM

## 2024-11-01 RX ORDER — MECLIZINE HCL 12.5 MG 12.5 MG/1
12.5 TABLET ORAL 3 TIMES DAILY PRN
Status: DISCONTINUED | OUTPATIENT
Start: 2024-11-01 | End: 2024-11-01

## 2024-11-01 RX ORDER — ATORVASTATIN CALCIUM 80 MG/1
80 TABLET, FILM COATED ORAL NIGHTLY
Status: DISCONTINUED | OUTPATIENT
Start: 2024-11-01 | End: 2024-11-05 | Stop reason: HOSPADM

## 2024-11-01 RX ORDER — PROCHLORPERAZINE MALEATE 10 MG
5 TABLET ORAL EVERY 6 HOURS PRN
Status: DISCONTINUED | OUTPATIENT
Start: 2024-11-01 | End: 2024-11-05 | Stop reason: HOSPADM

## 2024-11-01 RX ADMIN — ATORVASTATIN CALCIUM 80 MG: 80 TABLET, FILM COATED ORAL at 20:22

## 2024-11-01 RX ADMIN — IOPAMIDOL 100 ML: 612 INJECTION, SOLUTION INTRAVENOUS at 00:39

## 2024-11-01 RX ADMIN — Medication 10 ML: at 09:26

## 2024-11-01 RX ADMIN — LABETALOL HYDROCHLORIDE 10 MG: 5 INJECTION, SOLUTION INTRAVENOUS at 01:59

## 2024-11-01 RX ADMIN — MECLIZINE HYDROCHLORIDE 12.5 MG: 12.5 TABLET ORAL at 07:45

## 2024-11-01 RX ADMIN — INSULIN LISPRO 2 UNITS: 100 INJECTION, SOLUTION INTRAVENOUS; SUBCUTANEOUS at 20:22

## 2024-11-01 RX ADMIN — ONDANSETRON 4 MG: 2 INJECTION INTRAMUSCULAR; INTRAVENOUS at 05:48

## 2024-11-01 RX ADMIN — SCOPOLAMINE 1 PATCH: 1.5 PATCH, EXTENDED RELEASE TRANSDERMAL at 08:23

## 2024-11-01 RX ADMIN — PROCHLORPERAZINE EDISYLATE 5 MG: 5 INJECTION INTRAMUSCULAR; INTRAVENOUS at 09:25

## 2024-11-01 RX ADMIN — HYDRALAZINE HYDROCHLORIDE 10 MG: 20 INJECTION, SOLUTION INTRAMUSCULAR; INTRAVENOUS at 03:45

## 2024-11-01 RX ADMIN — ONDANSETRON 4 MG: 2 INJECTION INTRAMUSCULAR; INTRAVENOUS at 00:16

## 2024-11-01 RX ADMIN — IOPAMIDOL 50 ML: 612 INJECTION, SOLUTION INTRAVENOUS at 00:39

## 2024-11-01 RX ADMIN — ACETAMINOPHEN 650 MG: 325 TABLET, FILM COATED ORAL at 07:47

## 2024-11-01 RX ADMIN — ACETAMINOPHEN 650 MG: 325 TABLET, FILM COATED ORAL at 20:22

## 2024-11-01 RX ADMIN — AMLODIPINE BESYLATE 10 MG: 5 TABLET ORAL at 11:30

## 2024-11-01 RX ADMIN — DEXAMETHASONE SODIUM PHOSPHATE 10 MG: 10 INJECTION INTRAMUSCULAR; INTRAVENOUS at 13:19

## 2024-11-01 RX ADMIN — DIAZEPAM 2 MG: 2 TABLET ORAL at 14:42

## 2024-11-01 RX ADMIN — Medication 10 ML: at 20:22

## 2024-11-01 RX ADMIN — METOCLOPRAMIDE HYDROCHLORIDE 10 MG: 5 INJECTION INTRAMUSCULAR; INTRAVENOUS at 13:19

## 2024-11-01 RX ADMIN — HEPARIN SODIUM 12 UNITS/KG/HR: 10000 INJECTION, SOLUTION INTRAVENOUS at 11:58

## 2024-11-01 RX ADMIN — MECLIZINE HYDROCHLORIDE 25 MG: 25 TABLET ORAL at 00:16

## 2024-11-01 NOTE — THERAPY EVALUATION
Order received, but hold per RN due to pt. actively vomiting and unable to keep medication down at this time.  Also, no difficulty with communication for basic and medical needs or information regarding situation and social concerns.  Will follow up at a later time.

## 2024-11-01 NOTE — ED PROVIDER NOTES
[facility] EMERGENCY DEPARTMENT  Emergency Department Encounter  Emergency Medicine Physician Note       Pt Name: Martha Murray  MRN: [id number]  Pt :   1955  Room Number:    Date of encounter:  10/31/2024  PCP: Jenny Carvajal APRN  ED Provider: Robbi Ramos MD    Historian: Patient      HPI:  Chief Complaint: Dizziness        Context: Martha Murrya is a 69 y.o. female who presents to the ED c/o dizziness.  Patient states around 1.5 hours prior to arrival she started having dizziness vomiting and blurred vision.  States she has not been taking her blood pressure medications in the past 2 to 3 days.  EMS reported patient had blood pressure of 210/120 and they provided with sublingual nitro with mild improvement to 180 over 70s.  Patient still reporting dizziness and severe nausea.  Denies any falls or trauma.  Denies any blood thinner use.  Denies any chest pain.  Denies any other symptoms at this time.      PAST MEDICAL HISTORY  Past Medical History:   Diagnosis Date    HAMMAD (acute kidney injury) 2021    Allergic     Anemia     Anesthesia complication     Slow to wake    Arthritis     COPD (chronic obstructive pulmonary disease)     Depression     Diabetes mellitus     History of heart attack     Hyperlipidemia     Hypertension          PAST SURGICAL HISTORY  Past Surgical History:   Procedure Laterality Date    BILATERAL BREAST REDUCTION      BONE MARROW ASPIRATION      CATARACT EXTRACTION Bilateral     COLONOSCOPY      HYSTERECTOMY  1990    x 2    REDUCTION MAMMAPLASTY Bilateral     MORE THAN 20 YEARS AGO    SINUS SURGERY  2003    TONSILLECTOMY      TOTAL KNEE ARTHROPLASTY Right 7/3/2024    Procedure: TOTAL KNEE ARTHROPLASTY WITH NAINA ROBOT RIGHT;  Surgeon: Keaton Aldrich MD;  Location: [facility];  Service: Robotics - Ortho;  Laterality: Right;         FAMILY HISTORY  Family History   Problem Relation Age of Onset    Arthritis Maternal Grandmother      Hypertension Mother     Hyperlipidemia Mother     Thyroid disease Mother     Diabetes Sister     Crohn's disease Sister     Hypertension Sister     Heart attack Maternal Grandfather     Breast cancer Neg Hx     Ovarian cancer Neg Hx          SOCIAL HISTORY  Social History     Socioeconomic History    Marital status: Single   Tobacco Use    Smoking status: Former     Current packs/day: 0.00     Average packs/day: 2.0 packs/day for 30.0 years (60.0 ttl pk-yrs)     Types: Cigarettes     Start date:      Quit date:      Years since quittin.8     Passive exposure: Past    Smokeless tobacco: Never   Vaping Use    Vaping status: Never Used   Substance and Sexual Activity    Alcohol use: No    Drug use: No    Sexual activity: Defer         ALLERGIES  Sulfa antibiotics        REVIEW OF SYSTEMS  Review of Systems     All systems reviewed and negative except for those discussed in HPI.       PHYSICAL EXAM    I have reviewed the triage vital signs and nursing notes.    ED Triage Vitals [24 0003]   Temp Heart Rate Resp BP SpO2   97.5 °F (36.4 °C) 74 20 172/78 98 %      Temp src Heart Rate Source Patient Position BP Location FiO2 (%)   Oral Monitor Sitting Left arm --       Physical Exam    General:  Awake, alert, no acute distress  HEENT: Atraumatic, normocephalic, EOMI, PERRLA, mucous membranes moist  NECK:  Supple, atraumatic  Cardiovascular:  Regular rate, regular rhythm, no murmurs, rubs, or gallops.  Extremities well perfused   Respiratory:  Regular rate, clear lungs to auscultation bilaterally.  No rhonchi, rales, wheezing  Abdominal:  Soft, nondistended, nontender.  No guarding or rebound.  No palpable masses  Extremity:  No visible bony abnormalities in all 4 extremities.  Full range of motion of all extremities.  Skin:  Warm and dry.  No rashes  Neuro:  AAOx3, GCS 15.  Upward and leftward nystagmus.  No facial droop or dysarthria.  No focal strength or sensation deficits throughout all 4  extremities.  Psych:  Mood and affect appropriate.        LAB RESULTS  Recent Results (from the past 24 hours)   Green Top (Gel)    Collection Time: 11/01/24 12:16 AM   Result Value Ref Range    Extra Tube Hold for add-ons.    Lavender Top    Collection Time: 11/01/24 12:16 AM   Result Value Ref Range    Extra Tube hold for add-on    Gold Top - SST    Collection Time: 11/01/24 12:16 AM   Result Value Ref Range    Extra Tube Hold for add-ons.    Light Blue Top    Collection Time: 11/01/24 12:16 AM   Result Value Ref Range    Extra Tube Hold for add-ons.    CBC Auto Differential    Collection Time: 11/01/24 12:16 AM    Specimen: Blood   Result Value Ref Range    WBC 21.40 (H) 3.40 - 10.80 10*3/mm3    RBC 4.74 3.77 - 5.28 10*6/mm3    Hemoglobin 15.4 12.0 - 15.9 g/dL    Hematocrit 46.8 (H) 34.0 - 46.6 %    MCV 98.7 (H) 79.0 - 97.0 fL    MCH 32.5 26.6 - 33.0 pg    MCHC 32.9 31.5 - 35.7 g/dL    RDW 13.7 12.3 - 15.4 %    RDW-SD 50.2 37.0 - 54.0 fl    MPV 10.2 6.0 - 12.0 fL    Platelets 817 (H) 140 - 450 10*3/mm3    Neutrophil % 88.6 (H) 42.7 - 76.0 %    Lymphocyte % 4.7 (L) 19.6 - 45.3 %    Monocyte % 3.3 (L) 5.0 - 12.0 %    Eosinophil % 1.6 0.3 - 6.2 %    Basophil % 0.9 0.0 - 1.5 %    Immature Grans % 0.9 (H) 0.0 - 0.5 %    Neutrophils, Absolute 18.94 (H) 1.70 - 7.00 10*3/mm3    Lymphocytes, Absolute 1.01 0.70 - 3.10 10*3/mm3    Monocytes, Absolute 0.71 0.10 - 0.90 10*3/mm3    Eosinophils, Absolute 0.35 0.00 - 0.40 10*3/mm3    Basophils, Absolute 0.20 0.00 - 0.20 10*3/mm3    Immature Grans, Absolute 0.19 (H) 0.00 - 0.05 10*3/mm3    nRBC 0.0 0.0 - 0.2 /100 WBC   Comprehensive Metabolic Panel    Collection Time: 11/01/24  1:42 AM    Specimen: Blood   Result Value Ref Range    Glucose 206 (H) 65 - 99 mg/dL    BUN 15 8 - 23 mg/dL    Creatinine 1.32 (H) 0.57 - 1.00 mg/dL    Sodium 139 136 - 145 mmol/L    Potassium 3.2 (L) 3.5 - 5.2 mmol/L    Chloride 103 98 - 107 mmol/L    CO2 22.4 22.0 - 29.0 mmol/L    Calcium 8.9 8.6 - 10.5  mg/dL    Total Protein 6.5 6.0 - 8.5 g/dL    Albumin 4.1 3.5 - 5.2 g/dL    ALT (SGPT) 15 1 - 33 U/L    AST (SGOT) 21 1 - 32 U/L    Alkaline Phosphatase 100 39 - 117 U/L    Total Bilirubin 0.5 0.0 - 1.2 mg/dL    Globulin 2.4 gm/dL    A/G Ratio 1.7 g/dL    BUN/Creatinine Ratio 11.4 7.0 - 25.0    Anion Gap 13.6 5.0 - 15.0 mmol/L    eGFR 44.1 (L) >60.0 mL/min/1.73   High Sensitivity Troponin T    Collection Time: 11/01/24  1:42 AM    Specimen: Blood   Result Value Ref Range    HS Troponin T 11 <14 ng/L       If labs were ordered, I independently evaluated the results and considered them in treating the patient.        RADIOLOGY  CT Angiogram Neck    Result Date: 11/1/2024  PROCEDURE: CT ANGIOGRAM NECK-  HISTORY: Dizziness and vertigo  TECHNIQUE: Thin section axial CT with IV contrast supplemented with multiplanar reconstruction.   3 D reconstructions were performed on a separate workstation.  NASCET criteria and technique was utilized during interpretation.  FINDINGS:  Aortic arch:  Arch shows no significant narrowing. There is a filling defect along the anterior wall of the brachiocephalic artery measuring up to 7 mm. This could represent mural thrombus or lobulated soft plaque. Nevertheless this could be a embolic source. The left common and left subclavian arteries appear widely patent.  Right carotid:  No significant stenosis is seen of the cervical common or internal carotid artery.  Left carotid:  No significant stenosis is seen of the cervical common or internal carotid artery.  Vertebrals: Vertebral arteries are codominant. No significant stenosis is present.       1. No evidence of significant cervical carotid stenosis 2. Lobular filling defect proximal brachiocephalic artery could represent nonocclusive mural thrombus or lobulated soft plaque. Follow-up CTA recommended in 6-8 weeks for continued surveillance. CT appearance that abnormality could result in more distal emboli.   This study was performed with  techniques to keep radiation doses as low as reasonably achievable (ALARA). Individualized dose reduction techniques using automated exposure control or adjustment of vA and/or kV according to the patient size were employed.   This report was signed and finalized on 11/1/2024 12:58 AM by Sam Thakkar MD.      CT Angiogram Head    Result Date: 11/1/2024  PROCEDURE: CT ANGIOGRAM HEAD-  HISTORY: Dizziness and vertigo  TECHNIQUE: Thin section axial CT with contrast with multiplanar reconstruction.   3 D reconstructions were performed on a separate workstation.  FINDINGS:  Internal carotid arteries: The petrous, cavernous and postcavernous ICAs are unremarkable. No aneurysm is seen.  Anterior cerebral arteries: Central anterior cerebral arteries are unremarkable. No aneurysm is seen.  Middle cerebral arteries: Central middle cerebral arteries are unremarkable. No aneurysm is seen.  Basilar artery: Distal vertebral and basilar arteries are widely patent. No aneurysm is seen.  Posterior cerebral artery: Posterior cerebral arteries are patent centrally. No obvious aneurysm is seen.  Venous sinuses: Major venous sinuses are patent.      No evidence of acute large vessel occlusive disease    This study was performed with techniques to keep radiation doses as low as reasonably achievable (ALARA). Individualized dose reduction techniques using automated exposure control or adjustment of vA and/or kV according to the patient size were employed.  This report was signed and finalized on 11/1/2024 12:53 AM by Sam Thakkar MD.      CT CEREBRAL PERFUSION WITH & WITHOUT CONTRAST    Result Date: 11/1/2024  CT cerebral perfusion, without and with contrast  HISTORY: Symptoms of CVA. Vertigo. Dizziness..  TECHNIQUE: CT brain perfusion with mapping of flow parameters including transit time, cerebral blood flow and cerebral blood volume. IV contrast was utilized.  FINDINGS:  Tissue with delayed blood, Tmax > 6 secs:  0 ml  Tissue with  significantly reduced blood, rCBF < 30%:  0        1.  No evidence of acute large vessel occlusion  Should symptoms persist recommend follow-up MRI  This report was signed and finalized on 11/1/2024 12:52 AM by Sam Thakkar MD.      CT Head Without Contrast Stroke Protocol    Result Date: 11/1/2024  PROCEDURE: CT HEAD WO CONTRAST STROKE PROTOCOL-  HISTORY: Dizziness/vertigo, symptoms of acute CVA  COMPARISON: 7/20/2023  TECHNIQUE: Noncontrast exam  FINDINGS: Mild atrophy and chronic ischemic white matter changes are noted.  No cortical edema is present. There is no mass or hemorrhage. Ventricles are normal.  Bone windows show no skull fracture or obvious destructive lesion.      1. No acute intracranial abnormality or obvious mass. 2. Atrophy and chronic ischemic white matter changes as above.   This study was performed with techniques to keep radiation doses as low as reasonably achievable (ALARA). Individualized dose reduction techniques using automated exposure control or adjustment of vA and/or kV according to the patient size were employed.    This report was signed and finalized on 11/1/2024 12:51 AM by Sam Thakkar MD.       I ordered and independently evaluated the above noted radiographic studies.     See radiologist's dictation for official interpretation.        PROCEDURES    Procedures    ECG 12 Lead ED Triage Standing Order; Chest Pain   Final Result          MEDICATIONS GIVEN IN ER    Medications   sodium chloride 0.9 % flush 10 mL (has no administration in time range)   meclizine (ANTIVERT) tablet 25 mg (25 mg Oral Given 11/1/24 0016)   ondansetron (ZOFRAN) injection 4 mg (4 mg Intravenous Given 11/1/24 0016)   iopamidol (ISOVUE-300) 61 % injection 100 mL (100 mL Intravenous Given 11/1/24 0039)   iopamidol (ISOVUE-300) 61 % injection 50 mL (50 mL Intravenous Given 11/1/24 0039)   labetalol (NORMODYNE,TRANDATE) injection 10 mg (10 mg Intravenous Given 11/1/24 0159)         MEDICAL DECISION MAKING,  PROGRESS, and CONSULTS    All labs, if obtained, have been independently reviewed by me.  All radiology studies, if obtained, have been evaluated by me and the radiologist dictating the report.  All EKG's, if obtained, have been independently viewed and interpreted by me.      Discussion below represents my analysis of pertinent findings related to patient's condition, differential diagnosis, treatment plan and final disposition.    Martha Murray is a 69 y.o. female who presents to the ED c/o dizziness.  Brought in by EMS.  Hypertensive on arrival with blood pressure 172/78.  Differential diagnosis includes was not limited to hypertensive emergency, stroke/CVA, intracranial lesion, peripheral vertigo, myocardial infarction, cardiac arrhythmia.  Extensive labs ordered along with the EKG and CT along with CTA head and neck.  Not a candidate for tPA given time that patient went to sleep and unclear exact onset of symptoms outside of tPA window.    EKG personally interpreted showing normal sinus rhythm with rate of 76 with no evidence of acute ischemic changes/STEMI.    Labs show leukocytosis of 21 along with elevated hematocrit of 46.8 and elevated platelet count of 817 patient has known history of essential thrombocytosis and follow-up with hematology already for which she is on hydroxyurea.  Mildly elevated creatinine of 1.32 and mild hypokalemia of 3.2.  Normal troponin.    Patient given 4 mg of IV Zofran for nausea along with oral meclizine to help with possible vertigo.  Patient also given IV labetalol for symptomatic hypertension.    CT without contrast negative for acute intracranial abnormality or bleed.  CTA head negative.  CTA neck negative for large vessel occlusion, however showed a lobular filling defect in the proximal brachiocephalic artery that radiology had recommended patient have a follow-up CTA in 6 to 8 weeks for further clarification.    Consulted neurology who evaluated patient through  telemedicine and recommended further stroke workup including MRI given patient's continued vertigo.    Discussed patient's case with hospitalist who is agreeable with plan for admission for further treatment.                             Orders placed during this visit:  Orders Placed This Encounter   Procedures    XR Chest 1 View    CT Head Without Contrast Stroke Protocol    CT Angiogram Head    CT Angiogram Neck    CT CEREBRAL PERFUSION WITH & WITHOUT CONTRAST    [place] Draw    Comprehensive Metabolic Panel    High Sensitivity Troponin T    CBC Auto Differential    High Sensitivity Troponin T 2Hr    Urinalysis With Microscopic If Indicated (No Culture) - Urine, Clean Catch    NPO Diet NPO Type: Strict NPO    Undress & Gown    Continuous Pulse Oximetry    Code Status and Medical Interventions: CPR (Attempt to Resuscitate); Full Support    Oxygen Therapy- Nasal Cannula; Titrate 1-6 LPM Per SpO2; 90 - 95%    ECG 12 Lead ED Triage Standing Order; Chest Pain    Insert Peripheral IV    Initiate Observation Status    CBC & Differential    Green Top (Gel)    Lavender Top    Gold Top - SST    Light Blue Top         ED Course:    Consultants:                  Shared Decision Making:  After my consideration of clinical presentation and any laboratory/radiology studies obtained, I discussed the findings with the patient/patient representative who is in agreement with the treatment plan and the final disposition.   Risks and benefits of discharge and/or observation/admission were discussed.                    DIAGNOSIS  Final diagnoses:   Vertigo   Uncontrolled hypertension   Noncompliance with medication regimen   Thrombocytosis         DISPOSITION  Admit      Please note that portions of this document were completed with voice recognition software.        Robbi Ramos MD  11/01/24 0761

## 2024-11-01 NOTE — CONSULTS
"DM education referral per Stroke protocol.  Pt's A1c today was 5.5%.  Pt. Does have type 2 DM.  Pt. Is taking no medications and is doing diet control.  BG on admit was 206.  Currently BP is elevated.  Pt. Is resting.  Discussed that I did want pt. To get rest.  I did leave packet on \"Diabetes Basics\", BG logs, and the Healthy Plate diet.  Pt. Has no questions at this time.    "

## 2024-11-01 NOTE — PROGRESS NOTES
Stroke Progress Note       Chief Complaint:  left sided weakness     Subjective    Subjective     Subjective:  No acute events overnight     Objective      Temp:  [97.5 °F (36.4 °C)] 97.5 °F (36.4 °C)  Heart Rate:  [70-89] 89  Resp:  [18-20] 18  BP: (163-198)/() 178/69    NEURO( Exam is performed with help of hospital staff at bed side and observed by me via audio-video interface)    MENTAL STATUS: AAOx3, memory intact, fund of knowledge appropriate    LANG/SPEECH: Naming and repetition intact, fluent, follows 3-step commands    CRANIAL NERVES:    Pupils equal and reactive, EOMI intact, no gaze deviation, no nystagmus  No facial droop, cough and gag intact, shoulder shrug intact, tongue midline     MOTOR:  Moves all extremities equally    SENSORY: Normal to light touch all throughout     COORDINATION: Normal finger to nose and heel to shin, no tremor, no dysmetria    STATION: Not assessed due to patient condition    GAIT: Not assessed due to patient condition     Results Review:    I reviewed the patient's new clinical results.    Lab Results (last 24 hours)       Procedure Component Value Units Date/Time    Hemoglobin A1c [[id number]]  (Normal) Collected: 11/01/24 0836    Specimen: Blood Updated: 11/01/24 0908     Hemoglobin A1C 5.50 %     Narrative:      Hemoglobin A1C Ranges:    Increased Risk for Diabetes  5.7% to 6.4%  Diabetes                     >= 6.5%  Diabetic Goal                < 7.0%    CBC (No Diff) [[id number]]  (Abnormal) Collected: 11/01/24 0836    Specimen: Blood Updated: 11/01/24 0845     WBC 18.97 10*3/mm3      RBC 4.86 10*6/mm3      Hemoglobin 15.8 g/dL      Hematocrit 47.0 %      MCV 96.7 fL      MCH 32.5 pg      MCHC 33.6 g/dL      RDW 13.6 %      RDW-SD 48.4 fl      MPV 9.9 fL      Platelets 662 10*3/mm3     Vitamin B12 [[id number]] Collected: 11/01/24 0836    Specimen: Blood Updated: 11/01/24 0842    POC Glucose 4x Daily Before Meals & at Bedtime [[id number]]  (Abnormal) Collected:  11/01/24 0831    Specimen: Blood Updated: 11/01/24 0836     Glucose 135 mg/dL      Comment: Serial Number: [id number]:  [id number]       Urinalysis With Microscopic If Indicated (No Culture) - Urine, Clean Catch [[id number]]  (Abnormal) Collected: 11/01/24 0351    Specimen: Urine, Clean Catch Updated: 11/01/24 0413     Color, UA Yellow     Appearance, UA Clear     pH, UA 5.5     Specific Gravity, UA >=1.030     Glucose, UA Negative     Ketones, UA Negative     Bilirubin, UA Negative     Blood, UA Negative     Protein,  mg/dL (2+)     Leuk Esterase, UA Negative     Nitrite, UA Negative     Urobilinogen, UA 1.0 E.U./dL    Urinalysis, Microscopic Only - Urine, Clean Catch [[id number]] Collected: 11/01/24 0351    Specimen: Urine, Clean Catch Updated: 11/01/24 0413     RBC, UA None Seen /HPF      WBC, UA None Seen /HPF      Bacteria, UA None Seen /HPF      Squamous Epithelial Cells, UA 0-2 /HPF      Hyaline Casts, UA 0-2 /LPF      Methodology Manual Light Microscopy    High Sensitivity Troponin T 2Hr [[id number]]  (Normal) Collected: 11/01/24 0344    Specimen: Blood Updated: 11/01/24 0407     HS Troponin T 11 ng/L      Troponin T Delta 0 ng/L     Narrative:      High Sensitive Troponin T Reference Range:  <14.0 ng/L- Negative Female for AMI  <22.0 ng/L- Negative Male for AMI  >=14 - Abnormal Female indicating possible myocardial injury.  >=22 - Abnormal Male indicating possible myocardial injury.   Clinicians would have to utilize clinical acumen, EKG, Troponin, and serial changes to determine if it is an Acute Myocardial Infarction or myocardial injury due to an underlying chronic condition.         Lipid Panel [[id number]]  (Abnormal) Collected: 11/01/24 0344    Specimen: Blood Updated: 11/01/24 0405     Total Cholesterol 201 mg/dL      Triglycerides 108 mg/dL      HDL Cholesterol 46 mg/dL      LDL Cholesterol  135 mg/dL      VLDL Cholesterol 20 mg/dL      LDL/HDL Ratio 2.90    Narrative:      Cholesterol  Reference Ranges  (U.[place] Department of Health and Human Services ATP III Classifications)    Desirable          <200 mg/dL  Borderline High    200-239 mg/dL  High Risk          >240 mg/dL      Triglyceride Reference Ranges  (U.S. Department of Health and Human Services ATP III Classifications)    Normal           <150 mg/dL  Borderline High  150-199 mg/dL  High             200-499 mg/dL  Very High        >500 mg/dL    HDL Reference Ranges  (U.S. Department of Health and Human Services ATP III Classifications)    Low     <40 mg/dl (major risk factor for CHD)  High    >60 mg/dl ('negative' risk factor for CHD)        LDL Reference Ranges  (U.S. Department of Health and Human Services ATP III Classifications)    Optimal          <100 mg/dL  Near Optimal     100-129 mg/dL  Borderline High  130-159 mg/dL  High             160-189 mg/dL  Very High        >189 mg/dL    Basic Metabolic Panel [[id number]]  (Abnormal) Collected: 11/01/24 0344    Specimen: Blood Updated: 11/01/24 0405     Glucose 185 mg/dL      BUN 15 mg/dL      Creatinine 1.23 mg/dL      Sodium 139 mmol/L      Potassium 3.5 mmol/L      Comment: Slight hemolysis detected by analyzer. Result may be falsely elevated.        Chloride 105 mmol/L      CO2 19.3 mmol/L      Calcium 8.7 mg/dL      BUN/Creatinine Ratio 12.2     Anion Gap 14.7 mmol/L      eGFR 48.0 mL/min/1.73     Narrative:      GFR Normal >60  Chronic Kidney Disease <60  Kidney Failure <15      TSH Rfx On Abnormal To Free T4 [[id number]]  (Normal) Collected: 11/01/24 0142    Specimen: Blood Updated: 11/01/24 0402     TSH 2.110 uIU/mL     Comprehensive Metabolic Panel [[id number]]  (Abnormal) Collected: 11/01/24 0142    Specimen: Blood Updated: 11/01/24 0207     Glucose 206 mg/dL      Comment: Glucose >180, Hemoglobin A1C recommended.        BUN 15 mg/dL      Creatinine 1.32 mg/dL      Sodium 139 mmol/L      Potassium 3.2 mmol/L      Comment: Slight hemolysis detected by analyzer. Result may be falsely  elevated.        Chloride 103 mmol/L      CO2 22.4 mmol/L      Calcium 8.9 mg/dL      Total Protein 6.5 g/dL      Albumin 4.1 g/dL      ALT (SGPT) 15 U/L      AST (SGOT) 21 U/L      Alkaline Phosphatase 100 U/L      Total Bilirubin 0.5 mg/dL      Globulin 2.4 gm/dL      A/G Ratio 1.7 g/dL      BUN/Creatinine Ratio 11.4     Anion Gap 13.6 mmol/L      eGFR 44.1 mL/min/1.73     Narrative:      GFR Normal >60  Chronic Kidney Disease <60  Kidney Failure <15      High Sensitivity Troponin T [[id number]]  (Normal) Collected: 11/01/24 0142    Specimen: Blood Updated: 11/01/24 0207     HS Troponin T 11 ng/L     Narrative:      High Sensitive Troponin T Reference Range:  <14.0 ng/L- Negative Female for AMI  <22.0 ng/L- Negative Male for AMI  >=14 - Abnormal Female indicating possible myocardial injury.  >=22 - Abnormal Male indicating possible myocardial injury.   Clinicians would have to utilize clinical acumen, EKG, Troponin, and serial changes to determine if it is an Acute Myocardial Infarction or myocardial injury due to an underlying chronic condition.         [facility] Draw [[id number]] Collected: 11/01/24 0016    Specimen: Blood Updated: 11/01/24 0031    Narrative:      The following orders were created for panel order [facility] Draw.  Procedure                               Abnormality         Status                     ---------                               -----------         ------                     Green Top (Gel)[[id number]]                                  Final result               Lavender Top[[id number]]                                     Final result               Gold Top - SST[[id number]]                                   Final result               Light Blue Top[[id number]]                                   Final result                 Please view results for these tests on the individual orders.    Green Top (Gel) [[id number]] Collected: 11/01/24 0016    Specimen: Blood Updated: 11/01/24 0031     Extra Tube  Hold for add-ons.     Comment: Auto resulted.       Lavender Top [[id number]] Collected: 11/01/24 0016    Specimen: Blood Updated: 11/01/24 0031     Extra Tube hold for add-on     Comment: Auto resulted       Gold Top - SST [[id number]] Collected: 11/01/24 0016    Specimen: Blood Updated: 11/01/24 0031     Extra Tube Hold for add-ons.     Comment: Auto resulted.       Light Blue Top [[id number]] Collected: 11/01/24 0016    Specimen: Blood Updated: 11/01/24 0031     Extra Tube Hold for add-ons.     Comment: Auto resulted       CBC & Differential [[id number]]  (Abnormal) Collected: 11/01/24 0016    Specimen: Blood Updated: 11/01/24 0030    Narrative:      The following orders were created for panel order CBC & Differential.  Procedure                               Abnormality         Status                     ---------                               -----------         ------                     CBC Auto Differential[[id number]]        Abnormal            Final result                 Please view results for these tests on the individual orders.    CBC Auto Differential [[id number]]  (Abnormal) Collected: 11/01/24 0016    Specimen: Blood Updated: 11/01/24 0030     WBC 21.40 10*3/mm3      RBC 4.74 10*6/mm3      Hemoglobin 15.4 g/dL      Hematocrit 46.8 %      MCV 98.7 fL      MCH 32.5 pg      MCHC 32.9 g/dL      RDW 13.7 %      RDW-SD 50.2 fl      MPV 10.2 fL      Platelets 817 10*3/mm3      Neutrophil % 88.6 %      Lymphocyte % 4.7 %      Monocyte % 3.3 %      Eosinophil % 1.6 %      Basophil % 0.9 %      Immature Grans % 0.9 %      Neutrophils, Absolute 18.94 10*3/mm3      Lymphocytes, Absolute 1.01 10*3/mm3      Monocytes, Absolute 0.71 10*3/mm3      Eosinophils, Absolute 0.35 10*3/mm3      Basophils, Absolute 0.20 10*3/mm3      Immature Grans, Absolute 0.19 10*3/mm3      nRBC 0.0 /100 WBC           MRI Brain Without Contrast    Result Date: 11/1/2024  Small acute/subacute right periventricular CVA; right: JULISSA Medel in  the emergency room, notified at 9:40 a.m. November 1, 2024.      This report was signed and finalized on 11/1/2024 9:45 AM by Adelina Holliday MD.      XR Chest 1 View    Result Date: 11/1/2024  No acute cardiopulmonary process.        Images were reviewed, interpreted, and dictated by Dr. Adelina Holliday MD Transcribed by Milagros Pichardo PA-C.  This report was signed and finalized on 11/1/2024 9:25 AM by Adelina Holliday MD.      CT Angiogram Neck    Result Date: 11/1/2024  1. No evidence of significant cervical carotid stenosis 2. Lobular filling defect proximal brachiocephalic artery could represent nonocclusive mural thrombus or lobulated soft plaque. Follow-up CTA recommended in 6-8 weeks for continued surveillance. CT appearance that abnormality could result in more distal emboli.   This study was performed with techniques to keep radiation doses as low as reasonably achievable (ALARA). Individualized dose reduction techniques using automated exposure control or adjustment of vA and/or kV according to the patient size were employed.   This report was signed and finalized on 11/1/2024 12:58 AM by Sam Thakkar MD.      CT Angiogram Head    Result Date: 11/1/2024  No evidence of acute large vessel occlusive disease    This study was performed with techniques to keep radiation doses as low as reasonably achievable (ALARA). Individualized dose reduction techniques using automated exposure control or adjustment of vA and/or kV according to the patient size were employed.  This report was signed and finalized on 11/1/2024 12:53 AM by Sam Thakkar MD.      CT CEREBRAL PERFUSION WITH & WITHOUT CONTRAST    Result Date: 11/1/2024  1.  No evidence of acute large vessel occlusion  Should symptoms persist recommend follow-up MRI  This report was signed and finalized on 11/1/2024 12:52 AM by Sam Thakkar MD.      CT Head Without Contrast Stroke Protocol    Result Date: 11/1/2024  1. No acute intracranial abnormality or obvious  mass. 2. Atrophy and chronic ischemic white matter changes as above.   This study was performed with techniques to keep radiation doses as low as reasonably achievable (ALARA). Individualized dose reduction techniques using automated exposure control or adjustment of vA and/or kV according to the patient size were employed.    This report was signed and finalized on 11/1/2024 12:51 AM by Sam Thakkar MD.     Results for orders placed during the hospital encounter of 07/01/21    Adult Transthoracic Echo Complete W/ Cont if Necessary Per Protocol    Interpretation Summary  1.  Normal left ventricular size and systolic function, LVEF 60-65%.  2.  Grade 1 diastolic dysfunction.  3.  Mild concentric LVH.  4.  Moderately increased left atrial volume index.  5.  Normal right ventricular size and systolic function.  6.  Mild aortic regurgitation.            Assessment/Plan     Assessment/Plan:    Acute lacunar infarct in the right basal ganglia- likely etiology small vessel disease   -CTAs- open, filling defect in proximal brachiocephalic artery- non occlusive thrombus/soft plaque.  -will run heparin for 24hrs, continue aspirin   - systolic blood pressure goal between 120-160    Hyperlipidemia-- recommend high intensity statin Lipitor 80       Stroke clinic follow up in [facility].       Discussed signs and symptoms of stroke and when to call 911. Instructed to follow a low fat diet including the Mediterranean diet. Instructed to take all medications daily as prescribed. Encouraged 30 minutes of exercise 3-4 times a week as tolerated. Stay well hydrated. Discussed potential side effects of new medications and signs and symptoms to report.   Patient  verbalizes understanding and agrees with plan.        Daryn Rodriguez MD  11/01/24  10:00 EDT

## 2024-11-01 NOTE — NURSING NOTE
"This nurse at pt. Bedside. Pt. Moved from ER stretcher to hospital bed, pt. Reported \"dizziness\" with exertion, then vomited into emesis bag, yellow in color. VSS. Pt. Denies abdominal pain, bowel sounds audible, abdomen soft and nontender with palpation. NAYELI Katz notified via telephone. New orders received for IV Decadron, IV Reglan, and po Valium.  "

## 2024-11-01 NOTE — PROGRESS NOTES
[facility]    Name:  Martha Murray   Age:  69 y.o.  Sex:  female  :  1955  MRN:  [id number]   Visit Number:  [id number]  Admission Date:  10/31/2024  Date Of Service:  24  Primary Care Physician:  Jenny Carvajal APRN     LOS: 0 days :    Chief Complaint:      Dizziness    Subjective:    Patient seen and examined.  States dizziness is persistent.  States wanting to lean toward towards her right side for some reason.  Denies unilateral weakness.    Hospital Course:    Patient is an obese 69-year-old female with history significant for CKD, type 2 diabetes, hypertension, hyperlipidemia, essential thrombocythemia who presents to the emergency room with complaints of dizziness.  Patient's symptoms occurred abruptly approximately 2 hours prior to presentation.  States that she is having difficulty ambulating due to feeling of the room spinning around her.  No extremity weakness, facial slurring, difficulty speaking.  Also denied headache, fever, vision changes, chest pain, shortness of breath.  En route, EMS reported patient blood pressure 210/120.  They provided sublingual nitroglycerin with slight improvement.  The patient admits that she stopped taking her blood pressure medicine 2 to 3 days ago.  Denies smoking, alcohol use, illicit drug use.     ED summary: Patient afebrile, hypertensive 198/79 and nonhypoxic on room air.  Troponins negative x 2.  Potassium 3.2 and creatinine 1.32 which appears baseline.  Glucose 206.  TSH appropriate.  WBC 21 and platelets 817.  UA negative for infection, protein positive.  CT head without contrast showed no acute abnormality.  CTA head showed no LVO.  CTA neck showed no evidence of significant cervical carotid stenosis. Lobular filling defect proximal brachiocephalic artery could represent nonocclusive mural thrombus or lobulated soft plaque. Follow-up CTA recommended in 6-8 weeks for continued  surveillance.  CT cerebral perfusion showed no evidence of large vessel occlusion.  Teleneurology consulted, recommended admission for further stroke workup.  MRI noted small acute/subacute right periventricular CVA.  Patient initiated on heparin infusion x 24 hours due to filling defect in proximal brachiocephalic artery.  Continued on aspirin.  Norvasc added for blood pressure control.  Echo noted EF 61% with indeterminate left ventricular diastolic function negative saline test mild dilation of aorta at 3.8.    Review of Systems:     All systems were reviewed and negative except as mentioned in subjective, assessment and plan.    Vital Signs:    Temp:  [97.5 °F (36.4 °C)] 97.5 °F (36.4 °C)  Heart Rate:  [70-89] 84  Resp:  [18-20] 18  BP: (163-200)/() 181/81    Intake and output:    No intake/output data recorded.  No intake/output data recorded.    Physical Examination:    General Appearance:  Alert and cooperative.  Chronically ill middle-aged female.   Head:  Atraumatic and normocephalic.   Eyes: Conjunctivae and sclerae normal, no icterus. No pallor.   Throat: No oral lesions, no thrush, oral mucosa moist.   Neck: Supple, trachea midline, no thyromegaly.   Lungs:   Breath sounds heard bilaterally equally.  No wheezing or crackles. No Pleural rub or bronchial breathing.  On room air unlabored.   Heart:  Normal S1 and S2, no murmur, no gallop, no rub. No JVD.   Abdomen:   Normal bowel sounds, no masses, no organomegaly. Soft, nontender, nondistended, no rebound tenderness.   Extremities: Supple, no edema, no cyanosis, no clubbing.   Skin: No bleeding or rash.   Neurologic: Alert and oriented x 3. No facial asymmetry. Moves all four limbs. No tremors.  No nystagmus noted.     Laboratory results:    Results from last 7 days   Lab Units 11/01/24  0344 11/01/24  0142   SODIUM mmol/L 139 139   POTASSIUM mmol/L 3.5 3.2*   CHLORIDE mmol/L 105 103   CO2 mmol/L 19.3* 22.4   BUN mg/dL 15 15   CREATININE mg/dL 1.23*  "1.32*   CALCIUM mg/dL 8.7 8.9   BILIRUBIN mg/dL  --  0.5   ALK PHOS U/L  --  100   ALT (SGPT) U/L  --  15   AST (SGOT) U/L  --  21   GLUCOSE mg/dL 185* 206*     Results from last 7 days   Lab Units 11/01/24  0836 11/01/24  0016   WBC 10*3/mm3 18.97* 21.40*   HEMOGLOBIN g/dL 15.8 15.4   HEMATOCRIT % 47.0* 46.8*   PLATELETS 10*3/mm3 662* 817*     Results from last 7 days   Lab Units 11/01/24  1104   INR  1.14*     Results from last 7 days   Lab Units 11/01/24  0344 11/01/24  0142   HSTROP T ng/L 11 11         No results for input(s): \"PHART\", \"LGP6PCS\", \"PO2ART\", \"XNC0WWU\", \"BASEEXCESS\" in the last 8760 hours.   I have reviewed the patient's laboratory results.    Radiology results:    Adult Transthoracic Echo Complete W/ Cont if Necessary Per Protocol (With Agitated Saline)    Result Date: 11/1/2024    Left ventricular systolic function is normal. Calculated left ventricular EF = 68.3% Left ventricular ejection fraction appears to be 61 - 65%.   Left ventricular wall thickness is consistent with mild concentric hypertrophy. Left ventricular diastolic function was indeterminate.   Normal right ventricular size and function.   The left atrial cavity is mildly dilated.   Mild aortic valve regurgitation is present.   Saline test results are negative for right to left atrial level shunt.   Mild dilation of the proximal aorta is present. Ascending aorta = 3.8 cm.  Recommend dedicated chest CTA for further characterization of aortic pathology.     MRI Brain Without Contrast    Result Date: 11/1/2024  PROCEDURE: MRI BRAIN WO CONTRAST-  HISTORY: Stroke, follow up, dizziness and vertigo. Compare July 20, 2023.  PROCEDURE: Multiplanar MR imaging of the brain was performed in multiple MR sequences .  FINDINGS: Brain parenchyma displays normal signal without evidence of mass, hemorrhage or edema. There is mild small vessel ischemic disease, age-appropriate. Limited images the proximal cord are unremarkable. The ventricles are " mildly dilated indicating mild atrophy stable from prior exam. There is no extra-axial fluid or midline shift. Flow-voids are appropriate. Diffusion weighted images demonstrate a small area of mildly abnormal diffusion in the right parietal periventricular region just superior to the basal ganglia best seen series 6 image 15. This is confirmed on the ADC map to a moderate degree. Findings are suggestive of a acute/subacute stroke. This correlates with a small area of mildly increased T2 signal but has no correlate on the T1-weighted images. Limited images of the paranasal sinuses are unremarkable.      Impression: Small acute/subacute right periventricular CVA; right: JULISSA Medel in the emergency room, notified at 9:40 a.m. November 1, 2024.      This report was signed and finalized on 11/1/2024 9:45 AM by Adelina Holliday MD.      XR Chest 1 View    Result Date: 11/1/2024  PROCEDURE: XR CHEST 1 VW-    HISTORY: Dizziness, vertigo. Chest Pain Triage Protocol  COMPARISON: July 3, 2024.  FINDINGS: Apical lordotic view. The heart is borderline in size. The mediastinum is unremarkable. The lungs are clear. There is no pneumothorax. There are no acute osseous abnormalities.      Impression: No acute cardiopulmonary process.        Images were reviewed, interpreted, and dictated by Dr. Adelina Holliday MD Transcribed by Milagros Pichardo PA-C.  This report was signed and finalized on 11/1/2024 9:25 AM by Adelina Holliday MD.      CT Angiogram Neck    Result Date: 11/1/2024  PROCEDURE: CT ANGIOGRAM NECK-  HISTORY: Dizziness and vertigo  TECHNIQUE: Thin section axial CT with IV contrast supplemented with multiplanar reconstruction.   3 D reconstructions were performed on a separate workstation.  NASCET criteria and technique was utilized during interpretation.  FINDINGS:  Aortic arch:  Arch shows no significant narrowing. There is a filling defect along the anterior wall of the brachiocephalic artery measuring up to 7 mm. This could  represent mural thrombus or lobulated soft plaque. Nevertheless this could be a embolic source. The left common and left subclavian arteries appear widely patent.  Right carotid:  No significant stenosis is seen of the cervical common or internal carotid artery.  Left carotid:  No significant stenosis is seen of the cervical common or internal carotid artery.  Vertebrals: Vertebral arteries are codominant. No significant stenosis is present.       Impression: 1. No evidence of significant cervical carotid stenosis 2. Lobular filling defect proximal brachiocephalic artery could represent nonocclusive mural thrombus or lobulated soft plaque. Follow-up CTA recommended in 6-8 weeks for continued surveillance. CT appearance that abnormality could result in more distal emboli.   This study was performed with techniques to keep radiation doses as low as reasonably achievable (ALARA). Individualized dose reduction techniques using automated exposure control or adjustment of vA and/or kV according to the patient size were employed.   This report was signed and finalized on 11/1/2024 12:58 AM by Sam Thakkar MD.      CT Angiogram Head    Result Date: 11/1/2024  PROCEDURE: CT ANGIOGRAM HEAD-  HISTORY: Dizziness and vertigo  TECHNIQUE: Thin section axial CT with contrast with multiplanar reconstruction.   3 D reconstructions were performed on a separate workstation.  FINDINGS:  Internal carotid arteries: The petrous, cavernous and postcavernous ICAs are unremarkable. No aneurysm is seen.  Anterior cerebral arteries: Central anterior cerebral arteries are unremarkable. No aneurysm is seen.  Middle cerebral arteries: Central middle cerebral arteries are unremarkable. No aneurysm is seen.  Basilar artery: Distal vertebral and basilar arteries are widely patent. No aneurysm is seen.  Posterior cerebral artery: Posterior cerebral arteries are patent centrally. No obvious aneurysm is seen.  Venous sinuses: Major venous sinuses are  patent.      Impression: No evidence of acute large vessel occlusive disease    This study was performed with techniques to keep radiation doses as low as reasonably achievable (ALARA). Individualized dose reduction techniques using automated exposure control or adjustment of vA and/or kV according to the patient size were employed.  This report was signed and finalized on 11/1/2024 12:53 AM by Sam Thakkar MD.      CT CEREBRAL PERFUSION WITH & WITHOUT CONTRAST    Result Date: 11/1/2024  CT cerebral perfusion, without and with contrast  HISTORY: Symptoms of CVA. Vertigo. Dizziness..  TECHNIQUE: CT brain perfusion with mapping of flow parameters including transit time, cerebral blood flow and cerebral blood volume. IV contrast was utilized.  FINDINGS:  Tissue with delayed blood, Tmax > 6 secs:  0 ml  Tissue with significantly reduced blood, rCBF < 30%:  0        Impression: 1.  No evidence of acute large vessel occlusion  Should symptoms persist recommend follow-up MRI  This report was signed and finalized on 11/1/2024 12:52 AM by Sam Thakkar MD.      CT Head Without Contrast Stroke Protocol    Result Date: 11/1/2024  PROCEDURE: CT HEAD WO CONTRAST STROKE PROTOCOL-  HISTORY: Dizziness/vertigo, symptoms of acute CVA  COMPARISON: 7/20/2023  TECHNIQUE: Noncontrast exam  FINDINGS: Mild atrophy and chronic ischemic white matter changes are noted.  No cortical edema is present. There is no mass or hemorrhage. Ventricles are normal.  Bone windows show no skull fracture or obvious destructive lesion.      Impression: 1. No acute intracranial abnormality or obvious mass. 2. Atrophy and chronic ischemic white matter changes as above.   This study was performed with techniques to keep radiation doses as low as reasonably achievable (ALARA). Individualized dose reduction techniques using automated exposure control or adjustment of vA and/or kV according to the patient size were employed.    This report was signed and  finalized on 11/1/2024 12:51 AM by Sam Thakkar MD.     I have reviewed the patient's radiology reports.    Medication Review:     I have reviewed the patient's active and prn medications.     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  Bubble study negative.  A1c 5.5/lipid panel mildly elevated.  Aspirin/high-dose statin.  Blood pressure control.  Restart home lisinopril-Norvasc added.  Neurochecks per protocol  PT/OT/SLP  Dizziness persistent with Reglan/Valium/Decadron ordered x 1.  Continue scopolamine and meclizine.  Leukocytosis  Unclear cause of leukocytosis.  No infectious source identified.  Downtrending.  Could be secondary to hemoconcentration  Patient does have essential thrombocythemia for which she is on hydroxyurea  Hypokalemia  Replace per protocol  Type 2 diabetes  A1c 5.5  Sliding scale for correction  Diabetic education    I have reviewed the copied text and it is accurate as of 11/1/2024      DVT Prophylaxis: Heparin infusion  Code Status: Full  Diet: As tolerated  Discharge Plan: Likely home tomorrow pending PT/OT evaluation.    Jen Batres, APRN  11/01/24  12:58 EDT    Dictated utilizing Dragon dictation.

## 2024-11-01 NOTE — PLAN OF CARE
Goal Outcome Evaluation:  Plan of Care Reviewed With: patient        Progress: no change  Outcome Evaluation: VSS on RA- BP better controlled at this time. Pt. still with dizziness and vomiting (Pt. reports improvement in symptoms with medication adjustments). FSBG stable. Heparin gtt. infusing. NIH 0. Plan to work with therapy tomorrow. Diet advanced to clears and tolerating small sips. POC ongoing.

## 2024-11-01 NOTE — PHARMACY RECOMMENDATION
Pharmacy to Dose Heparin Infusion    Martha Murray is a  69 y.o. female receiving heparin infusion.     Therapy for (VTE/Cardiac):   Cardiac  Patient Weight: 72.6 kg  Initial Bolus (Y/N):   no  Any Bolus (Y/N):   no        Signs or Symptoms of Bleeding:     Cardiac or Other (Not VTE)   Initial Bolus: None  Initial rate: 12 units/kg/hr (Max 1,000 units/hr)   Anti Xa Rebolus Infusion Hold time Change infusion Dose (Units/kg/hr) Next Anti Xa or aPTT Level Due   < 0.1 0 None Increase by  3 Units/kg/hr 6 hours   0.1- 0.19 0 None Increase by  2 Units/kg/hr 6 hours   0.2 - 0.29 0 None Increase by  1 Units/kg/hr 6 hours   0.3 - 0.5 0 None No Change 6 hours (after 2 consecutive levels in range check qAM)   0.51 - 0.6 0 None Decrease by  1 Units/kg/hr 6 hours   0.61 - 0.8 0 30 Minutes Decrease by  2 Units/kg/hr 6 hours   0.81 - 1 0 60 Minutes Decrease by  3 Units/kg/hr 6 hours   >1 0 Hold  After Anti Xa less than 0.5 decrease previous rate by  4 Units/kg/hr  Every 2 hours until Anti Xa  less than 0.5 then when infusion restarts in 6 hours         Recommend anti-Xa every 6 hours.     Results from last 7 days   Lab Units 11/01/24  0836 11/01/24  0016   HEMOGLOBIN g/dL 15.8 15.4   HEMATOCRIT % 47.0* 46.8*   PLATELETS 10*3/mm3 662* 817*          Date   Time   Anti-Xa Current Rate (Unit/kg/hr) Bolus   (Units) Rate Change   (Unit/kg/hr) New Rate (Unit/kg/hr) Next   Anti-Xa Comments  Pump Check Daily   11/1/24 1140 TBD NEW -- +12 12 1800 D/W Kevin, RN     Pharmacy will continue to follow anti-Xa results and monitor for signs and symptoms of bleeding or thrombosis.    Francisca Peres, PharmD  11/01/24 11:40 EDT

## 2024-11-01 NOTE — CONSULTS
TELENEUROLOGY CONSULT      Reason for Consult: Stroke  Referring Provider: Robbi Ramos MD     CC: Dizziness    HPI:    TIME LINE  Last known well NA   Page 1245am est   Arrival at bedside 12.45amd   Time of 1st NIHSS 00:55 EDT    Decision for/against IV thrombolytics 00:55 EDT    Decision for/against endovascular 00:55 EDT    Contact time of endovascular team NA       Martha Murray is a 69 y.o. female with sudden onset of dizziness with difficulty ambulating. Associated with nausea and vomiting. Denies double vision, slurred speech, focal weakness, numbness.     Baseline MRS: 0    Past Medical History:  Active Ambulatory Problems     Diagnosis Date Noted    Essential hypertension 08/25/2017    Mild intermittent asthma without complication 08/25/2017    Type 2 diabetes mellitus without complication, without long-term current use of insulin 08/25/2017    Hyperlipidemia LDL goal <70 08/28/2017    Depression 02/07/2018    Essential thrombocytosis 03/28/2019    Onychomycosis with ingrown toenail 04/17/2019    CKD (chronic kidney disease) stage 3, GFR 30-59 ml/min 06/24/2019    Obesity (BMI 30.0-34.9) 08/07/2023    Coronary artery disease involving native coronary artery of native heart with angina pectoris 07/03/2024    S/P total knee arthroplasty, right 07/03/2024    Arthritis of knee 07/03/2024     Resolved Ambulatory Problems     Diagnosis Date Noted    Chronic pain of both knees 11/06/2017    History of tobacco abuse 02/15/2019    Simple chronic bronchitis 02/15/2019    Other chest pain 02/15/2019    Vitamin D deficiency 03/28/2019    Leukocytosis 03/28/2019    Elevated liver enzymes 03/28/2019    PAD (peripheral artery disease) 06/21/2019    Peripheral vascular disease of lower extremity with ulceration 07/17/2019    Arterial insufficiency with ischemic ulcer 07/17/2019    Atypical angina 10/17/2019    HAMMAD (acute kidney injury) 07/01/2021    Primary osteoarthritis of both knees 08/10/2021    Irritable  bowel syndrome with diarrhea 01/02/2022    Other insomnia 01/02/2022    Right hand pain 01/02/2022    Acute kidney injury 07/30/2023    High anion gap metabolic acidosis 07/30/2023    Splenomegaly 07/30/2023    Lactic acidosis 07/30/2023    Arthritis of knee 05/21/2024     Past Medical History:   Diagnosis Date    Allergic     Anemia     Anesthesia complication     Arthritis     COPD (chronic obstructive pulmonary disease)     Diabetes mellitus     History of heart attack     Hyperlipidemia     Hypertension        Past Surgical History:   has a past surgical history that includes Tonsillectomy; Breast Reduction; Sinus surgery (2003); Bone marrow aspiration; Hysterectomy (1990); Reduction mammaplasty (Bilateral); Cataract extraction (Bilateral); Colonoscopy; and Total knee arthroplasty (Right, 7/3/2024).    Family History:  Family History   Problem Relation Age of Onset    Arthritis Maternal Grandmother     Hypertension Mother     Hyperlipidemia Mother     Thyroid disease Mother     Diabetes Sister     Crohn's disease Sister     Hypertension Sister     Heart attack Maternal Grandfather     Breast cancer Neg Hx     Ovarian cancer Neg Hx        Social History:  Social History     Social History Narrative    Not on file       Allergies:  Sulfa antibiotics    Medications:  No current facility-administered medications on file prior to encounter.     Current Outpatient Medications on File Prior to Encounter   Medication Sig Dispense Refill    aspirin (ASPIR) 81 MG EC tablet Take 1 tablet by mouth 2 (Two) Times a Day. 60 tablet 0    hydroxyurea (HYDREA) 500 MG capsule Take 1 capsule by mouth Daily. 30 capsule 11    lisinopril (PRINIVIL,ZESTRIL) 40 MG tablet Take 1 tablet by mouth Daily. 90 tablet 1       Review of Systems:  History obtained from: the patient  General ROS: negative for - fatigue, fever, weight gain, or weight loss  Psychological ROS: negative for - anxiety, disorientation, hallucinations, or memory  "difficulties  Ophthalmic ROS: negative for - blurry vision, decreased vision, double vision, or eye pain  ENT ROS: negative for - epistaxis, nasal congestion, oral lesions, sore throat, or tinnitus  Hematological and Lymphatic ROS: negative for - bleeding problems, bruising, jaundice, or swollen lymph nodes  Endocrine ROS: negative for - hair pattern changes, palpitations, polydipsia/polyuria, or temperature intolerance  Respiratory ROS: no cough, shortness of breath, or wheezing  Cardiovascular ROS: no chest pain or dyspnea on exertion  Gastrointestinal ROS: negative for - abdominal pain, diarrhea, melena, nausea/vomiting, or stool incontinence  Genito-Urinary ROS: negative for - dysuria, hematuria, incontinence, or urinary frequency/urgency  Musculoskeletal ROS: negative for - joint pain, joint stiffness, or joint swelling  Neurological ROS: as noted in HPI  Dermatological ROS: negative for eczema, mole changes, pruritus, and rash     TPA inclusion/exclusion criteria reviewed and is notable for out of the window    Physical Examination:  Vitals:    11/01/24 0003   BP: 172/78   BP Location: Left arm   Patient Position: Sitting   Pulse: 74   Resp: 20   Temp: 97.5 °F (36.4 °C)   TempSrc: Oral   SpO2: 98%   Weight: 72.6 kg (160 lb)   Height: 154.9 cm (60.98\")       General:  In mild distress   Head:  Normocephalic, atraumatic.   Lungs:   non-labored   Heart:  Regular rate and rhythm on telemetry   Abdomen:   Non-distended          National Institutes of Health Stroke Scale (NIHSS)    Instructions Scale/Definitions Score         1a.  Level of  Consciousness (LOC) 0 = Alert, keenly responsive  1 = Not alert, but arousable by minor        stimulation.  2 = Not alert, required repeated stimulation to         attend.  3 = Responds only with reflex motor or totally         unresponsive       1a.  0   1b.  LOC Questions  Asked to say month and his/her age 0 = Answers both questions correctly.   1 = Answers one question " correctly        (dysarthric, intubated).  2 = Answers neither question correctly        (aphasic, stupor).      1b.  0   1c.  LOC Commands  Asked to open & close eyes, then  & release with non-affected hand. 0 = Performs both tasks correctly.  1 = Performs one task correctly.  2 = Performs neither task correctly.     1c.  0     2.  Best Gaze  Asked to follow with eyes through horizontal plane. 0 = Normal  1 = Partial gaze palsy.  2 = Forced deviation or total gaze paresis.   2.  0   3.  Visual  Visual fields (quadrants) tested with finger counting or visual threat. 0 = No visual loss.  1 = Partial hemianopia (extinction).  2 = Complete hemianopia.  3 = Bilateral hemianopia (including         blindness).       3.  0   4.  Facial Palsy  Asked to show teeth & raise eyebrows. 0 = Normal symmetrical movement.  1 = Minor paralysis.  2 = Partial paralysis (total/near total         paralysis of lower face).  3 = Complete paralysis of one or both         sides (upper & lower).         4.  0   5.  Motor Arm  Asked to extend arms (palm down) 90º (if sitting) or 45º (if supine) & hold for 10 seconds. 0 = No drift; arm stays at 90º/45º for full 10        seconds.  1 = Drift; arm drifts down but does not hit         bed or other support.  2 = Some effort against gravity; drifts down         to bed or support.  3 = No effort against gravity: arm falls to         bed or support.  4 = No movement.  9 = Amputation, joint fusion. 5a. (Left)       0      5b.  (Right)        0   6.  Motor Leg  While supine, asked to hold leg at 30º for 5 seconds. 0 = No drift; leg stays at 30º for full 5        seconds.  1 = Drift; leg drifts down but does not hit         the bed or other support.  2 = Some effort against gravity; drifts down         to bed or support.  3 = No effort against gravity; leg falls to         bed or support.  4 = No movement.  9 = Amputation, joint fusion.    6a.  (Left)       0        6b.  (Right)       0   7.  Limb  "Ataxia  Finger - nose & heel shin tests on both sides.  0 = Absent.  1 = Present in one limb.  2 = Present in two limbs.     7.  0   8.  Sensory  Sensation or grimace to pin prick or withdrawal from noxious stimuli in obtunded or aphasic patient.  0 = Normal; no sensory loss.  1 = Mild / moderate sensory loss; may be        dulled / \"not as sharp\".  2 = Severe / total sensory loss; coma     8.  0   9.  Best Language  Asked to describe a picture, name objects & read simple words.  (See NIHSS language tools). 0 = No aphasia; normal  1 = Mild / moderate aphasia; some loss of        fluency / comprehension without        limitation of expression of ideas (can        identify picture from patient's        responses).  2 = Severe aphasia (cannot identify         pictures from responses).  3 = Mute; global aphasia; no usable        speech; cannot follow simple        commands.           9.  0   10.  Dysarthria   0 = Normal.  1 = Mild / moderate; slurs some words;         can be understood.  2 = Severe; so slurred as to be         unintelligible; mute;anarthric.    9 = Intubated or other physical barrier.       10.  0   11.  Extinction & Inattention  Look at Visual (from #3) and double simultaneous tactile.    0 = No abnormality.  1 = Inattention or extinction in one sensory         modality.  2 = Profound darren inattention or        inattention to more than one modality;        does not recognize own hand; orients        to only one side of space.         11.  0        Complete NIHSS Score                   (0-42)  0         Laboratory Studies:    Lab Results   Component Value Date    WBC 21.40 (H) 11/01/2024    WBC 11.07 (H) 09/18/2024    WBC 27.64 (H) 07/13/2024    HGB 15.4 11/01/2024    HGB 15.0 09/18/2024    HGB 13.0 07/13/2024    HCT 46.8 (H) 11/01/2024    HCT 45.3 09/18/2024    HCT 38.1 07/13/2024     (H) 11/01/2024     (H) 09/18/2024     (H) 07/13/2024     Lab Results   Component Value Date    NA " "141 09/18/2024     07/13/2024     07/05/2024    K 4.1 09/18/2024    K 3.2 (L) 07/13/2024    K 4.7 07/05/2024     09/18/2024     07/13/2024     (H) 07/05/2024    CO2 25.1 09/18/2024    CO2 19.1 (L) 07/13/2024    CO2 21.0 (L) 07/05/2024    BUN 12 09/18/2024    BUN 21 07/13/2024    BUN 32 (H) 07/05/2024    CREATININE 1.12 (H) 09/18/2024    CREATININE 1.13 (H) 07/13/2024    CREATININE 1.65 (H) 07/05/2024    CALCIUM 9.2 09/18/2024    CALCIUM 8.8 07/13/2024    CALCIUM 8.4 (L) 07/05/2024    BILITOT 1.1 09/18/2024    BILITOT 0.8 07/13/2024    BILITOT 0.8 06/14/2024    ALKPHOS 113 09/18/2024    ALKPHOS 190 (H) 07/13/2024    ALKPHOS 99 06/14/2024    ALT 16 09/18/2024    ALT 14 07/13/2024    ALT 20 06/14/2024    AST 32 09/18/2024    AST 18 07/13/2024    AST 33 (H) 06/14/2024    GLUCOSE 106 (H) 09/18/2024    GLUCOSE 129 (H) 07/13/2024    GLUCOSE 113 (H) 07/05/2024     Lab Results   Component Value Date    INR 1.06 06/14/2024    PROTIME 14.4 06/14/2024     Lab Results   Component Value Date    CHOL 189 08/07/2023    CHOL 180 12/14/2021    CHOL 107 11/10/2020    TRIG 224 (H) 08/07/2023    TRIG 210 (H) 12/14/2021    TRIG 79 05/14/2021    HDL 46 08/07/2023    HDL 36 (L) 12/14/2021    HDL 45 05/14/2021    VLDL 38 08/07/2023    VLDL 37 12/14/2021    VLDL 16 05/14/2021     No components found for: \"TRPNI\"      Imaging:  CT Head images : No intracranial hemorrhage  CTA Head and Neck images :no LVO  CTP images : NA      ASSESSMENT/PLAN:  Martha Murray is a 69 y.o. female with LKW 3pm, NIHSS 0 presenting with dizziness.     TPA not offered due to out of the window  Thrombectomy not offered due to back to baseline.      Recommendations:  -Admission for further evaluation, management, and optimization of stroke risk factors  -MRI of the brain without contrast  - If MRI positive for cerebral ischemia tnen.  Echocardiogram with bubble study  -Frequent neuro checks per institutional stroke " protocol  -Continuous telemetry  -Labs: A1c, fasting lipid panel, TSH, B12   -LDL goal <70 (high intensity statin as indicated based on LDL level)   -A1c goal <6.0 (optimize as needed)   -Stroke Prophylaxis: asa 81mg plavix 75m, Lipitor 40mg  -BP control <140/90mmhg  -Screen for JENNIFER - may need further outpatient sleep study   -Smoking cessation is critically important (if patient is a smoker)   -Highly recommend adoption of healthy lifestyle habits with diet and exercise for general health promotion. Mediterranean diet is a good option. Exercise goals should be at least 30 minutes of moderate intensity 4x/week  -Continue other home medications   -Maintain normothermia  -Maintain euglycemia   -Patient NPO until patient passes RN stroke swallow screen or speech therapy evaluation  -PT/OT/Speech/swallow consults as indicated   -DVT prophylaxis with Lovenox/SCDs.  -Stroke education provided by nursing per institutional guidelines     This consult was performed using a telemedicine platform. Consent was obtained from patient/family prior to performance of evaluation.    Location of patient: ED  Location of provider: home office  Person assisting: nursing staff  Total time spent: 27 min.     Thank you for the consultation. Case discussed with Dr. Robbi Butler MD    Thank you for allowing neurology to participate in the care of this patient.    Please page neurology once all studies are completed so they may follow-up and make any final recommendations if needed. Please page neurology for any urgent concerns or changes in neurological status.     As the provider for this telehealth service, I attest that I introduced myself to the patient, provided my credentials, disclosed my location, [place], [place], and determined that, based on a review of the patient's chart and/or a discussion with members of the patient's treatment team, telemedicine via a real-time, two-way, interactive audio and video platform is an appropriate and  effective means of providing this service. The patient is locate at [place], [place] The patient and I mutually agree that this visit is appropriate for telemedicine as well.    Conrad Camacho MD [facility]  11/01/24   01:14 EDT

## 2024-11-01 NOTE — H&P
[facility]   HISTORY AND PHYSICAL      Name:  Martha Murray   Age:  69 y.o.  Sex:  female  :  1955  MRN:  [id number]   Visit Number:  [id number]  Admission Date:  10/31/2024  Date Of Service:  24  Primary Care Physician:  Jenny Carvajal APRN    Chief Complaint:     Dizziness    History Of Presenting Illness:      Patient is an obese 69-year-old female with history significant for CKD, type 2 diabetes, hypertension, hyperlipidemia, essential thrombocythemia who presents to the emergency room with complaints of dizziness.  Patient's symptoms occurred abruptly approximately 2 hours prior to presentation.  States that she is having difficulty ambulating due to feeling of the room spinning around her.  No extremity weakness, facial slurring, difficulty speaking.  Also denied headache, fever, vision changes, chest pain, shortness of breath.  En route, EMS reported patient blood pressure 210/120.  They provided sublingual nitroglycerin with slight improvement.  The patient admits that she stopped taking her blood pressure medicine 2 to 3 days ago.  Denies smoking, alcohol use, illicit drug use.    ED summary: Patient afebrile, hypertensive 198/79 and nonhypoxic on room air.  Troponins negative x 2.  Potassium 3.2 and creatinine 1.32 which appears baseline.  Glucose 206.  TSH appropriate.  WBC 21 and platelets 817.  UA negative for infection, protein positive.  CT head without contrast showed no acute abnormality.  CTA head showed no LVO.  CTA neck showed no evidence of significant cervical carotid stenosis. Lobular filling defect proximal brachiocephalic artery could  represent nonocclusive mural thrombus or lobulated soft plaque.  Follow-up CTA recommended in 6-8 weeks for continued surveillance.  CT cerebral perfusion showed no evidence of large vessel occlusion.  Teleneurology consulted, recommended admission for further stroke workup.    Review Of Systems:    All  systems were reviewed and negative except as mentioned in history of presenting illness, assessment and plan.    Past Medical History: Patient  has a past medical history of HAMMAD (acute kidney injury) (07/01/2021), Allergic, Anemia, Anesthesia complication, Arthritis, COPD (chronic obstructive pulmonary disease), Depression, Diabetes mellitus, History of heart attack, Hyperlipidemia, and Hypertension.    Past Surgical History: Patient  has a past surgical history that includes Tonsillectomy; Breast Reduction; Sinus surgery (2003); Bone marrow aspiration; Hysterectomy (1990); Reduction mammaplasty (Bilateral); Cataract extraction (Bilateral); Colonoscopy; and Total knee arthroplasty (Right, 7/3/2024).    Social History: Patient  reports that she quit smoking about 27 years ago. Her smoking use included cigarettes. She started smoking about 57 years ago. She has a 60 pack-year smoking history. She has been exposed to tobacco smoke. She has never used smokeless tobacco. She reports that she does not drink alcohol and does not use drugs.    Family History:  Patient's family history has been reviewed and found to be noncontributory.     Allergies:      Sulfa antibiotics    Home Medications:    Prior to Admission Medications       Prescriptions Last Dose Informant Patient Reported? Taking?    aspirin (ASPIR) 81 MG EC tablet   No No    Take 1 tablet by mouth 2 (Two) Times a Day.    hydroxyurea (HYDREA) 500 MG capsule   No No    Take 1 capsule by mouth Daily.    lisinopril (PRINIVIL,ZESTRIL) 40 MG tablet  Self No No    Take 1 tablet by mouth Daily.          ED Medications:    Medications   sodium chloride 0.9 % flush 10 mL (has no administration in time range)   meclizine (ANTIVERT) tablet 25 mg (25 mg Oral Given 11/1/24 0016)   ondansetron (ZOFRAN) injection 4 mg (4 mg Intravenous Given 11/1/24 0016)   iopamidol (ISOVUE-300) 61 % injection 100 mL (100 mL Intravenous Given 11/1/24 0039)   iopamidol (ISOVUE-300) 61 %  "injection 50 mL (50 mL Intravenous Given 11/1/24 0039)   labetalol (NORMODYNE,TRANDATE) injection 10 mg (10 mg Intravenous Given 11/1/24 0159)     Vital Signs:  Temp:  [97.5 °F (36.4 °C)] 97.5 °F (36.4 °C)  Heart Rate:  [70-76] 72  Resp:  [18-20] 18  BP: (172-188)/(78-92) 188/80        11/01/24  0003   Weight: 72.6 kg (160 lb)     Body mass index is 30.25 kg/m².    Physical Exam:     Most recent vital Signs: BP (!) 188/80   Pulse 72   Temp 97.5 °F (36.4 °C) (Oral)   Resp 18   Ht 154.9 cm (60.98\")   Wt 72.6 kg (160 lb)   SpO2 100%   BMI 30.25 kg/m²     Physical Exam  Vitals reviewed.   Constitutional:       Appearance: She is obese.   HENT:      Head: Normocephalic and atraumatic.      Right Ear: External ear normal.      Left Ear: External ear normal.   Neurological:      Mental Status: She is alert.         Laboratory data:    I have reviewed the labs done in the emergency room.    Results from last 7 days   Lab Units 11/01/24  0142   SODIUM mmol/L 139   POTASSIUM mmol/L 3.2*   CHLORIDE mmol/L 103   CO2 mmol/L 22.4   BUN mg/dL 15   CREATININE mg/dL 1.32*   CALCIUM mg/dL 8.9   BILIRUBIN mg/dL 0.5   ALK PHOS U/L 100   ALT (SGPT) U/L 15   AST (SGOT) U/L 21   GLUCOSE mg/dL 206*     Results from last 7 days   Lab Units 11/01/24  0016   WBC 10*3/mm3 21.40*   HEMOGLOBIN g/dL 15.4   HEMATOCRIT % 46.8*   PLATELETS 10*3/mm3 817*         Results from last 7 days   Lab Units 11/01/24  0142   HSTROP T ng/L 11                           Invalid input(s): \"USDES\", \"NITRITITE\", \"BACT\", \"EP\"    Pain Management Panel  More data exists         Latest Ref Rng & Units 11/7/2023 8/4/2020   Pain Management Panel   Creatinine, Urine mg/dL 250.5  234.4       Details                   EKG:      EKG personally reviewed, sinus rhythm with rate of 76 bpm.  No acute ST or T wave changes.    Radiology:    CT Angiogram Neck    Result Date: 11/1/2024  PROCEDURE: CT ANGIOGRAM NECK-  HISTORY: Dizziness and vertigo  TECHNIQUE: Thin section " axial CT with IV contrast supplemented with multiplanar reconstruction.   3 D reconstructions were performed on a separate workstation.  NASCET criteria and technique was utilized during interpretation.  FINDINGS:  Aortic arch:  Arch shows no significant narrowing. There is a filling defect along the anterior wall of the brachiocephalic artery measuring up to 7 mm. This could represent mural thrombus or lobulated soft plaque. Nevertheless this could be a embolic source. The left common and left subclavian arteries appear widely patent.  Right carotid:  No significant stenosis is seen of the cervical common or internal carotid artery.  Left carotid:  No significant stenosis is seen of the cervical common or internal carotid artery.  Vertebrals: Vertebral arteries are codominant. No significant stenosis is present.       1. No evidence of significant cervical carotid stenosis 2. Lobular filling defect proximal brachiocephalic artery could represent nonocclusive mural thrombus or lobulated soft plaque. Follow-up CTA recommended in 6-8 weeks for continued surveillance. CT appearance that abnormality could result in more distal emboli.   This study was performed with techniques to keep radiation doses as low as reasonably achievable (ALARA). Individualized dose reduction techniques using automated exposure control or adjustment of vA and/or kV according to the patient size were employed.   This report was signed and finalized on 11/1/2024 12:58 AM by Sam Thakkar MD.      CT Angiogram Head    Result Date: 11/1/2024  PROCEDURE: CT ANGIOGRAM HEAD-  HISTORY: Dizziness and vertigo  TECHNIQUE: Thin section axial CT with contrast with multiplanar reconstruction.   3 D reconstructions were performed on a separate workstation.  FINDINGS:  Internal carotid arteries: The petrous, cavernous and postcavernous ICAs are unremarkable. No aneurysm is seen.  Anterior cerebral arteries: Central anterior cerebral arteries are  unremarkable. No aneurysm is seen.  Middle cerebral arteries: Central middle cerebral arteries are unremarkable. No aneurysm is seen.  Basilar artery: Distal vertebral and basilar arteries are widely patent. No aneurysm is seen.  Posterior cerebral artery: Posterior cerebral arteries are patent centrally. No obvious aneurysm is seen.  Venous sinuses: Major venous sinuses are patent.      No evidence of acute large vessel occlusive disease    This study was performed with techniques to keep radiation doses as low as reasonably achievable (ALARA). Individualized dose reduction techniques using automated exposure control or adjustment of vA and/or kV according to the patient size were employed.  This report was signed and finalized on 11/1/2024 12:53 AM by Sam Thakkar MD.      CT CEREBRAL PERFUSION WITH & WITHOUT CONTRAST    Result Date: 11/1/2024  CT cerebral perfusion, without and with contrast  HISTORY: Symptoms of CVA. Vertigo. Dizziness..  TECHNIQUE: CT brain perfusion with mapping of flow parameters including transit time, cerebral blood flow and cerebral blood volume. IV contrast was utilized.  FINDINGS:  Tissue with delayed blood, Tmax > 6 secs:  0 ml  Tissue with significantly reduced blood, rCBF < 30%:  0        1.  No evidence of acute large vessel occlusion  Should symptoms persist recommend follow-up MRI  This report was signed and finalized on 11/1/2024 12:52 AM by Sam Thakkar MD.      CT Head Without Contrast Stroke Protocol    Result Date: 11/1/2024  PROCEDURE: CT HEAD WO CONTRAST STROKE PROTOCOL-  HISTORY: Dizziness/vertigo, symptoms of acute CVA  COMPARISON: 7/20/2023  TECHNIQUE: Noncontrast exam  FINDINGS: Mild atrophy and chronic ischemic white matter changes are noted.  No cortical edema is present. There is no mass or hemorrhage. Ventricles are normal.  Bone windows show no skull fracture or obvious destructive lesion.      1. No acute intracranial abnormality or obvious mass. 2. Atrophy and  chronic ischemic white matter changes as above.   This study was performed with techniques to keep radiation doses as low as reasonably achievable (ALARA). Individualized dose reduction techniques using automated exposure control or adjustment of vA and/or kV according to the patient size were employed.    This report was signed and finalized on 11/1/2024 12:51 AM by Sam Thakkar MD.       Assessment:    Stroke  Hypertensive urgency  Leukocytosis  Hypokalemia  Type 2 diabetes  CAD  Essential thrombocytosis  CKD stage III  Hypertension  Hyperlipidemia  Depression  Obesity    Plan:    Dizziness  Stroke  Hypertensive urgency  Teleneurology following  MRI brain without contrast  Echo with bubble study  A1c and lipid panel  Aspirin, Plavix, high intensity statin  Blood pressure control.  Restart home lisinopril  Neurochecks per protocol  PT/OT/SLP  Leukocytosis  Unclear cause of leukocytosis.  No infectious source identified.  Repeat with morning lab; could be secondary to hemoconcentration  Patient does have essential thrombocythemia for which she is on hydroxyurea  Hypokalemia  Replace per protocol  Type 2 diabetes  A1c pending  Sliding scale for correction  Diabetic education    Patient's clinical course dictates.  Continue patient's home medications as warranted.    Risk Assessment: High  DVT Prophylaxis: SCDs  Code Status: Full  Diet: As tolerated          Kevin Shannon DO  11/01/24  02:48 EDT    Dictated utilizing Dragon dictation.

## 2024-11-01 NOTE — CASE MANAGEMENT/SOCIAL WORK
Discharge Planning Assessment  [facility]     Patient Name: Martha Murray  MRN: [id number]  Today's Date: 11/1/2024    Admit Date: 10/31/2024    Plan: The patient is awake and able to answer questions.  She is a current patient of Jenny Castaneda and gets her medications from [facility].  She elects to enroll in Meds to Bed.  She does not use DME at home.  She denies the need for DME at DC.  The patient endorses food insecurity, financial strain, stress, and utilities concerns.  She also reports that at times she doesn't have gas money most times to go to her sister's house to get food, though lack of transportation hasn't kept her from doctor's appointments.  She says she has been eating potatoes and canned peas for nearly a month.  Provided patient with [facility] Resource Guide.  Will also provide food bag and gas card on day of DC.  Questions and concerns were addressed, OWUSU delivered at the time of this conversation.  Will provide additional resources and information upon patient request.   Discharge Needs Assessment       Row Name 11/01/24 0952       Living Environment    People in Home alone    Current Living Arrangements home    Duration at Residence 10 years    Potentially Unsafe Housing Conditions none    In the past 12 months has the electric, gas, oil, or water company threatened to shut off services in your home? No    Primary Care Provided by self    Provides Primary Care For no one    Family Caregiver if Needed none    Quality of Family Relationships helpful;involved    Able to Return to Prior Arrangements yes       Resource/Environmental Concerns    Resource/Environmental Concerns financial    Financial Concerns food, unable to afford;other (see comments)  unable to afford gas money at times    Transportation Concerns other (see comments)  unable to afford gas money at times       Transportation Needs    In the past 12 months, has lack of transportation kept you from medical appointments or  from getting medications? no    In the past 12 months, has lack of transportation kept you from meetings, work, or from getting things needed for daily living? No  unable to afford gas money at times       Food Insecurity    Within the past 12 months, you worried that your food would run out before you got the money to buy more. Sometimes    Within the past 12 months, the food you bought just didn't last and you didn't have money to get more. Sometimes       Transition Planning    Patient/Family Anticipates Transition to home    Patient/Family Anticipated Services at Transition none    Transportation Anticipated family or friend will provide       Discharge Needs Assessment    Readmission Within the Last 30 Days no previous admission in last 30 days    Equipment Currently Used at Home none    Concerns to be Addressed discharge planning;financial/insurance    Do you want help finding or keeping work or a job? I do not need or want help    Do you want help with school or training? For example, starting or completing job training or getting a high school diploma, GED or equivalent No    Anticipated Changes Related to Illness none    Equipment Needed After Discharge none    Provided Post Acute Provider List? N/A    N/A Provider List Comment Patient plans to return home; no new needs at this time    Provided Post Acute Provider Quality & Resource List? N/A    Offered/Gave Vendor List no    Current Discharge Risk financial support inadequate                   Discharge Plan       Row Name 11/01/24 0955       Plan    Plan The patient is awake and able to answer questions.  She is a current patient of Jenny Castaneda and gets her medications from [facility].  She elects to enroll in Meds to Bed.  She does not use DME at home.  She denies the need for DME at [place].  The patient endorses food insecurity, financial strain, stress, and utilities concerns.  She also reports that at times she doesn't have gas money most times to  go to her sister's house to get food, though lack of transportation hasn't kept her from doctor's appointments.  She says she has been eating potatoes and canned peas for nearly a month.  Provided patient with Pathways Resource Guide.  Will also provide food bag and gas card on day of DC.  Questions and concerns were addressed, OWUSU delivered at the time of this conversation.  Will provide additional resources and information upon patient request.    Patient/Family in Agreement with Plan unable to assess    Provided Post Acute Provider List? N/A    N/A Provider List Comment Patient plans to return home; no new needs at this time    Provided Post Acute Provider Quality & Resource List? N/A    N/A Quality & Resource List Comment Patient plans to return home; no new needs at this time    Final Discharge Disposition Code 01 - home or self-care    Final Note Patient plans to return home where she lives alone                  Continued Care and Services - Admitted Since 10/31/2024    No active coordination exists for this encounter.       Selected Continued Care - Episodes Includes continued care and service providers with selected services from the active episodes listed below      Oncology- External Fill Episode start date: 11/8/2023   There are no active outsourced providers for this episode.                    Demographic Summary       Row Name 11/01/24 0951       General Information    Admission Type observation    Arrived From emergency department    Required Notices Provided Observation Status Notice    Referral Source admission list    Reason for Consult discharge planning    Preferred Language English       Contact Information    Permission Granted to Share Info With                    Functional Status       Row Name 11/01/24 0951       Functional Status    Usual Activity Tolerance good    Current Activity Tolerance moderate       Physical Activity    On average, how many days per week do you engage  in moderate to strenuous exercise (like a brisk walk)? 0 days    On average, how many minutes do you engage in exercise at this level? 0 min    Number of minutes of exercise per week 0       Assessment of Health Literacy    How often do you have someone help you read hospital materials? Never    How often do you have problems learning about your medical condition because of difficulty understanding written information? Never    How often do you have a problem understanding what is told to you about your medical condition? Never    How confident are you filling out medical forms by yourself? Extremely    Health Literacy Excellent       Functional Status, IADL    Medications independent    Meal Preparation independent    Housekeeping independent    Laundry independent    Shopping independent    If for any reason you need help with day-to-day activities such as bathing, preparing meals, shopping, managing finances, etc., do you get the help you need? I don't need any help       Mental Status    General Appearance WDL WDL       Mental Status Summary    Recent Changes in Mental Status/Cognitive Functioning no changes                   Psychosocial       Row Name 11/01/24 0951       Values/Beliefs    Spiritual, Cultural Beliefs, [employer] Practices, Values that Affect Care no       Behavior WDL    Behavior WDL WDL       Emotion Mood WDL    Emotion/Mood/Affect WDL WDL       Speech WDL    Speech WDL WDL       Perceptual State WDL    Perceptual State WDL WDL       Thought Process WDL    Thought Process WDL WDL       Intellectual Performance WDL    Intellectual Performance WDL WDL                   Abuse/Neglect       Row Name 11/01/24 0951       Personal Safety    Feels Unsafe at Home or Work/School no    Feels Threatened by Someone no    Does Anyone Try to Keep You From Having Contact with Others or Doing Things Outside Your Home? no    Physical Signs of Abuse Present no                   Legal       Row Name 11/01/24 9120        Financial Resource Strain    How hard is it for you to pay for the very basics like food, housing, medical care, and heating? Hard                   Substance Abuse       Row Name 11/01/24 0952       Substance Use    Substance Use Status never used    Substance Use Comment Quit smoking over 20 years ago                   Patient Forms       Row Name 11/01/24 1002       Patient Forms    Patient Observation Letter Delivered    Delivered to Patient    Method of delivery In person                      Gissel Ortiz RN

## 2024-11-01 NOTE — THERAPY EVALUATION
PT evaluation held this date as pt is extremely dizzy and nauseated with recent emesis. Will plan to check back tomorrow.

## 2024-11-01 NOTE — PAYOR COMM NOTE
"TO:BC  FROM:VINOD ALVAREZ, RN PHONE [phone]  OBSERVATION NOTIFICATION  TAX ID [id number] NPI [id number]    Martha Benitez (69 y.o. Female)       Date of Birth   1955    Social Security Number       Address   [address] [place] [address]    Home Phone   [phone]    MRN   [id number]       Advent   [facility]    Marital Status   Single                            Admission Date   10/31/24    Admission Type   Emergency    Admitting Provider       Attending Provider   Robbi Ramos MD    Department, Room/Bed   [facility] EMERGENCY DEPARTMENT,        Discharge Date       Discharge Disposition       Discharge Destination                                 Attending Provider: Robbi Ramos MD    Allergies: Sulfa Antibiotics    Isolation: None   Infection: None   Code Status: CPR    Ht: 154.9 cm (60.98\")   Wt: 72.6 kg (160 lb)    Admission Cmt: None   Principal Problem: Stroke [I63.9]                   Active Insurance as of 10/31/2024       Primary Coverage       Payor Plan Insurance Group Employer/Plan Group    ANTHEM MEDICARE REPLACEMENT ANTHEM MEDICARE ADVANTAGE [id number]       Payor Plan Address Payor Plan Phone Number Payor Plan Fax Number Effective Dates    [address] [phone]  2024 - None Entered    [facility] [id number]         Subscriber Name Subscriber Birth Date Member ID       MARTHA BENITEZ 1955 [id number]                     Emergency Contacts        (Rel.) Home Phone Work Phone Mobile Phone    Jose Sykes (Son) [phone] -- [phone]                 History & Physical        Kevin Shannon DO at 24 0248            [facility] HOSPITALIST   HISTORY AND PHYSICAL      Name:  Martha Benitez   Age:  69 y.o.  Sex:  female  :  1955  MRN:  [id number]   Visit Number:  [id number]  Admission Date:  10/31/2024  Date Of Service:  24  Primary Care Physician:  Candida Castaneda, " NAYELI Alvarado    Chief Complaint:     Dizziness    History Of Presenting Illness:      Patient is an obese 69-year-old female with history significant for CKD, type 2 diabetes, hypertension, hyperlipidemia, essential thrombocythemia who presents to the emergency room with complaints of dizziness.  Patient's symptoms occurred abruptly approximately 2 hours prior to presentation.  States that she is having difficulty ambulating due to feeling of the room spinning around her.  No extremity weakness, facial slurring, difficulty speaking.  Also denied headache, fever, vision changes, chest pain, shortness of breath.  En route, EMS reported patient blood pressure 210/120.  They provided sublingual nitroglycerin with slight improvement.  The patient admits that she stopped taking her blood pressure medicine 2 to 3 days ago.  Denies smoking, alcohol use, illicit drug use.    ED summary: Patient afebrile, hypertensive 198/79 and nonhypoxic on room air.  Troponins negative x 2.  Potassium 3.2 and creatinine 1.32 which appears baseline.  Glucose 206.  TSH appropriate.  WBC 21 and platelets 817.  UA negative for infection, protein positive.  CT head without contrast showed no acute abnormality.  CTA head showed no LVO.  CTA neck showed no evidence of significant cervical carotid stenosis. Lobular filling defect proximal brachiocephalic artery could  represent nonocclusive mural thrombus or lobulated soft plaque.  Follow-up CTA recommended in 6-8 weeks for continued surveillance.  CT cerebral perfusion showed no evidence of large vessel occlusion.  Teleneurology consulted, recommended admission for further stroke workup.    Review Of Systems:    All systems were reviewed and negative except as mentioned in history of presenting illness, assessment and plan.    Past Medical History: Patient  has a past medical history of HAMMAD (acute kidney injury) (07/01/2021), Allergic, Anemia, Anesthesia complication, Arthritis, COPD (chronic  obstructive pulmonary disease), Depression, Diabetes mellitus, History of heart attack, Hyperlipidemia, and Hypertension.    Past Surgical History: Patient  has a past surgical history that includes Tonsillectomy; Breast Reduction; Sinus surgery (2003); Bone marrow aspiration; Hysterectomy (1990); Reduction mammaplasty (Bilateral); Cataract extraction (Bilateral); Colonoscopy; and Total knee arthroplasty (Right, 7/3/2024).    Social History: Patient  reports that she quit smoking about 27 years ago. Her smoking use included cigarettes. She started smoking about 57 years ago. She has a 60 pack-year smoking history. She has been exposed to tobacco smoke. She has never used smokeless tobacco. She reports that she does not drink alcohol and does not use drugs.    Family History:  Patient's family history has been reviewed and found to be noncontributory.     Allergies:      Sulfa antibiotics    Home Medications:    Prior to Admission Medications       Prescriptions Last Dose Informant Patient Reported? Taking?    aspirin (ASPIR) 81 MG EC tablet   No No    Take 1 tablet by mouth 2 (Two) Times a Day.    hydroxyurea (HYDREA) 500 MG capsule   No No    Take 1 capsule by mouth Daily.    lisinopril (PRINIVIL,ZESTRIL) 40 MG tablet  Self No No    Take 1 tablet by mouth Daily.          ED Medications:    Medications   sodium chloride 0.9 % flush 10 mL (has no administration in time range)   meclizine (ANTIVERT) tablet 25 mg (25 mg Oral Given 11/1/24 0016)   ondansetron (ZOFRAN) injection 4 mg (4 mg Intravenous Given 11/1/24 0016)   iopamidol (ISOVUE-300) 61 % injection 100 mL (100 mL Intravenous Given 11/1/24 0039)   iopamidol (ISOVUE-300) 61 % injection 50 mL (50 mL Intravenous Given 11/1/24 0039)   labetalol (NORMODYNE,TRANDATE) injection 10 mg (10 mg Intravenous Given 11/1/24 0159)     Vital Signs:  Temp:  [97.5 °F (36.4 °C)] 97.5 °F (36.4 °C)  Heart Rate:  [70-76] 72  Resp:  [18-20] 18  BP: (172-188)/(78-92) 188/80         "11/01/24  0003   Weight: 72.6 kg (160 lb)     Body mass index is 30.25 kg/m².    Physical Exam:     Most recent vital Signs: BP (!) 188/80   Pulse 72   Temp 97.5 °F (36.4 °C) (Oral)   Resp 18   Ht 154.9 cm (60.98\")   Wt 72.6 kg (160 lb)   SpO2 100%   BMI 30.25 kg/m²     Physical Exam  Vitals reviewed.   Constitutional:       Appearance: She is obese.   HENT:      Head: Normocephalic and atraumatic.      Right Ear: External ear normal.      Left Ear: External ear normal.   Neurological:      Mental Status: She is alert.         Laboratory data:    I have reviewed the labs done in the emergency room.    Results from last 7 days   Lab Units 11/01/24  0142   SODIUM mmol/L 139   POTASSIUM mmol/L 3.2*   CHLORIDE mmol/L 103   CO2 mmol/L 22.4   BUN mg/dL 15   CREATININE mg/dL 1.32*   CALCIUM mg/dL 8.9   BILIRUBIN mg/dL 0.5   ALK PHOS U/L 100   ALT (SGPT) U/L 15   AST (SGOT) U/L 21   GLUCOSE mg/dL 206*     Results from last 7 days   Lab Units 11/01/24  0016   WBC 10*3/mm3 21.40*   HEMOGLOBIN g/dL 15.4   HEMATOCRIT % 46.8*   PLATELETS 10*3/mm3 817*         Results from last 7 days   Lab Units 11/01/24  0142   HSTROP T ng/L 11                           Invalid input(s): \"USDES\", \"NITRITITE\", \"BACT\", \"EP\"    Pain Management Panel  More data exists         Latest Ref Rng & Units 11/7/2023 8/4/2020   Pain Management Panel   Creatinine, Urine mg/dL 250.5  234.4       Details                   EKG:      EKG personally reviewed, sinus rhythm with rate of 76 bpm.  No acute ST or T wave changes.    Radiology:    CT Angiogram Neck    Result Date: 11/1/2024  PROCEDURE: CT ANGIOGRAM NECK-  HISTORY: Dizziness and vertigo  TECHNIQUE: Thin section axial CT with IV contrast supplemented with multiplanar reconstruction.   3 D reconstructions were performed on a separate workstation.  NASCET criteria and technique was utilized during interpretation.  FINDINGS:  Aortic arch:  Arch shows no significant narrowing. There is a filling defect " along the anterior wall of the brachiocephalic artery measuring up to 7 mm. This could represent mural thrombus or lobulated soft plaque. Nevertheless this could be a embolic source. The left common and left subclavian arteries appear widely patent.  Right carotid:  No significant stenosis is seen of the cervical common or internal carotid artery.  Left carotid:  No significant stenosis is seen of the cervical common or internal carotid artery.  Vertebrals: Vertebral arteries are codominant. No significant stenosis is present.       1. No evidence of significant cervical carotid stenosis 2. Lobular filling defect proximal brachiocephalic artery could represent nonocclusive mural thrombus or lobulated soft plaque. Follow-up CTA recommended in 6-8 weeks for continued surveillance. CT appearance that abnormality could result in more distal emboli.   This study was performed with techniques to keep radiation doses as low as reasonably achievable (ALARA). Individualized dose reduction techniques using automated exposure control or adjustment of vA and/or kV according to the patient size were employed.   This report was signed and finalized on 11/1/2024 12:58 AM by Sam Thakkar MD.      CT Angiogram Head    Result Date: 11/1/2024  PROCEDURE: CT ANGIOGRAM HEAD-  HISTORY: Dizziness and vertigo  TECHNIQUE: Thin section axial CT with contrast with multiplanar reconstruction.   3 D reconstructions were performed on a separate workstation.  FINDINGS:  Internal carotid arteries: The petrous, cavernous and postcavernous ICAs are unremarkable. No aneurysm is seen.  Anterior cerebral arteries: Central anterior cerebral arteries are unremarkable. No aneurysm is seen.  Middle cerebral arteries: Central middle cerebral arteries are unremarkable. No aneurysm is seen.  Basilar artery: Distal vertebral and basilar arteries are widely patent. No aneurysm is seen.  Posterior cerebral artery: Posterior cerebral arteries are patent  centrally. No obvious aneurysm is seen.  Venous sinuses: Major venous sinuses are patent.      No evidence of acute large vessel occlusive disease    This study was performed with techniques to keep radiation doses as low as reasonably achievable (ALARA). Individualized dose reduction techniques using automated exposure control or adjustment of vA and/or kV according to the patient size were employed.  This report was signed and finalized on 11/1/2024 12:53 AM by Sam Thakkar MD.      CT CEREBRAL PERFUSION WITH & WITHOUT CONTRAST    Result Date: 11/1/2024  CT cerebral perfusion, without and with contrast  HISTORY: Symptoms of CVA. Vertigo. Dizziness..  TECHNIQUE: CT brain perfusion with mapping of flow parameters including transit time, cerebral blood flow and cerebral blood volume. IV contrast was utilized.  FINDINGS:  Tissue with delayed blood, Tmax > 6 secs:  0 ml  Tissue with significantly reduced blood, rCBF < 30%:  0        1.  No evidence of acute large vessel occlusion  Should symptoms persist recommend follow-up MRI  This report was signed and finalized on 11/1/2024 12:52 AM by Sam Thakkar MD.      CT Head Without Contrast Stroke Protocol    Result Date: 11/1/2024  PROCEDURE: CT HEAD WO CONTRAST STROKE PROTOCOL-  HISTORY: Dizziness/vertigo, symptoms of acute CVA  COMPARISON: 7/20/2023  TECHNIQUE: Noncontrast exam  FINDINGS: Mild atrophy and chronic ischemic white matter changes are noted.  No cortical edema is present. There is no mass or hemorrhage. Ventricles are normal.  Bone windows show no skull fracture or obvious destructive lesion.      1. No acute intracranial abnormality or obvious mass. 2. Atrophy and chronic ischemic white matter changes as above.   This study was performed with techniques to keep radiation doses as low as reasonably achievable (ALARA). Individualized dose reduction techniques using automated exposure control or adjustment of vA and/or kV according to the patient size were  employed.    This report was signed and finalized on 11/1/2024 12:51 AM by Sam Thakkar MD.       Assessment:    Stroke  Hypertensive urgency  Leukocytosis  Hypokalemia  Type 2 diabetes  CAD  Essential thrombocytosis  CKD stage III  Hypertension  Hyperlipidemia  Depression  Obesity    Plan:    Dizziness  Stroke  Hypertensive urgency  Teleneurology following  MRI brain without contrast  Echo with bubble study  A1c and lipid panel  Aspirin, Plavix, high intensity statin  Blood pressure control.  Restart home lisinopril  Neurochecks per protocol  PT/OT/SLP  Leukocytosis  Unclear cause of leukocytosis.  No infectious source identified.  Repeat with morning lab; could be secondary to hemoconcentration  Patient does have essential thrombocythemia for which she is on hydroxyurea  Hypokalemia  Replace per protocol  Type 2 diabetes  A1c pending  Sliding scale for correction  Diabetic education    Patient's clinical course dictates.  Continue patient's home medications as warranted.    Risk Assessment: High  DVT Prophylaxis: SCDs  Code Status: Full  Diet: As tolerated          Kevin Shannon DO  11/01/24  02:48 EDT    Dictated utilizing Dragon dictation.      Electronically signed by Kevin Shannon DO at 11/01/24 0637       Emergency Department Notes    No notes of this type exist for this encounter.       Vital Signs (last day)       Date/Time Temp Temp src Pulse Resp BP Patient Position SpO2    11/01/24 0600 -- -- 87 -- 175/72 -- 95    11/01/24 0521 -- -- 89 -- 163/104 -- --    11/01/24 0441 -- -- 89 -- 176/76 -- --    11/01/24 0321 -- -- 75 -- 190/75 -- --    11/01/24 0302 -- -- 73 -- 198/79 -- --    11/01/24 0241 -- -- 75 -- 183/75 -- --    11/01/24 0222 -- -- 72 -- 188/80 -- --    11/01/24 0119 -- -- 76 18 187/87 Lying 100    11/01/24 0101 -- -- 70 -- 181/92 -- 100    11/01/24 0003 97.5 (36.4) Oral 74 20 172/78 Sitting 98          Current Facility-Administered Medications   Medication Dose Route Frequency Provider  Last Rate Last Admin    acetaminophen (TYLENOL) tablet 650 mg  650 mg Oral Q4H PRN Kevin Shannon DO        Or    acetaminophen (TYLENOL) suppository 650 mg  650 mg Rectal Q4H PRN Kevin Shannon DO        aspirin EC tablet 81 mg  81 mg Oral Daily Kevin Shannon DO        atorvastatin (LIPITOR) tablet 80 mg  80 mg Oral Nightly Kevin Shannon DO        clopidogrel (PLAVIX) tablet 75 mg  75 mg Oral Daily Kevin Shannon DO        dextrose (D50W) (25 g/50 mL) IV injection 25 g  25 g Intravenous Q15 Min PRN Kevin Shannon DO        dextrose (GLUTOSE) oral gel 15 g  15 g Oral Q15 Min PRN Kevin Shannon DO        glucagon (GLUCAGEN) injection 1 mg  1 mg Intramuscular Q15 Min PRN Kevin Shannon DO        hydrALAZINE (APRESOLINE) injection 10 mg  10 mg Intravenous Q6H PRN Kevin Shannon DO   10 mg at 11/01/24 0345    hydroxyurea (HYDREA) capsule 500 mg  500 mg Oral Daily Kevin Shannon DO        Insulin Lispro (humaLOG) injection 2-7 Units  2-7 Units Subcutaneous 4x Daily AC & at Bedtime Kevin Shannon DO        lisinopril (PRINIVIL,ZESTRIL) tablet 40 mg  40 mg Oral Daily Kevin Shannon DO        meclizine (ANTIVERT) tablet 12.5 mg  12.5 mg Oral TID PRN Kevin Shannon DO        sodium chloride 0.9 % flush 10 mL  10 mL Intravenous PRN Robbi Ramos MD        sodium chloride 0.9 % flush 10 mL  10 mL Intravenous Q12H Kevin Shannon DO        sodium chloride 0.9 % flush 10 mL  10 mL Intravenous PRN Kevin Shannon DO        sodium chloride 0.9 % infusion 40 mL  40 mL Intravenous PRN Kevin Shannon DO         Current Outpatient Medications   Medication Sig Dispense Refill    aspirin (ASPIR) 81 MG EC tablet Take 1 tablet by mouth 2 (Two) Times a Day. 60 tablet 0    hydroxyurea (HYDREA) 500 MG capsule Take 1 capsule by mouth Daily. 30 capsule 11    lisinopril (PRINIVIL,ZESTRIL) 40 MG tablet Take 1 tablet by mouth Daily. 90 tablet 1     Lab Results (last 24 hours)       Procedure Component Value  Units Date/Time    Urinalysis With Microscopic If Indicated (No Culture) - Urine, Clean Catch [[id number]]  (Abnormal) Collected: 11/01/24 0351    Specimen: Urine, Clean Catch Updated: 11/01/24 0413     Color, UA Yellow     Appearance, UA Clear     pH, UA 5.5     Specific Gravity, UA >=1.030     Glucose, UA Negative     Ketones, UA Negative     Bilirubin, UA Negative     Blood, UA Negative     Protein,  mg/dL (2+)     Leuk Esterase, UA Negative     Nitrite, UA Negative     Urobilinogen, UA 1.0 E.U./dL    Urinalysis, Microscopic Only - Urine, Clean Catch [[id number]] Collected: 11/01/24 0351    Specimen: Urine, Clean Catch Updated: 11/01/24 0413     RBC, UA None Seen /HPF      WBC, UA None Seen /HPF      Bacteria, UA None Seen /HPF      Squamous Epithelial Cells, UA 0-2 /HPF      Hyaline Casts, UA 0-2 /LPF      Methodology Manual Light Microscopy    High Sensitivity Troponin T 2Hr [[id number]]  (Normal) Collected: 11/01/24 0344    Specimen: Blood Updated: 11/01/24 0407     HS Troponin T 11 ng/L      Troponin T Delta 0 ng/L     Narrative:      High Sensitive Troponin T Reference Range:  <14.0 ng/L- Negative Female for AMI  <22.0 ng/L- Negative Male for AMI  >=14 - Abnormal Female indicating possible myocardial injury.  >=22 - Abnormal Male indicating possible myocardial injury.   Clinicians would have to utilize clinical acumen, EKG, Troponin, and serial changes to determine if it is an Acute Myocardial Infarction or myocardial injury due to an underlying chronic condition.         Lipid Panel [[id number]]  (Abnormal) Collected: 11/01/24 0344    Specimen: Blood Updated: 11/01/24 0405     Total Cholesterol 201 mg/dL      Triglycerides 108 mg/dL      HDL Cholesterol 46 mg/dL      LDL Cholesterol  135 mg/dL      VLDL Cholesterol 20 mg/dL      LDL/HDL Ratio 2.90    Narrative:      Cholesterol Reference Ranges  (U.S. Department of Health and Human Services ATP III Classifications)    Desirable          <200  mg/dL  Borderline High    200-239 mg/dL  High Risk          >240 mg/dL      Triglyceride Reference Ranges  (U.S. Department of Health and Human Services ATP III Classifications)    Normal           <150 mg/dL  Borderline High  150-199 mg/dL  High             200-499 mg/dL  Very High        >500 mg/dL    HDL Reference Ranges  (U.S. Department of Health and Human Services ATP III Classifications)    Low     <40 mg/dl (major risk factor for CHD)  High    >60 mg/dl ('negative' risk factor for CHD)        LDL Reference Ranges  (U.S. Department of Health and Human Services ATP III Classifications)    Optimal          <100 mg/dL  Near Optimal     100-129 mg/dL  Borderline High  130-159 mg/dL  High             160-189 mg/dL  Very High        >189 mg/dL    Basic Metabolic Panel [[id number]]  (Abnormal) Collected: 11/01/24 0344    Specimen: Blood Updated: 11/01/24 0405     Glucose 185 mg/dL      BUN 15 mg/dL      Creatinine 1.23 mg/dL      Sodium 139 mmol/L      Potassium 3.5 mmol/L      Comment: Slight hemolysis detected by analyzer. Result may be falsely elevated.        Chloride 105 mmol/L      CO2 19.3 mmol/L      Calcium 8.7 mg/dL      BUN/Creatinine Ratio 12.2     Anion Gap 14.7 mmol/L      eGFR 48.0 mL/min/1.73     Narrative:      GFR Normal >60  Chronic Kidney Disease <60  Kidney Failure <15      TSH Rfx On Abnormal To Free T4 [[id number]]  (Normal) Collected: 11/01/24 0142    Specimen: Blood Updated: 11/01/24 0402     TSH 2.110 uIU/mL     Comprehensive Metabolic Panel [[id number]]  (Abnormal) Collected: 11/01/24 0142    Specimen: Blood Updated: 11/01/24 0207     Glucose 206 mg/dL      Comment: Glucose >180, Hemoglobin A1C recommended.        BUN 15 mg/dL      Creatinine 1.32 mg/dL      Sodium 139 mmol/L      Potassium 3.2 mmol/L      Comment: Slight hemolysis detected by analyzer. Result may be falsely elevated.        Chloride 103 mmol/L      CO2 22.4 mmol/L      Calcium 8.9 mg/dL      Total Protein 6.5 g/dL       Albumin 4.1 g/dL      ALT (SGPT) 15 U/L      AST (SGOT) 21 U/L      Alkaline Phosphatase 100 U/L      Total Bilirubin 0.5 mg/dL      Globulin 2.4 gm/dL      A/G Ratio 1.7 g/dL      BUN/Creatinine Ratio 11.4     Anion Gap 13.6 mmol/L      eGFR 44.1 mL/min/1.73     Narrative:      GFR Normal >60  Chronic Kidney Disease <60  Kidney Failure <15      High Sensitivity Troponin T [[id number]]  (Normal) Collected: 11/01/24 0142    Specimen: Blood Updated: 11/01/24 0207     HS Troponin T 11 ng/L     Narrative:      High Sensitive Troponin T Reference Range:  <14.0 ng/L- Negative Female for AMI  <22.0 ng/L- Negative Male for AMI  >=14 - Abnormal Female indicating possible myocardial injury.  >=22 - Abnormal Male indicating possible myocardial injury.   Clinicians would have to utilize clinical acumen, EKG, Troponin, and serial changes to determine if it is an Acute Myocardial Infarction or myocardial injury due to an underlying chronic condition.         [place] Draw [[id number]] Collected: 11/01/24 0016    Specimen: Blood Updated: 11/01/24 0031    Narrative:      The following orders were created for panel order [place] Draw.  Procedure                               Abnormality         Status                     ---------                               -----------         ------                     Green Top (Gel)[[id number]]                                  Final result               Lavender Top[[id number]]                                     Final result               Gold Top - SST[[id number]]                                   Final result               Light Blue Top[[id number]]                                   Final result                 Please view results for these tests on the individual orders.    Green Top (Gel) [[id number]] Collected: 11/01/24 0016    Specimen: Blood Updated: 11/01/24 0031     Extra Tube Hold for add-ons.     Comment: Auto resulted.       Lavender Top [[id number]] Collected: 11/01/24 0016    Specimen:  Blood Updated: 11/01/24 0031     Extra Tube hold for add-on     Comment: Auto resulted       Gold Top - SST [[id number]] Collected: 11/01/24 0016    Specimen: Blood Updated: 11/01/24 0031     Extra Tube Hold for add-ons.     Comment: Auto resulted.       Light Blue Top [[id number]] Collected: 11/01/24 0016    Specimen: Blood Updated: 11/01/24 0031     Extra Tube Hold for add-ons.     Comment: Auto resulted       CBC & Differential [[id number]]  (Abnormal) Collected: 11/01/24 0016    Specimen: Blood Updated: 11/01/24 0030    Narrative:      The following orders were created for panel order CBC & Differential.  Procedure                               Abnormality         Status                     ---------                               -----------         ------                     CBC Auto Differential[[id number]]        Abnormal            Final result                 Please view results for these tests on the individual orders.    CBC Auto Differential [[id number]]  (Abnormal) Collected: 11/01/24 0016    Specimen: Blood Updated: 11/01/24 0030     WBC 21.40 10*3/mm3      RBC 4.74 10*6/mm3      Hemoglobin 15.4 g/dL      Hematocrit 46.8 %      MCV 98.7 fL      MCH 32.5 pg      MCHC 32.9 g/dL      RDW 13.7 %      RDW-SD 50.2 fl      MPV 10.2 fL      Platelets 817 10*3/mm3      Neutrophil % 88.6 %      Lymphocyte % 4.7 %      Monocyte % 3.3 %      Eosinophil % 1.6 %      Basophil % 0.9 %      Immature Grans % 0.9 %      Neutrophils, Absolute 18.94 10*3/mm3      Lymphocytes, Absolute 1.01 10*3/mm3      Monocytes, Absolute 0.71 10*3/mm3      Eosinophils, Absolute 0.35 10*3/mm3      Basophils, Absolute 0.20 10*3/mm3      Immature Grans, Absolute 0.19 10*3/mm3      nRBC 0.0 /100 WBC           Physician Progress Notes (last 24 hours)  Notes from 10/31/24 0642 through 11/01/24 0642   No notes of this type exist for this encounter.

## 2024-11-02 LAB
ANION GAP SERPL CALCULATED.3IONS-SCNC: 13.4 MMOL/L (ref 5–15)
BASOPHILS # BLD AUTO: 0.05 10*3/MM3 (ref 0–0.2)
BASOPHILS NFR BLD AUTO: 0.3 % (ref 0–1.5)
BUN SERPL-MCNC: 20 MG/DL (ref 8–23)
BUN/CREAT SERPL: 14.3 (ref 7–25)
CALCIUM SPEC-SCNC: 9.2 MG/DL (ref 8.6–10.5)
CHLORIDE SERPL-SCNC: 104 MMOL/L (ref 98–107)
CO2 SERPL-SCNC: 24.6 MMOL/L (ref 22–29)
CREAT SERPL-MCNC: 1.4 MG/DL (ref 0.57–1)
DEPRECATED RDW RBC AUTO: 50.4 FL (ref 37–54)
EGFRCR SERPLBLD CKD-EPI 2021: 41.1 ML/MIN/1.73
EOSINOPHIL # BLD AUTO: 0.01 10*3/MM3 (ref 0–0.4)
EOSINOPHIL NFR BLD AUTO: 0.1 % (ref 0.3–6.2)
ERYTHROCYTE [DISTWIDTH] IN BLOOD BY AUTOMATED COUNT: 13.8 % (ref 12.3–15.4)
GLUCOSE BLDC GLUCOMTR-MCNC: 101 MG/DL (ref 70–130)
GLUCOSE BLDC GLUCOMTR-MCNC: 105 MG/DL (ref 70–130)
GLUCOSE BLDC GLUCOMTR-MCNC: 127 MG/DL (ref 70–130)
GLUCOSE BLDC GLUCOMTR-MCNC: 90 MG/DL (ref 70–130)
GLUCOSE SERPL-MCNC: 129 MG/DL (ref 65–99)
HCT VFR BLD AUTO: 44.9 % (ref 34–46.6)
HGB BLD-MCNC: 14.5 G/DL (ref 12–15.9)
IMM GRANULOCYTES # BLD AUTO: 0.15 10*3/MM3 (ref 0–0.05)
IMM GRANULOCYTES NFR BLD AUTO: 0.9 % (ref 0–0.5)
LYMPHOCYTES # BLD AUTO: 0.85 10*3/MM3 (ref 0.7–3.1)
LYMPHOCYTES NFR BLD AUTO: 5.4 % (ref 19.6–45.3)
MCH RBC QN AUTO: 32.2 PG (ref 26.6–33)
MCHC RBC AUTO-ENTMCNC: 32.3 G/DL (ref 31.5–35.7)
MCV RBC AUTO: 99.8 FL (ref 79–97)
MONOCYTES # BLD AUTO: 0.68 10*3/MM3 (ref 0.1–0.9)
MONOCYTES NFR BLD AUTO: 4.3 % (ref 5–12)
NEUTROPHILS NFR BLD AUTO: 14.05 10*3/MM3 (ref 1.7–7)
NEUTROPHILS NFR BLD AUTO: 89 % (ref 42.7–76)
NRBC BLD AUTO-RTO: 0 /100 WBC (ref 0–0.2)
PLATELET # BLD AUTO: 506 10*3/MM3 (ref 140–450)
PMV BLD AUTO: 10.5 FL (ref 6–12)
POTASSIUM SERPL-SCNC: 4.2 MMOL/L (ref 3.5–5.2)
RBC # BLD AUTO: 4.5 10*6/MM3 (ref 3.77–5.28)
SODIUM SERPL-SCNC: 142 MMOL/L (ref 136–145)
UFH PPP CHRO-ACNC: 0.34 IU/ML (ref 0.3–0.7)
UFH PPP CHRO-ACNC: 0.36 IU/ML (ref 0.3–0.7)
WBC NRBC COR # BLD AUTO: 15.79 10*3/MM3 (ref 3.4–10.8)

## 2024-11-02 PROCEDURE — 97162 PT EVAL MOD COMPLEX 30 MIN: CPT

## 2024-11-02 PROCEDURE — 82948 REAGENT STRIP/BLOOD GLUCOSE: CPT

## 2024-11-02 PROCEDURE — 80048 BASIC METABOLIC PNL TOTAL CA: CPT | Performed by: NURSE PRACTITIONER

## 2024-11-02 PROCEDURE — 82948 REAGENT STRIP/BLOOD GLUCOSE: CPT | Performed by: INTERNAL MEDICINE

## 2024-11-02 PROCEDURE — 85520 HEPARIN ASSAY: CPT | Performed by: INTERNAL MEDICINE

## 2024-11-02 PROCEDURE — 85025 COMPLETE CBC W/AUTO DIFF WBC: CPT | Performed by: NURSE PRACTITIONER

## 2024-11-02 PROCEDURE — 85520 HEPARIN ASSAY: CPT

## 2024-11-02 PROCEDURE — 99233 SBSQ HOSP IP/OBS HIGH 50: CPT | Performed by: STUDENT IN AN ORGANIZED HEALTH CARE EDUCATION/TRAINING PROGRAM

## 2024-11-02 PROCEDURE — 99232 SBSQ HOSP IP/OBS MODERATE 35: CPT | Performed by: INTERNAL MEDICINE

## 2024-11-02 RX ORDER — MECLIZINE HYDROCHLORIDE 25 MG/1
12.5 TABLET ORAL EVERY 8 HOURS SCHEDULED
Status: DISCONTINUED | OUTPATIENT
Start: 2024-11-02 | End: 2024-11-04

## 2024-11-02 RX ORDER — ASPIRIN 325 MG
325 TABLET, DELAYED RELEASE (ENTERIC COATED) ORAL DAILY
Status: DISCONTINUED | OUTPATIENT
Start: 2024-11-03 | End: 2024-11-05 | Stop reason: HOSPADM

## 2024-11-02 RX ORDER — TRAZODONE HYDROCHLORIDE 50 MG/1
25 TABLET, FILM COATED ORAL NIGHTLY PRN
Status: DISCONTINUED | OUTPATIENT
Start: 2024-11-02 | End: 2024-11-05 | Stop reason: HOSPADM

## 2024-11-02 RX ADMIN — Medication 10 ML: at 21:43

## 2024-11-02 RX ADMIN — LISINOPRIL 40 MG: 20 TABLET ORAL at 09:31

## 2024-11-02 RX ADMIN — Medication 10 ML: at 09:31

## 2024-11-02 RX ADMIN — HYDROXYUREA 500 MG: 500 CAPSULE ORAL at 09:31

## 2024-11-02 RX ADMIN — ASPIRIN 81 MG: 81 TABLET, COATED ORAL at 09:31

## 2024-11-02 RX ADMIN — ACETAMINOPHEN 650 MG: 325 TABLET, FILM COATED ORAL at 21:22

## 2024-11-02 RX ADMIN — AMLODIPINE BESYLATE 10 MG: 5 TABLET ORAL at 09:31

## 2024-11-02 RX ADMIN — MECLIZINE HYDROCHLORIDE 12.5 MG: 25 TABLET ORAL at 14:16

## 2024-11-02 RX ADMIN — ATORVASTATIN CALCIUM 80 MG: 80 TABLET, FILM COATED ORAL at 21:22

## 2024-11-02 RX ADMIN — MECLIZINE HYDROCHLORIDE 12.5 MG: 25 TABLET ORAL at 21:22

## 2024-11-02 RX ADMIN — TRAZODONE HYDROCHLORIDE 25 MG: 50 TABLET ORAL at 21:43

## 2024-11-02 NOTE — PROGRESS NOTES
Stroke Progress Note       Chief Complaint:  left sided weakness     Subjective    Subjective     Subjective:  No acute events overnight   Ataxia on the right side     Objective      Temp:  [98.1 °F (36.7 °C)-99.1 °F (37.3 °C)] 98.5 °F (36.9 °C)  Heart Rate:  [72-89] 74  Resp:  [16-18] 18  BP: (110-200)/(51-86) 153/59    NEURO( Exam is performed with help of hospital staff at bed side and observed by me via audio-video interface)    MENTAL STATUS: AAOx3, memory intact, fund of knowledge appropriate    LANG/SPEECH: Naming and repetition intact, fluent, follows 3-step commands    CRANIAL NERVES:    Pupils equal and reactive, EOMI intact, no gaze deviation, no nystagmus  No facial droop, cough and gag intact, shoulder shrug intact, tongue midline     MOTOR:  Moves all extremities equally    SENSORY: Normal to light touch all throughout     COORDINATION: Normal finger to nose and heel to shin, no tremor, no dysmetria    STATION: Not assessed due to patient condition    GAIT: Not assessed due to patient condition     Results Review:    I reviewed the patient's new clinical results.    Lab Results (last 24 hours)       Procedure Component Value Units Date/Time    POC Glucose 4x Daily Before Meals & at Bedtime [[id number]]  (Normal) Collected: 11/02/24 0729    Specimen: Blood Updated: 11/02/24 0746     Glucose 105 mg/dL      Comment: Serial Number: [id number]:  [id number]       Basic Metabolic Panel [[id number]]  (Abnormal) Collected: 11/02/24 0536    Specimen: Blood Updated: 11/02/24 0655     Glucose 129 mg/dL      BUN 20 mg/dL      Creatinine 1.40 mg/dL      Sodium 142 mmol/L      Potassium 4.2 mmol/L      Comment: Specimen hemolyzed.  Result may be falsely elevated.        Chloride 104 mmol/L      CO2 24.6 mmol/L      Calcium 9.2 mg/dL      BUN/Creatinine Ratio 14.3     Anion Gap 13.4 mmol/L      eGFR 41.1 mL/min/1.73     Narrative:      GFR Normal >60  Chronic Kidney Disease <60  Kidney Failure <15      CBC &  Differential [[id number]]  (Abnormal) Collected: 11/02/24 0535    Specimen: Blood Updated: 11/02/24 0607    Narrative:      The following orders were created for panel order CBC & Differential.  Procedure                               Abnormality         Status                     ---------                               -----------         ------                     CBC Auto Differential[[id number]]        Abnormal            Final result                 Please view results for these tests on the individual orders.    CBC Auto Differential [[id number]]  (Abnormal) Collected: 11/02/24 0535    Specimen: Blood Updated: 11/02/24 0607     WBC 15.79 10*3/mm3      RBC 4.50 10*6/mm3      Hemoglobin 14.5 g/dL      Hematocrit 44.9 %      MCV 99.8 fL      MCH 32.2 pg      MCHC 32.3 g/dL      RDW 13.8 %      RDW-SD 50.4 fl      MPV 10.5 fL      Platelets 506 10*3/mm3      Neutrophil % 89.0 %      Lymphocyte % 5.4 %      Monocyte % 4.3 %      Eosinophil % 0.1 %      Basophil % 0.3 %      Immature Grans % 0.9 %      Neutrophils, Absolute 14.05 10*3/mm3      Lymphocytes, Absolute 0.85 10*3/mm3      Monocytes, Absolute 0.68 10*3/mm3      Eosinophils, Absolute 0.01 10*3/mm3      Basophils, Absolute 0.05 10*3/mm3      Immature Grans, Absolute 0.15 10*3/mm3      nRBC 0.0 /100 WBC     Heparin Anti-Xa [[id number]]  (Normal) Collected: 11/02/24 0131    Specimen: Blood Updated: 11/02/24 0224     Heparin Anti-Xa (UFH) 0.36 IU/ml     POC Glucose Once [[id number]]  (Abnormal) Collected: 11/01/24 2009    Specimen: Blood Updated: 11/01/24 2012     Glucose 164 mg/dL      Comment: Serial Number: [id number]:  [id number]       Vitamin B12 [[id number]]  (Normal) Collected: 11/01/24 0836    Specimen: Blood Updated: 11/01/24 1915     Vitamin B-12 332 pg/mL     Narrative:      Results may be falsely increased if patient taking Biotin.      Heparin Anti-Xa [[id number]]  (Abnormal) Collected: 11/01/24 1818    Specimen: Blood from Arm, Right Updated:  11/01/24 1845     Heparin Anti-Xa (UFH) 0.10 IU/ml     POC Glucose Once [[id number]]  (Abnormal) Collected: 11/01/24 1633    Specimen: Blood Updated: 11/01/24 1635     Glucose 160 mg/dL      Comment: Serial Number: [id number]:  [id number]       Heparin Anti-Xa [[id number]]  (Abnormal) Collected: 11/01/24 1104    Specimen: Blood Updated: 11/01/24 1159     Heparin Anti-Xa (UFH) 0.10 IU/ml     aPTT [[id number]]  (Abnormal) Collected: 11/01/24 1104    Specimen: Blood Updated: 11/01/24 1154     PTT 33.2 seconds     Protime-INR [[id number]]  (Abnormal) Collected: 11/01/24 1104    Specimen: Blood Updated: 11/01/24 1154     Protime 15.2 Seconds      INR 1.14    Narrative:      Suggested INR therapeutic range for stable oral anticoagulant therapy:    Low Intensity therapy:   1.5-2.0  Moderate Intensity therapy:   2.0-3.0  High Intensity therapy:   2.5-4.0    POC Glucose 4x Daily Before Meals & at Bedtime [[id number]]  (Normal) Collected: 11/01/24 1127    Specimen: Blood Updated: 11/01/24 1129     Glucose 114 mg/dL      Comment: Serial Number: [id number]:  [id number]       Hemoglobin A1c [[id number]]  (Normal) Collected: 11/01/24 0836    Specimen: Blood Updated: 11/01/24 0908     Hemoglobin A1C 5.50 %     Narrative:      Hemoglobin A1C Ranges:    Increased Risk for Diabetes  5.7% to 6.4%  Diabetes                     >= 6.5%  Diabetic Goal                < 7.0%    CBC (No Diff) [[id number]]  (Abnormal) Collected: 11/01/24 0836    Specimen: Blood Updated: 11/01/24 0845     WBC 18.97 10*3/mm3      RBC 4.86 10*6/mm3      Hemoglobin 15.8 g/dL      Hematocrit 47.0 %      MCV 96.7 fL      MCH 32.5 pg      MCHC 33.6 g/dL      RDW 13.6 %      RDW-SD 48.4 fl      MPV 9.9 fL      Platelets 662 10*3/mm3     POC Glucose 4x Daily Before Meals & at Bedtime [[id number]]  (Abnormal) Collected: 11/01/24 0831    Specimen: Blood Updated: 11/01/24 0836     Glucose 135 mg/dL      Comment: Serial Number: [id number]:  [id number]             MRI  Brain Without Contrast    Result Date: 11/1/2024  Small acute/subacute right periventricular CVA; right: JULISSA Medel in the emergency room, notified at 9:40 a.m. November 1, 2024.      This report was signed and finalized on 11/1/2024 9:45 AM by Adelina Holliday MD.      XR Chest 1 View    Result Date: 11/1/2024  No acute cardiopulmonary process.        Images were reviewed, interpreted, and dictated by Dr. Adelina Holliday MD Transcribed by Milagros Pichardo PA-C.  This report was signed and finalized on 11/1/2024 9:25 AM by Adelina Holliday MD.      CT Angiogram Neck    Result Date: 11/1/2024  1. No evidence of significant cervical carotid stenosis 2. Lobular filling defect proximal brachiocephalic artery could represent nonocclusive mural thrombus or lobulated soft plaque. Follow-up CTA recommended in 6-8 weeks for continued surveillance. CT appearance that abnormality could result in more distal emboli.   This study was performed with techniques to keep radiation doses as low as reasonably achievable (ALARA). Individualized dose reduction techniques using automated exposure control or adjustment of vA and/or kV according to the patient size were employed.   This report was signed and finalized on 11/1/2024 12:58 AM by Sam Thakkar MD.      CT Angiogram Head    Result Date: 11/1/2024  No evidence of acute large vessel occlusive disease    This study was performed with techniques to keep radiation doses as low as reasonably achievable (ALARA). Individualized dose reduction techniques using automated exposure control or adjustment of vA and/or kV according to the patient size were employed.  This report was signed and finalized on 11/1/2024 12:53 AM by Sam Thakkar MD.      CT CEREBRAL PERFUSION WITH & WITHOUT CONTRAST    Result Date: 11/1/2024  1.  No evidence of acute large vessel occlusion  Should symptoms persist recommend follow-up MRI  This report was signed and finalized on 11/1/2024 12:52 AM by Sam Thakkar MD.       CT Head Without Contrast Stroke Protocol    Result Date: 11/1/2024  1. No acute intracranial abnormality or obvious mass. 2. Atrophy and chronic ischemic white matter changes as above.   This study was performed with techniques to keep radiation doses as low as reasonably achievable (ALARA). Individualized dose reduction techniques using automated exposure control or adjustment of vA and/or kV according to the patient size were employed.    This report was signed and finalized on 11/1/2024 12:51 AM by Sam Thakkar MD.     Results for orders placed during the hospital encounter of 10/31/24    Adult Transthoracic Echo Complete W/ Cont if Necessary Per Protocol (With Agitated Saline)    Interpretation Summary    Left ventricular systolic function is normal. Calculated left ventricular EF = 68.3% Left ventricular ejection fraction appears to be 61 - 65%.    Left ventricular wall thickness is consistent with mild concentric hypertrophy. Left ventricular diastolic function was indeterminate.    Normal right ventricular size and function.    The left atrial cavity is mildly dilated.    Mild aortic valve regurgitation is present.    Saline test results are negative for right to left atrial level shunt.    Mild dilation of the proximal aorta is present. Ascending aorta = 3.8 cm.  Recommend dedicated chest CTA for further characterization of aortic pathology.            Assessment/Plan     Assessment/Plan:    Acute Lacunar Infarct: Located in the right basal ganglia, likely due to small vessel disease.    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    Blood Pressure Management: Target systolic blood pressure between 120-160 mmHg.    Transthoracic Echocardiogram (TTE): Mild dilation of the proximal aorta noted; ascending aorta measures 3.8 cm. Recommend a dedicated chest CTA for further evaluation of aortic pathology. Discontinue  heparin if no mobile thrombus is observed. Continue dual antiplatelet therapy (DAPT) for 21 days, followed by Plavix 75 mg daily.    Hyperlipidemia Management: LDL level at 135; recommend initiating high-intensity statin therapy with Lipitor 80 mg.      Stroke clinic follow up in [facility].     This was an audio and video enabled telemedicine encounter.       Daryn Rodriguez MD  11/02/24  08:06 EDT

## 2024-11-02 NOTE — PROGRESS NOTES
Pharmacy to Dose Heparin Infusion    Martha Murray is a  69 y.o. female receiving heparin infusion.     Therapy for (VTE/Cardiac):   Cardiac  Patient Weight: 72.6 kg  Initial Bolus (Y/N):   no  Any Bolus (Y/N):   no        Signs or Symptoms of Bleeding: N    Cardiac or Other (Not VTE)   Initial Bolus: None  Initial rate: 12 units/kg/hr (Max 1,000 units/hr)   Anti Xa Rebolus Infusion Hold time Change infusion Dose (Units/kg/hr) Next Anti Xa or aPTT Level Due   < 0.1 0 None Increase by  3 Units/kg/hr 6 hours   0.1- 0.19 0 None Increase by  2 Units/kg/hr 6 hours   0.2 - 0.29 0 None Increase by  1 Units/kg/hr 6 hours   0.3 - 0.5 0 None No Change 6 hours (after 2 consecutive levels in range check qAM)   0.51 - 0.6 0 None Decrease by  1 Units/kg/hr 6 hours   0.61 - 0.8 0 30 Minutes Decrease by  2 Units/kg/hr 6 hours   0.81 - 1 0 60 Minutes Decrease by  3 Units/kg/hr 6 hours   >1 0 Hold  After Anti Xa less than 0.5 decrease previous rate by  4 Units/kg/hr  Every 2 hours until Anti Xa  less than 0.5 then when infusion restarts in 6 hours         Recommend anti-Xa every 6 hours.         Lab 11/02/24  0809 11/02/24  0536 11/02/24  0535 11/02/24  0131 11/01/24  1818 11/01/24  1104 11/01/24  0836 11/01/24  0344 11/01/24  0142 11/01/24  0016   HEMOGLOBIN  --   --  14.5  --   --   --  15.8  --   --  15.4   HEMATOCRIT  --   --  44.9  --   --   --  47.0*  --   --  46.8*   PLATELETS  --   --  506*  --   --   --  662*  --   --  817*   PROTIME  --   --   --   --   --  15.2*  --   --   --   --    APTT  --   --   --   --   --  33.2*  --   --   --   --    HEPARIN ANTI-XA 0.34  --   --  0.36 0.10* 0.10*  --   --   --   --    CREATININE  --  1.40*  --   --   --   --   --  1.23* 1.32*  --    EGFR  --  41.1*  --   --   --   --   --  48.0* 44.1*  --          Date   Time   Anti-Xa Current Rate (Unit/kg/hr) Bolus   (Units) Rate Change   (Unit/kg/hr) New Rate (Unit/kg/hr) Next   Anti-Xa Comments  Pump Check Daily   11/1/24 1140 0.1 NEW --  +12 12 1800 D/W JULISSA Brown   11/1/24 1900 0.1 12 -- +3 15 11/2 0130 D/W JULISSA Boyd   11/2/24 0131 0.36 15 0 0 15 0800 D/W JULISSA Boyd   11/2/24 0809 0.34 15 0 0 15 11/3 @0600 D/W JULISSA Aaron                                                                                                                                                                                                Pharmacy will continue to follow anti-Xa results and monitor for signs and symptoms of bleeding or thrombosis.    Thank you for the consult,    Harvey De La Torre, PharmD, BCPS   11/02/24 02:37 EDT

## 2024-11-02 NOTE — PROGRESS NOTES
[facility]    Name:  Martha Murray   Age:  69 y.o.  Sex:  female  :  1955  MRN:  [id number]   Visit Number:  [id number]  Admission Date:  10/31/2024  Date Of Service:  24  Primary Care Physician:  Jenny Carvajal APRN     LOS: 0 days :    Chief Complaint:      Dizziness    Subjective:    Patient's chart reviewed and events noted.  She has been admitted because of acute lacunar infarct and stroke due to small vessel disease.  She is feeling dizzy upon head movement.  She has been unable to sit and stand with walking due to the dizziness.    Hospital Course:    Patient is an obese 69-year-old female with history significant for CKD, type 2 diabetes, hypertension, hyperlipidemia, essential thrombocythemia who presents to the emergency room with complaints of dizziness.  Patient's symptoms occurred abruptly approximately 2 hours prior to presentation.  States that she is having difficulty ambulating due to feeling of the room spinning around her.  No extremity weakness, facial slurring, difficulty speaking.  Also denied headache, fever, vision changes, chest pain, shortness of breath.  En route, EMS reported patient blood pressure 210/120.  They provided sublingual nitroglycerin with slight improvement.  The patient admits that she stopped taking her blood pressure medicine 2 to 3 days ago.  Denies smoking, alcohol use, illicit drug use.     ED summary: Patient afebrile, hypertensive 198/79 and nonhypoxic on room air.  Troponins negative x 2.  Potassium 3.2 and creatinine 1.32 which appears baseline.  Glucose 206.  TSH appropriate.  WBC 21 and platelets 817.  UA negative for infection, protein positive.  CT head without contrast showed no acute abnormality.  CTA head showed no LVO.  CTA neck showed no evidence of significant cervical carotid stenosis. Lobular filling defect proximal brachiocephalic artery could represent nonocclusive mural  thrombus or lobulated soft plaque. Follow-up CTA recommended in 6-8 weeks for continued surveillance.  CT cerebral perfusion showed no evidence of large vessel occlusion.  Teleneurology consulted, recommended admission for further stroke workup.  MRI noted small acute/subacute right periventricular CVA.  Patient initiated on heparin infusion x 24 hours due to filling defect in proximal brachiocephalic artery.  Continued on aspirin.  Norvasc added for blood pressure control.  Echo noted EF 61% with indeterminate left ventricular diastolic function negative saline test mild dilation of aorta at 3.8.    Review of Systems:     All systems were reviewed and negative except as mentioned in subjective, assessment and plan.    Vital Signs:    Temp:  [98.1 °F (36.7 °C)-99.1 °F (37.3 °C)] 98.1 °F (36.7 °C)  Heart Rate:  [65-88] 65  Resp:  [16-18] 18  BP: (110-174)/(51-71) 174/71    Intake and output:    I/O last 3 completed shifts:  In: 477.8 [P.O.:300; I.V.:177.8]  Out: 750 [Urine:550; Emesis/NG output:200]  I/O this shift:  In: 580 [P.O.:580]  Out: 200 [Urine:200]    Physical Examination:    General Appearance:  Alert and cooperative.  Chronically ill middle-aged female.   Head:  Atraumatic and normocephalic.   Eyes: Conjunctivae and sclerae normal, no icterus. No pallor.   Throat: No oral lesions, no thrush, oral mucosa moist.   Neck: Supple, trachea midline, no thyromegaly.   Lungs:   Breath sounds heard bilaterally equally.  No wheezing or crackles. No Pleural rub or bronchial breathing.  On room air unlabored.   Heart:  Normal S1 and S2, no murmur, no gallop, no rub. No JVD.   Abdomen:   Normal bowel sounds, no masses, no organomegaly. Soft, nontender, nondistended, no rebound tenderness.   Extremities: Supple, no edema, no cyanosis, no clubbing.   Skin: No bleeding or rash.   Neurologic: Alert and oriented x 3. No facial asymmetry. Moves all four limbs. No tremors.  No nystagmus noted.     Laboratory  "results:    Results from last 7 days   Lab Units 11/02/24  0536 11/01/24  0344 11/01/24  0142   SODIUM mmol/L 142 139 139   POTASSIUM mmol/L 4.2 3.5 3.2*   CHLORIDE mmol/L 104 105 103   CO2 mmol/L 24.6 19.3* 22.4   BUN mg/dL 20 15 15   CREATININE mg/dL 1.40* 1.23* 1.32*   CALCIUM mg/dL 9.2 8.7 8.9   BILIRUBIN mg/dL  --   --  0.5   ALK PHOS U/L  --   --  100   ALT (SGPT) U/L  --   --  15   AST (SGOT) U/L  --   --  21   GLUCOSE mg/dL 129* 185* 206*     Results from last 7 days   Lab Units 11/02/24  0535 11/01/24  0836 11/01/24  0016   WBC 10*3/mm3 15.79* 18.97* 21.40*   HEMOGLOBIN g/dL 14.5 15.8 15.4   HEMATOCRIT % 44.9 47.0* 46.8*   PLATELETS 10*3/mm3 506* 662* 817*     Results from last 7 days   Lab Units 11/01/24  1104   INR  1.14*     Results from last 7 days   Lab Units 11/01/24  0344 11/01/24  0142   HSTROP T ng/L 11 11         No results for input(s): \"PHART\", \"SJB0FXA\", \"PO2ART\", \"JXW0QNK\", \"BASEEXCESS\" in the last 8760 hours.   I have reviewed the patient's laboratory results.    Radiology results:    Adult Transthoracic Echo Complete W/ Cont if Necessary Per Protocol (With Agitated Saline)    Result Date: 11/1/2024    Left ventricular systolic function is normal. Calculated left ventricular EF = 68.3% Left ventricular ejection fraction appears to be 61 - 65%.   Left ventricular wall thickness is consistent with mild concentric hypertrophy. Left ventricular diastolic function was indeterminate.   Normal right ventricular size and function.   The left atrial cavity is mildly dilated.   Mild aortic valve regurgitation is present.   Saline test results are negative for right to left atrial level shunt.   Mild dilation of the proximal aorta is present. Ascending aorta = 3.8 cm.  Recommend dedicated chest CTA for further characterization of aortic pathology.     MRI Brain Without Contrast    Result Date: 11/1/2024  PROCEDURE: MRI BRAIN WO CONTRAST-  HISTORY: Stroke, follow up, dizziness and vertigo. Compare July " 20, 2023.  PROCEDURE: Multiplanar MR imaging of the brain was performed in multiple MR sequences .  FINDINGS: Brain parenchyma displays normal signal without evidence of mass, hemorrhage or edema. There is mild small vessel ischemic disease, age-appropriate. Limited images the proximal cord are unremarkable. The ventricles are mildly dilated indicating mild atrophy stable from prior exam. There is no extra-axial fluid or midline shift. Flow-voids are appropriate. Diffusion weighted images demonstrate a small area of mildly abnormal diffusion in the right parietal periventricular region just superior to the basal ganglia best seen series 6 image 15. This is confirmed on the ADC map to a moderate degree. Findings are suggestive of a acute/subacute stroke. This correlates with a small area of mildly increased T2 signal but has no correlate on the T1-weighted images. Limited images of the paranasal sinuses are unremarkable.      Impression: Small acute/subacute right periventricular CVA; right: JULISSA Medel in the emergency room, notified at 9:40 a.m. November 1, 2024.      This report was signed and finalized on 11/1/2024 9:45 AM by Adelina Holliday MD.      XR Chest 1 View    Result Date: 11/1/2024  PROCEDURE: XR CHEST 1 VW-    HISTORY: Dizziness, vertigo. Chest Pain Triage Protocol  COMPARISON: July 3, 2024.  FINDINGS: Apical lordotic view. The heart is borderline in size. The mediastinum is unremarkable. The lungs are clear. There is no pneumothorax. There are no acute osseous abnormalities.      Impression: No acute cardiopulmonary process.        Images were reviewed, interpreted, and dictated by Dr. Adelina Holliday MD Transcribed by Milagros Pichardo PA-C.  This report was signed and finalized on 11/1/2024 9:25 AM by Adelina Holliday MD.      CT Angiogram Neck    Result Date: 11/1/2024  PROCEDURE: CT ANGIOGRAM NECK-  HISTORY: Dizziness and vertigo  TECHNIQUE: Thin section axial CT with IV contrast supplemented with multiplanar  reconstruction.   3 D reconstructions were performed on a separate workstation.  NASCET criteria and technique was utilized during interpretation.  FINDINGS:  Aortic arch:  Arch shows no significant narrowing. There is a filling defect along the anterior wall of the brachiocephalic artery measuring up to 7 mm. This could represent mural thrombus or lobulated soft plaque. Nevertheless this could be a embolic source. The left common and left subclavian arteries appear widely patent.  Right carotid:  No significant stenosis is seen of the cervical common or internal carotid artery.  Left carotid:  No significant stenosis is seen of the cervical common or internal carotid artery.  Vertebrals: Vertebral arteries are codominant. No significant stenosis is present.       Impression: 1. No evidence of significant cervical carotid stenosis 2. Lobular filling defect proximal brachiocephalic artery could represent nonocclusive mural thrombus or lobulated soft plaque. Follow-up CTA recommended in 6-8 weeks for continued surveillance. CT appearance that abnormality could result in more distal emboli.   This study was performed with techniques to keep radiation doses as low as reasonably achievable (ALARA). Individualized dose reduction techniques using automated exposure control or adjustment of vA and/or kV according to the patient size were employed.   This report was signed and finalized on 11/1/2024 12:58 AM by Sam Thakkar MD.      CT Angiogram Head    Result Date: 11/1/2024  PROCEDURE: CT ANGIOGRAM HEAD-  HISTORY: Dizziness and vertigo  TECHNIQUE: Thin section axial CT with contrast with multiplanar reconstruction.   3 D reconstructions were performed on a separate workstation.  FINDINGS:  Internal carotid arteries: The petrous, cavernous and postcavernous ICAs are unremarkable. No aneurysm is seen.  Anterior cerebral arteries: Central anterior cerebral arteries are unremarkable. No aneurysm is seen.  Middle cerebral  arteries: Central middle cerebral arteries are unremarkable. No aneurysm is seen.  Basilar artery: Distal vertebral and basilar arteries are widely patent. No aneurysm is seen.  Posterior cerebral artery: Posterior cerebral arteries are patent centrally. No obvious aneurysm is seen.  Venous sinuses: Major venous sinuses are patent.      Impression: No evidence of acute large vessel occlusive disease    This study was performed with techniques to keep radiation doses as low as reasonably achievable (ALARA). Individualized dose reduction techniques using automated exposure control or adjustment of vA and/or kV according to the patient size were employed.  This report was signed and finalized on 11/1/2024 12:53 AM by Sam Thakkar MD.      CT CEREBRAL PERFUSION WITH & WITHOUT CONTRAST    Result Date: 11/1/2024  CT cerebral perfusion, without and with contrast  HISTORY: Symptoms of CVA. Vertigo. Dizziness..  TECHNIQUE: CT brain perfusion with mapping of flow parameters including transit time, cerebral blood flow and cerebral blood volume. IV contrast was utilized.  FINDINGS:  Tissue with delayed blood, Tmax > 6 secs:  0 ml  Tissue with significantly reduced blood, rCBF < 30%:  0        Impression: 1.  No evidence of acute large vessel occlusion  Should symptoms persist recommend follow-up MRI  This report was signed and finalized on 11/1/2024 12:52 AM by Sam Thakkar MD.      CT Head Without Contrast Stroke Protocol    Result Date: 11/1/2024  PROCEDURE: CT HEAD WO CONTRAST STROKE PROTOCOL-  HISTORY: Dizziness/vertigo, symptoms of acute CVA  COMPARISON: 7/20/2023  TECHNIQUE: Noncontrast exam  FINDINGS: Mild atrophy and chronic ischemic white matter changes are noted.  No cortical edema is present. There is no mass or hemorrhage. Ventricles are normal.  Bone windows show no skull fracture or obvious destructive lesion.      Impression: 1. No acute intracranial abnormality or obvious mass. 2. Atrophy and chronic ischemic  white matter changes as above.   This study was performed with techniques to keep radiation doses as low as reasonably achievable (ALARA). Individualized dose reduction techniques using automated exposure control or adjustment of vA and/or kV according to the patient size were employed.    This report was signed and finalized on 11/1/2024 12:51 AM by aSm Thakkar MD.     I have reviewed the patient's radiology reports.    Medication Review:     I have reviewed the patient's active and prn medications.     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  A1c 5.5/lipid panel mildly elevated.  Aspirin/high-dose statin.  Blood pressure control.  Restart home lisinopril-Norvasc added.  Neurochecks per protocol  PT/OT/SLP  Dizziness persistent with Reglan/Valium/Decadron ordered x 1.  Continue scopolamine and meclizine.  Leukocytosis  Unclear cause of leukocytosis.  No infectious source identified.  Downtrending.  Could be secondary to hemoconcentration  Patient does have essential thrombocythemia for which she is on hydroxyurea  Hypokalemia  This has resolved  Type 2 diabetes  A1c 5.5  Sliding scale for correction  Diabetic education    I have reviewed the copied text and it is accurate as of 11/2/2024        Code Status: Full  Diet: As tolerated  Discharge Plan: Likely home tomorrow pending PT/OT evaluation.    Luis Enrique Yuan MD  11/02/24  13:30 EDT    Dictated utilizing Dragon dictation.

## 2024-11-02 NOTE — PLAN OF CARE
Goal Outcome Evaluation:         Interventions implemented as appropriate

## 2024-11-02 NOTE — PAYOR COMM NOTE
"Martha Benitez (69 y.o. Female)       Date of Birth   1955    Social Security Number       Address   [facility] [address] [place] [address]    Home Phone   [phone]    MRN   [id number]       [facility]    Marital Status   Single                            Admission Date   10/31/24    Admission Type   Emergency    Admitting Provider   Kevin Shannon DO    Attending Provider   Luis Enrique Yuan MD    Department, Room/Bed   [facility] TELEMETRY 3, 318/1       Discharge Date       Discharge Disposition       Discharge Destination                                 Attending Provider: Luis Enrique Yuan MD    Allergies: Sulfa Antibiotics    Isolation: None   Infection: None   Code Status: CPR    Ht: 154.9 cm (61\")   Wt: 74.3 kg (163 lb 12.8 oz)    Admission Cmt: None   Principal Problem: Stroke [I63.9]                   Active Insurance as of 10/31/2024       Primary Coverage       Payor Plan Insurance Group Employer/Plan Group    ANTHEM MEDICARE REPLACEMENT ANTHEM MEDICARE ADVANTAGE [id number]       Payor Plan Address Payor Plan Phone Number Payor Plan Fax Number Effective Dates    [address] [phone]  2024 - None Entered    [facility] [id number]         Subscriber Name Subscriber Birth Date Member ID       MARTHA BENITEZ 1955 [id number]                     Emergency Contacts        (Rel.) Home Phone Work Phone Mobile Phone    Jose Sykes (Son) [phone] -- [phone]                 History & Physical        Kevin Shannon DO at 24 0248            [facility] HOSPITALIST   HISTORY AND PHYSICAL      Name:  Martha Benitez   Age:  69 y.o.  Sex:  female  :  1955  MRN:  [id number]   Visit Number:  [id number]  Admission Date:  10/31/2024  Date Of Service:  24  Primary Care Physician:  Jenny Carvajal APRN    Chief Complaint:     Dizziness    History Of Presenting Illness:      Patient is an obese " 69-year-old female with history significant for CKD, type 2 diabetes, hypertension, hyperlipidemia, essential thrombocythemia who presents to the emergency room with complaints of dizziness.  Patient's symptoms occurred abruptly approximately 2 hours prior to presentation.  States that she is having difficulty ambulating due to feeling of the room spinning around her.  No extremity weakness, facial slurring, difficulty speaking.  Also denied headache, fever, vision changes, chest pain, shortness of breath.  En route, EMS reported patient blood pressure 210/120.  They provided sublingual nitroglycerin with slight improvement.  The patient admits that she stopped taking her blood pressure medicine 2 to 3 days ago.  Denies smoking, alcohol use, illicit drug use.    ED summary: Patient afebrile, hypertensive 198/79 and nonhypoxic on room air.  Troponins negative x 2.  Potassium 3.2 and creatinine 1.32 which appears baseline.  Glucose 206.  TSH appropriate.  WBC 21 and platelets 817.  UA negative for infection, protein positive.  CT head without contrast showed no acute abnormality.  CTA head showed no LVO.  CTA neck showed no evidence of significant cervical carotid stenosis. Lobular filling defect proximal brachiocephalic artery could  represent nonocclusive mural thrombus or lobulated soft plaque.  Follow-up CTA recommended in 6-8 weeks for continued surveillance.  CT cerebral perfusion showed no evidence of large vessel occlusion.  Teleneurology consulted, recommended admission for further stroke workup.    Review Of Systems:    All systems were reviewed and negative except as mentioned in history of presenting illness, assessment and plan.    Past Medical History: Patient  has a past medical history of HAMMAD (acute kidney injury) (07/01/2021), Allergic, Anemia, Anesthesia complication, Arthritis, COPD (chronic obstructive pulmonary disease), Depression, Diabetes mellitus, History of heart attack, Hyperlipidemia, and  Hypertension.    Past Surgical History: Patient  has a past surgical history that includes Tonsillectomy; Breast Reduction; Sinus surgery (2003); Bone marrow aspiration; Hysterectomy (1990); Reduction mammaplasty (Bilateral); Cataract extraction (Bilateral); Colonoscopy; and Total knee arthroplasty (Right, 7/3/2024).    Social History: Patient  reports that she quit smoking about 27 years ago. Her smoking use included cigarettes. She started smoking about 57 years ago. She has a 60 pack-year smoking history. She has been exposed to tobacco smoke. She has never used smokeless tobacco. She reports that she does not drink alcohol and does not use drugs.    Family History:  Patient's family history has been reviewed and found to be noncontributory.     Allergies:      Sulfa antibiotics    Home Medications:    Prior to Admission Medications       Prescriptions Last Dose Informant Patient Reported? Taking?    aspirin (ASPIR) 81 MG EC tablet   No No    Take 1 tablet by mouth 2 (Two) Times a Day.    hydroxyurea (HYDREA) 500 MG capsule   No No    Take 1 capsule by mouth Daily.    lisinopril (PRINIVIL,ZESTRIL) 40 MG tablet  Self No No    Take 1 tablet by mouth Daily.          ED Medications:    Medications   sodium chloride 0.9 % flush 10 mL (has no administration in time range)   meclizine (ANTIVERT) tablet 25 mg (25 mg Oral Given 11/1/24 0016)   ondansetron (ZOFRAN) injection 4 mg (4 mg Intravenous Given 11/1/24 0016)   iopamidol (ISOVUE-300) 61 % injection 100 mL (100 mL Intravenous Given 11/1/24 0039)   iopamidol (ISOVUE-300) 61 % injection 50 mL (50 mL Intravenous Given 11/1/24 0039)   labetalol (NORMODYNE,TRANDATE) injection 10 mg (10 mg Intravenous Given 11/1/24 0159)     Vital Signs:  Temp:  [97.5 °F (36.4 °C)] 97.5 °F (36.4 °C)  Heart Rate:  [70-76] 72  Resp:  [18-20] 18  BP: (172-188)/(78-92) 188/80        11/01/24  0003   Weight: 72.6 kg (160 lb)     Body mass index is 30.25 kg/m².    Physical Exam:     Most recent  "vital Signs: BP (!) 188/80   Pulse 72   Temp 97.5 °F (36.4 °C) (Oral)   Resp 18   Ht 154.9 cm (60.98\")   Wt 72.6 kg (160 lb)   SpO2 100%   BMI 30.25 kg/m²     Physical Exam  Vitals reviewed.   Constitutional:       Appearance: She is obese.   HENT:      Head: Normocephalic and atraumatic.      Right Ear: External ear normal.      Left Ear: External ear normal.   Neurological:      Mental Status: She is alert.         Laboratory data:    I have reviewed the labs done in the emergency room.    Results from last 7 days   Lab Units 11/01/24  0142   SODIUM mmol/L 139   POTASSIUM mmol/L 3.2*   CHLORIDE mmol/L 103   CO2 mmol/L 22.4   BUN mg/dL 15   CREATININE mg/dL 1.32*   CALCIUM mg/dL 8.9   BILIRUBIN mg/dL 0.5   ALK PHOS U/L 100   ALT (SGPT) U/L 15   AST (SGOT) U/L 21   GLUCOSE mg/dL 206*     Results from last 7 days   Lab Units 11/01/24  0016   WBC 10*3/mm3 21.40*   HEMOGLOBIN g/dL 15.4   HEMATOCRIT % 46.8*   PLATELETS 10*3/mm3 817*         Results from last 7 days   Lab Units 11/01/24  0142   HSTROP T ng/L 11                           Invalid input(s): \"USDES\", \"NITRITITE\", \"BACT\", \"EP\"    Pain Management Panel  More data exists         Latest Ref Rng & Units 11/7/2023 8/4/2020   Pain Management Panel   Creatinine, Urine mg/dL 250.5  234.4       Details                   EKG:      EKG personally reviewed, sinus rhythm with rate of 76 bpm.  No acute ST or T wave changes.    Radiology:    CT Angiogram Neck    Result Date: 11/1/2024  PROCEDURE: CT ANGIOGRAM NECK-  HISTORY: Dizziness and vertigo  TECHNIQUE: Thin section axial CT with IV contrast supplemented with multiplanar reconstruction.   3 D reconstructions were performed on a separate workstation.  NASCET criteria and technique was utilized during interpretation.  FINDINGS:  Aortic arch:  Arch shows no significant narrowing. There is a filling defect along the anterior wall of the brachiocephalic artery measuring up to 7 mm. This could represent mural thrombus " or lobulated soft plaque. Nevertheless this could be a embolic source. The left common and left subclavian arteries appear widely patent.  Right carotid:  No significant stenosis is seen of the cervical common or internal carotid artery.  Left carotid:  No significant stenosis is seen of the cervical common or internal carotid artery.  Vertebrals: Vertebral arteries are codominant. No significant stenosis is present.       1. No evidence of significant cervical carotid stenosis 2. Lobular filling defect proximal brachiocephalic artery could represent nonocclusive mural thrombus or lobulated soft plaque. Follow-up CTA recommended in 6-8 weeks for continued surveillance. CT appearance that abnormality could result in more distal emboli.   This study was performed with techniques to keep radiation doses as low as reasonably achievable (ALARA). Individualized dose reduction techniques using automated exposure control or adjustment of vA and/or kV according to the patient size were employed.   This report was signed and finalized on 11/1/2024 12:58 AM by Sam Thakkar MD.      CT Angiogram Head    Result Date: 11/1/2024  PROCEDURE: CT ANGIOGRAM HEAD-  HISTORY: Dizziness and vertigo  TECHNIQUE: Thin section axial CT with contrast with multiplanar reconstruction.   3 D reconstructions were performed on a separate workstation.  FINDINGS:  Internal carotid arteries: The petrous, cavernous and postcavernous ICAs are unremarkable. No aneurysm is seen.  Anterior cerebral arteries: Central anterior cerebral arteries are unremarkable. No aneurysm is seen.  Middle cerebral arteries: Central middle cerebral arteries are unremarkable. No aneurysm is seen.  Basilar artery: Distal vertebral and basilar arteries are widely patent. No aneurysm is seen.  Posterior cerebral artery: Posterior cerebral arteries are patent centrally. No obvious aneurysm is seen.  Venous sinuses: Major venous sinuses are patent.      No evidence of acute  large vessel occlusive disease    This study was performed with techniques to keep radiation doses as low as reasonably achievable (ALARA). Individualized dose reduction techniques using automated exposure control or adjustment of vA and/or kV according to the patient size were employed.  This report was signed and finalized on 11/1/2024 12:53 AM by Sam Thakkar MD.      CT CEREBRAL PERFUSION WITH & WITHOUT CONTRAST    Result Date: 11/1/2024  CT cerebral perfusion, without and with contrast  HISTORY: Symptoms of CVA. Vertigo. Dizziness..  TECHNIQUE: CT brain perfusion with mapping of flow parameters including transit time, cerebral blood flow and cerebral blood volume. IV contrast was utilized.  FINDINGS:  Tissue with delayed blood, Tmax > 6 secs:  0 ml  Tissue with significantly reduced blood, rCBF < 30%:  0        1.  No evidence of acute large vessel occlusion  Should symptoms persist recommend follow-up MRI  This report was signed and finalized on 11/1/2024 12:52 AM by Sam Thakkar MD.      CT Head Without Contrast Stroke Protocol    Result Date: 11/1/2024  PROCEDURE: CT HEAD WO CONTRAST STROKE PROTOCOL-  HISTORY: Dizziness/vertigo, symptoms of acute CVA  COMPARISON: 7/20/2023  TECHNIQUE: Noncontrast exam  FINDINGS: Mild atrophy and chronic ischemic white matter changes are noted.  No cortical edema is present. There is no mass or hemorrhage. Ventricles are normal.  Bone windows show no skull fracture or obvious destructive lesion.      1. No acute intracranial abnormality or obvious mass. 2. Atrophy and chronic ischemic white matter changes as above.   This study was performed with techniques to keep radiation doses as low as reasonably achievable (ALARA). Individualized dose reduction techniques using automated exposure control or adjustment of vA and/or kV according to the patient size were employed.    This report was signed and finalized on 11/1/2024 12:51 AM by Sam Thakkar MD.        Assessment:    Stroke  Hypertensive urgency  Leukocytosis  Hypokalemia  Type 2 diabetes  CAD  Essential thrombocytosis  CKD stage III  Hypertension  Hyperlipidemia  Depression  Obesity    Plan:    Dizziness  Stroke  Hypertensive urgency  Teleneurology following  MRI brain without contrast  Echo with bubble study  A1c and lipid panel  Aspirin, Plavix, high intensity statin  Blood pressure control.  Restart home lisinopril  Neurochecks per protocol  PT/OT/SLP  Leukocytosis  Unclear cause of leukocytosis.  No infectious source identified.  Repeat with morning lab; could be secondary to hemoconcentration  Patient does have essential thrombocythemia for which she is on hydroxyurea  Hypokalemia  Replace per protocol  Type 2 diabetes  A1c pending  Sliding scale for correction  Diabetic education    Patient's clinical course dictates.  Continue patient's home medications as warranted.    Risk Assessment: High  DVT Prophylaxis: SCDs  Code Status: Full  Diet: As tolerated          Kevin Shannon DO  24  02:48 EDT    Dictated utilizing Dragon dictation.      Electronically signed by Kevin Shannon DO at 24 0637          Emergency Department Notes        Robbi Ramos MD at 24 0010                  [facility] EMERGENCY DEPARTMENT  Emergency Department Encounter  Emergency Medicine Physician Note       Pt Name: Martha Murray  MRN: [id number]  Pt :   1955  Room Number:    Date of encounter:  10/31/2024  PCP: Jenny Carvajal APRN  ED Provider: Robbi Ramos MD    Historian: Patient      HPI:  Chief Complaint: Dizziness        Context: Martha Murray is a 69 y.o. female who presents to the ED c/o dizziness.  Patient states around 1.5 hours prior to arrival she started having dizziness vomiting and blurred vision.  States she has not been taking her blood pressure medications in the past 2 to 3 days.  EMS reported patient had blood pressure of 210/120 and they  provided with sublingual nitro with mild improvement to 180 over 70s.  Patient still reporting dizziness and severe nausea.  Denies any falls or trauma.  Denies any blood thinner use.  Denies any chest pain.  Denies any other symptoms at this time.      PAST MEDICAL HISTORY  Past Medical History:   Diagnosis Date    HAMMAD (acute kidney injury) 2021    Allergic     Anemia     Anesthesia complication     Slow to wake    Arthritis     COPD (chronic obstructive pulmonary disease)     Depression     Diabetes mellitus     History of heart attack     Hyperlipidemia     Hypertension          PAST SURGICAL HISTORY  Past Surgical History:   Procedure Laterality Date    BILATERAL BREAST REDUCTION      BONE MARROW ASPIRATION      CATARACT EXTRACTION Bilateral     COLONOSCOPY      HYSTERECTOMY  1990    x 2    REDUCTION MAMMAPLASTY Bilateral     MORE THAN 20 YEARS AGO    SINUS SURGERY  2003    TONSILLECTOMY      TOTAL KNEE ARTHROPLASTY Right 7/3/2024    Procedure: TOTAL KNEE ARTHROPLASTY WITH NAINA ROBOT RIGHT;  Surgeon: Keaton Aldrich MD;  Location: [facility];  Service: Robotics - Ortho;  Laterality: Right;         FAMILY HISTORY  Family History   Problem Relation Age of Onset    Arthritis Maternal Grandmother     Hypertension Mother     Hyperlipidemia Mother     Thyroid disease Mother     Diabetes Sister     Crohn's disease Sister     Hypertension Sister     Heart attack Maternal Grandfather     Breast cancer Neg Hx     Ovarian cancer Neg Hx          SOCIAL HISTORY  Social History     Socioeconomic History    Marital status: Single   Tobacco Use    Smoking status: Former     Current packs/day: 0.00     Average packs/day: 2.0 packs/day for 30.0 years (60.0 ttl pk-yrs)     Types: Cigarettes     Start date:      Quit date:      Years since quittin.8     Passive exposure: Past    Smokeless tobacco: Never   Vaping Use    Vaping status: Never Used   Substance and Sexual Activity    Alcohol use: No    Drug use: No     Sexual activity: Defer         ALLERGIES  Sulfa antibiotics        REVIEW OF SYSTEMS  Review of Systems     All systems reviewed and negative except for those discussed in HPI.       PHYSICAL EXAM    I have reviewed the triage vital signs and nursing notes.    ED Triage Vitals [11/01/24 0003]   Temp Heart Rate Resp BP SpO2   97.5 °F (36.4 °C) 74 20 172/78 98 %      Temp src Heart Rate Source Patient Position BP Location FiO2 (%)   Oral Monitor Sitting Left arm --       Physical Exam    General:  Awake, alert, no acute distress  HEENT: Atraumatic, normocephalic, EOMI, PERRLA, mucous membranes moist  NECK:  Supple, atraumatic  Cardiovascular:  Regular rate, regular rhythm, no murmurs, rubs, or gallops.  Extremities well perfused   Respiratory:  Regular rate, clear lungs to auscultation bilaterally.  No rhonchi, rales, wheezing  Abdominal:  Soft, nondistended, nontender.  No guarding or rebound.  No palpable masses  Extremity:  No visible bony abnormalities in all 4 extremities.  Full range of motion of all extremities.  Skin:  Warm and dry.  No rashes  Neuro:  AAOx3, GCS 15.  Upward and leftward nystagmus.  No facial droop or dysarthria.  No focal strength or sensation deficits throughout all 4 extremities.  Psych:  Mood and affect appropriate.        LAB RESULTS  Recent Results (from the past 24 hours)   Green Top (Gel)    Collection Time: 11/01/24 12:16 AM   Result Value Ref Range    Extra Tube Hold for add-ons.    Lavender Top    Collection Time: 11/01/24 12:16 AM   Result Value Ref Range    Extra Tube hold for add-on    Gold Top - SST    Collection Time: 11/01/24 12:16 AM   Result Value Ref Range    Extra Tube Hold for add-ons.    Light Blue Top    Collection Time: 11/01/24 12:16 AM   Result Value Ref Range    Extra Tube Hold for add-ons.    CBC Auto Differential    Collection Time: 11/01/24 12:16 AM    Specimen: Blood   Result Value Ref Range    WBC 21.40 (H) 3.40 - 10.80 10*3/mm3    RBC 4.74 3.77 - 5.28  10*6/mm3    Hemoglobin 15.4 12.0 - 15.9 g/dL    Hematocrit 46.8 (H) 34.0 - 46.6 %    MCV 98.7 (H) 79.0 - 97.0 fL    MCH 32.5 26.6 - 33.0 pg    MCHC 32.9 31.5 - 35.7 g/dL    RDW 13.7 12.3 - 15.4 %    RDW-SD 50.2 37.0 - 54.0 fl    MPV 10.2 6.0 - 12.0 fL    Platelets 817 (H) 140 - 450 10*3/mm3    Neutrophil % 88.6 (H) 42.7 - 76.0 %    Lymphocyte % 4.7 (L) 19.6 - 45.3 %    Monocyte % 3.3 (L) 5.0 - 12.0 %    Eosinophil % 1.6 0.3 - 6.2 %    Basophil % 0.9 0.0 - 1.5 %    Immature Grans % 0.9 (H) 0.0 - 0.5 %    Neutrophils, Absolute 18.94 (H) 1.70 - 7.00 10*3/mm3    Lymphocytes, Absolute 1.01 0.70 - 3.10 10*3/mm3    Monocytes, Absolute 0.71 0.10 - 0.90 10*3/mm3    Eosinophils, Absolute 0.35 0.00 - 0.40 10*3/mm3    Basophils, Absolute 0.20 0.00 - 0.20 10*3/mm3    Immature Grans, Absolute 0.19 (H) 0.00 - 0.05 10*3/mm3    nRBC 0.0 0.0 - 0.2 /100 WBC   Comprehensive Metabolic Panel    Collection Time: 11/01/24  1:42 AM    Specimen: Blood   Result Value Ref Range    Glucose 206 (H) 65 - 99 mg/dL    BUN 15 8 - 23 mg/dL    Creatinine 1.32 (H) 0.57 - 1.00 mg/dL    Sodium 139 136 - 145 mmol/L    Potassium 3.2 (L) 3.5 - 5.2 mmol/L    Chloride 103 98 - 107 mmol/L    CO2 22.4 22.0 - 29.0 mmol/L    Calcium 8.9 8.6 - 10.5 mg/dL    Total Protein 6.5 6.0 - 8.5 g/dL    Albumin 4.1 3.5 - 5.2 g/dL    ALT (SGPT) 15 1 - 33 U/L    AST (SGOT) 21 1 - 32 U/L    Alkaline Phosphatase 100 39 - 117 U/L    Total Bilirubin 0.5 0.0 - 1.2 mg/dL    Globulin 2.4 gm/dL    A/G Ratio 1.7 g/dL    BUN/Creatinine Ratio 11.4 7.0 - 25.0    Anion Gap 13.6 5.0 - 15.0 mmol/L    eGFR 44.1 (L) >60.0 mL/min/1.73   High Sensitivity Troponin T    Collection Time: 11/01/24  1:42 AM    Specimen: Blood   Result Value Ref Range    HS Troponin T 11 <14 ng/L       If labs were ordered, I independently evaluated the results and considered them in treating the patient.        RADIOLOGY  CT Angiogram Neck    Result Date: 11/1/2024  PROCEDURE: CT ANGIOGRAM NECK-  HISTORY: Dizziness  and vertigo  TECHNIQUE: Thin section axial CT with IV contrast supplemented with multiplanar reconstruction.   3 D reconstructions were performed on a separate workstation.  NASCET criteria and technique was utilized during interpretation.  FINDINGS:  Aortic arch:  Arch shows no significant narrowing. There is a filling defect along the anterior wall of the brachiocephalic artery measuring up to 7 mm. This could represent mural thrombus or lobulated soft plaque. Nevertheless this could be a embolic source. The left common and left subclavian arteries appear widely patent.  Right carotid:  No significant stenosis is seen of the cervical common or internal carotid artery.  Left carotid:  No significant stenosis is seen of the cervical common or internal carotid artery.  Vertebrals: Vertebral arteries are codominant. No significant stenosis is present.       1. No evidence of significant cervical carotid stenosis 2. Lobular filling defect proximal brachiocephalic artery could represent nonocclusive mural thrombus or lobulated soft plaque. Follow-up CTA recommended in 6-8 weeks for continued surveillance. CT appearance that abnormality could result in more distal emboli.   This study was performed with techniques to keep radiation doses as low as reasonably achievable (ALARA). Individualized dose reduction techniques using automated exposure control or adjustment of vA and/or kV according to the patient size were employed.   This report was signed and finalized on 11/1/2024 12:58 AM by Sam Thakkar MD.      CT Angiogram Head    Result Date: 11/1/2024  PROCEDURE: CT ANGIOGRAM HEAD-  HISTORY: Dizziness and vertigo  TECHNIQUE: Thin section axial CT with contrast with multiplanar reconstruction.   3 D reconstructions were performed on a separate workstation.  FINDINGS:  Internal carotid arteries: The petrous, cavernous and postcavernous ICAs are unremarkable. No aneurysm is seen.  Anterior cerebral arteries: Central  anterior cerebral arteries are unremarkable. No aneurysm is seen.  Middle cerebral arteries: Central middle cerebral arteries are unremarkable. No aneurysm is seen.  Basilar artery: Distal vertebral and basilar arteries are widely patent. No aneurysm is seen.  Posterior cerebral artery: Posterior cerebral arteries are patent centrally. No obvious aneurysm is seen.  Venous sinuses: Major venous sinuses are patent.      No evidence of acute large vessel occlusive disease    This study was performed with techniques to keep radiation doses as low as reasonably achievable (ALARA). Individualized dose reduction techniques using automated exposure control or adjustment of vA and/or kV according to the patient size were employed.  This report was signed and finalized on 11/1/2024 12:53 AM by Sam Thakkar MD.      CT CEREBRAL PERFUSION WITH & WITHOUT CONTRAST    Result Date: 11/1/2024  CT cerebral perfusion, without and with contrast  HISTORY: Symptoms of CVA. Vertigo. Dizziness..  TECHNIQUE: CT brain perfusion with mapping of flow parameters including transit time, cerebral blood flow and cerebral blood volume. IV contrast was utilized.  FINDINGS:  Tissue with delayed blood, Tmax > 6 secs:  0 ml  Tissue with significantly reduced blood, rCBF < 30%:  0        1.  No evidence of acute large vessel occlusion  Should symptoms persist recommend follow-up MRI  This report was signed and finalized on 11/1/2024 12:52 AM by Sam Thakkar MD.      CT Head Without Contrast Stroke Protocol    Result Date: 11/1/2024  PROCEDURE: CT HEAD WO CONTRAST STROKE PROTOCOL-  HISTORY: Dizziness/vertigo, symptoms of acute CVA  COMPARISON: 7/20/2023  TECHNIQUE: Noncontrast exam  FINDINGS: Mild atrophy and chronic ischemic white matter changes are noted.  No cortical edema is present. There is no mass or hemorrhage. Ventricles are normal.  Bone windows show no skull fracture or obvious destructive lesion.      1. No acute intracranial abnormality or  obvious mass. 2. Atrophy and chronic ischemic white matter changes as above.   This study was performed with techniques to keep radiation doses as low as reasonably achievable (ALARA). Individualized dose reduction techniques using automated exposure control or adjustment of vA and/or kV according to the patient size were employed.    This report was signed and finalized on 11/1/2024 12:51 AM by Sam Thakkar MD.       I ordered and independently evaluated the above noted radiographic studies.     See radiologist's dictation for official interpretation.        PROCEDURES    Procedures    ECG 12 Lead ED Triage Standing Order; Chest Pain   Final Result          MEDICATIONS GIVEN IN ER    Medications   sodium chloride 0.9 % flush 10 mL (has no administration in time range)   meclizine (ANTIVERT) tablet 25 mg (25 mg Oral Given 11/1/24 0016)   ondansetron (ZOFRAN) injection 4 mg (4 mg Intravenous Given 11/1/24 0016)   iopamidol (ISOVUE-300) 61 % injection 100 mL (100 mL Intravenous Given 11/1/24 0039)   iopamidol (ISOVUE-300) 61 % injection 50 mL (50 mL Intravenous Given 11/1/24 0039)   labetalol (NORMODYNE,TRANDATE) injection 10 mg (10 mg Intravenous Given 11/1/24 0159)         MEDICAL DECISION MAKING, PROGRESS, and CONSULTS    All labs, if obtained, have been independently reviewed by me.  All radiology studies, if obtained, have been evaluated by me and the radiologist dictating the report.  All EKG's, if obtained, have been independently viewed and interpreted by me.      Discussion below represents my analysis of pertinent findings related to patient's condition, differential diagnosis, treatment plan and final disposition.    Martha Murray is a 69 y.o. female who presents to the ED c/o dizziness.  Brought in by EMS.  Hypertensive on arrival with blood pressure 172/78.  Differential diagnosis includes was not limited to hypertensive emergency, stroke/CVA, intracranial lesion, peripheral vertigo, myocardial  infarction, cardiac arrhythmia.  Extensive labs ordered along with the EKG and CT along with CTA head and neck.  Not a candidate for tPA given time that patient went to sleep and unclear exact onset of symptoms outside of tPA window.    EKG personally interpreted showing normal sinus rhythm with rate of 76 with no evidence of acute ischemic changes/STEMI.    Labs show leukocytosis of 21 along with elevated hematocrit of 46.8 and elevated platelet count of 817 patient has known history of essential thrombocytosis and follow-up with hematology already for which she is on hydroxyurea.  Mildly elevated creatinine of 1.32 and mild hypokalemia of 3.2.  Normal troponin.    Patient given 4 mg of IV Zofran for nausea along with oral meclizine to help with possible vertigo.  Patient also given IV labetalol for symptomatic hypertension.    CT without contrast negative for acute intracranial abnormality or bleed.  CTA head negative.  CTA neck negative for large vessel occlusion, however showed a lobular filling defect in the proximal brachiocephalic artery that radiology had recommended patient have a follow-up CTA in 6 to 8 weeks for further clarification.    Consulted neurology who evaluated patient through telemedicine and recommended further stroke workup including MRI given patient's continued vertigo.    Discussed patient's case with hospitalist who is agreeable with plan for admission for further treatment.                             Orders placed during this visit:  Orders Placed This Encounter   Procedures    XR Chest 1 View    CT Head Without Contrast Stroke Protocol    CT Angiogram Head    CT Angiogram Neck    CT CEREBRAL PERFUSION WITH & WITHOUT CONTRAST    [place] Draw    Comprehensive Metabolic Panel    High Sensitivity Troponin T    CBC Auto Differential    High Sensitivity Troponin T 2Hr    Urinalysis With Microscopic If Indicated (No Culture) - Urine, Clean Catch    NPO Diet NPO Type: Strict NPO    Undress &  Gown    Continuous Pulse Oximetry    Code Status and Medical Interventions: CPR (Attempt to Resuscitate); Full Support    Oxygen Therapy- Nasal Cannula; Titrate 1-6 LPM Per SpO2; 90 - 95%    ECG 12 Lead ED Triage Standing Order; Chest Pain    Insert Peripheral IV    Initiate Observation Status    CBC & Differential    Green Top (Gel)    Lavender Top    Gold Top - SST    Light Blue Top         ED Course:    Consultants:                  Shared Decision Making:  After my consideration of clinical presentation and any laboratory/radiology studies obtained, I discussed the findings with the patient/patient representative who is in agreement with the treatment plan and the final disposition.   Risks and benefits of discharge and/or observation/admission were discussed.                    DIAGNOSIS  Final diagnoses:   Vertigo   Uncontrolled hypertension   Noncompliance with medication regimen   Thrombocytosis         DISPOSITION  Admit      Please note that portions of this document were completed with voice recognition software.        Robbi Ramos MD  11/01/24 0708      Electronically signed by Robbi Ramos MD at 11/01/24 0708       Current Facility-Administered Medications   Medication Dose Route Frequency Provider Last Rate Last Admin    acetaminophen (TYLENOL) tablet 650 mg  650 mg Oral Q4H PRN Kevin Shannon DO   650 mg at 11/01/24 2022    Or    acetaminophen (TYLENOL) suppository 650 mg  650 mg Rectal Q4H PRN Kevin Shannon DO        amLODIPine (NORVASC) tablet 10 mg  10 mg Oral Q24H Jen Batres APRN   10 mg at 11/02/24 0931    aspirin EC tablet 81 mg  81 mg Oral Daily Kevin Shannon DO   81 mg at 11/02/24 0931    atorvastatin (LIPITOR) tablet 80 mg  80 mg Oral Nightly Kevin Shannon DO   80 mg at 11/01/24 2022    dextrose (D50W) (25 g/50 mL) IV injection 25 g  25 g Intravenous Q15 Min PRN Kevin Shannon DO        dextrose (GLUTOSE) oral gel 15 g  15 g Oral Q15 Min PRN Kevin Shannon DO         glucagon (GLUCAGEN) injection 1 mg  1 mg Intramuscular Q15 Min PRN Kevin Shannon DO        heparin 94383 units/250 ml (100 units/ml) in D5W  15 Units/kg/hr Intravenous Titrated Daryn Rodriguez MD 10.89 mL/hr at 11/02/24 0622 15 Units/kg/hr at 11/02/24 0622    hydrALAZINE (APRESOLINE) injection 10 mg  10 mg Intravenous Q6H PRN Kevin Shannon DO   10 mg at 11/01/24 0345    hydroxyurea (HYDREA) capsule 500 mg  500 mg Oral Daily Kevin Shannon DO   500 mg at 11/02/24 0931    Insulin Lispro (humaLOG) injection 2-7 Units  2-7 Units Subcutaneous 4x Daily AC & at Bedtime Kevin Shannon DO   2 Units at 11/01/24 2022    lisinopril (PRINIVIL,ZESTRIL) tablet 40 mg  40 mg Oral Daily Kevin Shannon DO   40 mg at 11/02/24 0931    meclizine (ANTIVERT) tablet 25 mg  25 mg Oral TID PRN Jen Batres APRN        Pharmacy to Dose Heparin   Does not apply Continuous PRN Daryn Rodriguez MD        prochlorperazine (COMPAZINE) injection 5 mg  5 mg Intravenous Q6H PRN Jen Batres APRN   5 mg at 11/01/24 0925    Or    prochlorperazine (COMPAZINE) tablet 5 mg  5 mg Oral Q6H PRN Jen Batres APRN        Or    prochlorperazine (COMPAZINE) suppository 25 mg  25 mg Rectal Q12H PRN Jen Batres APRN        scopolamine patch 1 mg/72 hr  1 patch Transdermal Q72H Jen Batres APRN   1 patch at 11/01/24 0823    sodium chloride 0.9 % flush 10 mL  10 mL Intravenous PRN Robbi Ramos MD        sodium chloride 0.9 % flush 10 mL  10 mL Intravenous Q12H Kvein Shannon DO   10 mL at 11/02/24 0931    sodium chloride 0.9 % flush 10 mL  10 mL Intravenous PRN Kevin Shannon DO        sodium chloride 0.9 % infusion 40 mL  40 mL Intravenous PRN Kevin Shannno DO         Lab Results (last 48 hours)       Procedure Component Value Units Date/Time    Heparin Anti-Xa [[id number]]  (Normal) Collected: 11/02/24 0809    Specimen: Blood Updated: 11/02/24 0929     Heparin Anti-Xa (UFH) 0.34 IU/ml      POC Glucose 4x Daily Before Meals & at Bedtime [[id number]]  (Normal) Collected: 11/02/24 0729    Specimen: Blood Updated: 11/02/24 0746     Glucose 105 mg/dL      Comment: Serial Number: [id number]:  [id number]       Basic Metabolic Panel [[id number]]  (Abnormal) Collected: 11/02/24 0536    Specimen: Blood Updated: 11/02/24 0655     Glucose 129 mg/dL      BUN 20 mg/dL      Creatinine 1.40 mg/dL      Sodium 142 mmol/L      Potassium 4.2 mmol/L      Comment: Specimen hemolyzed.  Result may be falsely elevated.        Chloride 104 mmol/L      CO2 24.6 mmol/L      Calcium 9.2 mg/dL      BUN/Creatinine Ratio 14.3     Anion Gap 13.4 mmol/L      eGFR 41.1 mL/min/1.73     Narrative:      GFR Normal >60  Chronic Kidney Disease <60  Kidney Failure <15      CBC & Differential [[id number]]  (Abnormal) Collected: 11/02/24 0535    Specimen: Blood Updated: 11/02/24 0607    Narrative:      The following orders were created for panel order CBC & Differential.  Procedure                               Abnormality         Status                     ---------                               -----------         ------                     CBC Auto Differential[[id number]]        Abnormal            Final result                 Please view results for these tests on the individual orders.    CBC Auto Differential [[id number]]  (Abnormal) Collected: 11/02/24 0535    Specimen: Blood Updated: 11/02/24 0607     WBC 15.79 10*3/mm3      RBC 4.50 10*6/mm3      Hemoglobin 14.5 g/dL      Hematocrit 44.9 %      MCV 99.8 fL      MCH 32.2 pg      MCHC 32.3 g/dL      RDW 13.8 %      RDW-SD 50.4 fl      MPV 10.5 fL      Platelets 506 10*3/mm3      Neutrophil % 89.0 %      Lymphocyte % 5.4 %      Monocyte % 4.3 %      Eosinophil % 0.1 %      Basophil % 0.3 %      Immature Grans % 0.9 %      Neutrophils, Absolute 14.05 10*3/mm3      Lymphocytes, Absolute 0.85 10*3/mm3      Monocytes, Absolute 0.68 10*3/mm3      Eosinophils, Absolute 0.01 10*3/mm3       Basophils, Absolute 0.05 10*3/mm3      Immature Grans, Absolute 0.15 10*3/mm3      nRBC 0.0 /100 WBC     Heparin Anti-Xa [[id number]]  (Normal) Collected: 11/02/24 0131    Specimen: Blood Updated: 11/02/24 0224     Heparin Anti-Xa (UFH) 0.36 IU/ml     POC Glucose Once [[id number]]  (Abnormal) Collected: 11/01/24 2009    Specimen: Blood Updated: 11/01/24 2012     Glucose 164 mg/dL      Comment: Serial Number: [id number]:  [id number]       Vitamin B12 [[id number]]  (Normal) Collected: 11/01/24 0836    Specimen: Blood Updated: 11/01/24 1915     Vitamin B-12 332 pg/mL     Narrative:      Results may be falsely increased if patient taking Biotin.      Heparin Anti-Xa [[id number]]  (Abnormal) Collected: 11/01/24 1818    Specimen: Blood from Arm, Right Updated: 11/01/24 1845     Heparin Anti-Xa (UFH) 0.10 IU/ml     POC Glucose Once [[id number]]  (Abnormal) Collected: 11/01/24 1633    Specimen: Blood Updated: 11/01/24 1635     Glucose 160 mg/dL      Comment: Serial Number: [id number]:  [id number]       Heparin Anti-Xa [[id number]]  (Abnormal) Collected: 11/01/24 1104    Specimen: Blood Updated: 11/01/24 1159     Heparin Anti-Xa (UFH) 0.10 IU/ml     aPTT [[id number]]  (Abnormal) Collected: 11/01/24 1104    Specimen: Blood Updated: 11/01/24 1154     PTT 33.2 seconds     Protime-INR [[id number]]  (Abnormal) Collected: 11/01/24 1104    Specimen: Blood Updated: 11/01/24 1154     Protime 15.2 Seconds      INR 1.14    Narrative:      Suggested INR therapeutic range for stable oral anticoagulant therapy:    Low Intensity therapy:   1.5-2.0  Moderate Intensity therapy:   2.0-3.0  High Intensity therapy:   2.5-4.0    POC Glucose 4x Daily Before Meals & at Bedtime [[id number]]  (Normal) Collected: 11/01/24 1127    Specimen: Blood Updated: 11/01/24 1129     Glucose 114 mg/dL      Comment: Serial Number: [id number]:  [id number]       Hemoglobin A1c [[id number]]  (Normal) Collected: 11/01/24 0836    Specimen: Blood Updated: 11/01/24  0908     Hemoglobin A1C 5.50 %     Narrative:      Hemoglobin A1C Ranges:    Increased Risk for Diabetes  5.7% to 6.4%  Diabetes                     >= 6.5%  Diabetic Goal                < 7.0%    CBC (No Diff) [[id number]]  (Abnormal) Collected: 11/01/24 0836    Specimen: Blood Updated: 11/01/24 0845     WBC 18.97 10*3/mm3      RBC 4.86 10*6/mm3      Hemoglobin 15.8 g/dL      Hematocrit 47.0 %      MCV 96.7 fL      MCH 32.5 pg      MCHC 33.6 g/dL      RDW 13.6 %      RDW-SD 48.4 fl      MPV 9.9 fL      Platelets 662 10*3/mm3     POC Glucose 4x Daily Before Meals & at Bedtime [[id number]]  (Abnormal) Collected: 11/01/24 0831    Specimen: Blood Updated: 11/01/24 0836     Glucose 135 mg/dL      Comment: Serial Number: [id number]:  [id number]       Urinalysis With Microscopic If Indicated (No Culture) - Urine, Clean Catch [[id number]]  (Abnormal) Collected: 11/01/24 0351    Specimen: Urine, Clean Catch Updated: 11/01/24 0413     Color, UA Yellow     Appearance, UA Clear     pH, UA 5.5     Specific Gravity, UA >=1.030     Glucose, UA Negative     Ketones, UA Negative     Bilirubin, UA Negative     Blood, UA Negative     Protein,  mg/dL (2+)     Leuk Esterase, UA Negative     Nitrite, UA Negative     Urobilinogen, UA 1.0 E.U./dL    Urinalysis, Microscopic Only - Urine, Clean Catch [[id number]] Collected: 11/01/24 0351    Specimen: Urine, Clean Catch Updated: 11/01/24 0413     RBC, UA None Seen /HPF      WBC, UA None Seen /HPF      Bacteria, UA None Seen /HPF      Squamous Epithelial Cells, UA 0-2 /HPF      Hyaline Casts, UA 0-2 /LPF      Methodology Manual Light Microscopy    High Sensitivity Troponin T 2Hr [[id number]]  (Normal) Collected: 11/01/24 0344    Specimen: Blood Updated: 11/01/24 0407     HS Troponin T 11 ng/L      Troponin T Delta 0 ng/L     Narrative:      High Sensitive Troponin T Reference Range:  <14.0 ng/L- Negative Female for AMI  <22.0 ng/L- Negative Male for AMI  >=14 - Abnormal Female indicating  possible myocardial injury.  >=22 - Abnormal Male indicating possible myocardial injury.   Clinicians would have to utilize clinical acumen, EKG, Troponin, and serial changes to determine if it is an Acute Myocardial Infarction or myocardial injury due to an underlying chronic condition.         Lipid Panel [[id number]]  (Abnormal) Collected: 11/01/24 0344    Specimen: Blood Updated: 11/01/24 0405     Total Cholesterol 201 mg/dL      Triglycerides 108 mg/dL      HDL Cholesterol 46 mg/dL      LDL Cholesterol  135 mg/dL      VLDL Cholesterol 20 mg/dL      LDL/HDL Ratio 2.90    Narrative:      Cholesterol Reference Ranges  (U.[place] Department of Health and Human Services ATP III Classifications)    Desirable          <200 mg/dL  Borderline High    200-239 mg/dL  High Risk          >240 mg/dL      Triglyceride Reference Ranges  (U.S. Department of Health and Human Services ATP III Classifications)    Normal           <150 mg/dL  Borderline High  150-199 mg/dL  High             200-499 mg/dL  Very High        >500 mg/dL    HDL Reference Ranges  (U.S. Department of Health and Human Services ATP III Classifications)    Low     <40 mg/dl (major risk factor for CHD)  High    >60 mg/dl ('negative' risk factor for CHD)        LDL Reference Ranges  (U.S. Department of Health and Human Services ATP III Classifications)    Optimal          <100 mg/dL  Near Optimal     100-129 mg/dL  Borderline High  130-159 mg/dL  High             160-189 mg/dL  Very High        >189 mg/dL    Basic Metabolic Panel [[id number]]  (Abnormal) Collected: 11/01/24 0344    Specimen: Blood Updated: 11/01/24 0405     Glucose 185 mg/dL      BUN 15 mg/dL      Creatinine 1.23 mg/dL      Sodium 139 mmol/L      Potassium 3.5 mmol/L      Comment: Slight hemolysis detected by analyzer. Result may be falsely elevated.        Chloride 105 mmol/L      CO2 19.3 mmol/L      Calcium 8.7 mg/dL      BUN/Creatinine Ratio 12.2     Anion Gap 14.7 mmol/L      eGFR 48.0  mL/min/1.73     Narrative:      GFR Normal >60  Chronic Kidney Disease <60  Kidney Failure <15      TSH Rfx On Abnormal To Free T4 [[id number]]  (Normal) Collected: 11/01/24 0142    Specimen: Blood Updated: 11/01/24 0402     TSH 2.110 uIU/mL     Comprehensive Metabolic Panel [[id number]]  (Abnormal) Collected: 11/01/24 0142    Specimen: Blood Updated: 11/01/24 0207     Glucose 206 mg/dL      Comment: Glucose >180, Hemoglobin A1C recommended.        BUN 15 mg/dL      Creatinine 1.32 mg/dL      Sodium 139 mmol/L      Potassium 3.2 mmol/L      Comment: Slight hemolysis detected by analyzer. Result may be falsely elevated.        Chloride 103 mmol/L      CO2 22.4 mmol/L      Calcium 8.9 mg/dL      Total Protein 6.5 g/dL      Albumin 4.1 g/dL      ALT (SGPT) 15 U/L      AST (SGOT) 21 U/L      Alkaline Phosphatase 100 U/L      Total Bilirubin 0.5 mg/dL      Globulin 2.4 gm/dL      A/G Ratio 1.7 g/dL      BUN/Creatinine Ratio 11.4     Anion Gap 13.6 mmol/L      eGFR 44.1 mL/min/1.73     Narrative:      GFR Normal >60  Chronic Kidney Disease <60  Kidney Failure <15      High Sensitivity Troponin T [[id number]]  (Normal) Collected: 11/01/24 0142    Specimen: Blood Updated: 11/01/24 0207     HS Troponin T 11 ng/L     Narrative:      High Sensitive Troponin T Reference Range:  <14.0 ng/L- Negative Female for AMI  <22.0 ng/L- Negative Male for AMI  >=14 - Abnormal Female indicating possible myocardial injury.  >=22 - Abnormal Male indicating possible myocardial injury.   Clinicians would have to utilize clinical acumen, EKG, Troponin, and serial changes to determine if it is an Acute Myocardial Infarction or myocardial injury due to an underlying chronic condition.         [facility] Draw [[id number]] Collected: 11/01/24 0016    Specimen: Blood Updated: 11/01/24 0031    Narrative:      The following orders were created for panel order [facility] Draw.  Procedure                               Abnormality         Status                      ---------                               -----------         ------                     Green Top (Gel)[[id number]]                                  Final result               Lavender Top[[id number]]                                     Final result               Gold Top - SST[[id number]]                                   Final result               Light Blue Top[[id number]]                                   Final result                 Please view results for these tests on the individual orders.    Green Top (Gel) [[id number]] Collected: 11/01/24 0016    Specimen: Blood Updated: 11/01/24 0031     Extra Tube Hold for add-ons.     Comment: Auto resulted.       Lavender Top [[id number]] Collected: 11/01/24 0016    Specimen: Blood Updated: 11/01/24 0031     Extra Tube hold for add-on     Comment: Auto resulted       Gold Top - SST [[id number]] Collected: 11/01/24 0016    Specimen: Blood Updated: 11/01/24 0031     Extra Tube Hold for add-ons.     Comment: Auto resulted.       Light Blue Top [[id number]] Collected: 11/01/24 0016    Specimen: Blood Updated: 11/01/24 0031     Extra Tube Hold for add-ons.     Comment: Auto resulted       CBC & Differential [[id number]]  (Abnormal) Collected: 11/01/24 0016    Specimen: Blood Updated: 11/01/24 0030    Narrative:      The following orders were created for panel order CBC & Differential.  Procedure                               Abnormality         Status                     ---------                               -----------         ------                     CBC Auto Differential[[id number]]        Abnormal            Final result                 Please view results for these tests on the individual orders.    CBC Auto Differential [[id number]]  (Abnormal) Collected: 11/01/24 0016    Specimen: Blood Updated: 11/01/24 0030     WBC 21.40 10*3/mm3      RBC 4.74 10*6/mm3      Hemoglobin 15.4 g/dL      Hematocrit 46.8 %      MCV 98.7 fL      MCH 32.5 pg      MCHC 32.9 g/dL      RDW 13.7 %       RDW-SD 50.2 fl      MPV 10.2 fL      Platelets 817 10*3/mm3      Neutrophil % 88.6 %      Lymphocyte % 4.7 %      Monocyte % 3.3 %      Eosinophil % 1.6 %      Basophil % 0.9 %      Immature Grans % 0.9 %      Neutrophils, Absolute 18.94 10*3/mm3      Lymphocytes, Absolute 1.01 10*3/mm3      Monocytes, Absolute 0.71 10*3/mm3      Eosinophils, Absolute 0.35 10*3/mm3      Basophils, Absolute 0.20 10*3/mm3      Immature Grans, Absolute 0.19 10*3/mm3      nRBC 0.0 /100 WBC           Imaging Results (Last 48 Hours)       Procedure Component Value Units Date/Time    MRI Brain Without Contrast [[id number]] Collected: 11/01/24 0939     Updated: 11/01/24 0947    Narrative:      PROCEDURE: MRI BRAIN WO CONTRAST-     HISTORY: Stroke, follow up, dizziness and vertigo. Compare July 20, 2023.     PROCEDURE: Multiplanar MR imaging of the brain was performed in multiple  MR sequences .     FINDINGS: Brain parenchyma displays normal signal without evidence of  mass, hemorrhage or edema. There is mild small vessel ischemic disease,  age-appropriate. Limited images the proximal cord are unremarkable. The  ventricles are mildly dilated indicating mild atrophy stable from prior  exam. There is no extra-axial fluid or midline shift. Flow-voids are  appropriate. Diffusion weighted images demonstrate a small area of  mildly abnormal diffusion in the right parietal periventricular region  just superior to the basal ganglia best seen series 6 image 15. This is  confirmed on the ADC map to a moderate degree. Findings are suggestive  of a acute/subacute stroke. This correlates with a small area of mildly  increased T2 signal but has no correlate on the T1-weighted images.  Limited images of the paranasal sinuses are unremarkable.       Impression:      Small acute/subacute right periventricular CVA; right:  JULISSA Medel in the emergency room, notified at 9:40 a.m. November 1, 2024.                 This report was signed and finalized on  11/1/2024 9:45 AM by Adelina Holliday MD.       XR Chest 1 View [[id number]] Collected: 11/01/24 0911     Updated: 11/01/24 0927    Narrative:      PROCEDURE: XR CHEST 1 VW-        HISTORY: Dizziness, vertigo. Chest Pain Triage Protocol     COMPARISON: July 3, 2024.     FINDINGS: Apical lordotic view. The heart is borderline in size. The  mediastinum is unremarkable. The lungs are clear. There is no  pneumothorax. There are no acute osseous abnormalities.       Impression:      No acute cardiopulmonary process.                       Images were reviewed, interpreted, and dictated by Dr. Adelina Holliday MD  Transcribed by Milagros Pichardo PA-C.     This report was signed and finalized on 11/1/2024 9:25 AM by Adelina Holliday MD.       CT Angiogram Neck [[id number]] Collected: 11/01/24 0055     Updated: 11/01/24 0100    Narrative:      PROCEDURE: CT ANGIOGRAM NECK-     HISTORY: Dizziness and vertigo     TECHNIQUE: Thin section axial CT with IV contrast supplemented with  multiplanar reconstruction.   3 D reconstructions were performed on a  separate workstation.     NASCET criteria and technique was utilized during interpretation.     FINDINGS:     Aortic arch:  Arch shows no significant narrowing. There is a filling  defect along the anterior wall of the brachiocephalic artery measuring  up to 7 mm. This could represent mural thrombus or lobulated soft  plaque. Nevertheless this could be a embolic source. The left common and  left subclavian arteries appear widely patent.     Right carotid:  No significant stenosis is seen of the cervical common  or internal carotid artery.     Left carotid:  No significant stenosis is seen of the cervical common or  internal carotid artery.     Vertebrals: Vertebral arteries are codominant. No significant stenosis  is present.         Impression:      1. No evidence of significant cervical carotid stenosis  2. Lobular filling defect proximal brachiocephalic artery could  represent nonocclusive  mural thrombus or lobulated soft plaque.  Follow-up CTA recommended in 6-8 weeks for continued surveillance. CT  appearance that abnormality could result in more distal emboli.        This study was performed with techniques to keep radiation doses as low  as reasonably achievable (ALARA). Individualized dose reduction  techniques using automated exposure control or adjustment of vA and/or  kV according to the patient size were employed.         This report was signed and finalized on 11/1/2024 12:58 AM by Sam Thakkar MD.       CT Angiogram Head [[id number]] Collected: 11/01/24 0052     Updated: 11/01/24 0055    Narrative:      PROCEDURE: CT ANGIOGRAM HEAD-     HISTORY: Dizziness and vertigo     TECHNIQUE: Thin section axial CT with contrast with multiplanar  reconstruction.   3 D reconstructions were performed on a separate  workstation.     FINDINGS:      Internal carotid arteries: The petrous, cavernous and postcavernous ICAs  are unremarkable. No aneurysm is seen.     Anterior cerebral arteries: Central anterior cerebral arteries are  unremarkable. No aneurysm is seen.     Middle cerebral arteries: Central middle cerebral arteries are  unremarkable. No aneurysm is seen.     Basilar artery: Distal vertebral and basilar arteries are widely patent.  No aneurysm is seen.     Posterior cerebral artery: Posterior cerebral arteries are patent  centrally. No obvious aneurysm is seen.     Venous sinuses: Major venous sinuses are patent.       Impression:      No evidence of acute large vessel occlusive disease           This study was performed with techniques to keep radiation doses as low  as reasonably achievable (ALARA). Individualized dose reduction  techniques using automated exposure control or adjustment of vA and/or  kV according to the patient size were employed.      This report was signed and finalized on 11/1/2024 12:53 AM by Sam Thakkar MD.       CT CEREBRAL PERFUSION WITH & WITHOUT CONTRAST  [[id number]] Collected: 11/01/24 0051     Updated: 11/01/24 0054    Narrative:      CT cerebral perfusion, without and with contrast     HISTORY: Symptoms of CVA. Vertigo. Dizziness..     TECHNIQUE: CT brain perfusion with mapping of flow parameters including  transit time, cerebral blood flow and cerebral blood volume. IV contrast  was utilized.     FINDINGS:     Tissue with delayed blood, Tmax > 6 secs:  0 ml     Tissue with significantly reduced blood, rCBF < 30%:  0              Impression:      1.  No evidence of acute large vessel occlusion     Should symptoms persist recommend follow-up MRI     This report was signed and finalized on 11/1/2024 12:52 AM by Sam Thakkar MD.       CT Head Without Contrast Stroke Protocol [[id number]] Collected: 11/01/24 0051     Updated: 11/01/24 0053    Narrative:      PROCEDURE: CT HEAD WO CONTRAST STROKE PROTOCOL-     HISTORY: Dizziness/vertigo, symptoms of acute CVA     COMPARISON: 7/20/2023     TECHNIQUE: Noncontrast exam     FINDINGS: Mild atrophy and chronic ischemic white matter changes are  noted.     No cortical edema is present. There is no mass or hemorrhage. Ventricles  are normal.     Bone windows show no skull fracture or obvious destructive lesion.       Impression:      1. No acute intracranial abnormality or obvious mass.   2. Atrophy and chronic ischemic white matter changes as above.        This study was performed with techniques to keep radiation doses as low  as reasonably achievable (ALARA). Individualized dose reduction  techniques using automated exposure control or adjustment of vA and/or  kV according to the patient size were employed.            This report was signed and finalized on 11/1/2024 12:51 AM by Sam Thakkar MD.             Consult Notes (last 24 hours)  Notes from 11/01/24 0938 through 11/02/24 0938   No notes of this type exist for this encounter.          Physical Therapy Notes (last 24 hours)        Sondra Oropeza, PT at 11/01/24 1219   Version 1 of 1         PT evaluation held this date as pt is extremely dizzy and nauseated with recent emesis. Will plan to check back tomorrow.       Electronically signed by Sondra Oropeza PT at 11/01/24 5191

## 2024-11-02 NOTE — PLAN OF CARE
Goal Outcome Evaluation:  Plan of Care Reviewed With: patient        Progress: improving     VSS on RA. Blood pressures WNL. Heparin gtt infusing per MAR. Denies N/V this shift.

## 2024-11-02 NOTE — PLAN OF CARE
Goal Outcome Evaluation:  Plan of Care Reviewed With: patient           Outcome Evaluation: PT evaluation completed this am with pt presenting supine in bed, O x 4, c/o L anterior headache rated 3/10, on room air (O2 sat 98%), and improved dizziness at rest but still has dizziness with quick movements. Pt education throughout session for using vision to assist with dizziness and balance. Pt was able to come to sit on EOB with cues only and maintain sitting with supervision only with improved dizziness as she sat on EOB focusing her vision ahead of her. Pt came to stand with FWW and CGA and began to ambulate with CGA with a veer to the R and then a sudden loss of balance to the R requiring mod assist to regain midline. Pt stood and focused vision again and then turned and amb back to bed with min assist for a total of 12 ft. Pt was nauseated after walking and needed her emesis bag but did not vomit. Pt presents with deficits in balance, endurance, and strength. She is expected to improve her functional mobility wt continued PT services prior to d/c. Therapist did speak with pt regarding short term rehab benefits.

## 2024-11-02 NOTE — THERAPY EVALUATION
Patient Name: Martha Murray  : 1955    MRN: [id number]                              Today's Date: 2024       Admit Date: 10/31/2024    Visit Dx:     ICD-10-CM ICD-9-CM   1. Vertigo  R42 780.4   2. Uncontrolled hypertension  I10 401.9   3. Noncompliance with medication regimen  Z91.148 V15.81   4. Thrombocytosis  D75.839 238.71     Patient Active Problem List   Diagnosis    Essential hypertension    Mild intermittent asthma without complication    Type 2 diabetes mellitus without complication, without long-term current use of insulin    Hyperlipidemia LDL goal <70    Depression    Essential thrombocytosis    Onychomycosis with ingrown toenail    CKD (chronic kidney disease) stage 3, GFR 30-59 ml/min    Obesity (BMI 30.0-34.9)    Coronary artery disease involving native coronary artery of native heart with angina pectoris    S/P total knee arthroplasty, right    Arthritis of knee    Stroke     Past Medical History:   Diagnosis Date    HAMMAD (acute kidney injury) 2021    Allergic     Anemia     Anesthesia complication     Slow to wake    Arthritis     COPD (chronic obstructive pulmonary disease)     Depression     Diabetes mellitus     History of heart attack     Hyperlipidemia     Hypertension      Past Surgical History:   Procedure Laterality Date    BILATERAL BREAST REDUCTION      BONE MARROW ASPIRATION      CATARACT EXTRACTION Bilateral     COLONOSCOPY      HYSTERECTOMY  1990    x 2    REDUCTION MAMMAPLASTY Bilateral     MORE THAN 20 YEARS AGO    SINUS SURGERY  2003    TONSILLECTOMY      TOTAL KNEE ARTHROPLASTY Right 7/3/2024    Procedure: TOTAL KNEE ARTHROPLASTY WITH NAINA ROBOT RIGHT;  Surgeon: Keaton Aldrich MD;  Location: [facility];  Service: Robotics - Ortho;  Laterality: Right;      General Information       Row Name 24 1044          Physical Therapy Time and Intention    Document Type evaluation  -TW     Mode of Treatment physical therapy;individual therapy  -TW       Row Name  11/02/24 1044          General Information    Patient Profile Reviewed yes  -TW     Prior Level of Function independent:  pt did not use any assistive device for ambulation prior but due to a previous TKA she has FWW, BSC, and shower chair available if needed.  -TW     Existing Precautions/Restrictions fall;cardiac  -TW     Barriers to Rehab visual deficit;impaired sensation  -TW       Row Name 11/02/24 1044          Living Environment    People in Home alone  -TW       Row Name 11/02/24 1044          Home Main Entrance    Number of Stairs, Main Entrance one  -TW     Stair Railings, Main Entrance none  -TW       Row Name 11/02/24 1044          Stairs Within Home, Primary    Number of Stairs, Within Home, Primary none  -TW       Row Name 11/02/24 1044          Cognition    Orientation Status (Cognition) oriented x 4  -TW       Row Name 11/02/24 1044          Safety Issues/Impairments Affecting Functional Mobility    Safety Issues Affecting Function (Mobility) insight into deficits/self-awareness;safety precaution awareness;safety precautions follow-through/compliance;sequencing abilities  -TW     Impairments Affecting Function (Mobility) balance;coordination;endurance/activity tolerance;pain;strength;sensation/sensory awareness  -TW               User Key  (r) = Recorded By, (t) = Taken By, (c) = Cosigned By      Initials Name Provider Type    TW Sondra Oropeza PT Physical Therapist                   Mobility       Row Name 11/02/24 1044          Bed Mobility    Bed Mobility supine-sit;sit-supine;rolling right;rolling left  -TW     Rolling Left [place] (Bed Mobility) modified independence  -TW     Rolling Right [place] (Bed Mobility) modified independence  -TW     Supine-Sit [place] (Bed Mobility) standby assist;verbal cues  -TW     Sit-Supine [place] (Bed Mobility) standby assist;verbal cues  -TW     Assistive Device (Bed Mobility) bed rails;head of bed elevated  -TW     Comment, (Bed Mobility)  Pt education on fixing her vision on a stationary object when when performing bed mobility to help in preventing dizziness. Pt was able to demonstrate various techniques with moderate success in controling dizziness. Pt was able to sit on EOB without loss of balance for 5 min.  -       Row Name 11/02/24 1044          Bed-Chair Transfer    Bed-Chair [place] (Transfers) not tested  -TW       Row Name 11/02/24 1044          Sit-Stand Transfer    Sit-Stand [place] (Transfers) contact guard;verbal cues  -TW     Assistive Device (Sit-Stand Transfers) walker, front-wheeled  -TW     Comment, (Sit-Stand Transfer) pt continued to use visual cues while coming to stand. Pt had no increase in dizziness with static stand.  -       Row Name 11/02/24 1044          Gait/Stairs (Locomotion)    [place] Level (Gait) minimum assist (75% patient effort);moderate assist (50% patient effort)  -TW     Assistive Device (Gait) walker, front-wheeled  -TW     Patient was able to Ambulate yes  -TW     Distance in Feet (Gait) 12  -TW     Deviations/Abnormal Patterns (Gait) other (see comments)  -TW     Comment, (Gait/Stairs) Cues to focus vision on stationary object in room, Pt began amb with FWW with min assist but then began to veer R and then fall to the R needing Mod assist to regain static balance. Pt needed mod assist to turn and amb back to bed with increased nausea.  -TW               User Key  (r) = Recorded By, (t) = Taken By, (c) = Cosigned By      Initials Name Provider Type    TW Sondra Oropeza PT Physical Therapist                   Obj/Interventions       Row Name 11/02/24 1044          Range of Motion Comprehensive    Comment, General Range of Motion BLE grossly WFLS  -TW       Row Name 11/02/24 1044          Strength Comprehensive (MMT)    Comment, General Manual Muscle Testing (MMT) Assessment Pt's R knee is weaker than L knee grossly 3+/5 to 4/5. Of note pt did have a R total knee replacement this year. LLE  grossly 4 to 4+/5  -TW       Row Name 11/02/24 1044          Balance    Balance Assessment sitting static balance;sitting dynamic balance;standing static balance;standing dynamic balance  -TW     Static Sitting Balance standby assist;verbal cues  -TW     Dynamic Sitting Balance standby assist;verbal cues  -TW     Position, Sitting Balance unsupported;sitting edge of bed  -TW     Static Standing Balance contact guard;verbal cues  -TW     Dynamic Standing Balance minimal assist;moderate assist  -TW     Position/Device Used, Standing Balance supported;walker, rolling  -TW     Comment, Balance strong lean to the R in dynamic standing balance and dizziness in standing.  -TW       Row Name 11/02/24 1044          Sensory Assessment (Somatosensory)    Sensory Assessment (Somatosensory) other (see comments)  pt does have B feet diabetic neuropathy that is equal.  -TW               User Key  (r) = Recorded By, (t) = Taken By, (c) = Cosigned By      Initials Name Provider Type    TW Sondra Oropeza, PT Physical Therapist                   Goals/Plan       Row Name 11/02/24 1044          Transfer Goal 1 (PT)    Activity/Assistive Device (Transfer Goal 1, PT) sit-to-stand/stand-to-sit;bed-to-chair/chair-to-bed;toilet;walker, rolling  -TW     [place] Level/Cues Needed (Transfer Goal 1, PT) supervision required  -TW     Time Frame (Transfer Goal 1, PT) long term goal (LTG);2 weeks  -TW     Strategies/Barriers (Transfers Goal 1, PT) without loss of balance.  -TW     Progress/Outcome (Transfer Goal 1, PT) goal ongoing  -TW       Row Name 11/02/24 1044          Gait Training Goal 1 (PT)    Activity/Assistive Device (Gait Training Goal 1, PT) gait (walking locomotion);assistive device use  -TW     [place] Level (Gait Training Goal 1, PT) standby assist  -TW     Distance (Gait Training Goal 1, PT) 100 ft x 2  -TW     Time Frame (Gait Training Goal 1, PT) 2 weeks;long term goal (LTG)  -TW     Progress/Outcome (Gait Training Goal  1, PT) goal ongoing  -TW       Row Name 11/02/24 1044          Problem Specific Goal 1 (PT)    Problem Specific Goal 1 (PT) SIde stepping with CGA 5 steps to each side without device  -TW     Time Frame (Problem Specific Goal 1, PT) long-term goal (LTG);2 weeks  -TW     Progress/Outcome (Problem Specific Goal 1, PT) goal ongoing  -TW       Row Name 11/02/24 1044          Patient Education Goal (PT)    Activity (Patient Education Goal, PT) BLE ther ex 1 x 10  -TW     [place]/Cues/Accuracy (Memory Goal 2, PT) demonstrates adequately  -TW     Time Frame (Patient Education Goal, PT) long term goal (LTG);2 weeks  -TW     Progress/Outcome (Patient Education Goal, PT) goal ongoing  -TW       Row Name 11/02/24 1044          Therapy Assessment/Plan (PT)    Planned Therapy Interventions (PT) balance training;gait training;patient/family education;strengthening;transfer training  -TW               User Key  (r) = Recorded By, (t) = Taken By, (c) = Cosigned By      Initials Name Provider Type    TW Sondra Oropeza, PT Physical Therapist                   Clinical Impression       Row Name 11/02/24 1044          Pain    Pretreatment Pain Rating 3/10  -TW     Posttreatment Pain Rating 3/10  -TW     Pain Location head  -TW     Pain Side/Orientation anterior;left  -TW     Pre/Posttreatment Pain Comment Per pt no change in her HA with mobility.  -TW       Row Name 11/02/24 1044          Plan of Care Review    Plan of Care Reviewed With patient  -TW     Outcome Evaluation PT evaluation completed this am with pt presenting supine in bed, O x 4, c/o L anterior headache rated 3/10, on room air (O2 sat 98%), and improved dizziness at rest but still has dizziness with quick movements. Pt education throughout session for using vision to assist with dizziness and balance. Pt was able to come to sit on EOB with cues only and maintain sitting with supervision only with improved dizziness as she sat on EOB focusing her vision ahead of her.  Pt came to stand with FWW and CGA and began to ambulate with CGA with a veer to the R and then a sudden loss of balance to the R requiring mod assist to regain midline. Pt stood and focused vision again and then turned and amb back to bed with min assist for a total of 12 ft. Pt was nauseated after walking and needed her emesis bag but did not vomit. Pt presents with deficits in balance, endurance, and strength. She is expected to improve her functional mobility wtih continued PT services prior to d/c. Therapist did speak with pt regarding short term rehab benefits.  -TW       Row Name 11/02/24 1044          Therapy Assessment/Plan (PT)    Patient/Family Therapy Goals Statement (PT) Pt would like to move without dizziness.  -TW     Rehab Potential (PT) good  -TW     Criteria for Skilled Interventions Met (PT) yes;meets criteria  -TW     Therapy Frequency (PT) daily  -TW     Predicted Duration of Therapy Intervention (PT) 2wks  -TW       Row Name 11/02/24 1044          Vital Signs    Pre SpO2 (%) 98  -TW     O2 Delivery Pre Treatment room air  -TW     Pre Patient Position Supine  -TW     Intra Patient Position Standing  -TW     Post Patient Position Supine  -TW       Row Name 11/02/24 1044          Positioning and Restraints    Pre-Treatment Position in bed  -TW     Post Treatment Position bed  -TW     In Bed notified nsg;fowlers;side rails up x3;call light within reach;encouraged to call for assist;exit alarm on  -TW               User Key  (r) = Recorded By, (t) = Taken By, (c) = Cosigned By      Initials Name Provider Type    TW Sondra Oropeza, PT Physical Therapist                   Outcome Measures       Row Name 11/02/24 1044          How much help from another person do you currently need...    Turning from your back to your side while in flat bed without using bedrails? 3  -TW     Moving from lying on back to sitting on the side of a flat bed without bedrails? 3  -TW     Moving to and from a bed to a chair  (including a wheelchair)? 2  -TW     Standing up from a chair using your arms (e.g., wheelchair, bedside chair)? 3  -TW     Climbing 3-5 steps with a railing? 2  -TW     To walk in hospital room? 2  -TW     AM-PAC 6 Clicks Score (PT) 15  -TW     Highest Level of Mobility Goal 4 --> Transfer to chair/commode  -TW       Row Name 11/02/24 1044          Functional Assessment    Outcome Measure Options AM-PAC 6 Clicks Basic Mobility (PT)  -TW               User Key  (r) = Recorded By, (t) = Taken By, (c) = Cosigned By      Initials Name Provider Type    TW Sondra Oropeza, PT Physical Therapist                                 Physical Therapy Education       Title: PT OT SLP Therapies (In Progress)       Topic: Physical Therapy (In Progress)       Point: Mobility training (Not Started)       Learner Progress:  Not documented in this visit.              Point: Home exercise program (Not Started)       Learner Progress:  Not documented in this visit.              Point: Body mechanics (Done)       Learning Progress Summary            Patient Acceptance, E,D, VU,DU by TW at 11/2/2024 1044    Comment: Pt education for using vision to assist with balance and improving dizziness with mobility.                      Point: Precautions (Not Started)       Learner Progress:  Not documented in this visit.                              User Key       Initials Effective Dates Name Provider Type Discipline    TW 06/16/21 -  Sondra Oropeza PT Physical Therapist PT                  PT Recommendation and Plan  Planned Therapy Interventions (PT): balance training, gait training, patient/family education, strengthening, transfer training  Outcome Evaluation: PT evaluation completed this am with pt presenting supine in bed, O x 4, c/o L anterior headache rated 3/10, on room air (O2 sat 98%), and improved dizziness at rest but still has dizziness with quick movements. Pt education throughout session for using vision to assist with dizziness and  balance. Pt was able to come to sit on EOB with cues only and maintain sitting with supervision only with improved dizziness as she sat on EOB focusing her vision ahead of her. Pt came to stand with FWW and CGA and began to ambulate with CGA with a veer to the R and then a sudden loss of balance to the R requiring mod assist to regain midline. Pt stood and focused vision again and then turned and amb back to bed with min assist for a total of 12 ft. Pt was nauseated after walking and needed her emesis bag but did not vomit. Pt presents with deficits in balance, endurance, and strength. She is expected to improve her functional mobility wtih continued PT services prior to d/c. Therapist did speak with pt regarding short term rehab benefits.     Time Calculation:   PT Evaluation Complexity  History, PT Evaluation Complexity: 3 or more personal factors and/or comorbidities  Examination of Body Systems (PT Eval Complexity): total of 3 or more elements  Clinical Presentation (PT Evaluation Complexity): unstable  Clinical Decision Making (PT Evaluation Complexity): moderate complexity  Overall Complexity (PT Evaluation Complexity): moderate complexity     PT Charges       Row Name 11/02/24 1044             Time Calculation    Stop Time 1044  -TW      PT Received On 11/02/24 -TW      PT Goal Re-Cert Due Date 11/12/24 -TW                User Key  (r) = Recorded By, (t) = Taken By, (c) = Cosigned By      Initials Name Provider Type    Sondra Ruiz PT Physical Therapist                  Therapy Charges for Today       Code Description Service Date Service Provider Modifiers Qty    [id number] HC PT EVAL MOD COMPLEXITY 4 11/2/2024 Sondra Oropeza PT GP 1            PT G-Codes  Outcome Measure Options: AM-PAC 6 Clicks Basic Mobility (PT)  AM-PAC 6 Clicks Score (PT): 15       Sondra Oropeza PT  11/2/2024

## 2024-11-03 ENCOUNTER — APPOINTMENT (OUTPATIENT)
Dept: CT IMAGING | Facility: HOSPITAL | Age: 69
End: 2024-11-03
Payer: MEDICARE

## 2024-11-03 LAB
C DIFF GDH + TOXINS A+B STL QL IA.RAPID: POSITIVE
C DIFF GDH + TOXINS A+B STL QL IA.RAPID: POSITIVE
GLUCOSE BLDC GLUCOMTR-MCNC: 102 MG/DL (ref 70–130)
GLUCOSE BLDC GLUCOMTR-MCNC: 117 MG/DL (ref 70–130)
GLUCOSE BLDC GLUCOMTR-MCNC: 124 MG/DL (ref 70–130)
GLUCOSE BLDC GLUCOMTR-MCNC: 140 MG/DL (ref 70–130)

## 2024-11-03 PROCEDURE — 99233 SBSQ HOSP IP/OBS HIGH 50: CPT | Performed by: STUDENT IN AN ORGANIZED HEALTH CARE EDUCATION/TRAINING PROGRAM

## 2024-11-03 PROCEDURE — 82948 REAGENT STRIP/BLOOD GLUCOSE: CPT

## 2024-11-03 PROCEDURE — 71275 CT ANGIOGRAPHY CHEST: CPT

## 2024-11-03 PROCEDURE — 25510000001 IOPAMIDOL 61 % SOLUTION: Performed by: INTERNAL MEDICINE

## 2024-11-03 PROCEDURE — 99232 SBSQ HOSP IP/OBS MODERATE 35: CPT | Performed by: INTERNAL MEDICINE

## 2024-11-03 PROCEDURE — 87449 NOS EACH ORGANISM AG IA: CPT | Performed by: INTERNAL MEDICINE

## 2024-11-03 PROCEDURE — 87324 CLOSTRIDIUM AG IA: CPT | Performed by: INTERNAL MEDICINE

## 2024-11-03 RX ORDER — CARVEDILOL 12.5 MG/1
12.5 TABLET ORAL EVERY 12 HOURS SCHEDULED
Status: DISCONTINUED | OUTPATIENT
Start: 2024-11-03 | End: 2024-11-05 | Stop reason: HOSPADM

## 2024-11-03 RX ORDER — VANCOMYCIN HYDROCHLORIDE 125 MG/1
125 CAPSULE ORAL EVERY 6 HOURS SCHEDULED
Status: DISCONTINUED | OUTPATIENT
Start: 2024-11-03 | End: 2024-11-05 | Stop reason: HOSPADM

## 2024-11-03 RX ORDER — IOPAMIDOL 612 MG/ML
100 INJECTION, SOLUTION INTRAVASCULAR
Status: COMPLETED | OUTPATIENT
Start: 2024-11-03 | End: 2024-11-03

## 2024-11-03 RX ORDER — CLOPIDOGREL BISULFATE 75 MG/1
75 TABLET ORAL DAILY
Status: DISCONTINUED | OUTPATIENT
Start: 2024-11-03 | End: 2024-11-05 | Stop reason: HOSPADM

## 2024-11-03 RX ADMIN — IOPAMIDOL 100 ML: 612 INJECTION, SOLUTION INTRAVENOUS at 11:54

## 2024-11-03 RX ADMIN — CARVEDILOL 12.5 MG: 12.5 TABLET, FILM COATED ORAL at 22:23

## 2024-11-03 RX ADMIN — CARVEDILOL 12.5 MG: 12.5 TABLET, FILM COATED ORAL at 12:04

## 2024-11-03 RX ADMIN — MECLIZINE HYDROCHLORIDE 12.5 MG: 25 TABLET ORAL at 22:24

## 2024-11-03 RX ADMIN — ATORVASTATIN CALCIUM 80 MG: 80 TABLET, FILM COATED ORAL at 22:24

## 2024-11-03 RX ADMIN — MECLIZINE HYDROCHLORIDE 12.5 MG: 25 TABLET ORAL at 13:37

## 2024-11-03 RX ADMIN — ACETAMINOPHEN 650 MG: 325 TABLET, FILM COATED ORAL at 16:39

## 2024-11-03 RX ADMIN — HYDROXYUREA 500 MG: 500 CAPSULE ORAL at 08:34

## 2024-11-03 RX ADMIN — VANCOMYCIN HYDROCHLORIDE 125 MG: 125 CAPSULE ORAL at 16:30

## 2024-11-03 RX ADMIN — Medication 10 ML: at 22:25

## 2024-11-03 RX ADMIN — CLOPIDOGREL BISULFATE 75 MG: 75 TABLET ORAL at 12:04

## 2024-11-03 RX ADMIN — AMLODIPINE BESYLATE 10 MG: 5 TABLET ORAL at 08:34

## 2024-11-03 RX ADMIN — ASPIRIN 325 MG: 325 TABLET, COATED ORAL at 08:34

## 2024-11-03 RX ADMIN — LISINOPRIL 40 MG: 20 TABLET ORAL at 08:34

## 2024-11-03 RX ADMIN — Medication 10 ML: at 08:35

## 2024-11-03 RX ADMIN — MECLIZINE HYDROCHLORIDE 12.5 MG: 25 TABLET ORAL at 05:53

## 2024-11-03 NOTE — NURSING NOTE
BP better controlled. NIH 0. Reports improvement with dizziness with movement. Still with right sided lean. Up to BSC today with one person assistance with frequent diarrhea present- C-DIFF positive, PO vanc started. Headache controlled with Tylenol. POC to STR ongoing.

## 2024-11-03 NOTE — PLAN OF CARE
Goal Outcome Evaluation:  Plan of Care Reviewed With: patient        Progress: improving  Outcome Evaluation: BP better controlled. NIH 0. Reports improvement with dizziness with movement. Still with right sided lean. Up to BSC today with one person assistance with frequent diarrhea present- C-DIFF positive, PO vanc started. Headache controlled with Tylenol. POC to STR ongoing

## 2024-11-03 NOTE — PROGRESS NOTES
[facility]    Name:  Martha Murray   Age:  69 y.o.  Sex:  female  :  1955  MRN:  [id number]   Visit Number:  [id number]  Admission Date:  10/31/2024  Date Of Service:  24  Primary Care Physician:  Jenny Carvajal APRN     LOS: 1 day :    Chief Complaint:      Dizziness    Subjective:    Patient has been seen today on November 3.  She is feeling slightly better with her dizziness.  But she is not able to yet walk on her own and does need assistance.  She will benefit from inpatient rehab and agrees to go based on recommendations.  Also due to the echocardiogram which showed aortic abnormality and dilatation up to 3.8 cm recommended CTA which has been ordered today    Hospital Course:    Patient is an obese 69-year-old female with history significant for CKD, type 2 diabetes, hypertension, hyperlipidemia, essential thrombocythemia who presents to the emergency room with complaints of dizziness.  Patient's symptoms occurred abruptly approximately 2 hours prior to presentation.  States that she is having difficulty ambulating due to feeling of the room spinning around her.  No extremity weakness, facial slurring, difficulty speaking.  Also denied headache, fever, vision changes, chest pain, shortness of breath.  En route, EMS reported patient blood pressure 210/120.  They provided sublingual nitroglycerin with slight improvement.  The patient admits that she stopped taking her blood pressure medicine 2 to 3 days ago.  Denies smoking, alcohol use, illicit drug use.     ED summary: Patient afebrile, hypertensive 198/79 and nonhypoxic on room air.  Troponins negative x 2.  Potassium 3.2 and creatinine 1.32 which appears baseline.  Glucose 206.  TSH appropriate.  WBC 21 and platelets 817.  UA negative for infection, protein positive.  CT head without contrast showed no acute abnormality.  CTA head showed no LVO.  CTA neck showed no evidence of  significant cervical carotid stenosis. Lobular filling defect proximal brachiocephalic artery could represent nonocclusive mural thrombus or lobulated soft plaque. Follow-up CTA recommended in 6-8 weeks for continued surveillance.  CT cerebral perfusion showed no evidence of large vessel occlusion.  Teleneurology consulted, recommended admission for further stroke workup.  MRI noted small acute/subacute right periventricular CVA.  Patient initiated on heparin infusion x 24 hours due to filling defect in proximal brachiocephalic artery.  Continued on aspirin.  Norvasc added for blood pressure control.  Echo noted EF 61% with indeterminate left ventricular diastolic function negative saline test mild dilation of aorta at 3.8.    Review of Systems:     All systems were reviewed and negative except as mentioned in subjective, assessment and plan.    Vital Signs:    Temp:  [98 °F (36.7 °C)-98.8 °F (37.1 °C)] 98.4 °F (36.9 °C)  Heart Rate:  [] 106  Resp:  [18] 18  BP: (157-182)/(57-91) 182/91    Intake and output:    I/O last 3 completed shifts:  In: 757.8 [P.O.:580; I.V.:177.8]  Out: 650 [Urine:650]  No intake/output data recorded.    Physical Examination:    General Appearance:  Alert and cooperative.  Chronically ill middle-aged female.   Head:  Atraumatic and normocephalic.   Eyes: Conjunctivae and sclerae normal, no icterus. No pallor.   Throat: No oral lesions, no thrush, oral mucosa moist.   Neck: Supple, trachea midline, no thyromegaly.   Lungs:   Breath sounds heard bilaterally equally.  No wheezing or crackles. No Pleural rub or bronchial breathing.  On room air unlabored.   Heart:  Normal S1 and S2, no murmur, no gallop, no rub. No JVD.   Abdomen:   Normal bowel sounds, no masses, no organomegaly. Soft, nontender, nondistended, no rebound tenderness.   Extremities: Supple, no edema, no cyanosis, no clubbing.   Skin: No bleeding or rash.   Neurologic: Alert and oriented x 3. No facial asymmetry. Moves all  "four limbs. No tremors.  No nystagmus noted.     Laboratory results:    Results from last 7 days   Lab Units 11/02/24  0536 11/01/24  0344 11/01/24  0142   SODIUM mmol/L 142 139 139   POTASSIUM mmol/L 4.2 3.5 3.2*   CHLORIDE mmol/L 104 105 103   CO2 mmol/L 24.6 19.3* 22.4   BUN mg/dL 20 15 15   CREATININE mg/dL 1.40* 1.23* 1.32*   CALCIUM mg/dL 9.2 8.7 8.9   BILIRUBIN mg/dL  --   --  0.5   ALK PHOS U/L  --   --  100   ALT (SGPT) U/L  --   --  15   AST (SGOT) U/L  --   --  21   GLUCOSE mg/dL 129* 185* 206*     Results from last 7 days   Lab Units 11/02/24  0535 11/01/24  0836 11/01/24  0016   WBC 10*3/mm3 15.79* 18.97* 21.40*   HEMOGLOBIN g/dL 14.5 15.8 15.4   HEMATOCRIT % 44.9 47.0* 46.8*   PLATELETS 10*3/mm3 506* 662* 817*     Results from last 7 days   Lab Units 11/01/24  1104   INR  1.14*     Results from last 7 days   Lab Units 11/01/24  0344 11/01/24  0142   HSTROP T ng/L 11 11         No results for input(s): \"PHART\", \"NGY4PEL\", \"PO2ART\", \"YLG7ZGW\", \"BASEEXCESS\" in the last 8760 hours.   I have reviewed the patient's laboratory results.    Radiology results:    No radiology results from the last 24 hrs  I have reviewed the patient's radiology reports.    Medication Review:     I have reviewed the patient's active and prn medications.     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  Htn still not well controlled.  Will add Coreg along with the other 2 home medication Norvasc " and lisinopril  Neurochecks per protocol  PT/OT/SLP  Meclizine is helping with her dizziness  Due to abnormal echocardiogram showing aortic dilatation to 3.8 cm CTA has been ordered today  Leukocytosis  Unclear cause of leukocytosis.  No infectious source identified.  Downtrending.  Could be secondary to hemoconcentration  Repeat labs to be done tomorrow and she does not need any antibiotic as no obvious signs of infection  Patient does have essential thrombocythemia for which she is on hydroxyurea  Hypokalemia  This has resolved  Type 2 diabetes  A1c 5.5  Sliding scale for correction  Diabetic education    I have reviewed the copied text and it is accurate as of 11/3/2024        Code Status: Full  Diet: As tolerated  Discharge Plan: Likely going for inpatient rehab and social service consult    Luis Enrique Yuan MD  11/03/24  11:22 EST    Dictated utilizing Dragon dictation.

## 2024-11-03 NOTE — PROGRESS NOTES
Stroke Progress Note       Chief Complaint:  left sided weakness     Subjective    Subjective     Subjective:  No acute events overnight   Ataxia on the right side persistent.     Objective      Temp:  [98 °F (36.7 °C)-98.8 °F (37.1 °C)] 98.4 °F (36.9 °C)  Heart Rate:  [] 106  Resp:  [18] 18  BP: (157-182)/(57-91) 182/91    NEURO( Exam is performed with help of hospital staff at bed side and observed by me via audio-video interface)    MENTAL STATUS: AAOx3, memory intact, fund of knowledge appropriate    LANG/SPEECH: Naming and repetition intact, fluent, follows 3-step commands    CRANIAL NERVES:    Pupils equal and reactive, EOMI intact, no gaze deviation, no nystagmus  No facial droop, cough and gag intact, shoulder shrug intact, tongue midline     MOTOR:  Moves all extremities equally    SENSORY: Normal to light touch all throughout     COORDINATION: Normal finger to nose and heel to shin, no tremor, no dysmetria    STATION: Not assessed due to patient condition    GAIT: Not assessed due to patient condition     Results Review:    I reviewed the patient's new clinical results.    Lab Results (last 24 hours)       Procedure Component Value Units Date/Time    POC Glucose Once [[id number]]  (Abnormal) Collected: 11/03/24 0825    Specimen: Blood Updated: 11/03/24 0845     Glucose 140 mg/dL      Comment: Serial Number: [id number]:  [id number]       POC Glucose Once [[id number]]  (Normal) Collected: 11/02/24 2015    Specimen: Blood Updated: 11/02/24 2018     Glucose 127 mg/dL      Comment: Serial Number: [id number]:  [id number]       POC Glucose Once [[id number]]  (Normal) Collected: 11/02/24 1652    Specimen: Blood Updated: 11/02/24 1653     Glucose 101 mg/dL      Comment: Serial Number: [id number]:  [id number]       POC Glucose 4x Daily Before Meals & at Bedtime [[id number]]  (Normal) Collected: 11/02/24 1126    Specimen: Blood Updated: 11/02/24 1130     Glucose 90 mg/dL      Comment: Serial Number:  [id number]:  [id number]             No radiology results for the last day  Results for orders placed during the hospital encounter of 10/31/24    Adult Transthoracic Echo Complete W/ Cont if Necessary Per Protocol (With Agitated Saline)    Interpretation Summary    Left ventricular systolic function is normal. Calculated left ventricular EF = 68.3% Left ventricular ejection fraction appears to be 61 - 65%.    Left ventricular wall thickness is consistent with mild concentric hypertrophy. Left ventricular diastolic function was indeterminate.    Normal right ventricular size and function.    The left atrial cavity is mildly dilated.    Mild aortic valve regurgitation is present.    Saline test results are negative for right to left atrial level shunt.    Mild dilation of the proximal aorta is present. Ascending aorta = 3.8 cm.  Recommend dedicated chest CTA for further characterization of aortic pathology.            Assessment/Plan     Assessment/Plan:    Acute Lacunar Infarct: Located in the right basal ganglia, likely due to small vessel disease.    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    Blood Pressure Management: Target systolic blood pressure between 120-160 mmHg.    Transthoracic Echocardiogram (TTE): Mild dilation of the proximal aorta noted; ascending aorta measures 3.8 cm. Recommend a dedicated chest CTA for further evaluation of aortic pathology( this was not done yesterday, I discussed with Dr. Yuan today and they would order that today. Discontinued heparin already. if no mobile thrombus is observed in aorta. Continue dual antiplatelet therapy (DAPT) for 21 days, followed by Plavix 75 mg daily.    Hyperlipidemia Management: LDL level at 135; recommend initiating high-intensity statin therapy with Lipitor 80 mg.      Stroke clinic follow up in [facility].     This was an audio and video enabled  telemedicine encounter.       Daryn Rodriguez MD  11/03/24  10:08 EST

## 2024-11-03 NOTE — PAYOR COMM NOTE
"TO:BC  FROM:VINOD ALVAREZ,RN PHONE [phone]  UPDATED CLINICALS    Martha Benitez (69 y.o. Female)       Date of Birth   1955    Social Security Number       Address   [address] [place] [address]    Home Phone   [phone]    MRN   [id number]       Scientology   [employer]    Marital Status   Single                            Admission Date   10/31/24    Admission Type   Emergency    Admitting Provider   Kevin Shannon DO    Attending Provider   Luis Enrique Yuan MD    Department, Room/Bed   [facility] TELEMETRY 3, 318/1       Discharge Date       Discharge Disposition       Discharge Destination                                 Attending Provider: Luis Enrique Yuan MD    Allergies: Sulfa Antibiotics    Isolation: Spore   Infection: C.difficile (rule out) (11/03/24)   Code Status: CPR    Ht: 154.9 cm (61\")   Wt: 74.3 kg (163 lb 12.8 oz)    Admission Cmt: None   Principal Problem: Stroke [I63.9]                   Active Insurance as of 10/31/2024       Primary Coverage       Payor Plan Insurance Group Employer/Plan Group    ANTHEM MEDICARE REPLACEMENT ANTHEM MEDICARE ADVANTAGE [id number]       Payor Plan Address Payor Plan Phone Number Payor Plan Fax Number Effective Dates    [address] [phone]  1/1/2024 - None Entered    [facility] [id number]         Subscriber Name Subscriber Birth Date Member ID       MARTHA BENITEZ 1955 [id number]                     Emergency Contacts        (Rel.) Home Phone Work Phone Mobile Phone    Jose Sykes (Son) [phone] -- [phone]              Vital Signs (last day)       Date/Time Temp Temp src Pulse Resp BP Patient Position SpO2    11/03/24 1204 -- -- 86 -- 153/67 -- --    11/03/24 0834 -- -- 106 -- -- -- --    11/03/24 0700 98.4 (36.9) Oral -- 18 182/91 Sitting --    11/03/24 0429 98 (36.7) Oral 78 18 177/68 Lying 93    11/02/24 2331 98.8 (37.1) Oral 76 18 162/57 Lying 96    11/02/24 1930 98.6 " (37) Oral 91 18 157/81 Lying 97    11/02/24 1626 98.5 (36.9) Oral 82 18 167/68 Lying 97    11/02/24 1100 98.1 (36.7) Oral 65 18 174/71 Lying 99    11/02/24 0716 98.5 (36.9) Oral 74 18 153/59 Lying 96    11/02/24 0301 98.4 (36.9) Oral 72 18 110/62 Lying 94          Current Facility-Administered Medications   Medication Dose Route Frequency Provider Last Rate Last Admin    acetaminophen (TYLENOL) tablet 650 mg  650 mg Oral Q4H PRN Kevin Shannon DO   650 mg at 11/02/24 2122    Or    acetaminophen (TYLENOL) suppository 650 mg  650 mg Rectal Q4H PRN Kevin Shannon DO        amLODIPine (NORVASC) tablet 10 mg  10 mg Oral Q24H Jen Batres APRN   10 mg at 11/03/24 0834    aspirin EC tablet 325 mg  325 mg Oral Daily Luis Enrique Yuan MD   325 mg at 11/03/24 0834    atorvastatin (LIPITOR) tablet 80 mg  80 mg Oral Nightly Kevin Shannon DO   80 mg at 11/02/24 2122    carvedilol (COREG) tablet 12.5 mg  12.5 mg Oral Q12H Luis Enrique Yuan MD   12.5 mg at 11/03/24 1204    clopidogrel (PLAVIX) tablet 75 mg  75 mg Oral Daily Daryn Rodriguez MD   75 mg at 11/03/24 1204    dextrose (D50W) (25 g/50 mL) IV injection 25 g  25 g Intravenous Q15 Min PRN Kevin Shannon DO        dextrose (GLUTOSE) oral gel 15 g  15 g Oral Q15 Min PRN Kevin Shannon DO        glucagon (GLUCAGEN) injection 1 mg  1 mg Intramuscular Q15 Min PRN Kevin Shannon DO        hydrALAZINE (APRESOLINE) injection 10 mg  10 mg Intravenous Q6H PRN Kevin Shannon DO   10 mg at 11/01/24 0345    hydroxyurea (HYDREA) capsule 500 mg  500 mg Oral Daily Kevin Shannon DO   500 mg at 11/03/24 0834    Insulin Lispro (humaLOG) injection 2-7 Units  2-7 Units Subcutaneous 4x Daily AC & at Bedtime Kevin Shannon DO   2 Units at 11/01/24 2022    lisinopril (PRINIVIL,ZESTRIL) tablet 40 mg  40 mg Oral Daily Kevin Shannon DO   40 mg at 11/03/24 0834    meclizine (ANTIVERT) tablet 12.5 mg  12.5 mg Oral Q8H Luis Enrique Yuan MD   12.5 mg at 11/03/24 0598     prochlorperazine (COMPAZINE) injection 5 mg  5 mg Intravenous Q6H PRN Medardo, Jen J, APRN   5 mg at 11/01/24 0925    Or    prochlorperazine (COMPAZINE) tablet 5 mg  5 mg Oral Q6H PRN Medardo, Jen J, APRN        Or    prochlorperazine (COMPAZINE) suppository 25 mg  25 mg Rectal Q12H PRN Medardo, Jen J, APRN        scopolamine patch 1 mg/72 hr  1 patch Transdermal Q72H Medardo, Jen J, APRN   1 patch at 11/01/24 0823    sodium chloride 0.9 % flush 10 mL  10 mL Intravenous PRN Robbi Ramos MD        sodium chloride 0.9 % flush 10 mL  10 mL Intravenous Q12H Kevin Shannon, DO   10 mL at 11/03/24 0835    sodium chloride 0.9 % flush 10 mL  10 mL Intravenous PRN Kevin Shannon,         sodium chloride 0.9 % infusion 40 mL  40 mL Intravenous PRN Kevin Shannon,         traZODone (DESYREL) tablet 25 mg  25 mg Oral Nightly PRN Reynaldo Sánchez Jr., MD   25 mg at 11/02/24 2143     Lab Results (last 24 hours)       Procedure Component Value Units Date/Time    POC Glucose Once [[id number]]  (Normal) Collected: 11/03/24 1203    Specimen: Blood Updated: 11/03/24 1212     Glucose 102 mg/dL      Comment: Serial Number: [id number]:  [id number]       POC Glucose Once [[id number]]  (Abnormal) Collected: 11/03/24 0825    Specimen: Blood Updated: 11/03/24 0845     Glucose 140 mg/dL      Comment: Serial Number: [id number]:  [id number]       POC Glucose Once [[id number]]  (Normal) Collected: 11/02/24 2015    Specimen: Blood Updated: 11/02/24 2018     Glucose 127 mg/dL      Comment: Serial Number: [id number]:  [id number]       POC Glucose Once [[id number]]  (Normal) Collected: 11/02/24 1652    Specimen: Blood Updated: 11/02/24 1653     Glucose 101 mg/dL      Comment: Serial Number: [id number]:  [id number]             Imaging Results (Last 24 Hours)       Procedure Component Value Units Date/Time    CT Angiogram Chest [[id number]] Resulted: 11/03/24 1148     Updated: 11/03/24 1154             Physician  Progress Notes (last 48 hours)        Luis Enrique Yuan MD at 24 1122              [facility]    PROGRESS NOTE    Name:  Martha Murray   Age:  69 y.o.  Sex:  female  :  1955  MRN:  [id number]   Visit Number:  [id number]  Admission Date:  10/31/2024  Date Of Service:  24  Primary Care Physician:  Jenny Carvajal APRN     LOS: 1 day :    Chief Complaint:      Dizziness    Subjective:    Patient has been seen today on November 3.  She is feeling slightly better with her dizziness.  But she is not able to yet walk on her own and does need assistance.  She will benefit from inpatient rehab and agrees to go based on recommendations.  Also due to the echocardiogram which showed aortic abnormality and dilatation up to 3.8 cm recommended CTA which has been ordered today    Hospital Course:    Patient is an obese 69-year-old female with history significant for CKD, type 2 diabetes, hypertension, hyperlipidemia, essential thrombocythemia who presents to the emergency room with complaints of dizziness.  Patient's symptoms occurred abruptly approximately 2 hours prior to presentation.  States that she is having difficulty ambulating due to feeling of the room spinning around her.  No extremity weakness, facial slurring, difficulty speaking.  Also denied headache, fever, vision changes, chest pain, shortness of breath.  En route, EMS reported patient blood pressure 210/120.  They provided sublingual nitroglycerin with slight improvement.  The patient admits that she stopped taking her blood pressure medicine 2 to 3 days ago.  Denies smoking, alcohol use, illicit drug use.     ED summary: Patient afebrile, hypertensive 198/79 and nonhypoxic on room air.  Troponins negative x 2.  Potassium 3.2 and creatinine 1.32 which appears baseline.  Glucose 206.  TSH appropriate.  WBC 21 and platelets 817.  UA negative for infection, protein positive.  CT head without contrast showed  no acute abnormality.  CTA head showed no LVO.  CTA neck showed no evidence of significant cervical carotid stenosis. Lobular filling defect proximal brachiocephalic artery could represent nonocclusive mural thrombus or lobulated soft plaque. Follow-up CTA recommended in 6-8 weeks for continued surveillance.  CT cerebral perfusion showed no evidence of large vessel occlusion.  Teleneurology consulted, recommended admission for further stroke workup.  MRI noted small acute/subacute right periventricular CVA.  Patient initiated on heparin infusion x 24 hours due to filling defect in proximal brachiocephalic artery.  Continued on aspirin.  Norvasc added for blood pressure control.  Echo noted EF 61% with indeterminate left ventricular diastolic function negative saline test mild dilation of aorta at 3.8.    Review of Systems:     All systems were reviewed and negative except as mentioned in subjective, assessment and plan.    Vital Signs:    Temp:  [98 °F (36.7 °C)-98.8 °F (37.1 °C)] 98.4 °F (36.9 °C)  Heart Rate:  [] 106  Resp:  [18] 18  BP: (157-182)/(57-91) 182/91    Intake and output:    I/O last 3 completed shifts:  In: 757.8 [P.O.:580; I.V.:177.8]  Out: 650 [Urine:650]  No intake/output data recorded.    Physical Examination:    General Appearance:  Alert and cooperative.  Chronically ill middle-aged female.   Head:  Atraumatic and normocephalic.   Eyes: Conjunctivae and sclerae normal, no icterus. No pallor.   Throat: No oral lesions, no thrush, oral mucosa moist.   Neck: Supple, trachea midline, no thyromegaly.   Lungs:   Breath sounds heard bilaterally equally.  No wheezing or crackles. No Pleural rub or bronchial breathing.  On room air unlabored.   Heart:  Normal S1 and S2, no murmur, no gallop, no rub. No JVD.   Abdomen:   Normal bowel sounds, no masses, no organomegaly. Soft, nontender, nondistended, no rebound tenderness.   Extremities: Supple, no edema, no cyanosis, no clubbing.   Skin: No bleeding  "or rash.   Neurologic: Alert and oriented x 3. No facial asymmetry. Moves all four limbs. No tremors.  No nystagmus noted.     Laboratory results:    Results from last 7 days   Lab Units 11/02/24  0536 11/01/24  0344 11/01/24  0142   SODIUM mmol/L 142 139 139   POTASSIUM mmol/L 4.2 3.5 3.2*   CHLORIDE mmol/L 104 105 103   CO2 mmol/L 24.6 19.3* 22.4   BUN mg/dL 20 15 15   CREATININE mg/dL 1.40* 1.23* 1.32*   CALCIUM mg/dL 9.2 8.7 8.9   BILIRUBIN mg/dL  --   --  0.5   ALK PHOS U/L  --   --  100   ALT (SGPT) U/L  --   --  15   AST (SGOT) U/L  --   --  21   GLUCOSE mg/dL 129* 185* 206*     Results from last 7 days   Lab Units 11/02/24  0535 11/01/24  0836 11/01/24  0016   WBC 10*3/mm3 15.79* 18.97* 21.40*   HEMOGLOBIN g/dL 14.5 15.8 15.4   HEMATOCRIT % 44.9 47.0* 46.8*   PLATELETS 10*3/mm3 506* 662* 817*     Results from last 7 days   Lab Units 11/01/24  1104   INR  1.14*     Results from last 7 days   Lab Units 11/01/24  0344 11/01/24  0142   HSTROP T ng/L 11 11         No results for input(s): \"PHART\", \"NWS2YII\", \"PO2ART\", \"DQV3PFD\", \"BASEEXCESS\" in the last 8760 hours.   I have reviewed the patient's laboratory results.    Radiology results:    No radiology results from the last 24 hrs  I have reviewed the patient's radiology reports.    Medication Review:     I have reviewed the patient's active and prn medications.     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  Htn still not " well controlled.  Will add Coreg along with the other 2 home medication Norvasc and lisinopril  Neurochecks per protocol  PT/OT/SLP  Meclizine is helping with her dizziness  Due to abnormal echocardiogram showing aortic dilatation to 3.8 cm CTA has been ordered today  Leukocytosis  Unclear cause of leukocytosis.  No infectious source identified.  Downtrending.  Could be secondary to hemoconcentration  Repeat labs to be done tomorrow and she does not need any antibiotic as no obvious signs of infection  Patient does have essential thrombocythemia for which she is on hydroxyurea  Hypokalemia  This has resolved  Type 2 diabetes  A1c 5.5  Sliding scale for correction  Diabetic education    I have reviewed the copied text and it is accurate as of 11/3/2024        Code Status: Full  Diet: As tolerated  Discharge Plan: Likely going for inpatient rehab and social service consult    Luis Enrique Yuan MD  11/03/24  11:22 EST    Dictated utilizing Dragon dictation.      Electronically signed by Luis Enrique Yuan MD at 11/03/24 1133       Daryn Rodriguez MD at 11/03/24 1008          Stroke Progress Note       Chief Complaint:  left sided weakness     Subjective    Subjective     Subjective:  No acute events overnight   Ataxia on the right side persistent.     Objective      Temp:  [98 °F (36.7 °C)-98.8 °F (37.1 °C)] 98.4 °F (36.9 °C)  Heart Rate:  [] 106  Resp:  [18] 18  BP: (157-182)/(57-91) 182/91    NEURO( Exam is performed with help of hospital staff at bed side and observed by me via audio-video interface)    MENTAL STATUS: AAOx3, memory intact, fund of knowledge appropriate    LANG/SPEECH: Naming and repetition intact, fluent, follows 3-step commands    CRANIAL NERVES:    Pupils equal and reactive, EOMI intact, no gaze deviation, no nystagmus  No facial droop, cough and gag intact, shoulder shrug intact, tongue midline     MOTOR:  Moves all extremities equally    SENSORY: Normal to light touch all  throughout     COORDINATION: Normal finger to nose and heel to shin, no tremor, no dysmetria    STATION: Not assessed due to patient condition    GAIT: Not assessed due to patient condition     Results Review:    I reviewed the patient's new clinical results.    Lab Results (last 24 hours)       Procedure Component Value Units Date/Time    POC Glucose Once [[id number]]  (Abnormal) Collected: 11/03/24 0825    Specimen: Blood Updated: 11/03/24 0845     Glucose 140 mg/dL      Comment: Serial Number: [id number]:  [id number]       POC Glucose Once [[id number]]  (Normal) Collected: 11/02/24 2015    Specimen: Blood Updated: 11/02/24 2018     Glucose 127 mg/dL      Comment: Serial Number: [id number]:  [id number]       POC Glucose Once [[id number]]  (Normal) Collected: 11/02/24 1652    Specimen: Blood Updated: 11/02/24 1653     Glucose 101 mg/dL      Comment: Serial Number: [id number]:  [id number]       POC Glucose 4x Daily Before Meals & at Bedtime [[id number]]  (Normal) Collected: 11/02/24 1126    Specimen: Blood Updated: 11/02/24 1130     Glucose 90 mg/dL      Comment: Serial Number: [id number]:  [id number]             No radiology results for the last day  Results for orders placed during the hospital encounter of 10/31/24    Adult Transthoracic Echo Complete W/ Cont if Necessary Per Protocol (With Agitated Saline)    Interpretation Summary    Left ventricular systolic function is normal. Calculated left ventricular EF = 68.3% Left ventricular ejection fraction appears to be 61 - 65%.    Left ventricular wall thickness is consistent with mild concentric hypertrophy. Left ventricular diastolic function was indeterminate.    Normal right ventricular size and function.    The left atrial cavity is mildly dilated.    Mild aortic valve regurgitation is present.    Saline test results are negative for right to left atrial level shunt.    Mild dilation of the proximal aorta is present. Ascending aorta = 3.8 cm.   Recommend dedicated chest CTA for further characterization of aortic pathology.           Assessment/Plan     Assessment/Plan:    Acute Lacunar Infarct: Located in the right basal ganglia, likely due to small vessel disease.    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    Blood Pressure Management: Target systolic blood pressure between 120-160 mmHg.    Transthoracic Echocardiogram (TTE): Mild dilation of the proximal aorta noted; ascending aorta measures 3.8 cm. Recommend a dedicated chest CTA for further evaluation of aortic pathology( this was not done yesterday, I discussed with Dr. Yuan today and they would order that today. Discontinued heparin already. if no mobile thrombus is observed in aorta. Continue dual antiplatelet therapy (DAPT) for 21 days, followed by Plavix 75 mg daily.    Hyperlipidemia Management: LDL level at 135; recommend initiating high-intensity statin therapy with Lipitor 80 mg.      Stroke clinic follow up in [place].     This was an audio and video enabled telemedicine encounter.       Daryn Rodriguez MD  24  10:08 EST       Electronically signed by Daryn Rodriguez MD at 24 1010       Luis Enrique Yuan MD at 24 1330              [facility]    [facility]    Name:  Martha Murray   Age:  69 y.o.  Sex:  female  :  1955  MRN:  [id number]   Visit Number:  [id number]  Admission Date:  10/31/2024  Date Of Service:  24  Primary Care Physician:  Jenny Carvajal APRN     LOS: 0 days :    Chief Complaint:      Dizziness    Subjective:    Patient's chart reviewed and events noted.  She has been admitted because of acute lacunar infarct and stroke due to small vessel disease.  She is feeling dizzy upon head movement.  She has been unable to sit and stand with walking due to the dizziness.    Hospital  Course:    Patient is an obese 69-year-old female with history significant for CKD, type 2 diabetes, hypertension, hyperlipidemia, essential thrombocythemia who presents to the emergency room with complaints of dizziness.  Patient's symptoms occurred abruptly approximately 2 hours prior to presentation.  States that she is having difficulty ambulating due to feeling of the room spinning around her.  No extremity weakness, facial slurring, difficulty speaking.  Also denied headache, fever, vision changes, chest pain, shortness of breath.  En route, EMS reported patient blood pressure 210/120.  They provided sublingual nitroglycerin with slight improvement.  The patient admits that she stopped taking her blood pressure medicine 2 to 3 days ago.  Denies smoking, alcohol use, illicit drug use.     ED summary: Patient afebrile, hypertensive 198/79 and nonhypoxic on room air.  Troponins negative x 2.  Potassium 3.2 and creatinine 1.32 which appears baseline.  Glucose 206.  TSH appropriate.  WBC 21 and platelets 817.  UA negative for infection, protein positive.  CT head without contrast showed no acute abnormality.  CTA head showed no LVO.  CTA neck showed no evidence of significant cervical carotid stenosis. Lobular filling defect proximal brachiocephalic artery could represent nonocclusive mural thrombus or lobulated soft plaque. Follow-up CTA recommended in 6-8 weeks for continued surveillance.  CT cerebral perfusion showed no evidence of large vessel occlusion.  Teleneurology consulted, recommended admission for further stroke workup.  MRI noted small acute/subacute right periventricular CVA.  Patient initiated on heparin infusion x 24 hours due to filling defect in proximal brachiocephalic artery.  Continued on aspirin.  Norvasc added for blood pressure control.  Echo noted EF 61% with indeterminate left ventricular diastolic function negative saline test mild dilation of aorta at 3.8.    Review of Systems:     All  systems were reviewed and negative except as mentioned in subjective, assessment and plan.    Vital Signs:    Temp:  [98.1 °F (36.7 °C)-99.1 °F (37.3 °C)] 98.1 °F (36.7 °C)  Heart Rate:  [65-88] 65  Resp:  [16-18] 18  BP: (110-174)/(51-71) 174/71    Intake and output:    I/O last 3 completed shifts:  In: 477.8 [P.O.:300; I.V.:177.8]  Out: 750 [Urine:550; Emesis/NG output:200]  I/O this shift:  In: 580 [P.O.:580]  Out: 200 [Urine:200]    Physical Examination:    General Appearance:  Alert and cooperative.  Chronically ill middle-aged female.   Head:  Atraumatic and normocephalic.   Eyes: Conjunctivae and sclerae normal, no icterus. No pallor.   Throat: No oral lesions, no thrush, oral mucosa moist.   Neck: Supple, trachea midline, no thyromegaly.   Lungs:   Breath sounds heard bilaterally equally.  No wheezing or crackles. No Pleural rub or bronchial breathing.  On room air unlabored.   Heart:  Normal S1 and S2, no murmur, no gallop, no rub. No JVD.   Abdomen:   Normal bowel sounds, no masses, no organomegaly. Soft, nontender, nondistended, no rebound tenderness.   Extremities: Supple, no edema, no cyanosis, no clubbing.   Skin: No bleeding or rash.   Neurologic: Alert and oriented x 3. No facial asymmetry. Moves all four limbs. No tremors.  No nystagmus noted.     Laboratory results:    Results from last 7 days   Lab Units 11/02/24  0536 11/01/24  0344 11/01/24  0142   SODIUM mmol/L 142 139 139   POTASSIUM mmol/L 4.2 3.5 3.2*   CHLORIDE mmol/L 104 105 103   CO2 mmol/L 24.6 19.3* 22.4   BUN mg/dL 20 15 15   CREATININE mg/dL 1.40* 1.23* 1.32*   CALCIUM mg/dL 9.2 8.7 8.9   BILIRUBIN mg/dL  --   --  0.5   ALK PHOS U/L  --   --  100   ALT (SGPT) U/L  --   --  15   AST (SGOT) U/L  --   --  21   GLUCOSE mg/dL 129* 185* 206*     Results from last 7 days   Lab Units 11/02/24  0535 11/01/24  0836 11/01/24  0016   WBC 10*3/mm3 15.79* 18.97* 21.40*   HEMOGLOBIN g/dL 14.5 15.8 15.4   HEMATOCRIT % 44.9 47.0* 46.8*   PLATELETS  "10*3/mm3 506* 662* 817*     Results from last 7 days   Lab Units 11/01/24  1104   INR  1.14*     Results from last 7 days   Lab Units 11/01/24  0344 11/01/24  0142   HSTROP T ng/L 11 11         No results for input(s): \"PHART\", \"PSP3LYF\", \"PO2ART\", \"PTB2RNZ\", \"BASEEXCESS\" in the last 8760 hours.   I have reviewed the patient's laboratory results.    Radiology results:    Adult Transthoracic Echo Complete W/ Cont if Necessary Per Protocol (With Agitated Saline)    Result Date: 11/1/2024    Left ventricular systolic function is normal. Calculated left ventricular EF = 68.3% Left ventricular ejection fraction appears to be 61 - 65%.   Left ventricular wall thickness is consistent with mild concentric hypertrophy. Left ventricular diastolic function was indeterminate.   Normal right ventricular size and function.   The left atrial cavity is mildly dilated.   Mild aortic valve regurgitation is present.   Saline test results are negative for right to left atrial level shunt.   Mild dilation of the proximal aorta is present. Ascending aorta = 3.8 cm.  Recommend dedicated chest CTA for further characterization of aortic pathology.     MRI Brain Without Contrast    Result Date: 11/1/2024  PROCEDURE: MRI BRAIN WO CONTRAST-  HISTORY: Stroke, follow up, dizziness and vertigo. Compare July 20, 2023.  PROCEDURE: Multiplanar MR imaging of the brain was performed in multiple MR sequences .  FINDINGS: Brain parenchyma displays normal signal without evidence of mass, hemorrhage or edema. There is mild small vessel ischemic disease, age-appropriate. Limited images the proximal cord are unremarkable. The ventricles are mildly dilated indicating mild atrophy stable from prior exam. There is no extra-axial fluid or midline shift. Flow-voids are appropriate. Diffusion weighted images demonstrate a small area of mildly abnormal diffusion in the right parietal periventricular region just superior to the basal ganglia best seen series 6 " image 15. This is confirmed on the ADC map to a moderate degree. Findings are suggestive of a acute/subacute stroke. This correlates with a small area of mildly increased T2 signal but has no correlate on the T1-weighted images. Limited images of the paranasal sinuses are unremarkable.      Impression: Small acute/subacute right periventricular CVA; right: JULISSA Medel in the emergency room, notified at 9:40 a.m. November 1, 2024.      This report was signed and finalized on 11/1/2024 9:45 AM by Adelina Holliday MD.      XR Chest 1 View    Result Date: 11/1/2024  PROCEDURE: XR CHEST 1 VW-    HISTORY: Dizziness, vertigo. Chest Pain Triage Protocol  COMPARISON: July 3, 2024.  FINDINGS: Apical lordotic view. The heart is borderline in size. The mediastinum is unremarkable. The lungs are clear. There is no pneumothorax. There are no acute osseous abnormalities.      Impression: No acute cardiopulmonary process.        Images were reviewed, interpreted, and dictated by Dr. Adelina Holliday MD Transcribed by Milagros Pichardo PA-C.  This report was signed and finalized on 11/1/2024 9:25 AM by Adelina Holliday MD.      CT Angiogram Neck    Result Date: 11/1/2024  PROCEDURE: CT ANGIOGRAM NECK-  HISTORY: Dizziness and vertigo  TECHNIQUE: Thin section axial CT with IV contrast supplemented with multiplanar reconstruction.   3 D reconstructions were performed on a separate workstation.  NASCET criteria and technique was utilized during interpretation.  FINDINGS:  Aortic arch:  Arch shows no significant narrowing. There is a filling defect along the anterior wall of the brachiocephalic artery measuring up to 7 mm. This could represent mural thrombus or lobulated soft plaque. Nevertheless this could be a embolic source. The left common and left subclavian arteries appear widely patent.  Right carotid:  No significant stenosis is seen of the cervical common or internal carotid artery.  Left carotid:  No significant stenosis is seen of the  cervical common or internal carotid artery.  Vertebrals: Vertebral arteries are codominant. No significant stenosis is present.       Impression: 1. No evidence of significant cervical carotid stenosis 2. Lobular filling defect proximal brachiocephalic artery could represent nonocclusive mural thrombus or lobulated soft plaque. Follow-up CTA recommended in 6-8 weeks for continued surveillance. CT appearance that abnormality could result in more distal emboli.   This study was performed with techniques to keep radiation doses as low as reasonably achievable (ALARA). Individualized dose reduction techniques using automated exposure control or adjustment of vA and/or kV according to the patient size were employed.   This report was signed and finalized on 11/1/2024 12:58 AM by Sam Thakkar MD.      CT Angiogram Head    Result Date: 11/1/2024  PROCEDURE: CT ANGIOGRAM HEAD-  HISTORY: Dizziness and vertigo  TECHNIQUE: Thin section axial CT with contrast with multiplanar reconstruction.   3 D reconstructions were performed on a separate workstation.  FINDINGS:  Internal carotid arteries: The petrous, cavernous and postcavernous ICAs are unremarkable. No aneurysm is seen.  Anterior cerebral arteries: Central anterior cerebral arteries are unremarkable. No aneurysm is seen.  Middle cerebral arteries: Central middle cerebral arteries are unremarkable. No aneurysm is seen.  Basilar artery: Distal vertebral and basilar arteries are widely patent. No aneurysm is seen.  Posterior cerebral artery: Posterior cerebral arteries are patent centrally. No obvious aneurysm is seen.  Venous sinuses: Major venous sinuses are patent.      Impression: No evidence of acute large vessel occlusive disease    This study was performed with techniques to keep radiation doses as low as reasonably achievable (ALARA). Individualized dose reduction techniques using automated exposure control or adjustment of vA and/or kV according to the patient size  were employed.  This report was signed and finalized on 11/1/2024 12:53 AM by Sam Thakkar MD.      CT CEREBRAL PERFUSION WITH & WITHOUT CONTRAST    Result Date: 11/1/2024  CT cerebral perfusion, without and with contrast  HISTORY: Symptoms of CVA. Vertigo. Dizziness..  TECHNIQUE: CT brain perfusion with mapping of flow parameters including transit time, cerebral blood flow and cerebral blood volume. IV contrast was utilized.  FINDINGS:  Tissue with delayed blood, Tmax > 6 secs:  0 ml  Tissue with significantly reduced blood, rCBF < 30%:  0        Impression: 1.  No evidence of acute large vessel occlusion  Should symptoms persist recommend follow-up MRI  This report was signed and finalized on 11/1/2024 12:52 AM by Sam Thakkar MD.      CT Head Without Contrast Stroke Protocol    Result Date: 11/1/2024  PROCEDURE: CT HEAD WO CONTRAST STROKE PROTOCOL-  HISTORY: Dizziness/vertigo, symptoms of acute CVA  COMPARISON: 7/20/2023  TECHNIQUE: Noncontrast exam  FINDINGS: Mild atrophy and chronic ischemic white matter changes are noted.  No cortical edema is present. There is no mass or hemorrhage. Ventricles are normal.  Bone windows show no skull fracture or obvious destructive lesion.      Impression: 1. No acute intracranial abnormality or obvious mass. 2. Atrophy and chronic ischemic white matter changes as above.   This study was performed with techniques to keep radiation doses as low as reasonably achievable (ALARA). Individualized dose reduction techniques using automated exposure control or adjustment of vA and/or kV according to the patient size were employed.    This report was signed and finalized on 11/1/2024 12:51 AM by Sam Thakkar MD.     I have reviewed the patient's radiology reports.    Medication Review:     I have reviewed the patient's active and prn medications.     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  A1c 5.5/lipid panel mildly elevated.  Aspirin/high-dose statin.  Blood pressure control.  Restart home lisinopril-Norvasc added.  Neurochecks per protocol  PT/OT/SLP  Dizziness persistent with Reglan/Valium/Decadron ordered x 1.  Continue scopolamine and meclizine.  Leukocytosis  Unclear cause of leukocytosis.  No infectious source identified.  Downtrending.  Could be secondary to hemoconcentration  Patient does have essential thrombocythemia for which she is on hydroxyurea  Hypokalemia  This has resolved  Type 2 diabetes  A1c 5.5  Sliding scale for correction  Diabetic education    I have reviewed the copied text and it is accurate as of 11/2/2024        Code Status: Full  Diet: As tolerated  Discharge Plan: Likely home tomorrow pending PT/OT evaluation.    Luis Enrique Yuan MD  11/02/24  13:30 EDT    Dictated utilizing Dragon dictation.      Electronically signed by Luis Enrique Yuan MD at 11/02/24 1333       Daryn Rodriguez MD at 11/02/24 0806          Stroke Progress Note       Chief Complaint:  left sided weakness     Subjective    Subjective     Subjective:  No acute events overnight   Ataxia on the right side     Objective      Temp:  [98.1 °F (36.7 °C)-99.1 °F (37.3 °C)] 98.5 °F (36.9 °C)  Heart Rate:  [72-89] 74  Resp:  [16-18] 18  BP: (110-200)/(51-86) 153/59    NEURO( Exam is performed with help of hospital staff at bed side and observed by me via audio-video interface)    MENTAL STATUS: AAOx3, memory intact, fund of knowledge appropriate    LANG/SPEECH: Naming and repetition intact, fluent, follows  3-step commands    CRANIAL NERVES:    Pupils equal and reactive, EOMI intact, no gaze deviation, no nystagmus  No facial droop, cough and gag intact, shoulder shrug intact, tongue midline     MOTOR:  Moves all extremities equally    SENSORY: Normal to light touch all throughout     COORDINATION: Normal finger to nose and heel to shin, no tremor, no dysmetria    STATION: Not assessed due to patient condition    GAIT: Not assessed due to patient condition     Results Review:    I reviewed the patient's new clinical results.    Lab Results (last 24 hours)       Procedure Component Value Units Date/Time    POC Glucose 4x Daily Before Meals & at Bedtime [[id number]]  (Normal) Collected: 11/02/24 0729    Specimen: Blood Updated: 11/02/24 0746     Glucose 105 mg/dL      Comment: Serial Number: [id number]:  [id number]       Basic Metabolic Panel [[id number]]  (Abnormal) Collected: 11/02/24 0536    Specimen: Blood Updated: 11/02/24 0655     Glucose 129 mg/dL      BUN 20 mg/dL      Creatinine 1.40 mg/dL      Sodium 142 mmol/L      Potassium 4.2 mmol/L      Comment: Specimen hemolyzed.  Result may be falsely elevated.        Chloride 104 mmol/L      CO2 24.6 mmol/L      Calcium 9.2 mg/dL      BUN/Creatinine Ratio 14.3     Anion Gap 13.4 mmol/L      eGFR 41.1 mL/min/1.73     Narrative:      GFR Normal >60  Chronic Kidney Disease <60  Kidney Failure <15      CBC & Differential [[id number]]  (Abnormal) Collected: 11/02/24 0535    Specimen: Blood Updated: 11/02/24 0607    Narrative:      The following orders were created for panel order CBC & Differential.  Procedure                               Abnormality         Status                     ---------                               -----------         ------                     CBC Auto Differential[[id number]]        Abnormal            Final result                 Please view results for these tests on the individual orders.    CBC Auto Differential [[id number]]  (Abnormal)  Collected: 11/02/24 0535    Specimen: Blood Updated: 11/02/24 0607     WBC 15.79 10*3/mm3      RBC 4.50 10*6/mm3      Hemoglobin 14.5 g/dL      Hematocrit 44.9 %      MCV 99.8 fL      MCH 32.2 pg      MCHC 32.3 g/dL      RDW 13.8 %      RDW-SD 50.4 fl      MPV 10.5 fL      Platelets 506 10*3/mm3      Neutrophil % 89.0 %      Lymphocyte % 5.4 %      Monocyte % 4.3 %      Eosinophil % 0.1 %      Basophil % 0.3 %      Immature Grans % 0.9 %      Neutrophils, Absolute 14.05 10*3/mm3      Lymphocytes, Absolute 0.85 10*3/mm3      Monocytes, Absolute 0.68 10*3/mm3      Eosinophils, Absolute 0.01 10*3/mm3      Basophils, Absolute 0.05 10*3/mm3      Immature Grans, Absolute 0.15 10*3/mm3      nRBC 0.0 /100 WBC     Heparin Anti-Xa [[id number]]  (Normal) Collected: 11/02/24 0131    Specimen: Blood Updated: 11/02/24 0224     Heparin Anti-Xa (UFH) 0.36 IU/ml     POC Glucose Once [[id number]]  (Abnormal) Collected: 11/01/24 2009    Specimen: Blood Updated: 11/01/24 2012     Glucose 164 mg/dL      Comment: Serial Number: [id number]:  [id number]       Vitamin B12 [[id number]]  (Normal) Collected: 11/01/24 0836    Specimen: Blood Updated: 11/01/24 1915     Vitamin B-12 332 pg/mL     Narrative:      Results may be falsely increased if patient taking Biotin.      Heparin Anti-Xa [[id number]]  (Abnormal) Collected: 11/01/24 1818    Specimen: Blood from Arm, Right Updated: 11/01/24 1845     Heparin Anti-Xa (UFH) 0.10 IU/ml     POC Glucose Once [[id number]]  (Abnormal) Collected: 11/01/24 1633    Specimen: Blood Updated: 11/01/24 1635     Glucose 160 mg/dL      Comment: Serial Number: [id number]:  [id number]       Heparin Anti-Xa [[id number]]  (Abnormal) Collected: 11/01/24 1104    Specimen: Blood Updated: 11/01/24 1159     Heparin Anti-Xa (UFH) 0.10 IU/ml     aPTT [[id number]]  (Abnormal) Collected: 11/01/24 1104    Specimen: Blood Updated: 11/01/24 1154     PTT 33.2 seconds     Protime-INR [[id number]]  (Abnormal) Collected: 11/01/24  1104    Specimen: Blood Updated: 11/01/24 1154     Protime 15.2 Seconds      INR 1.14    Narrative:      Suggested INR therapeutic range for stable oral anticoagulant therapy:    Low Intensity therapy:   1.5-2.0  Moderate Intensity therapy:   2.0-3.0  High Intensity therapy:   2.5-4.0    POC Glucose 4x Daily Before Meals & at Bedtime [[id number]]  (Normal) Collected: 11/01/24 1127    Specimen: Blood Updated: 11/01/24 1129     Glucose 114 mg/dL      Comment: Serial Number: [id number]:  [id number]       Hemoglobin A1c [[id number]]  (Normal) Collected: 11/01/24 0836    Specimen: Blood Updated: 11/01/24 0908     Hemoglobin A1C 5.50 %     Narrative:      Hemoglobin A1C Ranges:    Increased Risk for Diabetes  5.7% to 6.4%  Diabetes                     >= 6.5%  Diabetic Goal                < 7.0%    CBC (No Diff) [[id number]]  (Abnormal) Collected: 11/01/24 0836    Specimen: Blood Updated: 11/01/24 0845     WBC 18.97 10*3/mm3      RBC 4.86 10*6/mm3      Hemoglobin 15.8 g/dL      Hematocrit 47.0 %      MCV 96.7 fL      MCH 32.5 pg      MCHC 33.6 g/dL      RDW 13.6 %      RDW-SD 48.4 fl      MPV 9.9 fL      Platelets 662 10*3/mm3     POC Glucose 4x Daily Before Meals & at Bedtime [[id number]]  (Abnormal) Collected: 11/01/24 0831    Specimen: Blood Updated: 11/01/24 0836     Glucose 135 mg/dL      Comment: Serial Number: [id number]:  [id number]             MRI Brain Without Contrast    Result Date: 11/1/2024  Small acute/subacute right periventricular CVA; right: JULISSA Medel in the emergency room, notified at 9:40 a.m. November 1, 2024.      This report was signed and finalized on 11/1/2024 9:45 AM by Adelina Holliday MD.      XR Chest 1 View    Result Date: 11/1/2024  No acute cardiopulmonary process.        Images were reviewed, interpreted, and dictated by Dr. Adelina Holliday MD Transcribed by Milagros Pichardo PA-C.  This report was signed and finalized on 11/1/2024 9:25 AM by Adelina Holliday MD.      CT Angiogram Neck    Result  Date: 11/1/2024  1. No evidence of significant cervical carotid stenosis 2. Lobular filling defect proximal brachiocephalic artery could represent nonocclusive mural thrombus or lobulated soft plaque. Follow-up CTA recommended in 6-8 weeks for continued surveillance. CT appearance that abnormality could result in more distal emboli.   This study was performed with techniques to keep radiation doses as low as reasonably achievable (ALARA). Individualized dose reduction techniques using automated exposure control or adjustment of vA and/or kV according to the patient size were employed.   This report was signed and finalized on 11/1/2024 12:58 AM by Sam Thakkar MD.      CT Angiogram Head    Result Date: 11/1/2024  No evidence of acute large vessel occlusive disease    This study was performed with techniques to keep radiation doses as low as reasonably achievable (ALARA). Individualized dose reduction techniques using automated exposure control or adjustment of vA and/or kV according to the patient size were employed.  This report was signed and finalized on 11/1/2024 12:53 AM by Sam Thakkar MD.      CT CEREBRAL PERFUSION WITH & WITHOUT CONTRAST    Result Date: 11/1/2024  1.  No evidence of acute large vessel occlusion  Should symptoms persist recommend follow-up MRI  This report was signed and finalized on 11/1/2024 12:52 AM by Sam Thakkar MD.      CT Head Without Contrast Stroke Protocol    Result Date: 11/1/2024  1. No acute intracranial abnormality or obvious mass. 2. Atrophy and chronic ischemic white matter changes as above.   This study was performed with techniques to keep radiation doses as low as reasonably achievable (ALARA). Individualized dose reduction techniques using automated exposure control or adjustment of vA and/or kV according to the patient size were employed.    This report was signed and finalized on 11/1/2024 12:51 AM by Sam Thakkar MD.     Results for orders placed during the  hospital encounter of 10/31/24    Adult Transthoracic Echo Complete W/ Cont if Necessary Per Protocol (With Agitated Saline)    Interpretation Summary    Left ventricular systolic function is normal. Calculated left ventricular EF = 68.3% Left ventricular ejection fraction appears to be 61 - 65%.    Left ventricular wall thickness is consistent with mild concentric hypertrophy. Left ventricular diastolic function was indeterminate.    Normal right ventricular size and function.    The left atrial cavity is mildly dilated.    Mild aortic valve regurgitation is present.    Saline test results are negative for right to left atrial level shunt.    Mild dilation of the proximal aorta is present. Ascending aorta = 3.8 cm.  Recommend dedicated chest CTA for further characterization of aortic pathology.           Assessment/Plan     Assessment/Plan:    Acute Lacunar Infarct: Located in the right basal ganglia, likely due to small vessel disease.    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    Blood Pressure Management: Target systolic blood pressure between 120-160 mmHg.    Transthoracic Echocardiogram (TTE): Mild dilation of the proximal aorta noted; ascending aorta measures 3.8 cm. Recommend a dedicated chest CTA for further evaluation of aortic pathology. Discontinue heparin if no mobile thrombus is observed. Continue dual antiplatelet therapy (DAPT) for 21 days, followed by Plavix 75 mg daily.    Hyperlipidemia Management: LDL level at 135; recommend initiating high-intensity statin therapy with Lipitor 80 mg.      Stroke clinic follow up in [facility].     This was an audio and video enabled telemedicine encounter.       Daryn Rodriguez MD  11/02/24  08:06 EDT       Electronically signed by Daryn Rodriguez MD at 11/02/24 0283

## 2024-11-04 LAB
ANION GAP SERPL CALCULATED.3IONS-SCNC: 13.3 MMOL/L (ref 5–15)
BASOPHILS # BLD AUTO: 0.1 10*3/MM3 (ref 0–0.2)
BASOPHILS NFR BLD AUTO: 0.7 % (ref 0–1.5)
BUN SERPL-MCNC: 23 MG/DL (ref 8–23)
BUN/CREAT SERPL: 18 (ref 7–25)
CALCIUM SPEC-SCNC: 8.9 MG/DL (ref 8.6–10.5)
CHLORIDE SERPL-SCNC: 104 MMOL/L (ref 98–107)
CO2 SERPL-SCNC: 22.7 MMOL/L (ref 22–29)
CREAT SERPL-MCNC: 1.28 MG/DL (ref 0.57–1)
DEPRECATED RDW RBC AUTO: 50.3 FL (ref 37–54)
EGFRCR SERPLBLD CKD-EPI 2021: 45.7 ML/MIN/1.73
EOSINOPHIL # BLD AUTO: 0.3 10*3/MM3 (ref 0–0.4)
EOSINOPHIL NFR BLD AUTO: 2.2 % (ref 0.3–6.2)
ERYTHROCYTE [DISTWIDTH] IN BLOOD BY AUTOMATED COUNT: 13.7 % (ref 12.3–15.4)
GLUCOSE BLDC GLUCOMTR-MCNC: 108 MG/DL (ref 70–130)
GLUCOSE BLDC GLUCOMTR-MCNC: 121 MG/DL (ref 70–130)
GLUCOSE BLDC GLUCOMTR-MCNC: 136 MG/DL (ref 70–130)
GLUCOSE BLDC GLUCOMTR-MCNC: 161 MG/DL (ref 70–130)
GLUCOSE SERPL-MCNC: 110 MG/DL (ref 65–99)
HCT VFR BLD AUTO: 46.3 % (ref 34–46.6)
HGB BLD-MCNC: 15.2 G/DL (ref 12–15.9)
IMM GRANULOCYTES # BLD AUTO: 0.08 10*3/MM3 (ref 0–0.05)
IMM GRANULOCYTES NFR BLD AUTO: 0.6 % (ref 0–0.5)
LYMPHOCYTES # BLD AUTO: 0.8 10*3/MM3 (ref 0.7–3.1)
LYMPHOCYTES NFR BLD AUTO: 5.9 % (ref 19.6–45.3)
MCH RBC QN AUTO: 32.1 PG (ref 26.6–33)
MCHC RBC AUTO-ENTMCNC: 32.8 G/DL (ref 31.5–35.7)
MCV RBC AUTO: 97.7 FL (ref 79–97)
MONOCYTES # BLD AUTO: 0.81 10*3/MM3 (ref 0.1–0.9)
MONOCYTES NFR BLD AUTO: 6 % (ref 5–12)
NEUTROPHILS NFR BLD AUTO: 11.45 10*3/MM3 (ref 1.7–7)
NEUTROPHILS NFR BLD AUTO: 84.6 % (ref 42.7–76)
NRBC BLD AUTO-RTO: 0 /100 WBC (ref 0–0.2)
PLATELET # BLD AUTO: 567 10*3/MM3 (ref 140–450)
PMV BLD AUTO: 10.4 FL (ref 6–12)
POTASSIUM SERPL-SCNC: 3.1 MMOL/L (ref 3.5–5.2)
RBC # BLD AUTO: 4.74 10*6/MM3 (ref 3.77–5.28)
SODIUM SERPL-SCNC: 140 MMOL/L (ref 136–145)
WBC NRBC COR # BLD AUTO: 13.54 10*3/MM3 (ref 3.4–10.8)

## 2024-11-04 PROCEDURE — 80048 BASIC METABOLIC PNL TOTAL CA: CPT | Performed by: NURSE PRACTITIONER

## 2024-11-04 PROCEDURE — 63710000001 INSULIN LISPRO (HUMAN) PER 5 UNITS: Performed by: INTERNAL MEDICINE

## 2024-11-04 PROCEDURE — 99233 SBSQ HOSP IP/OBS HIGH 50: CPT | Performed by: STUDENT IN AN ORGANIZED HEALTH CARE EDUCATION/TRAINING PROGRAM

## 2024-11-04 PROCEDURE — 97166 OT EVAL MOD COMPLEX 45 MIN: CPT

## 2024-11-04 PROCEDURE — 82948 REAGENT STRIP/BLOOD GLUCOSE: CPT | Performed by: INTERNAL MEDICINE

## 2024-11-04 PROCEDURE — 82948 REAGENT STRIP/BLOOD GLUCOSE: CPT

## 2024-11-04 PROCEDURE — 85025 COMPLETE CBC W/AUTO DIFF WBC: CPT | Performed by: NURSE PRACTITIONER

## 2024-11-04 PROCEDURE — 97116 GAIT TRAINING THERAPY: CPT

## 2024-11-04 PROCEDURE — 63710000001 PROCHLORPERAZINE MALEATE PER 10 MG: Performed by: NURSE PRACTITIONER

## 2024-11-04 PROCEDURE — 99232 SBSQ HOSP IP/OBS MODERATE 35: CPT | Performed by: NURSE PRACTITIONER

## 2024-11-04 PROCEDURE — 97530 THERAPEUTIC ACTIVITIES: CPT

## 2024-11-04 RX ORDER — MECLIZINE HYDROCHLORIDE 25 MG/1
25 TABLET ORAL EVERY 8 HOURS SCHEDULED
Status: DISCONTINUED | OUTPATIENT
Start: 2024-11-04 | End: 2024-11-05 | Stop reason: HOSPADM

## 2024-11-04 RX ORDER — POTASSIUM CHLORIDE 1500 MG/1
40 TABLET, EXTENDED RELEASE ORAL EVERY 4 HOURS
Status: COMPLETED | OUTPATIENT
Start: 2024-11-04 | End: 2024-11-04

## 2024-11-04 RX ADMIN — VANCOMYCIN HYDROCHLORIDE 125 MG: 125 CAPSULE ORAL at 23:36

## 2024-11-04 RX ADMIN — MECLIZINE HYDROCHLORIDE 12.5 MG: 25 TABLET ORAL at 07:20

## 2024-11-04 RX ADMIN — SCOPOLAMINE 1 PATCH: 1.5 PATCH, EXTENDED RELEASE TRANSDERMAL at 09:05

## 2024-11-04 RX ADMIN — PROCHLORPERAZINE MALEATE 5 MG: 10 TABLET ORAL at 08:25

## 2024-11-04 RX ADMIN — Medication 10 ML: at 22:00

## 2024-11-04 RX ADMIN — INSULIN LISPRO 2 UNITS: 100 INJECTION, SOLUTION INTRAVENOUS; SUBCUTANEOUS at 21:59

## 2024-11-04 RX ADMIN — HYDROXYUREA 500 MG: 500 CAPSULE ORAL at 08:25

## 2024-11-04 RX ADMIN — MECLIZINE HYDROCHLORIDE 25 MG: 25 TABLET ORAL at 21:59

## 2024-11-04 RX ADMIN — ATORVASTATIN CALCIUM 80 MG: 80 TABLET, FILM COATED ORAL at 21:59

## 2024-11-04 RX ADMIN — POTASSIUM CHLORIDE 40 MEQ: 1500 TABLET, EXTENDED RELEASE ORAL at 17:32

## 2024-11-04 RX ADMIN — CLOPIDOGREL BISULFATE 75 MG: 75 TABLET ORAL at 08:25

## 2024-11-04 RX ADMIN — PROCHLORPERAZINE MALEATE 5 MG: 10 TABLET ORAL at 17:01

## 2024-11-04 RX ADMIN — ASPIRIN 325 MG: 325 TABLET, COATED ORAL at 08:25

## 2024-11-04 RX ADMIN — CARVEDILOL 12.5 MG: 12.5 TABLET, FILM COATED ORAL at 08:25

## 2024-11-04 RX ADMIN — AMLODIPINE BESYLATE 10 MG: 5 TABLET ORAL at 08:26

## 2024-11-04 RX ADMIN — VANCOMYCIN HYDROCHLORIDE 125 MG: 125 CAPSULE ORAL at 17:02

## 2024-11-04 RX ADMIN — VANCOMYCIN HYDROCHLORIDE 125 MG: 125 CAPSULE ORAL at 01:53

## 2024-11-04 RX ADMIN — POTASSIUM CHLORIDE 40 MEQ: 1500 TABLET, EXTENDED RELEASE ORAL at 13:41

## 2024-11-04 RX ADMIN — VANCOMYCIN HYDROCHLORIDE 125 MG: 125 CAPSULE ORAL at 12:16

## 2024-11-04 RX ADMIN — MECLIZINE HYDROCHLORIDE 25 MG: 25 TABLET ORAL at 13:41

## 2024-11-04 RX ADMIN — VANCOMYCIN HYDROCHLORIDE 125 MG: 125 CAPSULE ORAL at 07:21

## 2024-11-04 RX ADMIN — POTASSIUM CHLORIDE 40 MEQ: 1500 TABLET, EXTENDED RELEASE ORAL at 22:00

## 2024-11-04 NOTE — PROGRESS NOTES
"Dietitian Assessment    Patient Name: Martha Murray  YOB: 1955  MRN: [id number]  Admission date: 10/31/2024    Comment:        Clinical Nutrition Assessment      Reason for Assessment NPO/Clear liquid >3 days   H&P  Past Medical History:   Diagnosis Date    HAMMAD (acute kidney injury) 07/01/2021    Allergic     Anemia     Anesthesia complication     Slow to wake    Arthritis     COPD (chronic obstructive pulmonary disease)     Depression     Diabetes mellitus     History of heart attack     Hyperlipidemia     Hypertension        Past Surgical History:   Procedure Laterality Date    BILATERAL BREAST REDUCTION      BONE MARROW ASPIRATION      CATARACT EXTRACTION Bilateral     COLONOSCOPY      HYSTERECTOMY  1990    x 2    REDUCTION MAMMAPLASTY Bilateral     MORE THAN 20 YEARS AGO    SINUS SURGERY  2003    TONSILLECTOMY      TOTAL KNEE ARTHROPLASTY Right 7/3/2024    Procedure: TOTAL KNEE ARTHROPLASTY WITH NAINA ROBOT RIGHT;  Surgeon: Keaton Aldrich MD;  Location: [facility];  Service: Robotics - Ortho;  Laterality: Right;            Current Problems        Encounter Information        Trending Narrative     11/4: Pt NPO/Clear liquid x 3 days. Average PO intake 68% x 4 meals. RD will order Boost Breeze daily to promote PO intake.      Anthropometrics        Current Height, Weight Height: 154.9 cm (61\")  Weight: 74.3 kg (163 lb 12.8 oz) (11/01/24 1304)   Trending Weight Hx     This admission:              PTA:     Wt Readings from Last 30 Encounters:   11/01/24 1304 74.3 kg (163 lb 12.8 oz)   11/01/24 1102 72.6 kg (160 lb 0.9 oz)   11/01/24 0003 72.6 kg (160 lb)   10/18/24 1000 73.9 kg (163 lb)   09/19/24 1404 74.2 kg (163 lb 9.6 oz)   07/13/24 1007 79.4 kg (175 lb)   07/03/24 1626 79.1 kg (174 lb 6.1 oz)   06/20/24 1342 78 kg (172 lb)   06/19/24 1234 78.6 kg (173 lb 4.5 oz)   05/21/24 1121 75.8 kg (167 lb)   03/26/24 1023 81.6 kg (180 lb)   03/21/24 0931 75.3 kg (166 lb)   03/15/24 0935 75.3 kg (166 " lb)   02/22/24 0859 75.3 kg (166 lb)   11/09/23 1342 76.2 kg (168 lb)   11/07/23 0838 75.8 kg (167 lb)   10/31/23 1323 76.1 kg (167 lb 12.8 oz)   10/05/23 1139 77.6 kg (171 lb)   08/07/23 0840 75.3 kg (166 lb)   07/31/23 0400 73.3 kg (161 lb 9.6 oz)   07/30/23 0545 74.4 kg (164 lb 0.4 oz)   07/30/23 0500 74.4 kg (164 lb 0.4 oz)   07/29/23 2000 77.1 kg (170 lb)   07/28/23 0943 75.7 kg (166 lb 12.8 oz)   07/20/23 0937 76.7 kg (169 lb)   04/07/23 0720 74.8 kg (165 lb)   01/15/23 0227 74.8 kg (165 lb)   01/10/23 0827 73.9 kg (163 lb)   12/05/22 1122 71.5 kg (157 lb 9.6 oz)   11/19/22 0940 72.1 kg (159 lb)   08/18/22 0851 66.6 kg (146 lb 12.8 oz)   03/22/22 0813 72.6 kg (160 lb 0.9 oz)   03/08/22 0825 72.6 kg (160 lb 0.9 oz)   02/24/22 1324 72.6 kg (160 lb)   02/15/22 0839 73 kg (161 lb)      BMI kg/m2 Body mass index is 30.95 kg/m².     Labs        Pertinent Labs     Results from last 7 days   Lab Units 11/04/24  0800 11/02/24  0536 11/01/24  0344 11/01/24  0142   SODIUM mmol/L 140 142 139 139   POTASSIUM mmol/L 3.1* 4.2 3.5 3.2*   CHLORIDE mmol/L 104 104 105 103   CO2 mmol/L 22.7 24.6 19.3* 22.4   BUN mg/dL 23 20 15 15   CREATININE mg/dL 1.28* 1.40* 1.23* 1.32*   CALCIUM mg/dL 8.9 9.2 8.7 8.9   BILIRUBIN mg/dL  --   --   --  0.5   ALK PHOS U/L  --   --   --  100   ALT (SGPT) U/L  --   --   --  15   AST (SGOT) U/L  --   --   --  21   GLUCOSE mg/dL 110* 129* 185* 206*       Results from last 7 days   Lab Units 11/04/24  0800 11/01/24  0836 11/01/24  0344   HEMOGLOBIN g/dL 15.2   < >  --    HEMATOCRIT % 46.3   < >  --    TRIGLYCERIDES mg/dL  --   --  108    < > = values in this interval not displayed.       Lab Results   Component Value Date    HGBA1C 5.50 11/01/2024            Medications       Scheduled Medications amLODIPine, 10 mg, Oral, Q24H  aspirin, 325 mg, Oral, Daily  atorvastatin, 80 mg, Oral, Nightly  carvedilol, 12.5 mg, Oral, Q12H  clopidogrel, 75 mg, Oral, Daily  hydroxyurea, 500 mg, Oral, Daily  insulin  lispro, 2-7 Units, Subcutaneous, 4x Daily AC & at Bedtime  lisinopril, 40 mg, Oral, Daily  meclizine, 12.5 mg, Oral, Q8H  Scopolamine, 1 patch, Transdermal, Q72H  sodium chloride, 10 mL, Intravenous, Q12H  vancomycin, 125 mg, Oral, Q6H        Infusions       PRN Medications   acetaminophen **OR** acetaminophen    dextrose    dextrose    glucagon (human recombinant)    hydrALAZINE    prochlorperazine **OR** prochlorperazine **OR** prochlorperazine    sodium chloride    sodium chloride    sodium chloride    traZODone     Physical Findings        Trending Physical   Appearance, NFPE    --  Edema  1+     Bowel Function LBM: 11/3     Tubes Peripheral IV     Chewing/Swallowing WNL     Skin WNL       Estimated/Assessed Needs       Energy Requirements    EST Needs, Method, Wt used 1093-2921 kcal (MSJ 1.2-1.3)       Protein Requirements    EST Needs, Method, Wt used 60-74 g pro (.8-1 g pro/kg CBW)       Fluid Requirements     Estimated Needs (mL/day) 6265-9404 mL       Current Nutrition Orders & Evaluation of Intake       Oral Nutrition     Food Allergies NKFA   Current PO Diet Diet: Liquid; Clear Liquid; Fluid Consistency: Thin (IDDSI 0)   Supplement    PO Evaluation     Trending % PO Intake 11/4: 68% x 4 meals     Enteral Nutrition    Enteral Route    Order, Modulars, Flushes    Residual/Tolerance    TF Observation         Parenteral Nutrition     TPN Route    Total # Days on TPN    TPN Order, Lipid Details    MVI & Trace Element Freq    TPN Observation       Nutrition Diagnosis         Nutrition Dx Problem 1 Altered GI function r/t clinical condition AEB NPO/Clear x 3 days.      Nutrition Dx Problem 2        Intervention Goal         Intervention Goal(s) Diet advancement per MD  PO intake meet >50% of estimated needs   Adhere to ONS     Nutrition Intervention        RD Action Will order Boost Breeze daily      Nutrition Prescription          Diet Prescription Clear liquid    Supplement Prescription Boost Breeze daily      Enteral Prescription        TPN Prescription      Monitor/Evaluation        Monitor Per protocol, I&O, PO intake, Supplement intake, Pertinent labs, Weight, Skin status, GI status, Symptoms, POC/GOC, Swallow function, Hemodynamic stability     RD to f/up    Electronically signed by:  Yanique Cohen RD  11/04/24 10:19 EST

## 2024-11-04 NOTE — PROGRESS NOTES
Stroke Progress Note       Chief Complaint:  left sided weakness     Subjective    Subjective     Subjective:  No new events   Explains that she is leaning more towards the right     Objective      Temp:  [97.2 °F (36.2 °C)-99 °F (37.2 °C)] 97.2 °F (36.2 °C)  Heart Rate:  [75] 75  Resp:  [16-18] 18  BP: (112-143)/(49-72) 112/50    NEURO( Exam is performed with help of hospital staff at bed side and observed by me via audio-video interface)    MENTAL STATUS: AAOx3, memory intact, fund of knowledge appropriate    LANG/SPEECH: Naming and repetition intact, fluent, follows 3-step commands    CRANIAL NERVES:    Pupils equal and reactive, EOMI intact, no gaze deviation, no nystagmus  No facial droop, cough and gag intact, shoulder shrug intact, tongue midline     MOTOR:  Moves all extremities equally    SENSORY: Normal to light touch all throughout     COORDINATION: Normal finger to nose and heel to shin, no tremor, no dysmetria    STATION: Not assessed due to patient condition    GAIT: Not assessed due to patient condition     Results Review:    I reviewed the patient's new clinical results.    Lab Results (last 24 hours)       Procedure Component Value Units Date/Time    POC Glucose 4x Daily Before Meals & at Bedtime [[id number]]  (Abnormal) Collected: 11/04/24 1153    Specimen: Blood Updated: 11/04/24 1155     Glucose 136 mg/dL      Comment: Serial Number: [id number]:  [id number]       Basic Metabolic Panel [[id number]]  (Abnormal) Collected: 11/04/24 0800    Specimen: Blood Updated: 11/04/24 0927     Glucose 110 mg/dL      BUN 23 mg/dL      Creatinine 1.28 mg/dL      Sodium 140 mmol/L      Potassium 3.1 mmol/L      Chloride 104 mmol/L      CO2 22.7 mmol/L      Calcium 8.9 mg/dL      BUN/Creatinine Ratio 18.0     Anion Gap 13.3 mmol/L      eGFR 45.7 mL/min/1.73     Narrative:      GFR Normal >60  Chronic Kidney Disease <60  Kidney Failure <15      CBC & Differential [[id number]]  (Abnormal) Collected: 11/04/24 0800     Specimen: Blood Updated: 11/04/24 0900    Narrative:      The following orders were created for panel order CBC & Differential.  Procedure                               Abnormality         Status                     ---------                               -----------         ------                     CBC Auto Differential[[id number]]        Abnormal            Final result                 Please view results for these tests on the individual orders.    CBC Auto Differential [[id number]]  (Abnormal) Collected: 11/04/24 0800    Specimen: Blood Updated: 11/04/24 0900     WBC 13.54 10*3/mm3      RBC 4.74 10*6/mm3      Hemoglobin 15.2 g/dL      Hematocrit 46.3 %      MCV 97.7 fL      MCH 32.1 pg      MCHC 32.8 g/dL      RDW 13.7 %      RDW-SD 50.3 fl      MPV 10.4 fL      Platelets 567 10*3/mm3      Neutrophil % 84.6 %      Lymphocyte % 5.9 %      Monocyte % 6.0 %      Eosinophil % 2.2 %      Basophil % 0.7 %      Immature Grans % 0.6 %      Neutrophils, Absolute 11.45 10*3/mm3      Lymphocytes, Absolute 0.80 10*3/mm3      Monocytes, Absolute 0.81 10*3/mm3      Eosinophils, Absolute 0.30 10*3/mm3      Basophils, Absolute 0.10 10*3/mm3      Immature Grans, Absolute 0.08 10*3/mm3      nRBC 0.0 /100 WBC     POC Glucose Once [[id number]]  (Normal) Collected: 11/04/24 0814    Specimen: Blood Updated: 11/04/24 0822     Glucose 121 mg/dL      Comment: Serial Number: [id number]:  [id number]       POC Glucose Once [[id number]]  (Normal) Collected: 11/03/24 2059    Specimen: Blood Updated: 11/03/24 2104     Glucose 117 mg/dL      Comment: Serial Number: [id number]:  [id number]       POC Glucose Once [[id number]]  (Normal) Collected: 11/03/24 1633    Specimen: Blood Updated: 11/03/24 1638     Glucose 124 mg/dL      Comment: Serial Number: [id number]:  [id number]       Clostridioides difficile Toxin - Stool, Per Rectum [[id number]]  (Abnormal) Collected: 11/03/24 1336    Specimen: Stool from Per Rectum Updated: 11/03/24 154     Narrative:      The following orders were created for panel order Clostridioides difficile Toxin - Stool, Per Rectum.  Procedure                               Abnormality         Status                     ---------                               -----------         ------                     Clostridioides difficile...[[id number]]  Abnormal            Final result                 Please view results for these tests on the individual orders.    Clostridioides difficile EIA - Stool, Per Rectum [[id number]]  (Abnormal) Collected: 11/03/24 1336    Specimen: Stool from Per Rectum Updated: 11/03/24 1545     C Diff GDH Ag Positive     C.diff Toxin Ag Positive    Narrative:      DNA from a toxigenic strain of C.difficile was detected, along with the presence of free toxin. These results are suggestive of C.difficile infection, in context of an appropriate clinical scenario.          CT Angiogram Chest    Result Date: 11/3/2024  Aneurysmal dilatation of the ascending aorta up to 4.1 cm.   There is prominence of the pulmonary trunk measuring up to 2.2 cm, correlate for possible pulmonary hypertension.  Spiculated nodular density in the left upper lobe measuring up to 1.6 cm. This is slightly more conspicuous compared to the July 29, 2023 exam. Consider follow-up PET/CT or chest CT in 3 months.   This study was performed with techniques to keep radiation doses as low as reasonably achievable (ALARA). Individualized dose reduction techniques using automated exposure control or adjustment of mA and/or kV according to the patient size were employed.   CTDI: 2.67 mGy DLP:100.27 mGy.cm      This report was signed and finalized on 11/3/2024 1:30 PM by Juan A Andrews DO.     Results for orders placed during the hospital encounter of 10/31/24    Adult Transthoracic Echo Complete W/ Cont if Necessary Per Protocol (With Agitated Saline)    Interpretation Summary    Left ventricular systolic function is normal. Calculated left ventricular  EF = 68.3% Left ventricular ejection fraction appears to be 61 - 65%.    Left ventricular wall thickness is consistent with mild concentric hypertrophy. Left ventricular diastolic function was indeterminate.    Normal right ventricular size and function.    The left atrial cavity is mildly dilated.    Mild aortic valve regurgitation is present.    Saline test results are negative for right to left atrial level shunt.    Mild dilation of the proximal aorta is present. Ascending aorta = 3.8 cm.  Recommend dedicated chest CTA for further characterization of aortic pathology.            Assessment/Plan     Assessment/Plan:    Acute Lacunar Infarct: Located in the right basal ganglia, likely due to small vessel disease.    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    Blood Pressure Management: normal blood pressure goals     Transthoracic Echocardiogram (TTE): Mild dilation of the proximal aorta noted; ascending aorta measures 3.8 cm. Chest CTA for further evaluation of aortic pathology- showed dilatation of aorta. Follow up with vascular outpatient.       Plan  Continue dual antiplatelet therapy (DAPT) for 21 days, followed by Plavix 75 mg daily.  Hyperlipidemia Management: LDL level at 135; recommend initiating high-intensity statin therapy with Lipitor 80 mg.  Holter monitoring at discharge  Inpatient rehab/ outpatient therapy   Stroke clinic follow up in [facility].     No further work up from stroke stand point.     This was an audio and video enabled telemedicine encounter.       Daryn Rodriguez MD  11/04/24  12:27 EST

## 2024-11-04 NOTE — PLAN OF CARE
Goal Outcome Evaluation:  Plan of Care Reviewed With: patient           Outcome Evaluation: Patient did well overnight. Vital signs have remained stable and will continue to monitor. She scored 0 on the NIH scale.  She was able ot hold all 4 limbs without drift, but stated her right arm and leg felt heavy. No overnight events to report.

## 2024-11-04 NOTE — DISCHARGE PLACEMENT REQUEST
"STR Referral     Martha Benitez (69 y.o. Female)       Date of Birth   1955    Social Security Number       [address] [place] [address]    Home Phone   [phone]    MRN   [id number]       [facility]    Marital Status   Single                            Admission Date   10/31/24    Admission Type   Emergency    Admitting Provider   Kevin Shannon DO    Attending Provider   Kevin Shannon DO    Department, Room/Bed   [facility] TELEMETRY 3, 318/1       Discharge Date       Discharge Disposition       Discharge Destination                                 Attending Provider: Kevin Shannon DO    Allergies: Sulfa Antibiotics    Isolation: Spore   Infection: C.difficile (24)   Code Status: CPR    Ht: 154.9 cm (61\")   Wt: 74.3 kg (163 lb 12.8 oz)    Admission Cmt: None   Principal Problem: Stroke [I63.9]                   Active Insurance as of 10/31/2024       Primary Coverage       Payor Plan Insurance Group Employer/Plan Group    ANTHEM MEDICARE REPLACEMENT ANTHEM MEDICARE ADVANTAGE [id number]       Payor Plan Address Payor Plan Phone Number Payor Plan Fax Number Effective Dates    [address] [phone]  2024 - None Entered    [facility] [address]         Subscriber Name Subscriber Birth Date Member ID       MARTHA BENITEZ 1955 [id number]                     Emergency Contacts        (Rel.) Home Phone Work Phone Mobile Phone    Jose Sykes (Son) [phone] -- [phone]                 History & Physical        Kevin Shannon DO at 24 0248            [facility] HOSPITALIST   HISTORY AND PHYSICAL      Name:  Martha Benitez   Age:  69 y.o.  Sex:  female  :  1955  MRN:  [id number]   Visit Number:  [id number]  Admission Date:  10/31/2024  Date Of Service:  24  Primary Care Physician:  Jenny Carvajal APRN    Chief Complaint:     Dizziness    History Of Presenting " Illness:      Patient is an obese 69-year-old female with history significant for CKD, type 2 diabetes, hypertension, hyperlipidemia, essential thrombocythemia who presents to the emergency room with complaints of dizziness.  Patient's symptoms occurred abruptly approximately 2 hours prior to presentation.  States that she is having difficulty ambulating due to feeling of the room spinning around her.  No extremity weakness, facial slurring, difficulty speaking.  Also denied headache, fever, vision changes, chest pain, shortness of breath.  En route, EMS reported patient blood pressure 210/120.  They provided sublingual nitroglycerin with slight improvement.  The patient admits that she stopped taking her blood pressure medicine 2 to 3 days ago.  Denies smoking, alcohol use, illicit drug use.    ED summary: Patient afebrile, hypertensive 198/79 and nonhypoxic on room air.  Troponins negative x 2.  Potassium 3.2 and creatinine 1.32 which appears baseline.  Glucose 206.  TSH appropriate.  WBC 21 and platelets 817.  UA negative for infection, protein positive.  CT head without contrast showed no acute abnormality.  CTA head showed no LVO.  CTA neck showed no evidence of significant cervical carotid stenosis. Lobular filling defect proximal brachiocephalic artery could  represent nonocclusive mural thrombus or lobulated soft plaque.  Follow-up CTA recommended in 6-8 weeks for continued surveillance.  CT cerebral perfusion showed no evidence of large vessel occlusion.  Teleneurology consulted, recommended admission for further stroke workup.    Review Of Systems:    All systems were reviewed and negative except as mentioned in history of presenting illness, assessment and plan.    Past Medical History: Patient  has a past medical history of HAMMAD (acute kidney injury) (07/01/2021), Allergic, Anemia, Anesthesia complication, Arthritis, COPD (chronic obstructive pulmonary disease), Depression, Diabetes mellitus, History of  heart attack, Hyperlipidemia, and Hypertension.    Past Surgical History: Patient  has a past surgical history that includes Tonsillectomy; Breast Reduction; Sinus surgery (2003); Bone marrow aspiration; Hysterectomy (1990); Reduction mammaplasty (Bilateral); Cataract extraction (Bilateral); Colonoscopy; and Total knee arthroplasty (Right, 7/3/2024).    Social History: Patient  reports that she quit smoking about 27 years ago. Her smoking use included cigarettes. She started smoking about 57 years ago. She has a 60 pack-year smoking history. She has been exposed to tobacco smoke. She has never used smokeless tobacco. She reports that she does not drink alcohol and does not use drugs.    Family History:  Patient's family history has been reviewed and found to be noncontributory.     Allergies:      Sulfa antibiotics    Home Medications:    Prior to Admission Medications       Prescriptions Last Dose Informant Patient Reported? Taking?    aspirin (ASPIR) 81 MG EC tablet   No No    Take 1 tablet by mouth 2 (Two) Times a Day.    hydroxyurea (HYDREA) 500 MG capsule   No No    Take 1 capsule by mouth Daily.    lisinopril (PRINIVIL,ZESTRIL) 40 MG tablet  Self No No    Take 1 tablet by mouth Daily.          ED Medications:    Medications   sodium chloride 0.9 % flush 10 mL (has no administration in time range)   meclizine (ANTIVERT) tablet 25 mg (25 mg Oral Given 11/1/24 0016)   ondansetron (ZOFRAN) injection 4 mg (4 mg Intravenous Given 11/1/24 0016)   iopamidol (ISOVUE-300) 61 % injection 100 mL (100 mL Intravenous Given 11/1/24 0039)   iopamidol (ISOVUE-300) 61 % injection 50 mL (50 mL Intravenous Given 11/1/24 0039)   labetalol (NORMODYNE,TRANDATE) injection 10 mg (10 mg Intravenous Given 11/1/24 0159)     Vital Signs:  Temp:  [97.5 °F (36.4 °C)] 97.5 °F (36.4 °C)  Heart Rate:  [70-76] 72  Resp:  [18-20] 18  BP: (172-188)/(78-92) 188/80        11/01/24  0003   Weight: 72.6 kg (160 lb)     Body mass index is 30.25  "kg/m².    Physical Exam:     Most recent vital Signs: BP (!) 188/80   Pulse 72   Temp 97.5 °F (36.4 °C) (Oral)   Resp 18   Ht 154.9 cm (60.98\")   Wt 72.6 kg (160 lb)   SpO2 100%   BMI 30.25 kg/m²     Physical Exam  Vitals reviewed.   Constitutional:       Appearance: She is obese.   HENT:      Head: Normocephalic and atraumatic.      Right Ear: External ear normal.      Left Ear: External ear normal.   Neurological:      Mental Status: She is alert.         Laboratory data:    I have reviewed the labs done in the emergency room.    Results from last 7 days   Lab Units 11/01/24  0142   SODIUM mmol/L 139   POTASSIUM mmol/L 3.2*   CHLORIDE mmol/L 103   CO2 mmol/L 22.4   BUN mg/dL 15   CREATININE mg/dL 1.32*   CALCIUM mg/dL 8.9   BILIRUBIN mg/dL 0.5   ALK PHOS U/L 100   ALT (SGPT) U/L 15   AST (SGOT) U/L 21   GLUCOSE mg/dL 206*     Results from last 7 days   Lab Units 11/01/24  0016   WBC 10*3/mm3 21.40*   HEMOGLOBIN g/dL 15.4   HEMATOCRIT % 46.8*   PLATELETS 10*3/mm3 817*         Results from last 7 days   Lab Units 11/01/24  0142   HSTROP T ng/L 11                           Invalid input(s): \"USDES\", \"NITRITITE\", \"BACT\", \"EP\"    Pain Management Panel  More data exists         Latest Ref Rng & Units 11/7/2023 8/4/2020   Pain Management Panel   Creatinine, Urine mg/dL 250.5  234.4       Details                   EKG:      EKG personally reviewed, sinus rhythm with rate of 76 bpm.  No acute ST or T wave changes.    Radiology:    CT Angiogram Neck    Result Date: 11/1/2024  PROCEDURE: CT ANGIOGRAM NECK-  HISTORY: Dizziness and vertigo  TECHNIQUE: Thin section axial CT with IV contrast supplemented with multiplanar reconstruction.   3 D reconstructions were performed on a separate workstation.  NASCET criteria and technique was utilized during interpretation.  FINDINGS:  Aortic arch:  Arch shows no significant narrowing. There is a filling defect along the anterior wall of the brachiocephalic artery measuring up to " 7 mm. This could represent mural thrombus or lobulated soft plaque. Nevertheless this could be a embolic source. The left common and left subclavian arteries appear widely patent.  Right carotid:  No significant stenosis is seen of the cervical common or internal carotid artery.  Left carotid:  No significant stenosis is seen of the cervical common or internal carotid artery.  Vertebrals: Vertebral arteries are codominant. No significant stenosis is present.       1. No evidence of significant cervical carotid stenosis 2. Lobular filling defect proximal brachiocephalic artery could represent nonocclusive mural thrombus or lobulated soft plaque. Follow-up CTA recommended in 6-8 weeks for continued surveillance. CT appearance that abnormality could result in more distal emboli.   This study was performed with techniques to keep radiation doses as low as reasonably achievable (ALARA). Individualized dose reduction techniques using automated exposure control or adjustment of vA and/or kV according to the patient size were employed.   This report was signed and finalized on 11/1/2024 12:58 AM by Sam Thakkar MD.      CT Angiogram Head    Result Date: 11/1/2024  PROCEDURE: CT ANGIOGRAM HEAD-  HISTORY: Dizziness and vertigo  TECHNIQUE: Thin section axial CT with contrast with multiplanar reconstruction.   3 D reconstructions were performed on a separate workstation.  FINDINGS:  Internal carotid arteries: The petrous, cavernous and postcavernous ICAs are unremarkable. No aneurysm is seen.  Anterior cerebral arteries: Central anterior cerebral arteries are unremarkable. No aneurysm is seen.  Middle cerebral arteries: Central middle cerebral arteries are unremarkable. No aneurysm is seen.  Basilar artery: Distal vertebral and basilar arteries are widely patent. No aneurysm is seen.  Posterior cerebral artery: Posterior cerebral arteries are patent centrally. No obvious aneurysm is seen.  Venous sinuses: Major venous sinuses  are patent.      No evidence of acute large vessel occlusive disease    This study was performed with techniques to keep radiation doses as low as reasonably achievable (ALARA). Individualized dose reduction techniques using automated exposure control or adjustment of vA and/or kV according to the patient size were employed.  This report was signed and finalized on 11/1/2024 12:53 AM by Sam Thakkar MD.      CT CEREBRAL PERFUSION WITH & WITHOUT CONTRAST    Result Date: 11/1/2024  CT cerebral perfusion, without and with contrast  HISTORY: Symptoms of CVA. Vertigo. Dizziness..  TECHNIQUE: CT brain perfusion with mapping of flow parameters including transit time, cerebral blood flow and cerebral blood volume. IV contrast was utilized.  FINDINGS:  Tissue with delayed blood, Tmax > 6 secs:  0 ml  Tissue with significantly reduced blood, rCBF < 30%:  0        1.  No evidence of acute large vessel occlusion  Should symptoms persist recommend follow-up MRI  This report was signed and finalized on 11/1/2024 12:52 AM by Sam Thakkar MD.      CT Head Without Contrast Stroke Protocol    Result Date: 11/1/2024  PROCEDURE: CT HEAD WO CONTRAST STROKE PROTOCOL-  HISTORY: Dizziness/vertigo, symptoms of acute CVA  COMPARISON: 7/20/2023  TECHNIQUE: Noncontrast exam  FINDINGS: Mild atrophy and chronic ischemic white matter changes are noted.  No cortical edema is present. There is no mass or hemorrhage. Ventricles are normal.  Bone windows show no skull fracture or obvious destructive lesion.      1. No acute intracranial abnormality or obvious mass. 2. Atrophy and chronic ischemic white matter changes as above.   This study was performed with techniques to keep radiation doses as low as reasonably achievable (ALARA). Individualized dose reduction techniques using automated exposure control or adjustment of vA and/or kV according to the patient size were employed.    This report was signed and finalized on 11/1/2024 12:51 AM by Sam  MD Ariane.       Assessment:    Stroke  Hypertensive urgency  Leukocytosis  Hypokalemia  Type 2 diabetes  CAD  Essential thrombocytosis  CKD stage III  Hypertension  Hyperlipidemia  Depression  Obesity    Plan:    Dizziness  Stroke  Hypertensive urgency  Teleneurology following  MRI brain without contrast  Echo with bubble study  A1c and lipid panel  Aspirin, Plavix, high intensity statin  Blood pressure control.  Restart home lisinopril  Neurochecks per protocol  PT/OT/SLP  Leukocytosis  Unclear cause of leukocytosis.  No infectious source identified.  Repeat with morning lab; could be secondary to hemoconcentration  Patient does have essential thrombocythemia for which she is on hydroxyurea  Hypokalemia  Replace per protocol  Type 2 diabetes  A1c pending  Sliding scale for correction  Diabetic education    Patient's clinical course dictates.  Continue patient's home medications as warranted.    Risk Assessment: High  DVT Prophylaxis: SCDs  Code Status: Full  Diet: As tolerated          Kevin Shannon DO  11/01/24  02:48 EDT    Dictated utilizing Dragon dictation.      Electronically signed by Kevin Shannon DO at 11/01/24 0637       Current Facility-Administered Medications   Medication Dose Route Frequency Provider Last Rate Last Admin    acetaminophen (TYLENOL) tablet 650 mg  650 mg Oral Q4H PRN Kevin Shannon DO   650 mg at 11/03/24 1639    Or    acetaminophen (TYLENOL) suppository 650 mg  650 mg Rectal Q4H PRN Kevin Shannon DO        amLODIPine (NORVASC) tablet 10 mg  10 mg Oral Q24H Jen Batres APRN   10 mg at 11/04/24 0826    aspirin EC tablet 325 mg  325 mg Oral Daily Luis Enrique Yuan MD   325 mg at 11/04/24 0825    atorvastatin (LIPITOR) tablet 80 mg  80 mg Oral Nightly Kevin Shannon DO   80 mg at 11/03/24 2224    carvedilol (COREG) tablet 12.5 mg  12.5 mg Oral Q12H Luis Enrique Yuan MD   12.5 mg at 11/04/24 0825    clopidogrel (PLAVIX) tablet 75 mg  75 mg Oral Daily Daryn Rodriguez  MD Maurisio   75 mg at 11/04/24 0825    dextrose (D50W) (25 g/50 mL) IV injection 25 g  25 g Intravenous Q15 Min PRN Kevin Shannon DO        dextrose (GLUTOSE) oral gel 15 g  15 g Oral Q15 Min PRN Kevin Shannon DO        glucagon (GLUCAGEN) injection 1 mg  1 mg Intramuscular Q15 Min PRN Kevin Shannon DO        hydrALAZINE (APRESOLINE) injection 10 mg  10 mg Intravenous Q6H PRN Kevin Shannon DO   10 mg at 11/01/24 0345    hydroxyurea (HYDREA) capsule 500 mg  500 mg Oral Daily Kevin Shannon DO   500 mg at 11/04/24 0825    Insulin Lispro (humaLOG) injection 2-7 Units  2-7 Units Subcutaneous 4x Daily AC & at Bedtime Kevin Shannon DO   2 Units at 11/01/24 2022    [Held by provider] lisinopril (PRINIVIL,ZESTRIL) tablet 40 mg  40 mg Oral Daily Kevin Shannon DO   40 mg at 11/03/24 0834    meclizine (ANTIVERT) tablet 25 mg  25 mg Oral Q8H MedardoJen APRN   25 mg at 11/04/24 1341    potassium chloride (KLOR-CON M20) CR tablet 40 mEq  40 mEq Oral Q4H MedardoJen APRN   40 mEq at 11/04/24 1341    Potassium Replacement - Follow Nurse / BPA Driven Protocol   Does not apply PRN Jen Batres APRN        prochlorperazine (COMPAZINE) injection 5 mg  5 mg Intravenous Q6H PRN Jen Batres APRN   5 mg at 11/01/24 0925    Or    prochlorperazine (COMPAZINE) tablet 5 mg  5 mg Oral Q6H PRN Jen Batres APRN   5 mg at 11/04/24 0825    Or    prochlorperazine (COMPAZINE) suppository 25 mg  25 mg Rectal Q12H PRN Jen Batres APRN        scopolamine patch 1 mg/72 hr  1 patch Transdermal Q72H MedardoJen lara APRN   1 patch at 11/04/24 0905    sodium chloride 0.9 % flush 10 mL  10 mL Intravenous PRN Robbi Ramos MD        sodium chloride 0.9 % flush 10 mL  10 mL Intravenous Q12H Kevin Shannon DO   10 mL at 11/03/24 2225    sodium chloride 0.9 % flush 10 mL  10 mL Intravenous PRN Kevin Shannon DO        sodium chloride 0.9 % infusion 40 mL  40 mL Intravenous PRN Kevin Shannon, DO         traZODone (DESYREL) tablet 25 mg  25 mg Oral Nightly PRN Reynaldo Sánchez Jr., MD   25 mg at 11/02/24 2143    vancomycin (VANCOCIN) capsule 125 mg  125 mg Oral Q6H Luis Enrique Yuan MD   125 mg at 11/04/24 1216        Physical Therapy Notes (most recent note)        Gissel Pablo PTA at 11/03/24 1617  Version 1 of 1         Pt reported being so sleepy and requested to wait until tomorrow. PT to f/u at later date      Electronically signed by Gissel Pablo PTA at 11/03/24 1730          Occupational Therapy Notes (most recent note)        Samra Pena OT at 11/01/24 1224          OT eval being held d/t dizziness, nausea and vomiting. Will follow up at later date.     Electronically signed by Samra Pena OT at 11/01/24 1222

## 2024-11-04 NOTE — THERAPY TREATMENT NOTE
Patient Name: Martha Murray  : 1955    MRN: [id number]                              Today's Date: 2024       Admit Date: 10/31/2024    Visit Dx:     ICD-10-CM ICD-9-CM   1. Vertigo  R42 780.4   2. Uncontrolled hypertension  I10 401.9   3. Noncompliance with medication regimen  Z91.148 V15.81   4. Thrombocytosis  D75.839 238.71   5. Palpitations  R00.2 785.1     Patient Active Problem List   Diagnosis    Essential hypertension    Mild intermittent asthma without complication    Type 2 diabetes mellitus without complication, without long-term current use of insulin    Hyperlipidemia LDL goal <70    Depression    Essential thrombocytosis    Onychomycosis with ingrown toenail    CKD (chronic kidney disease) stage 3, GFR 30-59 ml/min    Obesity (BMI 30.0-34.9)    Coronary artery disease involving native coronary artery of native heart with angina pectoris    S/P total knee arthroplasty, right    Arthritis of knee    Stroke     Past Medical History:   Diagnosis Date    HAMMAD (acute kidney injury) 2021    Allergic     Anemia     Anesthesia complication     Slow to wake    Arthritis     COPD (chronic obstructive pulmonary disease)     Depression     Diabetes mellitus     History of heart attack     Hyperlipidemia     Hypertension      Past Surgical History:   Procedure Laterality Date    BILATERAL BREAST REDUCTION      BONE MARROW ASPIRATION      CATARACT EXTRACTION Bilateral     COLONOSCOPY      HYSTERECTOMY  1990    x 2    REDUCTION MAMMAPLASTY Bilateral     MORE THAN 20 YEARS AGO    SINUS SURGERY  2003    TONSILLECTOMY      TOTAL KNEE ARTHROPLASTY Right 7/3/2024    Procedure: TOTAL KNEE ARTHROPLASTY WITH NAINA ROBOT RIGHT;  Surgeon: Keaton Aldrich MD;  Location: [facility];  Service: Robotics - Ortho;  Laterality: Right;      General Information       Row Name 24 1622          Physical Therapy Time and Intention    Document Type therapy note (daily note)  -     Mode of Treatment physical  therapy  -RM       Row Name 11/04/24 1622          General Information    Patient Profile Reviewed yes  -RM     Existing Precautions/Restrictions fall;cardiac  -RM       Row Name 11/04/24 1622          Cognition    Orientation Status (Cognition) oriented x 4  -RM       Row Name 11/04/24 1622          Safety Issues/Impairments Affecting Functional Mobility    Safety Issues Affecting Function (Mobility) safety precaution awareness;safety precautions follow-through/compliance;insight into deficits/self-awareness  -RM     Impairments Affecting Function (Mobility) endurance/activity tolerance;balance;strength  -RM               User Key  (r) = Recorded By, (t) = Taken By, (c) = Cosigned By      Initials Name Provider Type    Luiz Tam, WALTER Physical Therapist Assistant                   Mobility       Row Name 11/04/24 1623          Bed Mobility    Supine-Sit [place] (Bed Mobility) modified independence;verbal cues  -RM     Assistive Device (Bed Mobility) bed rails;head of bed elevated  -RM       Row Name 11/04/24 1623          Sit-Stand Transfer    Sit-Stand [place] (Transfers) contact guard  -RM     Assistive Device (Sit-Stand Transfers) walker, front-wheeled  -RM       Row Name 11/04/24 1623          Gait/Stairs (Locomotion)    [place] Level (Gait) minimum assist (75% patient effort);verbal cues;nonverbal cues (demo/gesture)  -RM     Assistive Device (Gait) walker, front-wheeled  -RM     Distance in Feet (Gait) 50  -RM     Deviations/Abnormal Patterns (Gait) ataxic  -RM     Right Sided Gait Deviations weight shift ability decreased;leans right  -RM               User Key  (r) = Recorded By, (t) = Taken By, (c) = Cosigned By      Initials Name Provider Type    Luiz Tam, WALTER Physical Therapist Assistant                   Obj/Interventions    No documentation.                  Goals/Plan    No documentation.                  Clinical Impression       Row Name 11/04/24 1624           Pain    Pretreatment Pain Rating 0/10 - no pain  -RM     Posttreatment Pain Rating 0/10 - no pain  -RM       Row Name 11/04/24 1624          Plan of Care Review    Plan of Care Reviewed With patient;child  -RM     Progress improving  -RM     Outcome Evaluation Pt  supine in bed and willing to participate with treatment.  Pt performed bed mobility,transfers and gait training this treatment.  Pt continues to present with R lateral lean during gait.  Pt with decreased assistance required for bed mobility and standing this treatment. See flowsheet for details. Cont PT per POC progressing to goals as pt tolerates.  -RM       Row Name 11/04/24 1624          Positioning and Restraints    Pre-Treatment Position in bed  -RM     Post Treatment Position chair  -RM     In Chair reclined;call light within reach;encouraged to call for assist;with family/caregiver;notified nsg  -               User Key  (r) = Recorded By, (t) = Taken By, (c) = Cosigned By      Initials Name Provider Type    RM Luiz Malone, PTA Physical Therapist Assistant                   Outcome Measures       Row Name 11/04/24 1631 11/04/24 0800       How much help from another person do you currently need...    Turning from your back to your side while in flat bed without using bedrails? 4  -RM 4  -EW    Moving from lying on back to sitting on the side of a flat bed without bedrails? 4  -RM 4  -EW    Moving to and from a bed to a chair (including a wheelchair)? 3  -RM 3  -EW    Standing up from a chair using your arms (e.g., wheelchair, bedside chair)? 3  -RM 3  -EW    Climbing 3-5 steps with a railing? 2  -RM 3  -EW    To walk in hospital room? 3  -RM 3  -EW    AM-PAC 6 Clicks Score (PT) 19  -RM 20  -EW    Highest Level of Mobility Goal 6 --> Walk 10 steps or more  -RM 6 --> Walk 10 steps or more  -EW      Row Name 11/04/24 1631 11/04/24 1427       Functional Assessment    Outcome Measure Options AM-PAC 6 Clicks Basic Mobility (PT)  -RM AM-PAC 6  Clicks Daily Activity (OT)  -              User Key  (r) = Recorded By, (t) = Taken By, (c) = Cosigned By      Initials Name Provider Type     Nanda Swanson Occupational Therapist     Luiz Malone, WALTER Physical Therapist Assistant    Lupis Crandall RN Registered Nurse                                 Physical Therapy Education       Title: PT OT SLP Therapies (In Progress)       Topic: Physical Therapy (In Progress)       Point: Mobility training (Done)       Learning Progress Summary            Patient Acceptance, E,TB,D, VU,NR by  at 11/4/2024 1631                      Point: Home exercise program (Not Started)       Learner Progress:  Not documented in this visit.              Point: Body mechanics (Done)       Learning Progress Summary            Patient Acceptance, E,D, VU,DU by  at 11/2/2024 1044    Comment: Pt education for using vision to assist with balance and improving dizziness with mobility.                      Point: Precautions (Not Started)       Learner Progress:  Not documented in this visit.                              User Key       Initials Effective Dates Name Provider Type Discipline     06/16/21 -  Luiz Malone, WALTER Physical Therapist Assistant PT     06/16/21 -  Sondra Oropeza PT Physical Therapist PT                  PT Recommendation and Plan     Progress: improving  Outcome Evaluation: Pt  supine in bed and willing to participate with treatment.  Pt performed bed mobility,transfers and gait training this treatment.  Pt continues to present with R lateral lean during gait.  Pt with decreased assistance required for bed mobility and standing this treatment. See flowsheet for details. Cont PT per POC progressing to goals as pt tolerates.     Time Calculation:         PT Charges       Row Name 11/04/24 1632             Time Calculation    Start Time 1558  -RM      Stop Time 1621  -RM      Time Calculation (min) 23 min  -RM      PT Received On 11/04/24  -RM       PT Goal Re-Cert Due Date 11/12/24  -RM         Time Calculation- PT    Total Timed Code Minutes- PT 23 minute(s)  -RM         Timed Charges    99926 - Gait Training Minutes  13  -RM      13069 - PT Therapeutic Activity Minutes 10  -RM         Total Minutes    Timed Charges Total Minutes 23  -RM       Total Minutes 23  -RM                User Key  (r) = Recorded By, (t) = Taken By, (c) = Cosigned By      Initials Name Provider Type    Liuz Tam, PTA Physical Therapist Assistant                  Therapy Charges for Today       Code Description Service Date Service Provider Modifiers Qty    41996985762 HC GAIT TRAINING EA 15 MIN 11/4/2024 Luiz Malone, PTA GP 1    06126994861 HC PT THERAPEUTIC ACT EA 15 MIN 11/4/2024 Luiz Malone, PTA GP 1            PT G-Codes  Outcome Measure Options: AM-PAC 6 Clicks Basic Mobility (PT)  AM-PAC 6 Clicks Score (PT): 19  AM-PAC 6 Clicks Score (OT): 17       Luiz Malone PTA  11/4/2024

## 2024-11-04 NOTE — PLAN OF CARE
Goal Outcome Evaluation:  Plan of Care Reviewed With: patient, child        Progress: improving  Outcome Evaluation: Pt  supine in bed and willing to participate with treatment.  Pt performed bed mobility,transfers and gait training this treatment.  Pt continues to present with R lateral lean during gait.  Pt with decreased assistance required for bed mobility and standing this treatment. See flowsheet for details. Cont PT per POC progressing to goals as pt tolerates.

## 2024-11-04 NOTE — PLAN OF CARE
Goal Outcome Evaluation:  Plan of Care Reviewed With: patient        Progress: no change  Outcome Evaluation: Pt seen for OT evaluation today.  Pt reports she lives alone and is normally able to care for herself without any difficulty.  Pt received today sidelying in bed, she sat eob with min assist and c/o dizziness when laying and sitting eob.  Pt stood with min assist and walked 2' to head of bed with min assist for weight shifting when trying to move her R leg.  Pt was able to get back into bed without assistance.  Pt reported she needed to use the bathroom and was able to sit eob this time with supervision and use of bed rail.  Pt stood with min assist and walked 2' to bedside commode with min assist.  Pt mod assist for toileting hygiene tasks.  Min assist to stand and walk back to her bed.  Pt needs min assist for bathing, dressing tasks, set up form grooming and feeding.  Pt is expected to benefit from skilled OT to improve her strength, coordination of her R arm, and independence with ADL tasks.  Pt would benefit from acute rehab upon d/c from [facility].    Anticipated Discharge Disposition (OT): inpatient rehabilitation facility

## 2024-11-04 NOTE — CASE MANAGEMENT/SOCIAL WORK
Case Management/Social Work    Patient Name:  Martha Murray  YOB: 1955  MRN: [id number]  Admit Date:  10/31/2024        [facility] sent referrals for STR to [facility] per request. [facility] following.       16:44 EST [facility] can offer bed. [facility] following.     Electronically signed by:  RAJINDER Ashraf  11/04/24 13:45 EST

## 2024-11-04 NOTE — THERAPY EVALUATION
Patient Name: Martha Murray  : 1955    MRN: [id number]                              Today's Date: 2024       Admit Date: 10/31/2024    Visit Dx:     ICD-10-CM ICD-9-CM   1. Vertigo  R42 780.4   2. Uncontrolled hypertension  I10 401.9   3. Noncompliance with medication regimen  Z91.148 V15.81   4. Thrombocytosis  D75.839 238.71   5. Palpitations  R00.2 785.1     Patient Active Problem List   Diagnosis    Essential hypertension    Mild intermittent asthma without complication    Type 2 diabetes mellitus without complication, without long-term current use of insulin    Hyperlipidemia LDL goal <70    Depression    Essential thrombocytosis    Onychomycosis with ingrown toenail    CKD (chronic kidney disease) stage 3, GFR 30-59 ml/min    Obesity (BMI 30.0-34.9)    Coronary artery disease involving native coronary artery of native heart with angina pectoris    S/P total knee arthroplasty, right    Arthritis of knee    Stroke     Past Medical History:   Diagnosis Date    HAMMAD (acute kidney injury) 2021    Allergic     Anemia     Anesthesia complication     Slow to wake    Arthritis     COPD (chronic obstructive pulmonary disease)     Depression     Diabetes mellitus     History of heart attack     Hyperlipidemia     Hypertension      Past Surgical History:   Procedure Laterality Date    BILATERAL BREAST REDUCTION      BONE MARROW ASPIRATION      CATARACT EXTRACTION Bilateral     COLONOSCOPY      HYSTERECTOMY  1990    x 2    REDUCTION MAMMAPLASTY Bilateral     MORE THAN 20 YEARS AGO    SINUS SURGERY  2003    TONSILLECTOMY      TOTAL KNEE ARTHROPLASTY Right 7/3/2024    Procedure: TOTAL KNEE ARTHROPLASTY WITH NAINA ROBOT RIGHT;  Surgeon: Keaton Aldrich MD;  Location: [facility];  Service: Robotics - Ortho;  Laterality: Right;      General Information       Row Name 24 1413          OT Time and Intention    Subjective Information complains of;dizziness  -AH     Document Type evaluation  -      Mode of Treatment occupational therapy  -     Patient Effort good  -     Symptoms Noted During/After Treatment dizziness;fatigue  -[facility] Name 11/04/24 1413          General Information    Patient Profile Reviewed yes  -     Prior Level of Function independent:;ADL's;community mobility  -     Existing Precautions/Restrictions fall;cardiac  -     Barriers to Rehab medically complex  -[facility] Name 11/04/24 1413          Occupational Profile    Reason for Services/Referral (Occupational Profile) ADL decline  -[facility] Name 11/04/24 1413          Living Environment    People in Home alone  -[facility] Name 11/04/24 1413          Home Main Entrance    Number of Stairs, Main Entrance one  -     Stair Railings, Main Entrance none  -[facility] Name 11/04/24 1413          Stairs Within Home, Primary    Number of Stairs, Within Home, Primary none  -[facility] Name 11/04/24 1413          Cognition    Orientation Status (Cognition) oriented x 4  -[facility] Name 11/04/24 1413          Safety Issues/Impairments Affecting Functional Mobility    Safety Issues Affecting Function (Mobility) safety precaution awareness;safety precautions follow-through/compliance;insight into deficits/self-awareness  -     Impairments Affecting Function (Mobility) endurance/activity tolerance;balance;strength  -               User Key  (r) = Recorded By, (t) = Taken By, (c) = Cosigned By      Initials Name Provider Type     Nanda Swanson Occupational Therapist                     Mobility/ADL's       [facility] Name 11/04/24 1414          Bed Mobility    Supine-Sit [place] (Bed Mobility) contact guard  -     Sit-Supine [place] (Bed Mobility) standby assist  -     Assistive Device (Bed Mobility) bed rails;head of bed elevated  -[facility] Name 11/04/24 1414          Transfers    Transfers sit-stand transfer;toilet transfer  -[facility] Name 11/04/24 1414          Sit-Stand Transfer    Sit-Stand  [place] (Transfers) contact guard  -     Assistive Device (Sit-Stand Transfers) other (see comments)  gait belt and HHA  -[facility] Name 11/04/24 1414          Toilet Transfer    Type (Toilet Transfer) sit-stand;stand-sit;stand pivot/stand step  -     [place] Level (Toilet Transfer) contact guard;minimum assist (75% patient effort)  -     Assistive Device (Toilet Transfer) other (see comments)  HHA and gait belt  -[facility] Name 11/04/24 1414          Functional Mobility    Functional Mobility- Ind. Level contact guard assist;minimum assist (75% patient effort)  -     Functional Mobility- Device walker, front-wheeled  -     Functional Mobility-Distance (Feet) 2  -     Functional Mobility- Comment pt walked 2' to head of bed and then walked 2' to bedside commode and 2' back to bed.  -     Patient was able to Ambulate yes  -AH       Row Name 11/04/24 1414          Activities of Daily Living    BADL Assessment/Intervention bathing;upper body dressing;lower body dressing;grooming;feeding;toileting  -AH       Row Name 11/04/24 1414          Bathing Assessment/Intervention    [place] Level (Bathing) minimum assist (75% patient effort)  -AH       Row Name 11/04/24 1414          Upper Body Dressing Assessment/Training    [place] Level (Upper Body Dressing) contact guard assist  -[facility] Name 11/04/24 1414          Lower Body Dressing Assessment/Training    [place] Level (Lower Body Dressing) contact guard assist  -[facility] Name 11/04/24 1414          Grooming Assessment/Training    [place] Level (Grooming) set up  -AH       Row Name 11/04/24 1414          Self-Feeding Assessment/Training    [place] Level (Feeding) set up  -AH       Row Name 11/04/24 1414          Toileting Assessment/Training    [place] Level (Toileting) moderate assist (50% patient effort)  -               User Key  (r) = Recorded By, (t) = Taken By, (c) = Cosigned By      Initials Name  Provider Type    Nanda Mancuso Occupational Therapist                   Obj/Interventions       [facility] Name 11/04/24 1420          Sensory Assessment (Somatosensory)    Sensory Assessment pt reports no changes in her UE sensation  -AH       Row Name 11/04/24 1420          Vision Assessment/Intervention    Visual Impairment/Limitations L  -AH       Row Name 11/04/24 1420          Range of Motion Comprehensive    General Range of Motion bilateral upper extremity ROM L  -AH       Row Name 11/04/24 1420          Strength Comprehensive (MMT)    Comment, General Manual Muscle Testing (MMT) Assessment BUE 4/5  -               User Key  (r) = Recorded By, (t) = Taken By, (c) = Cosigned By      Initials Name Provider Type    Nanda Mancuso Occupational Therapist                   Goals/Plan       [facility] Name 11/04/24 1426          Bed Mobility Goal 1 (OT)    Activity/Assistive Device (Bed Mobility Goal 1, OT) bed mobility activities, all  -     [place] Level/Cues Needed (Bed Mobility Goal 1, OT) standby assist  -     Time Frame (Bed Mobility Goal 1, OT) by discharge  -     Progress/Outcomes (Bed Mobility Goal 1, OT) goal ongoing  -AH       Row Name 11/04/24 1426          Transfer Goal 1 (OT)    Activity/Assistive Device (Transfer Goal 1, OT) sit-to-stand/stand-to-sit;walker, rolling  -     [place] Level/Cues Needed (Transfer Goal 1, OT) standby assist  -     Time Frame (Transfer Goal 1, OT) long term goal (LTG);1 week  -     Progress/Outcome (Transfer Goal 1, OT) goal ongoing  -AH       Row Name 11/04/24 1426          Bathing Goal 1 (OT)    Activity/Device (Bathing Goal 1, OT) bathing skills, all  -     [place] Level/Cues Needed (Bathing Goal 1, OT) set-up required  -     Time Frame (Bathing Goal 1, OT) by discharge  -     Progress/Outcomes (Bathing Goal 1, OT) goal ongoing  -AH       Row Name 11/04/24 1426          Dressing Goal 1 (OT)    Activity/Device (Dressing Goal 1, OT) lower  body dressing  -     [place]/Cues Needed (Dressing Goal 1, OT) set-up required  -     Time Frame (Dressing Goal 1, OT) long term goal (LTG);5 days  -     Progress/Outcome (Dressing Goal 1, OT) goal ongoing  -[facility] Name 11/04/24 1426          Toileting Goal 1 (OT)    Activity/Device (Toileting Goal 1, OT) toileting skills, all  -     [place] Level/Cues Needed (Toileting Goal 1, OT) supervision required  -     Time Frame (Toileting Goal 1, OT) by discharge  -     Progress/Outcome (Toileting Goal 1, OT) goal ongoing  -[facility] Name 11/04/24 1426          Strength Goal 1 (OT)    Strength Goal 1 (OT) Pt will perform UB strengthening ex using theraband for resistance.  -     Time Frame (Strength Goal 1, OT) by discharge  -     Progress/Outcome (Strength Goal 1, OT) goal ongoing  -[facility] Name 11/04/24 1426          Therapy Assessment/Plan (OT)    Planned Therapy Interventions (OT) activity tolerance training;BADL retraining;functional balance retraining;neuromuscular control/coordination retraining;patient/caregiver education/training;transfer/mobility retraining;strengthening exercise  -               User Key  (r) = Recorded By, (t) = Taken By, (c) = Cosigned By      Initials Name Provider Type    Nanda Mancuso Occupational Therapist                   Clinical Impression       [facility] Name 11/04/24 1420          Pain Assessment    Pretreatment Pain Rating 0/10 - no pain  -     Posttreatment Pain Rating 0/10 - no pain  -     Pain Management Interventions exercise or physical activity utilized  -[facility] Name 11/04/24 1420          Plan of Care Review    Plan of Care Reviewed With patient  -     Progress no change  -     Outcome Evaluation Pt seen for OT evaluation today.  Pt reports she lives alone and is normally able to care for herself without any difficulty.  Pt received today sidelying in bed, she sat eob with min assist and c/o dizziness when laying and  sitting eob.  Pt stood with min assist and walked 2' to head of bed with min assist for weight shifting when trying to move her R leg.  Pt was able to get back into bed without assistance.  Pt reported she needed to use the bathroom and was able to sit eob this time with supervision and use of bed rail.  Pt stood with min assist and walked 2' to bedside commode with min assist.  Pt mod assist for toileting hygiene tasks.  Min assist to stand and walk back to her bed.  Pt needs min assist for bathing, dressing tasks, set up form grooming and feeding.  Pt is expected to benefit from skilled OT to improve her strength, coordination of her R arm, and independence with ADL tasks.  Pt would benefit from acute rehab upon d/c from [facility].  -       Row Name 11/04/24 1420          Therapy Assessment/Plan (OT)    Patient/Family Therapy Goal Statement (OT) d/c rehab  -     Rehab Potential (OT) good  -     Criteria for Skilled Therapeutic Interventions Met (OT) yes;skilled treatment is necessary  -     Therapy Frequency (OT) 5 times/wk  -       Row Name 11/04/24 1420          Therapy Plan Review/Discharge Plan (OT)    Anticipated Discharge Disposition (OT) inpatient rehabilitation facility  -       Row Name 11/04/24 1420          Positioning and Restraints    Pre-Treatment Position in bed  -     Post Treatment Position bed  -     In Bed supine;call light within reach;encouraged to call for assist;notified [facility]               User Key  (r) = Recorded By, (t) = Taken By, (c) = Cosigned By      Initials Name Provider Type    Nanda Mancuso Occupational Therapist                   Outcome Measures       Row Name 11/04/24 1208          How much help from another is currently needed...    Putting on and taking off regular lower body clothing? 3  -AH     Bathing (including washing, rinsing, and drying) 3  -AH     Toileting (which includes using toilet bed pan or urinal) 2  -AH     Putting on and taking off regular  upper body clothing 3  -     Taking care of personal grooming (such as brushing teeth) 3  -     Eating meals 3  -     AM-PAC 6 Clicks Score (OT) 17  -       Row Name 11/04/24 0800          How much help from another person do you currently need...    Turning from your back to your side while in flat bed without using bedrails? 4  -EW     Moving from lying on back to sitting on the side of a flat bed without bedrails? 4  -EW     Moving to and from a bed to a chair (including a wheelchair)? 3  -EW     Standing up from a chair using your arms (e.g., wheelchair, bedside chair)? 3  -EW     Climbing 3-5 steps with a railing? 3  -EW     To walk in hospital room? 3  -EW     AM-PAC 6 Clicks Score (PT) 20  -EW     Highest Level of Mobility Goal 6 --> Walk 10 steps or more  -       Row Name 11/04/24 1427          Functional Assessment    Outcome Measure Options AM-PAC 6 Clicks Daily Activity (OT)  -               User Key  (r) = Recorded By, (t) = Taken By, (c) = Cosigned By      Initials Name Provider Type     Nanda Swanson Occupational Therapist    Lpuis Crandall, RN Registered Nurse                    Occupational Therapy Education       Title: PT OT SLP Therapies (In Progress)       Topic: Occupational Therapy (In Progress)       Point: ADL training (Done)       Description:   Instruct learner(s) on proper safety adaptation and remediation techniques during self care or transfers.   Instruct in proper use of assistive devices.                  Learning Progress Summary            Patient Acceptance, E,TB, VU by  at 11/4/2024 5914    Comment: Role of OT/POC                      Point: Home exercise program (Not Started)       Description:   Instruct learner(s) on appropriate technique for monitoring, assisting and/or progressing therapeutic exercises/activities.                  Learner Progress:  Not documented in this visit.              Point: Precautions (Not Started)       Description:    Instruct learner(s) on prescribed precautions during self-care and functional transfers.                  Learner Progress:  Not documented in this visit.              Point: Body mechanics (Not Started)       Description:   Instruct learner(s) on proper positioning and spine alignment during self-care, functional mobility activities and/or exercises.                  Learner Progress:  Not documented in this visit.                              User Key       Initials Effective Dates Name Provider Type Discipline     06/16/21 -  Nanda Swanson Occupational Therapist OT                  OT Recommendation and Plan  Planned Therapy Interventions (OT): activity tolerance training, BADL retraining, functional balance retraining, neuromuscular control/coordination retraining, patient/caregiver education/training, transfer/mobility retraining, strengthening exercise  Therapy Frequency (OT): 5 times/wk  Plan of Care Review  Plan of Care Reviewed With: patient  Progress: no change  Outcome Evaluation: Pt seen for OT evaluation today.  Pt reports she lives alone and is normally able to care for herself without any difficulty.  Pt received today sidelying in bed, she sat eob with min assist and c/o dizziness when laying and sitting eob.  Pt stood with min assist and walked 2' to head of bed with min assist for weight shifting when trying to move her R leg.  Pt was able to get back into bed without assistance.  Pt reported she needed to use the bathroom and was able to sit eob this time with supervision and use of bed rail.  Pt stood with min assist and walked 2' to bedside commode with min assist.  Pt mod assist for toileting hygiene tasks.  Min assist to stand and walk back to her bed.  Pt needs min assist for bathing, dressing tasks, set up form grooming and feeding.  Pt is expected to benefit from skilled OT to improve her strength, coordination of her R arm, and independence with ADL tasks.  Pt would benefit from acute  rehab upon d/c from [facility].     Time Calculation:   Evaluation Complexity (OT)  Review Occupational Profile/Medical/Therapy History Complexity: expanded/moderate complexity  Assessment, Occupational Performance/Identification of Deficit Complexity: 3-5 performance deficits  Clinical Decision Making Complexity (OT): detailed assessment/moderate complexity  Overall Complexity of Evaluation (OT): moderate complexity     Time Calculation- OT       Row Name 11/04/24 1113             Time Calculation- OT    OT Start Time 1113  -AH      OT Received On 11/04/24  -      OT Goal Re-Cert Due Date 11/14/24  -         Untimed Charges    OT Eval/Re-eval Minutes 58  -AH         Total Minutes    Untimed Charges Total Minutes 58  -AH       Total Minutes 58  -AH                User Key  (r) = Recorded By, (t) = Taken By, (c) = Cosigned By      Initials Name Provider Type    Nanda Mancuso Occupational Therapist                  Therapy Charges for Today       Code Description Service Date Service Provider Modifiers Qty    [id number] HC OT EVAL MOD COMPLEXITY 4 11/4/2024 Nanda Swanson GO 1                 Nanda Swanson  11/4/2024

## 2024-11-04 NOTE — PLAN OF CARE
Problem: Adult Inpatient Plan of Care  Goal: Plan of Care Review  Outcome: Progressing  Flowsheets (Taken 11/4/2024 6937)  Progress: improving  Plan of Care Reviewed With: patient   Goal Outcome Evaluation:  Plan of Care Reviewed With: patient        Progress: improving

## 2024-11-04 NOTE — CASE MANAGEMENT/SOCIAL WORK
1330: CM spoke with pt at bedside. Pt gave list for [facility] referral for STR. SW updated.

## 2024-11-05 VITALS
BODY MASS INDEX: 30.93 KG/M2 | TEMPERATURE: 98.3 F | HEART RATE: 77 BPM | HEIGHT: 61 IN | OXYGEN SATURATION: 98 % | SYSTOLIC BLOOD PRESSURE: 119 MMHG | WEIGHT: 163.8 LBS | DIASTOLIC BLOOD PRESSURE: 64 MMHG | RESPIRATION RATE: 18 BRPM

## 2024-11-05 LAB
ANION GAP SERPL CALCULATED.3IONS-SCNC: 10.6 MMOL/L (ref 5–15)
BASOPHILS # BLD AUTO: 0.09 10*3/MM3 (ref 0–0.2)
BASOPHILS NFR BLD AUTO: 0.8 % (ref 0–1.5)
BUN SERPL-MCNC: 25 MG/DL (ref 8–23)
BUN/CREAT SERPL: 17.5 (ref 7–25)
CALCIUM SPEC-SCNC: 8.9 MG/DL (ref 8.6–10.5)
CHLORIDE SERPL-SCNC: 109 MMOL/L (ref 98–107)
CO2 SERPL-SCNC: 22.4 MMOL/L (ref 22–29)
CREAT SERPL-MCNC: 1.43 MG/DL (ref 0.57–1)
DEPRECATED RDW RBC AUTO: 49.5 FL (ref 37–54)
EGFRCR SERPLBLD CKD-EPI 2021: 40 ML/MIN/1.73
EOSINOPHIL # BLD AUTO: 0.47 10*3/MM3 (ref 0–0.4)
EOSINOPHIL NFR BLD AUTO: 3.9 % (ref 0.3–6.2)
ERYTHROCYTE [DISTWIDTH] IN BLOOD BY AUTOMATED COUNT: 13.6 % (ref 12.3–15.4)
GLUCOSE BLDC GLUCOMTR-MCNC: 116 MG/DL (ref 70–130)
GLUCOSE BLDC GLUCOMTR-MCNC: 144 MG/DL (ref 70–130)
GLUCOSE SERPL-MCNC: 122 MG/DL (ref 65–99)
HCT VFR BLD AUTO: 46.3 % (ref 34–46.6)
HGB BLD-MCNC: 15 G/DL (ref 12–15.9)
IMM GRANULOCYTES # BLD AUTO: 0.05 10*3/MM3 (ref 0–0.05)
IMM GRANULOCYTES NFR BLD AUTO: 0.4 % (ref 0–0.5)
LYMPHOCYTES # BLD AUTO: 1.34 10*3/MM3 (ref 0.7–3.1)
LYMPHOCYTES NFR BLD AUTO: 11.2 % (ref 19.6–45.3)
MCH RBC QN AUTO: 31.8 PG (ref 26.6–33)
MCHC RBC AUTO-ENTMCNC: 32.4 G/DL (ref 31.5–35.7)
MCV RBC AUTO: 98.1 FL (ref 79–97)
MONOCYTES # BLD AUTO: 0.94 10*3/MM3 (ref 0.1–0.9)
MONOCYTES NFR BLD AUTO: 7.8 % (ref 5–12)
NEUTROPHILS NFR BLD AUTO: 75.9 % (ref 42.7–76)
NEUTROPHILS NFR BLD AUTO: 9.11 10*3/MM3 (ref 1.7–7)
NRBC BLD AUTO-RTO: 0 /100 WBC (ref 0–0.2)
PLATELET # BLD AUTO: 617 10*3/MM3 (ref 140–450)
PMV BLD AUTO: 10.4 FL (ref 6–12)
POTASSIUM SERPL-SCNC: 4.1 MMOL/L (ref 3.5–5.2)
POTASSIUM SERPL-SCNC: 4.6 MMOL/L (ref 3.5–5.2)
RBC # BLD AUTO: 4.72 10*6/MM3 (ref 3.77–5.28)
SODIUM SERPL-SCNC: 142 MMOL/L (ref 136–145)
WBC NRBC COR # BLD AUTO: 12 10*3/MM3 (ref 3.4–10.8)

## 2024-11-05 PROCEDURE — 99239 HOSP IP/OBS DSCHRG MGMT >30: CPT | Performed by: NURSE PRACTITIONER

## 2024-11-05 PROCEDURE — 84132 ASSAY OF SERUM POTASSIUM: CPT | Performed by: NURSE PRACTITIONER

## 2024-11-05 PROCEDURE — 80048 BASIC METABOLIC PNL TOTAL CA: CPT | Performed by: NURSE PRACTITIONER

## 2024-11-05 PROCEDURE — 85025 COMPLETE CBC W/AUTO DIFF WBC: CPT | Performed by: NURSE PRACTITIONER

## 2024-11-05 PROCEDURE — 82948 REAGENT STRIP/BLOOD GLUCOSE: CPT | Performed by: INTERNAL MEDICINE

## 2024-11-05 RX ORDER — PROCHLORPERAZINE MALEATE 5 MG/1
5 TABLET ORAL EVERY 6 HOURS PRN
Qty: 30 TABLET | Refills: 0 | Status: SHIPPED | OUTPATIENT
Start: 2024-11-05

## 2024-11-05 RX ORDER — SCOLOPAMINE TRANSDERMAL SYSTEM 1 MG/1
1 PATCH, EXTENDED RELEASE TRANSDERMAL
Qty: 10 PATCH | Refills: 0 | Status: SHIPPED | OUTPATIENT
Start: 2024-11-07

## 2024-11-05 RX ORDER — VANCOMYCIN HYDROCHLORIDE 125 MG/1
125 CAPSULE ORAL EVERY 6 HOURS SCHEDULED
Qty: 33 CAPSULE | Refills: 0 | Status: SHIPPED | OUTPATIENT
Start: 2024-11-05 | End: 2024-11-14

## 2024-11-05 RX ORDER — AMLODIPINE BESYLATE 10 MG/1
10 TABLET ORAL
Qty: 30 TABLET | Refills: 0 | Status: SHIPPED | OUTPATIENT
Start: 2024-11-06

## 2024-11-05 RX ORDER — CARVEDILOL 12.5 MG/1
12.5 TABLET ORAL EVERY 12 HOURS SCHEDULED
Qty: 60 TABLET | Refills: 0 | Status: SHIPPED | OUTPATIENT
Start: 2024-11-05

## 2024-11-05 RX ORDER — ATORVASTATIN CALCIUM 80 MG/1
80 TABLET, FILM COATED ORAL NIGHTLY
Qty: 90 TABLET | Refills: 0 | Status: SHIPPED | OUTPATIENT
Start: 2024-11-05

## 2024-11-05 RX ORDER — MECLIZINE HYDROCHLORIDE 25 MG/1
25 TABLET ORAL EVERY 8 HOURS SCHEDULED
Qty: 30 TABLET | Refills: 0 | Status: SHIPPED | OUTPATIENT
Start: 2024-11-05

## 2024-11-05 RX ORDER — LISINOPRIL 40 MG/1
40 TABLET ORAL DAILY
Start: 2024-11-05

## 2024-11-05 RX ORDER — ASPIRIN 325 MG
325 TABLET, DELAYED RELEASE (ENTERIC COATED) ORAL DAILY
Qty: 21 TABLET | Refills: 0 | Status: SHIPPED | OUTPATIENT
Start: 2024-11-06 | End: 2024-11-27

## 2024-11-05 RX ORDER — CLOPIDOGREL BISULFATE 75 MG/1
75 TABLET ORAL DAILY
Qty: 30 TABLET | Refills: 0 | Status: SHIPPED | OUTPATIENT
Start: 2024-11-06

## 2024-11-05 RX ADMIN — Medication 10 ML: at 09:25

## 2024-11-05 RX ADMIN — VANCOMYCIN HYDROCHLORIDE 125 MG: 125 CAPSULE ORAL at 06:02

## 2024-11-05 RX ADMIN — MECLIZINE HYDROCHLORIDE 25 MG: 25 TABLET ORAL at 06:03

## 2024-11-05 RX ADMIN — CARVEDILOL 12.5 MG: 12.5 TABLET, FILM COATED ORAL at 09:24

## 2024-11-05 RX ADMIN — CLOPIDOGREL BISULFATE 75 MG: 75 TABLET ORAL at 09:24

## 2024-11-05 RX ADMIN — AMLODIPINE BESYLATE 10 MG: 5 TABLET ORAL at 09:24

## 2024-11-05 RX ADMIN — ASPIRIN 325 MG: 325 TABLET, COATED ORAL at 09:24

## 2024-11-05 RX ADMIN — VANCOMYCIN HYDROCHLORIDE 125 MG: 125 CAPSULE ORAL at 12:05

## 2024-11-05 RX ADMIN — HYDROXYUREA 500 MG: 500 CAPSULE ORAL at 09:25

## 2024-11-05 NOTE — CASE MANAGEMENT/SOCIAL WORK
Case Management Discharge Note      Final Note: Patient plans to return home where she lives alone    Provided Post Acute Provider List?: N/A  N/A Provider List Comment: Patient plans to return home; no new needs at this time  Provided Post Acute Provider Quality & Resource List?: N/A  N/A Quality & Resource List Comment: Patient plans to return home; no new needs at this time    Selected Continued Care - Admitted Since 10/31/2024       Destination Coordination complete.      Service Provider Services Address Phone Fax Patient Preferred    [facility] [address] [address] [phone] --              Durable Medical Equipment    No services have been selected for the patient.                Dialysis/Infusion    No services have been selected for the patient.                Home Medical Care    No services have been selected for the patient.                Therapy    No services have been selected for the patient.                Community Resources    No services have been selected for the patient.                Community & DME    No services have been selected for the patient.                    Selected Continued Care - Episodes Includes continued care and service providers with selected services from the active episodes listed below      Oncology- External Fill Episode start date: 11/8/2023   There are no active outsourced providers for this episode.                 Transportation Services  Ambulance: [facility]    Final Discharge Disposition Code: 03 - skilled nursing facility (SNF)

## 2024-11-05 NOTE — PLAN OF CARE
Goal Outcome Evaluation: Patient being discharged to [facility] today

## 2024-11-05 NOTE — PAYOR COMM NOTE
"To:  [place]  From: Loly Jean Baptiste RN  Phone: [phone]  Fax: [phone]  NPI: [id number]  TIN: [id number]  Member ID: [id number]   MRN: [id number]    Martha Benitez (69 y.o. Female)       Date of Birth   1955    Social Security Number       Address   [facility] [address] [place] [address]    Home Phone   [phone]    MRN   [id number]       Hinduism   Congregational    Marital Status   Single                            Admission Date   10/31/24    Admission Type   Emergency    Admitting Provider   Kevin Shannon DO    Attending Provider       Department, Room/Bed   [facility] TELEMETRY 3, 318/1       Discharge Date   11/5/2024    Discharge Disposition   Rehab Facility or Unit (DC - External)    Discharge Destination   Other                              Attending Provider: (none)   Allergies: Sulfa Antibiotics    Isolation: Spore   Infection: C.difficile (11/03/24)   Code Status: CPR    Ht: 154.9 cm (61\")   Wt: 74.3 kg (163 lb 12.8 oz)    Admission Cmt: None   Principal Problem: Stroke [I63.9]                   Active Insurance as of 10/31/2024       Primary Coverage       Payor Plan Insurance Group Employer/Plan Group    ANTHEM MEDICARE REPLACEMENT ANTHEM MEDICARE ADVANTAGE [id number]       Payor Plan Address Payor Plan Phone Number Payor Plan Fax Number Effective Dates    [address] [phone]  1/1/2024 - None Entered    [facility] [address]         Subscriber Name Subscriber Birth Date Member ID       MARTHA BENITEZ 1955 [id number]                     Emergency Contacts        (Rel.) Home Phone Work Phone Mobile Phone    Jose Sykes (Son) [phone] -- [phone]                 Discharge Summary        Jen Batres APRN at 11/05/24 0940       Attestation signed by Janak Reece MD at 11/05/24 1019    I have reviewed this documentation and agree.                      [facility]   DISCHARGE SUMMARY      Name:  Martha " Mony [place]   Age:  69 y.o.  Sex:  female  :  1955  MRN:  [id number]   Visit Number:  [id number]    Admission Date:  10/31/2024  Date of Discharge:  2024  Primary Care Physician:  Jenny Carvajal APRN    Important issues to note:    -Patient admitted with noted acute lacunar infarct right basal ganglia with hypertensive urgency/dizziness/leukocytosis.  Complaining of diarrhea during admission and noted C. difficile with vancomycin p.o. initiated.  -Seen neurology who recommended dual antiplatelet therapy x 21 days and Plavix only thereafter.  High-dose statin continued.  -Blood pressure uncontrolled during admission with Coreg and Norvasc added.  Blood pressure became soft with lisinopril.  Would hold for now.  Restart if worsening.  -Dizziness and nausea improved.  Continue scopolamine and meclizine as needed.  -To be transferred to [facility] today for short-term rehab.  -Follow with neurology in 1 month.  -Holter monitor ordered upon discharge per neurology recommendations.  -Suspect underlying chronic kidney disease.  Monitor CBC and BMP closely.  -Return precautions.    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 difficile  Hypokalemia  Type 2 diabetes  CAD  Essential thrombocytosis  CKD stage III  Hypertension  Hyperlipidemia  Depression  Obesity    Problem List:     Active Hospital Problems    Diagnosis  POA    **Stroke [I63.9]  Yes      Resolved Hospital Problems   No resolved problems to display.     Presenting Problem:    Chief Complaint   Patient presents with    Hypertension      Consults:     Consulting Physician(s)                     None              Procedures Performed:        History of presenting illness/Hospital Course:    Patient is an obese 69-year-old female with history significant for CKD, type 2  diabetes, hypertension, hyperlipidemia, essential thrombocythemia who presents to the emergency room with complaints of dizziness.  Patient's symptoms occurred abruptly approximately 2 hours prior to presentation.  States that she is having difficulty ambulating due to feeling of the room spinning around her.  No extremity weakness, facial slurring, difficulty speaking.  Also denied headache, fever, vision changes, chest pain, shortness of breath.  En route, EMS reported patient blood pressure 210/120.  They provided sublingual nitroglycerin with slight improvement.  The patient admits that she stopped taking her blood pressure medicine 2 to 3 days ago.  Denies smoking, alcohol use, illicit drug use.     ED summary: Patient afebrile, hypertensive 198/79 and nonhypoxic on room air.  Troponins negative x 2.  Potassium 3.2 and creatinine 1.32 which appears baseline.  Glucose 206.  TSH appropriate.  WBC 21 and platelets 817.  UA negative for infection, protein positive.  CT head without contrast showed no acute abnormality.  CTA head showed no LVO.  CTA neck showed no evidence of significant cervical carotid stenosis. Lobular filling defect proximal brachiocephalic artery could represent nonocclusive mural thrombus or lobulated soft plaque. Follow-up CTA recommended in 6-8 weeks for continued surveillance.  CT cerebral perfusion showed no evidence of large vessel occlusion.  Teleneurology consulted, recommended admission for further stroke workup.  MRI noted small acute/subacute right periventricular CVA.  Patient initiated on heparin infusion x 24 hours due to filling defect in proximal brachiocephalic artery.  Continued on aspirin.  Norvasc added for blood pressure control.  Echo noted EF 61% with indeterminate left ventricular diastolic function negative saline test mild dilation of aorta at 3.8.  Neurology continued to follow recommended to antiplatelet therapy x 21 days followed by Plavix 75 mg daily.  Continued on  high intensity statin therapy with Lipitor 80 mg.  Patient complaining of diarrhea with noted C. difficile.  Leukocytosis improving after initiating vancomycin.  Otherwise, reassuring labs and vitals noted.  Dizziness and nausea have improved.   and strength equal bilaterally, however, states she does feel as if she is leaning to the right.  Lisinopril held as blood pressure was soft yesterday.  Can reinitiate if remains elevated.  Patient to follow with neurology in 1 month.  Strict return precautions given.    Vital Signs:    Temp:  [97.1 °F (36.2 °C)-98.5 °F (36.9 °C)] 98.4 °F (36.9 °C)  Heart Rate:  [67-92] 92  Resp:  [16-18] 18  BP: (112-142)/(50-88) 142/79    Physical Exam:    General Appearance:  Alert and cooperative.  Chronically ill middle-aged female.  No acute distress noted.   Head:  Atraumatic and normocephalic.   Eyes: Conjunctivae and sclerae normal, no icterus. No pallor.   Ears:  Ears with no abnormalities noted.   Throat: No oral lesions, no thrush, oral mucosa moist.   Neck: Supple, trachea midline, no thyromegaly.   Back:   No kyphoscoliosis present. No tenderness to palpation.   Lungs:   Breath sounds heard bilaterally equally.  No crackles or wheezing. No Pleural rub or bronchial breathing.  On room air unlabored.   Heart:  Normal S1 and S2, no murmur, no gallop, no rub. No JVD.   Abdomen:   Normal bowel sounds, no masses, no organomegaly. Soft, nontender, nondistended, no rebound tenderness.   Extremities: Supple, no edema, no cyanosis, no clubbing.   Pulses: Pulses palpable bilaterally.   Skin: No bleeding or rash.   Neurologic: Alert and oriented x 3. No facial asymmetry. Moves all four limbs. No tremors.     Pertinent Lab Results:     Results from last 7 days   Lab Units 11/05/24  0557 11/05/24  0238 11/04/24  0800 11/02/24  0536 11/01/24  0344 11/01/24  0142   SODIUM mmol/L 142  --  140 142   < > 139   POTASSIUM mmol/L 4.1 4.6 3.1* 4.2   < > 3.2*   CHLORIDE mmol/L 109*  --  104 104    < > 103   CO2 mmol/L 22.4  --  22.7 24.6   < > 22.4   BUN mg/dL 25*  --  23 20   < > 15   CREATININE mg/dL 1.43*  --  1.28* 1.40*   < > 1.32*   CALCIUM mg/dL 8.9  --  8.9 9.2   < > 8.9   BILIRUBIN mg/dL  --   --   --   --   --  0.5   ALK PHOS U/L  --   --   --   --   --  100   ALT (SGPT) U/L  --   --   --   --   --  15   AST (SGOT) U/L  --   --   --   --   --  21   GLUCOSE mg/dL 122*  --  110* 129*   < > 206*    < > = values in this interval not displayed.     Results from last 7 days   Lab Units 11/05/24  0557 11/04/24  0800 11/02/24  0535   WBC 10*3/mm3 12.00* 13.54* 15.79*   HEMOGLOBIN g/dL 15.0 15.2 14.5   HEMATOCRIT % 46.3 46.3 44.9   PLATELETS 10*3/mm3 617* 567* 506*     Results from last 7 days   Lab Units 11/01/24  1104   INR  1.14*     Results from last 7 days   Lab Units 11/01/24  0344 11/01/24  0142   HSTROP T ng/L 11 11                           Pertinent Radiology Results:    Imaging Results (All)       Procedure Component Value Units Date/Time    CT Angiogram Chest [[id number]] Collected: 11/03/24 1321     Updated: 11/03/24 1332    Narrative:      PROCEDURE: CT ANGIOGRAM CHEST-     HISTORY: echo show abnorma aortic aneurysm and to rule out thrombus  inaorta also; R42-Dizziness and giddiness; I10-Essential (primary)  hypertension; Z91.148-Patient's other noncompliance with medication  regimen for other reason; D75.839-Thrombocytosis, unspecified     COMPARISON: July 29, 2023.     TECHNIQUE: Multiple axial CT images were obtained from the thoracic  inlet through the upper abdomen per the CT PE protocol. Coronal and  oblique MIP images were reconstructed from the original axial data set.     FINDINGS: There are no filling defects to suggest pulmonary embolism.  Significant atherosclerotic plaque involving the descending and  abdominal aorta. No evidence of dissection. Aneurysmal dilatation of the  ascending aorta up to 4.1 cm. The descending thoracic aorta measures up  to 2.3 cm. There is prominence of the  pulmonary trunk measuring up to  2.2 cm, correlate for possible pulmonary hypertension. There is evidence  of calcified granulomatous disease. No mediastinal or hilar adenopathy.     There are mild emphysematous changes. Somewhat spiculated nodular  density in the left upper lobe measuring up to 1.6 cm on image 49. Of  there is no pleural or there is evidence of calcified. Apical pleural  thickening is noted. No acute osseous changes.             Impression:      Aneurysmal dilatation of the ascending aorta up to 4.1 cm.         There is prominence of the pulmonary trunk measuring up to 2.2 cm,  correlate for possible pulmonary hypertension.      Spiculated nodular density in the left upper lobe measuring up to 1.6  cm. This is slightly more conspicuous compared to the July 29, 2023  exam. Consider follow-up PET/CT or chest CT in 3 months.        This study was performed with techniques to keep radiation doses as low  as reasonably achievable (ALARA). Individualized dose reduction  techniques using automated exposure control or adjustment of mA and/or  kV according to the patient size were employed.        CTDI: 2.67 mGy  DLP:100.27 mGy.cm                 This report was signed and finalized on 11/3/2024 1:30 PM by Juan A Andrews DO.       MRI Brain Without Contrast [[id number]] Collected: 11/01/24 0939     Updated: 11/01/24 0947    Narrative:      PROCEDURE: MRI BRAIN WO CONTRAST-     HISTORY: Stroke, follow up, dizziness and vertigo. Compare July 20, 2023.     PROCEDURE: Multiplanar MR imaging of the brain was performed in multiple  MR sequences .     FINDINGS: Brain parenchyma displays normal signal without evidence of  mass, hemorrhage or edema. There is mild small vessel ischemic disease,  age-appropriate. Limited images the proximal cord are unremarkable. The  ventricles are mildly dilated indicating mild atrophy stable from prior  exam. There is no extra-axial fluid or midline shift. Flow-voids  are  appropriate. Diffusion weighted images demonstrate a small area of  mildly abnormal diffusion in the right parietal periventricular region  just superior to the basal ganglia best seen series 6 image 15. This is  confirmed on the ADC map to a moderate degree. Findings are suggestive  of a acute/subacute stroke. This correlates with a small area of mildly  increased T2 signal but has no correlate on the T1-weighted images.  Limited images of the paranasal sinuses are unremarkable.       Impression:      Small acute/subacute right periventricular CVA; right:  JULISSA Medel in the emergency room, notified at 9:40 a.m. November 1, 2024.                 This report was signed and finalized on 11/1/2024 9:45 AM by Adelina Holliday MD.       XR Chest 1 View [[id number]] Collected: 11/01/24 0911     Updated: 11/01/24 0927    Narrative:      PROCEDURE: XR CHEST 1 VW-        HISTORY: Dizziness, vertigo. Chest Pain Triage Protocol     COMPARISON: July 3, 2024.     FINDINGS: Apical lordotic view. The heart is borderline in size. The  mediastinum is unremarkable. The lungs are clear. There is no  pneumothorax. There are no acute osseous abnormalities.       Impression:      No acute cardiopulmonary process.                       Images were reviewed, interpreted, and dictated by Dr. Adelina Holliday MD  Transcribed by Milagros Pichardo PA-C.     This report was signed and finalized on 11/1/2024 9:25 AM by Adelina Holliday MD.       CT Angiogram Neck [[id number]] Collected: 11/01/24 0055     Updated: 11/01/24 0100    Narrative:      PROCEDURE: CT ANGIOGRAM NECK-     HISTORY: Dizziness and vertigo     TECHNIQUE: Thin section axial CT with IV contrast supplemented with  multiplanar reconstruction.   3 D reconstructions were performed on a  separate workstation.     NASCET criteria and technique was utilized during interpretation.     FINDINGS:     Aortic arch:  Arch shows no significant narrowing. There is a filling  defect along the  anterior wall of the brachiocephalic artery measuring  up to 7 mm. This could represent mural thrombus or lobulated soft  plaque. Nevertheless this could be a embolic source. The left common and  left subclavian arteries appear widely patent.     Right carotid:  No significant stenosis is seen of the cervical common  or internal carotid artery.     Left carotid:  No significant stenosis is seen of the cervical common or  internal carotid artery.     Vertebrals: Vertebral arteries are codominant. No significant stenosis  is present.         Impression:      1. No evidence of significant cervical carotid stenosis  2. Lobular filling defect proximal brachiocephalic artery could  represent nonocclusive mural thrombus or lobulated soft plaque.  Follow-up CTA recommended in 6-8 weeks for continued surveillance. CT  appearance that abnormality could result in more distal emboli.        This study was performed with techniques to keep radiation doses as low  as reasonably achievable (ALARA). Individualized dose reduction  techniques using automated exposure control or adjustment of vA and/or  kV according to the patient size were employed.         This report was signed and finalized on 11/1/2024 12:58 AM by Sam Thakkar MD.       CT Angiogram Head [[id number]] Collected: 11/01/24 0052     Updated: 11/01/24 0055    Narrative:      PROCEDURE: CT ANGIOGRAM HEAD-     HISTORY: Dizziness and vertigo     TECHNIQUE: Thin section axial CT with contrast with multiplanar  reconstruction.   3 D reconstructions were performed on a separate  workstation.     FINDINGS:      Internal carotid arteries: The petrous, cavernous and postcavernous ICAs  are unremarkable. No aneurysm is seen.     Anterior cerebral arteries: Central anterior cerebral arteries are  unremarkable. No aneurysm is seen.     Middle cerebral arteries: Central middle cerebral arteries are  unremarkable. No aneurysm is seen.     Basilar artery: Distal vertebral and  basilar arteries are widely patent.  No aneurysm is seen.     Posterior cerebral artery: Posterior cerebral arteries are patent  centrally. No obvious aneurysm is seen.     Venous sinuses: Major venous sinuses are patent.       Impression:      No evidence of acute large vessel occlusive disease           This study was performed with techniques to keep radiation doses as low  as reasonably achievable (ALARA). Individualized dose reduction  techniques using automated exposure control or adjustment of vA and/or  kV according to the patient size were employed.      This report was signed and finalized on 11/1/2024 12:53 AM by Sam Thakkar MD.       CT CEREBRAL PERFUSION WITH & WITHOUT CONTRAST [[id number]] Collected: 11/01/24 0051     Updated: 11/01/24 0054    Narrative:      CT cerebral perfusion, without and with contrast     HISTORY: Symptoms of CVA. Vertigo. Dizziness..     TECHNIQUE: CT brain perfusion with mapping of flow parameters including  transit time, cerebral blood flow and cerebral blood volume. IV contrast  was utilized.     FINDINGS:     Tissue with delayed blood, Tmax > 6 secs:  0 ml     Tissue with significantly reduced blood, rCBF < 30%:  0              Impression:      1.  No evidence of acute large vessel occlusion     Should symptoms persist recommend follow-up MRI     This report was signed and finalized on 11/1/2024 12:52 AM by Sam Thakkar MD.       CT Head Without Contrast Stroke Protocol [[id number]] Collected: 11/01/24 0051     Updated: 11/01/24 0053    Narrative:      PROCEDURE: CT HEAD WO CONTRAST STROKE PROTOCOL-     HISTORY: Dizziness/vertigo, symptoms of acute CVA     COMPARISON: 7/20/2023     TECHNIQUE: Noncontrast exam     FINDINGS: Mild atrophy and chronic ischemic white matter changes are  noted.     No cortical edema is present. There is no mass or hemorrhage. Ventricles  are normal.     Bone windows show no skull fracture or obvious destructive lesion.       Impression:      1.  No acute intracranial abnormality or obvious mass.   2. Atrophy and chronic ischemic white matter changes as above.        This study was performed with techniques to keep radiation doses as low  as reasonably achievable (ALARA). Individualized dose reduction  techniques using automated exposure control or adjustment of vA and/or  kV according to the patient size were employed.            This report was signed and finalized on 11/1/2024 12:51 AM by Sam Thakkar MD.               Echo:    Results for orders placed during the hospital encounter of 10/31/24    Adult Transthoracic Echo Complete W/ Cont if Necessary Per Protocol (With Agitated Saline)    Interpretation Summary    Left ventricular systolic function is normal. Calculated left ventricular EF = 68.3% Left ventricular ejection fraction appears to be 61 - 65%.    Left ventricular wall thickness is consistent with mild concentric hypertrophy. Left ventricular diastolic function was indeterminate.    Normal right ventricular size and function.    The left atrial cavity is mildly dilated.    Mild aortic valve regurgitation is present.    Saline test results are negative for right to left atrial level shunt.    Mild dilation of the proximal aorta is present. Ascending aorta = 3.8 cm.  Recommend dedicated chest CTA for further characterization of aortic pathology.    Condition on Discharge:      Stable.    Code status during the hospital stay:    Code Status and Medical Interventions: CPR (Attempt to Resuscitate); Full Support   Ordered at: 11/01/24 0248     Code Status (Patient has no pulse and is not breathing):    CPR (Attempt to Resuscitate)     Medical Interventions (Patient has pulse or is breathing):    Full Support     Discharge Disposition:    Rehab Facility or Unit (DC - External)    Discharge Medications:       Discharge Medications        New Medications        Instructions Start Date   atorvastatin 80 MG tablet  Commonly known as: LIPITOR   80 mg,  Oral, Nightly      carvedilol 12.5 MG tablet  Commonly known as: COREG   12.5 mg, Oral, Every 12 Hours Scheduled      clopidogrel 75 MG tablet  Commonly known as: PLAVIX   75 mg, Oral, Daily   Start Date: November 6, 2024     meclizine 25 MG tablet  Commonly known as: ANTIVERT   25 mg, Oral, Every 8 Hours Scheduled      prochlorperazine 5 MG tablet  Commonly known as: COMPAZINE   5 mg, Oral, Every 6 Hours PRN      Scopolamine 1 MG/3DAYS patch   1 patch, Transdermal, Every 72 Hours   Start Date: November 7, 2024     vancomycin 125 MG capsule  Commonly known as: VANCOCIN   125 mg, Oral, Every 6 Hours Scheduled             Changes to Medications        Instructions Start Date   amLODIPine 10 MG tablet  Commonly known as: NORVASC  What changed:   medication strength  how much to take  when to take this   10 mg, Oral, Every 24 Hours Scheduled   Start Date: November 6, 2024     aspirin 325 MG EC tablet  What changed:   medication strength  how much to take  when to take this   325 mg, Oral, Daily   Start Date: November 6, 2024     lisinopril 40 MG tablet  Commonly known as: PRINIVILZESTRIL  What changed: additional instructions   40 mg, Oral, Daily, Hold for now- continue if blood pressure remains elevated.             Continue These Medications        Instructions Start Date   hydroxyurea 500 MG capsule  Commonly known as: HYDREA   500 mg, Oral, Daily             Discharge Diet:     Diet Instructions       Diet: Cardiac Diets; Healthy Heart (2-3 Na+); Thin (IDDSI 0)      Discharge Diet: Cardiac Diets    Cardiac Diet: Healthy Heart (2-3 Na+)    Fluid Consistency: Thin (IDDSI 0)          Activity at Discharge:     Activity Instructions       Activity as Tolerated            Follow-up Appointments:     Contact information for follow-up providers       [facility] Follow up.    Contact information:  [address] [place] [address] [phone]             Jenny Carvajal, NAYELI .     Specialties: Nurse Practitioner, Family Medicine  Contact information:  [address] [address]  [phone]                       Contact information for after-discharge care       Destination       [facility] .    Service: Skilled Nursing  Contact information:  [address] [facility] [address] [phone]                                 Future Appointments   Date Time Provider Department Center   12/12/2024  2:45 PM Paulo Graves MD [facility]     Test Results Pending at Discharge:           Jen Batres, NAYELI  11/05/24  09:41 EST    Time: I spent >30 minutes on this discharge activity which included: face-to-face encounter with the patient, reviewing the data in the system, coordination of the care with the nursing staff as well as consultants, documentation, and entering orders.     Dictated utilizing Dragon dictation.      Electronically signed by aJnak Reece MD at 11/05/24 2381

## 2024-11-05 NOTE — DISCHARGE PLACEMENT REQUEST
"Claudio Mathews     Martha Benitez (69 y.o. Female)       Date of Birth   1955    Social Security Number       Address   [facility] [address] [place] [address]    Home Phone   [phone]    MRN   [id number]       [facility]    Marital Status   Single                            Admission Date   10/31/24    Admission Type   Emergency    Admitting Provider   Kevin Shannon DO    Attending Provider   Kevin Shannon DO    Department, Room/Bed   [facility] TELEMETRY 3, 318/1       Discharge Date       Discharge Disposition       Discharge Destination                                 Attending Provider: Kevin Shannon DO    Allergies: Sulfa Antibiotics    Isolation: Spore   Infection: C.difficile (24)   Code Status: CPR    Ht: 154.9 cm (61\")   Wt: 74.3 kg (163 lb 12.8 oz)    Admission Cmt: None   Principal Problem: Stroke [I63.9]                   Active Insurance as of 10/31/2024       Primary Coverage       Payor Plan Insurance Group Employer/Plan Group    ANTHEM MEDICARE REPLACEMENT ANTH MEDICARE ADVANTAGE [id number]       Payor Plan Address Payor Plan Phone Number Payor Plan Fax Number Effective Dates    [address] [phone]  2024 - None Entered    [facility] [address]         Subscriber Name Subscriber Birth Date Member ID       MARTHA BENITEZ 1955 [id number]                     Emergency Contacts        (Rel.) Home Phone Work Phone Mobile Phone    Jose Sykes (Son) [phone] -- [phone]                 History & Physical        Kevin Shannon DO at 24 0248            [facility] HOSPITALIST   HISTORY AND PHYSICAL      Name:  Martha Benitez   Age:  69 y.o.  Sex:  female  :  1955  MRN:  [id number]   Visit Number:  [id number]  Admission Date:  10/31/2024  Date Of Service:  24  Primary Care Physician:  Jenny Carvajal APRN    Chief Complaint:     Dizziness    History Of " Presenting Illness:      Patient is an obese 69-year-old female with history significant for CKD, type 2 diabetes, hypertension, hyperlipidemia, essential thrombocythemia who presents to the emergency room with complaints of dizziness.  Patient's symptoms occurred abruptly approximately 2 hours prior to presentation.  States that she is having difficulty ambulating due to feeling of the room spinning around her.  No extremity weakness, facial slurring, difficulty speaking.  Also denied headache, fever, vision changes, chest pain, shortness of breath.  En route, EMS reported patient blood pressure 210/120.  They provided sublingual nitroglycerin with slight improvement.  The patient admits that she stopped taking her blood pressure medicine 2 to 3 days ago.  Denies smoking, alcohol use, illicit drug use.    ED summary: Patient afebrile, hypertensive 198/79 and nonhypoxic on room air.  Troponins negative x 2.  Potassium 3.2 and creatinine 1.32 which appears baseline.  Glucose 206.  TSH appropriate.  WBC 21 and platelets 817.  UA negative for infection, protein positive.  CT head without contrast showed no acute abnormality.  CTA head showed no LVO.  CTA neck showed no evidence of significant cervical carotid stenosis. Lobular filling defect proximal brachiocephalic artery could  represent nonocclusive mural thrombus or lobulated soft plaque.  Follow-up CTA recommended in 6-8 weeks for continued surveillance.  CT cerebral perfusion showed no evidence of large vessel occlusion.  Teleneurology consulted, recommended admission for further stroke workup.    Review Of Systems:    All systems were reviewed and negative except as mentioned in history of presenting illness, assessment and plan.    Past Medical History: Patient  has a past medical history of HAMMAD (acute kidney injury) (07/01/2021), Allergic, Anemia, Anesthesia complication, Arthritis, COPD (chronic obstructive pulmonary disease), Depression, Diabetes mellitus,  History of heart attack, Hyperlipidemia, and Hypertension.    Past Surgical History: Patient  has a past surgical history that includes Tonsillectomy; Breast Reduction; Sinus surgery (2003); Bone marrow aspiration; Hysterectomy (1990); Reduction mammaplasty (Bilateral); Cataract extraction (Bilateral); Colonoscopy; and Total knee arthroplasty (Right, 7/3/2024).    Social History: Patient  reports that she quit smoking about 27 years ago. Her smoking use included cigarettes. She started smoking about 57 years ago. She has a 60 pack-year smoking history. She has been exposed to tobacco smoke. She has never used smokeless tobacco. She reports that she does not drink alcohol and does not use drugs.    Family History:  Patient's family history has been reviewed and found to be noncontributory.     Allergies:      Sulfa antibiotics    Home Medications:    Prior to Admission Medications       Prescriptions Last Dose Informant Patient Reported? Taking?    aspirin (ASPIR) 81 MG EC tablet   No No    Take 1 tablet by mouth 2 (Two) Times a Day.    hydroxyurea (HYDREA) 500 MG capsule   No No    Take 1 capsule by mouth Daily.    lisinopril (PRINIVIL,ZESTRIL) 40 MG tablet  Self No No    Take 1 tablet by mouth Daily.          ED Medications:    Medications   sodium chloride 0.9 % flush 10 mL (has no administration in time range)   meclizine (ANTIVERT) tablet 25 mg (25 mg Oral Given 11/1/24 0016)   ondansetron (ZOFRAN) injection 4 mg (4 mg Intravenous Given 11/1/24 0016)   iopamidol (ISOVUE-300) 61 % injection 100 mL (100 mL Intravenous Given 11/1/24 0039)   iopamidol (ISOVUE-300) 61 % injection 50 mL (50 mL Intravenous Given 11/1/24 0039)   labetalol (NORMODYNE,TRANDATE) injection 10 mg (10 mg Intravenous Given 11/1/24 0159)     Vital Signs:  Temp:  [97.5 °F (36.4 °C)] 97.5 °F (36.4 °C)  Heart Rate:  [70-76] 72  Resp:  [18-20] 18  BP: (172-188)/(78-92) 188/80        11/01/24  0003   Weight: 72.6 kg (160 lb)     Body mass index is  "30.25 kg/m².    Physical Exam:     Most recent vital Signs: BP (!) 188/80   Pulse 72   Temp 97.5 °F (36.4 °C) (Oral)   Resp 18   Ht 154.9 cm (60.98\")   Wt 72.6 kg (160 lb)   SpO2 100%   BMI 30.25 kg/m²     Physical Exam  Vitals reviewed.   Constitutional:       Appearance: She is obese.   HENT:      Head: Normocephalic and atraumatic.      Right Ear: External ear normal.      Left Ear: External ear normal.   Neurological:      Mental Status: She is alert.         Laboratory data:    I have reviewed the labs done in the emergency room.    Results from last 7 days   Lab Units 11/01/24  0142   SODIUM mmol/L 139   POTASSIUM mmol/L 3.2*   CHLORIDE mmol/L 103   CO2 mmol/L 22.4   BUN mg/dL 15   CREATININE mg/dL 1.32*   CALCIUM mg/dL 8.9   BILIRUBIN mg/dL 0.5   ALK PHOS U/L 100   ALT (SGPT) U/L 15   AST (SGOT) U/L 21   GLUCOSE mg/dL 206*     Results from last 7 days   Lab Units 11/01/24  0016   WBC 10*3/mm3 21.40*   HEMOGLOBIN g/dL 15.4   HEMATOCRIT % 46.8*   PLATELETS 10*3/mm3 817*         Results from last 7 days   Lab Units 11/01/24  0142   HSTROP T ng/L 11                           Invalid input(s): \"USDES\", \"NITRITITE\", \"BACT\", \"EP\"    Pain Management Panel  More data exists         Latest Ref Rng & Units 11/7/2023 8/4/2020   Pain Management Panel   Creatinine, Urine mg/dL 250.5  234.4       Details                   EKG:      EKG personally reviewed, sinus rhythm with rate of 76 bpm.  No acute ST or T wave changes.    Radiology:    CT Angiogram Neck    Result Date: 11/1/2024  PROCEDURE: CT ANGIOGRAM NECK-  HISTORY: Dizziness and vertigo  TECHNIQUE: Thin section axial CT with IV contrast supplemented with multiplanar reconstruction.   3 D reconstructions were performed on a separate workstation.  NASCET criteria and technique was utilized during interpretation.  FINDINGS:  Aortic arch:  Arch shows no significant narrowing. There is a filling defect along the anterior wall of the brachiocephalic artery measuring " up to 7 mm. This could represent mural thrombus or lobulated soft plaque. Nevertheless this could be a embolic source. The left common and left subclavian arteries appear widely patent.  Right carotid:  No significant stenosis is seen of the cervical common or internal carotid artery.  Left carotid:  No significant stenosis is seen of the cervical common or internal carotid artery.  Vertebrals: Vertebral arteries are codominant. No significant stenosis is present.       1. No evidence of significant cervical carotid stenosis 2. Lobular filling defect proximal brachiocephalic artery could represent nonocclusive mural thrombus or lobulated soft plaque. Follow-up CTA recommended in 6-8 weeks for continued surveillance. CT appearance that abnormality could result in more distal emboli.   This study was performed with techniques to keep radiation doses as low as reasonably achievable (ALARA). Individualized dose reduction techniques using automated exposure control or adjustment of vA and/or kV according to the patient size were employed.   This report was signed and finalized on 11/1/2024 12:58 AM by Sam Thakkar MD.      CT Angiogram Head    Result Date: 11/1/2024  PROCEDURE: CT ANGIOGRAM HEAD-  HISTORY: Dizziness and vertigo  TECHNIQUE: Thin section axial CT with contrast with multiplanar reconstruction.   3 D reconstructions were performed on a separate workstation.  FINDINGS:  Internal carotid arteries: The petrous, cavernous and postcavernous ICAs are unremarkable. No aneurysm is seen.  Anterior cerebral arteries: Central anterior cerebral arteries are unremarkable. No aneurysm is seen.  Middle cerebral arteries: Central middle cerebral arteries are unremarkable. No aneurysm is seen.  Basilar artery: Distal vertebral and basilar arteries are widely patent. No aneurysm is seen.  Posterior cerebral artery: Posterior cerebral arteries are patent centrally. No obvious aneurysm is seen.  Venous sinuses: Major venous  sinuses are patent.      No evidence of acute large vessel occlusive disease    This study was performed with techniques to keep radiation doses as low as reasonably achievable (ALARA). Individualized dose reduction techniques using automated exposure control or adjustment of vA and/or kV according to the patient size were employed.  This report was signed and finalized on 11/1/2024 12:53 AM by Sam Thakkar MD.      CT CEREBRAL PERFUSION WITH & WITHOUT CONTRAST    Result Date: 11/1/2024  CT cerebral perfusion, without and with contrast  HISTORY: Symptoms of CVA. Vertigo. Dizziness..  TECHNIQUE: CT brain perfusion with mapping of flow parameters including transit time, cerebral blood flow and cerebral blood volume. IV contrast was utilized.  FINDINGS:  Tissue with delayed blood, Tmax > 6 secs:  0 ml  Tissue with significantly reduced blood, rCBF < 30%:  0        1.  No evidence of acute large vessel occlusion  Should symptoms persist recommend follow-up MRI  This report was signed and finalized on 11/1/2024 12:52 AM by Sam Thakkar MD.      CT Head Without Contrast Stroke Protocol    Result Date: 11/1/2024  PROCEDURE: CT HEAD WO CONTRAST STROKE PROTOCOL-  HISTORY: Dizziness/vertigo, symptoms of acute CVA  COMPARISON: 7/20/2023  TECHNIQUE: Noncontrast exam  FINDINGS: Mild atrophy and chronic ischemic white matter changes are noted.  No cortical edema is present. There is no mass or hemorrhage. Ventricles are normal.  Bone windows show no skull fracture or obvious destructive lesion.      1. No acute intracranial abnormality or obvious mass. 2. Atrophy and chronic ischemic white matter changes as above.   This study was performed with techniques to keep radiation doses as low as reasonably achievable (ALARA). Individualized dose reduction techniques using automated exposure control or adjustment of vA and/or kV according to the patient size were employed.    This report was signed and finalized on 11/1/2024 12:51 AM  by Sam Thakkar MD.       Assessment:    Stroke  Hypertensive urgency  Leukocytosis  Hypokalemia  Type 2 diabetes  CAD  Essential thrombocytosis  CKD stage III  Hypertension  Hyperlipidemia  Depression  Obesity    Plan:    Dizziness  Stroke  Hypertensive urgency  Teleneurology following  MRI brain without contrast  Echo with bubble study  A1c and lipid panel  Aspirin, Plavix, high intensity statin  Blood pressure control.  Restart home lisinopril  Neurochecks per protocol  PT/OT/SLP  Leukocytosis  Unclear cause of leukocytosis.  No infectious source identified.  Repeat with morning lab; could be secondary to hemoconcentration  Patient does have essential thrombocythemia for which she is on hydroxyurea  Hypokalemia  Replace per protocol  Type 2 diabetes  A1c pending  Sliding scale for correction  Diabetic education    Patient's clinical course dictates.  Continue patient's home medications as warranted.    Risk Assessment: High  DVT Prophylaxis: SCDs  Code Status: Full  Diet: As tolerated          Kevin Shannon DO  11/01/24  02:48 EDT    Dictated utilizing Dragon dictation.      Electronically signed by Kevin Shannon DO at 11/01/24 0637       Current Facility-Administered Medications   Medication Dose Route Frequency Provider Last Rate Last Admin    acetaminophen (TYLENOL) tablet 650 mg  650 mg Oral Q4H PRN Kevin Shannon DO   650 mg at 11/03/24 1639    Or    acetaminophen (TYLENOL) suppository 650 mg  650 mg Rectal Q4H PRN Kevin Shannon DO        amLODIPine (NORVASC) tablet 10 mg  10 mg Oral Q24H Jen Batres APRN   10 mg at 11/04/24 0826    aspirin EC tablet 325 mg  325 mg Oral Daily Luis Enrique Yuan MD   325 mg at 11/04/24 0825    atorvastatin (LIPITOR) tablet 80 mg  80 mg Oral Nightly Kevin Shannon DO   80 mg at 11/04/24 2159    carvedilol (COREG) tablet 12.5 mg  12.5 mg Oral Q12H Luis Enrique Yuan MD   12.5 mg at 11/04/24 0825    clopidogrel (PLAVIX) tablet 75 mg  75 mg Oral Daily Jennifer  Daryn Forde MD   75 mg at 11/04/24 0825    dextrose (D50W) (25 g/50 mL) IV injection 25 g  25 g Intravenous Q15 Min PRN Kevin Shannon DO        dextrose (GLUTOSE) oral gel 15 g  15 g Oral Q15 Min PRN Kevin Shannon DO        glucagon (GLUCAGEN) injection 1 mg  1 mg Intramuscular Q15 Min PRN Kevin Shannon DO        hydrALAZINE (APRESOLINE) injection 10 mg  10 mg Intravenous Q6H PRN Kevin Shannon DO   10 mg at 11/01/24 0345    hydroxyurea (HYDREA) capsule 500 mg  500 mg Oral Daily Kevin Shannon DO   500 mg at 11/04/24 0825    Insulin Lispro (humaLOG) injection 2-7 Units  2-7 Units Subcutaneous 4x Daily AC & at Bedtime Kevin Shannon DO   2 Units at 11/04/24 2159    [Held by provider] lisinopril (PRINIVIL,ZESTRIL) tablet 40 mg  40 mg Oral Daily Kevin Shannon DO   40 mg at 11/03/24 0834    meclizine (ANTIVERT) tablet 25 mg  25 mg Oral Q8H Jen Batres APRN   25 mg at 11/05/24 0603    Potassium Replacement - Follow Nurse / BPA Driven Protocol   Does not apply PRN Jen Batres APRN        prochlorperazine (COMPAZINE) injection 5 mg  5 mg Intravenous Q6H PRN Jen Batres APRN   5 mg at 11/01/24 0925    Or    prochlorperazine (COMPAZINE) tablet 5 mg  5 mg Oral Q6H PRN Medardo Jen J, APRN   5 mg at 11/04/24 1701    Or    prochlorperazine (COMPAZINE) suppository 25 mg  25 mg Rectal Q12H PRN Jen Batres APRN        scopolamine patch 1 mg/72 hr  1 patch Transdermal Q72H Jen Batres APRN   1 patch at 11/04/24 0905    sodium chloride 0.9 % flush 10 mL  10 mL Intravenous PRN Robbi Ramos MD        sodium chloride 0.9 % flush 10 mL  10 mL Intravenous Q12H Kevin Shannon DO   10 mL at 11/04/24 2200    sodium chloride 0.9 % flush 10 mL  10 mL Intravenous PRN Kevin Shannon DO        sodium chloride 0.9 % infusion 40 mL  40 mL Intravenous PRN Kevin Shannon DO        traZODone (DESYREL) tablet 25 mg  25 mg Oral Nightly PRN Reynaldo Sánchez Jr., MD   25 mg at 11/02/24 4263     vancomycin (VANCOCIN) capsule 125 mg  125 mg Oral Q6H Luis Enrique Yuan MD   125 mg at 24 0602        Physician Progress Notes (most recent note)        Jen Batres APRN at 24 1309              [facility]    Name:  Martha Murray   Age:  69 y.o.  Sex:  female  :  1955  MRN:  [id number]   Visit Number:  [id number]  Admission Date:  10/31/2024  Date Of Service:  24  Primary Care Physician:  Jenny Carvajal APRN     LOS: 2 days :    Chief Complaint:      Dizziness    Subjective:    Patient seen and examined.  States dizziness is persistent.  States wanting to lean toward towards her right side.  Worried about her animals at home her son is taking care of him for now.  Advised that she may need short-term rehab.  Did have some diarrhea over the weekend.  Was noted to have C. difficile.  Not drinking water well.    Hospital Course:    Patient is an obese 69-year-old female with history significant for CKD, type 2 diabetes, hypertension, hyperlipidemia, essential thrombocythemia who presents to the emergency room with complaints of dizziness.  Patient's symptoms occurred abruptly approximately 2 hours prior to presentation.  States that she is having difficulty ambulating due to feeling of the room spinning around her.  No extremity weakness, facial slurring, difficulty speaking.  Also denied headache, fever, vision changes, chest pain, shortness of breath.  En route, EMS reported patient blood pressure 210/120.  They provided sublingual nitroglycerin with slight improvement.  The patient admits that she stopped taking her blood pressure medicine 2 to 3 days ago.  Denies smoking, alcohol use, illicit drug use.     ED summary: Patient afebrile, hypertensive 198/79 and nonhypoxic on room air.  Troponins negative x 2.  Potassium 3.2 and creatinine 1.32 which appears baseline.  Glucose 206.  TSH appropriate.  WBC 21 and platelets 817.   UA negative for infection, protein positive.  CT head without contrast showed no acute abnormality.  CTA head showed no LVO.  CTA neck showed no evidence of significant cervical carotid stenosis. Lobular filling defect proximal brachiocephalic artery could represent nonocclusive mural thrombus or lobulated soft plaque. Follow-up CTA recommended in 6-8 weeks for continued surveillance.  CT cerebral perfusion showed no evidence of large vessel occlusion.  Teleneurology consulted, recommended admission for further stroke workup.  MRI noted small acute/subacute right periventricular CVA.  Patient initiated on heparin infusion x 24 hours due to filling defect in proximal brachiocephalic artery.  Continued on aspirin.  Norvasc added for blood pressure control.  Echo noted EF 61% with indeterminate left ventricular diastolic function negative saline test mild dilation of aorta at 3.8.  Neurology continued to follow recommended to antiplatelet therapy x 21 days followed by Plavix 75 mg daily.  Continued on high intensity statin therapy with Lipitor 80 mg.    Review of Systems:     All systems were reviewed and negative except as mentioned in subjective, assessment and plan.    Vital Signs:    Temp:  [97.2 °F (36.2 °C)-99 °F (37.2 °C)] 97.2 °F (36.2 °C)  Heart Rate:  [75] 75  Resp:  [16-18] 18  BP: (112-143)/(49-72) 112/50    Intake and output:    I/O last 3 completed shifts:  In: 600 [P.O.:600]  Out: 450 [Urine:450]  I/O this shift:  In: 240 [P.O.:240]  Out: -     Physical Examination:    General Appearance:  Alert and cooperative.  Chronically ill middle-aged female.   Head:  Atraumatic and normocephalic.   Eyes: Conjunctivae and sclerae normal, no icterus. No pallor.   Throat: No oral lesions, no thrush, oral mucosa moist.   Neck: Supple, trachea midline, no thyromegaly.   Lungs:   Breath sounds heard bilaterally equally.  No wheezing or crackles. No Pleural rub or bronchial breathing.  On room air unlabored.   Heart:   "Normal S1 and S2, no murmur, no gallop, no rub. No JVD.   Abdomen:   Normal bowel sounds, no masses, no organomegaly. Soft, nontender, nondistended, no rebound tenderness.   Extremities: Supple, no edema, no cyanosis, no clubbing.   Skin: No bleeding or rash.   Neurologic: Alert and oriented x 3. No facial asymmetry. Moves all four limbs. No tremors.  No nystagmus noted.     Laboratory results:    Results from last 7 days   Lab Units 11/04/24  0800 11/02/24  0536 11/01/24  0344 11/01/24  0142   SODIUM mmol/L 140 142 139 139   POTASSIUM mmol/L 3.1* 4.2 3.5 3.2*   CHLORIDE mmol/L 104 104 105 103   CO2 mmol/L 22.7 24.6 19.3* 22.4   BUN mg/dL 23 20 15 15   CREATININE mg/dL 1.28* 1.40* 1.23* 1.32*   CALCIUM mg/dL 8.9 9.2 8.7 8.9   BILIRUBIN mg/dL  --   --   --  0.5   ALK PHOS U/L  --   --   --  100   ALT (SGPT) U/L  --   --   --  15   AST (SGOT) U/L  --   --   --  21   GLUCOSE mg/dL 110* 129* 185* 206*     Results from last 7 days   Lab Units 11/04/24  0800 11/02/24  0535 11/01/24  0836   WBC 10*3/mm3 13.54* 15.79* 18.97*   HEMOGLOBIN g/dL 15.2 14.5 15.8   HEMATOCRIT % 46.3 44.9 47.0*   PLATELETS 10*3/mm3 567* 506* 662*     Results from last 7 days   Lab Units 11/01/24  1104   INR  1.14*     Results from last 7 days   Lab Units 11/01/24  0344 11/01/24  0142   HSTROP T ng/L 11 11         No results for input(s): \"PHART\", \"XBY1FYD\", \"PO2ART\", \"GOX9XGH\", \"BASEEXCESS\" in the last 8760 hours.   I have reviewed the patient's laboratory results.    Radiology results:    CT Angiogram Chest    Result Date: 11/3/2024  PROCEDURE: CT ANGIOGRAM CHEST-  HISTORY: echo show abnorma aortic aneurysm and to rule out thrombus inaorta also; R42-Dizziness and giddiness; I10-Essential (primary) hypertension; Z91.148-Patient's other noncompliance with medication regimen for other reason; D75.839-Thrombocytosis, unspecified  COMPARISON: July 29, 2023.  TECHNIQUE: Multiple axial CT images were obtained from the thoracic inlet through the upper " abdomen per the CT PE protocol. Coronal and oblique MIP images were reconstructed from the original axial data set.  FINDINGS: There are no filling defects to suggest pulmonary embolism. Significant atherosclerotic plaque involving the descending and abdominal aorta. No evidence of dissection. Aneurysmal dilatation of the ascending aorta up to 4.1 cm. The descending thoracic aorta measures up to 2.3 cm. There is prominence of the pulmonary trunk measuring up to 2.2 cm, correlate for possible pulmonary hypertension. There is evidence of calcified granulomatous disease. No mediastinal or hilar adenopathy.  There are mild emphysematous changes. Somewhat spiculated nodular density in the left upper lobe measuring up to 1.6 cm on image 49. Of there is no pleural or there is evidence of calcified. Apical pleural thickening is noted. No acute osseous changes.        Impression: Aneurysmal dilatation of the ascending aorta up to 4.1 cm.   There is prominence of the pulmonary trunk measuring up to 2.2 cm, correlate for possible pulmonary hypertension.  Spiculated nodular density in the left upper lobe measuring up to 1.6 cm. This is slightly more conspicuous compared to the July 29, 2023 exam. Consider follow-up PET/CT or chest CT in 3 months.   This study was performed with techniques to keep radiation doses as low as reasonably achievable (ALARA). Individualized dose reduction techniques using automated exposure control or adjustment of mA and/or kV according to the patient size were employed.   CTDI: 2.67 mGy DLP:100.27 mGy.cm      This report was signed and finalized on 11/3/2024 1:30 PM by Juan A Andrews DO.     I have reviewed the patient's radiology reports.    Medication Review:     I have reviewed the patient's active and prn medications.     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 difficile  Hypokalemia  Type 2 diabetes  CAD  Essential thrombocytosis  CKD stage III  Hypertension  Hyperlipidemia  Depression  Obesity    Plan:    Dizziness  Stroke  Hypertensive urgency  Teleneurology following-recommended continuing heparin drip x 24 hours with aspirin and high-dose statin.  Bubble study negative.  A1c 5.5/lipid panel mildly elevated.  Plavix/aspirin/high-dose statin.  Continue DAPT x 21 days and then Plavix only thereafter.  Blood pressure control.  Restart home lisinopril-Norvasc and Coreg added.  Improved.  Neurochecks per protocol  PT/OT/SLP  Continue scopolamine patch and meclizine.  Encouraged increased water intake.  Holter monitor upon discharge per neurology recommendations.  Leukocytosis/C. difficile  Continue p.o. vancomycin.  Hypokalemia  Replace per protocol  Type 2 diabetes  A1c 5.5  Sliding scale for correction  Diabetic education    I have reviewed the copied text and it is accurate as of 11/4/2024      DVT Prophylaxis: SCDs  Code Status: Full  Diet: As tolerated  Discharge Plan: Home versus short-term rehab.    NAYELI Hayes  11/04/24  13:09 EST    Dictated utilizing Dragon dictation.      Electronically signed by Jen Batres APRN at 11/04/24 1314          Physical Therapy Notes (all)        Sondra Oropeza PT at 11/01/24 1215  Version 1 of 1         PT evaluation held this date as pt is extremely dizzy and nauseated with recent emesis. Will plan to check back tomorrow.       Electronically signed by Sondra Oropeza PT at 11/01/24 1218       Sondra Oropeza PT at 11/02/24 1044  Version 1 of 1         Goal Outcome Evaluation:  Plan of Care Reviewed With: patient           Outcome Evaluation: PT evaluation completed this am with pt presenting supine in bed, O x 4, c/o L anterior headache rated 3/10, on room air (O2 sat 98%), and improved dizziness at rest but still has dizziness with quick  movements. Pt education throughout session for using vision to assist with dizziness and balance. Pt was able to come to sit on EOB with cues only and maintain sitting with supervision only with improved dizziness as she sat on EOB focusing her vision ahead of her. Pt came to stand with FWW and CGA and began to ambulate with CGA with a veer to the R and then a sudden loss of balance to the R requiring mod assist to regain midline. Pt stood and focused vision again and then turned and amb back to bed with min assist for a total of 12 ft. Pt was nauseated after walking and needed her emesis bag but did not vomit. Pt presents with deficits in balance, endurance, and strength. She is expected to improve her functional mobility wt continued PT services prior to d/c. Therapist did speak with pt regarding short term rehab benefits.                               Electronically signed by Sondra Oropeza, PT at 24 1245       Sondra Oropeza PT at 24 1044  Version 1 of 1         Patient Name: Martha Murray  : 1955    MRN: [id number]                              Today's Date: 2024       Admit Date: 10/31/2024    Visit Dx:     ICD-10-CM ICD-9-CM   1. Vertigo  R42 780.4   2. Uncontrolled hypertension  I10 401.9   3. Noncompliance with medication regimen  Z91.148 V15.81   4. Thrombocytosis  D75.839 238.71     Patient Active Problem List   Diagnosis    Essential hypertension    Mild intermittent asthma without complication    Type 2 diabetes mellitus without complication, without long-term current use of insulin    Hyperlipidemia LDL goal <70    Depression    Essential thrombocytosis    Onychomycosis with ingrown toenail    CKD (chronic kidney disease) stage 3, GFR 30-59 ml/min    Obesity (BMI 30.0-34.9)    Coronary artery disease involving native coronary artery of native heart with angina pectoris    S/P total knee arthroplasty, right    Arthritis of knee    Stroke     Past Medical History:   Diagnosis  Date    HAMMAD (acute kidney injury) 07/01/2021    Allergic     Anemia     Anesthesia complication     Slow to wake    Arthritis     COPD (chronic obstructive pulmonary disease)     Depression     Diabetes mellitus     History of heart attack     Hyperlipidemia     Hypertension      Past Surgical History:   Procedure Laterality Date    BILATERAL BREAST REDUCTION      BONE MARROW ASPIRATION      CATARACT EXTRACTION Bilateral     COLONOSCOPY      HYSTERECTOMY  1990    x 2    REDUCTION MAMMAPLASTY Bilateral     MORE THAN 20 YEARS AGO    SINUS SURGERY  2003    TONSILLECTOMY      TOTAL KNEE ARTHROPLASTY Right 7/3/2024    Procedure: TOTAL KNEE ARTHROPLASTY WITH NAINA ROBOT RIGHT;  Surgeon: Keaton Aldrich MD;  Location: [facility];  Service: Robotics - Ortho;  Laterality: Right;      General Information       Row Name 11/02/24 1044          Physical Therapy Time and Intention    Document Type evaluation  -TW     Mode of Treatment physical therapy;individual therapy  -TW       Row Name 11/02/24 1044          General Information    Patient Profile Reviewed yes  -TW     Prior Level of Function independent:  pt did not use any assistive device for ambulation prior but due to a previous TKA she has FWW, BSC, and shower chair available if needed.  -TW     Existing Precautions/Restrictions fall;cardiac  -TW     Barriers to Rehab visual deficit;impaired sensation  -TW       Row Name 11/02/24 1044          Living Environment    People in Home alone  -TW       Row Name 11/02/24 1044          Home Main Entrance    Number of Stairs, Main Entrance one  -TW     Stair Railings, Main Entrance none  -TW       Row Name 11/02/24 1044          Stairs Within Home, Primary    Number of Stairs, Within Home, Primary none  -TW       Row Name 11/02/24 1044          Cognition    Orientation Status (Cognition) oriented x 4  -TW       Row Name 11/02/24 1044          Safety Issues/Impairments Affecting Functional Mobility    Safety Issues Affecting  Function (Mobility) insight into deficits/self-awareness;safety precaution awareness;safety precautions follow-through/compliance;sequencing abilities  -TW     Impairments Affecting Function (Mobility) balance;coordination;endurance/activity tolerance;pain;strength;sensation/sensory awareness  -               User Key  (r) = Recorded By, (t) = Taken By, (c) = Cosigned By      Initials Name Provider Type    TW Sondra Oropeza, PT Physical Therapist                   Mobility       Row Name 11/02/24 1044          Bed Mobility    Bed Mobility supine-sit;sit-supine;rolling right;rolling left  -TW     Rolling Left [place] (Bed Mobility) modified independence  -TW     Rolling Right [place] (Bed Mobility) modified independence  -TW     Supine-Sit [place] (Bed Mobility) standby assist;verbal cues  -TW     Sit-Supine [place] (Bed Mobility) standby assist;verbal cues  -TW     Assistive Device (Bed Mobility) bed rails;head of bed elevated  -TW     Comment, (Bed Mobility) Pt education on fixing her vision on a stationary object when when performing bed mobility to help in preventing dizziness. Pt was able to demonstrate various techniques with moderate success in controling dizziness. Pt was able to sit on EOB without loss of balance for 5 min.  -       Row Name 11/02/24 1044          Bed-Chair Transfer    Bed-Chair Victoria (Transfers) not tested  -       Row Name 11/02/24 1044          Sit-Stand Transfer    Sit-Stand [place] (Transfers) contact guard;verbal cues  -     Assistive Device (Sit-Stand Transfers) walker, front-wheeled  -TW     Comment, (Sit-Stand Transfer) pt continued to use visual cues while coming to stand. Pt had no increase in dizziness with static stand.  -       Row Name 11/02/24 1044          Gait/Stairs (Locomotion)    Victoria Level (Gait) minimum assist (75% patient effort);moderate assist (50% patient effort)  -     Assistive Device (Gait) walker, front-wheeled   -TW     Patient was able to Ambulate yes  -TW     Distance in Feet (Gait) 12  -TW     Deviations/Abnormal Patterns (Gait) other (see comments)  -TW     Comment, (Gait/Stairs) Cues to focus vision on stationary object in room, Pt began amb with FWW with min assist but then began to veer R and then fall to the R needing Mod assist to regain static balance. Pt needed mod assist to turn and amb back to bed with increased nausea.  -TW               User Key  (r) = Recorded By, (t) = Taken By, (c) = Cosigned By      Initials Name Provider Type    TW Sondra Oropeza, PAPI Physical Therapist                   Obj/Interventions       Row Name 11/02/24 1044          Range of Motion Comprehensive    Comment, General Range of Motion BLE grossly WFLS  -TW       Row Name 11/02/24 1044          Strength Comprehensive (MMT)    Comment, General Manual Muscle Testing (MMT) Assessment Pt's R knee is weaker than L knee grossly 3+/5 to 4/5. Of note pt did have a R total knee replacement this year. LLE grossly 4 to 4+/5  -TW       Row Name 11/02/24 1044          Balance    Balance Assessment sitting static balance;sitting dynamic balance;standing static balance;standing dynamic balance  -TW     Static Sitting Balance standby assist;verbal cues  -TW     Dynamic Sitting Balance standby assist;verbal cues  -TW     Position, Sitting Balance unsupported;sitting edge of bed  -TW     Static Standing Balance contact guard;verbal cues  -TW     Dynamic Standing Balance minimal assist;moderate assist  -TW     Position/Device Used, Standing Balance supported;walker, rolling  -TW     Comment, Balance strong lean to the R in dynamic standing balance and dizziness in standing.  -TW       Row Name 11/02/24 1044          Sensory Assessment (Somatosensory)    Sensory Assessment (Somatosensory) other (see comments)  pt does have B feet diabetic neuropathy that is equal.  -TW               User Key  (r) = Recorded By, (t) = Taken By, (c) = Cosigned By       Initials Name Provider Type    TW Sandip, Sondra, PT Physical Therapist                   Goals/Plan       Row Name 11/02/24 1044          Transfer Goal 1 (PT)    Activity/Assistive Device (Transfer Goal 1, PT) sit-to-stand/stand-to-sit;bed-to-chair/chair-to-bed;toilet;walker, rolling  -TW     Carter Level/Cues Needed (Transfer Goal 1, PT) supervision required  -TW     Time Frame (Transfer Goal 1, PT) long term goal (LTG);2 weeks  -TW     Strategies/Barriers (Transfers Goal 1, PT) without loss of balance.  -TW     Progress/Outcome (Transfer Goal 1, PT) goal ongoing  -       Row Name 11/02/24 1044          Gait Training Goal 1 (PT)    Activity/Assistive Device (Gait Training Goal 1, PT) gait (walking locomotion);assistive device use  -TW     Carter Level (Gait Training Goal 1, PT) standby assist  -TW     Distance (Gait Training Goal 1, PT) 100 ft x 2  -TW     Time Frame (Gait Training Goal 1, PT) 2 weeks;long term goal (LTG)  -TW     Progress/Outcome (Gait Training Goal 1, PT) goal ongoing  -TW       Row Name 11/02/24 1044          Problem Specific Goal 1 (PT)    Problem Specific Goal 1 (PT) SIde stepping with CGA 5 steps to each side without device  -TW     Time Frame (Problem Specific Goal 1, PT) long-term goal (LTG);2 weeks  -TW     Progress/Outcome (Problem Specific Goal 1, PT) goal ongoing  -TW       Row Name 11/02/24 1044          Patient Education Goal (PT)    Activity (Patient Education Goal, PT) BLE ther ex 1 x 10  -TW     Carter/Cues/Accuracy (Memory Goal 2, PT) demonstrates adequately  -TW     Time Frame (Patient Education Goal, PT) long term goal (LTG);2 weeks  -TW     Progress/Outcome (Patient Education Goal, PT) goal ongoing  -       Row Name 11/02/24 1044          Therapy Assessment/Plan (PT)    Planned Therapy Interventions (PT) balance training;gait training;patient/family education;strengthening;transfer training  -TW               User Key  (r) = Recorded By, (t) = Taken By,  (c) = Cosigned By      Initials Name Provider Type    TW Sondra Oropeza, PT Physical Therapist                   Clinical Impression       Row Name 11/02/24 1044          Pain    Pretreatment Pain Rating 3/10  -TW     Posttreatment Pain Rating 3/10  -TW     Pain Location head  -TW     Pain Side/Orientation anterior;left  -TW     Pre/Posttreatment Pain Comment Per pt no change in her HA with mobility.  -TW       Row Name 11/02/24 1044          Plan of Care Review    Plan of Care Reviewed With patient  -TW     Outcome Evaluation PT evaluation completed this am with pt presenting supine in bed, O x 4, c/o L anterior headache rated 3/10, on room air (O2 sat 98%), and improved dizziness at rest but still has dizziness with quick movements. Pt education throughout session for using vision to assist with dizziness and balance. Pt was able to come to sit on EOB with cues only and maintain sitting with supervision only with improved dizziness as she sat on EOB focusing her vision ahead of her. Pt came to stand with FWW and CGA and began to ambulate with CGA with a veer to the R and then a sudden loss of balance to the R requiring mod assist to regain midline. Pt stood and focused vision again and then turned and amb back to bed with min assist for a total of 12 ft. Pt was nauseated after walking and needed her emesis bag but did not vomit. Pt presents with deficits in balance, endurance, and strength. She is expected to improve her functional mobility wtih continued PT services prior to d/c. Therapist did speak with pt regarding short term rehab benefits.  -TW       Row Name 11/02/24 1044          Therapy Assessment/Plan (PT)    Patient/Family Therapy Goals Statement (PT) Pt would like to move without dizziness.  -TW     Rehab Potential (PT) good  -TW     Criteria for Skilled Interventions Met (PT) yes;meets criteria  -TW     Therapy Frequency (PT) daily  -TW     Predicted Duration of Therapy Intervention (PT) 2wks  -TW        Row Name 11/02/24 1044          Vital Signs    Pre SpO2 (%) 98  -TW     O2 Delivery Pre Treatment room air  -TW     Pre Patient Position Supine  -TW     Intra Patient Position Standing  -TW     Post Patient Position Supine  -TW       Row Name 11/02/24 1044          Positioning and Restraints    Pre-Treatment Position in bed  -TW     Post Treatment Position bed  -TW     In Bed notified nsg;fowlers;side rails up x3;call light within reach;encouraged to call for assist;exit alarm on  -TW               User Key  (r) = Recorded By, (t) = Taken By, (c) = Cosigned By      Initials Name Provider Type    Sondra Ruiz PT Physical Therapist                   Outcome Measures       Row Name 11/02/24 1044          How much help from another person do you currently need...    Turning from your back to your side while in flat bed without using bedrails? 3  -TW     Moving from lying on back to sitting on the side of a flat bed without bedrails? 3  -TW     Moving to and from a bed to a chair (including a wheelchair)? 2  -TW     Standing up from a chair using your arms (e.g., wheelchair, bedside chair)? 3  -TW     Climbing 3-5 steps with a railing? 2  -TW     To walk in hospital room? 2  -TW     AM-PAC 6 Clicks Score (PT) 15  -TW     Highest Level of Mobility Goal 4 --> Transfer to chair/commode  -TW       Row Name 11/02/24 1044          Functional Assessment    Outcome Measure Options AM-PAC 6 Clicks Basic Mobility (PT)  -TW               User Key  (r) = Recorded By, (t) = Taken By, (c) = Cosigned By      Initials Name Provider Type    Sondra Ruiz PT Physical Therapist                                 Physical Therapy Education       Title: PT OT SLP Therapies (In Progress)       Topic: Physical Therapy (In Progress)       Point: Mobility training (Not Started)       Learner Progress:  Not documented in this visit.              Point: Home exercise program (Not Started)       Learner Progress:  Not documented in this  visit.              Point: Body mechanics (Done)       Learning Progress Summary            Patient Acceptance, E,D, VU,DU by TW at 11/2/2024 7302    Comment: Pt education for using vision to assist with balance and improving dizziness with mobility.                      Point: Precautions (Not Started)       Learner Progress:  Not documented in this visit.                              User Key       Initials Effective Dates Name Provider Type Discipline     06/16/21 -  Sondra Oropeza, PT Physical Therapist PT                  PT Recommendation and Plan  Planned Therapy Interventions (PT): balance training, gait training, patient/family education, strengthening, transfer training  Outcome Evaluation: PT evaluation completed this am with pt presenting supine in bed, O x 4, c/o L anterior headache rated 3/10, on room air (O2 sat 98%), and improved dizziness at rest but still has dizziness with quick movements. Pt education throughout session for using vision to assist with dizziness and balance. Pt was able to come to sit on EOB with cues only and maintain sitting with supervision only with improved dizziness as she sat on EOB focusing her vision ahead of her. Pt came to stand with FWW and CGA and began to ambulate with CGA with a veer to the R and then a sudden loss of balance to the R requiring mod assist to regain midline. Pt stood and focused vision again and then turned and amb back to bed with min assist for a total of 12 ft. Pt was nauseated after walking and needed her emesis bag but did not vomit. Pt presents with deficits in balance, endurance, and strength. She is expected to improve her functional mobility wtih continued PT services prior to d/c. Therapist did speak with pt regarding short term rehab benefits.     Time Calculation:   PT Evaluation Complexity  History, PT Evaluation Complexity: 3 or more personal factors and/or comorbidities  Examination of Body Systems (PT Eval Complexity): total of 3 or  more elements  Clinical Presentation (PT Evaluation Complexity): unstable  Clinical Decision Making (PT Evaluation Complexity): moderate complexity  Overall Complexity (PT Evaluation Complexity): moderate complexity     PT Charges       Row Name 24 1044             Time Calculation    Stop Time 4  -      PT Received On 24      PT Goal Re-Cert Due Date 24                User Key  (r) = Recorded By, (t) = Taken By, (c) = Cosigned By      Initials Name Provider Type    TW Sondra Oropeza, PT Physical Therapist                  Therapy Charges for Today       Code Description Service Date Service Provider Modifiers Qty    78891775624 HC PT EVAL MOD COMPLEXITY 4 2024 Sondra Oropeza, PT GP 1            PT G-Codes  Outcome Measure Options: AM-PAC 6 Clicks Basic Mobility (PT)  AM-PAC 6 Clicks Score (PT): 15       Sondra Oropeza PT  2024      Electronically signed by Sondra Oropeza, PT at 24 1248       Gissel Palbo, PTA at 24 1617  Version 1 of          Pt reported being so sleepy and requested to wait until tomorrow. PT to f/u at later date      Electronically signed by Gissel Pablo, PTA at 24 1730       Luiz Malone, PTA at 24 1558  Version 1 of          Goal Outcome Evaluation:  Plan of Care Reviewed With: patient, child        Progress: improving  Outcome Evaluation: Pt  supine in bed and willing to participate with treatment.  Pt performed bed mobility,transfers and gait training this treatment.  Pt continues to present with R lateral lean during gait.  Pt with decreased assistance required for bed mobility and standing this treatment. See flowsheet for details. Cont PT per POC progressing to goals as pt tolerates.                               Electronically signed by Luiz Malone, PTA at 24 1632       Luiz Malone, PTA at 24 1558  Version 1 of          Patient Name: Martha Murray  : 1955    MRN: [id number]                               Today's Date: 11/4/2024       Admit Date: 10/31/2024    Visit Dx:     ICD-10-CM ICD-9-CM   1. Vertigo  R42 780.4   2. Uncontrolled hypertension  I10 401.9   3. Noncompliance with medication regimen  Z91.148 V15.81   4. Thrombocytosis  D75.839 238.71   5. Palpitations  R00.2 785.1     Patient Active Problem List   Diagnosis    Essential hypertension    Mild intermittent asthma without complication    Type 2 diabetes mellitus without complication, without long-term current use of insulin    Hyperlipidemia LDL goal <70    Depression    Essential thrombocytosis    Onychomycosis with ingrown toenail    CKD (chronic kidney disease) stage 3, GFR 30-59 ml/min    Obesity (BMI 30.0-34.9)    Coronary artery disease involving native coronary artery of native heart with angina pectoris    S/P total knee arthroplasty, right    Arthritis of knee    Stroke     Past Medical History:   Diagnosis Date    HAMMAD (acute kidney injury) 07/01/2021    Allergic     Anemia     Anesthesia complication     Slow to wake    Arthritis     COPD (chronic obstructive pulmonary disease)     Depression     Diabetes mellitus     History of heart attack     Hyperlipidemia     Hypertension      Past Surgical History:   Procedure Laterality Date    BILATERAL BREAST REDUCTION      BONE MARROW ASPIRATION      CATARACT EXTRACTION Bilateral     COLONOSCOPY      HYSTERECTOMY  1990    x 2    REDUCTION MAMMAPLASTY Bilateral     MORE THAN 20 YEARS AGO    SINUS SURGERY  2003    TONSILLECTOMY      TOTAL KNEE ARTHROPLASTY Right 7/3/2024    Procedure: TOTAL KNEE ARTHROPLASTY WITH NAINA ROBOT RIGHT;  Surgeon: Keaton Aldrich MD;  Location: [facility];  Service: Robotics - Ortho;  Laterality: Right;      General Information       Row Name 11/04/24 1622          Physical Therapy Time and Intention    Document Type therapy note (daily note)  -RM     Mode of Treatment physical therapy  -RM       Row Name 11/04/24 1622          General  Information    Patient Profile Reviewed yes  -RM     Existing Precautions/Restrictions fall;cardiac  -RM       Row Name 11/04/24 1622          Cognition    Orientation Status (Cognition) oriented x 4  -RM       Row Name 11/04/24 1622          Safety Issues/Impairments Affecting Functional Mobility    Safety Issues Affecting Function (Mobility) safety precaution awareness;safety precautions follow-through/compliance;insight into deficits/self-awareness  -RM     Impairments Affecting Function (Mobility) endurance/activity tolerance;balance;strength  -RM               User Key  (r) = Recorded By, (t) = Taken By, (c) = Cosigned By      Initials Name Provider Type    Luiz Tam PTA Physical Therapist Assistant                   Mobility       Row Name 11/04/24 1623          Bed Mobility    Supine-Sit [place] (Bed Mobility) modified independence;verbal cues  -RM     Assistive Device (Bed Mobility) bed rails;head of bed elevated  -RM       Row Name 11/04/24 1623          Sit-Stand Transfer    Sit-Stand [place] (Transfers) contact guard  -RM     Assistive Device (Sit-Stand Transfers) walker, front-wheeled  -RM       Row Name 11/04/24 1623          Gait/Stairs (Locomotion)    [place] Level (Gait) minimum assist (75% patient effort);verbal cues;nonverbal cues (demo/gesture)  -RM     Assistive Device (Gait) walker, front-wheeled  -RM     Distance in Feet (Gait) 50  -RM     Deviations/Abnormal Patterns (Gait) ataxic  -RM     Right Sided Gait Deviations weight shift ability decreased;leans right  -RM               User Key  (r) = Recorded By, (t) = Taken By, (c) = Cosigned By      Initials Name Provider Type    Luiz Tam PTA Physical Therapist Assistant                   Obj/Interventions    No documentation.                  Goals/Plan    No documentation.                  Clinical Impression       Row Name 11/04/24 1624          Pain    Pretreatment Pain Rating 0/10 - no pain  -RM      Posttreatment Pain Rating 0/10 - no pain  -RM       Row Name 11/04/24 1624          Plan of Care Review    Plan of Care Reviewed With patient;child  -RM     Progress improving  -RM     Outcome Evaluation Pt  supine in bed and willing to participate with treatment.  Pt performed bed mobility,transfers and gait training this treatment.  Pt continues to present with R lateral lean during gait.  Pt with decreased assistance required for bed mobility and standing this treatment. See flowsheet for details. Cont PT per POC progressing to goals as pt tolerates.  -RM       Row Name 11/04/24 1624          Positioning and Restraints    Pre-Treatment Position in bed  -RM     Post Treatment Position chair  -RM     In Chair reclined;call light within reach;encouraged to call for assist;with family/caregiver;notified nsg  -RM               User Key  (r) = Recorded By, (t) = Taken By, (c) = Cosigned By      Initials Name Provider Type    RM Luiz Malone, PTA Physical Therapist Assistant                   Outcome Measures       Row Name 11/04/24 1631 11/04/24 0800       How much help from another person do you currently need...    Turning from your back to your side while in flat bed without using bedrails? 4  -RM 4  -EW    Moving from lying on back to sitting on the side of a flat bed without bedrails? 4  -RM 4  -EW    Moving to and from a bed to a chair (including a wheelchair)? 3  -RM 3  -EW    Standing up from a chair using your arms (e.g., wheelchair, bedside chair)? 3  -RM 3  -EW    Climbing 3-5 steps with a railing? 2  -RM 3  -EW    To walk in hospital room? 3  -RM 3  -EW    AM-PAC 6 Clicks Score (PT) 19  -RM 20  -EW    Highest Level of Mobility Goal 6 --> Walk 10 steps or more  -RM 6 --> Walk 10 steps or more  -EW      Row Name 11/04/24 1631 11/04/24 1427       Functional Assessment    Outcome Measure Options AM-PAC 6 Clicks Basic Mobility (PT)  -RM AM-PAC 6 Clicks Daily Activity (OT)  -AH              User Key  (r) =  Recorded By, (t) = Taken By, (c) = Cosigned By      Initials Name Provider Type    Nanda Mancuso Occupational Therapist     Luiz Malone, PTA Physical Therapist Assistant    Lupis Crandall RN Registered Nurse                                 Physical Therapy Education       Title: PT OT SLP Therapies (In Progress)       Topic: Physical Therapy (In Progress)       Point: Mobility training (Done)       Learning Progress Summary            Patient Acceptance, E,TB,D, VU,NR by  at 11/4/2024 1631                      Point: Home exercise program (Not Started)       Learner Progress:  Not documented in this visit.              Point: Body mechanics (Done)       Learning Progress Summary            Patient Acceptance, E,D, VU,DU by TW at 11/2/2024 1044    Comment: Pt education for using vision to assist with balance and improving dizziness with mobility.                      Point: Precautions (Not Started)       Learner Progress:  Not documented in this visit.                              User Key       Initials Effective Dates Name Provider Type Discipline     06/16/21 -  Luiz Malone, PTA Physical Therapist Assistant PT     06/16/21 -  Sondra Oropeza PT Physical Therapist PT                  PT Recommendation and Plan     Progress: improving  Outcome Evaluation: Pt  supine in bed and willing to participate with treatment.  Pt performed bed mobility,transfers and gait training this treatment.  Pt continues to present with R lateral lean during gait.  Pt with decreased assistance required for bed mobility and standing this treatment. See flowsheet for details. Cont PT per POC progressing to goals as pt tolerates.     Time Calculation:         PT Charges       Row Name 11/04/24 1632             Time Calculation    Start Time 1558  -RM      Stop Time 1621  -RM      Time Calculation (min) 23 min  -RM      PT Received On 11/04/24  -RM      PT Goal Re-Cert Due Date 11/12/24  -RM         Time  Calculation- PT    Total Timed Code Minutes- PT 23 minute(s)  -RM         Timed Charges    99015 - Gait Training Minutes  13  -RM      09337 - PT Therapeutic Activity Minutes 10  -RM         Total Minutes    Timed Charges Total Minutes 23  -RM       Total Minutes 23  -RM                User Key  (r) = Recorded By, (t) = Taken By, (c) = Cosigned By      Initials Name Provider Type    Luiz Tam, WALTER Physical Therapist Assistant                  Therapy Charges for Today       Code Description Service Date Service Provider Modifiers Qty    25754152827 HC GAIT TRAINING EA 15 MIN 2024 Luiz Malone, PTA GP 1    89840658760 HC PT THERAPEUTIC ACT EA 15 MIN 2024 Luiz Malone, WALTER GP 1            PT G-Codes  Outcome Measure Options: AM-PAC 6 Clicks Basic Mobility (PT)  AM-PAC 6 Clicks Score (PT): 19  AM-PAC 6 Clicks Score (OT): 17       Luiz Malone PTA  2024      Electronically signed by Luiz Malone PTA at 24 1633          Occupational Therapy Notes (all)        Nanda Swanson at 24 1430          Patient Name: Martha Murray  : 1955    MRN: [id number]                              Today's Date: 2024       Admit Date: 10/31/2024    Visit Dx:     ICD-10-CM ICD-9-CM   1. Vertigo  R42 780.4   2. Uncontrolled hypertension  I10 401.9   3. Noncompliance with medication regimen  Z91.148 V15.81   4. Thrombocytosis  D75.839 238.71   5. Palpitations  R00.2 785.1     Patient Active Problem List   Diagnosis    Essential hypertension    Mild intermittent asthma without complication    Type 2 diabetes mellitus without complication, without long-term current use of insulin    Hyperlipidemia LDL goal <70    Depression    Essential thrombocytosis    Onychomycosis with ingrown toenail    CKD (chronic kidney disease) stage 3, GFR 30-59 ml/min    Obesity (BMI 30.0-34.9)    Coronary artery disease involving native coronary artery of native heart with angina pectoris    S/P  total knee arthroplasty, right    Arthritis of knee    Stroke     Past Medical History:   Diagnosis Date    HAMMAD (acute kidney injury) 07/01/2021    Allergic     Anemia     Anesthesia complication     Slow to wake    Arthritis     COPD (chronic obstructive pulmonary disease)     Depression     Diabetes mellitus     History of heart attack     Hyperlipidemia     Hypertension      Past Surgical History:   Procedure Laterality Date    BILATERAL BREAST REDUCTION      BONE MARROW ASPIRATION      CATARACT EXTRACTION Bilateral     COLONOSCOPY      HYSTERECTOMY  1990    x 2    REDUCTION MAMMAPLASTY Bilateral     MORE THAN 20 YEARS AGO    SINUS SURGERY  2003    TONSILLECTOMY      TOTAL KNEE ARTHROPLASTY Right 7/3/2024    Procedure: TOTAL KNEE ARTHROPLASTY WITH NAINA ROBOT RIGHT;  Surgeon: Keaton Aldrich MD;  Location: [facility];  Service: Robotics - Ortho;  Laterality: Right;      General Information       Row Name 11/04/24 1413          OT Time and Intention    Subjective Information complains of;dizziness  -     Document Type evaluation  -     Mode of Treatment occupational therapy  -     Patient Effort good  -     Symptoms Noted During/After Treatment dizziness;fatigue  -       Row Name 11/04/24 1413          General Information    Patient Profile Reviewed yes  -     Prior Level of Function independent:;ADL's;community mobility  -     Existing Precautions/Restrictions fall;cardiac  -     Barriers to Rehab medically complex  -       Row Name 11/04/24 1413          Occupational Profile    Reason for Services/Referral (Occupational Profile) ADL decline  -       Row Name 11/04/24 1413          Living Environment    People in Home alone  -       Row Name 11/04/24 1413          Home Main Entrance    Number of Stairs, Main Entrance one  -     Stair Railings, Main Entrance none  -       Row Name 11/04/24 1413          Stairs Within Home, Primary    Number of Stairs, Within Home, Primary none  -        [facility] Name 11/04/24 1413          Cognition    Orientation Status (Cognition) oriented x 4  -[facility] Name 11/04/24 1413          Safety Issues/Impairments Affecting Functional Mobility    Safety Issues Affecting Function (Mobility) safety precaution awareness;safety precautions follow-through/compliance;insight into deficits/self-awareness  -     Impairments Affecting Function (Mobility) endurance/activity tolerance;balance;strength  -               User Key  (r) = Recorded By, (t) = Taken By, (c) = Cosigned By      Initials Name Provider Type     Nanda Swanson Occupational Therapist                     Mobility/ADL's       [facility] Name 11/04/24 1414          Bed Mobility    Supine-Sit [place] (Bed Mobility) contact guard  -     Sit-Supine [place] (Bed Mobility) standby assist  -     Assistive Device (Bed Mobility) bed rails;head of bed elevated  -[facility] Name 11/04/24 1414          Transfers    Transfers sit-stand transfer;toilet transfer  -[facility] Name 11/04/24 1414          Sit-Stand Transfer    Sit-Stand [place] (Transfers) contact guard  -     Assistive Device (Sit-Stand Transfers) other (see comments)  gait belt and HHA  -[facility] Name 11/04/24 1414          Toilet Transfer    Type (Toilet Transfer) sit-stand;stand-sit;stand pivot/stand step  -     [place] Level (Toilet Transfer) contact guard;minimum assist (75% patient effort)  -     Assistive Device (Toilet Transfer) other (see comments)  HHA and gait belt  -[facility] Name 11/04/24 1414          Functional Mobility    Functional Mobility- Ind. Level contact guard assist;minimum assist (75% patient effort)  -     Functional Mobility- Device walker, front-wheeled  -     Functional Mobility-Distance (Feet) 2  -     Functional Mobility- Comment pt walked 2' to head of bed and then walked 2' to bedside commode and 2' back to bed.  -     Patient was able to Ambulate yes  -[facility] Name 11/04/24  1414          Activities of Daily Living    BADL Assessment/Intervention bathing;upper body dressing;lower body dressing;grooming;feeding;toileting  -AH       Row Name 11/04/24 1414          Bathing Assessment/Intervention    [place] Level (Bathing) minimum assist (75% patient effort)  -AH       Row Name 11/04/24 1414          Upper Body Dressing Assessment/Training    [place] Level (Upper Body Dressing) contact guard assist  -AH       Row Name 11/04/24 1414          Lower Body Dressing Assessment/Training    [place] Level (Lower Body Dressing) contact guard assist  -AH       Row Name 11/04/24 1414          Grooming Assessment/Training    [place] Level (Grooming) set up  -AH       Row Name 11/04/24 1414          Self-Feeding Assessment/Training    [place] Level (Feeding) set up  -AH       Row Name 11/04/24 1414          Toileting Assessment/Training    [place] Level (Toileting) moderate assist (50% patient effort)  -               User Key  (r) = Recorded By, (t) = Taken By, (c) = Cosigned By      Initials Name Provider Type    Nanda Mancuso Occupational Therapist                   Obj/Interventions       Row Name 11/04/24 1420          Sensory Assessment (Somatosensory)    Sensory Assessment pt reports no changes in her UE sensation  -AH       Row Name 11/04/24 1420          Vision Assessment/Intervention    Visual Impairment/Limitations WFL  -AH       Row Name 11/04/24 1420          Range of Motion Comprehensive    General Range of Motion bilateral upper extremity ROM [facility]  -AH       Row Name 11/04/24 1420          Strength Comprehensive (MMT)    Comment, General Manual Muscle Testing (MMT) Assessment BUE 4/5  -               User Key  (r) = Recorded By, (t) = Taken By, (c) = Cosigned By      Initials Name Provider Type    Nanda Mancuso Occupational Therapist                   Goals/Plan       Row Name 11/04/24 1426          Bed Mobility Goal 1 (OT)    Activity/Assistive  Device (Bed Mobility Goal 1, OT) bed mobility activities, all  -     [place] Level/Cues Needed (Bed Mobility Goal 1, OT) standby assist  -     Time Frame (Bed Mobility Goal 1, OT) by discharge  -     Progress/Outcomes (Bed Mobility Goal 1, OT) goal ongoing  -       Row Name 11/04/24 1426          Transfer Goal 1 (OT)    Activity/Assistive Device (Transfer Goal 1, OT) sit-to-stand/stand-to-sit;walker, rolling  -AH     [place] Level/Cues Needed (Transfer Goal 1, OT) standby assist  -AH     Time Frame (Transfer Goal 1, OT) long term goal (LTG);1 week  -     Progress/Outcome (Transfer Goal 1, OT) goal ongoing  -       Row Name 11/04/24 1426          Bathing Goal 1 (OT)    Activity/Device (Bathing Goal 1, OT) bathing skills, all  -     [place] Level/Cues Needed (Bathing Goal 1, OT) set-up required  -     Time Frame (Bathing Goal 1, OT) by discharge  -     Progress/Outcomes (Bathing Goal 1, OT) goal ongoing  -       Row Name 11/04/24 1426          Dressing Goal 1 (OT)    Activity/Device (Dressing Goal 1, OT) lower body dressing  -     [place]/Cues Needed (Dressing Goal 1, OT) set-up required  -     Time Frame (Dressing Goal 1, OT) long term goal (LTG);5 days  -     Progress/Outcome (Dressing Goal 1, OT) goal ongoing  -       Row Name 11/04/24 1426          Toileting Goal 1 (OT)    Activity/Device (Toileting Goal 1, OT) toileting skills, all  -     [place] Level/Cues Needed (Toileting Goal 1, OT) supervision required  -     Time Frame (Toileting Goal 1, OT) by discharge  -     Progress/Outcome (Toileting Goal 1, OT) goal ongoing  -       Row Name 11/04/24 1426          Strength Goal 1 (OT)    Strength Goal 1 (OT) Pt will perform UB strengthening ex using theraband for resistance.  -     Time Frame (Strength Goal 1, OT) by discharge  -     Progress/Outcome (Strength Goal 1, OT) goal ongoing  -       Row Name 11/04/24 1426          Therapy Assessment/Plan  (OT)    Planned Therapy Interventions (OT) activity tolerance training;BADL retraining;functional balance retraining;neuromuscular control/coordination retraining;patient/caregiver education/training;transfer/mobility retraining;strengthening exercise  -               User Key  (r) = Recorded By, (t) = Taken By, (c) = Cosigned By      Initials Name Provider Type     Nanda Swanson Occupational Therapist                   Clinical Impression       Row Name 11/04/24 1420          Pain Assessment    Pretreatment Pain Rating 0/10 - no pain  -     Posttreatment Pain Rating 0/10 - no pain  -     Pain Management Interventions exercise or physical activity utilized  -       Row Name 11/04/24 1427          Plan of Care Review    Plan of Care Reviewed With patient  -     Progress no change  -     Outcome Evaluation Pt seen for OT evaluation today.  Pt reports she lives alone and is normally able to care for herself without any difficulty.  Pt received today sidelying in bed, she sat eob with min assist and c/o dizziness when laying and sitting eob.  Pt stood with min assist and walked 2' to head of bed with min assist for weight shifting when trying to move her R leg.  Pt was able to get back into bed without assistance.  Pt reported she needed to use the bathroom and was able to sit eob this time with supervision and use of bed rail.  Pt stood with min assist and walked 2' to bedside commode with min assist.  Pt mod assist for toileting hygiene tasks.  Min assist to stand and walk back to her bed.  Pt needs min assist for bathing, dressing tasks, set up form grooming and feeding.  Pt is expected to benefit from skilled OT to improve her strength, coordination of her R arm, and independence with ADL tasks.  Pt would benefit from acute rehab upon d/c from [facility].  -       Row Name 11/04/24 1420          Therapy Assessment/Plan (OT)    Patient/Family Therapy Goal Statement (OT) d/c rehab  -     Rehab Potential  (OT) good  -     Criteria for Skilled Therapeutic Interventions Met (OT) yes;skilled treatment is necessary  [facility]     Therapy Frequency (OT) 5 times/wk  -       Row Name 11/04/24 1420          Therapy Plan Review/Discharge Plan (OT)    Anticipated Discharge Disposition (OT) inpatient rehabilitation facility  -       Row Name 11/04/24 1420          Positioning and Restraints    Pre-Treatment Position in bed  -     Post Treatment Position bed  -     In Bed supine;call light within reach;encouraged to call for assist;notified [facility]  -               User Key  (r) = Recorded By, (t) = Taken By, (c) = Cosigned By      Initials Name Provider Type    Nanda Mancuso Occupational Therapist                   Outcome Measures       Row Name 11/04/24 1427          How much help from another is currently needed...    Putting on and taking off regular lower body clothing? 3  -AH     Bathing (including washing, rinsing, and drying) 3  -AH     Toileting (which includes using toilet bed pan or urinal) 2  -AH     Putting on and taking off regular upper body clothing 3  -AH     Taking care of personal grooming (such as brushing teeth) 3  -AH     Eating meals 3  -     AM-[facility] 6 Clicks Score (OT) 17  -       Row Name 11/04/24 0800          How much help from another person do you currently need...    Turning from your back to your side while in flat bed without using bedrails? 4  -EW     Moving from lying on back to sitting on the side of a flat bed without bedrails? 4  -EW     Moving to and from a bed to a chair (including a wheelchair)? 3  -EW     Standing up from a chair using your arms (e.g., wheelchair, bedside chair)? 3  -EW     Climbing 3-5 steps with a railing? 3  -EW     To walk in hospital room? 3  -EW     AM-PAC 6 Clicks Score (PT) 20  -EW     Highest Level of Mobility Goal 6 --> Walk 10 steps or more  -       Row Name 11/04/24 1427          Functional Assessment    Outcome Measure Options AM-PAC 6 Clicks  Daily Activity (OT)  -               User Key  (r) = Recorded By, (t) = Taken By, (c) = Cosigned By      Initials Name Provider Type     Nadna Swanson Occupational Therapist    Lupis Crandall RN Registered Nurse                    Occupational Therapy Education       Title: PT OT SLP Therapies (In Progress)       Topic: Occupational Therapy (In Progress)       Point: ADL training (Done)       Description:   Instruct learner(s) on proper safety adaptation and remediation techniques during self care or transfers.   Instruct in proper use of assistive devices.                  Learning Progress Summary            Patient Acceptance, E,TB, VU by  at 11/4/2024 3430    Comment: Role of OT/POC                      Point: Home exercise program (Not Started)       Description:   Instruct learner(s) on appropriate technique for monitoring, assisting and/or progressing therapeutic exercises/activities.                  Learner Progress:  Not documented in this visit.              Point: Precautions (Not Started)       Description:   Instruct learner(s) on prescribed precautions during self-care and functional transfers.                  Learner Progress:  Not documented in this visit.              Point: Body mechanics (Not Started)       Description:   Instruct learner(s) on proper positioning and spine alignment during self-care, functional mobility activities and/or exercises.                  Learner Progress:  Not documented in this visit.                              User Key       Initials Effective Dates Name Provider Type Discipline     06/16/21 -  Nanda Swanson Occupational Therapist OT                  OT Recommendation and Plan  Planned Therapy Interventions (OT): activity tolerance training, BADL retraining, functional balance retraining, neuromuscular control/coordination retraining, patient/caregiver education/training, transfer/mobility retraining, strengthening exercise  Therapy Frequency  (OT): 5 times/wk  Plan of Care Review  Plan of Care Reviewed With: patient  Progress: no change  Outcome Evaluation: Pt seen for OT evaluation today.  Pt reports she lives alone and is normally able to care for herself without any difficulty.  Pt received today sidelying in bed, she sat eob with min assist and c/o dizziness when laying and sitting eob.  Pt stood with min assist and walked 2' to head of bed with min assist for weight shifting when trying to move her R leg.  Pt was able to get back into bed without assistance.  Pt reported she needed to use the bathroom and was able to sit eob this time with supervision and use of bed rail.  Pt stood with min assist and walked 2' to bedside commode with min assist.  Pt mod assist for toileting hygiene tasks.  Min assist to stand and walk back to her bed.  Pt needs min assist for bathing, dressing tasks, set up form grooming and feeding.  Pt is expected to benefit from skilled OT to improve her strength, coordination of her R arm, and independence with ADL tasks.  Pt would benefit from acute rehab upon d/c from [facility].     Time Calculation:   Evaluation Complexity (OT)  Review Occupational Profile/Medical/Therapy History Complexity: expanded/moderate complexity  Assessment, Occupational Performance/Identification of Deficit Complexity: 3-5 performance deficits  Clinical Decision Making Complexity (OT): detailed assessment/moderate complexity  Overall Complexity of Evaluation (OT): moderate complexity     Time Calculation- OT       Row Name 11/04/24 1113             Time Calculation- OT    OT Start Time 1113  -AH      OT Received On 11/04/24  -      OT Goal Re-Cert Due Date 11/14/24  -         Untimed Charges    OT Eval/Re-eval Minutes 58  -AH         Total Minutes    Untimed Charges Total Minutes 58  -AH       Total Minutes 58  -AH                User Key  (r) = Recorded By, (t) = Taken By, (c) = Cosigned By      Initials Name Provider Type    Nanda Mancuso  Occupational Therapist                  Therapy Charges for Today       Code Description Service Date Service Provider Modifiers Qty    [id number]  OT EVAL MOD COMPLEXITY 4 11/4/2024 Nanda Swanson GO 1                 Nanda Swanson  11/4/2024    Electronically signed by Nanda Swanson at 11/04/24 1431       Nanda Swanson at 11/04/24 1427          Goal Outcome Evaluation:  Plan of Care Reviewed With: patient        Progress: no change  Outcome Evaluation: Pt seen for OT evaluation today.  Pt reports she lives alone and is normally able to care for herself without any difficulty.  Pt received today sidelying in bed, she sat eob with min assist and c/o dizziness when laying and sitting eob.  Pt stood with min assist and walked 2' to head of bed with min assist for weight shifting when trying to move her R leg.  Pt was able to get back into bed without assistance.  Pt reported she needed to use the bathroom and was able to sit eob this time with supervision and use of bed rail.  Pt stood with min assist and walked 2' to bedside commode with min assist.  Pt mod assist for toileting hygiene tasks.  Min assist to stand and walk back to her bed.  Pt needs min assist for bathing, dressing tasks, set up form grooming and feeding.  Pt is expected to benefit from skilled OT to improve her strength, coordination of her R arm, and independence with ADL tasks.  Pt would benefit from acute rehab upon d/c from [facility].    Anticipated Discharge Disposition (OT): inpatient rehabilitation facility                          Electronically signed by Nanda Swanson at 11/04/24 1427       Samra Pena OT at 11/01/24 1224          OT anaial being held d/t dizziness, nausea and vomiting. Will follow up at later date.     Electronically signed by Samra Pena OT at 11/01/24 1228

## 2024-11-05 NOTE — DISCHARGE SUMMARY
[facility]      Name:  Martha Murray   Age:  69 y.o.  Sex:  female  :  1955  MRN:  [id number]   Visit Number:  [id number]    Admission Date:  10/31/2024  Date of Discharge:  2024  Primary Care Physician:  Jenny Carvajal APRN    Important issues to note:    -Patient admitted with noted acute lacunar infarct right basal ganglia with hypertensive urgency/dizziness/leukocytosis.  Complaining of diarrhea during admission and noted C. difficile with vancomycin p.o. initiated.  -Seen neurology who recommended dual antiplatelet therapy x 21 days and Plavix only thereafter.  High-dose statin continued.  -Blood pressure uncontrolled during admission with Coreg and Norvasc added.  Blood pressure became soft with lisinopril.  Would hold for now.  Restart if worsening.  -Dizziness and nausea improved.  Continue scopolamine and meclizine as needed.  -To be transferred to [place] today for short-term rehab.  -Follow with neurology in 1 month.  -Holter monitor ordered upon discharge per neurology recommendations.  -Suspect underlying chronic kidney disease.  Monitor CBC and BMP closely.  -Return precautions.    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 difficile  Hypokalemia  Type 2 diabetes  CAD  Essential thrombocytosis  CKD stage III  Hypertension  Hyperlipidemia  Depression  Obesity    Problem List:     Active Hospital Problems    Diagnosis  POA    **Stroke [I63.9]  Yes      Resolved Hospital Problems   No resolved problems to display.     Presenting Problem:    Chief Complaint   Patient presents with    Hypertension      Consults:     Consulting Physician(s)                     None              Procedures Performed:        History of presenting illness/Hospital  Course:    Patient is an obese 69-year-old female with history significant for CKD, type 2 diabetes, hypertension, hyperlipidemia, essential thrombocythemia who presents to the emergency room with complaints of dizziness.  Patient's symptoms occurred abruptly approximately 2 hours prior to presentation.  States that she is having difficulty ambulating due to feeling of the room spinning around her.  No extremity weakness, facial slurring, difficulty speaking.  Also denied headache, fever, vision changes, chest pain, shortness of breath.  En route, EMS reported patient blood pressure 210/120.  They provided sublingual nitroglycerin with slight improvement.  The patient admits that she stopped taking her blood pressure medicine 2 to 3 days ago.  Denies smoking, alcohol use, illicit drug use.     ED summary: Patient afebrile, hypertensive 198/79 and nonhypoxic on room air.  Troponins negative x 2.  Potassium 3.2 and creatinine 1.32 which appears baseline.  Glucose 206.  TSH appropriate.  WBC 21 and platelets 817.  UA negative for infection, protein positive.  CT head without contrast showed no acute abnormality.  CTA head showed no LVO.  CTA neck showed no evidence of significant cervical carotid stenosis. Lobular filling defect proximal brachiocephalic artery could represent nonocclusive mural thrombus or lobulated soft plaque. Follow-up CTA recommended in 6-8 weeks for continued surveillance.  CT cerebral perfusion showed no evidence of large vessel occlusion.  Teleneurology consulted, recommended admission for further stroke workup.  MRI noted small acute/subacute right periventricular CVA.  Patient initiated on heparin infusion x 24 hours due to filling defect in proximal brachiocephalic artery.  Continued on aspirin.  Norvasc added for blood pressure control.  Echo noted EF 61% with indeterminate left ventricular diastolic function negative saline test mild dilation of aorta at 3.8.  Neurology continued to follow  recommended to antiplatelet therapy x 21 days followed by Plavix 75 mg daily.  Continued on high intensity statin therapy with Lipitor 80 mg.  Patient complaining of diarrhea with noted C. difficile.  Leukocytosis improving after initiating vancomycin.  Otherwise, reassuring labs and vitals noted.  Dizziness and nausea have improved.   and strength equal bilaterally, however, states she does feel as if she is leaning to the right.  Lisinopril held as blood pressure was soft yesterday.  Can reinitiate if remains elevated.  Patient to follow with neurology in 1 month.  Strict return precautions given.    Vital Signs:    Temp:  [97.1 °F (36.2 °C)-98.5 °F (36.9 °C)] 98.4 °F (36.9 °C)  Heart Rate:  [67-92] 92  Resp:  [16-18] 18  BP: (112-142)/(50-88) 142/79    Physical Exam:    General Appearance:  Alert and cooperative.  Chronically ill middle-aged female.  No acute distress noted.   Head:  Atraumatic and normocephalic.   Eyes: Conjunctivae and sclerae normal, no icterus. No pallor.   Ears:  Ears with no abnormalities noted.   Throat: No oral lesions, no thrush, oral mucosa moist.   Neck: Supple, trachea midline, no thyromegaly.   Back:   No kyphoscoliosis present. No tenderness to palpation.   Lungs:   Breath sounds heard bilaterally equally.  No crackles or wheezing. No Pleural rub or bronchial breathing.  On room air unlabored.   Heart:  Normal S1 and S2, no murmur, no gallop, no rub. No JVD.   Abdomen:   Normal bowel sounds, no masses, no organomegaly. Soft, nontender, nondistended, no rebound tenderness.   Extremities: Supple, no edema, no cyanosis, no clubbing.   Pulses: Pulses palpable bilaterally.   Skin: No bleeding or rash.   Neurologic: Alert and oriented x 3. No facial asymmetry. Moves all four limbs. No tremors.     Pertinent Lab Results:     Results from last 7 days   Lab Units 11/05/24  0557 11/05/24  0238 11/04/24  0800 11/02/24  0536 11/01/24  0344 11/01/24  0142   SODIUM mmol/L 142  --  140 142    < > 139   POTASSIUM mmol/L 4.1 4.6 3.1* 4.2   < > 3.2*   CHLORIDE mmol/L 109*  --  104 104   < > 103   CO2 mmol/L 22.4  --  22.7 24.6   < > 22.4   BUN mg/dL 25*  --  23 20   < > 15   CREATININE mg/dL 1.43*  --  1.28* 1.40*   < > 1.32*   CALCIUM mg/dL 8.9  --  8.9 9.2   < > 8.9   BILIRUBIN mg/dL  --   --   --   --   --  0.5   ALK PHOS U/L  --   --   --   --   --  100   ALT (SGPT) U/L  --   --   --   --   --  15   AST (SGOT) U/L  --   --   --   --   --  21   GLUCOSE mg/dL 122*  --  110* 129*   < > 206*    < > = values in this interval not displayed.     Results from last 7 days   Lab Units 11/05/24  0557 11/04/24  0800 11/02/24  0535   WBC 10*3/mm3 12.00* 13.54* 15.79*   HEMOGLOBIN g/dL 15.0 15.2 14.5   HEMATOCRIT % 46.3 46.3 44.9   PLATELETS 10*3/mm3 617* 567* 506*     Results from last 7 days   Lab Units 11/01/24  1104   INR  1.14*     Results from last 7 days   Lab Units 11/01/24  0344 11/01/24  0142   HSTROP T ng/L 11 11                           Pertinent Radiology Results:    Imaging Results (All)       Procedure Component Value Units Date/Time    CT Angiogram Chest [[id number]] Collected: 11/03/24 1321     Updated: 11/03/24 1332    Narrative:      PROCEDURE: CT ANGIOGRAM CHEST-     HISTORY: echo show abnorma aortic aneurysm and to rule out thrombus  inaorta also; R42-Dizziness and giddiness; I10-Essential (primary)  hypertension; Z91.148-Patient's other noncompliance with medication  regimen for other reason; D75.839-Thrombocytosis, unspecified     COMPARISON: July 29, 2023.     TECHNIQUE: Multiple axial CT images were obtained from the thoracic  inlet through the upper abdomen per the CT PE protocol. Coronal and  oblique MIP images were reconstructed from the original axial data set.     FINDINGS: There are no filling defects to suggest pulmonary embolism.  Significant atherosclerotic plaque involving the descending and  abdominal aorta. No evidence of dissection. Aneurysmal dilatation of the  ascending aorta  up to 4.1 cm. The descending thoracic aorta measures up  to 2.3 cm. There is prominence of the pulmonary trunk measuring up to  2.2 cm, correlate for possible pulmonary hypertension. There is evidence  of calcified granulomatous disease. No mediastinal or hilar adenopathy.     There are mild emphysematous changes. Somewhat spiculated nodular  density in the left upper lobe measuring up to 1.6 cm on image 49. Of  there is no pleural or there is evidence of calcified. Apical pleural  thickening is noted. No acute osseous changes.             Impression:      Aneurysmal dilatation of the ascending aorta up to 4.1 cm.         There is prominence of the pulmonary trunk measuring up to 2.2 cm,  correlate for possible pulmonary hypertension.      Spiculated nodular density in the left upper lobe measuring up to 1.6  cm. This is slightly more conspicuous compared to the July 29, 2023  exam. Consider follow-up PET/CT or chest CT in 3 months.        This study was performed with techniques to keep radiation doses as low  as reasonably achievable (ALARA). Individualized dose reduction  techniques using automated exposure control or adjustment of mA and/or  kV according to the patient size were employed.        CTDI: 2.67 mGy  DLP:100.27 mGy.cm                 This report was signed and finalized on 11/3/2024 1:30 PM by Juan A Andrews DO.       MRI Brain Without Contrast [[id number]] Collected: 11/01/24 0939     Updated: 11/01/24 0947    Narrative:      PROCEDURE: MRI BRAIN WO CONTRAST-     HISTORY: Stroke, follow up, dizziness and vertigo. Compare July 20, 2023.     PROCEDURE: Multiplanar MR imaging of the brain was performed in multiple  MR sequences .     FINDINGS: Brain parenchyma displays normal signal without evidence of  mass, hemorrhage or edema. There is mild small vessel ischemic disease,  age-appropriate. Limited images the proximal cord are unremarkable. The  ventricles are mildly dilated indicating mild atrophy  stable from prior  exam. There is no extra-axial fluid or midline shift. Flow-voids are  appropriate. Diffusion weighted images demonstrate a small area of  mildly abnormal diffusion in the right parietal periventricular region  just superior to the basal ganglia best seen series 6 image 15. This is  confirmed on the ADC map to a moderate degree. Findings are suggestive  of a acute/subacute stroke. This correlates with a small area of mildly  increased T2 signal but has no correlate on the T1-weighted images.  Limited images of the paranasal sinuses are unremarkable.       Impression:      Small acute/subacute right periventricular CVA; right:  JULISSA Medel in the emergency room, notified at 9:40 a.m. November 1, 2024.                 This report was signed and finalized on 11/1/2024 9:45 AM by Adelina Holliday MD.       XR Chest 1 View [[id number]] Collected: 11/01/24 0911     Updated: 11/01/24 0927    Narrative:      PROCEDURE: XR CHEST 1 VW-        HISTORY: Dizziness, vertigo. Chest Pain Triage Protocol     COMPARISON: July 3, 2024.     FINDINGS: Apical lordotic view. The heart is borderline in size. The  mediastinum is unremarkable. The lungs are clear. There is no  pneumothorax. There are no acute osseous abnormalities.       Impression:      No acute cardiopulmonary process.                       Images were reviewed, interpreted, and dictated by Dr. Adelina Holliday MD  Transcribed by Milagros Pichardo PA-C.     This report was signed and finalized on 11/1/2024 9:25 AM by Adelina Holliday MD.       CT Angiogram Neck [[id number]] Collected: 11/01/24 0055     Updated: 11/01/24 0100    Narrative:      PROCEDURE: CT ANGIOGRAM NECK-     HISTORY: Dizziness and vertigo     TECHNIQUE: Thin section axial CT with IV contrast supplemented with  multiplanar reconstruction.   3 D reconstructions were performed on a  separate workstation.     NASCET criteria and technique was utilized during interpretation.     FINDINGS:     Aortic  arch:  Arch shows no significant narrowing. There is a filling  defect along the anterior wall of the brachiocephalic artery measuring  up to 7 mm. This could represent mural thrombus or lobulated soft  plaque. Nevertheless this could be a embolic source. The left common and  left subclavian arteries appear widely patent.     Right carotid:  No significant stenosis is seen of the cervical common  or internal carotid artery.     Left carotid:  No significant stenosis is seen of the cervical common or  internal carotid artery.     Vertebrals: Vertebral arteries are codominant. No significant stenosis  is present.         Impression:      1. No evidence of significant cervical carotid stenosis  2. Lobular filling defect proximal brachiocephalic artery could  represent nonocclusive mural thrombus or lobulated soft plaque.  Follow-up CTA recommended in 6-8 weeks for continued surveillance. CT  appearance that abnormality could result in more distal emboli.        This study was performed with techniques to keep radiation doses as low  as reasonably achievable (ALARA). Individualized dose reduction  techniques using automated exposure control or adjustment of vA and/or  kV according to the patient size were employed.         This report was signed and finalized on 11/1/2024 12:58 AM by Sam Thakkar MD.       CT Angiogram Head [[id number]] Collected: 11/01/24 0052     Updated: 11/01/24 0055    Narrative:      PROCEDURE: CT ANGIOGRAM HEAD-     HISTORY: Dizziness and vertigo     TECHNIQUE: Thin section axial CT with contrast with multiplanar  reconstruction.   3 D reconstructions were performed on a separate  workstation.     FINDINGS:      Internal carotid arteries: The petrous, cavernous and postcavernous ICAs  are unremarkable. No aneurysm is seen.     Anterior cerebral arteries: Central anterior cerebral arteries are  unremarkable. No aneurysm is seen.     Middle cerebral arteries: Central middle cerebral arteries  are  unremarkable. No aneurysm is seen.     Basilar artery: Distal vertebral and basilar arteries are widely patent.  No aneurysm is seen.     Posterior cerebral artery: Posterior cerebral arteries are patent  centrally. No obvious aneurysm is seen.     Venous sinuses: Major venous sinuses are patent.       Impression:      No evidence of acute large vessel occlusive disease           This study was performed with techniques to keep radiation doses as low  as reasonably achievable (ALARA). Individualized dose reduction  techniques using automated exposure control or adjustment of vA and/or  kV according to the patient size were employed.      This report was signed and finalized on 11/1/2024 12:53 AM by Sam Thakkar MD.       CT CEREBRAL PERFUSION WITH & WITHOUT CONTRAST [[id number]] Collected: 11/01/24 0051     Updated: 11/01/24 0054    Narrative:      CT cerebral perfusion, without and with contrast     HISTORY: Symptoms of CVA. Vertigo. Dizziness..     TECHNIQUE: CT brain perfusion with mapping of flow parameters including  transit time, cerebral blood flow and cerebral blood volume. IV contrast  was utilized.     FINDINGS:     Tissue with delayed blood, Tmax > 6 secs:  0 ml     Tissue with significantly reduced blood, rCBF < 30%:  0              Impression:      1.  No evidence of acute large vessel occlusion     Should symptoms persist recommend follow-up MRI     This report was signed and finalized on 11/1/2024 12:52 AM by Sam Thakkar MD.       CT Head Without Contrast Stroke Protocol [[id number]] Collected: 11/01/24 0051     Updated: 11/01/24 0053    Narrative:      PROCEDURE: CT HEAD WO CONTRAST STROKE PROTOCOL-     HISTORY: Dizziness/vertigo, symptoms of acute CVA     COMPARISON: 7/20/2023     TECHNIQUE: Noncontrast exam     FINDINGS: Mild atrophy and chronic ischemic white matter changes are  noted.     No cortical edema is present. There is no mass or hemorrhage. Ventricles  are normal.     Bone  windows show no skull fracture or obvious destructive lesion.       Impression:      1. No acute intracranial abnormality or obvious mass.   2. Atrophy and chronic ischemic white matter changes as above.        This study was performed with techniques to keep radiation doses as low  as reasonably achievable (ALARA). Individualized dose reduction  techniques using automated exposure control or adjustment of vA and/or  kV according to the patient size were employed.            This report was signed and finalized on 11/1/2024 12:51 AM by Sam Thakkar MD.               Echo:    Results for orders placed during the hospital encounter of 10/31/24    Adult Transthoracic Echo Complete W/ Cont if Necessary Per Protocol (With Agitated Saline)    Interpretation Summary    Left ventricular systolic function is normal. Calculated left ventricular EF = 68.3% Left ventricular ejection fraction appears to be 61 - 65%.    Left ventricular wall thickness is consistent with mild concentric hypertrophy. Left ventricular diastolic function was indeterminate.    Normal right ventricular size and function.    The left atrial cavity is mildly dilated.    Mild aortic valve regurgitation is present.    Saline test results are negative for right to left atrial level shunt.    Mild dilation of the proximal aorta is present. Ascending aorta = 3.8 cm.  Recommend dedicated chest CTA for further characterization of aortic pathology.    Condition on Discharge:      Stable.    Code status during the hospital stay:    Code Status and Medical Interventions: CPR (Attempt to Resuscitate); Full Support   Ordered at: 11/01/24 0248     Code Status (Patient has no pulse and is not breathing):    CPR (Attempt to Resuscitate)     Medical Interventions (Patient has pulse or is breathing):    Full Support     Discharge Disposition:    Rehab Facility or Unit (DC - External)    Discharge Medications:       Discharge Medications        New Medications         Instructions Start Date   atorvastatin 80 MG tablet  Commonly known as: LIPITOR   80 mg, Oral, Nightly      carvedilol 12.5 MG tablet  Commonly known as: COREG   12.5 mg, Oral, Every 12 Hours Scheduled      clopidogrel 75 MG tablet  Commonly known as: PLAVIX   75 mg, Oral, Daily   Start Date: November 6, 2024     meclizine 25 MG tablet  Commonly known as: ANTIVERT   25 mg, Oral, Every 8 Hours Scheduled      prochlorperazine 5 MG tablet  Commonly known as: COMPAZINE   5 mg, Oral, Every 6 Hours PRN      Scopolamine 1 MG/3DAYS patch   1 patch, Transdermal, Every 72 Hours   Start Date: November 7, 2024     vancomycin 125 MG capsule  Commonly known as: VANCOCIN   125 mg, Oral, Every 6 Hours Scheduled             Changes to Medications        Instructions Start Date   amLODIPine 10 MG tablet  Commonly known as: NORVASC  What changed:   medication strength  how much to take  when to take this   10 mg, Oral, Every 24 Hours Scheduled   Start Date: November 6, 2024     aspirin 325 MG EC tablet  What changed:   medication strength  how much to take  when to take this   325 mg, Oral, Daily   Start Date: November 6, 2024     lisinopril 40 MG tablet  Commonly known as: PRINIVIL,ZESTRIL  What changed: additional instructions   40 mg, Oral, Daily, Hold for now- continue if blood pressure remains elevated.             Continue These Medications        Instructions Start Date   hydroxyurea 500 MG capsule  Commonly known as: HYDREA   500 mg, Oral, Daily             Discharge Diet:     Diet Instructions       Diet: Cardiac Diets; Healthy Heart (2-3 Na+); Thin (IDDSI 0)      Discharge Diet: Cardiac Diets    Cardiac Diet: Healthy Heart (2-3 Na+)    Fluid Consistency: Thin (IDDSI 0)          Activity at Discharge:     Activity Instructions       Activity as Tolerated            Follow-up Appointments:     Contact information for follow-up providers       [facility] Follow up.    Contact information:  [address]  [facility] [address] [phone]             Jenny Carvajal, NAYELI .    Specialties: Nurse Practitioner, Family Medicine  Contact information:  [address]  [facility] [id number] [phone]                       Contact information for after-discharge care       Destination       [facility] .    Service: Skilled Nursing  Contact information:  [address] [facility] [address] [phone]                                 Future Appointments   Date Time Provider Department Center   12/12/2024  2:45 PM Paulo Graves MD [facility]     Test Results Pending at Discharge:           Jen Batres, APRN  11/05/24  09:41 EST    Time: I spent >30 minutes on this discharge activity which included: face-to-face encounter with the patient, reviewing the data in the system, coordination of the care with the nursing staff as well as consultants, documentation, and entering orders.     Dictated utilizing Dragon dictation.

## 2024-11-05 NOTE — PROGRESS NOTES
Nursing Home History and Physical       Toni Hollis DO  [address] [place] [address] Phone: [phone]  Fax: [phone]     PATIENT NAME: Martha Murray                                                                          YOB: 1955           DATE OF SERVICE: 11/06/2024  FACILITY: [facility]    CHIEF COMPLAINT:  Nursing facility admission    History of Present Illness  The patient is a 69-year-old female with a history of type 2 diabetes mellitus, coronary artery disease, CKD stage 3, hypertension, hyperlipidemia, and depression, recently hospitalized at [facility] for an acute lacunar infarct of the right basal ganglia as well as hypertensive urgency.    She was treated conservatively under neurology care and started on dual antiplatelet therapy to be continued for 21 days, after which she should continue Plavix only. A high dose statin was continued. Blood pressures were notably high during hospitalization, and she was started on carvedilol and Norvasc. She tested positive for C. difficile and was started on vancomycin, which was effective in controlling diarrhea by the time of discharge.    She reports an improvement in her condition, with no recent episodes of diarrhea. Her appetite has improved, and she is eating better. She is able to walk, although with some difficulty due to weakness in her right hand. She also experiences issues with coordination and balance but is working well with PT.        PAST MEDICAL & SURGICAL HISTORY:   Past Medical History:   Diagnosis Date    HAMMAD (acute kidney injury) 07/01/2021    Allergic     Anemia     Anesthesia complication     Slow to wake    Arthritis     COPD (chronic obstructive pulmonary disease)     Depression     Diabetes mellitus     History of heart attack     Hyperlipidemia     Hypertension       Past Surgical History:   Procedure Laterality Date    BILATERAL BREAST REDUCTION      BONE MARROW ASPIRATION       CATARACT EXTRACTION Bilateral     COLONOSCOPY      HYSTERECTOMY  1990    x 2    REDUCTION MAMMAPLASTY Bilateral     MORE THAN 20 YEARS AGO    SINUS SURGERY  2003    TONSILLECTOMY      TOTAL KNEE ARTHROPLASTY Right 7/3/2024    Procedure: TOTAL KNEE ARTHROPLASTY WITH NAINA ROBOT RIGHT;  Surgeon: Keaton Aldrich MD;  Location: [facility];  Service: Robotics - Ortho;  Laterality: Right;         MEDICATIONS:  I have reviewed and reconciled the patients medication list in the patients chart at the skilled nursing facility on 2024.      ALLERGIES:  Allergies   Allergen Reactions    Sulfa Antibiotics Rash         SOCIAL HISTORY:  Social History     Socioeconomic History    Marital status: Single   Tobacco Use    Smoking status: Former     Current packs/day: 0.00     Average packs/day: 2.0 packs/day for 30.0 years (60.0 ttl pk-yrs)     Types: Cigarettes     Start date:      Quit date:      Years since quittin.8     Passive exposure: Past    Smokeless tobacco: Never   Vaping Use    Vaping status: Never Used   Substance and Sexual Activity    Alcohol use: No    Drug use: No    Sexual activity: Defer       FAMILY HISTORY:  Family History   Problem Relation Age of Onset    Arthritis Maternal Grandmother     Hypertension Mother     Hyperlipidemia Mother     Thyroid disease Mother     Diabetes Sister     Crohn's disease Sister     Hypertension Sister     Heart attack Maternal Grandfather     Breast cancer Neg Hx     Ovarian cancer Neg Hx         REVIEW OF SYSTEMS:  Review of Systems   Constitutional:  Negative for chills, fatigue and fever.   HENT:  Negative for congestion, ear pain, rhinorrhea, sinus pressure and sore throat.    Eyes:  Negative for visual disturbance.   Respiratory:  Negative for cough, chest tightness, shortness of breath and wheezing.    Cardiovascular:  Negative for chest pain, palpitations and leg swelling.   Gastrointestinal:  Negative for abdominal pain, blood in stool, constipation,  "diarrhea, nausea and vomiting.   Endocrine: Negative for polydipsia and polyuria.   Genitourinary:  Negative for dysuria and hematuria.   Musculoskeletal:  Negative for arthralgias and back pain.   Skin:  Negative for rash.   Neurological:  Negative for dizziness, light-headedness, numbness and headaches.   Psychiatric/Behavioral:  Negative for dysphoric mood and sleep disturbance. The patient is not nervous/anxious.        PHYSICAL EXAMINATION:   VITAL SIGNS: /63   Pulse 72   Temp 97.9 °F (36.6 °C)   Resp 18   Ht 152.4 cm (60\")   Wt 73.1 kg (161 lb 3.2 oz)   SpO2 96%   BMI 31.48 kg/m²     Physical Exam  Vitals and nursing note reviewed.   Constitutional:       Appearance: Normal appearance. She is well-developed. She is obese.   HENT:      Head: Normocephalic and atraumatic.      Nose: Nose normal.      Mouth/Throat:      Mouth: Mucous membranes are moist.      Pharynx: No oropharyngeal exudate.   Eyes:      General: No scleral icterus.     Conjunctiva/sclera: Conjunctivae normal.      Pupils: Pupils are equal, round, and reactive to light.   Neck:      Thyroid: No thyromegaly.   Cardiovascular:      Rate and Rhythm: Normal rate and regular rhythm.      Heart sounds: Normal heart sounds. No murmur heard.     No friction rub. No gallop.   Pulmonary:      Effort: Pulmonary effort is normal. No respiratory distress.      Breath sounds: Normal breath sounds. No wheezing.   Abdominal:      General: Bowel sounds are normal. There is no distension.      Palpations: Abdomen is soft.      Tenderness: There is no abdominal tenderness.   Musculoskeletal:         General: No deformity or signs of injury.      Cervical back: Normal range of motion and neck supple.   Lymphadenopathy:      Cervical: No cervical adenopathy.   Skin:     General: Skin is warm and dry.      Findings: No rash.   Neurological:      Mental Status: She is alert and oriented to person, place, and time.      Coordination: Coordination " abnormal.      Comments: Mild right hand weakness   Psychiatric:         Mood and Affect: Mood normal.         Behavior: Behavior normal.         RECORDS REVIEW:   Discharge Summary [facility] 11/5/2024    ASSESSMENT   Diagnoses and all orders for this visit:    1. Right-sided lacunar infarction (Primary)    2. Balance disorder    3. Hypertensive urgency    4. Type 2 diabetes mellitus without complication, without long-term current use of insulin    5. Stage 3a chronic kidney disease    6. C. difficile colitis    7. Hyperlipidemia LDL goal <70    8. Depression with anxiety        Assessment & Plan  Lacunar infarct of right basal ganglia.  Continuation of aspirin, Plavix, and statin is advised. Outpatient neurology follow-up is scheduled. Physical and occupational therapy at the rehabilitation facility will be maintained.    Hypertensive urgency.  Blood pressure has shown stability since admission. Current regimen of carvedilol, Norvasc, and lisinopril will be continued.    Type 2 diabetes mellitus.  This is a reported condition, however, no diabetic medications were prescribed upon discharge. The last A1c was 5.5. Diet control for diabetes will be continued.    CKD stage 3a.  Renal function has remained stable since admission. Labs will be monitored.     C. difficile colitis.  Vancomycin will be continued. Isolation precautions per nursing facility will be maintained. Completion of vancomycin as prescribed from the hospital is expected.    Hyperlipidemia.  Statin therapy will be continued.     Depression.  Stability on current medication regimen is noted. No current medications for depression are prescribed. Monitoring in the nursing facility will be continued.             [x]  Discussed Patient in detail with nursing/staff, addressed all needs today.     [x]  Plan of Care Reviewed   [x]  PT/OT Reviewed   []  Order Changes  []  Discharge Plans Reviewed  [x]  Advance Directive on file with Nursing Home.    [x]  POA on file with Nursing Home.    [x]  Code Status listed and reviewed.     Toni Hollis DO.  11/7/2024      **Part of this note may be an electronic transcription/translation of spoken language to printed text using the Dragon Dictation System.**    Patient or patient representative verbalized consent for the use of Ambient Listening during the visit with  Toni Hollis DO for chart documentation. 11/7/2024  15:37 EST

## 2024-11-05 NOTE — DISCHARGE INSTRUCTIONS
To be transferred to [facility] today.  Continue vancomycin as scheduled.  Increase water intake.  Follow-up with neurology as scheduled.  Return to emergency department for any worsening symptoms.

## 2024-11-05 NOTE — CASE MANAGEMENT/SOCIAL WORK
Case Management/Social Work    Patient Name:  Martha Murray  YOB: 1955  MRN: [id number]  Admit Date:  10/31/2024        PANCHO completed auth in [facility] for STR at [place]. Pre-cert approved. Pancho updated team. Pt can admit today. PANCHO following.       Electronically signed by:  RAJINDER Ashraf  11/05/24 09:28 EST

## 2024-11-05 NOTE — PLAN OF CARE
Goal Outcome Evaluation:           Progress: improving  Outcome Evaluation: VSS on room air.  Oriented x4.  Accu-check completed with supplemental insulin administered (see MAR).  Intake and output monitored.  Up to bedside commode; tolerated well.  Contact Sport isolation maintained.  No complaints or acute events noted during shift.  Continue with plan of care.

## 2024-11-06 ENCOUNTER — NURSING HOME (OUTPATIENT)
Dept: INTERNAL MEDICINE | Facility: CLINIC | Age: 69
End: 2024-11-06
Payer: MEDICARE

## 2024-11-06 VITALS
RESPIRATION RATE: 18 BRPM | WEIGHT: 161.2 LBS | BODY MASS INDEX: 31.65 KG/M2 | HEART RATE: 72 BPM | DIASTOLIC BLOOD PRESSURE: 63 MMHG | TEMPERATURE: 97.9 F | OXYGEN SATURATION: 96 % | SYSTOLIC BLOOD PRESSURE: 147 MMHG | HEIGHT: 60 IN

## 2024-11-06 DIAGNOSIS — I16.0 HYPERTENSIVE URGENCY: ICD-10-CM

## 2024-11-06 DIAGNOSIS — R26.89 BALANCE DISORDER: ICD-10-CM

## 2024-11-06 DIAGNOSIS — I63.81 RIGHT-SIDED LACUNAR INFARCTION: Primary | ICD-10-CM

## 2024-11-06 DIAGNOSIS — E11.9 TYPE 2 DIABETES MELLITUS WITHOUT COMPLICATION, WITHOUT LONG-TERM CURRENT USE OF INSULIN: ICD-10-CM

## 2024-11-06 DIAGNOSIS — N18.31 STAGE 3A CHRONIC KIDNEY DISEASE: ICD-10-CM

## 2024-11-06 DIAGNOSIS — A04.72 C. DIFFICILE COLITIS: ICD-10-CM

## 2024-11-06 DIAGNOSIS — F41.8 DEPRESSION WITH ANXIETY: ICD-10-CM

## 2024-11-06 DIAGNOSIS — E78.5 HYPERLIPIDEMIA LDL GOAL <70: ICD-10-CM

## 2024-11-06 NOTE — LETTER
Nursing Home History and Physical       Toni Hollis DO  [address] [place] [address] Phone: [phone]  Fax: [phone]     PATIENT NAME: Martha Murray                                                                          YOB: 1955           DATE OF SERVICE: 11/06/2024  FACILITY: [facility]    CHIEF COMPLAINT:  Nursing facility admission    History of Present Illness  The patient is a 69-year-old female with a history of type 2 diabetes mellitus, coronary artery disease, CKD stage 3, hypertension, hyperlipidemia, and depression, recently hospitalized at [facility] for an acute lacunar infarct of the right basal ganglia as well as hypertensive urgency.    She was treated conservatively under neurology care and started on dual antiplatelet therapy to be continued for 21 days, after which she should continue Plavix only. A high dose statin was continued. Blood pressures were notably high during hospitalization, and she was started on carvedilol and Norvasc. She tested positive for C. difficile and was started on vancomycin, which was effective in controlling diarrhea by the time of discharge.    She reports an improvement in her condition, with no recent episodes of diarrhea. Her appetite has improved, and she is eating better. She is able to walk, although with some difficulty due to weakness in her right hand. She also experiences issues with coordination and balance but is working well with PT.        PAST MEDICAL & SURGICAL HISTORY:   Past Medical History:   Diagnosis Date   • HAMMAD (acute kidney injury) 07/01/2021   • Allergic    • Anemia    • Anesthesia complication     Slow to wake   • Arthritis    • COPD (chronic obstructive pulmonary disease)    • Depression    • Diabetes mellitus    • History of heart attack    • Hyperlipidemia    • Hypertension       Past Surgical History:   Procedure Laterality Date   • BILATERAL BREAST REDUCTION     • BONE MARROW  ASPIRATION     • CATARACT EXTRACTION Bilateral    • COLONOSCOPY     • HYSTERECTOMY  1990    x 2   • REDUCTION MAMMAPLASTY Bilateral     MORE THAN 20 YEARS AGO   • SINUS SURGERY     • TONSILLECTOMY     • TOTAL KNEE ARTHROPLASTY Right 7/3/2024    Procedure: TOTAL KNEE ARTHROPLASTY WITH NAINA ROBOT RIGHT;  Surgeon: Keaton Aldrich MD;  [facility]  Service: Robotics - Ortho;  Laterality: Right;         MEDICATIONS:  I have reviewed and reconciled the patients medication list in the patients chart at the skilled nursing facility on 2024.      ALLERGIES:  Allergies   Allergen Reactions   • Sulfa Antibiotics Rash         SOCIAL HISTORY:  Social History     Socioeconomic History   • Marital status: Single   Tobacco Use   • Smoking status: Former     Current packs/day: 0.00     Average packs/day: 2.0 packs/day for 30.0 years (60.0 ttl pk-yrs)     Types: Cigarettes     Start date:      Quit date:      Years since quittin.8     Passive exposure: Past   • Smokeless tobacco: Never   Vaping Use   • Vaping status: Never Used   Substance and Sexual Activity   • Alcohol use: No   • Drug use: No   • Sexual activity: Defer       FAMILY HISTORY:  Family History   Problem Relation Age of Onset   • Arthritis Maternal Grandmother    • Hypertension Mother    • Hyperlipidemia Mother    • Thyroid disease Mother    • Diabetes Sister    • Crohn's disease Sister    • Hypertension Sister    • Heart attack Maternal Grandfather    • Breast cancer Neg Hx    • Ovarian cancer Neg Hx         REVIEW OF SYSTEMS:  Review of Systems   Constitutional:  Negative for chills, fatigue and fever.   HENT:  Negative for congestion, ear pain, rhinorrhea, sinus pressure and sore throat.    Eyes:  Negative for visual disturbance.   Respiratory:  Negative for cough, chest tightness, shortness of breath and wheezing.    Cardiovascular:  Negative for chest pain, palpitations and leg swelling.   Gastrointestinal:  Negative for  "abdominal pain, blood in stool, constipation, diarrhea, nausea and vomiting.   Endocrine: Negative for polydipsia and polyuria.   Genitourinary:  Negative for dysuria and hematuria.   Musculoskeletal:  Negative for arthralgias and back pain.   Skin:  Negative for rash.   Neurological:  Negative for dizziness, light-headedness, numbness and headaches.   Psychiatric/Behavioral:  Negative for dysphoric mood and sleep disturbance. The patient is not nervous/anxious.        PHYSICAL EXAMINATION:   VITAL SIGNS: /63   Pulse 72   Temp 97.9 °F (36.6 °C)   Resp 18   Ht 152.4 cm (60\")   Wt 73.1 kg (161 lb 3.2 oz)   SpO2 96%   BMI 31.48 kg/m²     Physical Exam  Vitals and nursing note reviewed.   Constitutional:       Appearance: Normal appearance. She is well-developed. She is obese.   HENT:      Head: Normocephalic and atraumatic.      Nose: Nose normal.      Mouth/Throat:      Mouth: Mucous membranes are moist.      Pharynx: No oropharyngeal exudate.   Eyes:      General: No scleral icterus.     Conjunctiva/sclera: Conjunctivae normal.      Pupils: Pupils are equal, round, and reactive to light.   Neck:      Thyroid: No thyromegaly.   Cardiovascular:      Rate and Rhythm: Normal rate and regular rhythm.      Heart sounds: Normal heart sounds. No murmur heard.     No friction rub. No gallop.   Pulmonary:      Effort: Pulmonary effort is normal. No respiratory distress.      Breath sounds: Normal breath sounds. No wheezing.   Abdominal:      General: Bowel sounds are normal. There is no distension.      Palpations: Abdomen is soft.      Tenderness: There is no abdominal tenderness.   Musculoskeletal:         General: No deformity or signs of injury.      Cervical back: Normal range of motion and neck supple.   Lymphadenopathy:      Cervical: No cervical adenopathy.   Skin:     General: Skin is warm and dry.      Findings: No rash.   Neurological:      Mental Status: She is alert and oriented to person, place, and " time.      Coordination: Coordination abnormal.      Comments: Mild right hand weakness   Psychiatric:         Mood and Affect: Mood normal.         Behavior: Behavior normal.         RECORDS REVIEW:   Discharge Summary [facility] 11/5/2024    ASSESSMENT   Diagnoses and all orders for this visit:    1. Right-sided lacunar infarction (Primary)    2. Balance disorder    3. Hypertensive urgency    4. Type 2 diabetes mellitus without complication, without long-term current use of insulin    5. Stage 3a chronic kidney disease    6. C. difficile colitis    7. Hyperlipidemia LDL goal <70    8. Depression with anxiety        Assessment & Plan  Lacunar infarct of right basal ganglia.  Continuation of aspirin, Plavix, and statin is advised. Outpatient neurology follow-up is scheduled. Physical and occupational therapy at the rehabilitation facility will be maintained.    Hypertensive urgency.  Blood pressure has shown stability since admission. Current regimen of carvedilol, Norvasc, and lisinopril will be continued.    Type 2 diabetes mellitus.  This is a reported condition, however, no diabetic medications were prescribed upon discharge. The last A1c was 5.5. Diet control for diabetes will be continued.    CKD stage 3a.  Renal function has remained stable since admission. Labs will be monitored.     C. difficile colitis.  Vancomycin will be continued. Isolation precautions per nursing facility will be maintained. Completion of vancomycin as prescribed from the hospital is expected.    Hyperlipidemia.  Statin therapy will be continued.     Depression.  Stability on current medication regimen is noted. No current medications for depression are prescribed. Monitoring in the nursing facility will be continued.             [x]  Discussed Patient in detail with nursing/staff, addressed all needs today.     [x]  Plan of Care Reviewed   [x]  PT/OT Reviewed   []  Order Changes  []  Discharge Plans Reviewed  [x]  Advance  Directive on file with Nursing Home.   [x]  POA on file with Nursing Home.    [x]  Code Status listed and reviewed.     Toni Hollis DO.  11/7/2024      **Part of this note may be an electronic transcription/translation of spoken language to printed text using the Dragon Dictation System.**    Patient or patient representative verbalized consent for the use of Ambient Listening during the visit with  Toni Hollis DO for chart documentation. 11/7/2024  15:37 EST

## 2024-11-15 ENCOUNTER — TELEPHONE (OUTPATIENT)
Dept: INTERNAL MEDICINE | Facility: CLINIC | Age: 69
End: 2024-11-15
Payer: MEDICARE

## 2024-11-15 NOTE — TELEPHONE ENCOUNTER
Caller: NICOLAS WITH [facility]    Relationship: [facility]    Best call back number: [phone]     What orders are you requesting (i.e. lab or imaging): HOME HEALTH PHYSICAL THERAPY AND OCCUPATIONAL THERAPY HOME HEALTH ORDERS.     ORDERS TO EVALUATE AND TREAT AND THAT VIOLETA ZUNIGA WILL FOLLOW AFTERCARE    In what timeframe would the patient need to come in: MONDAY, 11/18/24 IF VERBAL OK IS RECEIVED IN TIME.    Where will you receive your lab/imaging services: [facility]     Additional notes:DISCHARGING FROM [facility] TODAY, 11/15/24. PLEASE CALL WITH VERBAL ORDERS.       Physical Therapy    Physical Therapy Treatment    Patient Name: Kobe Cisneros  MRN: [id number]  Today's Date: 11/14/2024    Time Entry:   Time Calculation  Start Time: 1600  Stop Time: 1638  Time Calculation (min): 38 min     PT Therapeutic Procedures Time Entry  Manual Therapy Time Entry: 23  Therapeutic Exercise Time Entry: 15                 Visit #2  20% COINS, 400 DED, 2500 OOP MAX, 25 VS PLAN YR,   NO AUTH *PLAN YR JULY 1-JUNE 30     Assessment:  Patient tolerated dry needling well without adverse reaction. Good tolerance to STM following needling and noted an improvement in ability to achieve end-range with cervical AROM. Will continue to progress patient as tolerated and appropriate.      Plan:   OP PT Plan  Treatment/Interventions: Dry needling, Hot pack, Electrical stimulation, Education/ Instruction, Manual therapy, Neuromuscular re-education, Self care/ home management, Therapeutic activities, Therapeutic exercises, Vasopneumatic device  PT Plan: Skilled PT  PT Frequency: 1 time per week  Certification Period Start Date: 10/31/24  Certification Period End Date: 01/29/25  Number of Treatments Authorized: 25  Rehab Potential: Good  Plan of Care Agreement: Patient    Current Problem  1. Neck pain          Subjective    Patient reports symptoms returned soon after last visit. HEP is going well without issues.     Pain   4/10 L posterior cervical spine    Objective     Palpation    Tenderness: present      Paraspinous: right      Trapezius: right      Periscapular: right      Spinous process: upper cervical and mid cervical    Right      Muscle tone: increased  Range of Motion      Cervical flexion: 1-2 finger breadths. Cervical flexion detail: pain and guarding.     Cervical extension: reduced extension (26-50%). Cervical extension detail: guarding.     Right      Lateral bending: reduced bend (51-75%). Lateral bending detail: pain.       Lateral rotation: reduced rotation (51-75%). Lateral rotation detail:  pain and guarding.     Left      Lateral bending: reduced bend (51-75%). Lateral bending detail: pain.       Lateral rotation: reduced rotation (51-75%). Lateral rotation detail: pain and guarding.    Treatments:  Therapeutic Exercise (70872):   UBE 3'/3'  Uni Row RTB  Uni Ext RTB  ER RTB  Horiz abd RTB    Skilled intervention utilized in the appropriate selection & application of above exercises. Verbal and tactile cues provided for proper form and technique. Pt. demonstrated appropriate form & verbalized understanding of optimal technique for above exercises.        Manual Therapy (77182):   Patient educated in dry needling precautions, indications, and contraindications. Patient is aware of the risks and benefits of procedure and wishes to have it performed. No adverse reaction to the dry needling noted.    Needles used: 30 mm x3  Location needled: L Upper trapezius, L levator scap  PT performed winding needle manipulation  Needles left in situ for 5 min  NMES applied for local twitch response enhancement of needling effect on local tissue    Soft tissue mobilization to: L upper trapezius, L levator scap    Appropriate force, direction, amplitude, & velocity for selected techniques to improve joint & soft tissue mobility & enhance ADL performance. Rationale & procedure given for above treatment techniques, & verbal consent obtained prior to initiating treatment.           OP EDUCATION:   Outpatient Education  Individual(s) Educated: Patient  Education Provided: Anatomy, Home Exercise Program, Home Safety, Physiology, POC, Posture, Body Mechanics    Goals:  Patient will improve NDI score to </=5     Patient will improve cervical rotation AROM to >/=  for improved cervical mobility.     Patient will improve cervical lateral flexion AROM to >/=  for improved cervical mobility.     Patient will improve middle trapezius strength to >/=4+/5 for improved periscapular and postural stability.     Patient will improve  lower trapezius strength to >/=4+/5 for improved periscapular and postural stability.     Patient will be independent with HEP.

## 2024-11-19 NOTE — TELEPHONE ENCOUNTER
Caller: NICOLAS WITH [facility]    Relationship: Home Health    Best call back number: [phone]     What is the best time to reach you: ANY    Who are you requesting to speak with (clinical staff, provider,  specific staff member): CLINICAL STAFF    Do you know the name of the person who called: SELF    What was the call regarding: NICOLAS IS CHECKING ON THE STATUS OF HER EARLIER REQUEST TO CONTINUE HOME HEALTH CARE FOR PATIENT. PLEASE CALL FOR VERBAL ORDER AND TO DISCUSS FURTHER.

## 2024-12-02 ENCOUNTER — LAB (OUTPATIENT)
Dept: INTERNAL MEDICINE | Facility: CLINIC | Age: 69
End: 2024-12-02
Payer: MEDICARE

## 2024-12-02 ENCOUNTER — OFFICE VISIT (OUTPATIENT)
Dept: INTERNAL MEDICINE | Facility: CLINIC | Age: 69
End: 2024-12-02
Payer: MEDICARE

## 2024-12-02 VITALS
WEIGHT: 159.4 LBS | HEIGHT: 60 IN | SYSTOLIC BLOOD PRESSURE: 144 MMHG | DIASTOLIC BLOOD PRESSURE: 84 MMHG | HEART RATE: 56 BPM | OXYGEN SATURATION: 99 % | TEMPERATURE: 96.6 F | BODY MASS INDEX: 31.29 KG/M2

## 2024-12-02 DIAGNOSIS — I63.9 CEREBROVASCULAR ACCIDENT (CVA), UNSPECIFIED MECHANISM: ICD-10-CM

## 2024-12-02 DIAGNOSIS — Z00.00 HEALTH CARE MAINTENANCE: ICD-10-CM

## 2024-12-02 DIAGNOSIS — I10 ESSENTIAL HYPERTENSION: Primary | ICD-10-CM

## 2024-12-02 DIAGNOSIS — E78.5 HYPERLIPIDEMIA LDL GOAL <70: ICD-10-CM

## 2024-12-02 LAB
ALBUMIN SERPL-MCNC: 4.4 G/DL (ref 3.5–5.2)
ALBUMIN/GLOB SERPL: 1.8 G/DL
ALP SERPL-CCNC: 131 U/L (ref 39–117)
ALT SERPL W P-5'-P-CCNC: 22 U/L (ref 1–33)
ANION GAP SERPL CALCULATED.3IONS-SCNC: 14 MMOL/L (ref 5–15)
AST SERPL-CCNC: 29 U/L (ref 1–32)
BILIRUB SERPL-MCNC: 1.2 MG/DL (ref 0–1.2)
BUN SERPL-MCNC: 15 MG/DL (ref 8–23)
BUN/CREAT SERPL: 10.8 (ref 7–25)
CALCIUM SPEC-SCNC: 9.5 MG/DL (ref 8.6–10.5)
CHLORIDE SERPL-SCNC: 105 MMOL/L (ref 98–107)
CHOLEST SERPL-MCNC: 180 MG/DL (ref 0–200)
CO2 SERPL-SCNC: 22 MMOL/L (ref 22–29)
CREAT SERPL-MCNC: 1.39 MG/DL (ref 0.57–1)
DEPRECATED RDW RBC AUTO: 47.7 FL (ref 37–54)
EGFRCR SERPLBLD CKD-EPI 2021: 41.4 ML/MIN/1.73
ERYTHROCYTE [DISTWIDTH] IN BLOOD BY AUTOMATED COUNT: 14 % (ref 12.3–15.4)
FOLATE SERPL-MCNC: 4.59 NG/ML (ref 4.78–24.2)
GLOBULIN UR ELPH-MCNC: 2.4 GM/DL
GLUCOSE SERPL-MCNC: 90 MG/DL (ref 65–99)
HBA1C MFR BLD: 5.6 % (ref 4.8–5.6)
HCT VFR BLD AUTO: 42.3 % (ref 34–46.6)
HDLC SERPL-MCNC: 39 MG/DL (ref 40–60)
HGB BLD-MCNC: 14 G/DL (ref 12–15.9)
LDLC SERPL CALC-MCNC: 117 MG/DL (ref 0–100)
LDLC/HDLC SERPL: 2.92 {RATIO}
MCH RBC QN AUTO: 31.2 PG (ref 26.6–33)
MCHC RBC AUTO-ENTMCNC: 33.1 G/DL (ref 31.5–35.7)
MCV RBC AUTO: 94.2 FL (ref 79–97)
PLATELET # BLD AUTO: 651 10*3/MM3 (ref 140–450)
PMV BLD AUTO: 10.6 FL (ref 6–12)
POTASSIUM SERPL-SCNC: 4.3 MMOL/L (ref 3.5–5.2)
PROT SERPL-MCNC: 6.8 G/DL (ref 6–8.5)
RBC # BLD AUTO: 4.49 10*6/MM3 (ref 3.77–5.28)
SODIUM SERPL-SCNC: 141 MMOL/L (ref 136–145)
TRIGL SERPL-MCNC: 136 MG/DL (ref 0–150)
TSH SERPL DL<=0.05 MIU/L-ACNC: 0.97 UIU/ML (ref 0.27–4.2)
VIT B12 BLD-MCNC: 337 PG/ML (ref 211–946)
VLDLC SERPL-MCNC: 24 MG/DL (ref 5–40)
WBC NRBC COR # BLD AUTO: 7.32 10*3/MM3 (ref 3.4–10.8)

## 2024-12-02 PROCEDURE — 1160F RVW MEDS BY RX/DR IN RCRD: CPT | Performed by: PHYSICIAN ASSISTANT

## 2024-12-02 PROCEDURE — 1126F AMNT PAIN NOTED NONE PRSNT: CPT | Performed by: PHYSICIAN ASSISTANT

## 2024-12-02 PROCEDURE — 83036 HEMOGLOBIN GLYCOSYLATED A1C: CPT | Performed by: PHYSICIAN ASSISTANT

## 2024-12-02 PROCEDURE — 85027 COMPLETE CBC AUTOMATED: CPT | Performed by: PHYSICIAN ASSISTANT

## 2024-12-02 PROCEDURE — 3044F HG A1C LEVEL LT 7.0%: CPT | Performed by: PHYSICIAN ASSISTANT

## 2024-12-02 PROCEDURE — 1159F MED LIST DOCD IN RCRD: CPT | Performed by: PHYSICIAN ASSISTANT

## 2024-12-02 PROCEDURE — 36415 COLL VENOUS BLD VENIPUNCTURE: CPT | Performed by: PHYSICIAN ASSISTANT

## 2024-12-02 PROCEDURE — 3079F DIAST BP 80-89 MM HG: CPT | Performed by: PHYSICIAN ASSISTANT

## 2024-12-02 PROCEDURE — G2211 COMPLEX E/M VISIT ADD ON: HCPCS | Performed by: PHYSICIAN ASSISTANT

## 2024-12-02 PROCEDURE — 84443 ASSAY THYROID STIM HORMONE: CPT | Performed by: PHYSICIAN ASSISTANT

## 2024-12-02 PROCEDURE — 80053 COMPREHEN METABOLIC PANEL: CPT | Performed by: PHYSICIAN ASSISTANT

## 2024-12-02 PROCEDURE — 99214 OFFICE O/P EST MOD 30 MIN: CPT | Performed by: PHYSICIAN ASSISTANT

## 2024-12-02 PROCEDURE — 80061 LIPID PANEL: CPT | Performed by: PHYSICIAN ASSISTANT

## 2024-12-02 PROCEDURE — 82607 VITAMIN B-12: CPT | Performed by: PHYSICIAN ASSISTANT

## 2024-12-02 PROCEDURE — 3077F SYST BP >= 140 MM HG: CPT | Performed by: PHYSICIAN ASSISTANT

## 2024-12-02 PROCEDURE — 82746 ASSAY OF FOLIC ACID SERUM: CPT | Performed by: PHYSICIAN ASSISTANT

## 2024-12-02 RX ORDER — ASPIRIN 325 MG
325 TABLET ORAL DAILY
COMMUNITY

## 2024-12-02 RX ORDER — LISINOPRIL 20 MG/1
20 TABLET ORAL DAILY
Qty: 30 TABLET | Refills: 1 | Status: SHIPPED | OUTPATIENT
Start: 2024-12-02

## 2024-12-02 RX ORDER — ATORVASTATIN CALCIUM 80 MG/1
80 TABLET, FILM COATED ORAL NIGHTLY
Qty: 90 TABLET | Refills: 0 | Status: SHIPPED | OUTPATIENT
Start: 2024-12-02

## 2024-12-02 RX ORDER — AMLODIPINE BESYLATE 10 MG/1
10 TABLET ORAL
Qty: 30 TABLET | Refills: 0 | Status: SHIPPED | OUTPATIENT
Start: 2024-12-02

## 2024-12-02 RX ORDER — CLOPIDOGREL BISULFATE 75 MG/1
75 TABLET ORAL DAILY
Qty: 30 TABLET | Refills: 0 | Status: SHIPPED | OUTPATIENT
Start: 2024-12-02

## 2024-12-02 NOTE — PROGRESS NOTES
"[facility] PRIMARY CARE  [address]  [facility] [address]  Dept: [phone]  Dept Fax: [phone]  Loc: [phone]  Loc Fax: [phone]    Martha Murray  1955    Follow Up Office Visit Note    History of Present Illness:  Patient is a 69-year-old female in today for rehab facility follow-up for stroke.  Patient was admitted for stroke to the hospital.  Hospital course was as follows: \"-Patient admitted with noted acute lacunar infarct right basal ganglia with hypertensive urgency/dizziness/leukocytosis.  Complaining of diarrhea during admission and noted C. difficile with vancomycin p.o. initiated.  -Seen neurology who recommended dual antiplatelet therapy x 21 days and Plavix only thereafter.  High-dose statin continued.  -Blood pressure uncontrolled during admission with Coreg and Norvasc added.  Blood pressure became soft with lisinopril.  Would hold for now.  Restart if worsening.  -Dizziness and nausea improved.  Continue scopolamine and meclizine as needed.  -To be transferred to [facility] for short-term rehab.  -Follow with neurology in 1 month.  -Holter monitor ordered upon discharge per neurology recommendations.  -Suspect underlying chronic kidney disease.  Monitor CBC and BMP closely.  -Return precautions.\"  Patient was then transferred to [place] rehabilitation facility in [place].  Patient successfully completed rehabilitation while in this facility and was discharged.  Patient due to follow-up with stroke clinic soon.  When patient was discharged none of her medications that she was taking were sent in.  Patient was on amlodipine and lisinopril for high blood pressure.      Hypertension  Pertinent negatives include no chest pain, headaches, palpitations or shortness of breath.   Pertinent negative symptoms include no chest pain, no chills, no congestion, no cough, no fatigue, no fever, no headaches, no myalgias, no nausea, no rash, " no sore throat, no dysuria and no vomiting.       The following portions of the patient's history were reviewed and updated as appropriate: allergies, current medications, past family history, past medical history, past social history, past surgical history, and problem list.    Medications:    Current Outpatient Medications:     amLODIPine (NORVASC) 10 MG tablet, Take 1 tablet by mouth Daily., Disp: 30 tablet, Rfl: 0    aspirin 325 MG tablet, Take 1 tablet by mouth Daily., Disp: , Rfl:     atorvastatin (LIPITOR) 80 MG tablet, Take 1 tablet by mouth Every Night., Disp: 90 tablet, Rfl: 0    clopidogrel (PLAVIX) 75 MG tablet, Take 1 tablet by mouth Daily., Disp: 30 tablet, Rfl: 0    hydroxyurea (HYDREA) 500 MG capsule, Take 1 capsule by mouth Daily., Disp: 30 capsule, Rfl: 11    lisinopril (PRINIVIL,ZESTRIL) 20 MG tablet, Take 1 tablet by mouth Daily. Hold for now- continue if blood pressure remains elevated., Disp: 30 tablet, Rfl: 1    meclizine (ANTIVERT) 25 MG tablet, Take 1 tablet by mouth Every 8 (Eight) Hours., Disp: 30 tablet, Rfl: 0    prochlorperazine (COMPAZINE) 5 MG tablet, Take 1 tablet by mouth Every 6 (Six) Hours As Needed for Nausea or Vomiting., Disp: 30 tablet, Rfl: 0    Subjective  Allergies   Allergen Reactions    Sulfa Antibiotics Rash        Past Medical History:   Diagnosis Date    HAMMAD (acute kidney injury) 07/01/2021    Allergic     Anemia     Anesthesia complication     Slow to wake    Arthritis     COPD (chronic obstructive pulmonary disease)     Depression     Diabetes mellitus     History of heart attack     Hyperlipidemia     Hypertension        Past Surgical History:   Procedure Laterality Date    BILATERAL BREAST REDUCTION      BONE MARROW ASPIRATION      CATARACT EXTRACTION Bilateral     COLONOSCOPY      HYSTERECTOMY  1990    x 2    REDUCTION MAMMAPLASTY Bilateral     MORE THAN 20 YEARS AGO    SINUS SURGERY  2003    TONSILLECTOMY      TOTAL KNEE ARTHROPLASTY Right 7/3/2024    Procedure:  TOTAL KNEE ARTHROPLASTY WITH NAINA ROBOT RIGHT;  Surgeon: Keaton Aldrich MD;  Location: [facility];  Service: Robotics - Ortho;  Laterality: Right;       Family History   Problem Relation Age of Onset    Arthritis Maternal Grandmother     Hypertension Mother     Hyperlipidemia Mother     Thyroid disease Mother     Diabetes Sister     Crohn's disease Sister     Hypertension Sister     Heart attack Maternal Grandfather     Breast cancer Neg Hx     Ovarian cancer Neg Hx         Social History     Socioeconomic History    Marital status: Single   Tobacco Use    Smoking status: Former     Current packs/day: 0.00     Average packs/day: 2.0 packs/day for 30.0 years (60.0 ttl pk-yrs)     Types: Cigarettes     Start date:      Quit date:      Years since quittin.9     Passive exposure: Past    Smokeless tobacco: Never   Vaping Use    Vaping status: Never Used   Substance and Sexual Activity    Alcohol use: No    Drug use: No    Sexual activity: Defer       Review of Systems   Constitutional:  Negative for activity change, chills, fatigue, fever and unexpected weight change.   HENT:  Negative for congestion, ear pain, postnasal drip, sinus pressure and sore throat.    Eyes:  Negative for pain, discharge and redness.   Respiratory:  Negative for cough, shortness of breath and wheezing.    Cardiovascular:  Negative for chest pain, palpitations and leg swelling.   Gastrointestinal:  Negative for diarrhea, nausea and vomiting.   Endocrine: Negative for cold intolerance and heat intolerance.   Genitourinary:  Negative for decreased urine volume and dysuria.   Musculoskeletal:  Negative for arthralgias and myalgias.   Skin:  Negative for rash and wound.   Neurological:  Positive for dizziness. Negative for light-headedness and headaches.   Hematological:  Does not bruise/bleed easily.   Psychiatric/Behavioral:  Negative for confusion, dysphoric mood and sleep disturbance. The patient is not nervous/anxious.   "        Objective  Vitals:    12/02/24 0809   BP: 144/84   BP Location: Left arm   Patient Position: Sitting   Cuff Size: Adult   Pulse: 56   Temp: 96.6 °F (35.9 °C)   TempSrc: Temporal   SpO2: 99%   Weight: 72.3 kg (159 lb 6.4 oz)   Height: 152.4 cm (60\")     Body mass index is 31.13 kg/m².     Physical Exam  Physical Exam  Vitals and nursing note reviewed.   Constitutional:       General: She is not in acute distress.     Appearance: She is not ill-appearing.   HENT:      Head: Normocephalic.      Right Ear: Tympanic membrane, ear canal and external ear normal. There is no impacted cerumen.      Left Ear: Tympanic membrane, ear canal and external ear normal. There is no impacted cerumen.      Nose: No congestion or rhinorrhea.      Mouth/Throat:      Mouth: Mucous membranes are moist.      Pharynx: Oropharynx is clear. No oropharyngeal exudate or posterior oropharyngeal erythema.   Eyes:      General:         Right eye: No discharge.         Left eye: No discharge.      Extraocular Movements: Extraocular movements intact.      Conjunctiva/sclera: Conjunctivae normal.      Pupils: Pupils are equal, round, and reactive to light.   Cardiovascular:      Rate and Rhythm: Normal rate and regular rhythm.      Heart sounds: Normal heart sounds. No murmur heard.     No friction rub. No gallop.   Pulmonary:      Effort: Pulmonary effort is normal. No respiratory distress.      Breath sounds: Normal breath sounds. No wheezing.   Abdominal:      General: Bowel sounds are normal. There is no distension.      Palpations: Abdomen is soft. There is no mass.      Tenderness: There is no abdominal tenderness.   Musculoskeletal:         General: No swelling.      Cervical back: Normal range of motion. No tenderness.      Right lower leg: No edema.      Left lower leg: No edema.   Lymphadenopathy:      Cervical: No cervical adenopathy.   Skin:     Findings: No bruising, erythema or rash.   Neurological:      Mental Status: She is " oriented to person, place, and time.      Gait: Gait abnormal.   Psychiatric:         Mood and Affect: Mood normal.         Behavior: Behavior normal.         Thought Content: Thought content normal.         Judgment: Judgment normal.         Diagnostic Data  Procedures    Assessment  Diagnoses and all orders for this visit:    1. Essential hypertension (Primary)  -     lisinopril (PRINIVIL,ZESTRIL) 20 MG tablet; Take 1 tablet by mouth Daily. Hold for now- continue if blood pressure remains elevated.  Dispense: 30 tablet; Refill: 1    2. Hyperlipidemia LDL goal <70    3. Cerebrovascular accident (CVA), unspecified mechanism    4. Health care maintenance  -     Vitamin B12 & Folate  -     Lipid Panel  -     Hemoglobin A1c; Future  -     TSH Rfx On Abnormal To Free T4  -     Comprehensive Metabolic Panel  -     CBC (No Diff)    Other orders  -     amLODIPine (NORVASC) 10 MG tablet; Take 1 tablet by mouth Daily.  Dispense: 30 tablet; Refill: 0  -     clopidogrel (PLAVIX) 75 MG tablet; Take 1 tablet by mouth Daily.  Dispense: 30 tablet; Refill: 0  -     atorvastatin (LIPITOR) 80 MG tablet; Take 1 tablet by mouth Every Night.  Dispense: 90 tablet; Refill: 0        Plan    1. Essential hypertension (Primary)- uncontrolled, restart amlodipine and lisinopril.  Restarting lisinopril at lower dosage of 20 mg which is half of what she was taking due to hypotensive episodes in the past on this medication. Advised patient to take blood pressure readings at home 2-3 times daily. Advised if blood pressure higher than 160/100 or lower than 100/60 to call the office immediately. If symptomatic, go to the ER. Patient verbalized understanding of all instructions given and complied.    2. Hyperlipidemia LDL goal <70- restart atorvastatin.  Repeat FLP.    3. Cerebrovascular accident (CVA), unspecified mechanism- refill Plavix.  Follow-up with stroke clinic.    4. Health care maintenance- ordered fasting labs.      Return in about 2  weeks (around 12/16/2024) for Recheck.    Sam Malone PA-C  12/02/2024

## 2024-12-03 ENCOUNTER — TELEPHONE (OUTPATIENT)
Dept: INTERNAL MEDICINE | Facility: CLINIC | Age: 69
End: 2024-12-03

## 2024-12-03 RX ORDER — FOLIC ACID 1 MG/1
1 TABLET ORAL DAILY
Qty: 90 TABLET | Refills: 1 | Status: SHIPPED | OUTPATIENT
Start: 2024-12-03

## 2024-12-03 NOTE — TELEPHONE ENCOUNTER
HUB CAN RELAY MESSAGE TO PATIENT     ATTEMPTED TO CONTACT THE PT TO INFORM HER SHE IS SCHEDULED TO SEE [facility] STROKE UNIT ON 12/26/2024 ARRIVING AT 11AM. THE OFFICE IS LOCATED AT [address] [place] [address], [address], [place], [place], [address] AND THEIR OFFICE PHONE [phone] IF SHE HAS ANY MORE QUESTIONS OR CONCERNS. MAILED REMINDER TO THE PTS HOME AS WELL

## 2024-12-03 NOTE — TELEPHONE ENCOUNTER
Caller: [facility]    Relationship: Home Health    Best call back number: [phone]    Which medication are you concerned about: clopidogrel (PLAVIX) 75 MG tablet  AND OTC IBUPROFEN AS NEEDED    Who prescribed you this medication: DIDI BECAH PA-C (IBUPROFEN OTC)     When did you start taking this medication: [facility] ADVISED THAT THE PATIENT STATED THAT SHE STARTED THE CLOPIDOGREL ON 12/02/2024 AND HAS BEEN TAKING IBUPROFEN FOR 6 MONTHS TO A YEAR     What are your concerns: [facility] ADVISED THAT SHE RECEIVED A NOTIFICATION FROM HER SYSTEM THAT THE POSSIBLE DRUG INTERACTION BETWEEN THESE TWO MEDICATION IS A SEVERITY LEVEL 2     How long have you had these concerns: SINCE TODAY, 12/03/2024   PLEASE CALL [facility] IF ANY QUESTIONS OR CONCERNS AND PLEASE CALL THE PATIENT AND ADVISE.

## 2024-12-11 ENCOUNTER — TELEPHONE (OUTPATIENT)
Dept: ONCOLOGY | Facility: CLINIC | Age: 69
End: 2024-12-11
Payer: MEDICARE

## 2024-12-11 NOTE — TELEPHONE ENCOUNTER
Caller: Martha Murray    Relationship to patient: Self    Best call back number: [phone]    Chief complaint: SCHEDULING    Type of visit: FOLLOW UP 1    Requested date: END OF JAN 2025     If rescheduling, when is the original appointment: 12-

## 2024-12-13 ENCOUNTER — OUTSIDE FACILITY SERVICE (OUTPATIENT)
Dept: INTERNAL MEDICINE | Facility: CLINIC | Age: 69
End: 2024-12-13
Payer: MEDICARE

## 2024-12-13 PROCEDURE — G0180 MD CERTIFICATION HHA PATIENT: HCPCS | Performed by: NURSE PRACTITIONER

## 2024-12-26 ENCOUNTER — TELEPHONE (OUTPATIENT)
Dept: ONCOLOGY | Facility: CLINIC | Age: 69
End: 2024-12-26
Payer: MEDICARE

## 2024-12-26 ENCOUNTER — OFFICE VISIT (OUTPATIENT)
Dept: NEUROLOGY | Facility: CLINIC | Age: 69
End: 2024-12-26
Payer: MEDICARE

## 2024-12-26 VITALS
DIASTOLIC BLOOD PRESSURE: 90 MMHG | OXYGEN SATURATION: 95 % | WEIGHT: 158.4 LBS | HEART RATE: 84 BPM | SYSTOLIC BLOOD PRESSURE: 170 MMHG | HEIGHT: 60 IN | BODY MASS INDEX: 31.1 KG/M2

## 2024-12-26 DIAGNOSIS — E11.9 TYPE 2 DIABETES MELLITUS WITHOUT COMPLICATION, WITHOUT LONG-TERM CURRENT USE OF INSULIN: ICD-10-CM

## 2024-12-26 DIAGNOSIS — E78.5 HYPERLIPIDEMIA LDL GOAL <70: Chronic | ICD-10-CM

## 2024-12-26 DIAGNOSIS — I10 ESSENTIAL HYPERTENSION: Chronic | ICD-10-CM

## 2024-12-26 DIAGNOSIS — E66.811 OBESITY (BMI 30.0-34.9): ICD-10-CM

## 2024-12-26 DIAGNOSIS — Z86.73 HISTORY OF STROKE: Primary | ICD-10-CM

## 2024-12-26 DIAGNOSIS — K21.9 GASTROESOPHAGEAL REFLUX DISEASE WITHOUT ESOPHAGITIS: ICD-10-CM

## 2024-12-26 RX ORDER — PANTOPRAZOLE SODIUM 40 MG/1
40 TABLET, DELAYED RELEASE ORAL DAILY
Qty: 90 TABLET | Refills: 3 | Status: SHIPPED | OUTPATIENT
Start: 2024-12-26 | End: 2025-12-26

## 2024-12-26 RX ORDER — TRAZODONE HYDROCHLORIDE 50 MG/1
50 TABLET, FILM COATED ORAL NIGHTLY
COMMUNITY

## 2024-12-26 RX ORDER — CLOPIDOGREL BISULFATE 75 MG/1
75 TABLET ORAL DAILY
Qty: 90 TABLET | Refills: 3 | Status: SHIPPED | OUTPATIENT
Start: 2024-12-26

## 2024-12-26 NOTE — TELEPHONE ENCOUNTER
Caller: Martha Murray    Relationship: Self    Best call back number: [phone]     What is the best time to reach you: ASAP    Who are you requesting to speak with (clinical staff, provider,  specific staff member): CLINICAL        What was the call regarding: PT IS BEING SEEN BY [facility] FOR BP CHECKS, ETC, SHE SAYS THEY TOLD HER IF OUR OFFICE WANTS TO SEND THEM LAB ORDERS, THEY CAN DRAW LABS BEFORE HER FOLLOW UP APPT WITH DR EASLEY, PLEASE CALL [facility] [phone] FOR THIS, AND CALL PT [phone] TO LET HER KNOW WHEN LABS NEED TO BE DONE.

## 2024-12-26 NOTE — PATIENT INSTRUCTIONS
- Continue Plavix 75 mg daily  -- Start Protonix 40mg daily for reflux  -- Continue therapies as recommended  - BP goals less than 130/80.  Check twice daily and keep a log for primary care provider.  - Heart healthy/diabetic diet  - Increase activity as tolerated  - Fall precautions  - Return to the ED with any additional stroke symptoms  --Follow up with primary care for consideration of antidepressant, currently taking trazodone and elevated BP.

## 2024-12-26 NOTE — TELEPHONE ENCOUNTER
Return call to Martha.  Martha wanting to know if she should have her labs collected via home health as they told her they could collect them for her.  Advised she could have them collected the day of her appointment if she just arrives a little before her appointment.  Martha states understood

## 2024-12-26 NOTE — PROGRESS NOTES
New Patient Office Visit      Encounter Date: 2024   Patient Name: Martha Murray  : 1955   MRN: [id number]   PCP: Jenny Carvajal APRN    Referring Provider: No ref. provider found     Chief Complaint:  No chief complaint on file.      History of Present Illness: Martha Murray is a 69 y.o. female with known medical diagnoses of HTN, HLD, T2DM, COPD, MI, anemia, CHF, and stroke who presents to clinic today for follow-up.  She was admitted to [facility] on 10/31/2024 with complaints of dizziness, nausea, vomiting, and gait instability.  BP was 210/120.  WBCs 21.84.  NIH 0.  CT head was negative for any acute findings.  CTA H/N with mild athero and no LVO.  A lobular filling defect was noted in the proximal brachiocephalic artery representing possible nonocclusive mural thrombus or lobulated soft plaque.  CTP with no evidence of LVO.  MRI brain revealed an acute to subacute right basal ganglia stroke.  She was initiated on heparin, 24 hours secondary to filling defect in the proximal brachiocephalic artery as well as aspirin 81 mg daily.  .  after 24 hours, she was transition to DAPT with aspirin 81 mg daily and Plavix 75 mg daily with plans to transition to Plavix 75 mg daily after 21 days.  TTE with an EF of 61%, indeterminate left ventricular diastolic function, negative saline test, and mild aortic dilation.  Per review of EMR, she was recommended to follow-up with vascular as an outpatient.  Stool studies were positive for C. difficile.  WBCs noted to improve after initiation of oral vancomycin.  At discharge, she was transferred to [place] for short-term rehab.    Clinic visit 2024: Patient presents to clinic today alone.  She was upset upon arrival.  She used a medical transport company and reports her out-of-pocket cost was $70 to get here. BP initially elevated at 170/90.  Personally rechecked after several minutes and it was down to 145/90.  She denies any new stroke  symptoms.  Patient reports that when she left rehab she was not prescribed aspirin or Plavix.  She has not been taking any antiplatelet therapy.  Discussed with her that she will need to restart Plavix 75 mg for secondary stroke prevention.  Per review of EMR, her PCP did reorder Plavix.  She reports that she was never notified that it was filled by [facility].  I reordered her Plavix today.  She has been taking atorvastatin 80 mg daily with no problem.  She reports that she has had issues with mood lability since her stroke.  She is currently taking trazodone 50 mg at bedtime for insomnia.  Discussed with her that trazodone can be used as an antidepressant as well.  She was previously taking Prozac for depression and tolerated well.  Discussed with her that she will need to follow-up with her PCP for further management as she would like to continue taking trazodone for now.  She is doing PT and OT at home.  She walks with a walker or cane.  She is not currently driving.      Stroke Risk Factors: hyperlipidemia and hypertension      Subjective      Review of Systems:   Review of Systems   Constitutional:  Negative for chills and fever.   HENT:  Negative for congestion and trouble swallowing.    Eyes:  Negative for photophobia and visual disturbance.   Respiratory:  Negative for cough and shortness of breath.    Cardiovascular:  Negative for chest pain and palpitations.   Gastrointestinal:  Negative for nausea and vomiting.   Musculoskeletal:  Positive for gait problem.   Neurological:  Negative for facial asymmetry, speech difficulty, weakness and headache.       Past Medical History:   Past Medical History:   Diagnosis Date    HAMMAD (acute kidney injury) 07/01/2021    Allergic     Anemia     Anesthesia complication     Slow to wake    Arthritis     COPD (chronic obstructive pulmonary disease)     Depression     Diabetes mellitus     History of heart attack     Hyperlipidemia     Hypertension        Past Surgical History:    Past Surgical History:   Procedure Laterality Date    BILATERAL BREAST REDUCTION      BONE MARROW ASPIRATION      CATARACT EXTRACTION Bilateral     COLONOSCOPY      HYSTERECTOMY  1990    x 2    REDUCTION MAMMAPLASTY Bilateral     MORE THAN 20 YEARS AGO    SINUS SURGERY  2003    TONSILLECTOMY      TOTAL KNEE ARTHROPLASTY Right 7/3/2024    Procedure: TOTAL KNEE ARTHROPLASTY WITH NAINA ROBOT RIGHT;  Surgeon: Keaton Aldrich MD;  Location: [facility];  Service: Robotics - Ortho;  Laterality: Right;       Family History:   Family History   Problem Relation Age of Onset    Arthritis Maternal Grandmother     Hypertension Mother     Hyperlipidemia Mother     Thyroid disease Mother     Diabetes Sister     Crohn's disease Sister     Hypertension Sister     Heart attack Maternal Grandfather     Breast cancer Neg Hx     Ovarian cancer Neg Hx        Social History:   Social History     Socioeconomic History    Marital status: Single   Tobacco Use    Smoking status: Former     Current packs/day: 0.00     Average packs/day: 2.0 packs/day for 30.0 years (60.0 ttl pk-yrs)     Types: Cigarettes     Start date:      Quit date:      Years since quittin.0     Passive exposure: Past    Smokeless tobacco: Never   Vaping Use    Vaping status: Never Used   Substance and Sexual Activity    Alcohol use: No    Drug use: No    Sexual activity: Defer       Medications:     Current Outpatient Medications:     amLODIPine (NORVASC) 10 MG tablet, Take 1 tablet by mouth Daily., Disp: 30 tablet, Rfl: 0    atorvastatin (LIPITOR) 80 MG tablet, Take 1 tablet by mouth Every Night., Disp: 90 tablet, Rfl: 0    clopidogrel (PLAVIX) 75 MG tablet, Take 1 tablet by mouth Daily., Disp: 90 tablet, Rfl: 3    folic acid (FOLVITE) 1 MG tablet, Take 1 tablet by mouth Daily., Disp: 90 tablet, Rfl: 1    hydroxyurea (HYDREA) 500 MG capsule, Take 1 capsule by mouth Daily., Disp: 30 capsule, Rfl: 11    traZODone (DESYREL) 50 MG tablet, Take 1 tablet by  "mouth Every Night., Disp: , Rfl:     lisinopril (PRINIVIL,ZESTRIL) 20 MG tablet, Take 1 tablet by mouth Daily. Hold for now- continue if blood pressure remains elevated. (Patient not taking: Reported on 12/26/2024), Disp: 30 tablet, Rfl: 1    meclizine (ANTIVERT) 25 MG tablet, Take 1 tablet by mouth Every 8 (Eight) Hours. (Patient not taking: Reported on 12/26/2024), Disp: 30 tablet, Rfl: 0    pantoprazole (Protonix) 40 MG EC tablet, Take 1 tablet by mouth Daily., Disp: 90 tablet, Rfl: 3    prochlorperazine (COMPAZINE) 5 MG tablet, Take 1 tablet by mouth Every 6 (Six) Hours As Needed for Nausea or Vomiting. (Patient not taking: Reported on 12/26/2024), Disp: 30 tablet, Rfl: 0    Allergies:   Allergies   Allergen Reactions    Sulfa Antibiotics Rash       Objective     Physical Exam:  Vital Signs:   Vitals:    12/26/24 1057   BP: 170/90   Pulse: 84   SpO2: 95%   Weight: 71.8 kg (158 lb 6.4 oz)   Height: 152.4 cm (60\")     Body mass index is 30.94 kg/m².     Physical Exam  Constitutional:       General: She is not in acute distress.  HENT:      Head: Normocephalic and atraumatic.   Eyes:      Extraocular Movements: Extraocular movements intact.      Pupils: Pupils are equal, round, and reactive to light.   Cardiovascular:      Rate and Rhythm: Normal rate and regular rhythm.   Pulmonary:      Effort: Pulmonary effort is normal. No respiratory distress.   Abdominal:      General: There is no distension.      Palpations: Abdomen is soft.   Musculoskeletal:      Cervical back: Normal range of motion and neck supple.      Right lower leg: No edema.      Left lower leg: No edema.   Skin:     General: Skin is warm and dry.      Capillary Refill: Capillary refill takes less than 2 seconds.   Neurological:      Mental Status: She is alert and oriented to person, place, and time.      Cranial Nerves: No cranial nerve deficit.      Sensory: No sensory deficit.      Motor: Motor strength is normal.No weakness.      Coordination: " Coordination normal.      Gait: Gait abnormal.   Psychiatric:         Speech: Speech normal.       Neurological Exam  Mental Status  Alert. Oriented to person, place, time and situation. Oriented to person, place, and time. Speech is normal. Language is fluent with no aphasia.    Cranial Nerves  CN II: Right visual acuity: Finger movement. Left visual acuity: Finger movement.  CN III, IV, VI: Extraocular movements intact bilaterally. Pupils equal round and reactive to light bilaterally.  CN V: Facial sensation is normal.  CN VII: Full and symmetric facial movement.  CN IX, X: Palate elevates symmetrically  CN XI: Shoulder shrug strength is normal.  CN XII: Tongue midline without atrophy or fasciculations.    Motor   Strength is 5/5 throughout all four extremities.    Sensory  Light touch is normal in upper and lower extremities.     Coordination  Right: Finger-to-nose normal.Left: Finger-to-nose normal.    Gait   Abnormal gait.  Intermittent gait instability, uses walker.       NIH Stroke Scale    1a  Level of consciousness: 0=alert; keenly responsive   1b. LOC questions:  0=Answers both questions correctly    1c. LOC commands: 0=Performs both tasks correctly   2.  Best Gaze: 0=normal   3. Visual: 0=No visual loss   4. Facial Palsy: 0=Normal symmetric movement   5a. Motor left arm: 0=No drift, limb holds 90 (or 45) degrees for full 10 seconds   5b.  Motor right arm: 0=No drift, limb holds 90 (or 45) degrees for full 10 seconds   6a. Motor left le=No drift, limb holds 90 (or 45) degrees for full 10 seconds   6b  Motor right le=No drift, limb holds 90 (or 45) degrees for full 10 seconds   7. Limb Ataxia: 0=Absent   8.  Sensory: 0=Normal; no sensory loss   9. Best Language:  0=No aphasia, normal   10. Dysarthria: 0=Normal   11. Extinction and Inattention: 0=No abnormality    Total:   0         Modified [place] Score: 2        0  No Symptoms    1 No significant disability. Able to carry out all usual activities,  despite some symptoms.    2 Slight disability. Able to look after own affairs without assistance, but unable to carry out all previous activities.    3 Moderate disability. Requires some help, but able to walk unassisted.    4 Moderately severe disability. Unable to attend to own bodily needs without assistance, and unable to walk unassisted.    5 Severe disability. Requires constant nursing care and attention, bedridden, incontinent.    6 Dead        PHQ-9 Depression Screening  Little interest or pleasure in doing things?  1   Feeling down, depressed, or hopeless?  1   PHQ-2 Total Score     Trouble falling or staying asleep, or sleeping too much?     Feeling tired or having little energy?  1   Poor appetite or overeating?  1   Feeling bad about yourself - or that you are a failure or have let yourself or your family down?     Trouble concentrating on things, such as reading the newspaper or watching television?     Moving or speaking so slowly that other people could have noticed? Or the opposite - being so fidgety or restless that you have been moving around a lot more than usual?     Thoughts that you would be better off dead, or of hurting yourself in some way?     PHQ-9 Total Score  4   If you checked off any problems, how difficult have these problems made it for you to do your work, take care of things at home, or get along with other people?              Imaging Reviewed:   CT Angiogram Chest    Result Date: 11/3/2024  Aneurysmal dilatation of the ascending aorta up to 4.1 cm.   There is prominence of the pulmonary trunk measuring up to 2.2 cm, correlate for possible pulmonary hypertension.  Spiculated nodular density in the left upper lobe measuring up to 1.6 cm. This is slightly more conspicuous compared to the July 29, 2023 exam. Consider follow-up PET/CT or chest CT in 3 months.   This study was performed with techniques to keep radiation doses as low as reasonably achievable (ALARA). Individualized dose  reduction techniques using automated exposure control or adjustment of mA and/or kV according to the patient size were employed.   CTDI: 2.67 mGy DLP:100.27 mGy.cm      This report was signed and finalized on 11/3/2024 1:30 PM by Juan A Andrews DO.      MRI Brain Without Contrast    Result Date: 11/1/2024  Small acute/subacute right periventricular CVA; right: JULISSA Medel in the emergency room, notified at 9:40 a.m. November 1, 2024.      This report was signed and finalized on 11/1/2024 9:45 AM by Adelina Holliday MD.      XR Chest 1 View    Result Date: 11/1/2024  No acute cardiopulmonary process.        Images were reviewed, interpreted, and dictated by Dr. Adelina Holliday MD Transcribed by Milagros Pichardo PA-C.  This report was signed and finalized on 11/1/2024 9:25 AM by Adelina Holliday MD.      CT Angiogram Neck    Result Date: 11/1/2024  1. No evidence of significant cervical carotid stenosis 2. Lobular filling defect proximal brachiocephalic artery could represent nonocclusive mural thrombus or lobulated soft plaque. Follow-up CTA recommended in 6-8 weeks for continued surveillance. CT appearance that abnormality could result in more distal emboli.   This study was performed with techniques to keep radiation doses as low as reasonably achievable (ALARA). Individualized dose reduction techniques using automated exposure control or adjustment of vA and/or kV according to the patient size were employed.   This report was signed and finalized on 11/1/2024 12:58 AM by Sam Thakkar MD.      CT Angiogram Head    Result Date: 11/1/2024  No evidence of acute large vessel occlusive disease    This study was performed with techniques to keep radiation doses as low as reasonably achievable (ALARA). Individualized dose reduction techniques using automated exposure control or adjustment of vA and/or kV according to the patient size were employed.  This report was signed and finalized on 11/1/2024 12:53 AM by Sam Thakkar MD.       CT CEREBRAL PERFUSION WITH & WITHOUT CONTRAST    Result Date: 11/1/2024  1.  No evidence of acute large vessel occlusion  Should symptoms persist recommend follow-up MRI  This report was signed and finalized on 11/1/2024 12:52 AM by Sam Thakkar MD.      CT Head Without Contrast Stroke Protocol    Result Date: 11/1/2024  1. No acute intracranial abnormality or obvious mass. 2. Atrophy and chronic ischemic white matter changes as above.   This study was performed with techniques to keep radiation doses as low as reasonably achievable (ALARA). Individualized dose reduction techniques using automated exposure control or adjustment of vA and/or kV according to the patient size were employed.    This report was signed and finalized on 11/1/2024 12:51 AM by Sam Thakkar MD.        Results for orders placed during the hospital encounter of 10/31/24    Adult Transthoracic Echo Complete W/ Cont if Necessary Per Protocol (With Agitated Saline)    Interpretation Summary    Left ventricular systolic function is normal. Calculated left ventricular EF = 68.3% Left ventricular ejection fraction appears to be 61 - 65%.    Left ventricular wall thickness is consistent with mild concentric hypertrophy. Left ventricular diastolic function was indeterminate.    Normal right ventricular size and function.    The left atrial cavity is mildly dilated.    Mild aortic valve regurgitation is present.    Saline test results are negative for right to left atrial level shunt.    Mild dilation of the proximal aorta is present. Ascending aorta = 3.8 cm.  Recommend dedicated chest CTA for further characterization of aortic pathology.       Laboratory Results:     WBC   Date Value Ref Range Status   12/02/2024 7.32 3.40 - 10.80 10*3/mm3 Final   05/14/2021 14.79 (H) 3.40 - 10.80 10*3/mm3 Final     RBC   Date Value Ref Range Status   12/02/2024 4.49 3.77 - 5.28 10*6/mm3 Final   05/14/2021 5.84 (H) 3.77 - 5.28 10*6/mm3 Final     Hemoglobin    Date Value Ref Range Status   12/02/2024 14.0 12.0 - 15.9 g/dL Final     Hematocrit   Date Value Ref Range Status   12/02/2024 42.3 34.0 - 46.6 % Final     MCV   Date Value Ref Range Status   12/02/2024 94.2 79.0 - 97.0 fL Final     MCH   Date Value Ref Range Status   12/02/2024 31.2 26.6 - 33.0 pg Final     MCHC   Date Value Ref Range Status   12/02/2024 33.1 31.5 - 35.7 g/dL Final     RDW   Date Value Ref Range Status   12/02/2024 14.0 12.3 - 15.4 % Final     RDW-SD   Date Value Ref Range Status   12/02/2024 47.7 37.0 - 54.0 fl Final     MPV   Date Value Ref Range Status   12/02/2024 10.6 6.0 - 12.0 fL Final     Platelets   Date Value Ref Range Status   12/02/2024 651 (H) 140 - 450 10*3/mm3 Final     Neutrophil %   Date Value Ref Range Status   11/05/2024 75.9 42.7 - 76.0 % Final     Lymphocyte %   Date Value Ref Range Status   11/05/2024 11.2 (L) 19.6 - 45.3 % Final     Monocyte %   Date Value Ref Range Status   11/05/2024 7.8 5.0 - 12.0 % Final     Eosinophil %   Date Value Ref Range Status   11/05/2024 3.9 0.3 - 6.2 % Final     Basophil %   Date Value Ref Range Status   11/05/2024 0.8 0.0 - 1.5 % Final     Immature Grans %   Date Value Ref Range Status   11/05/2024 0.4 0.0 - 0.5 % Final     Neutrophils, Absolute   Date Value Ref Range Status   11/05/2024 9.11 (H) 1.70 - 7.00 10*3/mm3 Final     Lymphocytes, Absolute   Date Value Ref Range Status   11/05/2024 1.34 0.70 - 3.10 10*3/mm3 Final     Monocytes, Absolute   Date Value Ref Range Status   11/05/2024 0.94 (H) 0.10 - 0.90 10*3/mm3 Final     Eosinophils, Absolute   Date Value Ref Range Status   11/05/2024 0.47 (H) 0.00 - 0.40 10*3/mm3 Final     Basophils, Absolute   Date Value Ref Range Status   11/05/2024 0.09 0.00 - 0.20 10*3/mm3 Final     Immature Grans, Absolute   Date Value Ref Range Status   11/05/2024 0.05 0.00 - 0.05 10*3/mm3 Final     nRBC   Date Value Ref Range Status   11/05/2024 0.0 0.0 - 0.2 /100 WBC Final      Lab Results   Component Value  Date    GLUCOSE 90 2024    BUN 15 2024    CREATININE 1.39 (H) 2024     2024    K 4.3 2024     2024    CALCIUM 9.5 2024    PROTEINTOT 6.8 2024    ALBUMIN 4.4 2024    ALT 22 2024    AST 29 2024    ALKPHOS 131 (H) 2024    BILITOT 1.2 2024    GLOB 2.4 2024    AGRATIO 1.8 2024    BCR 10.8 2024    ANIONGAP 14.0 2024    EGFR 41.4 (L) 2024   A1C: 5.6    Lipid Panel          2024    03:44 2024    09:01   Lipid Panel   Total Cholesterol 201  180    Triglycerides 108  136    HDL Cholesterol 46  39    VLDL Cholesterol 20  24    LDL Cholesterol  135  117    LDL/HDL Ratio 2.90  2.92        Assessment / Plan      Assessment/Plan:   Diagnoses and all orders for this visit:    1. History of stroke (Primary)  - History of RBG stroke, likely etiology   - Continue Plavix 75 mg daily  -- Start Protonix 40mg daily for reflux  -- Continue therapies as recommended  - BP goals less than 130/80.  Check twice daily and keep a log for primary care provider.  - Heart healthy/diabetic diet  - Increase activity as tolerated  - Fall precautions  - Return to the ED with any additional stroke symptoms  --Follow up with primary care for consideration of antidepressant, currently taking trazodone  - TTE with Mild dilation of the proximal aorta is present. Ascending aorta = 3.8 cm. Recommend dedicated chest CTA for further characterization of aortic pathology.  Recommended the patient follow-up with vascular surgery.  Further management per PCP.    2. Essential hypertension  - BP in office 130/80.  Check twice daily and keep a log for PCP   - /90 on arrival to clinic, 145/90 on recheck  - Management per PCP    3. Hyperlipidemia LDL goal <70  - Most recent   - Continue atorvastatin 80 mg daily, LDL goal less than 70    4. Type 2 diabetes mellitus without complication, without long-term current use of  insulin  - Hemoglobin A1c 5.6 at goal of less than 7 for secondary stroke prevention  - Further management per primary care    5.  Depression  - Follow up with primary care for consideration of antidepressant, currently taking trazodone      Discussed the importance of medication compliance Plavix 75mg daily and Atorvastatin 80mg nightly and lifestyle modifications adequate control of blood pressure, adequate control of cholesterol (goal LDL <70), adequate control of glucose (<140, A1c goal <7), increased physical activity, and implementation of healthy diet to help reduce the risk of future cerebrovascular events.  Also discussed the signs symptoms that would warrant the patient return back to the emergency department including unilateral weakness, unilateral numbness, visual disturbances, loss of balance, speech difficulties, and/or a sudden severe headache.  Patient verbalized understanding.    Follow Up:   Return in about 3 months (around 3/26/2025).    NAYELI Henry, AGACNP-[facility] Neuro Stroke

## 2024-12-30 RX ORDER — AMLODIPINE BESYLATE 10 MG/1
10 TABLET ORAL
Qty: 30 TABLET | Refills: 0 | Status: SHIPPED | OUTPATIENT
Start: 2024-12-30

## 2025-01-14 ENCOUNTER — OFFICE VISIT (OUTPATIENT)
Dept: INTERNAL MEDICINE | Facility: CLINIC | Age: 70
End: 2025-01-14
Payer: MEDICARE

## 2025-01-14 VITALS
WEIGHT: 157.8 LBS | SYSTOLIC BLOOD PRESSURE: 162 MMHG | BODY MASS INDEX: 30.98 KG/M2 | HEART RATE: 80 BPM | HEIGHT: 60 IN | OXYGEN SATURATION: 98 % | TEMPERATURE: 97.1 F | DIASTOLIC BLOOD PRESSURE: 88 MMHG

## 2025-01-14 DIAGNOSIS — E78.5 HYPERLIPIDEMIA LDL GOAL <70: ICD-10-CM

## 2025-01-14 DIAGNOSIS — I10 ESSENTIAL HYPERTENSION: Primary | ICD-10-CM

## 2025-01-14 DIAGNOSIS — Z12.31 ENCOUNTER FOR SCREENING MAMMOGRAM FOR MALIGNANT NEOPLASM OF BREAST: ICD-10-CM

## 2025-01-14 DIAGNOSIS — K21.9 GASTROESOPHAGEAL REFLUX DISEASE, UNSPECIFIED WHETHER ESOPHAGITIS PRESENT: ICD-10-CM

## 2025-01-14 PROCEDURE — 3077F SYST BP >= 140 MM HG: CPT | Performed by: PHYSICIAN ASSISTANT

## 2025-01-14 PROCEDURE — 1126F AMNT PAIN NOTED NONE PRSNT: CPT | Performed by: PHYSICIAN ASSISTANT

## 2025-01-14 PROCEDURE — 1160F RVW MEDS BY RX/DR IN RCRD: CPT | Performed by: PHYSICIAN ASSISTANT

## 2025-01-14 PROCEDURE — 1159F MED LIST DOCD IN RCRD: CPT | Performed by: PHYSICIAN ASSISTANT

## 2025-01-14 PROCEDURE — 99214 OFFICE O/P EST MOD 30 MIN: CPT | Performed by: PHYSICIAN ASSISTANT

## 2025-01-14 PROCEDURE — 3079F DIAST BP 80-89 MM HG: CPT | Performed by: PHYSICIAN ASSISTANT

## 2025-01-14 RX ORDER — AMLODIPINE BESYLATE 10 MG/1
10 TABLET ORAL
Qty: 90 TABLET | Refills: 1 | Status: SHIPPED | OUTPATIENT
Start: 2025-01-14

## 2025-01-14 NOTE — PROGRESS NOTES
[facility] PRIMARY CARE  [address]  [place] [address]  Dept: [phone]  Dept Fax: [phone]  Loc: [phone]  Loc Fax: [phone]    Martha Murray  1955    Follow Up Office Visit Note    History of Present Illness:  Patient is a 69-year-old female in today to follow-up for hypertension.  Has only been taking lisinopril 20 mg daily.  Ran out of amlodipine.  Has not been taking this.  Blood pressure still high.  Denies any symptoms.    Hypertension  Pertinent negatives include no chest pain, headaches, palpitations or shortness of breath.       The following portions of the patient's history were reviewed and updated as appropriate: allergies, current medications, past family history, past medical history, past social history, past surgical history, and problem list.    Medications:    Current Outpatient Medications:     amLODIPine (NORVASC) 10 MG tablet, Take 1 tablet by mouth Daily., Disp: 90 tablet, Rfl: 1    atorvastatin (LIPITOR) 80 MG tablet, Take 1 tablet by mouth Every Night., Disp: 90 tablet, Rfl: 0    clopidogrel (PLAVIX) 75 MG tablet, Take 1 tablet by mouth Daily., Disp: 90 tablet, Rfl: 3    folic acid (FOLVITE) 1 MG tablet, Take 1 tablet by mouth Daily., Disp: 90 tablet, Rfl: 1    hydroxyurea (HYDREA) 500 MG capsule, Take 1 capsule by mouth Daily., Disp: 30 capsule, Rfl: 11    lisinopril (PRINIVIL,ZESTRIL) 20 MG tablet, Take 1 tablet by mouth Daily. Hold for now- continue if blood pressure remains elevated., Disp: 30 tablet, Rfl: 1    meclizine (ANTIVERT) 25 MG tablet, Take 1 tablet by mouth Every 8 (Eight) Hours., Disp: 30 tablet, Rfl: 0    pantoprazole (Protonix) 40 MG EC tablet, Take 1 tablet by mouth Daily., Disp: 90 tablet, Rfl: 3    prochlorperazine (COMPAZINE) 5 MG tablet, Take 1 tablet by mouth Every 6 (Six) Hours As Needed for Nausea or Vomiting., Disp: 30 tablet, Rfl: 0    traZODone (DESYREL) 50 MG tablet, Take 1 tablet by  mouth Every Night., Disp: , Rfl:     Subjective  Allergies   Allergen Reactions    Sulfa Antibiotics Rash        Past Medical History:   Diagnosis Date    HAMMAD (acute kidney injury) 2021    Allergic     Anemia     Anesthesia complication     Slow to wake    Arthritis     COPD (chronic obstructive pulmonary disease)     Depression     Diabetes mellitus     History of heart attack     Hyperlipidemia     Hypertension        Past Surgical History:   Procedure Laterality Date    BILATERAL BREAST REDUCTION      BONE MARROW ASPIRATION      CATARACT EXTRACTION Bilateral     COLONOSCOPY      HYSTERECTOMY  1990    x 2    REDUCTION MAMMAPLASTY Bilateral     MORE THAN 20 YEARS AGO    SINUS SURGERY  2003    TONSILLECTOMY      TOTAL KNEE ARTHROPLASTY Right 7/3/2024    Procedure: TOTAL KNEE ARTHROPLASTY WITH NAINA ROBOT RIGHT;  Surgeon: Keaton Aldrich MD;  Location: [facility];  Service: Robotics - Ortho;  Laterality: Right;       Family History   Problem Relation Age of Onset    Arthritis Maternal Grandmother     Hypertension Mother     Hyperlipidemia Mother     Thyroid disease Mother     Diabetes Sister     Crohn's disease Sister     Hypertension Sister     Heart attack Maternal Grandfather     Breast cancer Neg Hx     Ovarian cancer Neg Hx         Social History     Socioeconomic History    Marital status: Single   Tobacco Use    Smoking status: Former     Current packs/day: 0.00     Average packs/day: 2.0 packs/day for 30.0 years (60.0 ttl pk-yrs)     Types: Cigarettes     Start date:      Quit date:      Years since quittin.0     Passive exposure: Past    Smokeless tobacco: Never   Vaping Use    Vaping status: Never Used   Substance and Sexual Activity    Alcohol use: No    Drug use: No    Sexual activity: Defer       Review of Systems   Constitutional:  Negative for activity change, chills, fatigue, fever and unexpected weight change.   HENT:  Negative for congestion, ear pain, postnasal drip, sinus  "pressure and sore throat.    Eyes:  Negative for pain, discharge and redness.   Respiratory:  Negative for cough, shortness of breath and wheezing.    Cardiovascular:  Negative for chest pain, palpitations and leg swelling.   Gastrointestinal:  Negative for diarrhea, nausea and vomiting.   Endocrine: Negative for cold intolerance and heat intolerance.   Genitourinary:  Negative for decreased urine volume and dysuria.   Musculoskeletal:  Negative for arthralgias and myalgias.   Skin:  Negative for rash and wound.   Neurological:  Negative for dizziness, light-headedness and headaches.   Hematological:  Does not bruise/bleed easily.   Psychiatric/Behavioral:  Negative for confusion, dysphoric mood and sleep disturbance. The patient is not nervous/anxious.          Objective  Vitals:    01/14/25 0848   BP: 162/88   BP Location: Left arm   Patient Position: Sitting   Cuff Size: Adult   Pulse: 80   Temp: 97.1 °F (36.2 °C)   TempSrc: Temporal   SpO2: 98%   Weight: 71.6 kg (157 lb 12.8 oz)   Height: 152.4 cm (60\")     Body mass index is 30.82 kg/m².     Physical Exam  Physical Exam  Vitals and nursing note reviewed.   Constitutional:       General: She is not in acute distress.     Appearance: She is not ill-appearing.   HENT:      Head: Normocephalic.      Right Ear: Tympanic membrane, ear canal and external ear normal. There is no impacted cerumen.      Left Ear: Tympanic membrane, ear canal and external ear normal. There is no impacted cerumen.      Nose: No congestion or rhinorrhea.      Mouth/Throat:      Mouth: Mucous membranes are moist.      Pharynx: Oropharynx is clear. No oropharyngeal exudate or posterior oropharyngeal erythema.   Eyes:      General:         Right eye: No discharge.         Left eye: No discharge.      Extraocular Movements: Extraocular movements intact.      Conjunctiva/sclera: Conjunctivae normal.      Pupils: Pupils are equal, round, and reactive to light.   Cardiovascular:      Rate and " Rhythm: Normal rate and regular rhythm.      Heart sounds: Normal heart sounds. No murmur heard.     No friction rub. No gallop.   Pulmonary:      Effort: Pulmonary effort is normal. No respiratory distress.      Breath sounds: Normal breath sounds. No wheezing.   Abdominal:      General: Bowel sounds are normal. There is no distension.      Palpations: Abdomen is soft. There is no mass.      Tenderness: There is no abdominal tenderness.   Musculoskeletal:         General: No swelling. Normal range of motion.      Cervical back: Normal range of motion. No tenderness.      Right lower leg: No edema.      Left lower leg: No edema.   Lymphadenopathy:      Cervical: No cervical adenopathy.   Skin:     Findings: No bruising, erythema or rash.   Neurological:      Mental Status: She is oriented to person, place, and time.      Gait: Gait normal.   Psychiatric:         Mood and Affect: Mood normal.         Behavior: Behavior normal.         Thought Content: Thought content normal.         Judgment: Judgment normal.         Diagnostic Data  Procedures    Assessment  Diagnoses and all orders for this visit:    1. Essential hypertension (Primary)    2. Encounter for screening mammogram for malignant neoplasm of breast  -     Mammo Screening Digital Tomosynthesis Bilateral With CAD    3. Hyperlipidemia LDL goal <70    4. Gastroesophageal reflux disease, unspecified whether esophagitis present    Other orders  -     amLODIPine (NORVASC) 10 MG tablet; Take 1 tablet by mouth Daily.  Dispense: 90 tablet; Refill: 1        Plan    1. Essential hypertension (Primary)- uncontrolled on lisinopril 20 mg daily alone.  Restart amlodipine 10 mg daily. Advised patient to take blood pressure readings at home 2-3 times daily. Advised if blood pressure higher than 160/100 or lower than 100/60 to call the office immediately. If symptomatic, go to the ER. Patient verbalized understanding of all instructions given and complied.    2. Encounter  for screening mammogram for malignant neoplasm of breast- ordered mammogram.    3. Hyperlipidemia LDL goal <70- stable on Lipitor.    4. Gastroesophageal reflux disease, unspecified whether esophagitis present- stable on Protonix.      Return in about 2 weeks (around 1/28/2025) for Recheck.    Sam Malone PA-C  01/14/2025

## 2025-01-17 ENCOUNTER — TELEPHONE (OUTPATIENT)
Dept: INTERNAL MEDICINE | Facility: CLINIC | Age: 70
End: 2025-01-17

## 2025-01-17 NOTE — TELEPHONE ENCOUNTER
Caller: Martha Murray    Relationship: Self    Best call back number: [phone]    Which medication are you concerned about: AMLODIPINE 10MG    Who prescribed you this medication: DIDI [facility]    What are your concerns: PATIENT WENT TO GET REFILL THAT WAS SENT ON 01/14/2025 HOWEVER PHARMACY STATES SHE IS NOT DUE FOR MEDICATION UNTIL 01/28/2025. PATIENT ISN'T SURE WHY. PATIENT DOES NOT HAVE ANY MEDICATION. PLEASE ADVISE

## 2025-01-21 ENCOUNTER — PATIENT OUTREACH (OUTPATIENT)
Age: 70
End: 2025-01-21
Payer: MEDICARE

## 2025-01-21 NOTE — OUTREACH NOTE
SW attempted an outreach and left a voicemail with callback information. SW will attempt again in two days.  Jen HORTA -   Ambulatory Case Management    1/21/2025, 15:39 EST

## 2025-01-29 ENCOUNTER — PATIENT OUTREACH (OUTPATIENT)
Age: 70
End: 2025-01-29
Payer: MEDICARE

## 2025-01-29 NOTE — OUTREACH NOTE
SW made three attempts to contact pt for follow up. SW left voicemail messages with contact information. SW will close referral but will re-open should pt call back.  Jen HORTA -   Ambulatory Case Management    1/29/2025, 10:21 EST

## 2025-02-24 ENCOUNTER — LAB (OUTPATIENT)
Dept: LAB | Facility: HOSPITAL | Age: 70
End: 2025-02-24
Payer: MEDICARE

## 2025-02-24 DIAGNOSIS — D47.3 ESSENTIAL THROMBOCYTOSIS: ICD-10-CM

## 2025-02-24 LAB
ALBUMIN SERPL-MCNC: 4.1 G/DL (ref 3.5–5.2)
ALBUMIN/GLOB SERPL: 1.5 G/DL
ALP SERPL-CCNC: 160 U/L (ref 39–117)
ALT SERPL W P-5'-P-CCNC: 17 U/L (ref 1–33)
ANION GAP SERPL CALCULATED.3IONS-SCNC: 13.8 MMOL/L (ref 5–15)
ANISOCYTOSIS BLD QL: NORMAL
AST SERPL-CCNC: 29 U/L (ref 1–32)
BASOPHILS # BLD AUTO: 0.08 10*3/MM3 (ref 0–0.2)
BASOPHILS NFR BLD AUTO: 0.9 % (ref 0–1.5)
BILIRUB SERPL-MCNC: 0.9 MG/DL (ref 0–1.2)
BUN SERPL-MCNC: 15 MG/DL (ref 8–23)
BUN/CREAT SERPL: 11.2 (ref 7–25)
CALCIUM SPEC-SCNC: 9 MG/DL (ref 8.6–10.5)
CHLORIDE SERPL-SCNC: 104 MMOL/L (ref 98–107)
CO2 SERPL-SCNC: 22.2 MMOL/L (ref 22–29)
CREAT SERPL-MCNC: 1.34 MG/DL (ref 0.57–1)
DACRYOCYTES BLD QL SMEAR: NORMAL
DEPRECATED RDW RBC AUTO: 67.1 FL (ref 37–54)
EGFRCR SERPLBLD CKD-EPI 2021: 43.3 ML/MIN/1.73
EOSINOPHIL # BLD AUTO: 0.28 10*3/MM3 (ref 0–0.4)
EOSINOPHIL NFR BLD AUTO: 3 % (ref 0.3–6.2)
ERYTHROCYTE [DISTWIDTH] IN BLOOD BY AUTOMATED COUNT: 18.5 % (ref 12.3–15.4)
GLOBULIN UR ELPH-MCNC: 2.7 GM/DL
GLUCOSE SERPL-MCNC: 109 MG/DL (ref 65–99)
HCT VFR BLD AUTO: 38.5 % (ref 34–46.6)
HGB BLD-MCNC: 12.9 G/DL (ref 12–15.9)
IMM GRANULOCYTES # BLD AUTO: 0.06 10*3/MM3 (ref 0–0.05)
IMM GRANULOCYTES NFR BLD AUTO: 0.6 % (ref 0–0.5)
LYMPHOCYTES # BLD AUTO: 1.18 10*3/MM3 (ref 0.7–3.1)
LYMPHOCYTES NFR BLD AUTO: 12.6 % (ref 19.6–45.3)
MCH RBC QN AUTO: 32.8 PG (ref 26.6–33)
MCHC RBC AUTO-ENTMCNC: 33.5 G/DL (ref 31.5–35.7)
MCV RBC AUTO: 98 FL (ref 79–97)
MONOCYTES # BLD AUTO: 0.69 10*3/MM3 (ref 0.1–0.9)
MONOCYTES NFR BLD AUTO: 7.4 % (ref 5–12)
NEUTROPHILS NFR BLD AUTO: 7.08 10*3/MM3 (ref 1.7–7)
NEUTROPHILS NFR BLD AUTO: 75.5 % (ref 42.7–76)
NRBC BLD AUTO-RTO: 0 /100 WBC (ref 0–0.2)
PLAT MORPH BLD: NORMAL
PLATELET # BLD AUTO: 555 10*3/MM3 (ref 140–450)
PMV BLD AUTO: 9.7 FL (ref 6–12)
POTASSIUM SERPL-SCNC: 4.3 MMOL/L (ref 3.5–5.2)
PROT SERPL-MCNC: 6.8 G/DL (ref 6–8.5)
RBC # BLD AUTO: 3.93 10*6/MM3 (ref 3.77–5.28)
SODIUM SERPL-SCNC: 140 MMOL/L (ref 136–145)
WBC MORPH BLD: NORMAL
WBC NRBC COR # BLD AUTO: 9.37 10*3/MM3 (ref 3.4–10.8)

## 2025-02-24 PROCEDURE — 36415 COLL VENOUS BLD VENIPUNCTURE: CPT

## 2025-02-24 PROCEDURE — 85007 BL SMEAR W/DIFF WBC COUNT: CPT

## 2025-02-24 PROCEDURE — 80053 COMPREHEN METABOLIC PANEL: CPT

## 2025-02-24 PROCEDURE — 85025 COMPLETE CBC W/AUTO DIFF WBC: CPT

## 2025-02-27 ENCOUNTER — OFFICE VISIT (OUTPATIENT)
Dept: ONCOLOGY | Facility: CLINIC | Age: 70
End: 2025-02-27
Payer: MEDICARE

## 2025-02-27 ENCOUNTER — SPECIALTY PHARMACY (OUTPATIENT)
Dept: ONCOLOGY | Facility: HOSPITAL | Age: 70
End: 2025-02-27
Payer: MEDICARE

## 2025-02-27 VITALS
SYSTOLIC BLOOD PRESSURE: 136 MMHG | RESPIRATION RATE: 16 BRPM | HEART RATE: 91 BPM | BODY MASS INDEX: 30.43 KG/M2 | TEMPERATURE: 97.5 F | HEIGHT: 60 IN | DIASTOLIC BLOOD PRESSURE: 67 MMHG | WEIGHT: 155 LBS | OXYGEN SATURATION: 97 %

## 2025-02-27 DIAGNOSIS — D47.3 ESSENTIAL THROMBOCYTOSIS: Primary | ICD-10-CM

## 2025-02-27 PROCEDURE — 99214 OFFICE O/P EST MOD 30 MIN: CPT | Performed by: INTERNAL MEDICINE

## 2025-02-27 PROCEDURE — 3078F DIAST BP <80 MM HG: CPT | Performed by: INTERNAL MEDICINE

## 2025-02-27 PROCEDURE — 3075F SYST BP GE 130 - 139MM HG: CPT | Performed by: INTERNAL MEDICINE

## 2025-02-27 PROCEDURE — 1126F AMNT PAIN NOTED NONE PRSNT: CPT | Performed by: INTERNAL MEDICINE

## 2025-02-27 NOTE — PROGRESS NOTES
Follow Up Office Visit      Date: 2025     Patient Name: Martha Murray  MRN: [id number]  : 1955  Referring Physician: Julia Stacy     Chief Complaint:  Follow-up for JAK2 positive essential thrombocytosis     History of Present Illness: Martha Murray is a pleasant 66 y.o. female past medical history of anxiety, hypertension, hyperlipidemia, COPD, tobacco abuse, type 2 diabetes, CKD who presents today for evaluation of thrombocytosis. The patient has been followed by other PCP is monitoring CBCs which were notable for thrombocytosis. Most recently her platelet count was 1280K in 2021. Per chart review over the last several years her platelet count has ranged between 700K-900K. She does note excessive fatigue during this timeframe as well as increased cravings for ice. She denies any recurrent infections or inflammatory processes. Of note, she recent had a CT scan which showed some slight splenomegaly up to around 16 cm. She denies any chest pain or discomfort. Denies any shortness of breath, cough, vision changes, headaches     Interval History:  Presents to clinic for follow-up.  Started back on Hydrea in 2023.  Continues to tolerate the medication well.  Unfortunately suffered a stroke in 2024.  Transitioned off aspirin and started on Plavix.  Has been tolerating this well.  Strength and energy improving.  Denies any neuropathy.  Denies any vision changes or headaches today    Oncology History:    Oncology/Hematology History   Essential thrombocytosis   3/28/2019 Initial Diagnosis    Essential thrombocytosis     10/12/2023 -  Chemotherapy    OP POLYCYTHEMIA VERA/ESSENTIAL THROMBOCYTOSIS Hydroxyurea         Subjective      Review of Systems:   Constitutional: Negative for fevers, chills, or weight loss  Eyes: Negative for blurred vision or discharge         Ear/Nose/Throat: Negative for difficulty swallowing, sore throat, LAD                                                        Respiratory: Negative for cough, SOA, wheezing                                                                                        Cardiovascular: Negative for chest pain or palpitations                                                                  Gastrointestinal: Negative for nausea, vomiting or diarrhea                                                                     Genitourinary: Negative for dysuria or hematuria                                                                                           Musculoskeletal: Negative for any joint pains or muscle aches                                                                        Neurologic: Negative for any weakness, headaches, dizziness                                                                         Hematologic: Negative for any easy bleeding or bruising                                                                                   Psychiatric: Negative for anxiety or depression                          Past Medical History/Past Surgical History/ Family History/ Social History: Reviewed by me and unchanged from my previous documentation done on September 2024.     Medications:     Current Outpatient Medications:     amLODIPine (NORVASC) 10 MG tablet, Take 1 tablet by mouth Daily., Disp: 90 tablet, Rfl: 1    atorvastatin (LIPITOR) 80 MG tablet, Take 1 tablet by mouth Every Night., Disp: 90 tablet, Rfl: 0    clopidogrel (PLAVIX) 75 MG tablet, Take 1 tablet by mouth Daily., Disp: 90 tablet, Rfl: 3    folic acid (FOLVITE) 1 MG tablet, Take 1 tablet by mouth Daily., Disp: 90 tablet, Rfl: 1    hydroxyurea (HYDREA) 500 MG capsule, Take 1 capsule by mouth Daily., Disp: 30 capsule, Rfl: 11    lisinopril (PRINIVIL,ZESTRIL) 20 MG tablet, Take 1 tablet by mouth Daily. Hold for now- continue if blood pressure remains elevated., Disp: 30 tablet, Rfl: 1    meclizine (ANTIVERT) 25 MG tablet, Take 1 tablet by mouth Every 8 (Eight) Hours.,  "Disp: 30 tablet, Rfl: 0    pantoprazole (Protonix) 40 MG EC tablet, Take 1 tablet by mouth Daily., Disp: 90 tablet, Rfl: 3    prochlorperazine (COMPAZINE) 5 MG tablet, Take 1 tablet by mouth Every 6 (Six) Hours As Needed for Nausea or Vomiting., Disp: 30 tablet, Rfl: 0    traZODone (DESYREL) 50 MG tablet, Take 1 tablet by mouth Every Night., Disp: , Rfl:     Allergies:   Allergies   Allergen Reactions    Sulfa Antibiotics Rash       Objective     Physical Exam:  Vital Signs:   Vitals:    02/27/25 1007   BP: 136/67   Pulse: 91   Resp: 16   Temp: 97.5 °F (36.4 °C)   SpO2: 97%   Weight: 70.3 kg (155 lb)   Height: 152.4 cm (60\")   PainSc: 0-No pain     Pain Score    02/27/25 [facility]   PainSc: 0-No pain     ECOG Performance Status: 1 - Symptomatic but completely ambulatory    Constitutional: NAD, ECOG 1  Eyes: PERRLA, scleral anicteric  ENT: No LAD, no thyromegaly  Respiratory: CTAB, no wheezing, rales, rhonchi  Cardiovascular: RRR, no murmurs, pulses 2+ bilaterally  Abdomen: soft, NT/ND, no HSM  Musculoskeletal: strength 5/5 bilaterally, no c/c/e  Neurologic: A&O x 3, CN II-XII intact grossly    Results Review:   Lab on 02/24/2025   Component Date Value Ref Range Status    Glucose 02/24/2025 109 (H)  65 - 99 mg/dL Final    BUN 02/24/2025 15  8 - 23 mg/dL Final    Creatinine 02/24/2025 1.34 (H)  0.57 - 1.00 mg/dL Final    Sodium 02/24/2025 140  136 - 145 mmol/L Final    Potassium 02/24/2025 4.3  3.5 - 5.2 mmol/L Final    Slight hemolysis detected by analyzer. Result may be falsely elevated.    Chloride 02/24/2025 104  98 - 107 mmol/L Final    CO2 02/24/2025 22.2  22.0 - 29.0 mmol/L Final    Calcium 02/24/2025 9.0  8.6 - 10.5 mg/dL Final    Total Protein 02/24/2025 6.8  6.0 - 8.5 g/dL Final    Albumin 02/24/2025 4.1  3.5 - 5.2 g/dL Final    ALT (SGPT) 02/24/2025 17  1 - 33 U/L Final    AST (SGOT) 02/24/2025 29  1 - 32 U/L Final    Alkaline Phosphatase 02/24/2025 160 (H)  39 - 117 U/L Final    Total Bilirubin 02/24/2025 0.9  " 0.0 - 1.2 mg/dL Final    Globulin 02/24/2025 2.7  gm/dL Final    A/G Ratio 02/24/2025 1.5  g/dL Final    BUN/Creatinine Ratio 02/24/2025 11.2  7.0 - 25.0 Final    Anion Gap 02/24/2025 13.8  5.0 - 15.0 mmol/L Final    eGFR 02/24/2025 43.3 (L)  >60.0 mL/min/1.73 Final    WBC 02/24/2025 9.37  3.40 - 10.80 10*3/mm3 Final    RBC 02/24/2025 3.93  3.77 - 5.28 10*6/mm3 Final    Hemoglobin 02/24/2025 12.9  12.0 - 15.9 g/dL Final    Hematocrit 02/24/2025 38.5  34.0 - 46.6 % Final    MCV 02/24/2025 98.0 (H)  79.0 - 97.0 fL Final    MCH 02/24/2025 32.8  26.6 - 33.0 pg Final    MCHC 02/24/2025 33.5  31.5 - 35.7 g/dL Final    RDW 02/24/2025 18.5 (H)  12.3 - 15.4 % Final    RDW-SD 02/24/2025 67.1 (H)  37.0 - 54.0 fl Final    MPV 02/24/2025 9.7  6.0 - 12.0 fL Final    Platelets 02/24/2025 555 (H)  140 - 450 10*3/mm3 Final    Neutrophil % 02/24/2025 75.5  42.7 - 76.0 % Final    Lymphocyte % 02/24/2025 12.6 (L)  19.6 - 45.3 % Final    Monocyte % 02/24/2025 7.4  5.0 - 12.0 % Final    Eosinophil % 02/24/2025 3.0  0.3 - 6.2 % Final    Basophil % 02/24/2025 0.9  0.0 - 1.5 % Final    Immature Grans % 02/24/2025 0.6 (H)  0.0 - 0.5 % Final    Neutrophils, Absolute 02/24/2025 7.08 (H)  1.70 - 7.00 10*3/mm3 Final    Lymphocytes, Absolute 02/24/2025 1.18  0.70 - 3.10 10*3/mm3 Final    Monocytes, Absolute 02/24/2025 0.69  0.10 - 0.90 10*3/mm3 Final    Eosinophils, Absolute 02/24/2025 0.28  0.00 - 0.40 10*3/mm3 Final    Basophils, Absolute 02/24/2025 0.08  0.00 - 0.20 10*3/mm3 Final    Immature Grans, Absolute 02/24/2025 0.06 (H)  0.00 - 0.05 10*3/mm3 Final    nRBC 02/24/2025 0.0  0.0 - 0.2 /100 WBC Final    Anisocytosis 02/24/2025 Slight/1+  None Seen Final    Dacrocytes 02/24/2025 Slight/1+  None Seen Final    WBC Morphology 02/24/2025 Normal  Normal Final    Platelet Morphology 02/24/2025 Normal  Normal Final       No results found.    Assessment / Plan      Assessment/Plan:   1. Essential thrombocytosis (HCC) (Primary)  -Platelet count 1280K  in December 2021  -CT scan July 2021 showed slight splenomegaly up to 16 cm  -Concern for myelofibrosis given the clinical picture as well as iron deficiency given her symptoms  -JAK2 mutational testing positive for the [id number] mutation  -Von Willebrand studies consistent with an acquired von Willebrand's disease secondary to a myeloproliferative disorder  -Initially ordered Hydrea in February 2022 however patient did not  the medication  -Started on Hydrea in October 2023.  Tolerating well.  Platelet count 606K in November 2023  -Hemoglobin 14.9 with a platelet count of 570K in February 2023  -Platelet count 781K in June 2024 likely related to increased inflammation in her knees  -Platelet count 682K in September 2024  -Platelet count 555K in February 2025  -Will continue with Hydrea 500 mg daily along with Plavix     2.  Iron deficiency  -Noted on previous iron studies  -Given her JAK2 mutation positivity, will hold off on iron supplementation at this time     3.  Right knee pain  -Status post right total knee replacement on 7/3/2024    4.  CVA  -Stroke in November 2024.  Likely multifactorial  -Following with neurology  -Continue Hydrea and Plavix as above    Follow-up:  Follow-up in 3 months     Paulo Graves MD  Hematology and Oncology     Please note that portions of this note may have been completed with a voice recognition program. Efforts were made to edit the dictations, but occasionally words are mistranscribed.

## 2025-03-04 RX ORDER — ATORVASTATIN CALCIUM 80 MG/1
80 TABLET, FILM COATED ORAL NIGHTLY
Qty: 90 TABLET | Refills: 0 | Status: SHIPPED | OUTPATIENT
Start: 2025-03-04

## 2025-04-09 ENCOUNTER — OFFICE VISIT (OUTPATIENT)
Dept: NEUROLOGY | Facility: CLINIC | Age: 70
End: 2025-04-09
Payer: MEDICARE

## 2025-04-09 VITALS
OXYGEN SATURATION: 98 % | SYSTOLIC BLOOD PRESSURE: 144 MMHG | TEMPERATURE: 97.7 F | HEIGHT: 60 IN | BODY MASS INDEX: 31.02 KG/M2 | WEIGHT: 158 LBS | DIASTOLIC BLOOD PRESSURE: 82 MMHG | HEART RATE: 74 BPM

## 2025-04-09 DIAGNOSIS — F33.0 MILD RECURRENT MAJOR DEPRESSION: ICD-10-CM

## 2025-04-09 DIAGNOSIS — E78.5 HYPERLIPIDEMIA LDL GOAL <70: Chronic | ICD-10-CM

## 2025-04-09 DIAGNOSIS — E11.9 TYPE 2 DIABETES MELLITUS WITHOUT COMPLICATION, WITHOUT LONG-TERM CURRENT USE OF INSULIN: ICD-10-CM

## 2025-04-09 DIAGNOSIS — I10 ESSENTIAL HYPERTENSION: Chronic | ICD-10-CM

## 2025-04-09 DIAGNOSIS — Z86.73 HISTORY OF STROKE: Primary | ICD-10-CM

## 2025-04-09 RX ORDER — ESCITALOPRAM OXALATE 10 MG/1
10 TABLET ORAL DAILY
Qty: 90 TABLET | Refills: 3 | Status: SHIPPED | OUTPATIENT
Start: 2025-04-09 | End: 2026-04-09

## 2025-04-09 RX ORDER — ATORVASTATIN CALCIUM 40 MG/1
40 TABLET, FILM COATED ORAL DAILY
Qty: 90 TABLET | Refills: 3 | Status: SHIPPED | OUTPATIENT
Start: 2025-04-09 | End: 2026-04-09

## 2025-04-09 NOTE — PATIENT INSTRUCTIONS
- Continue Plavix 75 mg daily  - Decrease atorvastatin to 40 mg daily, Can cut 80mg tablets in half until gone  -- Stop Trazodone  -- Start escitalopram 10mg daily    - BP goals less than 130/80.  Check twice daily and keep a log for primary care provider.  - Serum glucose goals less than 140.  Hemoglobin A1c goal is less than 7  - Heart healthy/diabetic diet  - Increase activity as tolerated  -- Stay well hydrated  -- Ambulatory referral to behavioral health  -- Recommended follow-up with vascular surgery or cardiology.  Further management per PCP.  - Fall precautions  - Return to the ED with any additional stroke symptoms

## 2025-04-09 NOTE — PROGRESS NOTES
Follow Up Office Visit      Encounter Date: 2025   Patient Name: Martha Murray  : 1955   MRN: [id number]   PCP: Jenny Carvajal APRN    Referring Provider: No ref. provider found     Chief Complaint:    Chief Complaint   Patient presents with    Follow-up       History of Present Illness: Martha Murray is a 69 y.o. female who is here today to follow up with stroke.    Clinic visit 2024:Martha Murray is a 69 y.o. female with known medical diagnoses of HTN, HLD, T2DM, COPD, MI, anemia, CHF, and stroke who presents to clinic today for follow-up.  She was admitted to [facility] on 10/31/2024 with complaints of dizziness, nausea, vomiting, and gait instability.  BP was 210/120.  WBCs 21.84.  NIH 0.  CT head was negative for any acute findings.  CTA H/N with mild athero and no LVO.  A lobular filling defect was noted in the proximal brachiocephalic artery representing possible nonocclusive mural thrombus or lobulated soft plaque.  CTP with no evidence of LVO.  MRI brain revealed an acute to subacute right basal ganglia stroke.  She was initiated on heparin, 24 hours secondary to filling defect in the proximal brachiocephalic artery as well as aspirin 81 mg daily.  .  after 24 hours, she was transition to DAPT with aspirin 81 mg daily and Plavix 75 mg daily with plans to transition to Plavix 75 mg daily after 21 days.  TTE with an EF of 61%, indeterminate left ventricular diastolic function, negative saline test, and mild aortic dilation.  Per review of EMR, she was recommended to follow-up with vascular as an outpatient.  Stool studies were positive for C. difficile.  WBCs noted to improve after initiation of oral vancomycin.  At discharge, she was transferred to [facility] for short-term rehab.      Patient presents to clinic today alone.  She was upset upon arrival.  She used a medical transport company and reports her out-of-pocket cost was $70 to get here. BP initially  elevated at 170/90.  Personally rechecked after several minutes and it was down to 145/90.  She denies any new stroke symptoms.  Patient reports that when she left rehab she was not prescribed aspirin or Plavix.  She has not been taking any antiplatelet therapy.  Discussed with her that she will need to restart Plavix 75 mg for secondary stroke prevention.  Per review of EMR, her PCP did reorder Plavix.  She reports that she was never notified that it was filled by ????[facility].  I reordered her Plavix today.  She has been taking atorvastatin 80 mg daily with no problem.  She reports that she has had issues with mood lability since her stroke.  She is currently taking trazodone 50 mg at bedtime for insomnia.  Discussed with her that trazodone can be used as an antidepressant as well.  She was previously taking Prozac for depression and tolerated well.  Discussed with her that she will need to follow-up with her PCP for further management as she would like to continue taking trazodone for now.  She is doing PT and OT at home.  She walks with a walker or cane.  She is not currently driving.      Clinic visit 4/9/2025: Mrs. Murray presents to clinic today alone.  She reports that she has been doing relatively well.  She reports that she does have chronic back pain and intermittently walks with a cane for long distances.  She denies any new strokelike symptoms.  She does report fatigue with a lot of activity.  She reports that she has completed therapy.  She has resumed driving with no issues.  She states that she did not bring her medication list with her.  She is unsure if she is still taking her Plavix and atorvastatin.  Discussed with her that Plavix and atorvastatin are needed for secondary stroke prevention.  She verbalized understanding.  Discussed that her most recent ALP was elevated on on labs.  We will decrease atorvastatin to 40 mg nightly.  She reports continued mild depression.  She has stopped taking trazodone  and reports that she is sleeping better at night.  Discussed with her that we will initiate low-dose Lexapro 10 mg daily for now.  Ambulatory referral placed to behavioral health for further management.  Previous recommendation to follow-up with vascular surgery for aortic dilation discussed.  She reports that she would like to establish care with a primary care provider in [place] and have them refer her to local providers if possible.  She recently followed up with Dr. Graves secondary to her JAK2 thrombocytosis.  Per Dr. Graves, von Willebrand studies were consistent with an acquired von Willebrand's disease secondary to a myeloproliferative disorder.  She was started on Hydrea.  She reports compliance.     Subjective        I have reviewed and the following portions of the patient's history were updated as appropriate: past family history, past medical history, past social history, past surgical history and problem list.    Medications:     Current Outpatient Medications:     amLODIPine (NORVASC) 10 MG tablet, Take 1 tablet by mouth Daily., Disp: 90 tablet, Rfl: 1    clopidogrel (PLAVIX) 75 MG tablet, Take 1 tablet by mouth Daily., Disp: 90 tablet, Rfl: 3    folic acid (FOLVITE) 1 MG tablet, Take 1 tablet by mouth Daily., Disp: 90 tablet, Rfl: 1    hydroxyurea (HYDREA) 500 MG capsule, Take 1 capsule by mouth Daily., Disp: 30 capsule, Rfl: 11    lisinopril (PRINIVIL,ZESTRIL) 20 MG tablet, Take 1 tablet by mouth Daily. Hold for now- continue if blood pressure remains elevated., Disp: 30 tablet, Rfl: 1    meclizine (ANTIVERT) 25 MG tablet, Take 1 tablet by mouth Every 8 (Eight) Hours., Disp: 30 tablet, Rfl: 0    atorvastatin (Lipitor) 40 MG tablet, Take 1 tablet by mouth Daily., Disp: 90 tablet, Rfl: 3    escitalopram (Lexapro) 10 MG tablet, Take 1 tablet by mouth Daily., Disp: 90 tablet, Rfl: 3    Allergies:   Allergies   Allergen Reactions    Sulfa Antibiotics Rash       Objective     Physical Exam:   Vital  "Signs:   Vitals:    04/09/25 0916   BP: 144/82   Pulse: 74   Temp: 97.7 °F (36.5 °C)   SpO2: 98%   Weight: 71.7 kg (158 lb)   Height: 152.4 cm (60\")     Body mass index is 30.86 kg/m².    Physical Exam  Constitutional:       General: She is not in acute distress.  HENT:      Head: Normocephalic and atraumatic.   Eyes:      Extraocular Movements: Extraocular movements intact.      Pupils: Pupils are equal, round, and reactive to light.   Cardiovascular:      Rate and Rhythm: Normal rate and regular rhythm.   Pulmonary:      Effort: Pulmonary effort is normal. No respiratory distress.   Abdominal:      General: There is no distension.      Palpations: Abdomen is soft.   Musculoskeletal:      Cervical back: Normal range of motion and neck supple.      Right lower leg: No edema.      Left lower leg: No edema.   Skin:     General: Skin is warm and dry.      Capillary Refill: Capillary refill takes less than 2 seconds.   Neurological:      Mental Status: She is alert and oriented to person, place, and time.      Cranial Nerves: No cranial nerve deficit.      Sensory: No sensory deficit.      Motor: Motor strength is normal.No weakness.      Coordination: Coordination normal.      Gait: Gait abnormal.   Psychiatric:         Speech: Speech normal.         Neurological Exam  Mental Status  Alert. Oriented to person, place, time and situation. Oriented to person, place, and time. Speech is normal. Language is fluent with no aphasia.     Cranial Nerves  CN II: Right visual acuity: Finger movement. Left visual acuity: Finger movement.  CN III, IV, VI: Extraocular movements intact bilaterally. Pupils equal round and reactive to light bilaterally.  CN V: Facial sensation is normal.  CN VII: Full and symmetric facial movement.  CN IX, X: Palate elevates symmetrically  CN XI: Shoulder shrug strength is normal.  CN XII: Tongue midline without atrophy or fasciculations.     Motor   Strength is 5/5 throughout all four extremities.   "   Sensory  Light touch is normal in upper and lower extremities.      Coordination  Right: Finger-to-nose normal.Left: Finger-to-nose normal.     Gait   Abnormal gait.  Intermittent gait instability, uses walker.        NIH Stroke Scale     1a  Level of consciousness: 0=alert; keenly responsive   1b. LOC questions:  0=Answers both questions correctly    1c. LOC commands: 0=Performs both tasks correctly   2.  Best Gaze: 0=normal   3. Visual: 0=No visual loss   4. Facial Palsy: 0=Normal symmetric movement   5a. Motor left arm: 0=No drift, limb holds 90 (or 45) degrees for full 10 seconds   5b.  Motor right arm: 0=No drift, limb holds 90 (or 45) degrees for full 10 seconds   6a. Motor left le=No drift, limb holds 90 (or 45) degrees for full 10 seconds   6b  Motor right le=No drift, limb holds 90 (or 45) degrees for full 10 seconds   7. Limb Ataxia: 0=Absent   8.  Sensory: 0=Normal; no sensory loss   9. Best Language:  0=No aphasia, normal   10. Dysarthria: 0=Normal   11. Extinction and Inattention: 0=No abnormality     Total:   0            Modified Newdale Score: 1           0  No Symptoms    1 No significant disability. Able to carry out all usual activities, despite some symptoms.    2 Slight disability. Able to look after own affairs without assistance, but unable to carry out all previous activities.    3 Moderate disability. Requires some help, but able to walk unassisted.    4 Moderately severe disability. Unable to attend to own bodily needs without assistance, and unable to walk unassisted.    5 Severe disability. Requires constant nursing care and attention, bedridden, incontinent.    6 Dead              Assessment / Plan      Assessment/Plan:     1. History of stroke (Primary)  - History of RBG stroke, likely etiology   - Continue Plavix 75 mg daily  - Decrease atorvastatin to 40 mg daily, Can cut 80mg tablets in half until gone  -- Stop Trazodone  -- Start escitalopram 10mg daily    - BP goals  less than 130/80.  Check twice daily and keep a log for primary care provider.  - Serum glucose goals less than 140.  Hemoglobin A1c goal is less than 7  - Heart healthy/diabetic diet  - Increase activity as tolerated  -- Stay well hydrated  -- Ambulatory referral to behavioral health  -- Recommended follow-up with vascular surgery or cardiology.  Further management per PCP.  - Fall precautions  - Return to the ED with any additional stroke symptoms    2. Essential hypertension  - BP in office 130/80.  Check twice daily and keep a log for PCP  - Above goal at 144/82 today.  - Management per PCP     3. Hyperlipidemia LDL goal <70  - Most recent   - Continue atorvastatin 80 mg daily, LDL goal less than 70     4. Type 2 diabetes mellitus without complication, without long-term current use of insulin  - Hemoglobin A1c 5.6 at goal of less than 7 for secondary stroke prevention  - Further management per primary care     5.  Depression  - Patient has stopped taking trazodone  - Start escitalopram 10 mg daily  - Referral placed to behavioral health in [place]     Discussed the importance of medication compliance Plavix 75mg daily and Atorvastatin 80mg nightly and lifestyle modifications adequate control of blood pressure, adequate control of cholesterol (goal LDL <70), adequate control of glucose (<140, A1c goal <7), increased physical activity, and implementation of healthy diet to help reduce the risk of future cerebrovascular events.  Also discussed the signs symptoms that would warrant the patient return back to the emergency department including unilateral weakness, unilateral numbness, visual disturbances, loss of balance, speech difficulties, and/or a sudden severe headache.  Patient verbalized understanding.    Follow Up:   Return in about 3 months (around 7/9/2025).    NAYELI Schumacher, [facility]

## 2025-05-05 ENCOUNTER — OFFICE VISIT (OUTPATIENT)
Dept: PSYCHIATRY | Facility: CLINIC | Age: 70
End: 2025-05-05
Payer: MEDICARE

## 2025-05-05 VITALS
DIASTOLIC BLOOD PRESSURE: 86 MMHG | WEIGHT: 163 LBS | OXYGEN SATURATION: 97 % | SYSTOLIC BLOOD PRESSURE: 134 MMHG | HEART RATE: 82 BPM | HEIGHT: 60 IN | BODY MASS INDEX: 32 KG/M2

## 2025-05-05 DIAGNOSIS — F33.1 MODERATE EPISODE OF RECURRENT MAJOR DEPRESSIVE DISORDER: ICD-10-CM

## 2025-05-05 DIAGNOSIS — F41.1 GENERALIZED ANXIETY DISORDER: Primary | ICD-10-CM

## 2025-05-05 PROCEDURE — 3079F DIAST BP 80-89 MM HG: CPT | Performed by: NURSE PRACTITIONER

## 2025-05-05 PROCEDURE — 90792 PSYCH DIAG EVAL W/MED SRVCS: CPT | Performed by: NURSE PRACTITIONER

## 2025-05-05 PROCEDURE — 3075F SYST BP GE 130 - 139MM HG: CPT | Performed by: NURSE PRACTITIONER

## 2025-05-05 RX ORDER — PANTOPRAZOLE SODIUM 40 MG/1
40 TABLET, DELAYED RELEASE ORAL DAILY
COMMUNITY

## 2025-05-05 RX ORDER — SERTRALINE HYDROCHLORIDE 25 MG/1
25 TABLET, FILM COATED ORAL DAILY
Qty: 30 TABLET | Refills: 1 | Status: SHIPPED | OUTPATIENT
Start: 2025-05-05
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
## 2025-05-08 ENCOUNTER — OFFICE VISIT (OUTPATIENT)
Dept: INTERNAL MEDICINE | Facility: CLINIC | Age: 70
End: 2025-05-08
Payer: MEDICARE

## 2025-05-08 VITALS
SYSTOLIC BLOOD PRESSURE: 160 MMHG | WEIGHT: 163 LBS | HEART RATE: 85 BPM | BODY MASS INDEX: 32 KG/M2 | DIASTOLIC BLOOD PRESSURE: 73 MMHG | HEIGHT: 60 IN | TEMPERATURE: 97.7 F | OXYGEN SATURATION: 99 %

## 2025-05-08 DIAGNOSIS — M25.562 CHRONIC PAIN OF LEFT KNEE: ICD-10-CM

## 2025-05-08 DIAGNOSIS — R53.83 FATIGUE, UNSPECIFIED TYPE: ICD-10-CM

## 2025-05-08 DIAGNOSIS — Z86.73 HISTORY OF STROKE: ICD-10-CM

## 2025-05-08 DIAGNOSIS — N18.31 STAGE 3A CHRONIC KIDNEY DISEASE: ICD-10-CM

## 2025-05-08 DIAGNOSIS — E11.9 TYPE 2 DIABETES MELLITUS WITHOUT COMPLICATION, WITHOUT LONG-TERM CURRENT USE OF INSULIN: Primary | ICD-10-CM

## 2025-05-08 DIAGNOSIS — E78.5 HYPERLIPIDEMIA LDL GOAL <70: ICD-10-CM

## 2025-05-08 DIAGNOSIS — G89.29 CHRONIC PAIN OF LEFT KNEE: ICD-10-CM

## 2025-05-08 DIAGNOSIS — I10 ESSENTIAL HYPERTENSION: ICD-10-CM

## 2025-05-08 DIAGNOSIS — E55.9 VITAMIN D INSUFFICIENCY: ICD-10-CM

## 2025-05-08 DIAGNOSIS — R74.8 ELEVATED LIVER ENZYMES: ICD-10-CM

## 2025-05-08 DIAGNOSIS — Z12.11 ENCOUNTER FOR SCREENING FOR MALIGNANT NEOPLASM OF COLON: ICD-10-CM

## 2025-05-08 LAB
EXPIRATION DATE: NORMAL
EXPIRATION DATE: NORMAL
HBA1C MFR BLD: 5.2 % (ref 4.5–5.7)
Lab: NORMAL
Lab: NORMAL
POC ALBUMIN, URINE: 150 MG/L
POC CREATININE, URINE: 300 MG/DL
POC URINE ALB/CREA RATIO: NORMAL

## 2025-05-08 RX ORDER — LISINOPRIL 5 MG/1
5 TABLET ORAL DAILY
Qty: 90 TABLET | Refills: 1 | Status: SHIPPED | OUTPATIENT
Start: 2025-05-08

## 2025-05-08 NOTE — PROGRESS NOTES
Chief Complaint / Reason:      Chief Complaint   Patient presents with    Establish Care     History of stroke. Hypertension        Subjective     History of Present Illness  The patient presents to [facility]. She has a history of hypertension, hyperlipidemia, type 2 diabetes, COPD, MI, anemia, CHF, and stroke. She sees neurology, with her last visit on 04/09/2025. Her blood pressure is elevated today at 160/73. She also sees psychology and was prescribed Zoloft. She was seen on 05/05/2025 for generalized anxiety disorder. Additionally, she sees hematology/oncology.    She reports persistent fatigue, which she attributes to her recent stroke approximately 4 months ago, which resulted in a brain bleed. She typically goes to bed between 7:00 and 8:00 PM and wakes up at 3:00 AM, often falling back asleep after drinking coffee. She experiences restless leg syndrome at night and has a known vitamin D deficiency. She occasionally bruises easily and has an upcoming appointment with her hematologist in May 2025. She has not had any recent falls and sleeps on her side. She experiences back pain after prolonged sitting and reports tinnitus. She does not have any hearing issues or dizziness. She is currently unemployed but wishes to return to work. She is single and has one living child from a single pregnancy. She is up to date on vision and dental care but does not see a dermatologist. She has not had a mammogram due to cost and is not up to date on colonoscopy. She continues to drive and resumed driving 2.5 months post-stroke. She is on folic acid supplementation and Protonix 40 mg in the evening.    She has been experiencing elevated blood pressure readings, which have remained high despite previous interventions. She was previously on lisinopril but was taken off it for reasons unknown to her. She is not currently on any blood pressure medications.    She has a history of right knee replacement surgery performed by  Dr. Aldrich at [facility], which was followed by physical therapy. She reports a sensation of electricity in her knee, accompanied by pain. Her range of motion is normal, but she is unable to bend her knee fully. She plans to schedule an appointment with Dr. Aldrich regarding her left knee. She has used diclofenac gel for her right knee.    She is currently on Zoloft for anxiety, which she reports as effective. She was previously on Prozac, which was ineffective and caused excessive sleepiness.    She is on cholesterol medication and has refills available.    She is on Plavix and another medication prescribed by Dr. Graves for her enzymes.    PAST SURGICAL HISTORY:  - Right knee replacement surgery performed by Dr. Aldrich at [facility].    SOCIAL HISTORY  She does not smoke or drink alcohol.    FAMILY HISTORY  Her mother had to take a pill every day for thyroid issues.    History taken from: patient    PMH/FH/Social History were reviewed and updated appropriately in the electronic medical record.   Past Medical History:   Diagnosis Date    HAMMAD (acute kidney injury) 07/01/2021    Allergic     Anemia     Anesthesia complication     Slow to wake    Arthritis     COPD (chronic obstructive pulmonary disease)     Depression     Diabetes mellitus     History of heart attack     Hyperlipidemia     Hypertension      Past Surgical History:   Procedure Laterality Date    BILATERAL BREAST REDUCTION      BONE MARROW ASPIRATION      CATARACT EXTRACTION Bilateral     COLONOSCOPY      HYSTERECTOMY  1990    x 2    REDUCTION MAMMAPLASTY Bilateral     MORE THAN 20 YEARS AGO    SINUS SURGERY  2003    TONSILLECTOMY      TOTAL KNEE ARTHROPLASTY Right 7/3/2024    Procedure: TOTAL KNEE ARTHROPLASTY WITH NAINA ROBOT RIGHT;  Surgeon: Keaton Aldrich MD;  Location: [facility];  Service: Robotics - Ortho;  Laterality: Right;     Social History     Socioeconomic History    Marital status: Single   Tobacco Use     Smoking status: Former     Current packs/day: 0.00     Average packs/day: 2.0 packs/day for 30.0 years (60.0 ttl pk-yrs)     Types: Cigarettes     Start date:      Quit date:      Years since quittin.3     Passive exposure: Past    Smokeless tobacco: Never   Vaping Use    Vaping status: Never Used   Substance and Sexual Activity    Alcohol use: No    Drug use: No    Sexual activity: Defer     Family History   Problem Relation Age of Onset    Arthritis Maternal Grandmother     Hypertension Mother     Hyperlipidemia Mother     Thyroid disease Mother     Diabetes Sister     Crohn's disease Sister     Hypertension Sister     Heart attack Maternal Grandfather     Breast cancer Neg Hx     Ovarian cancer Neg Hx        Review of Systems:   Review of Systems      All other systems were reviewed and are negative.  Exceptions are noted in the subjective or above.      Objective     Vital Signs  Vitals:    25 1647   BP: 160/73   Pulse: 85   Temp: 97.7 °F (36.5 °C)   SpO2: 99%       Body mass index is 31.83 kg/m².  BMI is >= 30 and <35. (Class 1 Obesity). The following options were offered after discussion;: exercise counseling/recommendations and nutrition counseling/recommendations       Physical Exam  Vitals and nursing note reviewed.   Constitutional:       General: She is not in acute distress.     Appearance: She is well-developed. She is obese.   HENT:      Head: Normocephalic and atraumatic.      Right Ear: Ear canal and external ear normal. Tympanic membrane is erythematous and bulging.      Left Ear: Ear canal and external ear normal. Tympanic membrane is erythematous and bulging.      Nose: Mucosal edema present. No rhinorrhea.      Right Sinus: No maxillary sinus tenderness or frontal sinus tenderness.      Left Sinus: No maxillary sinus tenderness or frontal sinus tenderness.      Mouth/Throat:      Mouth: Mucous membranes are dry.      Pharynx: Posterior oropharyngeal erythema present.       Comments: PND    Eyes:      Conjunctiva/sclera: Conjunctivae normal.   Cardiovascular:      Rate and Rhythm: Normal rate and regular rhythm.      Pulses: Normal pulses.      Heart sounds: Normal heart sounds.   Pulmonary:      Effort: Pulmonary effort is normal. No respiratory distress.      Breath sounds: Normal breath sounds.   Abdominal:      General: Bowel sounds are normal.      Palpations: Abdomen is soft.   Musculoskeletal:         General: Swelling, tenderness and deformity present.   Lymphadenopathy:      Head:      Right side of head: No submental, submandibular or tonsillar adenopathy.      Left side of head: No submental, submandibular or tonsillar adenopathy.      Cervical: No cervical adenopathy.   Skin:     General: Skin is warm and dry.      Capillary Refill: Capillary refill takes less than 2 seconds.      Findings: Bruising present. No rash.   Neurological:      Mental Status: She is alert and oriented to person, place, and time.      Gait: Gait abnormal.   Psychiatric:         Behavior: Behavior normal.         Thought Content: Thought content normal.         Judgment: Judgment normal.              Results Review:    I reviewed the patient's new clinical results.   Office Visit on 05/08/2025   Component Date Value Ref Range Status    POC ALBUMIN, URINE 05/08/2025 150  mg/L Final    POC CREATININE, URINE 05/08/2025 300  mg/dL Final    POC Urine Albumin Creatinine Ratio 05/08/2025   <30 Final    Lot Number 05/08/2025 [id number]   Final    Expiration Date 05/08/2025 09/30/2025   Final    Hemoglobin A1C 05/08/2025 5.2  4.5 - 5.7 % Final    Lot Number 05/08/2025 [id number]   Final    Expiration Date 05/08/2025 02/17/2027   Final         Medication Review:     Current Outpatient Medications:     atorvastatin (Lipitor) 40 MG tablet, Take 1 tablet by mouth Daily., Disp: 90 tablet, Rfl: 3    clopidogrel (PLAVIX) 75 MG tablet, Take 1 tablet by mouth Daily., Disp: 90 tablet, Rfl: 3    folic acid (FOLVITE)  1 MG tablet, Take 1 tablet by mouth Daily., Disp: 90 tablet, Rfl: 1    hydroxyurea (HYDREA) 500 MG capsule, Take 1 capsule by mouth Daily., Disp: 30 capsule, Rfl: 11    pantoprazole (PROTONIX) 40 MG EC tablet, Take 1 tablet by mouth Daily., Disp: , Rfl:     sertraline (Zoloft) 25 MG tablet, Take 1 tablet by mouth Daily., Disp: 30 tablet, Rfl: 1    lisinopril (PRINIVIL,ZESTRIL) 5 MG tablet, Take 1 tablet by mouth Daily., Disp: 90 tablet, Rfl: 1    Diagnoses and all orders for this visit:    Type 2 diabetes mellitus without complication, without long-term current use of insulin  -     POC Albumin/Creatinine Ratio Urine  -     POC Glycosylated Hemoglobin (Hb A1C)    Encounter for screening for malignant neoplasm of colon  -     Ambulatory Referral For Screening Colonoscopy    Hyperlipidemia LDL goal <70  -     Lipid Panel    Essential hypertension  -     Comprehensive Metabolic Panel    Elevated liver enzymes  -     Comprehensive Metabolic Panel    Stage 3a chronic kidney disease  -     Iron and TIBC  -     Ferritin    Vitamin D insufficiency  -     Vitamin D,25-Hydroxy    Fatigue, unspecified type  -     Vitamin B12  -     CBC Auto Differential  -     Magnesium  -     Thyroid Panel With TSH  -     Thyroid Antibodies    History of stroke    Chronic pain of left knee    Other orders  -     lisinopril (PRINIVIL,ZESTRIL) 5 MG tablet; Take 1 tablet by mouth Daily.      Assessment & Plan  1. Hypertension.  - Blood pressure is elevated today at 160/73.  - Lisinopril will be restarted at a low dose.  - Advised to monitor blood pressure and heart rate daily, recording the readings in the provided log for review during the next visit.  - PDMP checked and is as expected.    2. Type 2 Diabetes Mellitus.  - A1c is well-controlled at 5.2.  - Continue current diabetes management plan.  - No changes in medication or treatment required at this time.    3. Chronic Obstructive Pulmonary Disease (COPD).  - Continue current COPD  management plan.  - No new symptoms or exacerbations reported.    4. History of Myocardial Infarction (MI).  - Continue current medications, including Plavix.  - Maintain hydration to support cardiovascular health.  - No new cardiovascular symptoms reported.    5. Anemia.  - Currently taking folic acid.  - Iron and ferritin levels will be checked during the next lab work.  - Continue current anemia management plan.    6. Congestive Heart Failure (CHF).  - Continue current CHF management plan.  - No new symptoms of heart failure reported.    7. History of Stroke.  - Stroke occurred approximately 4 months ago.  - Continue current medications, including Plavix.  - Maintain hydration to support recovery and prevent complications.    8. Generalized Anxiety Disorder.  - Recently prescribed Zoloft, which is reported to be working well.  - Continue taking Zoloft as prescribed.  - Monitor for any side effects or changes in anxiety symptoms.    9. Hyperlipidemia.  - Continue current cholesterol medication.  - Ensure medication refills are maintained to avoid lapses in treatment.  - No new lipid panel results discussed.    10. Knee Pain.  - Reports pain and swelling in the left knee, potentially requiring a partial knee replacement.  - Advised to use diclofenac gel on the left knee and apply ice after activity.  - Follow-up with orthopedic surgeon recommended.    11. Gastroesophageal Reflux Disease (GERD).  - Currently taking Protonix 40 mg in the evening.  - Advised to reduce the dose to 20 mg and take it at night to avoid interaction with Plavix.  - Monitor for any changes in GERD symptoms.    12. Health Maintenance.  - Advised to stay hydrated and avoid constipation.  - Colonoscopy will be ordered and should be completed this year.  - Full thyroid panel will be conducted during the next lab work.  - Schedule Medicare wellness visit and bring blood work results for review.    No follow-ups on file.    Lupis Thomason,  NAYELI  05/08/2025    Patient or patient representative verbalized consent for the use of Ambient Listening during the visit with  NAYELI Noel for chart documentation

## 2025-05-24 LAB
25(OH)D3+25(OH)D2 SERPL-MCNC: 26.7 NG/ML (ref 30–100)
ALBUMIN SERPL-MCNC: 4.4 G/DL (ref 3.5–5.2)
ALBUMIN/GLOB SERPL: 1.9 G/DL
ALP SERPL-CCNC: 143 U/L (ref 39–117)
ALT SERPL-CCNC: 16 U/L (ref 1–33)
AST SERPL-CCNC: 25 U/L (ref 1–32)
BASOPHILS # BLD AUTO: ABNORMAL 10*3/UL
BILIRUB SERPL-MCNC: 1.2 MG/DL (ref 0–1.2)
BUN SERPL-MCNC: 19 MG/DL (ref 8–23)
BUN/CREAT SERPL: 12.9 (ref 7–25)
CALCIUM SERPL-MCNC: 9.3 MG/DL (ref 8.6–10.5)
CHLORIDE SERPL-SCNC: 107 MMOL/L (ref 98–107)
CHOLEST SERPL-MCNC: 124 MG/DL (ref 0–200)
CO2 SERPL-SCNC: 22.6 MMOL/L (ref 22–29)
CREAT SERPL-MCNC: 1.47 MG/DL (ref 0.57–1)
DIFFERENTIAL COMMENT: ABNORMAL
EGFRCR SERPLBLD CKD-EPI 2021: 38.7 ML/MIN/1.73
EOSINOPHIL # BLD AUTO: ABNORMAL 10*3/UL
EOSINOPHIL # BLD MANUAL: 0.24 10*3/MM3 (ref 0–0.4)
EOSINOPHIL NFR BLD AUTO: ABNORMAL %
EOSINOPHIL NFR BLD MANUAL: 4 % (ref 0.3–6.2)
ERYTHROCYTE [DISTWIDTH] IN BLOOD BY AUTOMATED COUNT: 12.8 % (ref 12.3–15.4)
FERRITIN SERPL-MCNC: 23.5 NG/ML (ref 13–150)
FT4I SERPL CALC-MCNC: 2.3 (ref 1.2–4.9)
GLOBULIN SER CALC-MCNC: 2.3 GM/DL
GLUCOSE SERPL-MCNC: 105 MG/DL (ref 65–99)
HCT VFR BLD AUTO: 38 % (ref 34–46.6)
HDLC SERPL-MCNC: 43 MG/DL (ref 40–60)
HGB BLD-MCNC: 13.3 G/DL (ref 12–15.9)
IRON SATN MFR SERPL: 11 % (ref 20–50)
IRON SERPL-MCNC: 46 MCG/DL (ref 37–145)
LDLC SERPL CALC-MCNC: 58 MG/DL (ref 0–100)
LYMPHOCYTES # BLD AUTO: ABNORMAL 10*3/UL
LYMPHOCYTES # BLD MANUAL: 0.77 10*3/MM3 (ref 0.7–3.1)
LYMPHOCYTES NFR BLD AUTO: ABNORMAL %
LYMPHOCYTES NFR BLD MANUAL: 12 % (ref 19.6–45.3)
MAGNESIUM SERPL-MCNC: 1.9 MG/DL (ref 1.6–2.4)
MCH RBC QN AUTO: 36.9 PG (ref 26.6–33)
MCHC RBC AUTO-ENTMCNC: 35 G/DL (ref 31.5–35.7)
MCV RBC AUTO: 105.6 FL (ref 79–97)
MONOCYTES # BLD MANUAL: 0.18 10*3/MM3 (ref 0.1–0.9)
MONOCYTES NFR BLD AUTO: ABNORMAL %
MONOCYTES NFR BLD MANUAL: 3 % (ref 5–12)
NEUTROPHILS # BLD MANUAL: 4.74 10*3/MM3 (ref 1.7–7)
NEUTROPHILS NFR BLD AUTO: ABNORMAL %
NEUTROPHILS NFR BLD MANUAL: 80 % (ref 42.7–76)
NRBC BLD AUTO-RTO: 0 /100 WBC (ref 0–0.2)
PLATELET # BLD AUTO: 353 10*3/MM3 (ref 140–450)
PLATELET BLD QL SMEAR: ABNORMAL
POTASSIUM SERPL-SCNC: 4.5 MMOL/L (ref 3.5–5.2)
PROT SERPL-MCNC: 6.7 G/DL (ref 6–8.5)
RBC # BLD AUTO: 3.6 10*6/MM3 (ref 3.77–5.28)
RBC MORPH BLD: ABNORMAL
SODIUM SERPL-SCNC: 142 MMOL/L (ref 136–145)
T3RU NFR SERPL: 26 % (ref 24–39)
T4 SERPL-MCNC: 8.9 UG/DL (ref 4.5–12)
THYROGLOB AB SERPL-ACNC: NORMAL [IU]/ML
THYROPEROXIDASE AB SERPL-ACNC: <9 IU/ML (ref 0–34)
TIBC SERPL-MCNC: 429 MCG/DL
TRIGL SERPL-MCNC: 131 MG/DL (ref 0–150)
TSH SERPL DL<=0.005 MIU/L-ACNC: 1.13 UIU/ML (ref 0.45–4.5)
UIBC SERPL-MCNC: 383 MCG/DL (ref 112–346)
VIT B12 SERPL-MCNC: 382 PG/ML (ref 211–946)
VLDLC SERPL CALC-MCNC: 23 MG/DL (ref 5–40)
WBC # BLD AUTO: 5.93 10*3/MM3 (ref 3.4–10.8)

## 2025-05-27 LAB
25(OH)D3+25(OH)D2 SERPL-MCNC: 26.7 NG/ML (ref 30–100)
ALBUMIN SERPL-MCNC: 4.4 G/DL (ref 3.5–5.2)
ALBUMIN/GLOB SERPL: 1.9 G/DL
ALP SERPL-CCNC: 143 U/L (ref 39–117)
ALT SERPL-CCNC: 16 U/L (ref 1–33)
AST SERPL-CCNC: 25 U/L (ref 1–32)
BILIRUB SERPL-MCNC: 1.2 MG/DL (ref 0–1.2)
BUN SERPL-MCNC: 19 MG/DL (ref 8–23)
BUN/CREAT SERPL: 12.9 (ref 7–25)
CALCIUM SERPL-MCNC: 9.3 MG/DL (ref 8.6–10.5)
CHLORIDE SERPL-SCNC: 107 MMOL/L (ref 98–107)
CHOLEST SERPL-MCNC: 124 MG/DL (ref 0–200)
CO2 SERPL-SCNC: 22.6 MMOL/L (ref 22–29)
CREAT SERPL-MCNC: 1.47 MG/DL (ref 0.57–1)
DIFFERENTIAL COMMENT: ABNORMAL
EGFRCR SERPLBLD CKD-EPI 2021: 38.7 ML/MIN/1.73
EOSINOPHIL # BLD MANUAL: 0.24 10*3/MM3 (ref 0–0.4)
EOSINOPHIL NFR BLD MANUAL: 4 % (ref 0.3–6.2)
ERYTHROCYTE [DISTWIDTH] IN BLOOD BY AUTOMATED COUNT: 12.8 % (ref 12.3–15.4)
FERRITIN SERPL-MCNC: 23.5 NG/ML (ref 13–150)
FT4I SERPL CALC-MCNC: 2.3 (ref 1.2–4.9)
GLOBULIN SER CALC-MCNC: 2.3 GM/DL
GLUCOSE SERPL-MCNC: 105 MG/DL (ref 65–99)
HCT VFR BLD AUTO: 38 % (ref 34–46.6)
HDLC SERPL-MCNC: 43 MG/DL (ref 40–60)
HGB BLD-MCNC: 13.3 G/DL (ref 12–15.9)
IRON SATN MFR SERPL: 11 % (ref 20–50)
IRON SERPL-MCNC: 46 MCG/DL (ref 37–145)
LDLC SERPL CALC-MCNC: 58 MG/DL (ref 0–100)
LYMPHOCYTES # BLD MANUAL: 0.77 10*3/MM3 (ref 0.7–3.1)
LYMPHOCYTES NFR BLD MANUAL: 12 % (ref 19.6–45.3)
MAGNESIUM SERPL-MCNC: 1.9 MG/DL (ref 1.6–2.4)
MCH RBC QN AUTO: 36.9 PG (ref 26.6–33)
MCHC RBC AUTO-ENTMCNC: 35 G/DL (ref 31.5–35.7)
MCV RBC AUTO: 105.6 FL (ref 79–97)
MONOCYTES # BLD MANUAL: 0.18 10*3/MM3 (ref 0.1–0.9)
MONOCYTES NFR BLD MANUAL: 3 % (ref 5–12)
NEUTROPHILS # BLD MANUAL: 4.74 10*3/MM3 (ref 1.7–7)
NEUTROPHILS NFR BLD MANUAL: 80 % (ref 42.7–76)
NRBC BLD AUTO-RTO: 0 /100 WBC (ref 0–0.2)
PLATELET # BLD AUTO: 353 10*3/MM3 (ref 140–450)
PLATELET BLD QL SMEAR: ABNORMAL
POTASSIUM SERPL-SCNC: 4.5 MMOL/L (ref 3.5–5.2)
PROT SERPL-MCNC: 6.7 G/DL (ref 6–8.5)
RBC # BLD AUTO: 3.6 10*6/MM3 (ref 3.77–5.28)
RBC MORPH BLD: ABNORMAL
SODIUM SERPL-SCNC: 142 MMOL/L (ref 136–145)
T3RU NFR SERPL: 26 % (ref 24–39)
T4 SERPL-MCNC: 8.9 UG/DL (ref 4.5–12)
THYROGLOB AB SERPL-ACNC: <1 IU/ML (ref 0–0.9)
THYROPEROXIDASE AB SERPL-ACNC: <9 IU/ML (ref 0–34)
TIBC SERPL-MCNC: 429 MCG/DL
TRIGL SERPL-MCNC: 131 MG/DL (ref 0–150)
TSH SERPL DL<=0.005 MIU/L-ACNC: 1.13 UIU/ML (ref 0.45–4.5)
UIBC SERPL-MCNC: 383 MCG/DL (ref 112–346)
VIT B12 SERPL-MCNC: 382 PG/ML (ref 211–946)
VLDLC SERPL CALC-MCNC: 23 MG/DL (ref 5–40)
WBC # BLD AUTO: 5.93 10*3/MM3 (ref 3.4–10.8)

## 2025-06-02 RX ORDER — FOLIC ACID 1 MG/1
1000 TABLET ORAL DAILY
Qty: 90 TABLET | Refills: 1 | OUTPATIENT
Start: 2025-06-02

## 2025-06-09 RX ORDER — FOLIC ACID 1 MG/1
1 TABLET ORAL DAILY
Qty: 90 TABLET | Refills: 1 | Status: SHIPPED | OUTPATIENT
Start: 2025-06-09

## 2025-06-09 NOTE — TELEPHONE ENCOUNTER
Caller: Martha Murray    Relationship: Self    Best call back number: [phone]    Requested Prescriptions:   Requested Prescriptions     Pending Prescriptions Disp Refills    folic acid (FOLVITE) 1 MG tablet 90 tablet 1     Sig: Take 1 tablet by mouth Daily.        Pharmacy where request should be sent: [facility] [id number] [address] AT [facility] [phone] [facility] [phone] FX     Last office visit with prescribing clinician: 5/8/2025   Last telemedicine visit with prescribing clinician: Visit date not found   Next office visit with prescribing clinician: Visit date not found     Additional details provided by patient: PT IS OUT AND NEEDS REFILLS ASAP TODAY    Does the patient have less than a 3 day supply:  [x] Yes  [] No    Would you like a call back once the refill request has been completed: [x] Yes [] No    If the office needs to give you a call back, can they leave a voicemail: [x] Yes [] No    Kris Zuñiga   06/09/25 11:35 EDT

## 2025-06-26 DIAGNOSIS — F33.1 MODERATE EPISODE OF RECURRENT MAJOR DEPRESSIVE DISORDER: ICD-10-CM

## 2025-06-26 DIAGNOSIS — F41.1 GENERALIZED ANXIETY DISORDER: ICD-10-CM

## 2025-06-26 RX ORDER — SERTRALINE HYDROCHLORIDE 25 MG/1
25 TABLET, FILM COATED ORAL DAILY
Qty: 30 TABLET | Refills: 1 | Status: SHIPPED | OUTPATIENT
Start: 2025-06-26

## 2025-07-11 ENCOUNTER — LAB (OUTPATIENT)
Dept: LAB | Facility: HOSPITAL | Age: 70
End: 2025-07-11
Payer: MEDICARE

## 2025-07-11 DIAGNOSIS — D47.3 ESSENTIAL THROMBOCYTOSIS: ICD-10-CM

## 2025-07-11 LAB
ALBUMIN SERPL-MCNC: 4.2 G/DL (ref 3.5–5.2)
ALBUMIN/GLOB SERPL: 1.6 G/DL
ALP SERPL-CCNC: 130 U/L (ref 39–117)
ALT SERPL W P-5'-P-CCNC: 18 U/L (ref 1–33)
ANION GAP SERPL CALCULATED.3IONS-SCNC: 11.1 MMOL/L (ref 5–15)
AST SERPL-CCNC: 22 U/L (ref 1–32)
BASOPHILS # BLD AUTO: 0.04 10*3/MM3 (ref 0–0.2)
BASOPHILS NFR BLD AUTO: 0.5 % (ref 0–1.5)
BILIRUB SERPL-MCNC: 1.3 MG/DL (ref 0–1.2)
BUN SERPL-MCNC: 15 MG/DL (ref 8–23)
BUN/CREAT SERPL: 11.4 (ref 7–25)
CALCIUM SPEC-SCNC: 9.5 MG/DL (ref 8.6–10.5)
CHLORIDE SERPL-SCNC: 102 MMOL/L (ref 98–107)
CO2 SERPL-SCNC: 25.9 MMOL/L (ref 22–29)
CREAT SERPL-MCNC: 1.32 MG/DL (ref 0.57–1)
DEPRECATED RDW RBC AUTO: 54.4 FL (ref 37–54)
EGFRCR SERPLBLD CKD-EPI 2021: 43.8 ML/MIN/1.73
EOSINOPHIL # BLD AUTO: 0.33 10*3/MM3 (ref 0–0.4)
EOSINOPHIL NFR BLD AUTO: 4.3 % (ref 0.3–6.2)
ERYTHROCYTE [DISTWIDTH] IN BLOOD BY AUTOMATED COUNT: 13.9 % (ref 12.3–15.4)
GLOBULIN UR ELPH-MCNC: 2.6 GM/DL
GLUCOSE SERPL-MCNC: 100 MG/DL (ref 65–99)
HCT VFR BLD AUTO: 39.8 % (ref 34–46.6)
HGB BLD-MCNC: 13.2 G/DL (ref 12–15.9)
IMM GRANULOCYTES # BLD AUTO: 0.06 10*3/MM3 (ref 0–0.05)
IMM GRANULOCYTES NFR BLD AUTO: 0.8 % (ref 0–0.5)
LYMPHOCYTES # BLD AUTO: 1.24 10*3/MM3 (ref 0.7–3.1)
LYMPHOCYTES NFR BLD AUTO: 16.3 % (ref 19.6–45.3)
MCH RBC QN AUTO: 35.8 PG (ref 26.6–33)
MCHC RBC AUTO-ENTMCNC: 33.2 G/DL (ref 31.5–35.7)
MCV RBC AUTO: 107.9 FL (ref 79–97)
MONOCYTES # BLD AUTO: 0.52 10*3/MM3 (ref 0.1–0.9)
MONOCYTES NFR BLD AUTO: 6.8 % (ref 5–12)
NEUTROPHILS NFR BLD AUTO: 5.43 10*3/MM3 (ref 1.7–7)
NEUTROPHILS NFR BLD AUTO: 71.3 % (ref 42.7–76)
NRBC BLD AUTO-RTO: 0 /100 WBC (ref 0–0.2)
PLATELET # BLD AUTO: 395 10*3/MM3 (ref 140–450)
PMV BLD AUTO: 9.4 FL (ref 6–12)
POTASSIUM SERPL-SCNC: 4.4 MMOL/L (ref 3.5–5.2)
PROT SERPL-MCNC: 6.8 G/DL (ref 6–8.5)
RBC # BLD AUTO: 3.69 10*6/MM3 (ref 3.77–5.28)
SODIUM SERPL-SCNC: 139 MMOL/L (ref 136–145)
WBC NRBC COR # BLD AUTO: 7.62 10*3/MM3 (ref 3.4–10.8)

## 2025-07-11 PROCEDURE — 85025 COMPLETE CBC W/AUTO DIFF WBC: CPT

## 2025-07-11 PROCEDURE — 80053 COMPREHEN METABOLIC PANEL: CPT

## 2025-07-11 PROCEDURE — 36415 COLL VENOUS BLD VENIPUNCTURE: CPT

## 2025-07-16 ENCOUNTER — TELEPHONE (OUTPATIENT)
Dept: ONCOLOGY | Facility: CLINIC | Age: 70
End: 2025-07-16
Payer: MEDICARE

## 2025-07-16 NOTE — TELEPHONE ENCOUNTER
Hub staff attempted to follow warm transfer process and was unsuccessful     Caller: Martha Murray    Relationship to patient: Self    Best call back number: [phone]    Patient is needing: PT CANCELLED HER APPT ON 7-17 REQUESTING CALLBACK TO R/S

## 2025-08-14 ENCOUNTER — OFFICE VISIT (OUTPATIENT)
Age: 70
End: 2025-08-14
Payer: MEDICARE

## 2025-08-14 VITALS
DIASTOLIC BLOOD PRESSURE: 77 MMHG | BODY MASS INDEX: 32.59 KG/M2 | OXYGEN SATURATION: 98 % | TEMPERATURE: 98.6 F | SYSTOLIC BLOOD PRESSURE: 133 MMHG | HEART RATE: 116 BPM | WEIGHT: 166 LBS | HEIGHT: 60 IN

## 2025-08-14 DIAGNOSIS — N30.00 ACUTE CYSTITIS WITHOUT HEMATURIA: Primary | ICD-10-CM

## 2025-08-14 LAB
BILIRUB BLD-MCNC: ABNORMAL MG/DL
CLARITY, POC: ABNORMAL
COLOR UR: YELLOW
EXPIRATION DATE: ABNORMAL
GLUCOSE UR STRIP-MCNC: NEGATIVE MG/DL
KETONES UR QL: ABNORMAL
LEUKOCYTE EST, POC: ABNORMAL
Lab: ABNORMAL
NITRITE UR-MCNC: NEGATIVE MG/ML
PH UR: 6 [PH] (ref 5–8)
PROT UR STRIP-MCNC: ABNORMAL MG/DL
RBC # UR STRIP: ABNORMAL /UL
SP GR UR: 1.03 (ref 1–1.03)
UROBILINOGEN UR QL: NORMAL

## 2025-08-14 PROCEDURE — 87086 URINE CULTURE/COLONY COUNT: CPT | Performed by: STUDENT IN AN ORGANIZED HEALTH CARE EDUCATION/TRAINING PROGRAM

## 2025-08-14 PROCEDURE — 87077 CULTURE AEROBIC IDENTIFY: CPT | Performed by: STUDENT IN AN ORGANIZED HEALTH CARE EDUCATION/TRAINING PROGRAM

## 2025-08-14 PROCEDURE — 3044F HG A1C LEVEL LT 7.0%: CPT | Performed by: STUDENT IN AN ORGANIZED HEALTH CARE EDUCATION/TRAINING PROGRAM

## 2025-08-14 PROCEDURE — 3078F DIAST BP <80 MM HG: CPT | Performed by: STUDENT IN AN ORGANIZED HEALTH CARE EDUCATION/TRAINING PROGRAM

## 2025-08-14 PROCEDURE — 99213 OFFICE O/P EST LOW 20 MIN: CPT | Performed by: STUDENT IN AN ORGANIZED HEALTH CARE EDUCATION/TRAINING PROGRAM

## 2025-08-14 PROCEDURE — 3075F SYST BP GE 130 - 139MM HG: CPT | Performed by: STUDENT IN AN ORGANIZED HEALTH CARE EDUCATION/TRAINING PROGRAM

## 2025-08-14 PROCEDURE — 1125F AMNT PAIN NOTED PAIN PRSNT: CPT | Performed by: STUDENT IN AN ORGANIZED HEALTH CARE EDUCATION/TRAINING PROGRAM

## 2025-08-14 PROCEDURE — 87186 SC STD MICRODIL/AGAR DIL: CPT | Performed by: STUDENT IN AN ORGANIZED HEALTH CARE EDUCATION/TRAINING PROGRAM

## 2025-08-16 LAB — BACTERIA SPEC AEROBE CULT: ABNORMAL

## 2025-08-19 ENCOUNTER — RESULTS FOLLOW-UP (OUTPATIENT)
Age: 70
End: 2025-08-19
Payer: MEDICARE

## 2025-08-22 ENCOUNTER — TELEPHONE (OUTPATIENT)
Dept: ORTHOPEDIC SURGERY | Facility: CLINIC | Age: 70
End: 2025-08-22
Payer: MEDICARE

## 2025-08-29 ENCOUNTER — TELEPHONE (OUTPATIENT)
Dept: ORTHOPEDIC SURGERY | Facility: CLINIC | Age: 70
End: 2025-08-29
Payer: MEDICARE

## 2025-08-29 ASSESSMENT — KOOS JR
KOOS JR SCORE: 91.98
KOOS JR SCORE: 1

## (undated) DEVICE — PK KN TOTL 10

## (undated) DEVICE — SOL PVPI 10PCT 0.75OZ PEEL/PCH/PACKET 1P/U STRL

## (undated) DEVICE — TAPE,CLOTH/SILK,CURAD,3"X10YD,LF,40/CS: Brand: CURAD

## (undated) DEVICE — NEEDLE, QUINCKE 22GX3.5": Brand: MEDLINE INDUSTRIES, INC.

## (undated) DEVICE — DRSNG SURG AQUACEL AG/ADVNTGE 9X25CM 3.5X10IN

## (undated) DEVICE — Device

## (undated) DEVICE — TRY EPID SFTY 18G 3.5IN 1T7680

## (undated) DEVICE — PAD,ARMBOARD,CONV,FOAM,2X8X20",12PR/CS: Brand: MEDLINE

## (undated) DEVICE — BLANKT WARM UPPR/BDY ARM/OUT 57X196CM

## (undated) DEVICE — PATIENT RETURN ELECTRODE, SINGLE-USE, CONTACT QUALITY MONITORING, ADULT, WITH 9FT CORD, FOR PATIENTS WEIGING OVER 33LBS. (15KG): Brand: MEGADYNE

## (undated) DEVICE — SUT VIC 1 CTX 36IN OBGYN VCP977H

## (undated) DEVICE — GLV SURG SENSICARE PI ORTHO SZ8 LF STRL

## (undated) DEVICE — TRAP FLD MINIVAC MEGADYNE 100ML

## (undated) DEVICE — BNDG ELAS W/CLIP 6IN 10YD LF STRL

## (undated) DEVICE — UNDERCAST PADDING: Brand: DEROYAL

## (undated) DEVICE — ANTIBACTERIAL UNDYED BRAIDED (POLYGLACTIN 910), SYNTHETIC ABSORBABLE SUTURE: Brand: COATED VICRYL

## (undated) DEVICE — BLAD SAGITTAL MAKO STD

## (undated) DEVICE — ADHS SKIN PREMIERPRO EXOFIN TOPICAL HI/VISC .5ML

## (undated) DEVICE — KT TRAK CHECKPOINT 1FEM/1TIB MAKO SS 1P/U STRL

## (undated) DEVICE — PIN BONE MAKO PT 4X140MM SS 1P/U STRL

## (undated) DEVICE — DRESSING,GAUZE,XEROFORM,CURAD,1"X8",ST: Brand: CURAD

## (undated) DEVICE — TB SXN FRAZIER 12F STRL

## (undated) DEVICE — KT PUMP INFUBLOCK MDL 2100 PMKITSOLIS

## (undated) DEVICE — STRYKER PERFORMANCE SERIES SAGITTAL BLADE: Brand: STRYKER PERFORMANCE SERIES

## (undated) DEVICE — BNDG ELAS CO-FLEX SLF ADHR 6IN 5YD LF STRL

## (undated) DEVICE — DISPOSABLE TOURNIQUET CUFF SINGLE BLADDER, DUAL PORT AND QUICK CONNECT CONNECTOR: Brand: COLOR CUFF

## (undated) DEVICE — SUT MONOCRYL PLS ANTIB UND 3/0  PS1 27IN

## (undated) DEVICE — KT PROC KN MAKO VIZADISC TRACK 1P/U STRL

## (undated) DEVICE — CATHETER,URETHRAL,REDRUBBER,STERILE,22FR: Brand: MEDLINE

## (undated) DEVICE — UNDERGLV SURG BIOGEL INDICAT PI SZ8 BLU

## (undated) DEVICE — GLV SURG SENSICARE PI MIC PF SZ9 LF STRL

## (undated) DEVICE — PIN BONE MAKO PT 4X110MM SS 1P/U STRL

## (undated) DEVICE — DRSNG WND BORDR/ADHS NONADHR/GZ LF 4X4IN STRL

## (undated) DEVICE — SYR LUERLOK 30CC